# Patient Record
Sex: MALE | Race: WHITE | NOT HISPANIC OR LATINO | URBAN - METROPOLITAN AREA
[De-identification: names, ages, dates, MRNs, and addresses within clinical notes are randomized per-mention and may not be internally consistent; named-entity substitution may affect disease eponyms.]

---

## 2017-07-27 ENCOUNTER — EMERGENCY (EMERGENCY)
Facility: HOSPITAL | Age: 57
LOS: 0 days | Discharge: HOME | End: 2017-07-27

## 2017-07-27 DIAGNOSIS — G51.0 BELL'S PALSY: ICD-10-CM

## 2017-07-27 DIAGNOSIS — E11.9 TYPE 2 DIABETES MELLITUS WITHOUT COMPLICATIONS: ICD-10-CM

## 2017-07-27 DIAGNOSIS — I10 ESSENTIAL (PRIMARY) HYPERTENSION: ICD-10-CM

## 2017-07-27 DIAGNOSIS — I25.10 ATHEROSCLEROTIC HEART DISEASE OF NATIVE CORONARY ARTERY WITHOUT ANGINA PECTORIS: ICD-10-CM

## 2017-07-27 DIAGNOSIS — Z79.899 OTHER LONG TERM (CURRENT) DRUG THERAPY: ICD-10-CM

## 2017-07-27 DIAGNOSIS — Z79.82 LONG TERM (CURRENT) USE OF ASPIRIN: ICD-10-CM

## 2017-07-27 DIAGNOSIS — Z95.5 PRESENCE OF CORONARY ANGIOPLASTY IMPLANT AND GRAFT: ICD-10-CM

## 2017-07-27 DIAGNOSIS — Z88.1 ALLERGY STATUS TO OTHER ANTIBIOTIC AGENTS STATUS: ICD-10-CM

## 2017-07-27 DIAGNOSIS — R51 HEADACHE: ICD-10-CM

## 2017-07-27 DIAGNOSIS — Z79.84 LONG TERM (CURRENT) USE OF ORAL HYPOGLYCEMIC DRUGS: ICD-10-CM

## 2017-07-27 DIAGNOSIS — N28.9 DISORDER OF KIDNEY AND URETER, UNSPECIFIED: ICD-10-CM

## 2017-07-27 DIAGNOSIS — Z79.02 LONG TERM (CURRENT) USE OF ANTITHROMBOTICS/ANTIPLATELETS: ICD-10-CM

## 2018-03-08 ENCOUNTER — INPATIENT (INPATIENT)
Facility: HOSPITAL | Age: 58
LOS: 3 days | Discharge: HOME | End: 2018-03-12
Attending: INTERNAL MEDICINE

## 2018-03-08 VITALS
OXYGEN SATURATION: 99 % | HEART RATE: 65 BPM | SYSTOLIC BLOOD PRESSURE: 172 MMHG | RESPIRATION RATE: 18 BRPM | TEMPERATURE: 98 F | DIASTOLIC BLOOD PRESSURE: 90 MMHG

## 2018-03-08 DIAGNOSIS — Z87.898 PERSONAL HISTORY OF OTHER SPECIFIED CONDITIONS: Chronic | ICD-10-CM

## 2018-03-08 DIAGNOSIS — Z98.890 OTHER SPECIFIED POSTPROCEDURAL STATES: Chronic | ICD-10-CM

## 2018-03-08 LAB
ALBUMIN SERPL ELPH-MCNC: 3.4 G/DL — SIGNIFICANT CHANGE UP (ref 3–5.5)
ALP SERPL-CCNC: 97 U/L — SIGNIFICANT CHANGE UP (ref 30–115)
ALT FLD-CCNC: 10 U/L — SIGNIFICANT CHANGE UP (ref 0–41)
ANION GAP SERPL CALC-SCNC: 12 MMOL/L — SIGNIFICANT CHANGE UP (ref 7–14)
AST SERPL-CCNC: 11 U/L — SIGNIFICANT CHANGE UP (ref 0–41)
BASE EXCESS BLDV CALC-SCNC: -11.4 MMOL/L — LOW (ref -2–2)
BASOPHILS # BLD AUTO: 0.03 K/UL — SIGNIFICANT CHANGE UP (ref 0–0.2)
BASOPHILS NFR BLD AUTO: 0.3 % — SIGNIFICANT CHANGE UP (ref 0–1)
BILIRUB SERPL-MCNC: 0.5 MG/DL — SIGNIFICANT CHANGE UP (ref 0.2–1.2)
BUN SERPL-MCNC: 71 MG/DL — CRITICAL HIGH (ref 10–20)
CA-I SERPL-SCNC: 1.12 MMOL/L — SIGNIFICANT CHANGE UP (ref 1.12–1.3)
CALCIUM SERPL-MCNC: 8.3 MG/DL — LOW (ref 8.5–10.1)
CHLORIDE SERPL-SCNC: 114 MMOL/L — HIGH (ref 98–110)
CO2 SERPL-SCNC: 15 MMOL/L — LOW (ref 17–32)
CREAT SERPL-MCNC: 7.5 MG/DL — CRITICAL HIGH (ref 0.7–1.5)
EOSINOPHIL # BLD AUTO: 0.32 K/UL — SIGNIFICANT CHANGE UP (ref 0–0.7)
EOSINOPHIL NFR BLD AUTO: 3.7 % — SIGNIFICANT CHANGE UP (ref 0–8)
GAS PNL BLDV: 142 MMOL/L — SIGNIFICANT CHANGE UP (ref 136–145)
GAS PNL BLDV: SIGNIFICANT CHANGE UP
GLUCOSE SERPL-MCNC: 121 MG/DL — HIGH (ref 70–110)
HCO3 BLDV-SCNC: 15 MMOL/L — LOW (ref 22–29)
HCT VFR BLD CALC: 25.2 % — LOW (ref 42–52)
HGB BLD CALC-MCNC: 9.1 G/DL — LOW (ref 14–18)
HGB BLD-MCNC: 8.1 G/DL — LOW (ref 14–18)
IMM GRANULOCYTES NFR BLD AUTO: 0.3 % — SIGNIFICANT CHANGE UP (ref 0.1–0.3)
LACTATE BLDV-MCNC: 0.7 MMOL/L — SIGNIFICANT CHANGE UP (ref 0.5–1.6)
LACTATE SERPL-SCNC: 0.9 MMOL/L — SIGNIFICANT CHANGE UP (ref 0.5–2.2)
LYMPHOCYTES # BLD AUTO: 1.18 K/UL — LOW (ref 1.2–3.4)
LYMPHOCYTES # BLD AUTO: 13.5 % — LOW (ref 20.5–51.1)
MCHC RBC-ENTMCNC: 29.7 PG — SIGNIFICANT CHANGE UP (ref 27–31)
MCHC RBC-ENTMCNC: 32.1 G/DL — SIGNIFICANT CHANGE UP (ref 32–37)
MCV RBC AUTO: 92.3 FL — SIGNIFICANT CHANGE UP (ref 80–94)
MONOCYTES # BLD AUTO: 0.87 K/UL — HIGH (ref 0.1–0.6)
MONOCYTES NFR BLD AUTO: 10 % — HIGH (ref 1.7–9.3)
NEUTROPHILS # BLD AUTO: 6.3 K/UL — SIGNIFICANT CHANGE UP (ref 1.4–6.5)
NEUTROPHILS NFR BLD AUTO: 72.2 % — SIGNIFICANT CHANGE UP (ref 42.2–75.2)
NRBC # BLD: 0 /100 WBCS — SIGNIFICANT CHANGE UP (ref 0–0)
PCO2 BLDV: 36 MMHG — LOW (ref 41–51)
PH BLDV: 7.24 — LOW (ref 7.26–7.43)
PLATELET # BLD AUTO: 194 K/UL — SIGNIFICANT CHANGE UP (ref 130–400)
PO2 BLDV: 30 MMHG — SIGNIFICANT CHANGE UP (ref 20–40)
POTASSIUM BLDV-SCNC: 4.5 MMOL/L — SIGNIFICANT CHANGE UP (ref 3.3–5.6)
POTASSIUM SERPL-MCNC: 4.5 MMOL/L — SIGNIFICANT CHANGE UP (ref 3.5–5)
POTASSIUM SERPL-SCNC: 4.5 MMOL/L — SIGNIFICANT CHANGE UP (ref 3.5–5)
PROT SERPL-MCNC: 5.6 G/DL — LOW (ref 6–8)
RBC # BLD: 2.73 M/UL — LOW (ref 4.7–6.1)
RBC # FLD: 14.6 % — HIGH (ref 11.5–14.5)
SAO2 % BLDV: 47 % — SIGNIFICANT CHANGE UP
SODIUM SERPL-SCNC: 141 MMOL/L — SIGNIFICANT CHANGE UP (ref 135–146)
TROPONIN I SERPL-MCNC: 0.03 NG/ML — SIGNIFICANT CHANGE UP (ref 0–0.05)
WBC # BLD: 8.73 K/UL — SIGNIFICANT CHANGE UP (ref 4.8–10.8)
WBC # FLD AUTO: 8.73 K/UL — SIGNIFICANT CHANGE UP (ref 4.8–10.8)

## 2018-03-08 RX ORDER — SODIUM CHLORIDE 9 MG/ML
1000 INJECTION, SOLUTION INTRAVENOUS
Qty: 0 | Refills: 0 | Status: DISCONTINUED | OUTPATIENT
Start: 2018-03-08 | End: 2018-03-08

## 2018-03-08 RX ORDER — SODIUM CHLORIDE 9 MG/ML
3 INJECTION INTRAMUSCULAR; INTRAVENOUS; SUBCUTANEOUS ONCE
Qty: 0 | Refills: 0 | Status: COMPLETED | OUTPATIENT
Start: 2018-03-08 | End: 2018-03-08

## 2018-03-08 RX ORDER — OCTREOTIDE ACETATE 200 UG/ML
0 INJECTION, SOLUTION INTRAVENOUS; SUBCUTANEOUS
Qty: 0 | Refills: 0 | COMMUNITY

## 2018-03-08 RX ORDER — MECLIZINE HCL 12.5 MG
12.5 TABLET ORAL THREE TIMES A DAY
Qty: 0 | Refills: 0 | Status: DISCONTINUED | OUTPATIENT
Start: 2018-03-08 | End: 2018-03-12

## 2018-03-08 RX ORDER — ATORVASTATIN CALCIUM 80 MG/1
40 TABLET, FILM COATED ORAL AT BEDTIME
Qty: 0 | Refills: 0 | Status: DISCONTINUED | OUTPATIENT
Start: 2018-03-08 | End: 2018-03-12

## 2018-03-08 RX ORDER — ASPIRIN/CALCIUM CARB/MAGNESIUM 324 MG
1 TABLET ORAL
Qty: 0 | Refills: 0 | COMMUNITY

## 2018-03-08 RX ORDER — SODIUM CHLORIDE 9 MG/ML
1000 INJECTION INTRAMUSCULAR; INTRAVENOUS; SUBCUTANEOUS ONCE
Qty: 0 | Refills: 0 | Status: COMPLETED | OUTPATIENT
Start: 2018-03-08 | End: 2018-03-08

## 2018-03-08 RX ORDER — CALCITRIOL 0.5 UG/1
1 CAPSULE ORAL
Qty: 0 | Refills: 0 | COMMUNITY

## 2018-03-08 RX ORDER — METOCLOPRAMIDE HCL 10 MG
10 TABLET ORAL ONCE
Qty: 0 | Refills: 0 | Status: DISCONTINUED | OUTPATIENT
Start: 2018-03-08 | End: 2018-03-08

## 2018-03-08 RX ORDER — AMLODIPINE BESYLATE 2.5 MG/1
10 TABLET ORAL DAILY
Qty: 0 | Refills: 0 | Status: DISCONTINUED | OUTPATIENT
Start: 2018-03-08 | End: 2018-03-12

## 2018-03-08 RX ORDER — CALCITRIOL 0.5 UG/1
0.25 CAPSULE ORAL EVERY OTHER DAY
Qty: 0 | Refills: 0 | Status: DISCONTINUED | OUTPATIENT
Start: 2018-03-08 | End: 2018-03-12

## 2018-03-08 RX ORDER — CLOPIDOGREL BISULFATE 75 MG/1
75 TABLET, FILM COATED ORAL DAILY
Qty: 0 | Refills: 0 | Status: DISCONTINUED | OUTPATIENT
Start: 2018-03-08 | End: 2018-03-12

## 2018-03-08 RX ORDER — HEPARIN SODIUM 5000 [USP'U]/ML
5000 INJECTION INTRAVENOUS; SUBCUTANEOUS EVERY 8 HOURS
Qty: 0 | Refills: 0 | Status: DISCONTINUED | OUTPATIENT
Start: 2018-03-08 | End: 2018-03-09

## 2018-03-08 RX ORDER — AMLODIPINE BESYLATE 2.5 MG/1
10 TABLET ORAL ONCE
Qty: 0 | Refills: 0 | Status: COMPLETED | OUTPATIENT
Start: 2018-03-08 | End: 2018-03-08

## 2018-03-08 RX ORDER — ATENOLOL 25 MG/1
50 TABLET ORAL DAILY
Qty: 0 | Refills: 0 | Status: DISCONTINUED | OUTPATIENT
Start: 2018-03-08 | End: 2018-03-12

## 2018-03-08 RX ORDER — ASPIRIN/CALCIUM CARB/MAGNESIUM 324 MG
325 TABLET ORAL DAILY
Qty: 0 | Refills: 0 | Status: DISCONTINUED | OUTPATIENT
Start: 2018-03-08 | End: 2018-03-12

## 2018-03-08 RX ORDER — SODIUM BICARBONATE 1 MEQ/ML
0.05 SYRINGE (ML) INTRAVENOUS
Qty: 75 | Refills: 0 | Status: DISCONTINUED | OUTPATIENT
Start: 2018-03-08 | End: 2018-03-11

## 2018-03-08 RX ADMIN — SODIUM CHLORIDE 75 MILLILITER(S): 9 INJECTION, SOLUTION INTRAVENOUS at 20:05

## 2018-03-08 RX ADMIN — SODIUM CHLORIDE 2400 MILLILITER(S): 9 INJECTION INTRAMUSCULAR; INTRAVENOUS; SUBCUTANEOUS at 15:00

## 2018-03-08 RX ADMIN — HEPARIN SODIUM 5000 UNIT(S): 5000 INJECTION INTRAVENOUS; SUBCUTANEOUS at 23:17

## 2018-03-08 RX ADMIN — ATENOLOL 50 MILLIGRAM(S): 25 TABLET ORAL at 23:16

## 2018-03-08 RX ADMIN — SODIUM CHLORIDE 3 MILLILITER(S): 9 INJECTION INTRAMUSCULAR; INTRAVENOUS; SUBCUTANEOUS at 15:00

## 2018-03-08 RX ADMIN — AMLODIPINE BESYLATE 10 MILLIGRAM(S): 2.5 TABLET ORAL at 20:05

## 2018-03-08 RX ADMIN — CLOPIDOGREL BISULFATE 75 MILLIGRAM(S): 75 TABLET, FILM COATED ORAL at 23:10

## 2018-03-08 NOTE — ED PROVIDER NOTE - CARE PLAN
Principal Discharge DX:	Renal failure  Secondary Diagnosis:	Dizzy  Secondary Diagnosis:	DVT (deep venous thrombosis)

## 2018-03-08 NOTE — ED PROVIDER NOTE - OBJECTIVE STATEMENT
56 y/o M with PMH of acromyalgia, CAD with stents, diabetes, HTN, here for evaluation of dizziness. Pt reports that around 9 PM last night after going to bathroom he was walking back and started feeling dizziness and lightheadedness. Dizziness was associated with everything spinning, when pt sat down SX were resolved. Again at 8 AM this morning, pt went to the bathroom, pt reported feeling light headed and dizzy similar to spinning sensation however, this time pt  fell to legs, did not pass out. Denies hitting head. Pt currently with no SX. Denies any CP, SOB or rectal bleeding. Pt reports due to surgery has not been getting around much. Pt at home uses a cane to get around.

## 2018-03-08 NOTE — H&P ADULT - NSHPPHYSICALEXAM_GEN_ALL_CORE
GENERAL: NAD, well-developed  HEAD:  Atraumatic, Normocephalic  EYES: EOMI, PERRLA, conjunctiva and sclera clear  NECK: Supple, No JVD  CHEST/LUNG: Clear to auscultation bilaterally; No wheeze  HEART: Regular rate and rhythm; No murmurs  ABDOMEN: Soft, Nontender, Nondistended; Bowel sounds present  EXTREMITIES: 2-3 + pitting edema up to the knee bilaterally. + chronic vascular changes. no tenderness on palpation   NEUROLOGY: non-focal, CN II to XII grossly intact, AAOX3. no dysmetria. No nystagmus. gait: deferred

## 2018-03-08 NOTE — H&P ADULT - NSHPREVIEWOFSYSTEMS_GEN_ALL_CORE
Review of Systems:   CONSTITUTIONAL: No fever, weight changes  EYE: blurry vision during dizziness  ENMT:  No difficulty hearing, tinnitus, vertigo; No sinus or throat pain  NECK: No pain or stiffness  RESPIRATORY: No cough, wheezing, chills or hemoptysis; No shortness of breath  CARDIOVASCULAR: No chest pain, palpitations  GASTROINTESTINAL: No abdominal or epigastric pain. No nausea, vomiting, or hematemesis; No diarrhea or constipation. No melena or hematochezia.  GENITOURINARY: No dysuria  NEUROLOGICAL: No headaches, memory loss, loss of strength, numbness, or tremors. + dizziness/ spinning sensation/ imbalance  SKIN: No itching, burning, rashes, or lesions   MUSCULOSKELETAL: No joint pain or swelling

## 2018-03-08 NOTE — H&P ADULT - PMH
Acromegaly    CAD (coronary atherosclerotic disease)    CKD (chronic kidney disease), stage V    DM (diabetes mellitus), type 2    Dyslipidemia    HTN (hypertension)    Neuropathy

## 2018-03-08 NOTE — H&P ADULT - NSHPLABSRESULTS_GEN_ALL_CORE
T(C): 36.6 (03-08-18 @ 20:04), Max: 36.7 (03-08-18 @ 13:18)  T(F): 97.9 (03-08-18 @ 20:04), Max: 98 (03-08-18 @ 13:18)  HR: 55 (03-08-18 @ 20:04) (54 - 65)  BP: 175/100 (03-08-18 @ 20:04) (172/90 - 187/92)  RR: 18 (03-08-18 @ 20:04) (18 - 18)  SpO2: 99% (03-08-18 @ 20:04) (99% - 99%)  Labs:                         8.1<L>  8.73    )-----------(   194      ( 08 Mar 2018 15:20 )              25.2<L>    Neutro%  72.2    Lympho%  13.5<L>   Mono%    10.0<H>   Bands    x        03-08    141  |  114<H>  |  71<HH>  ----------------------------<  121<H>  4.5   |  15<L>  |  7.5<HH>    Ca    8.3<L>      08 Mar 2018 15:20    TPro  5.6<L>  /  Alb  3.4  /  TBili  0.5  /  DBili  x   /  AST  11  /  ALT  10  /  AlkPhos  97  03-08    eGFR if Non African American: 7 mL/min/1.73M2 (03-08-18 @ 15:20)  eGFR if : 9 mL/min/1.73M2 (03-08-18 @ 15:20)          CARDIAC MARKERS ( 08 Mar 2018 15:20 )  0.03 ng/mL / x     / x     / x     / x            Venous Blood Gas:  03-08 @ 17:22  7.24/36/30/15/47  VBG Lactate: 0.7

## 2018-03-08 NOTE — CONSULT NOTE ADULT - ASSESSMENT
Pt is 57M w/ Hx above who presented to the hospital for 2 episodes of dizziness w/ the second causing a fall to his knees. Pt was noted to have bilat leg swelling in ED L>R and received a venous duplex that was pos for chronic left femoral, popliteal, and gastrocnemius thrombus. Other than leg swelling for the past 2-3 weeks Pt has no acute symptoms relating to DVT. Due to chronic nature of DVTs Pt may need AC and f/u as outPt. Will discuss with attending.

## 2018-03-08 NOTE — H&P ADULT - ASSESSMENT
56 yo M with PMHx of pituitary tumor s/p removal, acromegaly, CAD s/p stents, DLD, DMII, neuropathy, CKD 5 presents with dizziness X2 days.     # Dizziness/ spinning sensation/ imbalance associated with blurry vision possibly 2/2 BPPV, r/o vasovagal r/o orthostatic r/o central neuro or cardiogenic causes  - f/u echo   - orthostatic VS follow up  - meclizine 12.5mg po q8 prn   - follow up C. E 2nd set  - get EKG    # LLE DVT (chronic appearing)  - follow up vascular C/S  - per ED note, ED spoke to vascular resident, if pt is asymptomatic and dvt is chronic, AC treatment not necessary    # NAGMA and KATHARINA on CKD V  - spoke to renal fellow, start 75 meq sodium bicarb infusion in 1/2 NS at 75 cc/hr  - monitor Is and OS  - pt refused ortiz  -monitor BMP    # DMII  - monitor FS  - start insulin ss if FS > 160    # HTN  - c/w home meds    # DLD  - c/w home meds    # CAD s/p stents  - c/w ASA, plavix    # DVT ppx   no need for GI ppx now 58 yo M with PMHx of pituitary tumor s/p removal, acromegaly, CAD s/p stents, DLD, DMII, neuropathy, CKD 5 presents with dizziness X2 days.     # Dizziness/ spinning sensation/ imbalance associated with blurry vision possibly 2/2 BPPV, r/o vasovagal r/o orthostatic r/o central neuro or cardiogenic causes  - f/u echo   - orthostatic VS follow up  - meclizine 12.5mg po q8 prn   - follow up C. E 2nd set    # LLE DVT (chronic appearing)  - follow up vascular C/S  - per ED note, ED spoke to vascular resident, if pt is asymptomatic and dvt is chronic, AC treatment not necessary    # NAGMA and KATHARINA on CKD V  - spoke to renal fellow, start 75 meq sodium bicarb infusion in 1/2 NS at 75 cc/hr  - monitor Is and OS, follow up Urine studies and follow up with retroperitoneal US  - pt refused ortiz  -monitor BMP    # DMII  - monitor FS  - start insulin ss if FS > 160    # HTN  - c/w home meds    # DLD  - c/w home meds    # CAD s/p stents  - c/w ASA, plavix    # DVT ppx   no need for GI ppx now 58 yo M with PMHx of pituitary tumor s/p removal, acromegaly, CAD s/p stents, DLD, DMII, neuropathy, CKD 5 presents with dizziness X2 days.     # Dizziness/ spinning sensation/ imbalance associated with blurry vision possibly 2/2 BPPV, r/o vasovagal r/o orthostatic r/o central neuro or arrhythmia  - f/u echo   - orthostatic VS follow up  - meclizine 12.5mg po q8 prn   - follow up C. E 2nd set    # chronic normocytic anemia  - H/H at baseline (hb 8.7 on 7/2017)  - f/u as outpatient    # LLE DVT (chronic appearing)  - follow up vascular C/S  - per ED note, ED spoke to vascular resident, if pt is asymptomatic and dvt is chronic, AC treatment not necessary    # NAGMA and KATHARINA on CKD V  - spoke to renal fellow, start 75 meq sodium bicarb infusion in 1/2 NS at 75 cc/hr  - monitor Is and OS, follow up Urine studies and follow up with retroperitoneal US  - pt refused ortiz  -monitor BMP    # DMII  - monitor FS  - start insulin ss if FS > 160    # HTN  - c/w home meds    # DLD  - c/w home meds    # CAD s/p stents  - c/w ASA, plavix    # DVT ppx   no need for GI ppx now

## 2018-03-08 NOTE — H&P ADULT - ATTENDING COMMENTS
Patient seen and examined independently. Resident's H & P reviewed. Agree with the findings and plan of care except,    1. UTI  2. KATHARINA/CKD-V likely due to obstructive uropathy  3. Urinary retention/BPH  4. AG Metabolic Acidosis  5. LLE DVT likely Ch.  6. Anemia of Ch. disease  7. Acromegaly  8. Likely JABARI  9. CAD        PLAN:    . Start him in IV Rociphen  . Start Flomax 0.4mg po q24h  . Pt. refused catheter  . Start Lasix 40mg IVP q12h.  . Check I'S and O'S  . Nephrology eval noted  . Pulm eval  . Vasc eval  . Will start him on IV hep for now. Follow PTT.  . On NAHCO3 drip. Renal F/U.

## 2018-03-08 NOTE — CONSULT NOTE ADULT - SUBJECTIVE AND OBJECTIVE BOX
HPI:  This patient is a 57y Male with PMH/PSH as below, who presents to the hospital w/ new onset dizziness for one day. Pt described it as a spinning sensation associated with imbalance and blurry vision lasting about 1-2 mins. Pt had another episode of dizziness the day of presentation. This last episode Pt fell to knees causing some shin abrasions and prompting his visit to the ED. In the ED Pt was noted to have swelling of the lower extrems L>R. Pt notes that this swelling is 2-3wks old. Lower extrem duplex was done which showed chronic appearing left femoral, popliteal, and gastrocnemius thrombus. Pt denies tenderness or discoloration of leg other than multiple healing wounds from constantly hitting shins due to chronic neuropathy. Pt is taking ASA and Plavix for Hx of coronary stents.    PMH:  Dyslipidemia  DM (diabetes mellitus), type 2  Neuropathy  HTN (hypertension)  CAD (coronary atherosclerotic disease)  Acromegaly  CKD (chronic kidney disease), stage V    PSH:  H/O eye surgery  H/O: pituitary tumor    Home Medications:  amLODIPine 10 mg oral tablet: 1 tab(s) orally once a day  aspirin 325 mg oral tablet: 1 tab(s) orally once a day  atenolol 50 mg oral tablet: 1 tab(s) orally once a day  calcitriol 0.25 mcg oral capsule: 1 cap(s) orally every 48 hours  Crestor 10 mg oral tablet: 1 tab(s) orally once a day (at bedtime)  glipiZIDE 2.5 mg oral tablet, extended release: 1 tab(s) orally once a day  Plavix 75 mg oral tablet: 1 tab(s) orally once a day  SandoSTATIN LAR [place] 20 mg intramuscular injection, extended release: intramuscular every 30 days  sodium bicarbonate 650 mg oral tablet: 1 tab(s) orally 2 times a day    Hospital Medications:    amLODIPine   Tablet 10 milliGRAM(s) Oral once  sodium chloride 0.9% Bolus 1000 milliLiter(s) IV Bolus once  sodium chloride 0.9% lock flush 3 milliLiter(s) IV Push once    Allergies:  bacitracin (Unknown)  Neosporin (Hypotension)      Physical Examination  Vital Signs: T(F): 96.2 (03-08-18 @ 21:39), Max: 98 (03-08-18 @ 13:18)  HR: 54 (03-08-18 @ 21:39)  BP: 186/91 (03-08-18 @ 21:39)  RR: 18 (03-08-18 @ 21:39)  SpO2: 99% (03-08-18 @ 20:04)  General Appearance: NAD, alert and cooperative  HEENT: Atraumatic, frontal bossing noted  Heart: Distant heart sounds. No murmurs. Rhythm is regular  Lungs: Clear to auscultation BL without rales, rhonchi, wheezing or diminished breath sounds.  Abdomen:  Positive bowel sounds. Soft, nondistended, nontender.  MSK/Extremities: +3 pitting edema bilat lower extrems extending to knees l>R, anterior shin with chronic and acute abrasions. palp DP bilat PT signs bilat, non-tender and absent sensation due to neuropathy, decreased ROM-extend/flex of toes.  Skin: Warm/dry, Normal color, texture and turgor with no lesions or eruptions. No jaundice.     Labs:  CBC: 03-08-18 @ 15:20  WBC: 8.73 K/uL N-- M-- L-- E--  HEMO: 8.1 g/dL<L> RBC2.73 M/uL<L>  HCT: 25.2 %<L>  PLT: 194 K/uL , --    BMP:03-08-18 @ 15:20   mmol/L  mmol/L<H> BUN 71 mg/dL<HH> GFR 9 mL/min/1.73M2<L>  K 4.5 mmol/L CO2 15 mmol/L<L> CR 7.5 mg/dL<HH> GLUC 121 mg/dL<H>    CA 8.3 mg/dL<L>  MG --  PO4 --    LFT: 03-08-18 @ 15:20  TPROT 5.6 g/dL<L> TBILI 0.5 mg/dL AST 11 U/L ALP 97 U/L  ALB 3.4 g/dL DBILI -- ALT 10 U/L      OTHER LABS:   Troponin I, Serum: 0.03 ng/mL (03-08-18 @ 15:20)  Lactate, Blood: 0.9 mmol/L (03-08-18 @ 15:20)      Imaging:  < from: [facility] Duplex Lower Ext Vein Scan, Bilat (03.08.18 @ 14:41) >  Impression:  Nonocclusive, chronic-appearing thrombus is present in the left femoral   and popliteal veins, along with the left gastrocnemius veins.  No evidence of deep venous thrombosis or superficial venous   thrombophlebitis on the right.  No prior studies available for comparison.      03-08-18 Inpatient  Height (cm): 185 (03-08-18 @ 21:00)  Weight (kg): 113 (03-08-18 @ 21:00)  BMI (kg/m2): 33 (03-08-18 @ 21:00)  BSA (m2): 2.36 (03-08-18 @ 21:00)

## 2018-03-08 NOTE — ED ADULT NURSE NOTE - OBJECTIVE STATEMENT
Pt has been feeling dizzy, experiencing dizzy spells. Family states he passed out yesterday and called family right after. Pt has history of acromegaly and brain surgery years

## 2018-03-08 NOTE — H&P ADULT - HISTORY OF PRESENT ILLNESS
56 yo M with PMHx of pituitary tumor s/p removal, acromegaly, CAD s/p stents, DLD, DMII, neuropathy, CKD 5 presents with dizziness X2 days. Pt felt dizziness the night before presentation, pt was walking back after going to the bathroom and felt dizzy. Pt described it as a spinning sensation associated with imbalance and blurry vision lasting about 1-2 mins. Pt had another episode of dizziness the day of presentation. Again, he was walking back after urinating, denies straining, felt dizziness on his way back. However, pt felt to his legs this time and called an ambulance to the hospital. Pt denies headache, fever, chills, N/V/D, SOB, CP, abdominal pain, leg pain, dysuria, blood in urine or stool.   Pt was found to have pH 7.24 Cr 7.5 and Bicarb of 15 in ED. Pt has a baseline cr of 6.64 (7/2017), follows up with Dr. Jones regularly and states that he stills make urine. Pt refused ortiz in the ED  Pt was found to have chronic appearing DVT in LLE.     In ED: 1 L NS bouls.

## 2018-03-08 NOTE — ED PROVIDER NOTE - PROGRESS NOTE DETAILS
pt refusing rectal exam states he know he has no bleeding Pt with chronic DVT, I spoke to vascular fellow unofficially who states if pt is not symptomatic can hold off on treatment. Pt has minimal L leg swelling, R leg swelling is greater. Additionally pt noted to have Creatinine of 7.5, agree with review of previous records with Creatinine of 6. Nephrologist not in [facility]. [facility] 7.24. Pt refusing He[place], states he makes urine. Will admit pt for further evaluation and treatment.  Nephrology consulted, pt signed out the Mar. Discussed with renal, will see pt tomorrow.

## 2018-03-09 PROBLEM — Z00.00 ENCOUNTER FOR PREVENTIVE HEALTH EXAMINATION: Status: ACTIVE | Noted: 2018-03-09

## 2018-03-09 LAB
ANION GAP SERPL CALC-SCNC: 10 MMOL/L — SIGNIFICANT CHANGE UP (ref 7–14)
ANION GAP SERPL CALC-SCNC: 11 MMOL/L — SIGNIFICANT CHANGE UP (ref 7–14)
ANION GAP SERPL CALC-SCNC: 12 MMOL/L — SIGNIFICANT CHANGE UP (ref 7–14)
BASOPHILS # BLD AUTO: 0.06 K/UL — SIGNIFICANT CHANGE UP (ref 0–0.2)
BASOPHILS NFR BLD AUTO: 0.6 % — SIGNIFICANT CHANGE UP (ref 0–1)
BUN SERPL-MCNC: 67 MG/DL — CRITICAL HIGH (ref 10–20)
BUN SERPL-MCNC: 67 MG/DL — CRITICAL HIGH (ref 10–20)
BUN SERPL-MCNC: 68 MG/DL — CRITICAL HIGH (ref 10–20)
CALCIUM SERPL-MCNC: 7.8 MG/DL — LOW (ref 8.5–10.1)
CALCIUM SERPL-MCNC: 7.8 MG/DL — LOW (ref 8.5–10.1)
CALCIUM SERPL-MCNC: 7.9 MG/DL — LOW (ref 8.5–10.1)
CHLORIDE SERPL-SCNC: 109 MMOL/L — SIGNIFICANT CHANGE UP (ref 98–110)
CHLORIDE SERPL-SCNC: 110 MMOL/L — SIGNIFICANT CHANGE UP (ref 98–110)
CHLORIDE SERPL-SCNC: 112 MMOL/L — HIGH (ref 98–110)
CK MB BLD-MCNC: 4 % — SIGNIFICANT CHANGE UP (ref 0–4)
CK MB CFR SERPL CALC: 5.9 NG/ML — SIGNIFICANT CHANGE UP (ref 0.6–6.3)
CK SERPL-CCNC: 141 U/L — SIGNIFICANT CHANGE UP (ref 0–225)
CO2 SERPL-SCNC: 15 MMOL/L — LOW (ref 17–32)
CO2 SERPL-SCNC: 16 MMOL/L — LOW (ref 17–32)
CO2 SERPL-SCNC: 16 MMOL/L — LOW (ref 17–32)
CREAT SERPL-MCNC: 6.6 MG/DL — CRITICAL HIGH (ref 0.7–1.5)
CREAT SERPL-MCNC: 6.6 MG/DL — CRITICAL HIGH (ref 0.7–1.5)
CREAT SERPL-MCNC: 6.7 MG/DL — CRITICAL HIGH (ref 0.7–1.5)
EOSINOPHIL # BLD AUTO: 0.51 K/UL — SIGNIFICANT CHANGE UP (ref 0–0.7)
EOSINOPHIL NFR BLD AUTO: 5.3 % — SIGNIFICANT CHANGE UP (ref 0–8)
GLUCOSE SERPL-MCNC: 109 MG/DL — SIGNIFICANT CHANGE UP (ref 70–110)
GLUCOSE SERPL-MCNC: 126 MG/DL — HIGH (ref 70–110)
GLUCOSE SERPL-MCNC: 95 MG/DL — SIGNIFICANT CHANGE UP (ref 70–110)
HCT VFR BLD CALC: 25.1 % — LOW (ref 42–52)
HGB BLD-MCNC: 8 G/DL — LOW (ref 14–18)
IMM GRANULOCYTES NFR BLD AUTO: 0.5 % — HIGH (ref 0.1–0.3)
IRON SATN MFR SERPL: 23 % — SIGNIFICANT CHANGE UP (ref 15–50)
IRON SATN MFR SERPL: 46 UG/DL — SIGNIFICANT CHANGE UP (ref 35–150)
LYMPHOCYTES # BLD AUTO: 1.06 K/UL — LOW (ref 1.2–3.4)
LYMPHOCYTES # BLD AUTO: 11 % — LOW (ref 20.5–51.1)
MAGNESIUM SERPL-MCNC: 1.7 MG/DL — LOW (ref 1.8–2.4)
MCHC RBC-ENTMCNC: 30.2 PG — SIGNIFICANT CHANGE UP (ref 27–31)
MCHC RBC-ENTMCNC: 31.9 G/DL — LOW (ref 32–37)
MCV RBC AUTO: 94.7 FL — HIGH (ref 80–94)
MONOCYTES # BLD AUTO: 1.06 K/UL — HIGH (ref 0.1–0.6)
MONOCYTES NFR BLD AUTO: 11 % — HIGH (ref 1.7–9.3)
NEUTROPHILS # BLD AUTO: 6.94 K/UL — HIGH (ref 1.4–6.5)
NEUTROPHILS NFR BLD AUTO: 71.6 % — SIGNIFICANT CHANGE UP (ref 42.2–75.2)
PHOSPHATE SERPL-MCNC: 8.6 MG/DL — HIGH (ref 2.1–4.9)
PLATELET # BLD AUTO: 203 K/UL — SIGNIFICANT CHANGE UP (ref 130–400)
POTASSIUM SERPL-MCNC: 4 MMOL/L — SIGNIFICANT CHANGE UP (ref 3.5–5)
POTASSIUM SERPL-MCNC: 4.1 MMOL/L — SIGNIFICANT CHANGE UP (ref 3.5–5)
POTASSIUM SERPL-MCNC: 4.2 MMOL/L — SIGNIFICANT CHANGE UP (ref 3.5–5)
POTASSIUM SERPL-SCNC: 4 MMOL/L — SIGNIFICANT CHANGE UP (ref 3.5–5)
POTASSIUM SERPL-SCNC: 4.1 MMOL/L — SIGNIFICANT CHANGE UP (ref 3.5–5)
POTASSIUM SERPL-SCNC: 4.2 MMOL/L — SIGNIFICANT CHANGE UP (ref 3.5–5)
RBC # BLD: 2.65 M/UL — LOW (ref 4.7–6.1)
RBC # FLD: 14.6 % — HIGH (ref 11.5–14.5)
SODIUM SERPL-SCNC: 136 MMOL/L — SIGNIFICANT CHANGE UP (ref 135–146)
SODIUM SERPL-SCNC: 137 MMOL/L — SIGNIFICANT CHANGE UP (ref 135–146)
SODIUM SERPL-SCNC: 138 MMOL/L — SIGNIFICANT CHANGE UP (ref 135–146)
TIBC SERPL-MCNC: 212 UG/ML — LOW (ref 260–400)
TROPONIN I SERPL-MCNC: 0.03 NG/ML — SIGNIFICANT CHANGE UP (ref 0–0.05)
WBC # BLD: 9.68 K/UL — SIGNIFICANT CHANGE UP (ref 4.8–10.8)
WBC # FLD AUTO: 9.68 K/UL — SIGNIFICANT CHANGE UP (ref 4.8–10.8)

## 2018-03-09 RX ORDER — CEFTRIAXONE 500 MG/1
INJECTION, POWDER, FOR SOLUTION INTRAMUSCULAR; INTRAVENOUS
Qty: 0 | Refills: 0 | Status: DISCONTINUED | OUTPATIENT
Start: 2018-03-09 | End: 2018-03-12

## 2018-03-09 RX ORDER — CEFTRIAXONE 500 MG/1
2 INJECTION, POWDER, FOR SOLUTION INTRAMUSCULAR; INTRAVENOUS ONCE
Qty: 0 | Refills: 0 | Status: COMPLETED | OUTPATIENT
Start: 2018-03-09 | End: 2018-03-09

## 2018-03-09 RX ORDER — CEFTRIAXONE 500 MG/1
1 INJECTION, POWDER, FOR SOLUTION INTRAMUSCULAR; INTRAVENOUS EVERY 24 HOURS
Qty: 0 | Refills: 0 | Status: DISCONTINUED | OUTPATIENT
Start: 2018-03-10 | End: 2018-03-12

## 2018-03-09 RX ORDER — TAMSULOSIN HYDROCHLORIDE 0.4 MG/1
0.4 CAPSULE ORAL DAILY
Qty: 0 | Refills: 0 | Status: DISCONTINUED | OUTPATIENT
Start: 2018-03-09 | End: 2018-03-12

## 2018-03-09 RX ORDER — CEFTRIAXONE 500 MG/1
INJECTION, POWDER, FOR SOLUTION INTRAMUSCULAR; INTRAVENOUS
Qty: 0 | Refills: 0 | Status: DISCONTINUED | OUTPATIENT
Start: 2018-03-09 | End: 2018-03-09

## 2018-03-09 RX ORDER — TAMSULOSIN HYDROCHLORIDE 0.4 MG/1
0.4 CAPSULE ORAL AT BEDTIME
Qty: 0 | Refills: 0 | Status: DISCONTINUED | OUTPATIENT
Start: 2018-03-09 | End: 2018-03-09

## 2018-03-09 RX ORDER — FUROSEMIDE 40 MG
40 TABLET ORAL EVERY 12 HOURS
Qty: 0 | Refills: 0 | Status: DISCONTINUED | OUTPATIENT
Start: 2018-03-09 | End: 2018-03-11

## 2018-03-09 RX ORDER — HEPARIN SODIUM 5000 [USP'U]/ML
1400 INJECTION INTRAVENOUS; SUBCUTANEOUS
Qty: 25000 | Refills: 0 | Status: DISCONTINUED | OUTPATIENT
Start: 2018-03-09 | End: 2018-03-09

## 2018-03-09 RX ADMIN — ATENOLOL 50 MILLIGRAM(S): 25 TABLET ORAL at 06:28

## 2018-03-09 RX ADMIN — CEFTRIAXONE 100 GRAM(S): 500 INJECTION, POWDER, FOR SOLUTION INTRAMUSCULAR; INTRAVENOUS at 13:20

## 2018-03-09 RX ADMIN — TAMSULOSIN HYDROCHLORIDE 0.4 MILLIGRAM(S): 0.4 CAPSULE ORAL at 13:07

## 2018-03-09 RX ADMIN — CLOPIDOGREL BISULFATE 75 MILLIGRAM(S): 75 TABLET, FILM COATED ORAL at 12:18

## 2018-03-09 RX ADMIN — Medication 40 MILLIGRAM(S): at 13:06

## 2018-03-09 RX ADMIN — HEPARIN SODIUM 1400 UNIT(S)/HR: 5000 INJECTION INTRAVENOUS; SUBCUTANEOUS at 13:12

## 2018-03-09 RX ADMIN — CALCITRIOL 0.25 MICROGRAM(S): 0.5 CAPSULE ORAL at 12:18

## 2018-03-09 RX ADMIN — Medication 325 MILLIGRAM(S): at 12:18

## 2018-03-09 RX ADMIN — AMLODIPINE BESYLATE 10 MILLIGRAM(S): 2.5 TABLET ORAL at 06:30

## 2018-03-09 RX ADMIN — Medication 75 MEQ/KG/HR: at 00:12

## 2018-03-09 RX ADMIN — Medication 40 MILLIGRAM(S): at 17:28

## 2018-03-09 RX ADMIN — ATORVASTATIN CALCIUM 40 MILLIGRAM(S): 80 TABLET, FILM COATED ORAL at 21:41

## 2018-03-09 RX ADMIN — HEPARIN SODIUM 5000 UNIT(S): 5000 INJECTION INTRAVENOUS; SUBCUTANEOUS at 06:30

## 2018-03-09 NOTE — CONSULT NOTE ADULT - ASSESSMENT
Impression:  [place] evaluation as out patient with PSG  elevation of right mega diaphragm: will need sniff test. can be done as out patient

## 2018-03-09 NOTE — CONSULT NOTE ADULT - SUBJECTIVE AND OBJECTIVE BOX
NEPHROLOGY CONSULTATION NOTE    A 58 yo M with PMH listed below presented to hospital because of new onset dizziness for 1 day, spinning sensation associated with imbalance and blurry vision. Pt stated he had a fall down on keens causing some abrasion. Renal consult called for KATHARINA and CKD 5. His serum Cr was 6.7, his baseline Cr was     PAST MEDICAL & SURGICAL HISTORY:  Dyslipidemia  DM (diabetes mellitus), type 2  Neuropathy  DVT  HTN (hypertension)  CAD (coronary atherosclerotic disease)  Acromegaly  CKD (chronic kidney disease), stage V  H/O eye surgery  H/O: pituitary tumor: s/p removal    Allergies:  bacitracin (Unknown)  Neosporin (Hypotension)    Home Medications Reviewed  Hospital Medications:   MEDICATIONS  (STANDING):  amLODIPine   Tablet 10 milliGRAM(s) Oral daily  aspirin 325 milliGRAM(s) Oral daily  ATENolol  Tablet 50 milliGRAM(s) Oral daily  atorvastatin 40 milliGRAM(s) Oral at bedtime  calcitriol   Capsule 0.25 MICROGram(s) Oral every other day  clopidogrel Tablet 75 milliGRAM(s) Oral daily  heparin  Injectable 5000 Unit(s) SubCutaneous every 8 hours  sodium bicarbonate  Infusion 0.05 mEq/kG/Hr (75 mL/Hr) IV Continuous <Continuous>  1/2 NS 75 ml/Hr    SOCIAL HISTORY:  Denies ETOH,Smoking,   FAMILY HISTORY:  Family history of myocardial infarction (Father)        REVIEW OF SYSTEMS:  CONSTITUTIONAL: No weakness, fevers or chills  EYES/ENT: No visual changes;  No vertigo or throat pain   NECK: No pain or stiffness  RESPIRATORY: No cough, wheezing, hemoptysis; No shortness of breath  CARDIOVASCULAR: No chest pain or palpitations.  GASTROINTESTINAL: No abdominal or epigastric pain. No nausea, vomiting, or hematemesis; No diarrhea or constipation. No melena or hematochezia.  GENITOURINARY: No dysuria, frequency, foamy urine, urinary urgency, incontinence or hematuria  NEUROLOGICAL: No numbness or weakness  SKIN: No itching, burning, rashes, or lesions   VASCULAR: No bilateral lower extremity edema.   All other review of systems is negative unless indicated above.    VITALS:  T(F): 97.1 (03-09-18 @ 05:30), Max: 98 (03-08-18 @ 13:18)  HR: 54 (03-08-18 @ 21:39)  BP: 151/75 (03-09-18 @ 05:30)  RR: 18 (03-09-18 @ 05:30)  SpO2: 99% (03-08-18 @ 20:04)    03-08 @ 07:01  -  03-09 @ 07:00  --------------------------------------------------------  IN: 450 mL / OUT: 0 mL / NET: 450 mL    03-09 @ 07:01  -  03-09 @ 09:11  --------------------------------------------------------  IN: 0 mL / OUT: 900 mL / NET: -900 mL      Height (cm): 185 (03-08 @ 21:00)  Weight (kg): 131 (03-09 @ 05:30)  BMI (kg/m2): 38.3 (03-09 @ 05:30)  BSA (m2): 2.51 (03-09 @ 05:30)      I&O's Detail    08 Mar 2018 07:01  -  09 Mar 2018 07:00  --------------------------------------------------------  IN:    sodium bicarbonate  Infusion: 450 mL  Total IN: 450 mL    OUT:  Total OUT: 0 mL    Total NET: 450 mL      09 Mar 2018 07:01  -  09 Mar 2018 09:11  --------------------------------------------------------  IN:  Total IN: 0 mL    OUT:    Voided: 900 mL  Total OUT: 900 mL    Total NET: -900 mL        Creatine Kinase, Serum: 141 U/L (03-09-18 @ 00:36)      PHYSICAL EXAM:  Constitutional: NAD  HEENT: anicteric sclera, oropharynx clear, MMM  Neck: No JVD  Respiratory: CTAB, no wheezes, rales or rhonchi  Cardiovascular: S1, S2, RRR  Gastrointestinal: BS+, soft, NT/ND  Extremities: No cyanosis or clubbing. No peripheral edema  Neurological: A/O x 3, no focal deficits  Psychiatric: Normal mood, normal affect  : No CVA tenderness. No ortiz.   Skin: No rashes  Vascular Access:    LABS:  03-09    137  |  110  |  68<HH>  ----------------------------<  109  4.2   |  16<L>  |  6.7<HH>    Ca    7.8<L>      09 Mar 2018 00:36  Mg     1.7     03-09    TPro  5.6<L>  /  Alb  3.4  /  TBili  0.5  /  DBili      /  AST  11  /  ALT  10  /  AlkPhos  97  03-08    Creatinine Trend: 6.7 <--, 6.6 <--, 7.5 <--                        8.0    9.68  )-----------( 203      ( 09 Mar 2018 06:33 )             25.1     Urine Studies:              RADIOLOGY & ADDITIONAL STUDIES: NEPHROLOGY CONSULTATION NOTE    A 56 yo M with PMH listed below presented to hospital because of new onset dizziness for 1 day, spinning sensation associated with imbalance and blurry vision. Pt stated he had a fall down on keens causing some abrasion. Renal consult called for KATHARINA and CKD 5. His serum Cr was 6.7, his baseline Cr was 6.6 last year.     PAST MEDICAL & SURGICAL HISTORY:  Dyslipidemia  DM (diabetes mellitus), type 2  Neuropathy  DVT  HTN (hypertension)  CAD (coronary atherosclerotic disease)  Acromegaly  CKD (chronic kidney disease), stage V  H/O eye surgery  H/O: pituitary tumor: s/p removal    Allergies:  bacitracin (Unknown)  Neosporin (Hypotension)    Home Medications Reviewed  Hospital Medications:   MEDICATIONS  (STANDING):  amLODIPine   Tablet 10 milliGRAM(s) Oral daily  aspirin 325 milliGRAM(s) Oral daily  ATENolol  Tablet 50 milliGRAM(s) Oral daily  atorvastatin 40 milliGRAM(s) Oral at bedtime  calcitriol   Capsule 0.25 MICROGram(s) Oral every other day  clopidogrel Tablet 75 milliGRAM(s) Oral daily  heparin  Injectable 5000 Unit(s) SubCutaneous every 8 hours  sodium bicarbonate  Infusion 0.05 mEq/kG/Hr (75 mL/Hr) IV Continuous <Continuous>  1/2 NS 75 ml/Hr    SOCIAL HISTORY:  Denies ETOH,Smoking,   FAMILY HISTORY:  Family history of myocardial infarction (Father)        REVIEW OF SYSTEMS:  CONSTITUTIONAL: No weakness, fevers or chills  EYES/ENT: No visual changes;  No vertigo or throat pain   NECK: No pain or stiffness  RESPIRATORY: No cough, wheezing, hemoptysis; No shortness of breath  CARDIOVASCULAR: No chest pain or palpitations.  GASTROINTESTINAL: No abdominal or epigastric pain. No nausea, vomiting, or hematemesis; No diarrhea or constipation. No melena or hematochezia.  GENITOURINARY: No dysuria, frequency, foamy urine, urinary urgency, incontinence or hematuria  NEUROLOGICAL: No numbness or weakness  SKIN: No itching, burning, rashes, or lesions   VASCULAR: No bilateral lower extremity edema.   All other review of systems is negative unless indicated above.    VITALS:  T(F): 97.1 (03-09-18 @ 05:30), Max: 98 (03-08-18 @ 13:18)  HR: 54 (03-08-18 @ 21:39)  BP: 151/75 (03-09-18 @ 05:30)  RR: 18 (03-09-18 @ 05:30)  SpO2: 99% (03-08-18 @ 20:04)    03-08 @ 07:01  -  03-09 @ 07:00  --------------------------------------------------------  IN: 450 mL / OUT: 0 mL / NET: 450 mL    03-09 @ 07:01  -  03-09 @ 09:11  --------------------------------------------------------  IN: 0 mL / OUT: 900 mL / NET: -900 mL    Height (cm): 185 (03-08 @ 21:00)  Weight (kg): 131 (03-09 @ 05:30)  BMI (kg/m2): 38.3 (03-09 @ 05:30)  BSA (m2): 2.51 (03-09 @ 05:30)      08 Mar 2018 07:01  -  09 Mar 2018 07:00  --------------------------------------------------------  IN:    sodium bicarbonate  Infusion: 450 mL  Total IN: 450 mL    OUT:  Total OUT: 0 mL    Total NET: 450 mL      09 Mar 2018 07:01  -  09 Mar 2018 09:11  --------------------------------------------------------  IN:  Total IN: 0 mL    OUT:  Voided: 900 mL  Total OUT: 900 mL    Total NET: -900 mL    Creatine Kinase, Serum: 141 U/L (03-09-18 @ 00:36)      PHYSICAL EXAM:  Constitutional: NAD  HEENT: anicteric sclera, oropharynx clear, MMM  Neck: No JVD  Respiratory: CTAB, no wheezes, rales or rhonchi  Cardiovascular: S1, S2, RRR  Gastrointestinal: BS+, soft, NT/ND  Extremities: No cyanosis or clubbing. No peripheral edema  Neurological: A/O x 3, no focal deficits  Psychiatric: Normal mood, normal affect  : No CVA tenderness. No ortiz.   Skin: No rashes      LABS:  03-09    137  |  110  |  68<HH>  ----------------------------<  109  4.2   |  16<L>  |  6.7<HH>    Ca    7.8<L>      09 Mar 2018 00:36  Mg     1.7     03-09    TPro  5.6<L>  /  Alb  3.4  /  TBili  0.5  /  DBili      /  AST  11  /  ALT  10  /  AlkPhos  97  03-08    Creatinine Trend: 6.7 <--, 6.6 <--, 7.5 <--                        8.0    9.68  )-----------( 203      ( 09 Mar 2018 06:33 )             25.1   Blood Gas Profile - Venous (03.08.18 @ 17:22)    pH, Venous: 7.24    pCO2, Venous: 36 mmHg    pO2, Venous: 30 mmHg    HCO3, Venous: 15 mmoL/L    Base Excess, Venous: -11.4 mmoL/L    Oxygen Saturation, Venous: 47 %    Urine Studies:    RADIOLOGY & ADDITIONAL STUDIES:  < from: US Retroperitoneal Complete (03.08.18 @ 20:47) >  IMPRESSION:  No hydronephrosis.  Right renal cyst.  Urinary bladder volume 590 cc. Patient declined voiding portion of   examination.  Enlarged prostate volume, 42 cc.    < end of copied text >  < from: Xray Chest 1 View AP/PA (03.08.18 @ 15:07) >  Impression:      Elevation the right hemidiaphragm with adjacent atelectasis.    Without difference.    < end of copied text >  < from: CT Head No Cont (03.08.18 @ 14:40) >    Impression:     1.  No acute mass effect, midline shift or hemorrhage.    2.  Limited study due to lack of administration of IV contrast which   decreases evaluation for primary or secondary cance    < end of copied text > NEPHROLOGY CONSULTATION NOTE    A 56 yo M with PMH listed below presented to hospital because of new onset dizziness for 1 day, spinning sensation associated with imbalance and blurry vision. Pt stated he had a fall down on keens causing some abrasion. Renal consult called for KATHARINA and CKD 5. His serum Cr was 6.7, his baseline Cr was 6.6 last year.     no fever/chills, headache, SOB, CP, abd pain, hematuria, and oliguria    PAST MEDICAL & SURGICAL HISTORY:  Dyslipidemia  DM (diabetes mellitus), type 2  Neuropathy  DVT  HTN (hypertension)  CAD (coronary atherosclerotic disease)  Acromegaly  CKD (chronic kidney disease), stage V  H/O eye surgery  H/O: pituitary tumor: s/p removal    Allergies:  bacitracin (Unknown)  Neosporin (Hypotension)    Home Medications Reviewed  Hospital Medications:   MEDICATIONS  (STANDING):  amLODIPine   Tablet 10 milliGRAM(s) Oral daily  aspirin 325 milliGRAM(s) Oral daily  ATENolol  Tablet 50 milliGRAM(s) Oral daily  atorvastatin 40 milliGRAM(s) Oral at bedtime  calcitriol   Capsule 0.25 MICROGram(s) Oral every other day  clopidogrel Tablet 75 milliGRAM(s) Oral daily  heparin  Injectable 5000 Unit(s) SubCutaneous every 8 hours  sodium bicarbonate  Infusion 0.05 mEq/kG/Hr (75 mL/Hr) IV Continuous <Continuous>  1/2 NS 75 ml/Hr    SOCIAL HISTORY:  Denies ETOH,Smoking,   FAMILY HISTORY:  Family history of myocardial infarction (Father)    REVIEW OF SYSTEMS:  CONSTITUTIONAL: no weakness.  EYES/ENT: No visual changes;    RESPIRATORY: No cough, wheezing, hemoptysis; No shortness of breath  CARDIOVASCULAR: No chest pain or palpitations..  GENITOURINARY: No dysuria, frequency, foamy urine, urinary urgency, incontinence or hematuria  VASCULAR:(+) bilateral lower extremity edema.     VITALS:  T(F): 97.1 (03-09-18 @ 05:30), Max: 98 (03-08-18 @ 13:18)  HR: 54 (03-08-18 @ 21:39)  BP: 151/75 (03-09-18 @ 05:30)Vital Signs Last 24 Hrs  BP: 151/75 (09 Mar 2018 05:30) (151/75 - 187/92)  RR: 18 (03-09-18 @ 05:30)  SpO2: 99% (03-08-18 @ 20:04)    03-08 @ 07:01  -  03-09 @ 07:00  --------------------------------------------------------  IN: 450 mL / OUT: 0 mL / NET: 450 mL    03-09 @ 07:01  -  03-09 @ 09:11  --------------------------------------------------------  IN: 0 mL / OUT: 900 mL / NET: -900 mL    Height (cm): 185 (03-08 @ 21:00)  Weight (kg): 131 (03-09 @ 05:30)  BMI (kg/m2): 38.3 (03-09 @ 05:30)  BSA (m2): 2.51 (03-09 @ 05:30)    08 Mar 2018 07:01  -  09 Mar 2018 07:00  --------------------------------------------------------  IN:    sodium bicarbonate  Infusion: 450 mL  Total IN: 450 mL    OUT:  Total OUT: 0 mL    Total NET: 450 mL    09 Mar 2018 07:01  -  09 Mar 2018 09:11  --------------------------------------------------------  IN:  Total IN: 0 mL    OUT:  Voided: 900 mL  Total OUT: 900 mL    Total NET: -900 mL    Creatine Kinase, Serum: 141 U/L (03-09-18 @ 00:36)    PHYSICAL EXAM:  Constitutional: NAD  HEENT: anicteric sclera, oropharynx clear, MMM  Neck: No JVD  Respiratory: CTAB, no wheezes, rales or rhonchi  Cardiovascular: S1, S2, RRR  Gastrointestinal: BS+, soft, NT/ND  Extremities: No cyanosis or clubbing. mild leg edema  Neurological: A/O x 3, no focal deficits  Psychiatric: Normal mood, normal affect  : No CVA tenderness. No ortiz.       LABS:  03-09    137  |  110  |  68<HH>  ----------------------------<  109  4.2   |  16<L>  |  6.7<HH>    Ca    7.8<L>      09 Mar 2018 00:36  Mg     1.7     03-09    TPro  5.6<L>  /  Alb  3.4  /  TBili  0.5  /  DBili      /  AST  11  /  ALT  10  /  AlkPhos  97  03-08    Creatinine Trend: 6.7 <--, 6.6 <--, 7.5 <--                        8.0    9.68  )-----------( 203      ( 09 Mar 2018 06:33 )             25.1   Blood Gas Profile - Venous (03.08.18 @ 17:22)    pH, Venous: 7.24    pCO2, Venous: 36 mmHg    pO2, Venous: 30 mmHg    HCO3, Venous: 15 mmoL/L    Base Excess, Venous: -11.4 mmoL/L    Oxygen Saturation, Venous: 47 %    Urine Studies:    RADIOLOGY & ADDITIONAL STUDIES:  < from: US Retroperitoneal Complete (03.08.18 @ 20:47) >  IMPRESSION:  No hydronephrosis.  Right renal cyst.  Urinary bladder volume 590 cc. Patient declined voiding portion of   examination.  Enlarged prostate volume, 42 cc.    < end of copied text >  < from: Xray Chest 1 View AP/PA (03.08.18 @ 15:07) >  Impression:      Elevation the right hemidiaphragm with adjacent atelectasis.    Without difference.    < end of copied text >  < from: CT Head No Cont (03.08.18 @ 14:40) >    Impression:     1.  No acute mass effect, midline shift or hemorrhage.    2.  Limited study due to lack of administration of IV contrast which   decreases evaluation for primary or secondary cance    < end of copied text > NEPHROLOGY CONSULTATION NOTE    A 56 yo M with PMH listed below presented to hospital because of new onset dizziness for 1 day, spinning sensation associated with imbalance and blurry vision. Pt stated he had a fall down on keens causing some abrasion. Renal consult called for KATHARINA and CKD 5. His serum Cr was 6.7, his baseline Cr was 6.6 last year.   PT non compliant with office f/u - last visit 8/2017 - creat 6.6, 9/2016 - creat 6.5.  Pt recently statrted having LE edema, no SOB.  Admitted with dizziness     no fever/chills, headache, SOB, CP, abd pain, hematuria, and oliguria    PAST MEDICAL & SURGICAL HISTORY:  Dyslipidemia  DM (diabetes mellitus), type 2  Neuropathy  DVT  HTN (hypertension)  CAD (coronary atherosclerotic disease)  Acromegaly  CKD (chronic kidney disease), stage V  H/O eye surgery  H/O: pituitary tumor: s/p removal    Allergies:  bacitracin (Unknown)  Neosporin (Hypotension)    Home Medications Reviewed  Hospital Medications:   MEDICATIONS  (STANDING):  amLODIPine   Tablet 10 milliGRAM(s) Oral daily  aspirin 325 milliGRAM(s) Oral daily  ATENolol  Tablet 50 milliGRAM(s) Oral daily  atorvastatin 40 milliGRAM(s) Oral at bedtime  calcitriol   Capsule 0.25 MICROGram(s) Oral every other day  clopidogrel Tablet 75 milliGRAM(s) Oral daily  heparin  Injectable 5000 Unit(s) SubCutaneous every 8 hours  sodium bicarbonate  Infusion 0.05 mEq/kG/Hr (75 mL/Hr) IV Continuous <Continuous>  1/2 NS 75 ml/Hr    SOCIAL HISTORY:  Denies ETOH,Smoking,   FAMILY HISTORY:  Family history of myocardial infarction (Father)    REVIEW OF SYSTEMS:  CONSTITUTIONAL: no weakness.  EYES/ENT: No visual changes;    RESPIRATORY: No cough, wheezing, hemoptysis; No shortness of breath  CARDIOVASCULAR: No chest pain or palpitations..  GENITOURINARY: No dysuria, frequency, foamy urine, urinary urgency, incontinence or hematuria  VASCULAR:(+) bilateral lower extremity edema.     VITALS:  T(F): 97.1 (03-09-18 @ 05:30), Max: 98 (03-08-18 @ 13:18)  HR: 54 (03-08-18 @ 21:39)  BP: 151/75 (03-09-18 @ 05:30)Vital Signs Last 24 Hrs  BP: 151/75 (09 Mar 2018 05:30) (151/75 - 187/92)  RR: 18 (03-09-18 @ 05:30)  SpO2: 99% (03-08-18 @ 20:04)    03-08 @ 07:01  -  03-09 @ 07:00  --------------------------------------------------------  IN: 450 mL / OUT: 0 mL / NET: 450 mL    03-09 @ 07:01  -  03-09 @ 09:11  --------------------------------------------------------  IN: 0 mL / OUT: 900 mL / NET: -900 mL    Height (cm): 185 (03-08 @ 21:00)  Weight (kg): 131 (03-09 @ 05:30)  BMI (kg/m2): 38.3 (03-09 @ 05:30)  BSA (m2): 2.51 (03-09 @ 05:30)    08 Mar 2018 07:01  -  09 Mar 2018 07:00  --------------------------------------------------------  IN:    sodium bicarbonate  Infusion: 450 mL  Total IN: 450 mL    OUT:  Total OUT: 0 mL    Total NET: 450 mL    09 Mar 2018 07:01  -  09 Mar 2018 09:11  --------------------------------------------------------  IN:  Total IN: 0 mL    OUT:  Voided: 900 mL  Total OUT: 900 mL    Total NET: -900 mL    Creatine Kinase, Serum: 141 U/L (03-09-18 @ 00:36)    PHYSICAL EXAM:  Constitutional: NAD  HEENT: moist mucous membranes  Neck: No JVD  Respiratory: CTAB, no wheezes, rales or rhonchi  Cardiovascular: S1, S2, RRR  Gastrointestinal: BS+, soft, NT/ND  Extremities: 1+ pitting edema  Neurological: A/O x 3, no tremor  Psychiatric: Normal mood, normal affect  : No CVA tenderness. No ortiz.       LABS:  03-09    137  |  110  |  68<HH>  ----------------------------<  109  4.2   |  16<L>  |  6.7<HH>    Ca    7.8<L>      09 Mar 2018 00:36  Mg     1.7     03-09    TPro  5.6<L>  /  Alb  3.4  /  TBili  0.5  /  DBili      /  AST  11  /  ALT  10  /  AlkPhos  97  03-08    Creatinine Trend: 6.7 <--, 6.6 <--, 7.5 <--                        8.0    9.68  )-----------( 203      ( 09 Mar 2018 06:33 )             25.1   Blood Gas Profile - Venous (03.08.18 @ 17:22)    pH, Venous: 7.24    pCO2, Venous: 36 mmHg    pO2, Venous: 30 mmHg    HCO3, Venous: 15 mmoL/L    Base Excess, Venous: -11.4 mmoL/L    Oxygen Saturation, Venous: 47 %    Urine Studies:    RADIOLOGY & ADDITIONAL STUDIES:  < from: US Retroperitoneal Complete (03.08.18 @ 20:47) >  IMPRESSION:  No hydronephrosis.  Right renal cyst.  Urinary bladder volume 590 cc. Patient declined voiding portion of   examination.  Enlarged prostate volume, 42 cc.    < end of copied text >  < from: Xray Chest 1 View AP/PA (03.08.18 @ 15:07) >  Impression:      Elevation the right hemidiaphragm with adjacent atelectasis.    Without difference.    < end of copied text >  < from: CT Head No Cont (03.08.18 @ 14:40) >    Impression:     1.  No acute mass effect, midline shift or hemorrhage.    2.  Limited study due to lack of administration of IV contrast which   decreases evaluation for primary or secondary cance    < end of copied text >

## 2018-03-09 NOTE — CONSULT NOTE ADULT - ASSESSMENT
1. KATHARINA on CKD 5 2/2 obstructive nephropathy  - U/S kidney and bladder noted, but pt refused Heard  - check ph, vitD,  iPTH level  - monitor BMP and U/O closely  - no nephrotoxic meds  - keep MAP >65  - d/w pt about HD, may need HD soon  - C/W 1/2 NS + 75 NaHCO3 75 ml/hr IV, monitor BMP  2. Anemia 2/2 CKD  - check iron study, ferritin, vit B12, folic acid,   - keep Hb>7  - if TIBC<30% and FERRITIN<500, will start iv iron.  - if BP well controlled, will start EDWIN  3. High AG metabolic acidosis 2/2 CKD  - check ABG  - sodium bicarbonate 375 mg tid oral, keep HCO3>20  4. hypocalcemia   - check ph, pth, and vitd  5. dizziness 2/2 uremic encephalopathy?  - consult with neurologist   - CT head noted, hold IV contrast    will follow 1. CKD stage V, non compliant with f/u (last office visit Aug 2017)  - creatinine has been stable at high level of 6.5 mg%, for the last year (followed by Dr Arevalo)  -- non oliguric  - U/S kidney and bladder noted, no hydro,  cc  - never had w/u done - please send UA, urine protein/creat ratio, SPEP, UPEP, SIF, UIF, DNA, C3, C4  - 2D Echo - r/o pericarditis    2. urinary retention - pt refusing Heard - start Flomax 0.4 mg qd  -repeat Bladder sono  - monitor BMP and U/O closely  - d/w pt dialysis options, lives alone, doubt he can do PD, limited mobility,  - he needs at least venous mapping for AVF as out pt  -  may need HD soon    3. NAG MEt acidosis due to CKD - C/W 1/2 NS + 75 NaHCO3 75 ml/hr IV, monitor BMP    4. Anemia 2/2 CKD  - check iron study, ferritin, vit B12, folic acid,   - keep Hb>7  - if TIBC<30% and FERRITIN<500, will start iv iron.  - if BP well controlled, will start EDWIN    5. hypocalcemia   - check ph, pth, and vitd    6. dizziness - check orthostatics  - consult with neurologist   - CT head noted, hold IV contrast    7. LLE DVT - on IV Heparin    will follow

## 2018-03-09 NOTE — PROGRESS NOTE ADULT - ASSESSMENT
56 yo M with PMHx of pituitary tumor s/p removal, acromegaly, CAD s/p stents, DLD, DMII, neuropathy, CKD 5 presents with dizziness X2 days.     # Dizziness/ spinning sensation/ imbalance associated with blurry vision possibly 2/2 BPPV, r/o vasovagal r/o orthostatic - - r/o central neuro or arrhythmia  - CT head non contrast: negative  - F/U echo   - orthostatic VS follow up  - meclizine 12.5mg po q8 prn     # NAGMA and KATHARINA on CKD V  - Renal following  - continu e 75 meq sodium bicarb infusion in 1/2 NS at 75 cc/hr  - monitor Is and Os - Pt refused ortiz  - Follow up Urine studies  - Monitor BMP  - Renal US (03/08):   	No hydronephrosis.  	Right renal cyst.  	Urinary bladder volume 590 cc. Patient declined voiding portion of examination.  	Enlarged prostate volume, 42 cc.    # LLE DVT (chronic appearing)  - Duplex LE (03/08):  	Nonocclusive, chronic-appearing thrombus is present in the left femoral and popliteal veins, along with the left gastrocnemius veins.  	No evidence of deep venous thrombosis or superficial venous thrombophlebitis on the right.  - follow up vascular C/S  - as per vascular resident, if pt is asymptomatic and dvt is chronic, AC treatment can be started, F/U outpatient     # CAD s/p stents  - Continue with ASA, plavix    # Chronic normocytic anemia  - H/H at baseline (hb 8.7 on 7/2017)  - f/u as outpatient    # DMII  - monitor FS  - start insulin ss if FS > 160    # HTN /DLD  - Continue with home meds    # DVT ppx   - on S/Q heparin    #) Full code status 56 yo M with PMHx of pituitary tumor s/p removal, acromegaly, CAD s/p stents, DLD, DMII, neuropathy, CKD 5 presents with dizziness X2 days.     # Dizziness/ spinning sensation/ imbalance associated with blurry vision possibly 2/2 BPPV, r/o vasovagal r/o orthostatic - - r/o central neuro or arrhythmia  - CT head non contrast: negative  - F/U echo   - meclizine 12.5mg po q8 prn     # NAGMA and KATHARINA on CKD V  - Renal following  - continue 75 meq sodium bicarb infusion in 1/2 NS at 75 cc/hr  - monitor Is and Os - Pt refused ortiz  - Follow up Urine studies  - Monitor BMP  - Renal US (03/08):   	No hydronephrosis.  	Right renal cyst.  	Urinary bladder volume 590 cc. Patient declined voiding portion of examination.  	Enlarged prostate volume, 42 cc.    # UTI  - Start Ceftriaxone 1gm q24  -    # LLE DVT (chronic appearing)  - Duplex LE (03/08):  	Nonocclusive, chronic-appearing thrombus is present in the left femoral and popliteal veins, along with the left gastrocnemius veins.  	No evidence of deep venous thrombosis or superficial venous thrombophlebitis on the right.  - follow up vascular C/S  - as per vascular resident, if pt is asymptomatic and dvt is chronic, AC treatment can be started, F/U outpatient     # CAD s/p stents  - Continue with ASA, plavix    # Chronic normocytic anemia  - H/H at baseline (hb 8.7 on 7/2017)  - f/u as outpatient    # DMII  - monitor FS  - start insulin ss if FS > 160    # HTN /DLD  - Continue with home meds    # DVT ppx   - on S/Q heparin    #) Full code status 56 yo M with PMHx of pituitary tumor s/p removal, acromegaly, CAD s/p stents, DLD, DMII, neuropathy, CKD 5 presents with dizziness X2 days.     #) Dizziness/ spinning sensation/ imbalance associated with blurry vision possibly 2/2 BPPV, r/o vasovagal r/o orthostatic - - r/o central neuro or arrhythmia  - CT head non contrast: negative  - F/U echo - r/o pericarditis  - meclizine 12.5mg po q8 prn     #) NAGMA and KATHARINA on CKD V  - Renal following  - continue 75 meq sodium bicarb infusion in 1/2 NS at 75 cc/hr  - monitor Is and Os - Pt refused Heard  - Follow up Urine studies  - Monitor BMP  - Renal US (03/08):   	No hydronephrosis.  	Right renal cyst.  	Urinary bladder volume 590 cc. Patient declined voiding portion of examination.  	Enlarged prostate volume, 42 cc  - Vascular consulted for possible AVF    #) UTI  - Start Ceftriaxone 1gm q24    #) LLE DVT (chronic appearing)  - Duplex LE (03/08):  	Nonocclusive, chronic-appearing thrombus is present in the left femoral and popliteal veins, along with the left gastrocnemius veins.  	No evidence of deep venous thrombosis or superficial venous thrombophlebitis on the right.  - As per vascular - No need for AC, only leg elevation and compression stockings    #) CAD s/p stents  - Continue with ASA, plavix    #) Chronic normocytic anemia  - H/H at baseline (hb 8.7 on 7/2017)  - f/u as outpatient    #) DM II  - monitor FS  - start insulin ss if FS > 160    #) HTN /DLD  - Continue with home meds    #) DVT ppx   - on S/Q heparin    #) Full code status

## 2018-03-09 NOTE — PROGRESS NOTE ADULT - SUBJECTIVE AND OBJECTIVE BOX
SUBJECTIVE:    Patient is a 57y old Male who presents with a chief complaint of Dizziness (08 Mar 2018 21:02)    Currently admitted to medicine with the primary diagnosis of Renal failure     Today is hospital day 1d. This morning he is resting comfortably in bed and reports no new issues or overnight events.     PAST MEDICAL & SURGICAL HISTORY  Dyslipidemia  DM (diabetes mellitus), type 2  Neuropathy  HTN (hypertension)  CAD (coronary atherosclerotic disease)  Acromegaly  CKD (chronic kidney disease), stage V  H/O eye surgery  H/O: pituitary tumor: s/p removal    SOCIAL HISTORY:  Negative for smoking/alcohol/drug use.     ALLERGIES:  bacitracin (Unknown)  Neosporin (Hypotension)    MEDICATIONS:  STANDING MEDICATIONS  amLODIPine   Tablet 10 milliGRAM(s) Oral daily  aspirin 325 milliGRAM(s) Oral daily  ATENolol  Tablet 50 milliGRAM(s) Oral daily  atorvastatin 40 milliGRAM(s) Oral at bedtime  calcitriol   Capsule 0.25 MICROGram(s) Oral every other day  clopidogrel Tablet 75 milliGRAM(s) Oral daily  heparin  Injectable 5000 Unit(s) SubCutaneous every 8 hours  sodium bicarbonate  Infusion 0.05 mEq/kG/Hr IV Continuous <Continuous>    PRN MEDICATIONS  meclizine 12.5 milliGRAM(s) Oral three times a day PRN    VITALS:   T(F): 97.1  HR: 54  BP: 151/75  RR: 18  SpO2: 99%    LABS:                        8.1    8.73  )-----------( 194      ( 08 Mar 2018 15:20 )             25.2     03-09    137  |  110  |  68<HH>  ----------------------------<  109  4.2   |  16<L>  |  6.7<HH>    Ca    7.8<L>      09 Mar 2018 00:36    TPro  5.6<L>  /  Alb  3.4  /  TBili  0.5  /  DBili  x   /  AST  11  /  ALT  10  /  AlkPhos  97  03-08          Creatine Kinase, Serum: 141 U/L (03-09-18 @ 00:36)  Troponin I, Serum: 0.03 ng/mL (03-09-18 @ 00:36)  Troponin I, Serum: 0.03 ng/mL (03-08-18 @ 15:20)  Lactate, Blood: 0.9 mmol/L (03-08-18 @ 15:20)      CARDIAC MARKERS ( 09 Mar 2018 00:36 )  0.03 ng/mL / x     / 141 U/L / x     / 5.9 ng/mL  CARDIAC MARKERS ( 08 Mar 2018 15:20 )  0.03 ng/mL / x     / x     / x     / x          RADIOLOGY:  Renal US (03/08):   No hydronephrosis.  Right renal cyst.  Urinary bladder volume 590 cc. Patient declined voiding portion of examination.  Enlarged prostate volume, 42 cc.    CXR (03/08): Elevation the right hemidiaphragm with adjacent atelectasis.    Duplex LE (03/08):  Nonocclusive, chronic-appearing thrombus is present in the left femoral and popliteal veins, along with the left gastrocnemius veins.  No evidence of deep venous thrombosis or superficial venous thrombophlebitis on the right.    CT Scan (03/08): No acute mass effect, midline shift or hemorrhage.    PHYSICAL EXAM:  GENERAL: NAD, well-developed  HEAD:  Atraumatic, Normocephalic  EYES: EOMI, PERRLA, conjunctiva and sclera clear  NECK: Supple, No JVD  CHEST/LUNG: Clear to auscultation bilaterally; No wheeze  HEART: Regular rate and rhythm; No murmurs  ABDOMEN: Soft, Nontender, Nondistended; Bowel sounds present  EXTREMITIES: 2-3 + pitting edema up to the knee bilaterally. + chronic vascular changes. no tenderness on palpation   NEUROLOGY: non-focal, CN II to XII grossly intact, AAOX3. no dysmetria. No nystagmus. gait: deferred SUBJECTIVE:    Patient is a 57y old Male who presents with a chief complaint of Dizziness (08 Mar 2018 21:02)    Currently admitted to medicine with the primary diagnosis of Renal failure     Today is hospital day 1d. This morning he is resting comfortably in bed and reports no new issues or overnight events.     PAST MEDICAL & SURGICAL HISTORY  Dyslipidemia  DM (diabetes mellitus), type 2  Neuropathy  HTN (hypertension)  CAD (coronary atherosclerotic disease)  Acromegaly  CKD (chronic kidney disease), stage V  H/O eye surgery  H/O: pituitary tumor: s/p removal    SOCIAL HISTORY:  Negative for smoking/alcohol/drug use.     ALLERGIES:  bacitracin (Unknown)  Neosporin (Hypotension)    MEDICATIONS:  STANDING MEDICATIONS  amLODIPine   Tablet 10 milliGRAM(s) Oral daily  aspirin 325 milliGRAM(s) Oral daily  ATENolol  Tablet 50 milliGRAM(s) Oral daily  atorvastatin 40 milliGRAM(s) Oral at bedtime  calcitriol   Capsule 0.25 MICROGram(s) Oral every other day  clopidogrel Tablet 75 milliGRAM(s) Oral daily  heparin  Injectable 5000 Unit(s) SubCutaneous every 8 hours  sodium bicarbonate  Infusion 0.05 mEq/kG/Hr IV Continuous <Continuous>    PRN MEDICATIONS  meclizine 12.5 milliGRAM(s) Oral three times a day PRN    VITALS:   T(F): 97.1  HR: 54  BP: 151/75  RR: 18  SpO2: 99%    LABS:                        8.1    8.73  )-----------( 194      ( 08 Mar 2018 15:20 )             25.2     03-09    137  |  110  |  68<HH>  ----------------------------<  109  4.2   |  16<L>  |  6.7<HH>    Ca    7.8<L>      09 Mar 2018 00:36    TPro  5.6<L>  /  Alb  3.4  /  TBili  0.5  /  DBili  x   /  AST  11  /  ALT  10  /  AlkPhos  97  03-08      Creatine Kinase, Serum: 141 U/L (03-09-18 @ 00:36)  Troponin I, Serum: 0.03 ng/mL (03-09-18 @ 00:36)  Troponin I, Serum: 0.03 ng/mL (03-08-18 @ 15:20)  Lactate, Blood: 0.9 mmol/L (03-08-18 @ 15:20)      CARDIAC MARKERS ( 09 Mar 2018 00:36 )  0.03 ng/mL / x     / 141 U/L / x     / 5.9 ng/mL  CARDIAC MARKERS ( 08 Mar 2018 15:20 )  0.03 ng/mL / x     / x     / x     / x          RADIOLOGY:  Renal US (03/08):   No hydronephrosis.  Right renal cyst.  Urinary bladder volume 590 cc. Patient declined voiding portion of examination.  Enlarged prostate volume, 42 cc.    CXR (03/08): Elevation the right hemidiaphragm with adjacent atelectasis.    Duplex LE (03/08):  Nonocclusive, chronic-appearing thrombus is present in the left femoral and popliteal veins, along with the left gastrocnemius veins.  No evidence of deep venous thrombosis or superficial venous thrombophlebitis on the right.    CT Scan (03/08): No acute mass effect, midline shift or hemorrhage.    PHYSICAL EXAM:  GENERAL: NAD, well-developed  HEAD:  Atraumatic, Normocephalic  EYES: EOMI, PERRLA, conjunctiva and sclera clear  NECK: Supple, No JVD  CHEST/LUNG: Clear to auscultation bilaterally; No wheeze  HEART: Regular rate and rhythm; No murmurs  ABDOMEN: Soft, Nontender, Nondistended; Bowel sounds present  EXTREMITIES: 2-3 + pitting edema up to the knee bilaterally. + chronic vascular changes. no tenderness on palpation, Atrophy note of both hands  NEUROLOGY: non-focal, CN II to XII grossly intact, AAOX3. no dysmetria. No nystagmus. gait: deferred

## 2018-03-09 NOTE — CONSULT NOTE ADULT - ATTENDING COMMENTS
57 year old with chronic DVT.      There is no need for anticoagulation in the setting of chronic DVT. Patient will benefit from leg elevation and compression stockings. Will follow as outpatient.
Patient seen and examined independently. Resident's H & P reviewed. Agree with the findings and plan of care except,    1. UTI  2. KATHARINA/CKD-V likely due to obstructive uropathy  3. Urinary retention/BPH  4. AG Metabolic Acidosis  5. LLE DVT likely Ch.  6. Anemia of Ch. disease  7. Acromegaly  8. Likely JABARI  9. CAD        PLAN:    . Start him in IV Rociphen  . Start Flomax 0.4mg po q24h  . Pt. refused catheter  . Start Lasix 40mg IVP q12h.  . Check I'S and O'S  . Nephrology eval noted  . Pulm eval  . Vasc eval  . Will start him on IV hep for now. Follow PTT.  . On NAHCO3 drip. Renal F/U.

## 2018-03-10 DIAGNOSIS — N18.5 CHRONIC KIDNEY DISEASE, STAGE 5: ICD-10-CM

## 2018-03-10 DIAGNOSIS — I10 ESSENTIAL (PRIMARY) HYPERTENSION: ICD-10-CM

## 2018-03-10 DIAGNOSIS — E22.0 ACROMEGALY AND PITUITARY GIGANTISM: ICD-10-CM

## 2018-03-10 DIAGNOSIS — E11.9 TYPE 2 DIABETES MELLITUS WITHOUT COMPLICATIONS: ICD-10-CM

## 2018-03-10 LAB
% ALBUMIN: 57.5 % — SIGNIFICANT CHANGE UP
% ALPHA 1: 5.5 % — SIGNIFICANT CHANGE UP
% ALPHA 2: 11 % — SIGNIFICANT CHANGE UP
% BETA: 10.1 % — SIGNIFICANT CHANGE UP
% GAMMA: 15.9 % — SIGNIFICANT CHANGE UP
24R-OH-CALCIDIOL SERPL-MCNC: 14.4 NG/ML — LOW (ref 30–80)
ALBUMIN SERPL ELPH-MCNC: 3.3 G/DL — LOW (ref 3.6–5.5)
ALBUMIN/GLOB SERPL ELPH: 1.3 RATIO — SIGNIFICANT CHANGE UP
ALPHA1 GLOB SERPL ELPH-MCNC: 0.3 G/DL — SIGNIFICANT CHANGE UP (ref 0.1–0.4)
ALPHA2 GLOB SERPL ELPH-MCNC: 0.6 G/DL — SIGNIFICANT CHANGE UP (ref 0.5–1)
ANION GAP SERPL CALC-SCNC: 10 MMOL/L — SIGNIFICANT CHANGE UP (ref 7–14)
B-GLOBULIN SERPL ELPH-MCNC: 0.6 G/DL — SIGNIFICANT CHANGE UP (ref 0.5–1)
BASOPHILS # BLD AUTO: 0.05 K/UL — SIGNIFICANT CHANGE UP (ref 0–0.2)
BASOPHILS NFR BLD AUTO: 0.7 % — SIGNIFICANT CHANGE UP (ref 0–1)
BLD GP AB SCN SERPL QL: SIGNIFICANT CHANGE UP
BUN SERPL-MCNC: 70 MG/DL — CRITICAL HIGH (ref 10–20)
C3 SERPL-MCNC: 92 MG/DL — SIGNIFICANT CHANGE UP (ref 80–180)
C4 SERPL-MCNC: 15 MG/DL — SIGNIFICANT CHANGE UP (ref 10–45)
CALCIUM SERPL-MCNC: 7.7 MG/DL — LOW (ref 8.5–10.1)
CALCIUM SERPL-MCNC: 8.3 MG/DL — LOW (ref 8.4–10.5)
CHLORIDE SERPL-SCNC: 113 MMOL/L — HIGH (ref 98–110)
CO2 SERPL-SCNC: 18 MMOL/L — SIGNIFICANT CHANGE UP (ref 17–32)
CREAT SERPL-MCNC: 7 MG/DL — CRITICAL HIGH (ref 0.7–1.5)
EOSINOPHIL # BLD AUTO: 0.39 K/UL — SIGNIFICANT CHANGE UP (ref 0–0.7)
EOSINOPHIL NFR BLD AUTO: 5.5 % — SIGNIFICANT CHANGE UP (ref 0–8)
FERRITIN SERPL-MCNC: 55 NG/ML — SIGNIFICANT CHANGE UP (ref 30–400)
FOLATE SERPL-MCNC: 8.9 NG/ML — SIGNIFICANT CHANGE UP (ref 4.8–24.2)
GAMMA GLOBULIN: 0.9 G/DL — SIGNIFICANT CHANGE UP (ref 0.6–1.6)
GLUCOSE SERPL-MCNC: 113 MG/DL — HIGH (ref 70–110)
HCT VFR BLD CALC: 22 % — LOW (ref 42–52)
HCT VFR BLD CALC: 23.4 % — LOW (ref 42–52)
HGB BLD-MCNC: 7 G/DL — CRITICAL LOW (ref 14–18)
HGB BLD-MCNC: 7.6 G/DL — LOW (ref 14–18)
IMM GRANULOCYTES NFR BLD AUTO: 0.4 % — HIGH (ref 0.1–0.3)
INTERPRETATION SERPL IFE-IMP: SIGNIFICANT CHANGE UP
LYMPHOCYTES # BLD AUTO: 0.84 K/UL — LOW (ref 1.2–3.4)
LYMPHOCYTES # BLD AUTO: 11.7 % — LOW (ref 20.5–51.1)
MCHC RBC-ENTMCNC: 29.4 PG — SIGNIFICANT CHANGE UP (ref 27–31)
MCHC RBC-ENTMCNC: 30.3 PG — SIGNIFICANT CHANGE UP (ref 27–31)
MCHC RBC-ENTMCNC: 31.8 G/DL — LOW (ref 32–37)
MCHC RBC-ENTMCNC: 32.5 G/DL — SIGNIFICANT CHANGE UP (ref 32–37)
MCV RBC AUTO: 92.4 FL — SIGNIFICANT CHANGE UP (ref 80–94)
MCV RBC AUTO: 93.2 FL — SIGNIFICANT CHANGE UP (ref 80–94)
MONOCYTES # BLD AUTO: 0.66 K/UL — HIGH (ref 0.1–0.6)
MONOCYTES NFR BLD AUTO: 9.2 % — SIGNIFICANT CHANGE UP (ref 1.7–9.3)
NEUTROPHILS # BLD AUTO: 5.18 K/UL — SIGNIFICANT CHANGE UP (ref 1.4–6.5)
NEUTROPHILS NFR BLD AUTO: 72.5 % — SIGNIFICANT CHANGE UP (ref 42.2–75.2)
NRBC # BLD: 0 /100 WBCS — SIGNIFICANT CHANGE UP (ref 0–0)
NRBC # BLD: 0 /100 WBCS — SIGNIFICANT CHANGE UP (ref 0–0)
PLATELET # BLD AUTO: 187 K/UL — SIGNIFICANT CHANGE UP (ref 130–400)
PLATELET # BLD AUTO: 193 K/UL — SIGNIFICANT CHANGE UP (ref 130–400)
POTASSIUM SERPL-MCNC: 4.1 MMOL/L — SIGNIFICANT CHANGE UP (ref 3.5–5)
POTASSIUM SERPL-SCNC: 4.1 MMOL/L — SIGNIFICANT CHANGE UP (ref 3.5–5)
PROT PATTERN SERPL ELPH-IMP: SIGNIFICANT CHANGE UP
PROT SERPL-MCNC: 5.8 G/DL — LOW (ref 6–8.3)
PROT SERPL-MCNC: 5.8 G/DL — LOW (ref 6–8.3)
PTH-INTACT FLD-MCNC: 1545 PG/ML — HIGH (ref 15–65)
RBC # BLD: 2.38 M/UL — LOW (ref 4.7–6.1)
RBC # BLD: 2.51 M/UL — LOW (ref 4.7–6.1)
RBC # FLD: 14 % — SIGNIFICANT CHANGE UP (ref 11.5–14.5)
RBC # FLD: 14.5 % — SIGNIFICANT CHANGE UP (ref 11.5–14.5)
SODIUM SERPL-SCNC: 141 MMOL/L — SIGNIFICANT CHANGE UP (ref 135–146)
TYPE + AB SCN PNL BLD: SIGNIFICANT CHANGE UP
VIT B12 SERPL-MCNC: 697 PG/ML — SIGNIFICANT CHANGE UP (ref 232–1245)
WBC # BLD: 7.15 K/UL — SIGNIFICANT CHANGE UP (ref 4.8–10.8)
WBC # BLD: 8.02 K/UL — SIGNIFICANT CHANGE UP (ref 4.8–10.8)
WBC # FLD AUTO: 7.15 K/UL — SIGNIFICANT CHANGE UP (ref 4.8–10.8)
WBC # FLD AUTO: 8.02 K/UL — SIGNIFICANT CHANGE UP (ref 4.8–10.8)

## 2018-03-10 RX ORDER — FUROSEMIDE 40 MG
40 TABLET ORAL ONCE
Qty: 0 | Refills: 0 | Status: COMPLETED | OUTPATIENT
Start: 2018-03-10 | End: 2018-03-10

## 2018-03-10 RX ADMIN — Medication 40 MILLIGRAM(S): at 17:07

## 2018-03-10 RX ADMIN — AMLODIPINE BESYLATE 10 MILLIGRAM(S): 2.5 TABLET ORAL at 05:51

## 2018-03-10 RX ADMIN — Medication 40 MILLIGRAM(S): at 22:37

## 2018-03-10 RX ADMIN — Medication 75 MEQ/KG/HR: at 21:55

## 2018-03-10 RX ADMIN — Medication 75 MEQ/KG/HR: at 05:51

## 2018-03-10 RX ADMIN — ATORVASTATIN CALCIUM 40 MILLIGRAM(S): 80 TABLET, FILM COATED ORAL at 21:43

## 2018-03-10 RX ADMIN — ATENOLOL 50 MILLIGRAM(S): 25 TABLET ORAL at 05:51

## 2018-03-10 RX ADMIN — Medication 40 MILLIGRAM(S): at 05:53

## 2018-03-10 RX ADMIN — CEFTRIAXONE 100 GRAM(S): 500 INJECTION, POWDER, FOR SOLUTION INTRAMUSCULAR; INTRAVENOUS at 10:37

## 2018-03-10 RX ADMIN — TAMSULOSIN HYDROCHLORIDE 0.4 MILLIGRAM(S): 0.4 CAPSULE ORAL at 11:21

## 2018-03-10 RX ADMIN — Medication 325 MILLIGRAM(S): at 11:21

## 2018-03-10 RX ADMIN — CLOPIDOGREL BISULFATE 75 MILLIGRAM(S): 75 TABLET, FILM COATED ORAL at 11:21

## 2018-03-10 NOTE — PROGRESS NOTE ADULT - PROBLEM SELECTOR PLAN 1
refusing ortiz, check residual, monitor cr and K   fu nephro
follow up nephrology recommendations, monitor bmp

## 2018-03-10 NOTE — PROGRESS NOTE ADULT - ASSESSMENT
DM/HTN/STAGE 5 CKD/anemia/HPT/metabolic acidosis/urinary retention:  # ? baseline creatinine  # followed by Dr Jones as outpatient  # refusing ortiz  # recheck bladder scan if > 400 PVR , will discuss ortiz again with patient  #start phoslo 2/2/2  # PTH noted, once ph better controlled, will increase calcitriol  # d/c iv fluids  # change lasix to po  #start feso4 po q 8, will need EDWIN  # start sodium bicarbonate q 8   # w/up in progress ?   # no acute indication for RRT  # will follow

## 2018-03-10 NOTE — PROGRESS NOTE ADULT - ATTENDING COMMENTS
drop in hgb. repeat cbc at 4pm, if hgb<7 or pt symptomatic transfuse and give lasix.  discussed with resident on call
drop in Hgb, anemia, repeat cbc at 4pm, If hgb<7 or pt is symptomaic transfuse one unit.   discussed with on call resident

## 2018-03-10 NOTE — PROGRESS NOTE ADULT - SUBJECTIVE AND OBJECTIVE BOX
Patient is a 57y old  Male who presents with a chief complaint of Dizziness (08 Mar 2018 21:02)    HPI:  56 yo M with PMHx of pituitary tumor s/p removal, acromegaly, CAD s/p stents, DLD, DMII, neuropathy, CKD 5 presents with dizziness X2 days. Pt felt dizziness the night before presentation, pt was walking back after going to the bathroom and felt dizzy. Pt described it as a spinning sensation associated with imbalance and blurry vision lasting about 1-2 mins. Pt had another episode of dizziness the day of presentation. Again, he was walking back after urinating, denies straining, felt dizziness on his way back. However, pt felt to his legs this time and called an ambulance to the hospital. Pt denies headache, fever, chills, N/V/D, SOB, CP, abdominal pain, leg pain, dysuria, blood in urine or stool.   Pt was found to have pH 7.24 Cr 7.5 and Bicarb of 15 in ED. Pt has a baseline cr of 6.64 (7/2017), follows up with Dr. Jones regularly and states that he stills make urine. Pt refused ortiz in the ED  Pt was found to have chronic appearing DVT in LLE.     In ED: 1 L QUINN benavidez. (08 Mar 2018 21:02)    PAST MEDICAL & SURGICAL HISTORY:  Dyslipidemia  DM (diabetes mellitus), type 2  Neuropathy  HTN (hypertension)  CAD (coronary atherosclerotic disease)  Acromegaly  CKD (chronic kidney disease), stage V  H/O eye surgery  H/O: pituitary tumor: s/p removal    Vital Signs Last 24 Hrs  T(C): 36.3 (10 Mar 2018 05:52), Max: 36.7 (09 Mar 2018 20:56)  T(F): 97.4 (10 Mar 2018 05:52), Max: 98.1 (09 Mar 2018 20:56)  HR: 55 (10 Mar 2018 05:52) (55 - 59)  BP: 135/63 (10 Mar 2018 05:52) (135/63 - 162/82)  BP(mean): --  RR: 16 (09 Mar 2018 20:56) (16 - 18)  SpO2: 95% (09 Mar 2018 23:48) (95% - 95%)                        7.6    7.15  )-----------( 193      ( 10 Mar 2018 10:15 )             23.4     03-10    141  |  113<H>  |  70<HH>  ----------------------------<  113<H>  4.1   |  18  |  7.0<HH>    Ca    7.7<L>      10 Mar 2018 10:15  Phos  8.6     03-09  Mg     1.7     03-09    TPro  5.8<L>  /  Alb  x   /  TBili  x   /  DBili  x   /  AST  x   /  ALT  x   /  AlkPhos  x   03-09    CARDIAC MARKERS ( 09 Mar 2018 00:36 )  0.03 ng/mL / x     / 141 U/L / x     / 5.9 ng/mL  CARDIAC MARKERS ( 08 Mar 2018 15:20 )  0.03 ng/mL / x     / x     / x     / x          MEDICATIONS  (STANDING):  amLODIPine   Tablet 10 milliGRAM(s) Oral daily  aspirin 325 milliGRAM(s) Oral daily  ATENolol  Tablet 50 milliGRAM(s) Oral daily  atorvastatin 40 milliGRAM(s) Oral at bedtime  calcitriol   Capsule 0.25 MICROGram(s) Oral every other day  cefTRIAXone   IVPB      cefTRIAXone   IVPB 1 Gram(s) IV Intermittent every 24 hours  clopidogrel Tablet 75 milliGRAM(s) Oral daily  furosemide   Injectable 40 milliGRAM(s) IV Push every 12 hours  sodium bicarbonate  Infusion 0.05 mEq/kG/Hr (75 mL/Hr) IV Continuous <Continuous>  tamsulosin 0.4 milliGRAM(s) Oral daily

## 2018-03-10 NOTE — PROGRESS NOTE ADULT - PROBLEM SELECTOR PROBLEM 2
Hypertension, unspecified type
Type 2 diabetes mellitus without complication, unspecified long term insulin use status

## 2018-03-10 NOTE — PROGRESS NOTE ADULT - SUBJECTIVE AND OBJECTIVE BOX
Patient is a 57y old  Male who presents with a chief complaint of Dizziness (08 Mar 2018 21:02)    HPI:  58 yo M with PMHx of pituitary tumor s/p removal, acromegaly, CAD s/p stents, DLD, DMII, neuropathy, CKD 5 presents with dizziness X2 days. Pt felt dizziness the night before presentation, pt was walking back after going to the bathroom and felt dizzy. Pt described it as a spinning sensation associated with imbalance and blurry vision lasting about 1-2 mins. Pt had another episode of dizziness the day of presentation. Again, he was walking back after urinating, denies straining, felt dizziness on his way back. However, pt felt to his legs this time and called an ambulance to the hospital. Pt denies headache, fever, chills, N/V/D, SOB, CP, abdominal pain, leg pain, dysuria, blood in urine or stool.   Pt was found to have pH 7.24 Cr 7.5 and Bicarb of 15 in ED. Pt has a baseline cr of 6.64 (7/2017), follows up with Dr. Jones regularly and states that he stills make urine. Pt refused ortiz in the ED  Pt was found to have chronic appearing DVT in LLE.     In ED: 1 L QUINN benavidez. (08 Mar 2018 21:02)    PAST MEDICAL & SURGICAL HISTORY:  Dyslipidemia  DM (diabetes mellitus), type 2  Neuropathy  HTN (hypertension)  CAD (coronary atherosclerotic disease)  Acromegaly  CKD (chronic kidney disease), stage V  H/O eye surgery  H/O: pituitary tumor: s/p removal    Vital Signs Last 24 Hrs  T(C): 36.3 (10 Mar 2018 05:52), Max: 36.7 (09 Mar 2018 20:56)  T(F): 97.4 (10 Mar 2018 05:52), Max: 98.1 (09 Mar 2018 20:56)  HR: 55 (10 Mar 2018 05:52) (55 - 59)  BP: 135/63 (10 Mar 2018 05:52) (135/63 - 162/82)  BP(mean): --  RR: 16 (09 Mar 2018 20:56) (16 - 18)  SpO2: 95% (09 Mar 2018 23:48) (95% - 95%)                        7.6    7.15  )-----------( 193      ( 10 Mar 2018 10:15 )             23.4     03-10    141  |  113<H>  |  70<HH>  ----------------------------<  113<H>  4.1   |  18  |  7.0<HH>    Ca    7.7<L>      10 Mar 2018 10:15  Phos  8.6     03-09  Mg     1.7     03-09    TPro  5.8<L>  /  Alb  x   /  TBili  x   /  DBili  x   /  AST  x   /  ALT  x   /  AlkPhos  x   03-09    CARDIAC MARKERS ( 09 Mar 2018 00:36 )  0.03 ng/mL / x     / 141 U/L / x     / 5.9 ng/mL  CARDIAC MARKERS ( 08 Mar 2018 15:20 )  0.03 ng/mL / x     / x     / x     / x                MEDICATIONS  (STANDING):  amLODIPine   Tablet 10 milliGRAM(s) Oral daily  aspirin 325 milliGRAM(s) Oral daily  ATENolol  Tablet 50 milliGRAM(s) Oral daily  atorvastatin 40 milliGRAM(s) Oral at bedtime  calcitriol   Capsule 0.25 MICROGram(s) Oral every other day  cefTRIAXone   IVPB      cefTRIAXone   IVPB 1 Gram(s) IV Intermittent every 24 hours  clopidogrel Tablet 75 milliGRAM(s) Oral daily  furosemide   Injectable 40 milliGRAM(s) IV Push every 12 hours  sodium bicarbonate  Infusion 0.05 mEq/kG/Hr (75 mL/Hr) IV Continuous <Continuous>  tamsulosin 0.4 milliGRAM(s) Oral daily

## 2018-03-10 NOTE — PROGRESS NOTE ADULT - SUBJECTIVE AND OBJECTIVE BOX
seen and examined  no new complaints  no CP, no SOB  no N, no V        Standing Inpatient Medications  amLODIPine   Tablet 10 milliGRAM(s) Oral daily  aspirin 325 milliGRAM(s) Oral daily  ATENolol  Tablet 50 milliGRAM(s) Oral daily  atorvastatin 40 milliGRAM(s) Oral at bedtime  calcitriol   Capsule 0.25 MICROGram(s) Oral every other day  cefTRIAXone   IVPB      cefTRIAXone   IVPB 1 Gram(s) IV Intermittent every 24 hours  clopidogrel Tablet 75 milliGRAM(s) Oral daily  furosemide   Injectable 40 milliGRAM(s) IV Push every 12 hours  sodium bicarbonate  Infusion 0.05 mEq/kG/Hr IV Continuous <Continuous>  tamsulosin 0.4 milliGRAM(s) Oral daily            VITALS/PHYSICAL EXAM  --------------------------------------------------------------------------------  T(C): 36.3 (03-10-18 @ 05:52), Max: 36.7 (03-09-18 @ 20:56)  HR: 55 (03-10-18 @ 05:52) (55 - 59)  BP: 135/63 (03-10-18 @ 05:52) (135/63 - 162/82)  RR: 16 (03-09-18 @ 20:56) (16 - 18)  SpO2: 95% (03-09-18 @ 23:48) (95% - 95%)      03-09-18 @ 07:01  -  03-10-18 @ 07:00  --------------------------------------------------------  IN: 2270 mL / OUT: 1202 mL / NET: 1068 mL      Physical Exam:  	Gen: NAD  	Pulm:  decrease BS  B/L  	CV: S1S2; no rub  	Abd: distended  	LE: 2+ edema    LABS/STUDIES  --------------------------------------------------------------------------------              8.0    9.68  >-----------<  203      [03-09-18 @ 06:33]              25.1     136  |  109  |  67  ----------------------------<  95      [03-09-18 @ 06:33]  4.0   |  15  |  6.6        Ca     7.9     [03-09-18 @ 06:33]      Mg     1.7     [03-09-18 @ 06:33]      Phos  8.6     [03-09-18 @ 18:01]    TPro  5.8  /  Alb  x   /  TBili  x   /  DBili  x   /  AST  x   /  ALT  x   /  AlkPhos  x   [03-09-18 @ 18:01]        Troponin 0.03      [03-09-18 @ 00:36]        [03-09-18 @ 00:36]    Creatinine Trend:  SCr 6.6 [03-09 @ 06:33]  SCr 6.7 [03-09 @ 00:36]  SCr 6.6 [03-08 @ 22:45]  SCr 7.5 [03-08 @ 15:20]        Iron 46, TIBC 212, %sat 23      [03-09-18 @ 18:01]  Ferritin 55      [03-09-18 @ 18:01]  PTH -- (Ca 8.3)      [03-09-18 @ 18:01]   1545

## 2018-03-11 LAB
ANION GAP SERPL CALC-SCNC: 8 MMOL/L — SIGNIFICANT CHANGE UP (ref 7–14)
BUN SERPL-MCNC: 71 MG/DL — CRITICAL HIGH (ref 10–20)
CALCIUM SERPL-MCNC: 7.6 MG/DL — LOW (ref 8.5–10.1)
CHLORIDE SERPL-SCNC: 112 MMOL/L — HIGH (ref 98–110)
CO2 SERPL-SCNC: 19 MMOL/L — SIGNIFICANT CHANGE UP (ref 17–32)
CREAT SERPL-MCNC: 7.3 MG/DL — CRITICAL HIGH (ref 0.7–1.5)
GLUCOSE SERPL-MCNC: 108 MG/DL — SIGNIFICANT CHANGE UP (ref 70–110)
HCT VFR BLD CALC: 24.1 % — LOW (ref 42–52)
HGB BLD-MCNC: 7.7 G/DL — LOW (ref 14–18)
MCHC RBC-ENTMCNC: 29.5 PG — SIGNIFICANT CHANGE UP (ref 27–31)
MCHC RBC-ENTMCNC: 32 G/DL — SIGNIFICANT CHANGE UP (ref 32–37)
MCV RBC AUTO: 92.3 FL — SIGNIFICANT CHANGE UP (ref 80–94)
NRBC # BLD: 0 /100 WBCS — SIGNIFICANT CHANGE UP (ref 0–0)
PLATELET # BLD AUTO: 202 K/UL — SIGNIFICANT CHANGE UP (ref 130–400)
POTASSIUM SERPL-MCNC: 4 MMOL/L — SIGNIFICANT CHANGE UP (ref 3.5–5)
POTASSIUM SERPL-SCNC: 4 MMOL/L — SIGNIFICANT CHANGE UP (ref 3.5–5)
RBC # BLD: 2.61 M/UL — LOW (ref 4.7–6.1)
RBC # FLD: 14.2 % — SIGNIFICANT CHANGE UP (ref 11.5–14.5)
SODIUM SERPL-SCNC: 139 MMOL/L — SIGNIFICANT CHANGE UP (ref 135–146)
WBC # BLD: 8.94 K/UL — SIGNIFICANT CHANGE UP (ref 4.8–10.8)
WBC # FLD AUTO: 8.94 K/UL — SIGNIFICANT CHANGE UP (ref 4.8–10.8)

## 2018-03-11 RX ORDER — CALCIUM ACETATE 667 MG
1334 TABLET ORAL
Qty: 0 | Refills: 0 | Status: DISCONTINUED | OUTPATIENT
Start: 2018-03-11 | End: 2018-03-12

## 2018-03-11 RX ORDER — SODIUM BICARBONATE 1 MEQ/ML
325 SYRINGE (ML) INTRAVENOUS EVERY 8 HOURS
Qty: 0 | Refills: 0 | Status: DISCONTINUED | OUTPATIENT
Start: 2018-03-11 | End: 2018-03-12

## 2018-03-11 RX ORDER — FUROSEMIDE 40 MG
40 TABLET ORAL DAILY
Qty: 0 | Refills: 0 | Status: DISCONTINUED | OUTPATIENT
Start: 2018-03-11 | End: 2018-03-12

## 2018-03-11 RX ADMIN — Medication 1334 MILLIGRAM(S): at 12:11

## 2018-03-11 RX ADMIN — Medication 325 MILLIGRAM(S): at 16:22

## 2018-03-11 RX ADMIN — Medication 1334 MILLIGRAM(S): at 17:24

## 2018-03-11 RX ADMIN — Medication 325 MILLIGRAM(S): at 12:11

## 2018-03-11 RX ADMIN — TAMSULOSIN HYDROCHLORIDE 0.4 MILLIGRAM(S): 0.4 CAPSULE ORAL at 12:12

## 2018-03-11 RX ADMIN — AMLODIPINE BESYLATE 10 MILLIGRAM(S): 2.5 TABLET ORAL at 06:06

## 2018-03-11 RX ADMIN — ATENOLOL 50 MILLIGRAM(S): 25 TABLET ORAL at 06:06

## 2018-03-11 RX ADMIN — Medication 40 MILLIGRAM(S): at 12:14

## 2018-03-11 RX ADMIN — CEFTRIAXONE 100 GRAM(S): 500 INJECTION, POWDER, FOR SOLUTION INTRAMUSCULAR; INTRAVENOUS at 12:11

## 2018-03-11 RX ADMIN — CLOPIDOGREL BISULFATE 75 MILLIGRAM(S): 75 TABLET, FILM COATED ORAL at 12:12

## 2018-03-11 RX ADMIN — CALCITRIOL 0.25 MICROGRAM(S): 0.5 CAPSULE ORAL at 12:11

## 2018-03-11 RX ADMIN — ATORVASTATIN CALCIUM 40 MILLIGRAM(S): 80 TABLET, FILM COATED ORAL at 21:19

## 2018-03-11 RX ADMIN — Medication 40 MILLIGRAM(S): at 06:06

## 2018-03-11 RX ADMIN — Medication 325 MILLIGRAM(S): at 21:20

## 2018-03-11 NOTE — PROGRESS NOTE ADULT - ASSESSMENT
56 yo M with PMHx of pituitary tumor s/p removal, acromegaly, CAD s/p stents, DLD, DMII, neuropathy, CKD 5 presents with dizziness X2 days.   #) Dizziness/ spinning sensation/ imbalance associated with blurry vision possibly 2/2 BPPV, r/o vasovagal r/o orthostatic - - r/o central neuro or arrhythmia - CT head non contrast: negative - F/U echo - r/o pericarditis - meclizine 12.5mg po q8 prn   #) NAGMA and KATHARINA on CKD V - Renal following ·	Still refusing ortiz ·	DC IVF change lasix to 40 mg po q8h  ·	start Sodium bicarbonate 325 mg po q8h  ·	will need RRT soon no acute indication for RRT ·	start phoslo 2/2/2 ·	on VitD t - monitor Is and Os  - Follow up Urine studies - Monitor BMP - Renal US (03/08):   No hydronephrosis.  Right renal cyst.  Urinary bladder volume 590 cc. Patient declined voiding portion of examination.  Enlarged prostate volume, 42 cc - Vascular consulted for possible AVF  #) UTI -  Ceftriaxone 1gm q24  #) LLE DVT (chronic appearing) - Duplex LE (03/08):  Nonocclusive, chronic-appearing thrombus is present in the left femoral and popliteal veins, along with the left gastrocnemius veins.  No evidence of deep venous thrombosis or superficial venous thrombophlebitis on the right. - As per vascular - No need for AC, only leg elevation and compression stockings  #) CAD s/p stents - Continue with ASA, plavix  #) Chronic normocytic anemia - H/H at baseline (hb 8.7 on 7/2017) - f/u as outpatient  #) DM II - monitor FS - start insulin ss if FS > 160  #) HTN /DLD - Continue with home meds  #) DVT ppx  - on S/Q heparin  #) Full code status 		  # Anemia Fe deficient  -Start FeSO4 1 tab po q12h  - will need Epogen , 56 yo M with PMHx of pituitary tumor s/p removal, acromegaly, CAD s/p stents, DLD, DMII, neuropathy, CKD 5 presents with dizziness X2 days.   #) Dizziness/ spinning sensation/ imbalance associated with blurry vision possibly 2/2 BPPV, r/o vasovagal r/o orthostatic - - r/o central neuro or arrhythmia - CT head non contrast: negative - meclizine 12.5mg po q8 prn   #) NAGMA and KATHARINA on CKD V - Renal following ·	Still refusing ortiz ·	DC IVF change lasix to 40 mg po q8h  ·	start Sodium bicarbonate 325 mg po q8h  ·	will need RRT soon no acute indication for RRT ·	start phoslo 2/2/2 ·	on VitD t - monitor Is and Os  - Follow up Urine studies - Monitor BMP - Renal US (03/08):   No hydronephrosis.  Right renal cyst.  Urinary bladder volume 590 cc. Patient declined voiding portion of examination.  Enlarged prostate volume, 42 cc - Vascular consulted for possible AVF  #) ???UTI -  pt is on Ceftriaxone 1gm q24 but no urine studies done  #) LLE DVT (chronic appearing) - Duplex LE (03/08):  Nonocclusive, chronic-appearing thrombus is present in the left femoral and popliteal veins, along with the left gastrocnemius veins.  No evidence of deep venous thrombosis or superficial venous thrombophlebitis on the right. - As per vascular - No need for AC, only leg elevation and compression stockings  #) CAD s/p stents - Continue with ASA, plavix  #) Chronic normocytic anemia - H/H at baseline (hb 8.7 on 7/2017) - f/u as outpatient  #) DM II - monitor FS - start insulin ss if FS > 160  #) HTN /DLD - Continue with home meds  #) DVT ppx  - on S/Q heparin  #) Full code status 		  # Anemia Fe deficient  -Start FeSO4 1 tab po q12h  - will need Epogen ,

## 2018-03-11 NOTE — PROGRESS NOTE ADULT - SUBJECTIVE AND OBJECTIVE BOX
Patient is a 57y old  Male who presents with a chief complaint of Dizziness (08 Mar 2018 21:02)      INTERVAL HPI/OVERNIGHT EVENTS: no new complaints    HPI:  58 yo M with PMHx of pituitary tumor s/p removal, acromegaly, CAD s/p stents, DLD, DMII, neuropathy, CKD 5 presents with dizziness X2 days. Pt felt dizziness the night before presentation, pt was walking back after going to the bathroom and felt dizzy. Pt described it as a spinning sensation associated with imbalance and blurry vision lasting about 1-2 mins. Pt had another episode of dizziness the day of presentation. Again, he was walking back after urinating, denies straining, felt dizziness on his way back. However, pt felt to his legs this time and called an ambulance to the hospital. Pt denies headache, fever, chills, N/V/D, SOB, CP, abdominal pain, leg pain, dysuria, blood in urine or stool.   Pt was found to have pH 7.24 Cr 7.5 and Bicarb of 15 in ED. Pt has a baseline cr of 6.64 (7/2017), follows up with Dr. Jones regularly and states that he stills make urine. Pt refused ortiz in the ED  Pt was found to have chronic appearing DVT in LLE.     In ED: 1 L QUINN benavidez. (08 Mar 2018 21:02)      PAST MEDICAL & SURGICAL HISTORY:    MEDICATIONS  (STANDING):  amLODIPine   Tablet 10 milliGRAM(s) Oral daily  aspirin 325 milliGRAM(s) Oral daily  ATENolol  Tablet 50 milliGRAM(s) Oral daily  atorvastatin 40 milliGRAM(s) Oral at bedtime  calcitriol   Capsule 0.25 MICROGram(s) Oral every other day  calcium acetate 1334 milliGRAM(s) Oral three times a day with meals  cefTRIAXone   IVPB      cefTRIAXone   IVPB 1 Gram(s) IV Intermittent every 24 hours  clopidogrel Tablet 75 milliGRAM(s) Oral daily  furosemide    Tablet 40 milliGRAM(s) Oral daily  sodium bicarbonate 325 milliGRAM(s) Oral every 8 hours  tamsulosin 0.4 milliGRAM(s) Oral daily    MEDICATIONS  (PRN):  meclizine 12.5 milliGRAM(s) Oral three times a day PRN Dizziness    Home Medications:  amLODIPine 10 mg oral tablet: 1 tab(s) orally once a day (08 Mar 2018 20:29)  aspirin 325 mg oral tablet: 1 tab(s) orally once a day (08 Mar 2018 20:29)  atenolol 50 mg oral tablet: 1 tab(s) orally once a day (08 Mar 2018 20:29)  calcitriol 0.25 mcg oral capsule: 1 cap(s) orally every 48 hours (08 Mar 2018 20:32)  Crestor 10 mg oral tablet: 1 tab(s) orally once a day (at bedtime) (08 Mar 2018 20:29)  glipiZIDE 2.5 mg oral tablet, extended release: 1 tab(s) orally once a day (08 Mar 2018 20:29)  Plavix 75 mg oral tablet: 1 tab(s) orally once a day (08 Mar 2018 20:29)  SandoSTATIN LAR [place] 20 mg intramuscular injection, extended release: intramuscular every 30 days (08 Mar 2018 20:29)  sodium bicarbonate 650 mg oral tablet: 1 tab(s) orally 2 times a day (08 Mar 2018 20:29)      Vital Signs Last 24 Hrs  T(C): 36.8 (11 Mar 2018 06:38), Max: 36.8 (11 Mar 2018 06:38)  T(F): 98.2 (11 Mar 2018 06:38), Max: 98.2 (11 Mar 2018 06:38)  HR: 58 (11 Mar 2018 06:38) (55 - 58)  BP: 135/78 (11 Mar 2018 06:38) (135/78 - 154/78)  BP(mean): --  RR: 20 (11 Mar 2018 06:38) (18 - 20)  SpO2: --  CAPILLARY BLOOD GLUCOSE  152 (11 Mar 2018 11:17)  119 (11 Mar 2018 07:49)  130 (10 Mar 2018 22:22)  142 (10 Mar 2018 14:33)          General: NAD, AAO3  CV: S1 S2  Pulm: decreased breath sounds at base  Abd: NT/ND/S +BS  EXT: no clubbing/cyanosis/ 1+edema  Neuro: nonfocal    LABS:                        7.7    8.94  )-----------( 202      ( 11 Mar 2018 08:50 )             24.1     03-11    139  |  112<H>  |  71<HH>  ----------------------------<  108  4.0   |  19  |  7.3<HH>    Ca    7.6<L>      11 Mar 2018 08:50  Phos  8.6     03-09    TPro  5.8<L>  /  Alb  3.3<L>  /  TBili  x   /  DBili  x   /  AST  x   /  ALT  x   /  AlkPhos  x   03-09    LIVER FUNCTIONS - ( 09 Mar 2018 18:01 )  Alb: 3.3 g/dL / Pro: 5.8 g/dL / ALK PHOS: x     / ALT: x     / AST: x     / GGT: x                               Consultant(s) Notes Reviewed:  [x ] YES  [ ] NO    Care Discussed with Consultants/Other Providers/ Housestaff [ x] YES  [ ] NO Patient is a 57y old  Male who presents with a chief complaint of Dizziness (08 Mar 2018 21:02)      INTERVAL HPI/OVERNIGHT EVENTS: no new complaints    HPI:  58 yo M with PMHx of pituitary tumor s/p removal, acromegaly, CAD s/p stents, DLD, DMII, neuropathy, CKD 5 presents with dizziness X2 days. Pt felt dizziness the night before presentation, pt was walking back after going to the bathroom and felt dizzy. Pt described it as a spinning sensation associated with imbalance and blurry vision lasting about 1-2 mins. Pt had another episode of dizziness the day of presentation. Again, he was walking back after urinating, denies straining, felt dizziness on his way back. However, pt felt to his legs this time and called an ambulance to the hospital. Pt denies headache, fever, chills, N/V/D, SOB, CP, abdominal pain, leg pain, dysuria, blood in urine or stool.   Pt was found to have pH 7.24 Cr 7.5 and Bicarb of 15 in ED. Pt has a baseline cr of 6.64 (7/2017), follows up with Dr. Jones regularly and states that he stills make urine. Pt refused ortiz in the ED  Pt was found to have chronic appearing DVT in LLE.     In ED: 1 L QUINN benavidez. (08 Mar 2018 21:02)      PAST MEDICAL & SURGICAL HISTORY:    MEDICATIONS  (STANDING):  amLODIPine   Tablet 10 milliGRAM(s) Oral daily  aspirin 325 milliGRAM(s) Oral daily  ATENolol  Tablet 50 milliGRAM(s) Oral daily  atorvastatin 40 milliGRAM(s) Oral at bedtime  calcitriol   Capsule 0.25 MICROGram(s) Oral every other day  calcium acetate 1334 milliGRAM(s) Oral three times a day with meals  cefTRIAXone   IVPB      cefTRIAXone   IVPB 1 Gram(s) IV Intermittent every 24 hours  clopidogrel Tablet 75 milliGRAM(s) Oral daily  furosemide    Tablet 40 milliGRAM(s) Oral daily  sodium bicarbonate 325 milliGRAM(s) Oral every 8 hours  tamsulosin 0.4 milliGRAM(s) Oral daily    MEDICATIONS  (PRN):  meclizine 12.5 milliGRAM(s) Oral three times a day PRN Dizziness    Home Medications:  amLODIPine 10 mg oral tablet: 1 tab(s) orally once a day (08 Mar 2018 20:29)  aspirin 325 mg oral tablet: 1 tab(s) orally once a day (08 Mar 2018 20:29)  atenolol 50 mg oral tablet: 1 tab(s) orally once a day (08 Mar 2018 20:29)  calcitriol 0.25 mcg oral capsule: 1 cap(s) orally every 48 hours (08 Mar 2018 20:32)  Crestor 10 mg oral tablet: 1 tab(s) orally once a day (at bedtime) (08 Mar 2018 20:29)  glipiZIDE 2.5 mg oral tablet, extended release: 1 tab(s) orally once a day (08 Mar 2018 20:29)  Plavix 75 mg oral tablet: 1 tab(s) orally once a day (08 Mar 2018 20:29)  SandoSTATIN LAR [place] 20 mg intramuscular injection, extended release: intramuscular every 30 days (08 Mar 2018 20:29)  sodium bicarbonate 650 mg oral tablet: 1 tab(s) orally 2 times a day (08 Mar 2018 20:29)      Vital Signs Last 24 Hrs  T(C): 36.8 (11 Mar 2018 06:38), Max: 36.8 (11 Mar 2018 06:38)  T(F): 98.2 (11 Mar 2018 06:38), Max: 98.2 (11 Mar 2018 06:38)  HR: 58 (11 Mar 2018 06:38) (55 - 58)  BP: 135/78 (11 Mar 2018 06:38) (135/78 - 154/78)  BP(mean): --  RR: 20 (11 Mar 2018 06:38) (18 - 20)  SpO2: --  CAPILLARY BLOOD GLUCOSE  152 (11 Mar 2018 11:17)  119 (11 Mar 2018 07:49)  130 (10 Mar 2018 22:22)  142 (10 Mar 2018 14:33)          General: NAD, AAO3  CV: S1 S2  Pulm: decreased breath sounds at base  Abd: NT/ND/S +BS  EXT: no clubbing/cyanosis/ 1-2+edema  Neuro: nonfocal    LABS:                        7.7    8.94  )-----------( 202      ( 11 Mar 2018 08:50 )             24.1     03-11    139  |  112<H>  |  71<HH>  ----------------------------<  108  4.0   |  19  |  7.3<HH>    Ca    7.6<L>      11 Mar 2018 08:50  Phos  8.6     03-09    TPro  5.8<L>  /  Alb  3.3<L>  /  TBili  x   /  DBili  x   /  AST  x   /  ALT  x   /  AlkPhos  x   03-09    LIVER FUNCTIONS - ( 09 Mar 2018 18:01 )  Alb: 3.3 g/dL / Pro: 5.8 g/dL / ALK PHOS: x     / ALT: x     / AST: x     / GGT: x                               Consultant(s) Notes Reviewed:  [x ] YES  [ ] NO    Care Discussed with Consultants/Other Providers/ Housestaff [ x] YES  [ ] NO

## 2018-03-11 NOTE — PROGRESS NOTE ADULT - ASSESSMENT
DM/HTN/STAGE 5 CKD/anemia/HPT/metabolic acidosis/urinary retention:  # ? baseline creatinine  # followed by Dr Jones as outpatient  # refusing ortiz  # recheck bladder scan if > 400 PVR , will discuss ortiz again with patient  #start phoslo 2/2/2  # PTH noted, once ph better controlled, will increase calcitriol  # d/c iv fluids  # change lasix to po  #start feso4 po q 8, will need EDWIN  # start sodium bicarbonate q 8   # w/up in progress ?   # no acute indication for RRT  # will follow DM/HTN/STAGE 5 CKD/anemia/HPT/metabolic acidosis/urinary retention:    ·	CKD 5 will check creatinine baseline from OP nephrology office in AM   ·	Still refusing ortiz  ·	DC IVF change lasix to 40 mg po q8h   ·	start Sodium bicarbonate 325 mg po q8h   ·	will need RRT soon no acute indication for RRT  ·	CKD MBD hyperphosphatemia SHPT  ·	start phoslo 2/2/2  ·	on VitD   ·	Anemia Fe deficient  ·	Start FeSO4 1 tab po q12h  ·	will need Epogen , will start in AM     will follow

## 2018-03-11 NOTE — PROGRESS NOTE ADULT - SUBJECTIVE AND OBJECTIVE BOX
Nephrology progress note    Patient is seen and examined, events over the last 24 h noted .    Allergies:  bacitracin (Unknown)  Neosporin (Hypotension)    Hospital Medications:   MEDICATIONS  (STANDING):  amLODIPine   Tablet 10 milliGRAM(s) Oral daily  aspirin 325 milliGRAM(s) Oral daily  ATENolol  Tablet 50 milliGRAM(s) Oral daily  atorvastatin 40 milliGRAM(s) Oral at bedtime  calcitriol   Capsule 0.25 MICROGram(s) Oral every other day  cefTRIAXone   IVPB      cefTRIAXone   IVPB 1 Gram(s) IV Intermittent every 24 hours  clopidogrel Tablet 75 milliGRAM(s) Oral daily  furosemide   Injectable 40 milliGRAM(s) IV Push every 12 hours  sodium bicarbonate  Infusion 0.05 mEq/kG/Hr (75 mL/Hr) IV Continuous <Continuous>  tamsulosin 0.4 milliGRAM(s) Oral daily        VITALS:  T(F): 98.2 (03-11-18 @ 06:38), Max: 98.2 (03-11-18 @ 06:38)  HR: 58 (03-11-18 @ 06:38)  BP: 135/78 (03-11-18 @ 06:38)  RR: 20 (03-11-18 @ 06:38)  SpO2: --  Wt(kg): --    03-09 @ 07:01  -  03-10 @ 07:00  --------------------------------------------------------  IN: 2270 mL / OUT: 1202 mL / NET: 1068 mL    03-10 @ 06:01  -  03-11 @ 07:00  --------------------------------------------------------  IN: 1300 mL / OUT: 0 mL / NET: 1300 mL          PHYSICAL EXAM:  Constitutional: NAD  HEENT: anicteric sclera, oropharynx clear, MMM  Neck: No JVD  Respiratory: CTAB, no wheezes, rales or rhonchi  Cardiovascular: S1, S2, RRR  Gastrointestinal: BS+, soft, NT/ND  Extremities: No cyanosis or clubbing. No peripheral edema  :  No ortiz.   Skin: No rashes    LABS:  03-10    141  |  113<H>  |  70<HH>  ----------------------------<  113<H>  4.1   |  18  |  7.0<HH>  Creatinine Trend: 7.0<--, 6.6<--, 6.7<--, 6.6<--, 7.5<--  Ca    7.7<L>      10 Mar 2018 10:15  Phos  8.6     03-09    TPro  5.8<L>  /  Alb  3.3<L>  /  TBili      /  DBili      /  AST      /  ALT      /  AlkPhos      03-09                          7.0    8.02  )-----------( 187      ( 10 Mar 2018 18:23 )             22.0     Immunofixation serum no monoclonal protein     Urine Studies:      RADIOLOGY & ADDITIONAL STUDIES:  < from: US Retroperitoneal Complete (03.08.18 @ 20:47) >  No hydronephrosis.  Right renal cyst.  Urinary bladder volume 590 cc. Patient declined voiding portion of   examination.  Enlarged prostate volume, 42 cc.    < end of copied text > Nephrology progress note    Patient is seen and examined, events over the last 24 h noted .  Denied any chest pain no SOB  Still refusing angel  was told by nephrology as OP that he may need  HD     Allergies:  bacitracin (Unknown)  Neosporin (Hypotension)    Hospital Medications:   MEDICATIONS  (STANDING):  amLODIPine   Tablet 10 milliGRAM(s) Oral daily  aspirin 325 milliGRAM(s) Oral daily  ATENolol  Tablet 50 milliGRAM(s) Oral daily  atorvastatin 40 milliGRAM(s) Oral at bedtime  calcitriol   Capsule 0.25 MICROGram(s) Oral every other day     cefTRIAXone   IVPB 1 Gram(s) IV Intermittent every 24 hours  clopidogrel Tablet 75 milliGRAM(s) Oral daily  furosemide   Injectable 40 milliGRAM(s) IV Push every 12 hours  sodium bicarbonate  Infusion 0.05 mEq/kG/Hr (75 mL/Hr) IV Continuous <Continuous>  tamsulosin 0.4 milliGRAM(s) Oral daily    VITALS:  T(F): 98.2 (03-11-18 @ 06:38), Max: 98.2 (03-11-18 @ 06:38)  HR: 58 (03-11-18 @ 06:38)  BP: 135/78 (03-11-18 @ 06:38)  RR: 20 (03-11-18 @ 06:38)      03-09 @ 07:01  -  03-10 @ 07:00  --------------------------------------------------------  IN: 2270 mL / OUT: 1202 mL / NET: 1068 mL    03-10 @ 06:01  -  03-11 @ 07:00  --------------------------------------------------------  IN: 1300 mL / OUT: 0 mL / NET: 1300 mL    PHYSICAL EXAM:  Constitutional: NAD  HEENT: anicteric sclera, oropharynx clear, MMM  Neck: No JVD  Respiratory: CTAB, no wheezes, rales or rhonchi  Cardiovascular: S1, S2, RRR  Gastrointestinal: BS+, soft, NT/ND  Extremities: plus one peripheral  edema  :  No ortiz.   Skin: No rashes    LABS:  03-10    141  |  113<H>  |  70<HH>  ----------------------------<  113<H>  4.1   |  18  |  7.0<HH>  Creatinine Trend: 7.0<--, 6.6<--, 6.7<--, 6.6<--, 7.5<--  Ca    7.7<L>      10 Mar 2018 10:15  Phos  8.6     03-09    TPro  5.8<L>  /  Alb  3.3<L>  /  TBili      /  DBili      /  AST      /  ALT      /  AlkPhos      03-09                          7.0    8.02  )-----------( 187      ( 10 Mar 2018 18:23 )             22.0     Iron with Total Binding Capacity (03.09.18 @ 18:01)    % Saturation, Iron: 23 %    Iron - Total Binding Capacity.: 212 ug/mL    Iron Total, Serum: 46 ug/dL  Ferritin, Serum: 55 ng/mL (03.09.18 @ 18:01)          Immunofixation serum no monoclonal protein     Urine Studies:      RADIOLOGY & ADDITIONAL STUDIES:  < from: US Retroperitoneal Complete (03.08.18 @ 20:47) >  No hydronephrosis.  Right renal cyst.  Urinary bladder volume 590 cc. Patient declined voiding portion of   examination.  Enlarged prostate volume, 42 cc.    < end of copied text >

## 2018-03-12 ENCOUNTER — TRANSCRIPTION ENCOUNTER (OUTPATIENT)
Age: 58
End: 2018-03-12

## 2018-03-12 LAB
ANION GAP SERPL CALC-SCNC: 10 MMOL/L — SIGNIFICANT CHANGE UP (ref 7–14)
BASOPHILS # BLD AUTO: 0.04 K/UL — SIGNIFICANT CHANGE UP (ref 0–0.2)
BASOPHILS NFR BLD AUTO: 0.5 % — SIGNIFICANT CHANGE UP (ref 0–1)
BUN SERPL-MCNC: 70 MG/DL — CRITICAL HIGH (ref 10–20)
CALCIUM SERPL-MCNC: 7.7 MG/DL — LOW (ref 8.5–10.1)
CHLORIDE SERPL-SCNC: 112 MMOL/L — HIGH (ref 98–110)
CO2 SERPL-SCNC: 18 MMOL/L — SIGNIFICANT CHANGE UP (ref 17–32)
CREAT SERPL-MCNC: 7.3 MG/DL — CRITICAL HIGH (ref 0.7–1.5)
EOSINOPHIL # BLD AUTO: 0.46 K/UL — SIGNIFICANT CHANGE UP (ref 0–0.7)
EOSINOPHIL NFR BLD AUTO: 5.7 % — SIGNIFICANT CHANGE UP (ref 0–8)
GLUCOSE SERPL-MCNC: 95 MG/DL — SIGNIFICANT CHANGE UP (ref 70–110)
HCT VFR BLD CALC: 24.3 % — LOW (ref 42–52)
HGB BLD-MCNC: 7.9 G/DL — LOW (ref 14–18)
IMM GRANULOCYTES NFR BLD AUTO: 0.5 % — HIGH (ref 0.1–0.3)
LYMPHOCYTES # BLD AUTO: 1.12 K/UL — LOW (ref 1.2–3.4)
LYMPHOCYTES # BLD AUTO: 13.8 % — LOW (ref 20.5–51.1)
MCHC RBC-ENTMCNC: 29.8 PG — SIGNIFICANT CHANGE UP (ref 27–31)
MCHC RBC-ENTMCNC: 32.5 G/DL — SIGNIFICANT CHANGE UP (ref 32–37)
MCV RBC AUTO: 91.7 FL — SIGNIFICANT CHANGE UP (ref 80–94)
MONOCYTES # BLD AUTO: 0.86 K/UL — HIGH (ref 0.1–0.6)
MONOCYTES NFR BLD AUTO: 10.6 % — HIGH (ref 1.7–9.3)
NEUTROPHILS # BLD AUTO: 5.6 K/UL — SIGNIFICANT CHANGE UP (ref 1.4–6.5)
NEUTROPHILS NFR BLD AUTO: 68.9 % — SIGNIFICANT CHANGE UP (ref 42.2–75.2)
NRBC # BLD: 0 /100 WBCS — SIGNIFICANT CHANGE UP (ref 0–0)
PLATELET # BLD AUTO: 182 K/UL — SIGNIFICANT CHANGE UP (ref 130–400)
POTASSIUM SERPL-MCNC: 4.4 MMOL/L — SIGNIFICANT CHANGE UP (ref 3.5–5)
POTASSIUM SERPL-SCNC: 4.4 MMOL/L — SIGNIFICANT CHANGE UP (ref 3.5–5)
RBC # BLD: 2.65 M/UL — LOW (ref 4.7–6.1)
RBC # FLD: 14.4 % — SIGNIFICANT CHANGE UP (ref 11.5–14.5)
SODIUM SERPL-SCNC: 140 MMOL/L — SIGNIFICANT CHANGE UP (ref 135–146)
WBC # BLD: 8.12 K/UL — SIGNIFICANT CHANGE UP (ref 4.8–10.8)
WBC # FLD AUTO: 8.12 K/UL — SIGNIFICANT CHANGE UP (ref 4.8–10.8)

## 2018-03-12 RX ORDER — MECLIZINE HCL 12.5 MG
1 TABLET ORAL
Qty: 0 | Refills: 0 | COMMUNITY
Start: 2018-03-12

## 2018-03-12 RX ORDER — ROSUVASTATIN CALCIUM 5 MG/1
1 TABLET ORAL
Qty: 0 | Refills: 0 | COMMUNITY

## 2018-03-12 RX ORDER — ROSUVASTATIN CALCIUM 5 MG/1
20 TABLET ORAL
Qty: 30 | Refills: 0 | OUTPATIENT
Start: 2018-03-12 | End: 2018-04-10

## 2018-03-12 RX ORDER — CALCIUM ACETATE 667 MG
1334 TABLET ORAL
Qty: 84 | Refills: 0
Start: 2018-03-12 | End: 2018-03-25

## 2018-03-12 RX ORDER — CLOPIDOGREL BISULFATE 75 MG/1
1 TABLET, FILM COATED ORAL
Qty: 0 | Refills: 0 | COMMUNITY

## 2018-03-12 RX ORDER — HEPARIN SODIUM 5000 [USP'U]/ML
5000 INJECTION INTRAVENOUS; SUBCUTANEOUS EVERY 8 HOURS
Qty: 0 | Refills: 0 | Status: DISCONTINUED | OUTPATIENT
Start: 2018-03-12 | End: 2018-03-12

## 2018-03-12 RX ORDER — ASPIRIN/CALCIUM CARB/MAGNESIUM 324 MG
1 TABLET ORAL
Qty: 0 | Refills: 0 | COMMUNITY

## 2018-03-12 RX ORDER — ASPIRIN/CALCIUM CARB/MAGNESIUM 324 MG
1 TABLET ORAL
Qty: 0 | Refills: 0 | COMMUNITY
Start: 2018-03-12

## 2018-03-12 RX ORDER — CLOPIDOGREL BISULFATE 75 MG/1
1 TABLET, FILM COATED ORAL
Qty: 0 | Refills: 0 | COMMUNITY
Start: 2018-03-12

## 2018-03-12 RX ORDER — CEFPODOXIME PROXETIL 100 MG
1 TABLET ORAL
Qty: 3 | Refills: 0 | OUTPATIENT
Start: 2018-03-12 | End: 2018-03-14

## 2018-03-12 RX ORDER — FUROSEMIDE 40 MG
1 TABLET ORAL
Qty: 0 | Refills: 0 | DISCHARGE
Start: 2018-03-12

## 2018-03-12 RX ORDER — OCTREOTIDE ACETATE 200 UG/ML
0 INJECTION, SOLUTION INTRAVENOUS; SUBCUTANEOUS
Qty: 0 | Refills: 0 | COMMUNITY

## 2018-03-12 RX ORDER — FERROUS SULFATE 325(65) MG
325 TABLET ORAL
Qty: 0 | Refills: 0 | Status: DISCONTINUED | OUTPATIENT
Start: 2018-03-12 | End: 2018-03-12

## 2018-03-12 RX ORDER — TAMSULOSIN HYDROCHLORIDE 0.4 MG/1
1 CAPSULE ORAL
Qty: 0 | Refills: 0 | DISCHARGE
Start: 2018-03-12

## 2018-03-12 RX ADMIN — CLOPIDOGREL BISULFATE 75 MILLIGRAM(S): 75 TABLET, FILM COATED ORAL at 13:19

## 2018-03-12 RX ADMIN — ATENOLOL 50 MILLIGRAM(S): 25 TABLET ORAL at 05:52

## 2018-03-12 RX ADMIN — Medication 325 MILLIGRAM(S): at 13:19

## 2018-03-12 RX ADMIN — AMLODIPINE BESYLATE 10 MILLIGRAM(S): 2.5 TABLET ORAL at 05:52

## 2018-03-12 RX ADMIN — Medication 40 MILLIGRAM(S): at 05:52

## 2018-03-12 RX ADMIN — Medication 325 MILLIGRAM(S): at 05:52

## 2018-03-12 RX ADMIN — TAMSULOSIN HYDROCHLORIDE 0.4 MILLIGRAM(S): 0.4 CAPSULE ORAL at 13:19

## 2018-03-12 RX ADMIN — Medication 325 MILLIGRAM(S): at 13:20

## 2018-03-12 RX ADMIN — Medication 1334 MILLIGRAM(S): at 08:03

## 2018-03-12 RX ADMIN — Medication 1334 MILLIGRAM(S): at 13:19

## 2018-03-12 RX ADMIN — HEPARIN SODIUM 5000 UNIT(S): 5000 INJECTION INTRAVENOUS; SUBCUTANEOUS at 13:19

## 2018-03-12 NOTE — PROGRESS NOTE ADULT - ASSESSMENT
DM/HTN/STAGE 5 CKD/anemia/HPT/metabolic acidosis/urinary retention:  # baseline cr 5 to 7   # followed by Dr Arevalo as outpatient  # refusing ortiz  #on phoslo 2/2/2  # PTH noted, once ph better controlled, will increase calcitriol  # change same dose of lasix   #start feso4 po q 8, procrit 5000 units s/c weekly   # on  sodium bicarbonate q 8   # no need for further w/up, will need av fistula, can be done as outpatient    # no acute indication for RRT  # will follow

## 2018-03-12 NOTE — DISCHARGE NOTE ADULT - PROVIDER TOKENS
TOKEN:'[id number]',FREE:[LAST:[Hardyal],FIRST:[Jair],PHONE:[phone]],FAX:[(   [phone]    -],ADDRESS:[[address] [place] [address]]]

## 2018-03-12 NOTE — DISCHARGE NOTE ADULT - PATIENT PORTAL LINK FT
You can access the [email or web address] Patient Portal, offered by [facility], by registering with the following website: [email or web address]

## 2018-03-12 NOTE — PROGRESS NOTE ADULT - PROVIDER SPECIALTY LIST ADULT
Hospitalist
Hospitalist
Internal Medicine
Internal Medicine
Nephrology
Hospitalist

## 2018-03-12 NOTE — DISCHARGE NOTE ADULT - MEDICATION SUMMARY - MEDICATIONS TO TAKE
I will START or STAY ON the medications listed below when I get home from the hospital:    aspirin 325 mg oral tablet  -- 1 tab(s) by mouth once a day  -- Indication: For CAD (coronary atherosclerotic disease)    sodium bicarbonate 650 mg oral tablet  -- 1 tab(s) by mouth 2 times a day  -- Indication: For RENAL FAILURE DIZZY DVT DEEP VEIN THROMBOSIS    tamsulosin 0.4 mg oral capsule  -- 1 cap(s) by mouth once a day  -- Indication: For bph    glipiZIDE 2.5 mg oral tablet, extended release  -- 1 tab(s) by mouth once a day  -- Indication: For Diabetes    Crestor 20 mg oral tablet  -- 20 milligram(s) by mouth once a day   -- Indication: For Dyslipidemia    clopidogrel 75 mg oral tablet  -- 1 tab(s) by mouth once a day  -- Indication: For CAD (coronary atherosclerotic disease)    atenolol 50 mg oral tablet  -- 1 tab(s) by mouth once a day  -- Indication: For bp    amLODIPine 10 mg oral tablet  -- 1 tab(s) by mouth once a day  -- Indication: For bp    cefpodoxime 100 mg oral tablet  -- 1 tab(s) by mouth once a day   -- Finish all this medication unless otherwise directed by prescriber.  Take with food or milk.    -- Indication: For uti    Lasix 40 mg oral tablet  -- 1 tab(s) by mouth once a day  -- Indication: For RENAL FAILURE DIZZY DVT DEEP VEIN THROMBOSIS    Calphron 667 mg oral tablet  -- 1334 milligram(s) by mouth 3 times a day   -- Indication: For RENAL FAILURE DIZZY DVT DEEP VEIN THROMBOSIS    calcitriol 0.25 mcg oral capsule  -- 1 cap(s) by mouth every 48 hours  -- Indication: For RENAL FAILURE DIZZY DVT DEEP VEIN THROMBOSIS

## 2018-03-12 NOTE — DISCHARGE NOTE ADULT - PLAN OF CARE
Control blood sugar levels Please continue taking your diabetic medications as your uncontrolled sugars were the underlying cause of your dizziness. Please follow up with your PCP Maintain renal function Please follow up with your Nephrologist Dr. Arevalo to monitor your renal function. YOu may require an AV fistula for renal replacement therapy please follow up with your PCP to monitor your hemoglobin regularly resolution of infection please complete your course of antibiotic as prescribed prevent further dvts As per vascular surgery, there is no need for anticoagulation at this time. Please continue with compression stockings and ambulation as much as possible. please follow up with your PCP

## 2018-03-12 NOTE — DISCHARGE NOTE ADULT - HOSPITAL COURSE
56 yo M with PMHx of pituitary tumor s/p removal, acromegaly, CAD s/p stents, DLD, DMII, neuropathy, CKD 5 presents with dizziness X2 days. Pt felt dizziness the night before presentation, pt was walking back after going to the bathroom and felt dizzy. Pt described it as a spinning sensation associated with imbalance and blurry vision lasting about 1-2 mins. Pt had another episode of dizziness the day of presentation. Again, he was walking back after urinating, denies straining, felt dizziness on his way back. However, pt felt to his legs this time and called an ambulance to the hospital. Pt denies headache, fever, chills, N/V/D, SOB, CP, abdominal pain, leg pain, dysuria, blood in urine or stool.   Pt was found to have pH 7.24 Cr 7.5 and Bicarb of 15 in ED. Glucose 500. Pt has a baseline cr of 6.64 (7/2017), follows up with Dr. Jones regularly and states that he stills make urine. Pt refused ortiz in the ED. Pt was found to have chronic appearing DVT in LLE.     Pt was admitted for primary diagnosis of dizziness likely 2/2 hyperosmolar hyperglycemic state which was corrected w/ fluids and insulin. CT Head and Echo were unremarkable. Pt's NAGMA was due ot underling worsening renal function and pt refused ortiz during the course of his stay to monitor ins and outs. Acidosis improved s/p fluids and bicarb drip. Nephrology was consulted and recommended renal replacement therapy as outpt and to continue w/ phoslo, and oral sodium bicarbonate. PT may need av fistula and can be done as outpt. Pt also had worsening anemia of chronic disease and was transfused 1 unit of PRBC for hb 7 and responded appropriately and remained stable. As per nephro, pt was given epogen and recommended FeSO4. Pt also was started on a course of rocephin for UTI which is improving and will complete the course as outpt. Vascular was consulted for chronic Left fem and pop dvt and recommended only leg elevation and compression stockings and no need for anticoagulation at this time.

## 2018-03-12 NOTE — PROVIDER CONTACT NOTE (OTHER) - SITUATION
PVR done. Bladderscan recorded 160-215mL pre-void, pt voided 250mL of yellow urine. PVR Bladderscan had 0mL recorded. MD notified

## 2018-03-12 NOTE — DISCHARGE NOTE ADULT - SECONDARY DIAGNOSIS.
Acute renal failure, unspecified acute renal failure type Anemia of chronic disease Acute cystitis without hematuria Chronic deep vein thrombosis (DVT) of femoral vein of left lower extremity

## 2018-03-12 NOTE — DISCHARGE NOTE ADULT - MEDICATION SUMMARY - MEDICATIONS TO CHANGE
I will SWITCH the dose or number of times a day I take the medications listed below when I get home from the hospital:    Crestor 10 mg oral tablet  -- 1 tab(s) by mouth once a day (at bedtime)

## 2018-03-12 NOTE — PROGRESS NOTE ADULT - ASSESSMENT
56 yo M with PMHx of pituitary tumor s/p removal, acromegaly, CAD s/p stents, DLD, DMII, neuropathy, CKD 5 presents with dizziness X2 days.       1. UTI  2. KATHARINA/CKD-V likely due to obstructive uropathy  3. Urinary retention/BPH  4. AG Metabolic Acidosis  5. Ch. LLE DVT   6. Anemia of Ch. disease  7. Acromegaly  8. Likely JABARI  9. CAD/DM-2        PLAN:    . Switch him to cefpodoxime 100mg po q24h  . Cont Flomax 0.4mg po q24h  . Pt. refused catheter  . Start Lasix 40mg IVP q12h.  . Check I'S and O'S  . Nephrology F/U noted. No need of RRT at this time. Out pt F/U>  . Pulm eval note. Out pt. sleep study  . Vasc eval noted. Ch. DVT. No need for A/C  . Will cont lasic 40mg po q24h. 56 yo M with PMHx of pituitary tumor s/p removal, acromegaly, CAD s/p stents, DLD, DMII, neuropathy, CKD 5 presents with dizziness X2 days.       1. UTI  2. KATHARINA/CKD-V likely due to obstructive uropathy  3. Urinary retention/BPH  4. AG Metabolic Acidosis  5. Ch. LLE DVT   6. Anemia of Ch. disease  7. Acromegaly  8. Likely JABARI  9. CAD/DM-2        PLAN:    . Switch him to cefpodoxime 100mg po q24h  . Cont Flomax 0.4mg po q24h  . Pt. refused catheter on admission. PVR <150cc now.  . Start Lasix 40mg IVP q12h.  . Check I'S and O'S  . Nephrology F/U noted. No need of RRT at this time. Out pt F/U>  . Pulm eval note. Out pt. sleep study  . Vasc eval noted. Ch. DVT. No need for A/C  . Will cont lasic 40mg po q24h.  . D/C home.    *  Med rec reviewed with the intern. Time spent 40 minutes.

## 2018-03-12 NOTE — DISCHARGE NOTE ADULT - ADDITIONAL INSTRUCTIONS
Dr. Edwards - PCP - 1 week  Dr. Arevalo - Nephrologist - 1 week  Dr. Barnes - Endocrinologist - 1 week

## 2018-03-12 NOTE — DISCHARGE NOTE ADULT - CARE PROVIDER_API CALL
Marlys Arevalo), Internal Medicine; Nephrology  [facility] [place] [address]  Phone: [phone]  Fax: [phone]    Jair Edwards  [address] [place] [address]  Phone: [phone]  Fax: (   )    -

## 2018-03-12 NOTE — PROGRESS NOTE ADULT - SUBJECTIVE AND OBJECTIVE BOX
SU SAWYER  57y Male    INTERVAL HPI/OVERNIGHT EVENTS:  Patient seen and examined. No dizziness. No SOB. No urinary complaints.    ROS: All other systems are negative.    Vital Signs:    T(F): 97 (03-12-18 @ 13:24), Max: 97.4 (03-11-18 @ 20:44)  HR: 57 (03-12-18 @ 13:24) (55 - 60)  BP: 168/82 (03-12-18 @ 13:24) (149/71 - 168/82)  RR: 18 (03-12-18 @ 13:24) (18 - 18)  SpO2: 98% (03-12-18 @ 08:42) (98% - 98%)  I&O's Summary    11 Mar 2018 07:01  -  12 Mar 2018 07:00  --------------------------------------------------------  IN: 240 mL / OUT: 220 mL / NET: 20 mL    12 Mar 2018 07:01  -  12 Mar 2018 14:52  --------------------------------------------------------  IN: 0 mL / OUT: 250 mL / NET: -250 mL      Daily     Daily   CAPILLARY BLOOD GLUCOSE  135 (12 Mar 2018 11:38)  113 (12 Mar 2018 09:58)  163 (11 Mar 2018 21:39)          PHYSICAL EXAM:    GENERAL: Alert & oriented X 3. NAD  SKIN: No rashes or lesions  HENT: Atrumatic. Normocephalic. PERRL. Moist membranes. Acromegaly.  NECK: Supple, No JVD. No lymphadenopathy.  PULMONARY: CTA B/L. No wheezing. No rales  CVS: Normal S1, S2. Rate and Rythm are regular. No murmurs, rubs or gallops.  ABDOMEN: Soft, Nontender, Nondistended; Bowel sounds present  EXTREMITIES: Peripheral pulses intact. 1-2 + pitting edema B/L.  NEUROLOGIC:  No motor or sensory deficit.    Consultant(s) Notes Reviewed:  [x ] YES  [ ] NO  Care Discussed with Consultants/Other Providers [ x] YES  [ ] NO    EKG reviewed  Telemetry reviewed    LABS:                        7.9    8.12  )-----------( 182      ( 12 Mar 2018 06:06 )             24.3   Hemoglobin: 7.9 g/dL (03-12 @ 06:06)  Hemoglobin: 7.7 g/dL (03-11 @ 08:50)  Hemoglobin: 7.0 g/dL (03-10 @ 18:23)  Hemoglobin: 7.6 g/dL (03-10 @ 10:15)  Hemoglobin: 8.0 g/dL (03-09 @ 06:33)  Hemoglobin: 8.1 g/dL (03-08 @ 15:20)    03-12    140  |  112<H>  |  70<HH>  ----------------------------<  95                     Creatinine Trend: 7.3<--, 7.3<--, 7.0<--, 6.6<--, 6.7<--, 6.6<--  4.4   |  18  |  7.3<HH>    Ca    7.7<L>      12 Mar 2018 06:06              RADIOLOGY & ADDITIONAL TESTS:      Imaging or report Personally Reviewed:  [ ] YES  [ ] NO    Medications:  Standing  amLODIPine   Tablet 10 milliGRAM(s) Oral daily  aspirin 325 milliGRAM(s) Oral daily  ATENolol  Tablet 50 milliGRAM(s) Oral daily  atorvastatin 40 milliGRAM(s) Oral at bedtime  calcitriol   Capsule 0.25 MICROGram(s) Oral every other day  calcium acetate 1334 milliGRAM(s) Oral three times a day with meals  cefTRIAXone   IVPB      cefTRIAXone   IVPB 1 Gram(s) IV Intermittent every 24 hours  clopidogrel Tablet 75 milliGRAM(s) Oral daily  furosemide    Tablet 40 milliGRAM(s) Oral daily  heparin  Injectable 5000 Unit(s) SubCutaneous every 8 hours  sodium bicarbonate 325 milliGRAM(s) Oral every 8 hours  tamsulosin 0.4 milliGRAM(s) Oral daily    PRN Meds  meclizine 12.5 milliGRAM(s) Oral three times a day PRN      Case discussed with resident    Care discussed with pt/family

## 2018-03-12 NOTE — PROGRESS NOTE ADULT - SUBJECTIVE AND OBJECTIVE BOX
seen and examined  no distress  no CP, no SOB  no N, no V       Standing Inpatient Medications  amLODIPine   Tablet 10 milliGRAM(s) Oral daily  aspirin 325 milliGRAM(s) Oral daily  ATENolol  Tablet 50 milliGRAM(s) Oral daily  atorvastatin 40 milliGRAM(s) Oral at bedtime  calcitriol   Capsule 0.25 MICROGram(s) Oral every other day  calcium acetate 1334 milliGRAM(s) Oral three times a day with meals  cefTRIAXone   IVPB      cefTRIAXone   IVPB 1 Gram(s) IV Intermittent every 24 hours  clopidogrel Tablet 75 milliGRAM(s) Oral daily  furosemide    Tablet 40 milliGRAM(s) Oral daily  heparin  Injectable 5000 Unit(s) SubCutaneous every 8 hours  sodium bicarbonate 325 milliGRAM(s) Oral every 8 hours  tamsulosin 0.4 milliGRAM(s) Oral daily              VITALS/PHYSICAL EXAM  --------------------------------------------------------------------------------  T(C): 36.1 (03-12-18 @ 05:00), Max: 36.3 (03-11-18 @ 20:44)  HR: 60 (03-12-18 @ 05:00) (55 - 60)  BP: 149/71 (03-12-18 @ 05:00) (148/76 - 159/78)  RR: 18 (03-12-18 @ 05:00) (18 - 18)  SpO2: 98% (03-12-18 @ 08:42) (98% - 98%)        03-11-18 @ 07:01  -  03-12-18 @ 07:00  --------------------------------------------------------  IN: 240 mL / OUT: 220 mL / NET: 20 mL      Physical Exam:  	Gen: NAD  	Pulm:   decrease BS B/L  	CV: S1S2; no rub  	Abd: soft, nontender/nondistended  	LE:  + edema  	    LABS/STUDIES  --------------------------------------------------------------------------------              7.9    8.12  >-----------<  182      [03-12-18 @ 06:06]              24.3     140  |  112  |  70  ----------------------------<  95      [03-12-18 @ 06:06]  4.4   |  18  |  7.3        Ca     7.7     [03-12-18 @ 06:06]            Creatinine Trend:  SCr 7.3 [03-12 @ 06:06]  SCr 7.3 [03-11 @ 08:50]  SCr 7.0 [03-10 @ 10:15]  SCr 6.6 [03-09 @ 06:33]  SCr 6.7 [03-09 @ 00:36]        Iron 46, TIBC 212, %sat 23      [03-09-18 @ 18:01]  Ferritin 55      [03-09-18 @ 18:01]  PTH -- (Ca 8.3)      [03-09-18 @ 18:01]   1545  Vitamin D (25OH) 14.4      [03-09-18 @ 18:01]      C3 Complement 92      [03-09-18 @ 18:01]  C4 Complement 15      [03-09-18 @ 18:01]  Immunofixation Serum:   No Monoclonal Band Identified      [03-09-18 @ 18:01]  SPEP Interpretation: Normal Electrophoresis Pattern      [03-09-18 @ 18:01]

## 2018-03-12 NOTE — DISCHARGE NOTE ADULT - CARE PLAN
Principal Discharge DX:	Dizziness  Goal:	Control blood sugar levels  Assessment and plan of treatment:	Please continue taking your diabetic medications as your uncontrolled sugars were the underlying cause of your dizziness. Please follow up with your PCP  Secondary Diagnosis:	Acute renal failure, unspecified acute renal failure type  Goal:	Maintain renal function  Assessment and plan of treatment:	Please follow up with your Nephrologist Dr. Arevalo to monitor your renal function. YOu may require an AV fistula for renal replacement therapy  Secondary Diagnosis:	Anemia of chronic disease  Goal:	Maintain renal function  Assessment and plan of treatment:	please follow up with your PCP to monitor your hemoglobin regularly  Secondary Diagnosis:	Acute cystitis without hematuria  Goal:	resolution of infection  Assessment and plan of treatment:	please complete your course of antibiotic as prescribed  Secondary Diagnosis:	Chronic deep vein thrombosis (DVT) of femoral vein of left lower extremity  Goal:	prevent further dvts  Assessment and plan of treatment:	As per vascular surgery, there is no need for anticoagulation at this time. Please continue with compression stockings and ambulation as much as possible. please follow up with your PCP

## 2018-03-13 VITALS
SYSTOLIC BLOOD PRESSURE: 143 MMHG | RESPIRATION RATE: 18 BRPM | TEMPERATURE: 97 F | HEART RATE: 101 BPM | DIASTOLIC BLOOD PRESSURE: 73 MMHG

## 2018-03-13 DIAGNOSIS — E11.22 TYPE 2 DIABETES MELLITUS WITH DIABETIC CHRONIC KIDNEY DISEASE: ICD-10-CM

## 2018-03-13 DIAGNOSIS — N39.0 URINARY TRACT INFECTION, SITE NOT SPECIFIED: ICD-10-CM

## 2018-03-13 DIAGNOSIS — I12.0 HYPERTENSIVE CHRONIC KIDNEY DISEASE WITH STAGE 5 CHRONIC KIDNEY DISEASE OR END STAGE RENAL DISEASE: ICD-10-CM

## 2018-03-13 DIAGNOSIS — I82.512 CHRONIC EMBOLISM AND THROMBOSIS OF LEFT FEMORAL VEIN: ICD-10-CM

## 2018-03-13 DIAGNOSIS — I25.10 ATHEROSCLEROTIC HEART DISEASE OF NATIVE CORONARY ARTERY WITHOUT ANGINA PECTORIS: ICD-10-CM

## 2018-03-13 DIAGNOSIS — N18.5 CHRONIC KIDNEY DISEASE, STAGE 5: ICD-10-CM

## 2018-03-13 DIAGNOSIS — G47.33 OBSTRUCTIVE SLEEP APNEA (ADULT) (PEDIATRIC): ICD-10-CM

## 2018-03-13 DIAGNOSIS — N13.9 OBSTRUCTIVE AND REFLUX UROPATHY, UNSPECIFIED: ICD-10-CM

## 2018-03-13 DIAGNOSIS — R42 DIZZINESS AND GIDDINESS: ICD-10-CM

## 2018-03-13 DIAGNOSIS — E22.0 ACROMEGALY AND PITUITARY GIGANTISM: ICD-10-CM

## 2018-03-13 DIAGNOSIS — N17.9 ACUTE KIDNEY FAILURE, UNSPECIFIED: ICD-10-CM

## 2018-03-13 DIAGNOSIS — D50.9 IRON DEFICIENCY ANEMIA, UNSPECIFIED: ICD-10-CM

## 2018-03-13 DIAGNOSIS — N40.1 BENIGN PROSTATIC HYPERPLASIA WITH LOWER URINARY TRACT SYMPTOMS: ICD-10-CM

## 2018-03-13 DIAGNOSIS — E87.2 ACIDOSIS: ICD-10-CM

## 2018-03-13 DIAGNOSIS — E83.39 OTHER DISORDERS OF PHOSPHORUS METABOLISM: ICD-10-CM

## 2018-03-13 DIAGNOSIS — R33.8 OTHER RETENTION OF URINE: ICD-10-CM

## 2018-03-13 DIAGNOSIS — E11.00 TYPE 2 DIABETES MELLITUS WITH HYPEROSMOLARITY WITHOUT NONKETOTIC HYPERGLYCEMIC-HYPEROSMOLAR COMA (NKHHC): ICD-10-CM

## 2018-03-13 DIAGNOSIS — E11.40 TYPE 2 DIABETES MELLITUS WITH DIABETIC NEUROPATHY, UNSPECIFIED: ICD-10-CM

## 2018-03-13 DIAGNOSIS — Z95.5 PRESENCE OF CORONARY ANGIOPLASTY IMPLANT AND GRAFT: ICD-10-CM

## 2018-03-14 LAB — SSDNA AB SER-ACNC: 45 U/ML — SIGNIFICANT CHANGE UP

## 2018-09-18 ENCOUNTER — INPATIENT (INPATIENT)
Facility: HOSPITAL | Age: 58
LOS: 7 days | Discharge: NURSING, REHAB OR LONG-TERM CARE | End: 2018-09-26
Attending: INTERNAL MEDICINE | Admitting: INTERNAL MEDICINE
Payer: MEDICARE

## 2018-09-18 VITALS
RESPIRATION RATE: 20 BRPM | WEIGHT: 265 LBS | SYSTOLIC BLOOD PRESSURE: 141 MMHG | TEMPERATURE: 97 F | HEART RATE: 52 BPM | DIASTOLIC BLOOD PRESSURE: 65 MMHG | OXYGEN SATURATION: 96 %

## 2018-09-18 DIAGNOSIS — Z87.898 PERSONAL HISTORY OF OTHER SPECIFIED CONDITIONS: Chronic | ICD-10-CM

## 2018-09-18 DIAGNOSIS — Z98.890 OTHER SPECIFIED POSTPROCEDURAL STATES: Chronic | ICD-10-CM

## 2018-09-18 LAB
ALBUMIN SERPL ELPH-MCNC: 3.7 G/DL — SIGNIFICANT CHANGE UP (ref 3.5–5.2)
ALP SERPL-CCNC: 123 U/L — HIGH (ref 30–115)
ALT FLD-CCNC: 10 U/L — SIGNIFICANT CHANGE UP (ref 0–41)
ANION GAP SERPL CALC-SCNC: 26 MMOL/L — HIGH (ref 7–14)
APTT BLD: 35 SEC — SIGNIFICANT CHANGE UP (ref 27–39.2)
AST SERPL-CCNC: 10 U/L — SIGNIFICANT CHANGE UP (ref 0–41)
BASE EXCESS BLDV CALC-SCNC: -18 MMOL/L — LOW (ref -2–2)
BASOPHILS # BLD AUTO: 0.03 K/UL — SIGNIFICANT CHANGE UP (ref 0–0.2)
BASOPHILS NFR BLD AUTO: 0.3 % — SIGNIFICANT CHANGE UP (ref 0–1)
BILIRUB SERPL-MCNC: 0.3 MG/DL — SIGNIFICANT CHANGE UP (ref 0.2–1.2)
BUN SERPL-MCNC: 117 MG/DL — CRITICAL HIGH (ref 10–20)
CA-I SERPL-SCNC: 1.15 MMOL/L — SIGNIFICANT CHANGE UP (ref 1.12–1.3)
CALCIUM SERPL-MCNC: 7.6 MG/DL — LOW (ref 8.5–10.1)
CHLORIDE SERPL-SCNC: 104 MMOL/L — SIGNIFICANT CHANGE UP (ref 98–110)
CO2 SERPL-SCNC: 7 MMOL/L — CRITICAL LOW (ref 17–32)
CREAT SERPL-MCNC: 9.1 MG/DL — CRITICAL HIGH (ref 0.7–1.5)
EOSINOPHIL # BLD AUTO: 0.04 K/UL — SIGNIFICANT CHANGE UP (ref 0–0.7)
EOSINOPHIL NFR BLD AUTO: 0.4 % — SIGNIFICANT CHANGE UP (ref 0–8)
GAS PNL BLDV: 138 MMOL/L — SIGNIFICANT CHANGE UP (ref 136–145)
GAS PNL BLDV: SIGNIFICANT CHANGE UP
GLUCOSE SERPL-MCNC: 111 MG/DL — HIGH (ref 70–99)
HCO3 BLDV-SCNC: 10 MMOL/L — LOW (ref 22–29)
HCT VFR BLD CALC: 22.3 % — LOW (ref 42–52)
HCT VFR BLDA CALC: 24.1 % — LOW (ref 34–44)
HGB BLD CALC-MCNC: 7.8 G/DL — LOW (ref 14–18)
HGB BLD-MCNC: 7.2 G/DL — CRITICAL LOW (ref 14–18)
IMM GRANULOCYTES NFR BLD AUTO: 0.4 % — HIGH (ref 0.1–0.3)
INR BLD: 1.22 RATIO — SIGNIFICANT CHANGE UP (ref 0.65–1.3)
LYMPHOCYTES # BLD AUTO: 0.78 K/UL — LOW (ref 1.2–3.4)
LYMPHOCYTES # BLD AUTO: 7.1 % — LOW (ref 20.5–51.1)
MAGNESIUM SERPL-MCNC: 1.8 MG/DL — SIGNIFICANT CHANGE UP (ref 1.8–2.4)
MCHC RBC-ENTMCNC: 29.4 PG — SIGNIFICANT CHANGE UP (ref 27–31)
MCHC RBC-ENTMCNC: 32.3 G/DL — SIGNIFICANT CHANGE UP (ref 32–37)
MCV RBC AUTO: 91 FL — SIGNIFICANT CHANGE UP (ref 80–94)
MONOCYTES # BLD AUTO: 1 K/UL — HIGH (ref 0.1–0.6)
MONOCYTES NFR BLD AUTO: 9 % — SIGNIFICANT CHANGE UP (ref 1.7–9.3)
NEUTROPHILS # BLD AUTO: 9.16 K/UL — HIGH (ref 1.4–6.5)
NEUTROPHILS NFR BLD AUTO: 82.8 % — HIGH (ref 42.2–75.2)
NRBC # BLD: 0 /100 WBCS — SIGNIFICANT CHANGE UP (ref 0–0)
NT-PROBNP SERPL-SCNC: 7464 PG/ML — HIGH (ref 0–300)
PCO2 BLDV: 28 MMHG — LOW (ref 41–51)
PH BLDV: 7.14 — LOW (ref 7.26–7.43)
PLATELET # BLD AUTO: 211 K/UL — SIGNIFICANT CHANGE UP (ref 130–400)
PO2 BLDV: 32 MMHG — SIGNIFICANT CHANGE UP (ref 20–40)
POTASSIUM BLDV-SCNC: 4.7 MMOL/L — SIGNIFICANT CHANGE UP (ref 3.3–5.6)
POTASSIUM SERPL-MCNC: 5 MMOL/L — SIGNIFICANT CHANGE UP (ref 3.5–5)
POTASSIUM SERPL-SCNC: 5 MMOL/L — SIGNIFICANT CHANGE UP (ref 3.5–5)
PROT SERPL-MCNC: 6.2 G/DL — SIGNIFICANT CHANGE UP (ref 6–8)
PROTHROM AB SERPL-ACNC: 13.1 SEC — HIGH (ref 9.95–12.87)
RBC # BLD: 2.45 M/UL — LOW (ref 4.7–6.1)
RBC # FLD: 15.2 % — HIGH (ref 11.5–14.5)
SAO2 % BLDV: 54 % — SIGNIFICANT CHANGE UP
SODIUM SERPL-SCNC: 137 MMOL/L — SIGNIFICANT CHANGE UP (ref 135–146)
TROPONIN T SERPL-MCNC: 0.33 NG/ML — CRITICAL HIGH
WBC # BLD: 11.05 K/UL — HIGH (ref 4.8–10.8)
WBC # FLD AUTO: 11.05 K/UL — HIGH (ref 4.8–10.8)

## 2018-09-18 NOTE — ED PROVIDER NOTE - OBJECTIVE STATEMENT
58 y/o M with an episode of SOB today while walking up the steps at his home.  Pt felt sob, through his shower and then went to outside [facility] [place], [place].  Had an CXR and was told that his heart was enlarged and he had fluid in his lungs. No cp. No fever.  mild cough. no n/v/d. No ap.  + 2-3d of b/l ankle swelling.  + skin avulsion on his 2nd and 3rd toes on his L foot and also on his L heel.  Pt came here via EMS and started feeling better when he was given o2 in the ambulance.  Pt now feels well.  PMH cad, with stents, htn, hchol, on asa, plavix, acromegaly, h/o pituitary tumor, ckd.

## 2018-09-18 NOTE — ED ADULT NURSE NOTE - OBJECTIVE STATEMENT
The patient complains of shortness of breath on exertion for 4 days.  The patient states the SOB is intermittent, worsening today and at rest.  The patient denies chest pain- states he was seen in urgent care today and told to come to the emergency room because he had a chest xray that showed fluid in the lungs.

## 2018-09-18 NOTE — ED ADULT TRIAGE NOTE - CHIEF COMPLAINT QUOTE
Pt came c/o respiratory distress x 2 days that is getting progressively worse, also has b/l LE pitting edema.

## 2018-09-18 NOTE — ED PROVIDER NOTE - CARE PLAN
Principal Discharge DX:	Acute on chronic renal failure  Secondary Diagnosis:	Anemia  Secondary Diagnosis:	Elevated troponin

## 2018-09-18 NOTE — ED ADULT NURSE NOTE - NSIMPLEMENTINTERV_GEN_ALL_ED
Implemented All Universal Safety Interventions:  [place] to call system. Call bell, personal items and telephone within reach. Instruct patient to call for assistance. Room bathroom lighting operational. Non-slip footwear when patient is off stretcher. Physically safe environment: no spills, clutter or unnecessary equipment. Stretcher in lowest position, wheels locked, appropriate side rails in place.

## 2018-09-18 NOTE — ED PROVIDER NOTE - NS ED ROS FT
Review of Systems:  · CONSTITUTIONAL: no fever and no chills.  · EYES: no discharge, no irritation, no pain, no redness, and no visual changes.  · ENMT: Ears: no ear pain and no hearing problems. Nose: no nasal congestion and no nasal drainage. Mouth/Throat: no dysphagia, no hoarseness and no throat pain.  · CARDIOVASCULAR: no chest pain  · RESPIRATORY:+ cough, and +shortness of breath.  · GASTROINTESTINAL: no abdominal pain, no diarrhea, no nausea and no vomiting.  · GENITOURINARY: no dysuria  · MUSCULOSKELETAL: no back pain, no musculoskeletal pain, no weakness.  + LE edema.  · SKIN: +skin avulsions on L foot/heel.  · NEURO: no loss of consciousness, no headache.  · ROS STATEMENT: all other ROS negative except as per HPI

## 2018-09-18 NOTE — ED PROVIDER NOTE - PROGRESS NOTE DETAILS
attempted to call cardiac fellow, no answer. VBG lactate is <0.4 so the automated report will not report it in the computer.  See scanned printout. d/w Dr. Currie, cards fellow.  He is not convinced this is ACS.  Recommends repeat 4 hr trop.  Call him if it is elevated.  also recommends asa.  d/w nephrology and ICU. d/w Dr. Gilbert, nephrology - who does not think Pt needs ICU.  Recommends bicarb drip, 3 amps in D5W at 75ml/hr.  Pt will be evaluated by him in the AM for dialysis. d/w Dr. Garcia, hospitalist - who is aware of the Pt.  The nephrology and cardiology c/s.  Agrees with plan.  Accepts the Pt to his service.

## 2018-09-19 PROBLEM — I10 ESSENTIAL (PRIMARY) HYPERTENSION: Chronic | Status: ACTIVE | Noted: 2018-03-08

## 2018-09-19 PROBLEM — I25.10 ATHEROSCLEROTIC HEART DISEASE OF NATIVE CORONARY ARTERY WITHOUT ANGINA PECTORIS: Chronic | Status: ACTIVE | Noted: 2018-03-08

## 2018-09-19 PROBLEM — E78.5 HYPERLIPIDEMIA, UNSPECIFIED: Chronic | Status: ACTIVE | Noted: 2018-03-08

## 2018-09-19 PROBLEM — E11.9 TYPE 2 DIABETES MELLITUS WITHOUT COMPLICATIONS: Chronic | Status: ACTIVE | Noted: 2018-03-08

## 2018-09-19 PROBLEM — E22.0 ACROMEGALY AND PITUITARY GIGANTISM: Chronic | Status: ACTIVE | Noted: 2018-03-08

## 2018-09-19 LAB
ANION GAP SERPL CALC-SCNC: 22 MMOL/L — HIGH (ref 7–14)
APPEARANCE UR: CLEAR — SIGNIFICANT CHANGE UP
BASOPHILS # BLD AUTO: 0.02 K/UL — SIGNIFICANT CHANGE UP (ref 0–0.2)
BASOPHILS NFR BLD AUTO: 0.2 % — SIGNIFICANT CHANGE UP (ref 0–1)
BILIRUB UR-MCNC: NEGATIVE — SIGNIFICANT CHANGE UP
BLD GP AB SCN SERPL QL: SIGNIFICANT CHANGE UP
BUN SERPL-MCNC: 113 MG/DL — CRITICAL HIGH (ref 10–20)
CALCIUM SERPL-MCNC: 7.9 MG/DL — LOW (ref 8.5–10.1)
CALCIUM SERPL-MCNC: 8 MG/DL — LOW (ref 8.4–10.5)
CHLORIDE SERPL-SCNC: 107 MMOL/L — SIGNIFICANT CHANGE UP (ref 98–110)
CK MB CFR SERPL CALC: 12.9 NG/ML — HIGH (ref 0.6–6.3)
CK MB CFR SERPL CALC: 13.1 NG/ML — HIGH (ref 0.6–6.3)
CK MB CFR SERPL CALC: 13.9 NG/ML — HIGH (ref 0.6–6.3)
CK SERPL-CCNC: 170 U/L — SIGNIFICANT CHANGE UP (ref 0–225)
CK SERPL-CCNC: 209 U/L — SIGNIFICANT CHANGE UP (ref 0–225)
CO2 SERPL-SCNC: 9 MMOL/L — CRITICAL LOW (ref 17–32)
COLOR SPEC: YELLOW — SIGNIFICANT CHANGE UP
CREAT SERPL-MCNC: 9 MG/DL — CRITICAL HIGH (ref 0.7–1.5)
DIFF PNL FLD: ABNORMAL
EOSINOPHIL # BLD AUTO: 0.06 K/UL — SIGNIFICANT CHANGE UP (ref 0–0.7)
EOSINOPHIL NFR BLD AUTO: 0.6 % — SIGNIFICANT CHANGE UP (ref 0–8)
EPI CELLS # UR: ABNORMAL /HPF
FOLATE SERPL-MCNC: 14 NG/ML — SIGNIFICANT CHANGE UP
GLUCOSE SERPL-MCNC: 114 MG/DL — HIGH (ref 70–99)
GLUCOSE UR QL: 100 MG/DL
GRAN CASTS # UR COMP ASSIST: ABNORMAL /LPF
HCT VFR BLD CALC: 21.6 % — LOW (ref 42–52)
HGB BLD-MCNC: 7.2 G/DL — CRITICAL LOW (ref 14–18)
IMM GRANULOCYTES NFR BLD AUTO: 0.4 % — HIGH (ref 0.1–0.3)
KETONES UR-MCNC: NEGATIVE — SIGNIFICANT CHANGE UP
LEUKOCYTE ESTERASE UR-ACNC: NEGATIVE — SIGNIFICANT CHANGE UP
LYMPHOCYTES # BLD AUTO: 0.87 K/UL — LOW (ref 1.2–3.4)
LYMPHOCYTES # BLD AUTO: 8.4 % — LOW (ref 20.5–51.1)
MAGNESIUM SERPL-MCNC: 1.7 MG/DL — LOW (ref 1.8–2.4)
MCHC RBC-ENTMCNC: 30.1 PG — SIGNIFICANT CHANGE UP (ref 27–31)
MCHC RBC-ENTMCNC: 33.3 G/DL — SIGNIFICANT CHANGE UP (ref 32–37)
MCV RBC AUTO: 90.4 FL — SIGNIFICANT CHANGE UP (ref 80–94)
MONOCYTES # BLD AUTO: 1.05 K/UL — HIGH (ref 0.1–0.6)
MONOCYTES NFR BLD AUTO: 10.2 % — HIGH (ref 1.7–9.3)
NEUTROPHILS # BLD AUTO: 8.28 K/UL — HIGH (ref 1.4–6.5)
NEUTROPHILS NFR BLD AUTO: 80.2 % — HIGH (ref 42.2–75.2)
NITRITE UR-MCNC: NEGATIVE — SIGNIFICANT CHANGE UP
NRBC # BLD: 0 /100 WBCS — SIGNIFICANT CHANGE UP (ref 0–0)
PH UR: 6 — SIGNIFICANT CHANGE UP (ref 5–8)
PHOSPHATE SERPL-MCNC: 8.9 MG/DL — HIGH (ref 2.1–4.9)
PLATELET # BLD AUTO: 201 K/UL — SIGNIFICANT CHANGE UP (ref 130–400)
POTASSIUM SERPL-MCNC: 5 MMOL/L — SIGNIFICANT CHANGE UP (ref 3.5–5)
POTASSIUM SERPL-SCNC: 5 MMOL/L — SIGNIFICANT CHANGE UP (ref 3.5–5)
PROT UR-MCNC: >=300 MG/DL
PTH-INTACT FLD-MCNC: 1519 PG/ML — HIGH (ref 15–65)
RBC # BLD: 2.39 M/UL — LOW (ref 4.7–6.1)
RBC # FLD: 15.2 % — HIGH (ref 11.5–14.5)
RBC CASTS # UR COMP ASSIST: ABNORMAL /HPF
SODIUM SERPL-SCNC: 138 MMOL/L — SIGNIFICANT CHANGE UP (ref 135–146)
SP GR SPEC: 1.02 — SIGNIFICANT CHANGE UP (ref 1.01–1.03)
TROPONIN T SERPL-MCNC: 0.27 NG/ML — CRITICAL HIGH
TROPONIN T SERPL-MCNC: 0.31 NG/ML — CRITICAL HIGH
TYPE + AB SCN PNL BLD: SIGNIFICANT CHANGE UP
UROBILINOGEN FLD QL: 0.2 MG/DL — SIGNIFICANT CHANGE UP (ref 0.2–0.2)
VIT B12 SERPL-MCNC: 1324 PG/ML — HIGH (ref 232–1245)
WBC # BLD: 10.32 K/UL — SIGNIFICANT CHANGE UP (ref 4.8–10.8)
WBC # FLD AUTO: 10.32 K/UL — SIGNIFICANT CHANGE UP (ref 4.8–10.8)
WBC UR QL: SIGNIFICANT CHANGE UP /HPF

## 2018-09-19 PROCEDURE — 99221 1ST HOSP IP/OBS SF/LOW 40: CPT

## 2018-09-19 PROCEDURE — 93970 EXTREMITY STUDY: CPT | Mod: 26

## 2018-09-19 PROCEDURE — 93926 LOWER EXTREMITY STUDY: CPT | Mod: 26

## 2018-09-19 RX ORDER — ATORVASTATIN CALCIUM 80 MG/1
80 TABLET, FILM COATED ORAL AT BEDTIME
Qty: 0 | Refills: 0 | Status: DISCONTINUED | OUTPATIENT
Start: 2018-09-19 | End: 2018-09-21

## 2018-09-19 RX ORDER — TAMSULOSIN HYDROCHLORIDE 0.4 MG/1
0.4 CAPSULE ORAL AT BEDTIME
Qty: 0 | Refills: 0 | Status: DISCONTINUED | OUTPATIENT
Start: 2018-09-19 | End: 2018-09-21

## 2018-09-19 RX ORDER — HEPARIN SODIUM 5000 [USP'U]/ML
5000 INJECTION INTRAVENOUS; SUBCUTANEOUS EVERY 8 HOURS
Qty: 0 | Refills: 0 | Status: DISCONTINUED | OUTPATIENT
Start: 2018-09-19 | End: 2018-09-21

## 2018-09-19 RX ORDER — ACETAMINOPHEN 500 MG
650 TABLET ORAL EVERY 6 HOURS
Qty: 0 | Refills: 0 | Status: DISCONTINUED | OUTPATIENT
Start: 2018-09-19 | End: 2018-09-21

## 2018-09-19 RX ORDER — ATENOLOL 25 MG/1
50 TABLET ORAL DAILY
Qty: 0 | Refills: 0 | Status: DISCONTINUED | OUTPATIENT
Start: 2018-09-19 | End: 2018-09-21

## 2018-09-19 RX ORDER — CALCITRIOL 0.5 UG/1
0.25 CAPSULE ORAL
Qty: 0 | Refills: 0 | Status: DISCONTINUED | OUTPATIENT
Start: 2018-09-19 | End: 2018-09-21

## 2018-09-19 RX ORDER — ASPIRIN/CALCIUM CARB/MAGNESIUM 324 MG
324 TABLET ORAL ONCE
Qty: 0 | Refills: 0 | Status: COMPLETED | OUTPATIENT
Start: 2018-09-19 | End: 2018-09-19

## 2018-09-19 RX ORDER — SODIUM BICARBONATE 1 MEQ/ML
0.09 SYRINGE (ML) INTRAVENOUS
Qty: 150 | Refills: 0 | Status: DISCONTINUED | OUTPATIENT
Start: 2018-09-19 | End: 2018-09-20

## 2018-09-19 RX ORDER — CALCIUM ACETATE 667 MG
1334 TABLET ORAL
Qty: 0 | Refills: 0 | Status: DISCONTINUED | OUTPATIENT
Start: 2018-09-19 | End: 2018-09-21

## 2018-09-19 RX ORDER — FUROSEMIDE 40 MG
40 TABLET ORAL
Qty: 0 | Refills: 0 | Status: DISCONTINUED | OUTPATIENT
Start: 2018-09-19 | End: 2018-09-20

## 2018-09-19 RX ORDER — AMLODIPINE BESYLATE 2.5 MG/1
10 TABLET ORAL DAILY
Qty: 0 | Refills: 0 | Status: DISCONTINUED | OUTPATIENT
Start: 2018-09-19 | End: 2018-09-21

## 2018-09-19 RX ORDER — INFLUENZA VIRUS VACCINE 15; 15; 15; 15 UG/.5ML; UG/.5ML; UG/.5ML; UG/.5ML
0.5 SUSPENSION INTRAMUSCULAR ONCE
Qty: 0 | Refills: 0 | Status: DISCONTINUED | OUTPATIENT
Start: 2018-09-19 | End: 2018-09-26

## 2018-09-19 RX ORDER — ASPIRIN/CALCIUM CARB/MAGNESIUM 324 MG
325 TABLET ORAL DAILY
Qty: 0 | Refills: 0 | Status: DISCONTINUED | OUTPATIENT
Start: 2018-09-19 | End: 2018-09-21

## 2018-09-19 RX ORDER — ACETAMINOPHEN 500 MG
650 TABLET ORAL ONCE
Qty: 0 | Refills: 0 | Status: COMPLETED | OUTPATIENT
Start: 2018-09-19 | End: 2018-09-19

## 2018-09-19 RX ORDER — OXYCODONE AND ACETAMINOPHEN 5; 325 MG/1; MG/1
1 TABLET ORAL EVERY 6 HOURS
Qty: 0 | Refills: 0 | Status: DISCONTINUED | OUTPATIENT
Start: 2018-09-19 | End: 2018-09-21

## 2018-09-19 RX ORDER — SODIUM CHLORIDE 9 MG/ML
1000 INJECTION INTRAMUSCULAR; INTRAVENOUS; SUBCUTANEOUS
Qty: 0 | Refills: 0 | Status: DISCONTINUED | OUTPATIENT
Start: 2018-09-20 | End: 2018-09-20

## 2018-09-19 RX ORDER — CLOPIDOGREL BISULFATE 75 MG/1
75 TABLET, FILM COATED ORAL DAILY
Qty: 0 | Refills: 0 | Status: DISCONTINUED | OUTPATIENT
Start: 2018-09-19 | End: 2018-09-19

## 2018-09-19 RX ADMIN — Medication 40 MILLIGRAM(S): at 18:39

## 2018-09-19 RX ADMIN — Medication 1334 MILLIGRAM(S): at 09:37

## 2018-09-19 RX ADMIN — HEPARIN SODIUM 5000 UNIT(S): 5000 INJECTION INTRAVENOUS; SUBCUTANEOUS at 13:05

## 2018-09-19 RX ADMIN — CLOPIDOGREL BISULFATE 75 MILLIGRAM(S): 75 TABLET, FILM COATED ORAL at 11:18

## 2018-09-19 RX ADMIN — Medication 1334 MILLIGRAM(S): at 12:34

## 2018-09-19 RX ADMIN — Medication 650 MILLIGRAM(S): at 05:02

## 2018-09-19 RX ADMIN — CALCITRIOL 0.25 MICROGRAM(S): 0.5 CAPSULE ORAL at 06:46

## 2018-09-19 RX ADMIN — Medication 1334 MILLIGRAM(S): at 18:39

## 2018-09-19 RX ADMIN — OXYCODONE AND ACETAMINOPHEN 1 TABLET(S): 5; 325 TABLET ORAL at 15:01

## 2018-09-19 RX ADMIN — ATORVASTATIN CALCIUM 80 MILLIGRAM(S): 80 TABLET, FILM COATED ORAL at 23:20

## 2018-09-19 RX ADMIN — Medication 75 MEQ/KG/HR: at 06:31

## 2018-09-19 RX ADMIN — Medication 325 MILLIGRAM(S): at 11:18

## 2018-09-19 RX ADMIN — Medication 324 MILLIGRAM(S): at 00:48

## 2018-09-19 RX ADMIN — HEPARIN SODIUM 5000 UNIT(S): 5000 INJECTION INTRAVENOUS; SUBCUTANEOUS at 23:20

## 2018-09-19 RX ADMIN — Medication 75 MEQ/KG/HR: at 03:58

## 2018-09-19 NOTE — CONSULT NOTE ADULT - SUBJECTIVE AND OBJECTIVE BOX
CHIEF COMPLAINT:Patient is a 57y old  Male who presents with a chief complaint of shortness of breath (19 Sep 2018 02:40)      HISTORY OF PRESENT ILLNESS:     58 yo M with PMHx of pituitary tumor s/p removal, acromegaly, CAD s/p 3 stents (2015), DLD, DMII, neuropathy, CKD 5 p/w 3 day hx of shortness of breath on exertion and was sent by urgent care. Pt states he has compliant w/ his medications and 3 days ago he developed progressively worsening shortness of breath on exertion. he was unable to walk a block without having to catch a breath and then went to urgent care. He had a CXR done which apparently showed congestion and thus sent him to the ED. Pt denies syncope, chest pain, dizziness, confusion, cough, fever, and chills.  Patient has not seen OP cardiologist since over 1 year, remote history of PCI with stents over 10 years ago, does not recall having any cardiac workup as OP in recent times. Patient denies having any CP/palpitations/dizziness/syncope.   Patient is seeing OP nephrologist however denies knowledge of imminent dialysis in near future.      PAST MEDICAL & SURGICAL HISTORY:  Dyslipidemia  DM (diabetes mellitus), type 2  Neuropathy  HTN (hypertension)  CAD (coronary atherosclerotic disease)  Acromegaly  CKD (chronic kidney disease), stage V  H/O eye surgery  H/O: pituitary tumor: s/p removal    FAMILY HISTORY:  Family history of myocardial infarction (Father)    Allergies    bacitracin (Unknown)  Neosporin (Hypotension)    Home Medications:  amLODIPine 10 mg oral tablet: 1 tab(s) orally once a day (19 Sep 2018 02:54)  aspirin 325 mg oral tablet: 1 tab(s) orally once a day (19 Sep 2018 02:54)  atenolol 50 mg oral tablet: 1 tab(s) orally once a day (19 Sep 2018 02:54)  calcitriol 0.25 mcg oral capsule: 1 cap(s) orally every 48 hours (19 Sep 2018 02:54)  clopidogrel 75 mg oral tablet: 1 tab(s) orally once a day (19 Sep 2018 02:54)  glipiZIDE 2.5 mg oral tablet, extended release: 1 tab(s) orally once a day (19 Sep 2018 02:54)  Lasix 40 mg oral tablet: 1 tab(s) orally once a day (19 Sep 2018 02:54)  sodium bicarbonate 650 mg oral tablet: 1 tab(s) orally 2 times a day (19 Sep 2018 02:54)  tamsulosin 0.4 mg oral capsule: 1 cap(s) orally once a day (19 Sep 2018 02:54)    MEDICATIONS  (STANDING):  amLODIPine   Tablet 10 milliGRAM(s) Oral daily  aspirin 325 milliGRAM(s) Oral daily  ATENolol  Tablet 50 milliGRAM(s) Oral daily  atorvastatin 80 milliGRAM(s) Oral at bedtime  calcitriol   Capsule 0.25 MICROGram(s) Oral <User Schedule>  calcium acetate 1334 milliGRAM(s) Oral three times a day with meals  clopidogrel Tablet 75 milliGRAM(s) Oral daily  furosemide   Injectable 40 milliGRAM(s) IV Push two times a day  heparin  Injectable 5000 Unit(s) SubCutaneous every 8 hours  sodium bicarbonate  Infusion 0.094 mEq/kG/Hr (75 mL/Hr) IV Continuous <Continuous>  tamsulosin 0.4 milliGRAM(s) Oral at bedtime    SOCIAL HISTORY:    [  ] active smoker  [x ] non smoker  [  ] Etoh  [  ] recreational drugs    REVIEW OF SYSTEMS:  14 point ROS negative except as mentioned above in HPI    PHYSICAL EXAM:  T(C): 36.2 (09-18-18 @ 19:24), Max: 36.2 (09-18-18 @ 19:24)  HR: 49 (09-19-18 @ 06:05) (49 - 52)  BP: 147/66 (09-19-18 @ 06:05) (141/65 - 147/66)  RR: 17 (09-19-18 @ 06:05) (17 - 20)  SpO2: 100% (09-19-18 @ 06:05) (96% - 100%)  Wt(kg): --  I&O's Summary      General Appearance: in NAD	  HEENT: NC, No JVD appreciated  Cardiovascular: Normal S1 S2, No murmurs  Respiratory: cta b/l  Gastrointestinal:  Soft, Non-tender, BS +	  Neurologic: No focal deficits, AAOx3  Extremities: ROM wnl, No clubbing/cyanosis, + edema b/l  Skin: No rashes, No ecchymoses, No cyanosis  Vascular: Peripheral pulses palpable 2+ bilaterally    LABS:	 	                        7.2    10.32 )-----------( 201      ( 19 Sep 2018 04:30 )             21.6     09-19    138  |  107  |  113<HH>  ----------------------------<  114<H>  5.0   |  9<LL>  |  9.0<HH>    Ca    7.9<L>      19 Sep 2018 04:30  Phos  8.9     09-19  Mg     1.7     09-19    TPro  6.2  /  Alb  3.7  /  TBili  0.3  /  DBili  x   /  AST  10  /  ALT  10  /  AlkPhos  123<H>  09-18    eGFR if Non African American: 6 mL/min/1.73M2 (09-19-18 @ 04:30)  eGFR if Non African American: 6 mL/min/1.73M2 (09-18-18 @ 21:32)        Serum Pro-Brain Natriuretic Peptide: 7464 pg/mL (09-18-18 @ 21:32)      CARDIAC MARKERS:  09-19-18 @ 04:30  TROPONIN-T  0.31 ng/mL  CKMB  13.1 ng/mL  CREATINE KINASE  170 U/L  09-19-18 @ 02:03  TROPONIN-T  --  CKMB  12.9 ng/mL  CREATINE KINASE  --  09-18-18 @ 21:32  TROPONIN-T  0.33 ng/mL  CKMB  --  CREATINE KINASE  --      TELEMETRY EVENTS: 	    ECG:  	  RADIOLOGY:  	    PREVIOUS DIAGNOSTIC TESTING:    [ ] Echocardiogram:  [ ] Catheterization:  [ ] Stress Test:

## 2018-09-19 NOTE — CONSULT NOTE ADULT - SUBJECTIVE AND OBJECTIVE BOX
Vascular Surgery Consultation Note  =====================================================  HPI:  Patient is a 57 year old male with PMH significant for pituitary tumor s/p removal, acromegaly, CAD s/p 3 stents (), DLD, DMII, neuropathy, CKD 5 presented to the ED complaining of a 3 day history of shortness of breath on exertion, patient was sent in by urgent care.  States he developed progressively worsening SOB 3 days ago and was unable to walk 1 block before having to stop and catch his breath.  In ED, patient was also complaining of nonhealing ulcer on RLE and bilateral abrasions on his shins that he noticed 2 days ago.  Patient states that he has neuropathy and frequently notices abrasions on his LEs and they have always healed, this is the 1st time he has had a wound that has not healed.  Patient does not follow with a podiatrist outpatient.  Arterial duplex showed apparent severe disease at the femoral bifurcation, with occlusion of the superficial femoral artery distal to this.      PAST MEDICAL & SURGICAL HISTORY:  Dyslipidemia  DM (diabetes mellitus), type 2  Neuropathy  HTN (hypertension)  CAD (coronary atherosclerotic disease)  Acromegaly  CKD (chronic kidney disease), stage V  H/O eye surgery  H/O: pituitary tumor: s/p removal    Home Meds: Home Medications:  amLODIPine 10 mg oral tablet: 1 tab(s) orally once a day (19 Sep 2018 02:54)  aspirin 325 mg oral tablet: 1 tab(s) orally once a day (19 Sep 2018 02:54)  atenolol 50 mg oral tablet: 1 tab(s) orally once a day (19 Sep 2018 02:54)  calcitriol 0.25 mcg oral capsule: 1 cap(s) orally every 48 hours (19 Sep 2018 02:54)  clopidogrel 75 mg oral tablet: 1 tab(s) orally once a day (19 Sep 2018 02:54)  glipiZIDE 2.5 mg oral tablet, extended release: 1 tab(s) orally once a day (19 Sep 2018 02:54)  Lasix 40 mg oral tablet: 1 tab(s) orally once a day (19 Sep 2018 02:54)  sodium bicarbonate 650 mg oral tablet: 1 tab(s) orally 2 times a day (19 Sep 2018 02:54)  tamsulosin 0.4 mg oral capsule: 1 cap(s) orally once a day (19 Sep 2018 02:54)    Allergies: Allergies    bacitracin (Unknown)  Neosporin (Hypotension)    Intolerances      Soc:   Advanced Directives: Presumed Full Code     ROS:    REVIEW OF SYSTEMS    [x] A ten-point review of systems was otherwise negative except as noted.  [ ] Due to altered mental status/intubation, subjective information were not able to be obtained from the patient. History was obtained, to the extent possible, from review of the chart and collateral sources of information.      CURRENT MEDICATIONS:   --------------------------------------------------------------------------------------  Neurologic Medications  acetaminophen   Tablet .. 650 milliGRAM(s) Oral every 6 hours PRN Mild Pain (1 - 3)  aspirin 325 milliGRAM(s) Oral daily  oxyCODONE    5 mG/acetaminophen 325 mG 1 Tablet(s) Oral every 6 hours PRN Moderate Pain (4 - 6)    Cardiovascular Medications  amLODIPine   Tablet 10 milliGRAM(s) Oral daily  ATENolol  Tablet 50 milliGRAM(s) Oral daily  furosemide   Injectable 40 milliGRAM(s) IV Push two times a day  tamsulosin 0.4 milliGRAM(s) Oral at bedtime    Gastrointestinal Medications  calcitriol   Capsule 0.25 MICROGram(s) Oral <User Schedule>  calcium acetate 1334 milliGRAM(s) Oral three times a day with meals  sodium bicarbonate  Infusion 0.094 mEq/kG/Hr IV Continuous <Continuous>    Hematologic/Oncologic Medications  clopidogrel Tablet 75 milliGRAM(s) Oral daily  heparin  Injectable 5000 Unit(s) SubCutaneous every 8 hours  influenza   Vaccine 0.5 milliLiter(s) IntraMuscular once    Endocrine/Metabolic Medications  atorvastatin 80 milliGRAM(s) Oral at bedtime    --------------------------------------------------------------------------------------    VITAL SIGNS, INS/OUTS (last 24 hours):  --------------------------------------------------------------------------------------  ICU Vital Signs Last 24 Hrs  T(C): 35.7 (19 Sep 2018 15:00), Max: 36.2 (18 Sep 2018 19:24)  T(F): 96.3 (19 Sep 2018 15:00), Max: 97.1 (18 Sep 2018 19:24)  HR: 52 (19 Sep 2018 15:00) (45 - 52)  BP: 136/65 (19 Sep 2018 15:00) (136/65 - 147/66)  RR: 18 (19 Sep 2018 15:00) (17 - 20)  SpO2: 100% (19 Sep 2018 15:00) (96% - 100%)      19 Sep 2018 07:01  -  19 Sep 2018 15:32  --------------------------------------------------------  IN: 525 mL / OUT: 0 mL / NET: 525 mL      --------------------------------------------------------------------------------------  PHYSICAL EXAM    General: NAD, AAOx3, calm and cooperative  HEENT: NCAT, JUANITO, EOMI  Cardiac: RRR S1, S2, no Murmurs, rubs or gallops  Respiratory: CTAB  Abdomen: Soft, non-distended, non-tender, +bowel sounds  Musculoskeletal: abrasions present over anterior portion of bilateral LEs, abrasion present over 1st-3rd toes of LLE, pitting edema bilateral LEs, faintly dopplerable PTs bilaterally    LABS  --------------------------------------------------------------------------------------  Labs:  CAPILLARY BLOOD GLUCOSE                              7.2    10.32 )-----------( 201      ( 19 Sep 2018 04:30 )             21.6       Auto Neutrophil %: 80.2 % (18 @ 04:30)  Auto Immature Granulocyte %: 0.4 % (18 @ 04:30)  Auto Immature Granulocyte %: 0.4 % (18 @ 21:32)  Auto Neutrophil %: 82.8 % (18 @ 21:32)        138  |  107  |  113<HH>  ----------------------------<  114<H>  5.0   |  9<LL>  |  9.0<HH>      Calcium, Total Serum: 7.9 mg/dL (18 @ 04:30)      LFTs:             6.2  | 0.3  | 10       ------------------[123     ( 18 Sep 2018 21:32 )  3.7  | x    | 10          Lipase:x      Amylase:x             Coags:     13.10  ----< 1.22    ( 18 Sep 2018 21:32 )     35.0        CARDIAC MARKERS ( 19 Sep 2018 12:01 )  x     / 0.27 ng/mL / 209 U/L / x     / 13.9 ng/mL  CARDIAC MARKERS ( 19 Sep 2018 04:30 )  x     / 0.31 ng/mL / 170 U/L / x     / 13.1 ng/mL  CARDIAC MARKERS ( 19 Sep 2018 02:03 )  x     / x     / x     / x     / 12.9 ng/mL  CARDIAC MARKERS ( 18 Sep 2018 21:32 )  x     / 0.33 ng/mL / x     / x     / x          Urinalysis Basic - ( 19 Sep 2018 06:40 )    Color: Yellow / Appearance: Clear / S.020 / pH: x  Gluc: x / Ketone: Negative  / Bili: Negative / Urobili: 0.2 mg/dL   Blood: x / Protein: >=300 mg/dL / Nitrite: Negative   Leuk Esterase: Negative / RBC: 2-5 /HPF / WBC 3-5 /HPF   Sq Epi: x / Non Sq Epi: Occasional /HPF / Bacteria: x          --------------------------------------------------------------------------------------    OTHER LABS    IMAGING RESULTS  < from: VA Duplex Low Ext Arterial, Ltd, Left (18 @ 08:05) >  Impression:    On the left: Apparentsevere disease at the femoral bifurcation, with   occlusion of the superficial femoral artery distal to this.   Reconstitution of flow through the popliteal and tibial vessels.    < end of copied text >

## 2018-09-19 NOTE — H&P ADULT - ASSESSMENT
58 yo M with PMHx of pituitary tumor s/p removal, acromegaly, CAD s/p 3 stents (), DLD, DMII, neuropathy, CKD 5 p/w 3 day hx of shortness of breath on exertion and was sent by urgent care. In the ED, vitals were wnl and Pt was saturating 100% on rA. pt was found to have an KATHARINA w/ AGMA w/ pH of 7.1 and HCO3 of 18. CXR in ED did not show any congestion. Trops was elevated at 0.33 and BNP 7464. Nephro was consulted and recommended HCO3 drip and no emergent need for dialysis. Cardio fellow recommended trending CE as pt has no acute chest pain. Pt denied using any pain killers, any recent use of antibiotics. Pt has been having adequate PO intake    #Dyspnea on exertion and AGMA likely 2/2 KATHARINA on CKD stage 5 likely pre-renal - Hemodynamically stable and Pt voiding freely  - vitals wnl and pt saturating on RA. Lung ausculation shows occasional crackles  - CXR: Cardiomegaly w/o evidence of congestion  - VB.14/28/32/7 AG 26  - Baseline Cr 7.3 and this admission 9.1  - BNP: 7464  - as per nephro, c/w HCO3 drip for now. Pt is voiding.  - c/w IV Lasix 40 Q12 for now  - strict i&O and daily weights  - monitor PO4 and iPTH daily  - f/u Urine:Protein Cr ratio  - f/u FeUREA  - f/u Renal sono    #Left heal ulcer w/ clean base likely complicated by underlying diabetic neuropathy   - No evidence of Sepsis  - PE: Peripheral pulse +1 in LLE  - will hold off antibiotics  - will order Arterial duplex of LLE  - will order Podiatry consult    #Troponemia likely 2/2 CKD Stage 5 - No clinical evidence of ACS  - trops: 0.33  - f/u EKG  - as per cardio, trend CE    #Normocytic anemia likely component of ACD and iron deficiency anemia - Stable and no overt bleed  - Pt on Procrit shots qweekly  - Baseline hb 7-8 - at baseline  - f/u iron studies, b12, folate  - Transfuse if <7  - active T&S    #T2Dm  - monitor FS  - If FS >180 - start insulin    #HTN/CAD s/p stents  - c/w home meds    #BPH  - c/w flomax    #Acitivity: OOBC  #Diet: renal Diet  #DVT/GI PPX: HSQ and Protonix  #Dispo: Home 58 yo M with PMHx of pituitary tumor s/p removal, acromegaly, CAD s/p 3 stents (), DLD, DMII, neuropathy, CKD 5 p/w 3 day hx of shortness of breath on exertion and was sent by urgent care. In the ED, vitals were wnl and Pt was saturating 100% on rA. pt was found to have an KATHARINA w/ AGMA w/ pH of 7.1 and HCO3 of 18. CXR in ED did not show any congestion. Trops was elevated at 0.33 and BNP 7464. Nephro was consulted and recommended HCO3 drip and no emergent need for dialysis. Cardio fellow recommended trending CE as pt has no acute chest pain. Pt denied using any pain killers, any recent use of antibiotics. Pt has been having adequate PO intake    #Dyspnea on exertion and AGMA likely 2/2 KATHARINA on CKD stage 5 likely pre-renal - Hemodynamically stable and Pt voiding freely  - vitals wnl and pt saturating on RA. Lung ausculation shows occasional crackles  - CXR: Cardiomegaly w/o evidence of congestion  - VB.14/28/32/7 AG 26  - Baseline Cr 7.3 and this admission 9.1  - BNP: 7464  - as per nephro, c/w HCO3 drip for now. Pt is voiding.  - c/w IV Lasix 40 Q12 for now  - strict i&O and daily weights  - monitor PO4 and iPTH daily  - f/u Urine:Protein Cr ratio  - f/u FeUREA  - f/u Renal sono    #Left heal ulcer w/ clean base likely complicated by underlying diabetic neuropathy   - No evidence of Sepsis  - PE: Peripheral pulse +1 in LLE  - will hold off antibiotics  - will order Arterial duplex of LLE  - will order Podiatry consult    #Troponemia likely 2/2 CKD Stage 5 - No clinical evidence of ACS  - trops: 0.33  - f/u EKG  - as per cardio, trend CE x2    #Normocytic anemia likely component of ACD and iron deficiency anemia - Stable and no overt bleed  - Pt on Procrit shots qweekly  - Baseline hb 7-8 - at baseline  - f/u iron studies, b12, folate  - Transfuse if <7  - active T&S    #T2Dm  - monitor FS  - If FS >180 - start insulin    #HTN/CAD s/p stents  - c/w home meds - norvasc, asa, lipitor, BB    #BPH  - c/w flomax    #Acitivity: OOBC  #Diet: renal Diet  #DVT/GI PPX: HSQ and Protonix  #Dispo: Home 56 yo M with PMHx of pituitary tumor s/p removal, acromegaly, CAD s/p 3 stents (), DLD, DMII, neuropathy, CKD 5 p/w 3 day hx of shortness of breath on exertion and was sent by urgent care. In the ED, vitals were wnl and Pt was saturating 100% on rA. pt was found to have an KATHARINA w/ AGMA w/ pH of 7.1 and HCO3 of 18. CXR in ED did not show any congestion. Trops was elevated at 0.33 and BNP 7464. Nephro was consulted and recommended HCO3 drip and no emergent need for dialysis. Cardio fellow recommended trending CE as pt has no acute chest pain. Pt denied using any pain killers, any recent use of antibiotics. Pt has been having adequate PO intake    #Dyspnea on exertion and AGMA likely 2/2 KATHARINA on CKD stage 5 likely pre-renal - Hemodynamically stable and Pt voiding freely  - vitals wnl and pt saturating on RA. Lung ausculation shows occasional crackles  - CXR: Cardiomegaly w/o evidence of congestion  - VB.14/28/32/7 AG 26  - Baseline Cr 7.3 and this admission 9.1  - BNP: 7464  - as per nephro, c/w HCO3 drip for now. Pt is voiding.  - c/w IV Lasix 40 Q12 for now  - strict i&O and daily weights  - monitor PO4 and iPTH daily  - f/u Urine:Protein Cr ratio  - f/u FeUREA  - f/u Renal sono    #Left heal ulcer w/ clean base likely complicated by underlying diabetic neuropathy   - No evidence of Sepsis  - PE: Peripheral pulse +1 in LLE  - will hold off antibiotics  - will order Arterial duplex of LLE  - will order Podiatry consult    #Troponemia likely 2/2 CKD Stage 5 -   - trops: 0.33  - f/u EKG  - as per cardio, trend CE x2    #Normocytic anemia likely component of ACD and iron deficiency anemia - Stable and no overt bleed  - Pt on Procrit shots qweekly  - Baseline hb 7-8 - at baseline  - f/u iron studies, b12, folate  - Transfuse if <7  - active T&S    #T2Dm  - monitor FS  - If FS >180 - start insulin    #HTN/CAD s/p stents  - c/w home meds - norvasc, asa, lipitor, BB    #BPH  - c/w flomax    #Acitivity: OOBC  #Diet: renal Diet  #DVT/GI PPX: HSQ and Protonix  #Dispo: Home

## 2018-09-19 NOTE — H&P ADULT - HISTORY OF PRESENT ILLNESS
58 yo M with PMHx of pituitary tumor s/p removal, acromegaly, CAD s/p 3 stents (2015), DLD, DMII, neuropathy, CKD 5 p/w 3 day hx of shortness of breath on exertion and was sent by urgent care. Pt states he has compliant w/ his medications and 3 days ago he developed progressively worsening shortness of breath on exertion. he was unable to walk a block without having to catch a breath and then went to urgent care. He had a CXR done which apparently showed congestion and thus sent him to the ED. Pt denies syncope, chest pain, dizziness, confusion, cough, fever, and chills.    In the ED, vitals were wnl and Pt was saturating 100% on rA. pt was found to have an KATHARINA w/ AGMA w/ pH of 7.1 and HCO3 of 18. CXR in ED did not show any congestion. Trops was elevated at 0.33 and BNP 7464. Nephro was consulted and recommended HCO3 drip and no emergent need for dialysis. Cardio fellow recommended trending CE as pt has no acute chest pain. Pt denied using any pain killers, any recent use of antibiotics. Pt has been having adequate PO intake

## 2018-09-19 NOTE — H&P ADULT - ATTENDING COMMENTS
58 yo M with PMHx of pituitary tumor s/p removal, acromegaly, CAD s/p 3 stents (2015), DLD, DMII, neuropathy, CKD 5 p/w 3 day hx of shortness of breath on exertion and was sent by urgent care. Pt states he has compliant w/ his medications and 3 days ago he developed progressively worsening shortness of breath on exertion. he was unable to walk a block without having to catch a breath and then went to urgent care. He had a CXR done which apparently showed congestion and thus sent him to the ED. Pt denies syncope, chest pain, dizziness, confusion, cough, fever, and chills.    In the ED, vitals were wnl and Pt was saturating 100% on rA. pt was found to have an KATHARINA w/ AGMA w/ pH of 7.1 and HCO3 of 18. CXR in ED did not show any congestion. Trops was elevated at 0.33 and BNP 7464. Nephro was consulted and recommended HCO3 drip and no emergent need for dialysis. Cardio fellow recommended trending CE as pt has no acute chest pain. Pt denied using any pain killers, any recent use of antibiotics. Pt has been having adequate PO intake    REVIEW OF SYSTEMS: see cc/HPI  CONSTITUTIONAL: No weakness, fevers or chills  EYES/ENT: No visual changes;  No vertigo or throat pain   NECK: No pain or stiffness  RESPIRATORY: No cough, wheezing, hemoptysis; No shortness of breath  CARDIOVASCULAR: No chest pain or palpitations  GASTROINTESTINAL: No abdominal or epigastric pain. No nausea, vomiting, or hematemesis; No diarrhea or constipation. No melena or hematochezia.  GENITOURINARY: No dysuria, frequency or hematuria  NEUROLOGICAL: No numbness or weakness  SKIN: No itching, rashes    T(C): 36.2 (09-18-18 @ 19:24), Max: 36.2 (09-18-18 @ 19:24)  HR: 49 (09-19-18 @ 06:05) (49 - 52)  BP: 147/66 (09-19-18 @ 06:05) (141/65 - 147/66)  RR: 17 (09-19-18 @ 06:05) (17 - 20)  SpO2: 100% (09-19-18 @ 06:05) (96% - 100%)  Physical Exam:  General: WN/WD NAD  Neurology: A&Ox3, nonfocal, follows commands  Eyes: PERRLA/ EOMI  ENT/Neck: Neck supple, trachea midline, No JVD  Respiratory: CTA B/L, No wheezing, rales, rhonchi  CV: Normal rate regular rhythm, S1S2, no murmurs, rubs or gallops  Abdominal: Soft, NT, ND +BS,   Extremities: No edema, + peripheral pulses L heel ulcer - non infected  Skin: No Rashes, Hematoma, Ecchymosis  Incisions: n/a  Tubes: n/a    A/P  KATHARINA on CKD w/ AGMA  - IV fluids w/ bicarb  - Nephrology eval   -Is and Os and daily weights   -U/S of KUB  -agree w/ Phos level and iPTH    Elevated Troponin in setting of CKD/ESRD  -serial EGK and Nathaniel  -check 2D Echo   -Cardiology eval     Anemia- chronic /normocytic   -c/w outpatient Rx    L heel ulceration   -local wound care/dressings  -Podiatry    CAD/HTN-stable  -c/w outpatient Rx    DM - type II   -f/w monitoring and insulin PRN     DVT prophylaxis

## 2018-09-19 NOTE — CONSULT NOTE ADULT - ASSESSMENT
Patient is a 57y Male with KATHARINA on CKD 5 (Baseline Cr. 7.0) and metabolic acidosis. presented for SOB, GUTIERREZ, swelling of both ankles.  PMH acromegaly (S/P Pitutary removal), DM with neuropathy, DLD, kidney stones.    # KATHARINA on CKD   - most likely Prerenal vs ATN due decreased effective IV volume secondary to CHF.   - S. crt elevated at 9.   - cont. Lasix IV 40 mg q 12 hr   - repeat BMP, UA   - check urine creatinine, Na, Urea for FENA, FEUrea   - US KUB   - monitor I & O   - No indication for immediate RRT   - But with CKD 5, RRT may be needed in future.   - Discussed with pt about preparation for possible HD in future with AV fistula.   - will follow    # HAGMA   - Metabolic acidosis with some possible met alkalosis (though reading from [facility] so can not be completely certain)   - can be due to KATHARINA vs lactic acidosis   - cont. Bicarb drip   - check ABGs, serum lactate   - monitor EKG closely    # Hyperphosphatemia   - cont Phoslo   - repeat IP   - check PTH.    # Anemia   - serum iron studies noted.   - family hx of Cancers   - evaluate for sites of active bleeding Patient is a 57y Male with KATHARINA on CKD 5 (Baseline Cr. 7.0) and metabolic acidosis. presented for SOB, GUTIERREZ, swelling of both ankles.  PMH acromegaly (S/P Pitutary removal), DM with neuropathy, DLD, kidney stones.    # KATHARINA on CKD   - most likely Prerenal vs ATN due decreased effective IV volume secondary to CHF.   - S. crt elevated at 9.   - cont. Lasix IV 40 mg q 12 hr   - repeat BMP, UA   - check urine creatinine, Na, Urea for FENA, FEUrea   - US KUB   - monitor I & O   - No indication for immediate RRT   - But with CKD 5, RRT may be needed in future.   - Discussed with pt about preparation for possible HD in future with AV fistula.   - will follow    # HAGMA   - Metabolic acidosis with some possible met alkalosis (though reading from [facility] so can not be completely certain)   - can be due to KATHARINA vs lactic acidosis   - cont. Bicarb drip   - check ABGs, serum lactate   - monitor EKG closely    # Hyperphosphatemia   - cont Phoslo   - repeat IP   - check PTH.    # Anemia   - serum iron studies noted.   - family hx of Cancers   - evaluate for sites of active bleeding    # BP   - under control.    Will follow Patient is a 57y Male with KATHARINA on CKD 5 (Baseline Cr. 7.0) and metabolic acidosis. presented for SOB, GUTIERREZ, swelling of both ankles.  PMH acromegaly (S/P Pitutary removal), DM with neuropathy, DLD, kidney stones.    # KATHARINA on CKD   - most likely Prerenal vs ATN due decreased effective IV volume secondary to CHF.   - S. crt elevated at 9.   - cont. Lasix IV 40 mg q 12 hr   - repeat BMP, UA   - check urine creatinine, Na, Urea for FENA, FEUrea   - US KUB   - monitor I & O   - No indication for immediate RRT   - But with CKD 5, RRT may be needed in future.   - Discussed with pt about preparation for possible HD in future with AV fistula.   - Avoid nephrotoxins and hypotension   - will follow    # HAGMA   - Metabolic acidosis with some possible met alkalosis (though reading from [facility] so can not be completely certain)   - can be due to KATHARINA vs lactic acidosis   - cont. Bicarb drip   - check ABGs, serum lactate   - monitor EKG closely    # Hyperphosphatemia   - cont Phoslo   - repeat IP   - check PTH.    # Anemia   - serum iron studies noted.   - family hx of Cancers   - evaluate for sites of active bleeding   - Maintain H/H   - Transfuse if Hb < 7.    # BP   - under control.    Will follow Patient is a 57y Male with KATHARINA on CKD 5 (Baseline Cr. 7.0) and metabolic acidosis. presented for SOB, GUTIERREZ, swelling of both ankles.  PMH acromegaly (S/P Pitutary removal), DM with neuropathy, DLD, kidney stones.    # KATHARINA on CKD 5 vs progression of CKD    - most likely progression of CKD    - S. crt elevated at 9.   - cont. sodium bicarbonate drip   -  hold lasix    - repeat BMP, UA   - check urine creatinine, Na, Urea for FENA, FEUrea   - US KUB   - monitor I & O   - will need RRT , will call for tesio catheter    - Discussed with pt about preparation for possible HD in future with AV fistula.   - IP noted high on phoslo, check PTH already on calcitriol   - Avoid nephrotoxins and hypotension   - will follow    # HAGMA   - Metabolic acidosis with some possible met alkalosis (though reading from [facility] so can not be completely certain)   - can be due to CKD progression    - cont. Bicarb drip   - check ABGs, serum lactate   - monitor EKG closely    # Hyperphosphatemia   - cont Phoslo   - repeat IP   - check PTH.    # Anemia   - serum iron studies noted.   - may need EDWIN    - evaluate for sites of active bleeding   - Maintain H/H   - Transfuse if Hb < 7.    # BP   - under control.    Will follow

## 2018-09-19 NOTE — CONSULT NOTE ADULT - SUBJECTIVE AND OBJECTIVE BOX
NEPHROLOGY CONSULTATION NOTE    Patient is a 57y Male pmh acromegaly (S/P Pitutary removal), DM with neuropathy, CKD 5 (Baseline Cr. 7.0), DLD presented to the hospital with     PAST MEDICAL & SURGICAL HISTORY:  Dyslipidemia  DM (diabetes mellitus), type 2  Neuropathy  HTN (hypertension)  CAD (coronary atherosclerotic disease)  Acromegaly  CKD (chronic kidney disease), stage V  H/O eye surgery  H/O: pituitary tumor: s/p removal    Allergies:  bacitracin (Unknown)  Neosporin (Hypotension)    Home Medications Reviewed  Hospital Medications:   MEDICATIONS  (STANDING):  amLODIPine   Tablet 10 milliGRAM(s) Oral daily  aspirin 325 milliGRAM(s) Oral daily  ATENolol  Tablet 50 milliGRAM(s) Oral daily  atorvastatin 80 milliGRAM(s) Oral at bedtime  calcitriol   Capsule 0.25 MICROGram(s) Oral <User Schedule>  calcium acetate 1334 milliGRAM(s) Oral three times a day with meals  clopidogrel Tablet 75 milliGRAM(s) Oral daily  furosemide   Injectable 40 milliGRAM(s) IV Push two times a day  heparin  Injectable 5000 Unit(s) SubCutaneous every 8 hours  sodium bicarbonate  Infusion 0.094 mEq/kG/Hr (75 mL/Hr) IV Continuous <Continuous>  tamsulosin 0.4 milliGRAM(s) Oral at bedtime      SOCIAL HISTORY:  Denies ETOH,Smoking,   FAMILY HISTORY:  Family history of myocardial infarction (Father)        REVIEW OF SYSTEMS:  CONSTITUTIONAL: No weakness, fevers or chills  EYES/ENT: No visual changes;  No vertigo or throat pain   NECK: No pain or stiffness  RESPIRATORY: No cough, wheezing, hemoptysis; No shortness of breath  CARDIOVASCULAR: No chest pain or palpitations.  GASTROINTESTINAL: No abdominal or epigastric pain. No nausea, vomiting, or hematemesis; No diarrhea or constipation. No melena or hematochezia.  GENITOURINARY: No dysuria, frequency, foamy urine, urinary urgency, incontinence or hematuria  NEUROLOGICAL: No numbness or weakness  SKIN: No itching, burning, rashes, or lesions   VASCULAR: No bilateral lower extremity edema.   All other review of systems is negative unless indicated above.    VITALS:  T(F): 96.1 (18 @ 07:31), Max: 97.1 (18 @ 19:24)  HR: 45 (09-19-18 @ 07:31)  BP: 137/70 (18 @ 07:31)  RR: 17 (18 @ 07:31)  SpO2: 100% (18 @ 07:31)      Weight (kg): 120.2 ( @ 19:24)    I&O's Detail    Creatine Kinase, Serum: 170 U/L (18 @ 04:30)      PHYSICAL EXAM:  Constitutional: NAD  HEENT: anicteric sclera, oropharynx clear, MMM  Neck: No JVD  Respiratory: CTAB, no wheezes, rales or rhonchi  Cardiovascular: S1, S2, RRR  Gastrointestinal: BS+, soft, NT/ND  Extremities: No cyanosis or clubbing. No peripheral edema  Neurological: A/O x 3, no focal deficits  Psychiatric: Normal mood, normal affect  : No CVA tenderness. No ortiz.   Skin: No rashes  Vascular Access:    LABS:      138  |  107  |  113<HH>  ----------------------------<  114<H>  5.0   |  9<LL>  |  9.0<HH>    Ca    7.9<L>      19 Sep 2018 04:30  Phos  8.9       Mg     1.7         TPro  6.2  /  Alb  3.7  /  TBili  0.3  /  DBili      /  AST  10  /  ALT  10  /  AlkPhos  123<H>      Creatinine Trend: 9.0 <--, 9.1 <--                        7.2    10.32 )-----------( 201      ( 19 Sep 2018 04:30 )             21.6     Urine Studies:  Urinalysis Basic - ( 19 Sep 2018 06:40 )    Color: Yellow / Appearance: Clear / S.020 / pH:   Gluc:  / Ketone: Negative  / Bili: Negative / Urobili: 0.2 mg/dL   Blood:  / Protein: >=300 mg/dL / Nitrite: Negative   Leuk Esterase: Negative / RBC: 2-5 /HPF / WBC 3-5 /HPF   Sq Epi:  / Non Sq Epi: Occasional /HPF / Bacteria:                 RADIOLOGY & ADDITIONAL STUDIES: NEPHROLOGY CONSULTATION NOTE    Patient is a 57y Male pmh acromegaly (S/P Pitutary removal), DM with neuropathy, CKD 5 (Baseline Cr. 7.0), DLD, kidney stones presented to the hospital with SOB, GUTIERREZ, swelling of both ankles for 3 days. Pt also complains of ulcer on left heel for 3 days. Pt has numbness in both feet due to Diabetic neuropathy. Pt is making urine, no collection for output chart but atleast 5-6 times/day.Denies any fever, n/v, chest pain, cough, dizziness. Consult called for KATHARINA on CKD 5 and metabolic acidosis.    PAST MEDICAL & SURGICAL HISTORY:  Dyslipidemia  DM (diabetes mellitus), type 2  Neuropathy  HTN (hypertension)  CAD (coronary atherosclerotic disease)  Acromegaly  CKD (chronic kidney disease), stage V  H/O eye surgery  H/O: pituitary tumor: s/p removal    Allergies:  bacitracin (Unknown)  Neosporin (Hypotension)    Home Medications:  amLODIPine 10 mg oral tablet: 1 tab(s) orally once a day (19 Sep 2018 02:54)  aspirin 325 mg oral tablet: 1 tab(s) orally once a day (19 Sep 2018 02:54)  atenolol 50 mg oral tablet: 1 tab(s) orally once a day (19 Sep 2018 02:54)  calcitriol 0.25 mcg oral capsule: 1 cap(s) orally every 48 hours (19 Sep 2018 02:54)  clopidogrel 75 mg oral tablet: 1 tab(s) orally once a day (19 Sep 2018 02:54)  glipiZIDE 2.5 mg oral tablet, extended release: 1 tab(s) orally once a day (19 Sep 2018 02:54)  Lasix 40 mg oral tablet: 1 tab(s) orally once a day (19 Sep 2018 02:54)  sodium bicarbonate 650 mg oral tablet: 1 tab(s) orally 2 times a day (19 Sep 2018 02:54)  tamsulosin 0.4 mg oral capsule: 1 cap(s) orally once a day (19 Sep 2018 02:54)    Hospital Medications:   MEDICATIONS  (STANDING):  amLODIPine   Tablet 10 milliGRAM(s) Oral daily  aspirin 325 milliGRAM(s) Oral daily  ATENolol  Tablet 50 milliGRAM(s) Oral daily  atorvastatin 80 milliGRAM(s) Oral at bedtime  calcitriol   Capsule 0.25 MICROGram(s) Oral <User Schedule>  calcium acetate 1334 milliGRAM(s) Oral three times a day with meals  clopidogrel Tablet 75 milliGRAM(s) Oral daily  furosemide   Injectable 40 milliGRAM(s) IV Push two times a day  heparin  Injectable 5000 Unit(s) SubCutaneous every 8 hours  sodium bicarbonate  Infusion 0.094 mEq/kG/Hr (75 mL/Hr) IV Continuous <Continuous>  tamsulosin 0.4 milliGRAM(s) Oral at bedtime      SOCIAL HISTORY:  Denies ETOH,Smoking,   FAMILY HISTORY:  Family history of myocardial infarction (Father)  Prostate Ca (Father)  Stage VI Ca (Mother) does not know primary site  Thyroid Ca (Sister)        REVIEW OF SYSTEMS:     All other review of systems is negative unless indicated above.    VITALS:  T(F): 96.1 (18 @ 07:31), Max: 97.1 (18 @ 19:24)  HR: 45 (18 @ 07:31)  BP: 137/70 (18 @ 07:31)  RR: 17 (18 @ 07:31)  SpO2: 100% (18 @ 07:31)      Weight (kg): 120.2 ( @ :24)    I&O's Detail    Creatine Kinase, Serum: 170 U/L (18 @ 04:30)      PHYSICAL EXAM:  Constitutional: NAD  Respiratory: CTAB, no wheezes, rales or rhonchi  Cardiovascular: S1, S2, RRR  Gastrointestinal: BS+, soft, NT/ND  Extremities:1+ peripheral edema, dressing on left foot for ulcer  Neurological: A/O x 3, no focal deficits  Psychiatric: Normal mood, normal affect  : No CVA tenderness. No ortiz.   Vascular Access:    LABS:      138  |  107  |  113<HH>  ----------------------------<  114<H>  5.0   |  9<LL>  |  9.0<HH>    Ca    7.9<L>      19 Sep 2018 04:30  Phos  8.9       Mg     1.7         TPro  6.2  /  Alb  3.7  /  TBili  0.3  /  DBili      /  AST  10  /  ALT  10  /  AlkPhos  123<H>      Creatinine Trend: 9.0 <--, 9.1 <--                        7.2    10.32 )-----------( 201      ( 19 Sep 2018 04:30 )             21.6     Serum Pro-Brain Natriuretic Peptide: 7464 pg/mL (18 @ 21:32)  Troponin T, Serum (18 @ 04:30)    Troponin T, Serum: 0.31: Critical value: ng/mL    Blood Gas Profile - Venous (18 @ 23:48)    pH, Venous: 7.14    pCO2, Venous: 28 mmHg    pO2, Venous: 32 mmHg    HCO3, Venous: 10 mmoL/L    Base Excess, Venous: -18.0 mmoL/L    Oxygen Saturation, Venous: 54 %      < from: 12 Lead ECG (18 @ 19:31) >  Diagnosis Line Sinus bradycardia  Otherwise normal ECG    < end of copied text >      Urine Studies:  Urinalysis Basic - ( 19 Sep 2018 06:40 )    Color: Yellow / Appearance: Clear / S.020 / pH:   Gluc:  / Ketone: Negative  / Bili: Negative / Urobili: 0.2 mg/dL   Blood:  / Protein: >=300 mg/dL / Nitrite: Negative   Leuk Esterase: Negative / RBC: 2-5 /HPF / WBC 3-5 /HPF   Sq Epi:  / Non Sq Epi: Occasional /HPF / Bacteria:         RADIOLOGY & ADDITIONAL STUDIES:

## 2018-09-19 NOTE — CONSULT NOTE ADULT - ASSESSMENT
ASSESSMENT:  57y Male with PMH significant for pituitary tumor s/p removal, acromegaly, CAD s/p 3 stents (2015), DLD, DMII, neuropathy, CKD 5 presented to ED with worsening SOB on exertion for the past 3 days, found to have nonhealing ulcers/abrasions on BL LEs and occlusion of SFA in LLE on arterial duplex.    PLAN:   -recommend angiogram of LLE      --------------------------------------------------------------------------------------    09-19-18 @ 15:32 ASSESSMENT:  57y Male with PMH significant for pituitary tumor s/p removal, acromegaly, CAD s/p 3 stents (2015), DLD, DMII, neuropathy, CKD 5 presented to ED with worsening SOB on exertion for the past 3 days, found to have nonhealing ulcers/abrasions on BL LEs and occlusion of SFA in LLE on arterial duplex.    PLAN:   -recommend angiogram of LLE  - Plan for a left lower extremity angiogram Monday 9/24/18  - Please, get nephrology clearance      --------------------------------------------------------------------------------------    09-19-18 @ 15:32

## 2018-09-19 NOTE — PATIENT PROFILE ADULT. - VISION (WITH CORRECTIVE LENSES IF THE PATIENT USUALLY WEARS THEM):
Normal vision: sees adequately in most situations; can see medication labels, newsprint/wear glasses to see things far away

## 2018-09-19 NOTE — CONSULT NOTE ADULT - SUBJECTIVE AND OBJECTIVE BOX
GENERAL SURGERY CONSULT NOTE    Patient: SU SAWYER , 57y (64)Male   MRN: [id number]  Location: [facility] Hold 028 A  Visit: 18 Inpatient  Date: 18 @ 17:47    HPI:  56 yo M with PMHx of pituitary tumor s/p removal, acromegaly, CAD s/p 3 stents (), DLD, DMII, neuropathy, CKD 5 p/w 3 day hx of shortness of breath on exertion and was sent by urgent care. Pt states he has compliant w/ his medications and 3 days ago he developed progressively worsening shortness of breath on exertion. he was unable to walk a block without having to catch a breath and then went to urgent care. He had a CXR done which apparently showed congestion and thus sent him to the ED. Pt denies syncope, chest pain, dizziness, confusion, cough, fever, and chills.    In the ED, vitals were wnl and Pt was saturating 100% on rA. pt was found to have an KATHARINA w/ AGMA w/ pH of 7.1 and HCO3 of 18. CXR in ED did not show any congestion. Trops was elevated at 0.33 and BNP 7464. Nephro was consulted and recommended HCO3 drip and no emergent need for dialysis. Cardio fellow recommended trending CE as pt has no acute chest pain. Pt denied using any pain killers, any recent use of antibiotics. Pt has been having adequate PO intake.      PMH:  Dyslipidemia  DM (diabetes mellitus), type 2  Neuropathy  HTN (hypertension)  CAD (coronary atherosclerotic disease)  Acromegaly  CKD (chronic kidney disease), stage V    PSH:    H/O eye surgery  H/O: pituitary tumor    Home Medications:  amLODIPine 10 mg oral tablet: 1 tab(s) orally once a day  aspirin 325 mg oral tablet: 1 tab(s) orally once a day  atenolol 50 mg oral tablet: 1 tab(s) orally once a day  calcitriol 0.25 mcg oral capsule: 1 cap(s) orally every 48 hours  Calphron 667 mg oral tablet: 1334 milligram(s) orally 3 times a day   clopidogrel 75 mg oral tablet: 1 tab(s) orally once a day  Crestor 20 mg oral tablet: 20 milligram(s) orally once a day   glipiZIDE 2.5 mg oral tablet, extended release: 1 tab(s) orally once a day  Lasix 40 mg oral tablet: 1 tab(s) orally once a day  sodium bicarbonate 650 mg oral tablet: 1 tab(s) orally 2 times a day  tamsulosin 0.4 mg oral capsule: 1 cap(s) orally once a day    Allergies:  bacitracin (Unknown)  Neosporin (Hypotension)    Vital Signs Last 24 Hrs  T(C): 35.7 (19 Sep 2018 15:00), Max: 36.2 (18 Sep 2018 19:24)  T(F): 96.3 (19 Sep 2018 15:00), Max: 97.1 (18 Sep 2018 19:24)  HR: 52 (19 Sep 2018 15:00) (45 - 52)  BP: 136/65 (19 Sep 2018 15:00) (136/65 - 147/66)  BP(mean): --  RR: 18 (19 Sep 2018 15:00) (17 - 20)  SpO2: 100% (19 Sep 2018 15:00) (96% - 100%)    PHYSICAL EXAM:  General: NAD, AAOx3, calm and cooperative  Cardiac: RRR S1, S2, no Murmurs, rubs or gallops  Ext:     MEDICATIONS  (STANDING):  amLODIPine   Tablet 10 milliGRAM(s) Oral daily  aspirin 325 milliGRAM(s) Oral daily  ATENolol  Tablet 50 milliGRAM(s) Oral daily  atorvastatin 80 milliGRAM(s) Oral at bedtime  calcitriol   Capsule 0.25 MICROGram(s) Oral <User Schedule>  calcium acetate 1334 milliGRAM(s) Oral three times a day with meals  clopidogrel Tablet 75 milliGRAM(s) Oral daily  furosemide   Injectable 40 milliGRAM(s) IV Push two times a day  heparin  Injectable 5000 Unit(s) SubCutaneous every 8 hours  influenza   Vaccine 0.5 milliLiter(s) IntraMuscular once  sodium bicarbonate  Infusion 0.094 mEq/kG/Hr (75 mL/Hr) IV Continuous <Continuous>  tamsulosin 0.4 milliGRAM(s) Oral at bedtime    MEDICATIONS  (PRN):  acetaminophen   Tablet .. 650 milliGRAM(s) Oral every 6 hours PRN Mild Pain (1 - 3)  oxyCODONE    5 mG/acetaminophen 325 mG 1 Tablet(s) Oral every 6 hours PRN Moderate Pain (4 - 6)    LAB/STUDIES:                        7.2    10.32 )-----------( 201      ( 19 Sep 2018 04:30 )             21.6     09-    138  |  107  |  113<HH>  ----------------------------<  114<H>  5.0   |  9<LL>  |  9.0<HH>    Ca    7.9<L>      19 Sep 2018 04:30  Phos  8.9       Mg     1.7         TPro  6.2  /  Alb  3.7  /  TBili  0.3  /  DBili  x   /  AST  10  /  ALT  10  /  AlkPhos  123<H>      PT/INR - ( 18 Sep 2018 21:32 )   PT: 13.10 sec;   INR: 1.22 ratio         PTT - ( 18 Sep 2018 21:32 )  PTT:35.0 sec  LIVER FUNCTIONS - ( 18 Sep 2018 21:32 )  Alb: 3.7 g/dL / Pro: 6.2 g/dL / ALK PHOS: 123 U/L / ALT: 10 U/L / AST: 10 U/L / GGT: x           Urinalysis Basic - ( 19 Sep 2018 06:40 )    Color: Yellow / Appearance: Clear / S.020 / pH: x  Gluc: x / Ketone: Negative  / Bili: Negative / Urobili: 0.2 mg/dL   Blood: x / Protein: >=300 mg/dL / Nitrite: Negative   Leuk Esterase: Negative / RBC: 2-5 /HPF / WBC 3-5 /HPF   Sq Epi: x / Non Sq Epi: Occasional /HPF / Bacteria: x    CARDIAC MARKERS ( 19 Sep 2018 12:01 )  x     / 0.27 ng/mL / 209 U/L / x     / 13.9 ng/mL  CARDIAC MARKERS ( 19 Sep 2018 04:30 )  x     / 0.31 ng/mL / 170 U/L / x     / 13.1 ng/mL  CARDIAC MARKERS ( 19 Sep 2018 02:03 )  x     / x     / x     / x     / 12.9 ng/mL  CARDIAC MARKERS ( 18 Sep 2018 21:32 )  x     / 0.33 ng/mL / x     / x     / x        ASSESSMENT:  57yM w/ PMHx of KATHARINA on CKD 5 (Baseline Cr. 7.0) and metabolic acidosis. presented for SOB, GUTIERREZ, swelling of both ankles. Patient evaluated by Nephrology, now with progression of CKD in need of Tesio Catheter & AVF.    PLAN:  - Needs Vein Mapping of B/L UE.  - Will Schedule for TESIO Catheter placement & Creation of AVF.    Case discussed with attending physician Dr. Gomes

## 2018-09-19 NOTE — CONSULT NOTE ADULT - ATTENDING COMMENTS
above noted neck no mass will place udall
agree with above note
I was Physically Present for the key portions of the evaluation   I agree with the above History  , Physical examination Assessment and plan   I have Reviewed , Modified or appended where appropriate.  Please check A and P as above   1- CKD 5/ acidosis severe   will need to initiate RRT   continue bicarb drip for now  patient is reluctant to start RRT  will call surgery for access  IP noted on binders  2-Anemia chronic will need EDWIN   check Fe studies   3- Cardio notes noted for stress test

## 2018-09-19 NOTE — CONSULT NOTE ADULT - SUBJECTIVE AND OBJECTIVE BOX
PODIATRY CONSULT   SU SAWYER is a pleasant well-nourished, well developed 57y Male in no acute distress, alert awake, and oriented to person, place and time.   Patient is a 57y old  Male who presents with a chief complaint of shortness of breath (19 Sep 2018 09:49)    HPI:  56 yo M with PMHx of pituitary tumor s/p removal, acromegaly, CAD s/p 3 stents (2015), DLD, DMII, neuropathy, CKD 5 p/w 3 day hx of shortness of breath on exertion and was sent by urgent care. Pt states he has compliant w/ his medications and 3 days ago he developed progressively worsening shortness of breath on exertion. he was unable to walk a block without having to catch a breath and then went to urgent care. He had a CXR done which apparently showed congestion and thus sent him to the ED. Pt denies syncope, chest pain, dizziness, confusion, cough, fever, and chills.    In the ED, vitals were wnl and Pt was saturating 100% on rA. pt was found to have an KATHARINA w/ AGMA w/ pH of 7.1 and HCO3 of 18. CXR in ED did not show any congestion. Trops was elevated at 0.33 and BNP 7464. Nephro was consulted and recommended HCO3 drip and no emergent need for dialysis. Cardio fellow recommended trending CE as pt has no acute chest pain. Pt denied using any pain killers, any recent use of antibiotics. Pt has been having adequate PO intake (19 Sep 2018 02:40)    Podiatry consulted about left heel ulcer and RLE ulcers.  left heel ulcer was noticed about 4 days ago. Pt noticed while in shower. pt states that he might have stepped on something. no pain, put her noticed redness and swelling. No discharge. No previus treatment. Pt states that the skin peeled off  RLE ulcer pt states that he has had them for a while. Pt states the he tends to bump into things and he develops ulcers and cuts. Pt says that in the past he has healed without seeking medical attention. no discharge from the ulcer. pt reports history of blister at the area where the ulcer are.   No podiatry visits in the past    ACUTE ON CHRONIC RENAL FAILURE;ANEMIA;ELEVATED TROPONIN  Dyslipidemia  DM (diabetes mellitus), type 2  Neuropathy  HTN (hypertension)  CAD (coronary atherosclerotic disease)  Acromegaly  CKD (chronic kidney disease), stage V      PMH: ACUTE ON CHRONIC RENAL FAILURE;ANEMIA;ELEVATED TROPONIN  Dyslipidemia  DM (diabetes mellitus), type 2  Neuropathy  HTN (hypertension)  CAD (coronary atherosclerotic disease)  Acromegaly  CKD (chronic kidney disease), stage V    PSH: H/O eye surgery  H/O: pituitary tumor    Medication   Allergy: bacitracin (Unknown)  Neosporin (Hypotension)        Labs:                        7.2    10.32 )-----------( 201      ( 19 Sep 2018 04:30 )             21.6     PT/INR - ( 18 Sep 2018 21:32 )   PT: 13.10 sec;   INR: 1.22 ratio         PTT - ( 18 Sep 2018 21:32 )  PTT:35.0 sec  09-19    138  |  107  |  113<HH>  ----------------------------<  114<H>  5.0   |  9<LL>  |  9.0<HH>    Ca    7.9<L>      19 Sep 2018 04:30  Phos  8.9     09-19  Mg     1.7     09-19    TPro  6.2  /  Alb  3.7  /  TBili  0.3  /  DBili  x   /  AST  10  /  ALT  10  /  AlkPhos  123<H>  09-18    CARDIAC MARKERS ( 19 Sep 2018 12:01 )  x     / 0.27 ng/mL / 209 U/L / x     / 13.9 ng/mL  CARDIAC MARKERS ( 19 Sep 2018 04:30 )  x     / 0.31 ng/mL / 170 U/L / x     / 13.1 ng/mL  CARDIAC MARKERS ( 19 Sep 2018 02:03 )  x     / x     / x     / x     / 12.9 ng/mL  CARDIAC MARKERS ( 18 Sep 2018 21:32 )  x     / 0.33 ng/mL / x     / x     / x              O:   Derm: Ulceration Right heel  /Left ---- in nature, +/- hyperkeratotic border, wound base Granular/Fibrogranular/Necrotic patches/ , wound size (-- cm X – cm X –cm) +/- edema, +/- jose-wound erythema, +/- purulence, +/- fluctuance, +/- tracking/tunneling, +/- probe to bone. +/- streaking erythema. +/- xerosis, +/- hemosiderin deposits. +/- active sign of infection  Vascular: Dorsalis Pedis and Posterior Tibial pulses --/4.  Capillary re-fill time less then 3 seconds digits 1-5 bilateral.  B/L feet warm to touch  Neuro: Protective sensation intact/diminished to the level of the digits bilateral.  MSK: Muscle strength 5/5 all major muscle groups bilateral.        Assessment and Plan:   Pt with…………………..  Chart reviewed and Patient evaluated  Discussed diagnosis and treatment with patient  Wound flush with normal saline  Applied --- with dry sterile dressing  Offloading to bilateral Heels.   Obtained wound culture to be sent to Pathology  X-rays ordered/reviewed : Shows -------  Recommend ID/Vascular consult  Continue IV abx as per ID  Arterial duplex ordered  Weight bearing/Non-weight bearing  to ------------  Discussed importance of daily foot examinations and proper shoe gear and to importance of lower Fasting Blood Glucose levels.   Podiatry will follow while in house.   will discuss care plan  with all  Attendings ------  Pt to OR for Excisional debridement  on non viable soft  tissue  ---- base down to subcutaneous tissue red granular base  Right/ Left foot ulceration  Medical clearance requested PODIATRY CONSULT   SU SAWYER is a 57y old  Male who presents with a chief complaint of shortness of breath (19 Sep 2018 09:49)    HPI:  56 yo M with PMHx of pituitary tumor s/p removal, acromegaly, CAD s/p 3 stents (2015), DLD, DMII, neuropathy, CKD 5 p/w 3 day hx of shortness of breath on exertion and was sent by urgent care. Pt states he has compliant w/ his medications and 3 days ago he developed progressively worsening shortness of breath on exertion. he was unable to walk a block without having to catch a breath and then went to urgent care. He had a CXR done which apparently showed congestion and thus sent him to the ED. Pt denies syncope, chest pain, dizziness, confusion, cough, fever, and chills.    In the ED, vitals were wnl and Pt was saturating 100% on rA. pt was found to have an KATHARINA w/ AGMA w/ pH of 7.1 and HCO3 of 18. CXR in ED did not show any congestion. Trops was elevated at 0.33 and BNP 7464. Nephro was consulted and recommended HCO3 drip and no emergent need for dialysis. Cardio fellow recommended trending CE as pt has no acute chest pain. Pt denied using any pain killers, any recent use of antibiotics. Pt has been having adequate PO intake (19 Sep 2018 02:40)    Podiatry consulted about left heel ulcer and RLE ulcers.  left heel ulcer was noticed about 4 days ago. Pt noticed while in shower. pt states that he might have stepped on something. no pain, put her noticed redness and swelling. No discharge. No previus treatment. Pt states that the skin peeled off  RLE ulcer pt states that he has had them for a while. Pt states the he tends to bump into things and he develops ulcers and cuts. Pt says that in the past he has healed without seeking medical attention. no discharge from the ulcer. pt reports history of blister at the area where the ulcer are.   No podiatry visits in the past    ACUTE ON CHRONIC RENAL FAILURE;ANEMIA;ELEVATED TROPONIN  Dyslipidemia  DM (diabetes mellitus), type 2  Neuropathy  HTN (hypertension)  CAD (coronary atherosclerotic disease)  Acromegaly  CKD (chronic kidney disease), stage V      PMH: ACUTE ON CHRONIC RENAL FAILURE;ANEMIA;ELEVATED TROPONIN  Dyslipidemia  DM (diabetes mellitus), type 2  Neuropathy  HTN (hypertension)  CAD (coronary atherosclerotic disease)  Acromegaly  CKD (chronic kidney disease), stage V    PSH: H/O eye surgery  H/O: pituitary tumor    Medication   Allergy: bacitracin (Unknown)  Neosporin (Hypotension)        Labs:                        7.2    10.32 )-----------( 201      ( 19 Sep 2018 04:30 )             21.6     PT/INR - ( 18 Sep 2018 21:32 )   PT: 13.10 sec;   INR: 1.22 ratio         PTT - ( 18 Sep 2018 21:32 )  PTT:35.0 sec  09-19    138  |  107  |  113<HH>  ----------------------------<  114<H>  5.0   |  9<LL>  |  9.0<HH>    Ca    7.9<L>      19 Sep 2018 04:30  Phos  8.9     09-19  Mg     1.7     09-19    TPro  6.2  /  Alb  3.7  /  TBili  0.3  /  DBili  x   /  AST  10  /  ALT  10  /  AlkPhos  123<H>  09-18    CARDIAC MARKERS ( 19 Sep 2018 12:01 )  x     / 0.27 ng/mL / 209 U/L / x     / 13.9 ng/mL  CARDIAC MARKERS ( 19 Sep 2018 04:30 )  x     / 0.31 ng/mL / 170 U/L / x     / 13.1 ng/mL  CARDIAC MARKERS ( 19 Sep 2018 02:03 )  x     / x     / x     / x     / 12.9 ng/mL  CARDIAC MARKERS ( 18 Sep 2018 21:32 )  x     / 0.33 ng/mL / x     / x     / x              O:   Derm: Ulceration Right heel ulcer due to deroofing of pervious blister, - hyperkeratotic border, + macerated borders, wound base Granular  wound size (3.1 cm X 3.4cm X) + edema, + jose-wound erythema, - purulence, - fluctuance, - tracking/tunneling, - probe to bone. - streaking erythema. - xerosis, - hemosiderin deposits. - active sign of infection  Ulceration LLE and L foot due to deroofing of pervious blister due to LE edema, Ulcers limited to subcutaneous tissue - hyperkeratotic border, + macerated borders, wound base Granular + edema, + jose-wound erythema, - purulence, - fluctuance, - tracking/tunneling, - probe to bone. - streaking erythema. - xerosis, - hemosiderin deposits. - active sign of infection  Vascular: Dorsalis Pedis and Posterior Tibial pulses non palpable b/l.  Capillary re-fill time less then 3 seconds digits 1-5 bilateral.  B/L feet warm to touch  Neuro: Protective sensation intact  to the level of the digits bilateral.  MSK: Muscle strength 5/5 all major muscle groups bilateral.        Assessment and Plan:   b/l ulcer due to previous blisters, Stable without sings of infection  P:  Chart reviewed and Patient evaluated  Discussed diagnosis and treatment with patient  Wounds flush with normal saline  R heel Applied Betadine  with dry sterile dressing - to be changes to silvedene/Xeroform/DSD/Kerlix on the next dressing change.  LLE ulcers: silvedene/Xeroform/DSD/Kerlix   X-rays ordered  Vascular consult appreciated  Podiatry will follow while in house.   will discuss care plan  with  Attending PODIATRY CONSULT   SU SAWYER is a 57y old  Male who presents with a chief complaint of shortness of breath (19 Sep 2018 09:49)    HPI:  56 yo M with PMHx of pituitary tumor s/p removal, acromegaly, CAD s/p 3 stents (2015), DLD, DMII, neuropathy, CKD 5 p/w 3 day hx of shortness of breath on exertion and was sent by urgent care. Pt states he has compliant w/ his medications and 3 days ago he developed progressively worsening shortness of breath on exertion. he was unable to walk a block without having to catch a breath and then went to urgent care. He had a CXR done which apparently showed congestion and thus sent him to the ED. Pt denies syncope, chest pain, dizziness, confusion, cough, fever, and chills.    In the ED, vitals were wnl and Pt was saturating 100% on rA. pt was found to have an KATHARINA w/ AGMA w/ pH of 7.1 and HCO3 of 18. CXR in ED did not show any congestion. Trops was elevated at 0.33 and BNP 7464. Nephro was consulted and recommended HCO3 drip and no emergent need for dialysis. Cardio fellow recommended trending CE as pt has no acute chest pain. Pt denied using any pain killers, any recent use of antibiotics. Pt has been having adequate PO intake (19 Sep 2018 02:40)    Podiatry consulted about left heel ulcer and RLE ulcers.  left heel ulcer was noticed about 4 days ago. Pt noticed while in shower. pt states that he might have stepped on something. no pain, put her noticed redness and swelling. No discharge. No previus treatment. Pt states that the skin peeled off  RLE ulcer pt states that he has had them for a while. Pt states the he tends to bump into things and he develops ulcers and cuts. Pt says that in the past he has healed without seeking medical attention. no discharge from the ulcer. pt reports history of blister at the area where the ulcer are.   No podiatry visits in the past    ACUTE ON CHRONIC RENAL FAILURE;ANEMIA;ELEVATED TROPONIN  Dyslipidemia  DM (diabetes mellitus), type 2  Neuropathy  HTN (hypertension)  CAD (coronary atherosclerotic disease)  Acromegaly  CKD (chronic kidney disease), stage V      PMH: ACUTE ON CHRONIC RENAL FAILURE;ANEMIA;ELEVATED TROPONIN  Dyslipidemia  DM (diabetes mellitus), type 2  Neuropathy  HTN (hypertension)  CAD (coronary atherosclerotic disease)  Acromegaly  CKD (chronic kidney disease), stage V    PSH: H/O eye surgery  H/O: pituitary tumor    Medication   Allergy: bacitracin (Unknown)  Neosporin (Hypotension)        Labs:                        7.2    10.32 )-----------( 201      ( 19 Sep 2018 04:30 )             21.6     PT/INR - ( 18 Sep 2018 21:32 )   PT: 13.10 sec;   INR: 1.22 ratio         PTT - ( 18 Sep 2018 21:32 )  PTT:35.0 sec  09-19    138  |  107  |  113<HH>  ----------------------------<  114<H>  5.0   |  9<LL>  |  9.0<HH>    Ca    7.9<L>      19 Sep 2018 04:30  Phos  8.9     09-19  Mg     1.7     09-19    TPro  6.2  /  Alb  3.7  /  TBili  0.3  /  DBili  x   /  AST  10  /  ALT  10  /  AlkPhos  123<H>  09-18    CARDIAC MARKERS ( 19 Sep 2018 12:01 )  x     / 0.27 ng/mL / 209 U/L / x     / 13.9 ng/mL  CARDIAC MARKERS ( 19 Sep 2018 04:30 )  x     / 0.31 ng/mL / 170 U/L / x     / 13.1 ng/mL  CARDIAC MARKERS ( 19 Sep 2018 02:03 )  x     / x     / x     / x     / 12.9 ng/mL  CARDIAC MARKERS ( 18 Sep 2018 21:32 )  x     / 0.33 ng/mL / x     / x     / x              O:   Derm: Ulceration Right heel ulcer due to deroofing of pervious blister, - hyperkeratotic border, + macerated borders, wound base Granular  wound size (3.1 cm X 3.4cm X) + edema, + jose-wound erythema, - purulence, - fluctuance, - tracking/tunneling, - probe to bone. - streaking erythema. - xerosis, - hemosiderin deposits. - active sign of infection  Ulceration LLE and L foot due to deroofing of pervious blister due to LE edema, Ulcers limited to subcutaneous tissue - hyperkeratotic border, + macerated borders, wound base Granular + edema, + jose-wound erythema, - purulence, - fluctuance, - tracking/tunneling, - probe to bone. - streaking erythema. - xerosis, - hemosiderin deposits. - active sign of infection  Vascular: Dorsalis Pedis and Posterior Tibial pulses non palpable b/l.  Capillary re-fill time less then 3 seconds digits 1-5 bilateral.  B/L feet warm to touch  Neuro: Protective sensation intact  to the level of the digits bilateral.  MSK: Muscle strength 5/5 all major muscle groups bilateral.        Assessment and Plan:   b/l ulcer due to previous blisters, Stable without sings of infection  P:  Chart reviewed and Patient evaluated with attending   Discussed diagnosis and treatment with patient  Wounds flush with normal saline  R heel Applied Betadine  with dry sterile dressing - to be changes to silvedene/Xeroform/DSD/Kerlix on the next dressing change.  LLE ulcers: silvedene/Xeroform/DSD/Kerlix   X-rays ordered  Vascular consult appreciated  Podiatry will follow while in house.   will discuss care plan  with  Attending

## 2018-09-19 NOTE — ED ADULT NURSE REASSESSMENT NOTE - NS ED NURSE REASSESS COMMENT FT1
Spoke to MD Santiago at 3001 about patients HR of 49.  MD ordered to not give 0600 dose of medications lasix, amlodipine and atenolol.

## 2018-09-20 LAB
ANION GAP SERPL CALC-SCNC: 26 MMOL/L — HIGH (ref 7–14)
APTT BLD: 33 SEC — SIGNIFICANT CHANGE UP (ref 27–39.2)
BASOPHILS # BLD AUTO: 0.03 K/UL — SIGNIFICANT CHANGE UP (ref 0–0.2)
BASOPHILS NFR BLD AUTO: 0.3 % — SIGNIFICANT CHANGE UP (ref 0–1)
BLD GP AB SCN SERPL QL: SIGNIFICANT CHANGE UP
BUN SERPL-MCNC: 115 MG/DL — CRITICAL HIGH (ref 10–20)
CALCIUM SERPL-MCNC: 8 MG/DL — LOW (ref 8.5–10.1)
CHLORIDE SERPL-SCNC: 104 MMOL/L — SIGNIFICANT CHANGE UP (ref 98–110)
CO2 SERPL-SCNC: 11 MMOL/L — LOW (ref 17–32)
CREAT ?TM UR-MCNC: 51 MG/DL — SIGNIFICANT CHANGE UP
CREAT SERPL-MCNC: 9.4 MG/DL — CRITICAL HIGH (ref 0.7–1.5)
EOSINOPHIL # BLD AUTO: 0.14 K/UL — SIGNIFICANT CHANGE UP (ref 0–0.7)
EOSINOPHIL NFR BLD AUTO: 1.4 % — SIGNIFICANT CHANGE UP (ref 0–8)
GLUCOSE BLDC GLUCOMTR-MCNC: 102 MG/DL — HIGH (ref 70–99)
GLUCOSE BLDC GLUCOMTR-MCNC: 84 MG/DL — SIGNIFICANT CHANGE UP (ref 70–99)
GLUCOSE BLDC GLUCOMTR-MCNC: 96 MG/DL — SIGNIFICANT CHANGE UP (ref 70–99)
GLUCOSE SERPL-MCNC: 100 MG/DL — HIGH (ref 70–99)
HCT VFR BLD CALC: 23 % — LOW (ref 42–52)
HGB BLD-MCNC: 7.5 G/DL — LOW (ref 14–18)
IMM GRANULOCYTES NFR BLD AUTO: 0.4 % — HIGH (ref 0.1–0.3)
INR BLD: 1.2 RATIO — SIGNIFICANT CHANGE UP (ref 0.65–1.3)
LYMPHOCYTES # BLD AUTO: 0.82 K/UL — LOW (ref 1.2–3.4)
LYMPHOCYTES # BLD AUTO: 8.4 % — LOW (ref 20.5–51.1)
MAGNESIUM SERPL-MCNC: 1.7 MG/DL — LOW (ref 1.8–2.4)
MCHC RBC-ENTMCNC: 29.3 PG — SIGNIFICANT CHANGE UP (ref 27–31)
MCHC RBC-ENTMCNC: 32.6 G/DL — SIGNIFICANT CHANGE UP (ref 32–37)
MCV RBC AUTO: 89.8 FL — SIGNIFICANT CHANGE UP (ref 80–94)
MONOCYTES # BLD AUTO: 0.81 K/UL — HIGH (ref 0.1–0.6)
MONOCYTES NFR BLD AUTO: 8.3 % — SIGNIFICANT CHANGE UP (ref 1.7–9.3)
NEUTROPHILS # BLD AUTO: 7.87 K/UL — HIGH (ref 1.4–6.5)
NEUTROPHILS NFR BLD AUTO: 81.2 % — HIGH (ref 42.2–75.2)
NRBC # BLD: 0 /100 WBCS — SIGNIFICANT CHANGE UP (ref 0–0)
PHOSPHATE SERPL-MCNC: 8.7 MG/DL — HIGH (ref 2.1–4.9)
PLATELET # BLD AUTO: 224 K/UL — SIGNIFICANT CHANGE UP (ref 130–400)
POTASSIUM SERPL-MCNC: 4.8 MMOL/L — SIGNIFICANT CHANGE UP (ref 3.5–5)
POTASSIUM SERPL-SCNC: 4.8 MMOL/L — SIGNIFICANT CHANGE UP (ref 3.5–5)
PROT ?TM UR-MCNC: 134 MG/DLG/24H — SIGNIFICANT CHANGE UP
PROT/CREAT UR-RTO: 2.6 RATIO — HIGH (ref 0–0.2)
PROTHROM AB SERPL-ACNC: 12.9 SEC — HIGH (ref 9.95–12.87)
RBC # BLD: 2.56 M/UL — LOW (ref 4.7–6.1)
RBC # FLD: 15 % — HIGH (ref 11.5–14.5)
SODIUM SERPL-SCNC: 141 MMOL/L — SIGNIFICANT CHANGE UP (ref 135–146)
TYPE + AB SCN PNL BLD: SIGNIFICANT CHANGE UP
UUN UR-MCNC: 415 MG/DL — SIGNIFICANT CHANGE UP
WBC # BLD: 9.71 K/UL — SIGNIFICANT CHANGE UP (ref 4.8–10.8)
WBC # FLD AUTO: 9.71 K/UL — SIGNIFICANT CHANGE UP (ref 4.8–10.8)

## 2018-09-20 RX ORDER — FUROSEMIDE 40 MG
40 TABLET ORAL ONCE
Qty: 0 | Refills: 0 | Status: COMPLETED | OUTPATIENT
Start: 2018-09-20 | End: 2018-09-20

## 2018-09-20 RX ORDER — FUROSEMIDE 40 MG
40 TABLET ORAL
Qty: 0 | Refills: 0 | Status: DISCONTINUED | OUTPATIENT
Start: 2018-09-20 | End: 2018-09-21

## 2018-09-20 RX ORDER — MAGNESIUM SULFATE 500 MG/ML
2 VIAL (ML) INJECTION ONCE
Qty: 0 | Refills: 0 | Status: COMPLETED | OUTPATIENT
Start: 2018-09-20 | End: 2018-09-20

## 2018-09-20 RX ADMIN — Medication 1334 MILLIGRAM(S): at 11:12

## 2018-09-20 RX ADMIN — Medication 12.5 GRAM(S): at 15:41

## 2018-09-20 RX ADMIN — OXYCODONE AND ACETAMINOPHEN 1 TABLET(S): 5; 325 TABLET ORAL at 06:42

## 2018-09-20 RX ADMIN — HEPARIN SODIUM 5000 UNIT(S): 5000 INJECTION INTRAVENOUS; SUBCUTANEOUS at 13:57

## 2018-09-20 RX ADMIN — SODIUM CHLORIDE 75 MILLILITER(S): 9 INJECTION INTRAMUSCULAR; INTRAVENOUS; SUBCUTANEOUS at 01:08

## 2018-09-20 RX ADMIN — ATENOLOL 50 MILLIGRAM(S): 25 TABLET ORAL at 10:11

## 2018-09-20 RX ADMIN — Medication 40 MILLIGRAM(S): at 15:41

## 2018-09-20 RX ADMIN — AMLODIPINE BESYLATE 10 MILLIGRAM(S): 2.5 TABLET ORAL at 10:11

## 2018-09-20 RX ADMIN — OXYCODONE AND ACETAMINOPHEN 1 TABLET(S): 5; 325 TABLET ORAL at 21:11

## 2018-09-20 RX ADMIN — ATORVASTATIN CALCIUM 80 MILLIGRAM(S): 80 TABLET, FILM COATED ORAL at 21:11

## 2018-09-20 RX ADMIN — OXYCODONE AND ACETAMINOPHEN 1 TABLET(S): 5; 325 TABLET ORAL at 21:41

## 2018-09-20 RX ADMIN — Medication 1334 MILLIGRAM(S): at 18:41

## 2018-09-20 RX ADMIN — OXYCODONE AND ACETAMINOPHEN 1 TABLET(S): 5; 325 TABLET ORAL at 05:41

## 2018-09-20 RX ADMIN — Medication 325 MILLIGRAM(S): at 11:12

## 2018-09-20 RX ADMIN — HEPARIN SODIUM 5000 UNIT(S): 5000 INJECTION INTRAVENOUS; SUBCUTANEOUS at 21:15

## 2018-09-20 RX ADMIN — Medication 40 MILLIGRAM(S): at 05:39

## 2018-09-20 NOTE — PROGRESS NOTE ADULT - SUBJECTIVE AND OBJECTIVE BOX
Progress Note: General Surgery  Patient: SU SAWYER , 57y (1960)Male   MRN: [id number]  Location: [place] [address] A  Visit: 18 Inpatient  Date: 18 @ 06:00  Hospital Day: 2    Procedure/Diagnosis: Consult for tesio and AVF  Events over 24h: No acute overnight events. Patient NPO for possible LLE angiogram with vascular surgery today.     Vitals: T(F): 96.3 (18 @ 22:34), Max: 96.3 (18 @ 15:00)  HR: 53 (18 @ 22:34)  BP: 136/83 (18 @ 22:34) (136/65 - 156/78)  RR: 18 (18 @ 22:34)  SpO2: 99% (18 @ 18:49)    In:   18 @ 07:01  -  18 @ 06:00  --------------------------------------------------------  IN: 1125 mL    Out:   18 @ 07:01  -  18 @ 06:00  --------------------------------------------------------  OUT:  Total OUT: 0 mL    Net:   18 @ 07:01  -  18 @ 06:00  --------------------------------------------------------  NET: 1125 mL    Diet: Diet, Consistent Carbohydrate Renal/No Snacks:   1000mL Fluid Restriction ([id number]) (18 @ 11:11)  Diet, NPO after Midnight:      NPO Start Date: 19-Sep-2018,   NPO Start Time: 23:59  Except Medications (18 @ 03:56)    IV Fluids: yes no , Type: calcitriol   Capsule 0.25 MICROGram(s) Oral <User Schedule>  calcium acetate 1334 milliGRAM(s) Oral three times a day with meals  sodium chloride 0.9%. 1000 milliLiter(s) (75 mL/Hr) IV Continuous <Continuous>      PHYSICAL EXAM:  General: NAD, AAOx3, calm and cooperative  Cardiac: RRR S1, S2, no Murmurs, rubs or gallops  Ext: Wrapped in ace bandages. Non palpable pulses.    Medications: [Standing]  amLODIPine   Tablet 10 milliGRAM(s) Oral daily  aspirin 325 milliGRAM(s) Oral daily  ATENolol  Tablet 50 milliGRAM(s) Oral daily  atorvastatin 80 milliGRAM(s) Oral at bedtime  calcitriol   Capsule 0.25 MICROGram(s) Oral <User Schedule>  calcium acetate 1334 milliGRAM(s) Oral three times a day with meals  furosemide   Injectable 40 milliGRAM(s) IV Push two times a day  heparin  Injectable 5000 Unit(s) SubCutaneous every 8 hours  influenza   Vaccine 0.5 milliLiter(s) IntraMuscular once  sodium chloride 0.9%. 1000 milliLiter(s) (75 mL/Hr) IV Continuous <Continuous>  tamsulosin 0.4 milliGRAM(s) Oral at bedtime    DVT Prophylaxis: heparin  Injectable 5000 Unit(s) SubCutaneous every 8 hours    GI Prophylaxis:   Antibiotics:   Anticoagulation:   Medications:[PRN]  acetaminophen   Tablet .. 650 milliGRAM(s) Oral every 6 hours PRN  oxyCODONE    5 mG/acetaminophen 325 mG 1 Tablet(s) Oral every 6 hours PRN    Labs:                        7.2    10.32 )-----------( 201      ( 19 Sep 2018 04:30 )             21.6     -    138  |  107  |  113<HH>  ----------------------------<  114<H>  5.0   |  9<LL>  |  9.0<HH>    Ca    7.9<L>      19 Sep 2018 04:30  Phos  8.9       Mg     1.7         TPro  6.2  /  Alb  3.7  /  TBili  0.3  /  DBili  x   /  AST  10  /  ALT  10  /  AlkPhos  123<H>      LIVER FUNCTIONS - ( 18 Sep 2018 21:32 )  Alb: 3.7 g/dL / Pro: 6.2 g/dL / ALK PHOS: 123 U/L / ALT: 10 U/L / AST: 10 U/L / GGT: x           PT/INR - ( 18 Sep 2018 21:32 )   PT: 13.10 sec;   INR: 1.22 ratio    PTT - ( 18 Sep 2018 21:32 )  PTT:35.0 sec    CARDIAC MARKERS ( 19 Sep 2018 12:01 )  x     / 0.27 ng/mL / 209 U/L / x     / 13.9 ng/mL  CARDIAC MARKERS ( 19 Sep 2018 04:30 )  x     / 0.31 ng/mL / 170 U/L / x     / 13.1 ng/mL  CARDIAC MARKERS ( 19 Sep 2018 02:03 )  x     / x     / x     / x     / 12.9 ng/mL  CARDIAC MARKERS ( 18 Sep 2018 21:32 )  x     / 0.33 ng/mL / x     / x     / x          Urine/Micro:    Urinalysis Basic - ( 19 Sep 2018 06:40 )    Color: Yellow / Appearance: Clear / S.020 / pH: x  Gluc: x / Ketone: Negative  / Bili: Negative / Urobili: 0.2 mg/dL   Blood: x / Protein: >=300 mg/dL / Nitrite: Negative   Leuk Esterase: Negative / RBC: 2-5 /HPF / WBC 3-5 /HPF   Sq Epi: x / Non Sq Epi: Occasional /HPF / Bacteria: x      Imaging:  None/24h    Assessment:  57y Male patient consulted for tesio and AVF creation    PLAN:  - Results of vein mapping appreciated  - Will Schedule for TESIO Catheter placement & Creation of AVF.    Date/Time: 18 @ 06:00

## 2018-09-20 NOTE — PROGRESS NOTE ADULT - SUBJECTIVE AND OBJECTIVE BOX
SUBJECTIVE:    Patient is a 57y old Male who presents with a chief complaint of shortness of breath (20 Sep 2018 10:58)    Currently admitted to medicine with the primary diagnosis of Acute on chronic renal failure now ESRD and LLE SFA Stenosis PVD     Today is hospital day 1d. This morning he is resting comfortably in bed and reports no new issues or overnight events. He denies sob, cp, n/v, headache, dizziness, palpitations    PAST MEDICAL & SURGICAL HISTORY  Dyslipidemia  DM (diabetes mellitus), type 2  Neuropathy  HTN (hypertension)  CAD (coronary atherosclerotic disease)  Acromegaly  CKD (chronic kidney disease), stage V  H/O eye surgery  H/O: pituitary tumor: s/p removal    ALLERGIES:  bacitracin (Unknown)  Neosporin (Hypotension)    MEDICATIONS:  STANDING MEDICATIONS  amLODIPine   Tablet 10 milliGRAM(s) Oral daily  aspirin 325 milliGRAM(s) Oral daily  ATENolol  Tablet 50 milliGRAM(s) Oral daily  atorvastatin 80 milliGRAM(s) Oral at bedtime  calcitriol   Capsule 0.25 MICROGram(s) Oral <User Schedule>  calcium acetate 1334 milliGRAM(s) Oral three times a day with meals  furosemide   Injectable 40 milliGRAM(s) IV Push two times a day  heparin  Injectable 5000 Unit(s) SubCutaneous every 8 hours  influenza   Vaccine 0.5 milliLiter(s) IntraMuscular once  tamsulosin 0.4 milliGRAM(s) Oral at bedtime    PRN MEDICATIONS  acetaminophen   Tablet .. 650 milliGRAM(s) Oral every 6 hours PRN  oxyCODONE    5 mG/acetaminophen 325 mG 1 Tablet(s) Oral every 6 hours PRN    VITALS:   T(F): 96.9  HR: 55  BP: 140/60  RR: 18  SpO2: 99%    LABS:                        7.5    9.71  )-----------( 224      ( 20 Sep 2018 08:41 )             23.0     09-20    141  |  104  |  115<HH>  ----------------------------<  100<H>  4.8   |  11<L>  |  9.4<HH>    Ca    8.0<L>      20 Sep 2018 08:41  Phos  8.7     09-20  Mg     1.7     09-20    TPro  6.2  /  Alb  3.7  /  TBili  0.3  /  DBili  x   /  AST  10  /  ALT  10  /  AlkPhos  123<H>      PT/INR - ( 20 Sep 2018 08:41 )   PT: 12.90 sec;   INR: 1.20 ratio         PTT - ( 20 Sep 2018 08:41 )  PTT:33.0 sec  Urinalysis Basic - ( 19 Sep 2018 06:40 )    Color: Yellow / Appearance: Clear / S.020 / pH: x  Gluc: x / Ketone: Negative  / Bili: Negative / Urobili: 0.2 mg/dL   Blood: x / Protein: >=300 mg/dL / Nitrite: Negative   Leuk Esterase: Negative / RBC: 2-5 /HPF / WBC 3-5 /HPF   Sq Epi: x / Non Sq Epi: Occasional /HPF / Bacteria: x      CARDIAC MARKERS ( 19 Sep 2018 12:01 )  x     / 0.27 ng/mL / 209 U/L / x     / 13.9 ng/mL  CARDIAC MARKERS ( 19 Sep 2018 04:30 )  x     / 0.31 ng/mL / 170 U/L / x     / 13.1 ng/mL  CARDIAC MARKERS ( 19 Sep 2018 02:03 )  x     / x     / x     / x     / 12.9 ng/mL  CARDIAC MARKERS ( 18 Sep 2018 21:32 )  x     / 0.33 ng/mL / x     / x     / x          RADIOLOGY:  < from: US Kidney and Bladder (18 @ 09:35) >  IMPRESSION:  No hydronephrosis.  Right renal cysts.  Post-void residual 383 cc.   < end of copied text >    < from: VA Duplex Low Ext Arterial, Ltd, Left (18 @ 08:05) >  Left:   common femoral artery: Patent, with very mild spectral broadening and   triphasic waveform   superficial femoral artery: Significantly diminished amplitude   proximally, with loss of triphasic waveform and spectral broadening.   Apparent occlusion in the mid SFA. Broad and distal reconstitution.   popliteal artery: Flow present, with diseased waveform.   anterior tibial artery: Patent   posterior tibial artery: Patent   peroneal artery: Not visualized     Impression:    On the left: Apparent severe disease at the femoral bifurcation, with   occlusion of the superficial femoral artery distal to this.   Reconstitution of flow through the popliteal and tibial vessels.    Please consider vascular surgical consultation if felt to be appropriate   in the current clinical context.    < end of copied text >      PHYSICAL EXAM:  GEN: No acute distress  LUNGS: Clear to auscultation bilaterally   HEART: S1/S2 present. RRR.   ABD: Soft, non-tender, non-distended. Bowel sounds present  EXT: NC/NC/NE/2+PP/[place]/ multiple B/L ulcers of the distal LE   NEURO: AAOX3, no focal deficits    Intravenous access:   NG tube:   Heard cathter:

## 2018-09-20 NOTE — PROGRESS NOTE ADULT - ASSESSMENT
56 yo M with PMHx of pituitary tumor s/p removal, acromegaly, CAD s/p 3 stents (2015), DLD, DMII, neuropathy, CKD 5 p/w 3 day hx of shortness of breath on exertion and was sent by urgent care.    1.	KATHARINA on CKD 5 vs progression of CKD  2.	 HAGMA  3.	Fluid overload/Ac. HFpEF  4.	CAD  5.	DM-2/DL  6.	Acromegaly  7.	B/L Leg ulcers 58 yo M with PMHx of pituitary tumor s/p removal, acromegaly, CAD s/p 3 stents (2015), DLD, DMII, neuropathy, CKD 5 p/w 3 day hx of shortness of breath on exertion and was sent by urgent care.    1.	KATHARINA on CKD 5 vs progression of CKD  2.	 HAGMA  3.	Fluid overload/Ac. HFpEF  4.	CAD  5.	DM-2/DL  6.	Acromegaly  7.	B/L Leg ulcers  8.	Anemia of Ch. disease         PLAN:    ·	Plan of care D/W the renal  ·	Tesio catheter and HD tomorrow  ·	Can D/C NaHCO3 drip. Follow BMP. Needs to be on PO NaHCO3  ·	Check I'S and O'S and daily wt.  ·	Transfuse on unit of PRBC'S and follow CBC.  ·	Monitor FS. On no meds for DM-2. Pt states he has been running low sugars  ·	Local wound care of leg and foot ulcers. Pod eval  ·	Vascular eval. Possible angiogram on Mon.

## 2018-09-20 NOTE — PROGRESS NOTE ADULT - SUBJECTIVE AND OBJECTIVE BOX
Nephrology progress note    Patient is seen and examined, events over the last 24 h noted .    Allergies:  bacitracin (Unknown)  Neosporin (Hypotension)    Hospital Medications:   MEDICATIONS  (STANDING):  amLODIPine   Tablet 10 milliGRAM(s) Oral daily  aspirin 325 milliGRAM(s) Oral daily  ATENolol  Tablet 50 milliGRAM(s) Oral daily  atorvastatin 80 milliGRAM(s) Oral at bedtime  calcitriol   Capsule 0.25 MICROGram(s) Oral <User Schedule>  calcium acetate 1334 milliGRAM(s) Oral three times a day with meals  furosemide   Injectable 40 milliGRAM(s) IV Push two times a day  heparin  Injectable 5000 Unit(s) SubCutaneous every 8 hours  influenza   Vaccine 0.5 milliLiter(s) IntraMuscular once  sodium chloride 0.9%. 1000 milliLiter(s) (75 mL/Hr) IV Continuous <Continuous>  tamsulosin 0.4 milliGRAM(s) Oral at bedtime        VITALS:  T(F): 95.7 (18 @ 06:04), Max: 96.3 (18 @ 15:00)  HR: 56 (18 @ 06:04)  BP: 146/66 (18 @ 06:04)  RR: 19 (18 @ 06:04)  SpO2: 99% (18 @ 18:49)       @ 07:01  -   @ 07:00  --------------------------------------------------------  IN: 1200 mL / OUT: 0 mL / NET: 1200 mL      Height (cm): 190.5 ( @ 22:34)  Weight (kg): 120.2 ( @ 22:34)  BMI (kg/m2): 33.1 ( @ 22:34)  BSA (m2): 2.47 ( @ 22:34)    PHYSICAL EXAM:  Constitutional: NAD  HEENT: anicteric sclera, oropharynx clear, MMM  Neck: No JVD  Respiratory: CTAB, no wheezes, rales or rhonchi  Cardiovascular: S1, S2, RRR  Gastrointestinal: BS+, soft, NT/ND  Extremities: No cyanosis or clubbing. No peripheral edema  :  No ortiz.   Skin: No rashes    LABS:      138  |  107  |  113<HH>  ----------------------------<  114<H>  5.0   |  9<LL>  |  9.0<HH>    Ca    7.9<L>      19 Sep 2018 04:30  Phos  8.9       Mg     1.7         TPro  6.2  /  Alb  3.7  /  TBili  0.3  /  DBili      /  AST  10  /  ALT  10  /  AlkPhos  123<H>                            7.2    10.32 )-----------( 201      ( 19 Sep 2018 04:30 )             21.6       Urine Studies:  Urinalysis Basic - ( 19 Sep 2018 06:40 )    Color: Yellow / Appearance: Clear / S.020 / pH:   Gluc:  / Ketone: Negative  / Bili: Negative / Urobili: 0.2 mg/dL   Blood:  / Protein: >=300 mg/dL / Nitrite: Negative   Leuk Esterase: Negative / RBC: 2-5 /HPF / WBC 3-5 /HPF   Sq Epi:  / Non Sq Epi: Occasional /HPF / Bacteria:         RADIOLOGY & ADDITIONAL STUDIES: Nephrology progress note    Patient is seen and examined, events over the last 24 h noted .  for tesio/ AVF today   needs angiogram    Allergies:  bacitracin (Unknown)  Neosporin (Hypotension)    Hospital Medications:   MEDICATIONS  (STANDING):  amLODIPine   Tablet 10 milliGRAM(s) Oral daily  aspirin 325 milliGRAM(s) Oral daily  ATENolol  Tablet 50 milliGRAM(s) Oral daily  atorvastatin 80 milliGRAM(s) Oral at bedtime  calcitriol   Capsule 0.25 MICROGram(s) Oral <User Schedule>  calcium acetate 1334 milliGRAM(s) Oral three times a day with meals  furosemide   Injectable 40 milliGRAM(s) IV Push two times a day  heparin  Injectable 5000 Unit(s) SubCutaneous every 8 hours  influenza   Vaccine 0.5 milliLiter(s) IntraMuscular once  sodium chloride 0.9%. 1000 milliLiter(s) (75 mL/Hr) IV Continuous <Continuous>  tamsulosin 0.4 milliGRAM(s) Oral at bedtime        VITALS:  T(F): 95.7 (18 @ 06:04), Max: 96.3 (18 @ 15:00)  HR: 56 (18 @ 06:04)  BP: 146/66 (18 @ 06:04)  RR: 19 (18 @ 06:04)  SpO2: 99% (18 @ 18:49)       @ 07:01  -   @ 07:00  --------------------------------------------------------  IN: 1200 mL / OUT: 0 mL / NET: 1200 mL      Height (cm): 190.5 ( 22:34)  Weight (kg): 120.2 ( 22:34)  BMI (kg/m2): 33.1 ( 22:34)  BSA (m2): 2.47 ( 22:34)    PHYSICAL EXAM:  Constitutional: NAD  HEENT: anicteric sclera, oropharynx clear, MMM  Neck: No JVD  Respiratory: CTAB, no wheezes, rales or rhonchi  Cardiovascular: S1, S2, RRR  Gastrointestinal: BS+, soft, NT/ND  Extremities: No cyanosis or clubbing. No peripheral edema  :  No ortiz.   Skin: No rashes    LABS:      138  |  107  |  113<HH>  ----------------------------<  114<H>  5.0   |  9<LL>  |  9.0<HH>    Ca    7.9<L>      19 Sep 2018 04:30  Phos  8.9       Mg     1.7         TPro  6.2  /  Alb  3.7  /  TBili  0.3  /  DBili      /  AST  10  /  ALT  10  /  AlkPhos  123<H>                            7.2    10.32 )-----------( 201      ( 19 Sep 2018 04:30 )             21.6       Urine Studies:  Urinalysis Basic - ( 19 Sep 2018 06:40 )    Color: Yellow / Appearance: Clear / S.020 / pH:   Gluc:  / Ketone: Negative  / Bili: Negative / Urobili: 0.2 mg/dL   Blood:  / Protein: >=300 mg/dL / Nitrite: Negative   Leuk Esterase: Negative / RBC: 2-5 /HPF / WBC 3-5 /HPF   Sq Epi:  / Non Sq Epi: Occasional /HPF / Bacteria:         RADIOLOGY & ADDITIONAL STUDIES:

## 2018-09-20 NOTE — PROGRESS NOTE ADULT - ASSESSMENT
Patient is a 57y Male with KATHARINA on CKD 5 (Baseline Cr. 7.0) and metabolic acidosis. presented for SOB, GUTIERREZ, swelling of both ankles.  PMH acromegaly (S/P Pitutary removal), DM with neuropathy, DLD, kidney stones.    # KATHARINA on CKD 5 vs progression of CKD    - most likely progression of CKD    - S. crt elevated at 9.   - cont. sodium bicarbonate drip   -  hold lasix    - repeat BMP, UA   - check urine creatinine, Na, Urea for FENA, FEUrea   - US KUB   - monitor I & O   - will need RRT , will call for tesio catheter    - Discussed with pt about preparation for possible HD in future with AV fistula.   - IP noted high on phoslo, check PTH already on calcitriol   - Avoid nephrotoxins and hypotension   - will follow    # HAGMA   - Metabolic acidosis with some possible met alkalosis (though reading from [facility] so can not be completely certain)   - can be due to CKD progression    - cont. Bicarb drip   - check ABGs, serum lactate   - monitor EKG closely    # Hyperphosphatemia   - cont Phoslo   - repeat IP   - check PTH.    # Anemia   - serum iron studies noted.   - may need EDWIN    - evaluate for sites of active bleeding   - Maintain H/H   - Transfuse if Hb < 7.    # BP   - under control.    Will follow Patient is a 57y Male with KATHARINA on CKD 5 (Baseline Cr. 7.0) and metabolic acidosis. presented for SOB, GUTIERREZ, swelling of both ankles.  PMH acromegaly (S/P Pitutary removal), DM with neuropathy, DLD, kidney stones.    # KATHARINA on CKD 5 vs progression of CKD    - most likely progression of CKD    - S. crt elevated at 9.   - DC IVF   -  continue lasix current    - repeat BMP, UA   - monitor I & O   - for HD in AM after tesio / will need AVF    - IP noted high on phoslo, check PTH already on calcitriol will switch to hectorol on HD    - Avoid nephrotoxins and hypotension   - will follow    # HAGMA   - Metabolic acidosis with some possible met alkalosis (though reading from [facility] so can not be completely certain)   - can be due to CKD progression    - check ABGs, serum lactate   - monitor EKG closely    # Hyperphosphatemia   - cont Phoslo   - repeat IP   - check PTH.    # Anemia   - serum iron studies noted.   - may need EDWIN , will start on HD    - Maintain H/H   - Transfuse if Hb < 7.    # BP   - under control.    Will follow

## 2018-09-20 NOTE — PROGRESS NOTE ADULT - SUBJECTIVE AND OBJECTIVE BOX
SU SAWYER  57y Male    CHIEF COMPLAINT:    Patient is a 57y old  Male who presents with a chief complaint of shortness of breath (20 Sep 2018 09:14)      INTERVAL HPI/OVERNIGHT EVENTS:    Patient seen and examined.    ROS: All other systems are negative.    Vital Signs:    T(F): 95.7 (18 @ 06:04), Max: 96.3 (18 @ 15:00)  HR: 56 (18 @ 06:04) (52 - 56)  BP: 146/66 (18 @ 06:04) (136/65 - 156/78)  RR: 19 (18 @ 06:04) (17 - 19)  SpO2: 99% (18 @ 18:49) (99% - 100%)  I&O's Summary    19 Sep 2018 07:01  -  20 Sep 2018 07:00  --------------------------------------------------------  IN: 1200 mL / OUT: 0 mL / NET: 1200 mL      Daily Height in cm: 190.5 (19 Sep 2018 22:34)    Daily Weight in k.6 (20 Sep 2018 06:04)  CAPILLARY BLOOD GLUCOSE          PHYSICAL EXAM:    GENERAL:  NAD  SKIN: No rashes or lesions  HENT: Atrumatic. Normocephalic. PERRL. Moist membranes.  NECK: Supple, No JVD. No lymphadenopathy.  PULMONARY: CTA B/L. No wheezing. No rales  CVS: Normal S1, S2. Rate and Rythm are regular. No murmurs.  ABDOMEN/GI: Soft, Nontender, Nondistended; BS present  EXTREMITIES: Peripheral pulses intact. No edema B/L LE.  NEUROLOGIC:  No motor or sensory deficit.  PSYCH: Alert & oriented x 3    Consultant(s) Notes Reviewed:  [x ] YES  [ ] NO  Care Discussed with Consultants/Other Providers [ x] YES  [ ] NO    EKG reviewed  Telemetry reviewed    LABS:                        7.5    9.71  )-----------( 224      ( 20 Sep 2018 08:41 )             23.0         141  |  104  |  115<HH>  ----------------------------<  100<H>  4.8   |  11<L>  |  9.4<HH>    Ca    8.0<L>      20 Sep 2018 08:41  Phos  8.7       Mg     1.7         TPro  6.2  /  Alb  3.7  /  TBili  0.3  /  DBili  x   /  AST  10  /  ALT  10  /  AlkPhos  123<H>      PT/INR - ( 20 Sep 2018 08:41 )   PT: 12.90 sec;   INR: 1.20 ratio         PTT - ( 20 Sep 2018 08:41 )  PTT:33.0 sec  Serum Pro-Brain Natriuretic Peptide: 7464 pg/mL (18 @ 21:32)    Trop 0.27, CKMB 13.9, , 18 @ 12:01  Trop 0.31, CKMB 13.1, , 18 @ 04:30  Trop --, CKMB 12.9, CK --, 18 @ 02:03  Trop 0.33, CKMB --, CK --, 18 @ 21:32        RADIOLOGY & ADDITIONAL TESTS:      Imaging or report Personally Reviewed:  [ ] YES  [ ] NO    Medications:  Standing  amLODIPine   Tablet 10 milliGRAM(s) Oral daily  aspirin 325 milliGRAM(s) Oral daily  ATENolol  Tablet 50 milliGRAM(s) Oral daily  atorvastatin 80 milliGRAM(s) Oral at bedtime  calcitriol   Capsule 0.25 MICROGram(s) Oral <User Schedule>  calcium acetate 1334 milliGRAM(s) Oral three times a day with meals  furosemide   Injectable 40 milliGRAM(s) IV Push two times a day  heparin  Injectable 5000 Unit(s) SubCutaneous every 8 hours  influenza   Vaccine 0.5 milliLiter(s) IntraMuscular once  tamsulosin 0.4 milliGRAM(s) Oral at bedtime    PRN Meds  acetaminophen   Tablet .. 650 milliGRAM(s) Oral every 6 hours PRN  oxyCODONE    5 mG/acetaminophen 325 mG 1 Tablet(s) Oral every 6 hours PRN      Case discussed with resident    Care discussed with pt/family SU SAWYER  57y Male    CHIEF COMPLAINT:    Patient is a 57y old  Male who presents with a chief complaint of shortness of breath (20 Sep 2018 09:14)      INTERVAL HPI/OVERNIGHT EVENTS:    Patient seen and examined. C/O GUTIERREZ and dry cough. No cp. No palpitations. Also C/O ulcers on both legs and feet and leg swelling. No fever.    ROS: All other systems are negative.    Vital Signs:    T(F): 95.7 (18 @ 06:04), Max: 96.3 (18 @ 15:00)  HR: 56 (18 @ 06:04) (52 - 56)  BP: 146/66 (18 @ 06:04) (136/65 - 156/78)  RR: 19 (18 @ 06:04) (17 - 19)  SpO2: 99% (18 @ 18:49) (99% - 100%)  I&O's Summary    19 Sep 2018 07:01  -  20 Sep 2018 07:00  --------------------------------------------------------  IN: 1200 mL / OUT: 0 mL / NET: 1200 mL      Daily Height in cm: 190.5 (19 Sep 2018 22:34)    Daily Weight in k.6 (20 Sep 2018 06:04)  CAPILLARY BLOOD GLUCOSE          PHYSICAL EXAM:    GENERAL:  NAD  SKIN: No rashes or lesions  HENT: Atrumatic. Normocephalic. PERRL. Moist membranes.  NECK: Supple, No JVD. No lymphadenopathy.  PULMONARY: CTA B/L. No wheezing. No rales  CVS: Normal S1, S2. Rate and Rythm are regular. No murmurs.  ABDOMEN/GI: Soft, Nontender, Nondistended; BS present  EXTREMITIES: Peripheral pulses intact. 1+ pitting edema B/L LE. Clean base ulcers on both legs. Dry gangrene of tip of Lt. 3-5th toes.  NEUROLOGIC:  No motor or sensory deficit.  PSYCH: Alert & oriented x 3    Consultant(s) Notes Reviewed:  [x ] YES  [ ] NO  Care Discussed with Consultants/Other Providers [ x] YES  [ ] NO    EKG reviewed  Telemetry reviewed    LABS:                        7.5    9.71  )-----------( 224      ( 20 Sep 2018 08:41 )             23.0         141  |  104  |  115<HH>  ----------------------------<  100<H>  4.8   |  11<L>  |  9.4<HH>    Ca    8.0<L>      20 Sep 2018 08:41  Phos  8.7       Mg     1.7         TPro  6.2  /  Alb  3.7  /  TBili  0.3  /  DBili  x   /  AST  10  /  ALT  10  /  AlkPhos  123<H>      PT/INR - ( 20 Sep 2018 08:41 )   PT: 12.90 sec;   INR: 1.20 ratio         PTT - ( 20 Sep 2018 08:41 )  PTT:33.0 sec  Serum Pro-Brain Natriuretic Peptide: 7464 pg/mL (18 @ 21:32)    Trop 0.27, CKMB 13.9, , 18 @ 12:01  Trop 0.31, CKMB 13.1, , 18 @ 04:30  Trop --, CKMB 12.9, CK --, 18 @ 02:03  Trop 0.33, CKMB --, CK --, 18 @ 21:32        RADIOLOGY & ADDITIONAL TESTS:      Imaging or report Personally Reviewed:  [ ] YES  [ ] NO    Medications:  Standing  amLODIPine   Tablet 10 milliGRAM(s) Oral daily  aspirin 325 milliGRAM(s) Oral daily  ATENolol  Tablet 50 milliGRAM(s) Oral daily  atorvastatin 80 milliGRAM(s) Oral at bedtime  calcitriol   Capsule 0.25 MICROGram(s) Oral <User Schedule>  calcium acetate 1334 milliGRAM(s) Oral three times a day with meals  furosemide   Injectable 40 milliGRAM(s) IV Push two times a day  heparin  Injectable 5000 Unit(s) SubCutaneous every 8 hours  influenza   Vaccine 0.5 milliLiter(s) IntraMuscular once  tamsulosin 0.4 milliGRAM(s) Oral at bedtime    PRN Meds  acetaminophen   Tablet .. 650 milliGRAM(s) Oral every 6 hours PRN  oxyCODONE    5 mG/acetaminophen 325 mG 1 Tablet(s) Oral every 6 hours PRN      Case discussed with resident    Care discussed with pt/family

## 2018-09-20 NOTE — CHART NOTE - NSCHARTNOTEFT_GEN_A_CORE
PRE OP DIAGNOSIS: ckd    PROCEDURE: tesio placement and arteriovenous fistula creation     Vital Signs Last 24 Hrs  T(F): 96.9 (20 Sep 2018 13:39), Max: 96.9 (20 Sep 2018 13:39)  HR: 55 (20 Sep 2018 13:39) (53 - 56)  BP: 140/60 (20 Sep 2018 13:39) (136/83 - 156/78)  RR: 18 (20 Sep 2018 13:39) (17 - 19)  SpO2: 99% (19 Sep 2018 18:49) (99% - 99%)    MEDICATIONS:   MEDICATIONS  (STANDING):  amLODIPine   Tablet 10 milliGRAM(s) Oral daily  aspirin 325 milliGRAM(s) Oral daily  ATENolol  Tablet 50 milliGRAM(s) Oral daily  atorvastatin 80 milliGRAM(s) Oral at bedtime  calcitriol   Capsule 0.25 MICROGram(s) Oral <User Schedule>  calcium acetate 1334 milliGRAM(s) Oral three times a day with meals  furosemide   Injectable 40 milliGRAM(s) IV Push two times a day  heparin  Injectable 5000 Unit(s) SubCutaneous every 8 hours  influenza   Vaccine 0.5 milliLiter(s) IntraMuscular once  tamsulosin 0.4 milliGRAM(s) Oral at bedtime    MEDICATIONS  (PRN):  acetaminophen   Tablet .. 650 milliGRAM(s) Oral every 6 hours PRN Mild Pain (1 - 3)  oxyCODONE    5 mG/acetaminophen 325 mG 1 Tablet(s) Oral every 6 hours PRN Moderate Pain (4 - 6)      LAB/STUDIES:    CAPILLARY BLOOD GLUCOSE  POCT Blood Glucose.: 84 mg/dL (20 Sep 2018 15:53)  POCT Blood Glucose.: 96 mg/dL (20 Sep 2018 11:44)                          7.5    9.71  )-----------( 224      ( 20 Sep 2018 08:41 )             23.0       Auto Neutrophil %: 81.2 % (18 @ 08:41)  Auto Immature Granulocyte %: 0.4 % (18 @ 08:41)        141  |  104  |  115<HH>  ----------------------------<  100<H>  4.8   |  11<L>  |  9.4<HH>      Calcium, Total Serum: 8.0 mg/dL (18 @ 08:41)      LFTs:             6.2  | 0.3  | 10       ------------------[123     ( 18 Sep 2018 21:32 )  3.7  | x    | 10          Lipase:x      Amylase:x           Coags:     12.90  ----< 1.20    ( 20 Sep 2018 08:41 )     33.0        CARDIAC MARKERS ( 19 Sep 2018 12:01 )  x     / 0.27 ng/mL / 209 U/L / x     / 13.9 ng/mL  CARDIAC MARKERS ( 19 Sep 2018 04:30 )  x     / 0.31 ng/mL / 170 U/L / x     / 13.1 ng/mL  CARDIAC MARKERS ( 19 Sep 2018 02:03 )  x     / x     / x     / x     / 12.9 ng/mL  CARDIAC MARKERS ( 18 Sep 2018 21:32 )  x     / 0.33 ng/mL / x     / x     / x        Urinalysis Basic - ( 19 Sep 2018 06:40 )  Color: Yellow / Appearance: Clear / S.020 / pH: x  Gluc: x / Ketone: Negative  / Bili: Negative / Urobili: 0.2 mg/dL   Blood: x / Protein: >=300 mg/dL / Nitrite: Negative   Leuk Esterase: Negative / RBC: 2-5 /HPF / WBC 3-5 /HPF   Sq Epi: x / Non Sq Epi: Occasional /HPF / Bacteria: x      Vein Mapping- done 2018  CXR: pending  EKG: done 2018  BLOOD: T&S - done 2018, 2 units PRBCs on hold for OR  ORDERS: NPO@[place], plavix discontinued 2018  CONSENT: OBTAINED AND IN CHART

## 2018-09-20 NOTE — PROGRESS NOTE ADULT - ASSESSMENT
56 yo M with PMHx of pituitary tumor s/p removal, acromegaly, CAD s/p 3 stents (), DLD, DMII, neuropathy, CKD 5 p/w 3 day hx of shortness of breath on exertion and was sent by urgent care. In the ED, vitals were wnl and Pt was saturating 100% on rA. pt was found to have an KATHARINA w/ AGMA w/ pH of 7.1 and HCO3 of 18. CXR in ED did not show any congestion. Trops was elevated at 0.33 and BNP 7464. Nephro was consulted and recommended HCO3 drip and no emergent need for dialysis. Cardio fellow recommended trending CE as pt has no acute chest pain. Pt denied using any pain killers, any recent use of antibiotics. Pt has been having adequate PO intake    #Dyspnea on exertion and AGMA likely 2/2 KATHARINA on CKD stage 5 likely pre-renal - Hemodynamically stable and Pt voiding freely  - vitals wnl and pt saturating on RA. Lung ausculation shows occasional crackles on admission  - CXR: Cardiomegaly w/o evidence of congestion  - VB.14/28/32/7 AG 26  - Baseline Cr 7.3 and this admission 9.1  - BNP: 7464  - c/w IV Lasix 40 Q12 for now  - strict i&O and daily weights  - monitor PO4 and iPTH daily PTH 1519  - f/u Urine:Protein Cr ratio  - f/u FeUREA  -  Renal sono no hydro  - OR tomorrow for AVF and Tesio placement followed by HD    #Left heal ulcer w/ clean base likely complicated by underlying diabetic neuropathy and Peripheral Vascular Disease  - No evidence of Sepsis  - PE: Peripheral pulse +1 in LLE  - will hold off antibiotics  - Podiatry Following  -L SFA Stenosis Need for Angiogram planned Monday    #Troponemia likely 2/2 CKD Stage 5 -   - trops: 0.33  - f/u EKG  - as per cardio, trend CE x2    #Normocytic anemia likely component of ACD and iron deficiency anemia - Stable and no overt bleed  - Pt on Procrit shots qweekly  - Baseline hb 7-8 - at baseline  - f/u iron studies, b12, folate  - Transfuse if <7  - active T&S  - Hb 7.5 1 PRBC today as patient has Cardiac hx    #T2Dm  - monitor FS  - If FS >180 - start insulin    #HTN/CAD s/p stents  - c/w home meds - norvasc, asa, lipitor, BB  -Repeat Echo    #BPH  - c/w flomax    #Acitivity: OOBC  #Diet: renal Diet  #DVT/GI PPX: HSQ and Protonix  #Dispo: Home 56 yo M with PMHx of pituitary tumor s/p removal, acromegaly, CAD s/p 3 stents (), DLD, DMII, neuropathy, CKD 5 p/w 3 day hx of shortness of breath on exertion and was sent by urgent care. In the ED, vitals were wnl and Pt was saturating 100% on rA. pt was found to have an KATHARINA w/ AGMA w/ pH of 7.1 and HCO3 of 18. CXR in ED did not show any congestion. Trops was elevated at 0.33 and BNP 7464. Nephro was consulted and recommended HCO3 drip and no emergent need for dialysis. Cardio fellow recommended trending CE as pt has no acute chest pain. Pt denied using any pain killers, any recent use of antibiotics. Pt has been having adequate PO intake    #Dyspnea on exertion and AGMA likely 2/2 KATHARINA on CKD stage 5 likely pre-renal - Hemodynamically stable and Pt voiding freely  - vitals wnl and pt saturating on RA. Lung ausculation shows occasional crackles on admission  - CXR: Cardiomegaly w/o evidence of congestion  - VB.14/28/32/7 AG 26  - Baseline Cr 7.3 and this admission 9.1  - BNP: 7464  - c/w IV Lasix 40 Q12 for now  - strict i&O and daily weights  - monitor PO4 and iPTH daily PTH 1519  - f/u Urine:Protein Cr ratio  - f/u FeUREA  -  Renal sono no hydro  - L Arm Precautions and Hold Plavix  - OR tomorrow for AVF and Tesio placement followed by HD    #Left heal ulcer w/ clean base likely complicated by underlying diabetic neuropathy and Peripheral Vascular Disease  - No evidence of Sepsis  - PE: Peripheral pulse +1 in LLE  - will hold off antibiotics  - Podiatry Following  -L SFA Stenosis Need for Angiogram planned Monday    #Troponemia likely 2/2 CKD Stage 5 -   - trops: 0.33  - f/u EKG  - as per cardio, trend CE x2    #Normocytic anemia likely component of ACD and iron deficiency anemia - Stable and no overt bleed  - Pt on Procrit shots qweekly  - Baseline hb 7-8 - at baseline  - f/u iron studies, b12, folate  - Transfuse if <7  - active T&S  - Hb 7.5 1 PRBC today as patient has Cardiac hx    #T2Dm  - monitor FS  - If FS >180 - start insulin    #HTN/CAD s/p stents  - c/w home meds - norvasc, asa, lipitor, BB  -Repeat Echo    #BPH  - c/w flomax    #Acitivity: OOBC  #Diet: renal Diet  #DVT/GI PPX: HSQ and Protonix  #Dispo: Home

## 2018-09-21 ENCOUNTER — TRANSCRIPTION ENCOUNTER (OUTPATIENT)
Age: 58
End: 2018-09-21

## 2018-09-21 LAB
CK MB CFR SERPL CALC: 15.3 NG/ML — HIGH (ref 0.6–6.3)
GLUCOSE BLDC GLUCOMTR-MCNC: 126 MG/DL — HIGH (ref 70–99)
GLUCOSE BLDC GLUCOMTR-MCNC: 127 MG/DL — HIGH (ref 70–99)
GLUCOSE BLDC GLUCOMTR-MCNC: 162 MG/DL — HIGH (ref 70–99)
HAV IGG SER QL IA: SIGNIFICANT CHANGE UP
HAV IGM SER-ACNC: SIGNIFICANT CHANGE UP
HAV IGM SER-ACNC: SIGNIFICANT CHANGE UP
HBV CORE AB SER-ACNC: SIGNIFICANT CHANGE UP
HBV CORE IGM SER-ACNC: SIGNIFICANT CHANGE UP
HBV CORE IGM SER-ACNC: SIGNIFICANT CHANGE UP
HBV SURFACE AB SER-ACNC: REACTIVE
HBV SURFACE AG SER-ACNC: SIGNIFICANT CHANGE UP
HBV SURFACE AG SER-ACNC: SIGNIFICANT CHANGE UP
HCV AB S/CO SERPL IA: 0.13 S/CO — SIGNIFICANT CHANGE UP
HCV AB SERPL-IMP: SIGNIFICANT CHANGE UP
TROPONIN T SERPL-MCNC: 0.25 NG/ML — CRITICAL HIGH

## 2018-09-21 PROCEDURE — 99231 SBSQ HOSP IP/OBS SF/LOW 25: CPT

## 2018-09-21 PROCEDURE — 99222 1ST HOSP IP/OBS MODERATE 55: CPT

## 2018-09-21 RX ORDER — ATENOLOL 25 MG/1
50 TABLET ORAL DAILY
Qty: 0 | Refills: 0 | Status: DISCONTINUED | OUTPATIENT
Start: 2018-09-21 | End: 2018-09-26

## 2018-09-21 RX ORDER — CALCIUM ACETATE 667 MG
1334 TABLET ORAL
Qty: 0 | Refills: 0 | Status: DISCONTINUED | OUTPATIENT
Start: 2018-09-21 | End: 2018-09-26

## 2018-09-21 RX ORDER — SODIUM BICARBONATE 1 MEQ/ML
325 SYRINGE (ML) INTRAVENOUS THREE TIMES A DAY
Qty: 0 | Refills: 0 | Status: DISCONTINUED | OUTPATIENT
Start: 2018-09-21 | End: 2018-09-22

## 2018-09-21 RX ORDER — TAMSULOSIN HYDROCHLORIDE 0.4 MG/1
0.4 CAPSULE ORAL AT BEDTIME
Qty: 0 | Refills: 0 | Status: DISCONTINUED | OUTPATIENT
Start: 2018-09-21 | End: 2018-09-26

## 2018-09-21 RX ORDER — HYDROMORPHONE HYDROCHLORIDE 2 MG/ML
1 INJECTION INTRAMUSCULAR; INTRAVENOUS; SUBCUTANEOUS
Qty: 0 | Refills: 0 | Status: DISCONTINUED | OUTPATIENT
Start: 2018-09-21 | End: 2018-09-22

## 2018-09-21 RX ORDER — HYDROMORPHONE HYDROCHLORIDE 2 MG/ML
0.5 INJECTION INTRAMUSCULAR; INTRAVENOUS; SUBCUTANEOUS
Qty: 0 | Refills: 0 | Status: DISCONTINUED | OUTPATIENT
Start: 2018-09-21 | End: 2018-09-22

## 2018-09-21 RX ORDER — ATORVASTATIN CALCIUM 80 MG/1
80 TABLET, FILM COATED ORAL AT BEDTIME
Qty: 0 | Refills: 0 | Status: DISCONTINUED | OUTPATIENT
Start: 2018-09-21 | End: 2018-09-26

## 2018-09-21 RX ORDER — ONDANSETRON 8 MG/1
4 TABLET, FILM COATED ORAL ONCE
Qty: 0 | Refills: 0 | Status: DISCONTINUED | OUTPATIENT
Start: 2018-09-21 | End: 2018-09-22

## 2018-09-21 RX ORDER — HEPARIN SODIUM 5000 [USP'U]/ML
5000 INJECTION INTRAVENOUS; SUBCUTANEOUS EVERY 8 HOURS
Qty: 0 | Refills: 0 | Status: DISCONTINUED | OUTPATIENT
Start: 2018-09-21 | End: 2018-09-26

## 2018-09-21 RX ORDER — ACETAMINOPHEN 500 MG
650 TABLET ORAL EVERY 6 HOURS
Qty: 0 | Refills: 0 | Status: DISCONTINUED | OUTPATIENT
Start: 2018-09-21 | End: 2018-09-26

## 2018-09-21 RX ORDER — AMLODIPINE BESYLATE 2.5 MG/1
10 TABLET ORAL DAILY
Qty: 0 | Refills: 0 | Status: DISCONTINUED | OUTPATIENT
Start: 2018-09-21 | End: 2018-09-26

## 2018-09-21 RX ORDER — FUROSEMIDE 40 MG
40 TABLET ORAL
Qty: 0 | Refills: 0 | Status: DISCONTINUED | OUTPATIENT
Start: 2018-09-21 | End: 2018-09-22

## 2018-09-21 RX ORDER — CALCITRIOL 0.5 UG/1
0.25 CAPSULE ORAL
Qty: 0 | Refills: 0 | Status: DISCONTINUED | OUTPATIENT
Start: 2018-09-21 | End: 2018-09-22

## 2018-09-21 RX ORDER — SODIUM BICARBONATE 1 MEQ/ML
325 SYRINGE (ML) INTRAVENOUS THREE TIMES A DAY
Qty: 0 | Refills: 0 | Status: DISCONTINUED | OUTPATIENT
Start: 2018-09-21 | End: 2018-09-21

## 2018-09-21 RX ORDER — OXYCODONE AND ACETAMINOPHEN 5; 325 MG/1; MG/1
1 TABLET ORAL EVERY 6 HOURS
Qty: 0 | Refills: 0 | Status: DISCONTINUED | OUTPATIENT
Start: 2018-09-21 | End: 2018-09-25

## 2018-09-21 RX ADMIN — OXYCODONE AND ACETAMINOPHEN 1 TABLET(S): 5; 325 TABLET ORAL at 09:30

## 2018-09-21 RX ADMIN — Medication 1334 MILLIGRAM(S): at 08:32

## 2018-09-21 RX ADMIN — HEPARIN SODIUM 5000 UNIT(S): 5000 INJECTION INTRAVENOUS; SUBCUTANEOUS at 21:50

## 2018-09-21 RX ADMIN — AMLODIPINE BESYLATE 10 MILLIGRAM(S): 2.5 TABLET ORAL at 05:54

## 2018-09-21 RX ADMIN — Medication 40 MILLIGRAM(S): at 21:50

## 2018-09-21 RX ADMIN — HEPARIN SODIUM 5000 UNIT(S): 5000 INJECTION INTRAVENOUS; SUBCUTANEOUS at 05:53

## 2018-09-21 RX ADMIN — ATORVASTATIN CALCIUM 80 MILLIGRAM(S): 80 TABLET, FILM COATED ORAL at 21:51

## 2018-09-21 RX ADMIN — CALCITRIOL 0.25 MICROGRAM(S): 0.5 CAPSULE ORAL at 05:54

## 2018-09-21 RX ADMIN — ATENOLOL 50 MILLIGRAM(S): 25 TABLET ORAL at 08:32

## 2018-09-21 RX ADMIN — Medication 1 APPLICATION(S): at 13:32

## 2018-09-21 RX ADMIN — OXYCODONE AND ACETAMINOPHEN 1 TABLET(S): 5; 325 TABLET ORAL at 08:32

## 2018-09-21 RX ADMIN — Medication 325 MILLIGRAM(S): at 21:51

## 2018-09-21 RX ADMIN — Medication 40 MILLIGRAM(S): at 05:53

## 2018-09-21 NOTE — DIETITIAN INITIAL EVALUATION ADULT. - ENERGY NEEDS
Estimated energy needs: 2670-3120kcal/d (30-35kcal/kg IBW)- pt to be started on HD, pressure ulcers noted  Estimated protein needs: 107-125gm/d (1.2-1.4gm/kg IBW) - for HD + pressure ulcers    Estimated fluid needs: 1000ml as per MD

## 2018-09-21 NOTE — PROGRESS NOTE ADULT - ASSESSMENT
58 yo M with PMHx of pituitary tumor s/p removal, acromegaly, CAD s/p 3 stents (), DLD, DMII, neuropathy, CKD 5 p/w 3 day hx of shortness of breath on exertion and was sent by urgent care. In the ED, vitals were wnl and Pt was saturating 100% on rA. pt was found to have an KATHARINA w/ AGMA w/ pH of 7.1 and HCO3 of 18. CXR in ED did not show any congestion. Trops was elevated at 0.33 and BNP 7464. Nephro was consulted and recommended HCO3 drip and no emergent need for dialysis. Cardio fellow recommended trending CE as pt has no acute chest pain. Pt denied using any pain killers, any recent use of antibiotics. Pt has been having adequate PO intake    #Dyspnea on exertion and AGMA likely 2/2 KATHARINA on CKD stage 5 likely pre-renal - Hemodynamically stable and Pt voiding freely  - vitals wnl and pt saturating on RA. Lung ausculation shows occasional crackles on admission  - CXR: Cardiomegaly w/o evidence of congestion  - VB.14/28/32/7 AG 26  - Baseline Cr 7.3 and this admission 9.1  - BNP: 7464  - c/w IV Lasix 40 Q12   - strict i&O and daily weights  - monitor PO4 and iPTH daily PTH 1519  - f/u Urine:Protein Cr ratio  - f/u FeUREA  -  Renal sono no hydro  - L Arm Precautions and Hold Plavix  - OR today for AVF and Tesio placement followed by HD    #Left heal ulcer w/ clean base likely complicated by underlying diabetic neuropathy and Peripheral Vascular Disease  - No evidence of Sepsis  -B/L LE pulses Dopplerable  - will hold off antibiotics  - Podiatry Following  -L SFA Stenosis Need for Angiogram planned Monday    #Troponemia likely 2/2 CKD Stage 5 -   - trops: 0.33, 0.25  - f/u EKG  - as per cardio, trend CE x2    #Normocytic anemia likely component of ACD and iron deficiency anemia - Stable and no overt bleed  - Pt on Procrit shots weekly  - Baseline hb 7-8 - at baseline  - f/u iron studies, b12, folate  - Transfuse if <7  - active T&S  - Hb 7.5 1 PRBC yesterday as patient has Cardiac hx    #T2Dm  - monitor FS  - If FS >180 - start insulin    #HTN/CAD s/p stents  - c/w home meds - norvasc, asa, lipitor, BB  -Repeat Echo    #BPH  - c/w flomax    #Acitivity: OOBC  #Diet: renal Diet  #DVT/GI PPX: HSQ and Protonix  #Dispo: Home

## 2018-09-21 NOTE — DIETITIAN INITIAL EVALUATION ADULT. - OTHER INFO
Pt admitted for shortness of breath on exertion. As per MD - dyspnea on exertion and AGMA likely 2/2 KATHARINA on CKD stage 5. Plan for OR today for AVF and Tesio placement followed by HD. Reason for assessment: pressure ulcer stage II.

## 2018-09-21 NOTE — DIETITIAN INITIAL EVALUATION ADULT. - ORAL INTAKE PTA
pt reports good appetite at home, on "low sugar". PO supplements: MVI, Iron, Calcitrol- not on regular basis. NKFA. NO Latter day/ cultural preferences./good

## 2018-09-21 NOTE — BRIEF OPERATIVE NOTE - PROCEDURE
<<-----Click on this checkbox to enter Procedure Tunneled venous catheter placement  09/21/2018    Active  [id number]  Dialysis fistula creation  09/21/2018    Active  [id number]

## 2018-09-21 NOTE — DIETITIAN INITIAL EVALUATION ADULT. - PHYSICAL APPEARANCE
well nourished/alert, oriented. BMI- 33.1. IBW-196lbs. Regular BMs (last 2 days ago, no complains). NO chewing/swallowing difficulties reported. Skin: RLE venous ulcer; stage II pressure ulcer- lt heel and rt 1st toe; unstageable pressure ulcer- rt 2nd toe

## 2018-09-21 NOTE — BRIEF OPERATIVE NOTE - OPERATION/FINDINGS
RUE AVF creation. Attempted LEFT IJ Tunneled Dialysis Catheter placement. Successful RIGHT IJ Tunneled Dialysis Catheter placement.

## 2018-09-21 NOTE — PROGRESS NOTE ADULT - SUBJECTIVE AND OBJECTIVE BOX
Patient is a 57y old Male who presents with a chief complaint of shortness of breath (20 Sep 2018 10:58)    Currently admitted to medicine with the primary diagnosis of Acute on chronic renal failure now ESRD and LLE SFA Stenosis PVD     Today is hospital day 2d. This morning he is resting comfortably in bed and reports no new issues or overnight events. He denies sob, cp, n/v, headache, dizziness, palpitations      PAST MEDICAL & SURGICAL HISTORY  Dyslipidemia  DM (diabetes mellitus), type 2  Neuropathy  HTN (hypertension)  CAD (coronary atherosclerotic disease)  Acromegaly  CKD (chronic kidney disease), stage V  H/O eye surgery  H/O: pituitary tumor: s/p removal     ALLERGIES:  bacitracin (Unknown)  Neosporin (Hypotension)    MEDICATIONS:  STANDING MEDICATIONS  amLODIPine   Tablet 10 milliGRAM(s) Oral daily  aspirin 325 milliGRAM(s) Oral daily  ATENolol  Tablet 50 milliGRAM(s) Oral daily  atorvastatin 80 milliGRAM(s) Oral at bedtime  calcitriol   Capsule 0.25 MICROGram(s) Oral <User Schedule>  calcium acetate 1334 milliGRAM(s) Oral three times a day with meals  furosemide   Injectable 40 milliGRAM(s) IV Push two times a day  heparin  Injectable 5000 Unit(s) SubCutaneous every 8 hours  influenza   Vaccine 0.5 milliLiter(s) IntraMuscular once  silver sulfADIAZINE 1% Cream 1 Application(s) Topical daily  tamsulosin 0.4 milliGRAM(s) Oral at bedtime    PRN MEDICATIONS  acetaminophen   Tablet .. 650 milliGRAM(s) Oral every 6 hours PRN  oxyCODONE    5 mG/acetaminophen 325 mG 1 Tablet(s) Oral every 6 hours PRN    VITALS:   T(F): 96.6  HR: 57  BP: 158/71  RR: 18  SpO2: 96%    LABS:                        7.5    9.71  )-----------( 224      ( 20 Sep 2018 08:41 )             23.0     09-20    141  |  104  |  115<HH>  ----------------------------<  100<H>  4.8   |  11<L>  |  9.4<HH>    Ca    8.0<L>      20 Sep 2018 08:41  Phos  8.7     09-20  Mg     1.7     09-20      PT/INR - ( 20 Sep 2018 08:41 )   PT: 12.90 sec;   INR: 1.20 ratio       PTT - ( 20 Sep 2018 08:41 )  PTT:33.0 sec    Troponin T, Serum: 0.25 ng/mL <HH> (09-20-18 @ 21:44)    CARDIAC MARKERS ( 20 Sep 2018 21:44 )  x     / 0.25 ng/mL / x     / x     / 15.3 ng/mL  CARDIAC MARKERS ( 19 Sep 2018 12:01 )  x     / 0.27 ng/mL / 209 U/L / x     / 13.9 ng/mL      RADIOLOGY:  < from: US Kidney and Bladder (09.19.18 @ 09:35) >  IMPRESSION:  No hydronephrosis.  Right renal cysts.  Post-void residual 383 cc.   < end of copied text >    < from: [facility] Duplex Low Ext Arterial, Ltd, Left (09.19.18 @ 08:05) >  Left:   common femoral artery: Patent, with very mild spectral broadening and   triphasic waveform   superficial femoral artery: Significantly diminished amplitude   proximally, with loss of triphasic waveform and spectral broadening.   Apparent occlusion in the mid SFA. Broad and distal reconstitution.   popliteal artery: Flow present, with diseased waveform.   anterior tibial artery: Patent   posterior tibial artery: Patent   peroneal artery: Not visualized     Impression:    On the left: Apparent severe disease at the femoral bifurcation, with   occlusion of the superficial femoral artery distal to this.   Reconstitution of flow through the popliteal and tibial vessels.    Please consider vascular surgical consultation if felt to be appropriate   in the current clinical context.  < end of copied text >    PHYSICAL EXAM:  GEN: No acute distress  LUNGS: Clear to auscultation bilaterally   HEART: S1/S2 present. RRR.   ABD: Soft, non-tender, non-distended. Bowel sounds present multiple B/L ulcers of the distal LE   EXT: NC/NC/NE/2+PP/[place]/Skin Intact.   NEURO: AAOX3, no focal defecits    Intravenous access:   NG tube:   Heard cathter:

## 2018-09-21 NOTE — CHART NOTE - NSCHARTNOTEFT_GEN_A_CORE
Surgery post-op Note 09-21-18 @ 22:59    Procedure: RUE AVF creation. Attempted LEFT IJ Tunneled Dialysis Catheter placement. Successful RIGHT IJ Tunneled Dialysis Catheter placement    S: 56 y/o Male s/p RUE AVF creation. Attempted LEFT IJ Tunneled Dialysis Catheter placement. Successful RIGHT IJ Tunneled Dialysis Catheter placement. Patient is laying comfortably in the bed. Denies any pain at the incision site, fever, chills, chest pain, shortness of breath.    O:   T(C): 36.3 (09-21-18 @ 21:00), Max: 36.3 (09-21-18 @ 19:33)  HR: 76 (09-21-18 @ 21:00) (51 - 76)  BP: 137/68 (09-21-18 @ 21:00) (120/72 - 158/71)  RR: 14 (09-21-18 @ 21:00) (12 - 22)  SpO2: 96% (09-21-18 @ 21:00) (92% - 99%)      General: AAOx 3. NAD  Heart: S1 and S2 noted  Lungs: Clear to auscultation bilaterally  Abdomen: Soft Non tender, Non distended. Bowel sounds present  Extremities:  Piulses:  Wound: No bleeding or discharge noted from the incision sites in the right side of the chest and RUE    09-20-18 @ 07:01  -  09-21-18 @ 07:00  --------------------------------------------------------  IN: 100 mL / OUT: 0 mL / NET: 100 mL    09-21-18 @ 07:01  -  09-21-18 @ 22:59  --------------------------------------------------------  IN: 275 mL / OUT: 300 mL / NET: -25 mL    acetaminophen   Tablet .. 650 milliGRAM(s) Oral every 6 hours PRN  amLODIPine   Tablet 10 milliGRAM(s) Oral daily  ATENolol  Tablet 50 milliGRAM(s) Oral daily  atorvastatin 80 milliGRAM(s) Oral at bedtime  calcitriol   Capsule 0.25 MICROGram(s) Oral <User Schedule>  calcium acetate 1334 milliGRAM(s) Oral three times a day with meals  furosemide   Injectable 40 milliGRAM(s) IV Push two times a day  heparin  Injectable 5000 Unit(s) SubCutaneous every 8 hours  HYDROmorphone  Injectable 0.5 milliGRAM(s) IV Push every 10 minutes PRN  HYDROmorphone  Injectable 1 milliGRAM(s) IV Push every 10 minutes PRN  influenza   Vaccine 0.5 milliLiter(s) IntraMuscular once  ondansetron Injectable 4 milliGRAM(s) IV Push once PRN  oxyCODONE    5 mG/acetaminophen 325 mG 1 Tablet(s) Oral every 6 hours PRN  silver sulfADIAZINE 1% Cream 1 Application(s) Topical daily  sodium bicarbonate 325 milliGRAM(s) Oral three times a day  tamsulosin 0.4 milliGRAM(s) Oral at bedtime    Assessment:  56 y/o Male s/p RUE AVF creation. Attempted LEFT IJ Tunneled Dialysis Catheter placement. Successful RIGHT IJ Tunneled Dialysis Catheter placement.     Plan:  Pain control  Heparin sq  Encourage ambulation  Incentive spirometry Surgery post-op Note 09-21-18 @ 22:59    Procedure: RUE AVF creation. Attempted LEFT IJ Tunneled Dialysis Catheter placement. Successful RIGHT IJ Tunneled Dialysis Catheter placement    S: 58 y/o Male s/p RUE AVF creation. Attempted LEFT IJ Tunneled Dialysis Catheter placement. Successful RIGHT IJ Tunneled Dialysis Catheter placement. Patient is laying comfortably in the bed. Denies any pain at the incision site, fever, chills, chest pain, shortness of breath.    O:   T(C): 36.3 (09-21-18 @ 21:00), Max: 36.3 (09-21-18 @ 19:33)  HR: 76 (09-21-18 @ 21:00) (51 - 76)  BP: 137/68 (09-21-18 @ 21:00) (120/72 - 158/71)  RR: 14 (09-21-18 @ 21:00) (12 - 22)  SpO2: 96% (09-21-18 @ 21:00) (92% - 99%)      General: AAOx 3. NAD  Heart: S1 and S2 noted  Lungs: Clear to auscultation bilaterally  Abdomen: Soft Non tender, Non distended. Bowel sounds present  Extremities: RUE palpable thrill over the fistula site, + radial pulse  Wound: No bleeding or discharge noted from the incision sites in the right side of the chest and RUE    09-20-18 @ 07:01  -  09-21-18 @ 07:00  --------------------------------------------------------  IN: 100 mL / OUT: 0 mL / NET: 100 mL    09-21-18 @ 07:01  -  09-21-18 @ 22:59  --------------------------------------------------------  IN: 275 mL / OUT: 300 mL / NET: -25 mL    acetaminophen   Tablet .. 650 milliGRAM(s) Oral every 6 hours PRN  amLODIPine   Tablet 10 milliGRAM(s) Oral daily  ATENolol  Tablet 50 milliGRAM(s) Oral daily  atorvastatin 80 milliGRAM(s) Oral at bedtime  calcitriol   Capsule 0.25 MICROGram(s) Oral <User Schedule>  calcium acetate 1334 milliGRAM(s) Oral three times a day with meals  furosemide   Injectable 40 milliGRAM(s) IV Push two times a day  heparin  Injectable 5000 Unit(s) SubCutaneous every 8 hours  HYDROmorphone  Injectable 0.5 milliGRAM(s) IV Push every 10 minutes PRN  HYDROmorphone  Injectable 1 milliGRAM(s) IV Push every 10 minutes PRN  influenza   Vaccine 0.5 milliLiter(s) IntraMuscular once  ondansetron Injectable 4 milliGRAM(s) IV Push once PRN  oxyCODONE    5 mG/acetaminophen 325 mG 1 Tablet(s) Oral every 6 hours PRN  silver sulfADIAZINE 1% Cream 1 Application(s) Topical daily  sodium bicarbonate 325 milliGRAM(s) Oral three times a day  tamsulosin 0.4 milliGRAM(s) Oral at bedtime    Assessment:  58 y/o Male s/p RUE AVF creation. Attempted LEFT IJ Tunneled Dialysis Catheter placement. Successful RIGHT IJ Tunneled Dialysis Catheter placement on 9/21    Plan:  f/u chest xray read on TDC position  Pain control  Heparin sq  Encourage ambulation  Incentive spirometry

## 2018-09-21 NOTE — PROGRESS NOTE ADULT - SUBJECTIVE AND OBJECTIVE BOX
PROGRESS NOTE   Patient is a 57y old  Male who presents with a chief complaint of shortness of breath (21 Sep 2018 12:38)      HPI:  56 yo M with PMHx of pituitary tumor s/p removal, acromegaly, CAD s/p 3 stents (2015), DLD, DMII, neuropathy, CKD 5 p/w 3 day hx of shortness of breath on exertion and was sent by urgent care. Pt states he has compliant w/ his medications and 3 days ago he developed progressively worsening shortness of breath on exertion. he was unable to walk a block without having to catch a breath and then went to urgent care. He had a CXR done which apparently showed congestion and thus sent him to the ED. Pt denies syncope, chest pain, dizziness, confusion, cough, fever, and chills.    In the ED, vitals were wnl and Pt was saturating 100% on rA. pt was found to have an KATHARINA w/ AGMA w/ pH of 7.1 and HCO3 of 18. CXR in ED did not show any congestion. Trops was elevated at 0.33 and BNP 7464. Nephro was consulted and recommended HCO3 drip and no emergent need for dialysis. Cardio fellow recommended trending CE as pt has no acute chest pain. Pt denied using any pain killers, any recent use of antibiotics. Pt has been having adequate PO intake (19 Sep 2018 02:40)    podiatry consulted about LE wounds b/l    Vital Signs Last 24 Hrs  T(C): 35.9 (21 Sep 2018 05:45), Max: 36.1 (20 Sep 2018 13:39)  T(F): 96.6 (21 Sep 2018 05:45), Max: 96.9 (20 Sep 2018 13:39)  HR: 57 (21 Sep 2018 05:45) (54 - 57)  BP: 158/71 (21 Sep 2018 05:45) (131/79 - 158/71)  BP(mean): --  RR: 18 (21 Sep 2018 05:45) (18 - 18)  SpO2: 96% (20 Sep 2018 22:25) (96% - 96%)                          7.5    9.71  )-----------( 224      ( 20 Sep 2018 08:41 )             23.0               09-20    141  |  104  |  115<HH>  ----------------------------<  100<H>  4.8   |  11<L>  |  9.4<HH>    Ca    8.0<L>      20 Sep 2018 08:41  Phos  8.7     09-20  Mg     1.7     09-20        Patient's wounds are stable and without signs of infection.   Continue local wound care.   Patient to f/u with podiatrist as OP. Please recall PRN.

## 2018-09-21 NOTE — PROGRESS NOTE ADULT - SUBJECTIVE AND OBJECTIVE BOX
Progress Note: General Surgery  Patient: SU SAWYER , 57y (1960)Male   MRN: [id number]  Location: [place] [address] A  Visit: 18 Inpatient  Date: 18 @ 05:50  Hospital Day: 3    Procedure/Diagnosis: Needs tesio and AVF  Events over 24h: Seen and examined bedside. No acute overnight events. OR today, patient preop'ed.    Vitals: T(F): 96.6 (18 @ 05:45), Max: 96.9 (18 @ 13:39)  HR: 57 (18 @ 05:45)  BP: 158/71 (18 @ 05:45) (131/79 - 158/71)  RR: 18 (18 @ 05:45)  SpO2: 96% (18 @ 22:25)    In:   18 @ 07:01  -  18 @ 07:00  --------------------------------------------------------  IN: 1200 mL    18 @ 07:01  -  18 @ 05:50  --------------------------------------------------------  IN: 100 mL      Out:   18 @ 07:01  -  18 @ 07:00  --------------------------------------------------------  OUT:  Total OUT: 0 mL      18 @ 07:01  -  18 @ 05:50  --------------------------------------------------------  OUT:  Total OUT: 0 mL        Net:   18 @ 07:01  -  18 @ 07:00  --------------------------------------------------------  NET: 1200 mL    18 @ 07:01  -  18 @ 05:50  --------------------------------------------------------  NET: 100 mL      Diet: Diet, Clear Liquid (18 @ 23:18)    IV Fluids: yes no , Type: calcitriol   Capsule 0.25 MICROGram(s) Oral <User Schedule>  calcium acetate 1334 milliGRAM(s) Oral three times a day with meals    PHYSICAL EXAM:  General: NAD, AAOx3, calm and cooperative  Cardiac: RRR S1, S2, no Murmurs, rubs or gallops  Ext: Wrapped in ace bandages. Non palpable pulses.    Medications: [Standing]  amLODIPine   Tablet 10 milliGRAM(s) Oral daily  aspirin 325 milliGRAM(s) Oral daily  ATENolol  Tablet 50 milliGRAM(s) Oral daily  atorvastatin 80 milliGRAM(s) Oral at bedtime  calcitriol   Capsule 0.25 MICROGram(s) Oral <User Schedule>  calcium acetate 1334 milliGRAM(s) Oral three times a day with meals  furosemide   Injectable 40 milliGRAM(s) IV Push two times a day  heparin  Injectable 5000 Unit(s) SubCutaneous every 8 hours  influenza   Vaccine 0.5 milliLiter(s) IntraMuscular once  silver sulfADIAZINE 1% Cream 1 Application(s) Topical daily  tamsulosin 0.4 milliGRAM(s) Oral at bedtime    DVT Prophylaxis: heparin  Injectable 5000 Unit(s) SubCutaneous every 8 hours  GI Prophylaxis:   Antibiotics:   Anticoagulation:   Medications:[PRN]  acetaminophen   Tablet .. 650 milliGRAM(s) Oral every 6 hours PRN  oxyCODONE    5 mG/acetaminophen 325 mG 1 Tablet(s) Oral every 6 hours PRN    Labs:                        7.5    9.71  )-----------( 224      ( 20 Sep 2018 08:41 )             23.0     09-20    141  |  104  |  115<HH>  ----------------------------<  100<H>  4.8   |  11<L>  |  9.4<HH>    Ca    8.0<L>      20 Sep 2018 08:41  Phos  8.7     -  Mg     1.7             PT/INR - ( 20 Sep 2018 08:41 )   PT: 12.90 sec;   INR: 1.20 ratio    PTT - ( 20 Sep 2018 08:41 )  PTT:33.0 sec    CARDIAC MARKERS ( 20 Sep 2018 21:44 )  x     / 0.25 ng/mL / x     / x     / 15.3 ng/mL  CARDIAC MARKERS ( 19 Sep 2018 12:01 )  x     / 0.27 ng/mL / 209 U/L / x     / 13.9 ng/mL      Urine/Micro:    Urinalysis Basic - ( 19 Sep 2018 06:40 )    Color: Yellow / Appearance: Clear / S.020 / pH: x  Gluc: x / Ketone: Negative  / Bili: Negative / Urobili: 0.2 mg/dL   Blood: x / Protein: >=300 mg/dL / Nitrite: Negative   Leuk Esterase: Negative / RBC: 2-5 /HPF / WBC 3-5 /HPF   Sq Epi: x / Non Sq Epi: Occasional /HPF / Bacteria: x      Assessment:  57y Male patient c/s for tesio and AVF    Plan:  -NPO from 7am  -IVF  -Needs medicine clearance  -OR today    Date/Time: 18 @ 05:50

## 2018-09-21 NOTE — PROGRESS NOTE ADULT - SUBJECTIVE AND OBJECTIVE BOX
SU SAWYER  57y Male    CHIEF COMPLAINT:    Patient is a 57y old  Male who presents with a chief complaint of shortness of breath (21 Sep 2018 11:46)      INTERVAL HPI/OVERNIGHT EVENTS:    Patient seen and examined. C/O GUTIERREZ. Scheduled for AVF and Tesio catheter today. No cp. No fever. No cough    ROS: All other systems are negative.    Vital Signs:    T(F): 96.6 (18 @ 05:45), Max: 96.9 (18 @ 13:39)  HR: 57 (18 @ 05:45) (54 - 57)  BP: 158/71 (18 @ 05:45) (131/79 - 158/71)  RR: 18 (18 @ 05:45) (18 - 18)  SpO2: 96% (18 @ 22:25) (96% - 96%)  I&O's Summary    20 Sep 2018 07:01  -  21 Sep 2018 07:00  --------------------------------------------------------  IN: 100 mL / OUT: 0 mL / NET: 100 mL      Daily Height in cm: 190.5 (20 Sep 2018 19:36)    Daily Weight in k.2 (21 Sep 2018 05:45)  CAPILLARY BLOOD GLUCOSE  162 (21 Sep 2018 11:58)  126 (21 Sep 2018 08:26)      POCT Blood Glucose.: 162 mg/dL (21 Sep 2018 11:56)  POCT Blood Glucose.: 126 mg/dL (21 Sep 2018 08:04)  POCT Blood Glucose.: 102 mg/dL (20 Sep 2018 20:58)  POCT Blood Glucose.: 84 mg/dL (20 Sep 2018 15:53)      PHYSICAL EXAM:    GENERAL:  NAD  SKIN: No rashes or lesions  HENT: Atrumatic. Normocephalic. PERRL. Moist membranes.  NECK: Supple, No JVD. No lymphadenopathy.  PULMONARY: BS are decreased in the bases B/L. No wheezing. No rales  CVS: Normal S1, S2. Rate and Rythm are regular. No murmurs.  ABDOMEN/GI: Soft, Nontender, Nondistended; BS present  EXTREMITIES: Peripheral pulses intact. 1+ pitting edema B/L LE. Ulcers on lower legs B/L.  NEUROLOGIC:  No motor or sensory deficit.  PSYCH: Alert & oriented x 3    Consultant(s) Notes Reviewed:  [x ] YES  [ ] NO  Care Discussed with Consultants/Other Providers [ x] YES  [ ] NO    EKG reviewed  Telemetry reviewed    LABS:                        7.5    9.71  )-----------( 224      ( 20 Sep 2018 08:41 )             23.0   Hemoglobin: 7.5 g/dL ( @ 08:41)  Hemoglobin: 7.2 g/dL ( @ 04:30)  Hemoglobin: 7.2 g/dL ( @ 21:32)        141  |  104  |  115<HH>  ----------------------------<  100<H>  4.8   |  11<L>  |  9.4<HH>    Ca    8.0<L>      20 Sep 2018 08:41  Phos  8.7       Mg     1.7           PT/INR - ( 20 Sep 2018 08:41 )   PT: 12.90 sec;   INR: 1.20 ratio         PTT - ( 20 Sep 2018 08:41 )  PTT:33.0 sec  Serum Pro-Brain Natriuretic Peptide: 7464 pg/mL (18 @ 21:32)    Trop 0.25, CKMB 15.3, CK --, 18 @ 21:44  Trop 0.27, CKMB 13.9, , 18 @ 12:01  Trop 0.31, CKMB 13.1, , 18 @ 04:30  Trop --, CKMB 12.9, CK --, 18 @ 02:03  Trop 0.33, CKMB --, CK --, 18 @ 21:32        RADIOLOGY & ADDITIONAL TESTS:      Imaging or report Personally Reviewed:  [ ] YES  [ ] NO    Medications:  Standing  amLODIPine   Tablet 10 milliGRAM(s) Oral daily  aspirin 325 milliGRAM(s) Oral daily  ATENolol  Tablet 50 milliGRAM(s) Oral daily  atorvastatin 80 milliGRAM(s) Oral at bedtime  calcitriol   Capsule 0.25 MICROGram(s) Oral <User Schedule>  calcium acetate 1334 milliGRAM(s) Oral three times a day with meals  furosemide   Injectable 40 milliGRAM(s) IV Push two times a day  heparin  Injectable 5000 Unit(s) SubCutaneous every 8 hours  influenza   Vaccine 0.5 milliLiter(s) IntraMuscular once  silver sulfADIAZINE 1% Cream 1 Application(s) Topical daily  tamsulosin 0.4 milliGRAM(s) Oral at bedtime    PRN Meds  acetaminophen   Tablet .. 650 milliGRAM(s) Oral every 6 hours PRN  oxyCODONE    5 mG/acetaminophen 325 mG 1 Tablet(s) Oral every 6 hours PRN      Case discussed with resident    Care discussed with pt/family

## 2018-09-21 NOTE — PROGRESS NOTE ADULT - ASSESSMENT
ESRD, sob, schrader most likely due to volume overload of ESRD  prior CAD, normal ECG and Normal echo  pre op for av fistula placement   moderate risk patient and surgery, limited FC walks with walker, at moderate risk, euvolemic, stable to have the procedure done  if risk of bleeding is a concern, stop plavix for 4 days and then proceed with the procedure  start ASA 81 mg daily   i had a lengthy talk with patient  and his wife  he needs out patient f/u with cardiology

## 2018-09-21 NOTE — PROGRESS NOTE ADULT - SUBJECTIVE AND OBJECTIVE BOX
Nephrology progress note    Patient is seen and examined, events over the last 24 h noted.  No new complaints.  Scheduled for Tesio Cath placement today.    Allergies:  bacitracin (Unknown)  Neosporin (Hypotension)    Hospital Medications:   MEDICATIONS  (STANDING):  amLODIPine   Tablet 10 milliGRAM(s) Oral daily  aspirin 325 milliGRAM(s) Oral daily  ATENolol  Tablet 50 milliGRAM(s) Oral daily  atorvastatin 80 milliGRAM(s) Oral at bedtime  calcitriol   Capsule 0.25 MICROGram(s) Oral <User Schedule>  calcium acetate 1334 milliGRAM(s) Oral three times a day with meals  furosemide   Injectable 40 milliGRAM(s) IV Push two times a day  heparin  Injectable 5000 Unit(s) SubCutaneous every 8 hours  influenza   Vaccine 0.5 milliLiter(s) IntraMuscular once  silver sulfADIAZINE 1% Cream 1 Application(s) Topical daily  tamsulosin 0.4 milliGRAM(s) Oral at bedtime        VITALS:  T(F): 96.6 (18 @ 05:45), Max: 96.9 (18 @ 13:39)  HR: 57 (18 @ 05:45)  BP: 158/71 (18 @ 05:45)  RR: 18 (18 @ 05:45)  SpO2: 96% (18 @ 22:25)       @ 07:01  -   @ 07:00  --------------------------------------------------------  IN: 1200 mL / OUT: 0 mL / NET: 1200 mL     @ 07:01  -   @ 07:00  --------------------------------------------------------  IN: 100 mL / OUT: 0 mL / NET: 100 mL      Height (cm): 190.5 ( 19:36)  Weight (kg): 120.2 ( 19:36)  BMI (kg/m2): 33.1 ( @ 19:36)  BSA (m2): 2.47 ( @ 19:36)    PHYSICAL EXAM:  Constitutional: NAD  HEENT: anicteric sclera, oropharynx clear, MMM  Neck: No JVD  Respiratory: CTAB, no wheezes, rales or rhonchi  Cardiovascular: S1, S2, RRR  Gastrointestinal: BS+, soft, NT/ND  Extremities: No cyanosis or clubbing. No peripheral edema  :  No ortiz.   Skin: No rashes    LABS:      141  |  104  |  115<HH>  ----------------------------<  100<H>  4.8   |  11<L>  |  9.4<HH>    Ca    8.0<L>      20 Sep 2018 08:41  Phos  8.7       Mg     1.7                              7.5    9.71  )-----------( 224      ( 20 Sep 2018 08:41 )             23.0         Urine Studies:  Urinalysis Basic - ( 19 Sep 2018 06:40 )    Color: Yellow / Appearance: Clear / S.020 / pH:   Gluc:  / Ketone: Negative  / Bili: Negative / Urobili: 0.2 mg/dL   Blood:  / Protein: >=300 mg/dL / Nitrite: Negative   Leuk Esterase: Negative / RBC: 2-5 /HPF / WBC 3-5 /HPF   Sq Epi:  / Non Sq Epi: Occasional /HPF / Bacteria:       Protein/Creatinine Ratio Calculation: 2.6 Ratio ( @ 01:25)  Creatinine, Random Urine: 51 mg/dL ( @ 01:25)    RADIOLOGY & ADDITIONAL STUDIES:

## 2018-09-21 NOTE — PROGRESS NOTE ADULT - SUBJECTIVE AND OBJECTIVE BOX
I was called by dr Gomes to see him for risk assessment prior to putting A-V fistula, he is stable, no new complaint other than GUTIERREZ, advanced ESRD, requires hemodialysis  ECG: bradycardia, otherwise normal ECG  echo: normal EF 60%  remote> 10 years hx of cardiac stent, years ago cardiac cath by dr jimenez showed a patent stent    MEDICATIONS  (STANDING):  amLODIPine   Tablet 10 milliGRAM(s) Oral daily  aspirin 325 milliGRAM(s) Oral daily  ATENolol  Tablet 50 milliGRAM(s) Oral daily  atorvastatin 80 milliGRAM(s) Oral at bedtime  calcitriol   Capsule 0.25 MICROGram(s) Oral <User Schedule>  calcium acetate 1334 milliGRAM(s) Oral three times a day with meals  furosemide   Injectable 40 milliGRAM(s) IV Push two times a day  heparin  Injectable 5000 Unit(s) SubCutaneous every 8 hours  influenza   Vaccine 0.5 milliLiter(s) IntraMuscular once  silver sulfADIAZINE 1% Cream 1 Application(s) Topical daily  sodium bicarbonate 325 milliGRAM(s) Oral three times a day  tamsulosin 0.4 milliGRAM(s) Oral at bedtime    MEDICATIONS  (PRN):  acetaminophen   Tablet .. 650 milliGRAM(s) Oral every 6 hours PRN Mild Pain (1 - 3)  oxyCODONE    5 mG/acetaminophen 325 mG 1 Tablet(s) Oral every 6 hours PRN Moderate Pain (4 - 6)            Vital Signs Last 24 Hrs  T(C): 35.9 (21 Sep 2018 05:45), Max: 36.1 (20 Sep 2018 13:39)  T(F): 96.6 (21 Sep 2018 05:45), Max: 96.9 (20 Sep 2018 13:39)  HR: 57 (21 Sep 2018 05:45) (54 - 57)  BP: 158/71 (21 Sep 2018 05:45) (131/79 - 158/71)  BP(mean): --  RR: 18 (21 Sep 2018 05:45) (18 - 18)  SpO2: 96% (20 Sep 2018 22:25) (96% - 96%)             REVIEW OF SYSTEMS:  CONSTITUTIONAL: no fever, no chills, no diaphoresis  CARDIOLOGY: no chest pain, SOB, no palpitation, no diaphoresis, no faint   RESPIRATORY: dyspnea, no wheeze, no orthopnea, no PND   NEUROLOGICAL: no dizziness, no headache  GI: no abdominal pain, no dyspepsia, no nausea, no vomiting, no diarrhea.    HEENT: no congestion, no nasal bleeding  SKIN: no ecchymosis, no petechia              PHYSICAL EXAM:  · CONSTITUTIONAL: Looks stable, in no respiratory distress  . NECK: Supple, no JVD, no bruit   · RESPIRATORY: Normal air entry to lung base, no wheeze, no crackle, no wet rales  · CARDIOVASCULAR: Normal S1, A2, P2, no murmur,  · EXTREMITIES: No cyanosis, no clubbing, no edema  · VASCULAR: Pulses are regular, equal, bilateral in upper and lower extremities  	  TELEMETRY: NSR    ECG: < from: 12 Lead ECG (09.19.18 @ 09:14) >  Sinus bradycardia    < end of copied text >      TTE: < from: Transthoracic Echocardiogram (03.09.18 @ 08:28) >  1. Left ventricular ejection fraction, by visual estimation, is 55 to   60%.   2. Technically difficult study.   3. Normal left atrial size.   4. There is a significant pericardial fat pad present.   5. Mild mitral annular calcification.   6. Thickening and calcification of the anterior and posterior mitral   valve leaflets.    < end of copied text >      LABS:                        7.5    9.71  )-----------( 224      ( 20 Sep 2018 08:41 )             23.0     09-20    141  |  104  |  115<HH>  ----------------------------<  100<H>  4.8   |  11<L>  |  9.4<HH>    Ca    8.0<L>      20 Sep 2018 08:41  Phos  8.7     09-20  Mg     1.7     09-20      CARDIAC MARKERS ( 20 Sep 2018 21:44 )  x     / 0.25 ng/mL / x     / x     / 15.3 ng/mL      PT/INR - ( 20 Sep 2018 08:41 )   PT: 12.90 sec;   INR: 1.20 ratio         PTT - ( 20 Sep 2018 08:41 )  PTT:33.0 sec    I&O's Summary    20 Sep 2018 07:01  -  21 Sep 2018 07:00  --------------------------------------------------------  IN: 100 mL / OUT: 0 mL / NET: 100 mL      BNP  RADIOLOGY & ADDITIONAL STUDIES: < from: Xray Chest 1 View-PORTABLE IMMEDIATE (09.20.18 @ 17:59) >  Stable elevation of the right hemidiaphragm.     < end of copied text >      IMPRESSION AND PLAN:

## 2018-09-21 NOTE — DISCHARGE NOTE ADULT - HOSPITAL COURSE
56 yo M with PMHx of pituitary tumor s/p removal, acromegaly, CAD s/p 3 stents (2015), DLD, DMII, neuropathy, CKD 5 p/w 3 day hx of shortness of breath on exertion and was sent by urgent care. In the ED, vitals were wnl and Pt was saturating 100% on rA. pt was found to have an KATHARINA w/ AGMA w/ pH of 7.1 and HCO3 of 18. CXR in ED did not show any congestion. Trops was elevated at 0.33 and BNP 7464. Nephro was consulted and recommended HCO3 drip and no emergent need for dialysis. Cardio fellow recommended trending CE as pt has no acute chest pain. Pt denied using any pain killers, any recent use of antibiotics. Pt has been having adequate PO intake    #Dyspnea on exertion and AGMA likely 2/2 KATHARINA on CKD stage 5 likely pre-renal - Hemodynamically stable and Pt voiding freely  sp tesio and HD on 9/22    #Left heal ulcer w/ clean base likely complicated by underlying diabetic neuropathy and Peripheral Vascular Disease  - No evidence of Sepsis  -B/L LE pulses Dopplerable  - will hold off antibiotics  - Podiatry Following  -L SFA Stenosis Need for Angiogram planned Monday    #Troponemia likely 2/2 CKD Stage 5 -   - trops: 0.33, 0.25, 0.25  - f/u EKG  - as per cardio, trend CE x2  -f/u ECHO   -Cardiologist Dr. Pagan    #Normocytic anemia likely component of ACD and iron deficiency anemia - Stable and no overt bleed  - Pt on Procrit shots weekly  - Baseline hb 7-8 - at baseline  -  iron studies,   -b12, folate WNL  - Transfuse if <7  - active T&S  s/p 1 PRBC as patient has Cardiac hx  -Hb 7.8    #T2Dm  - monitor FS  - If FS >180 - start insulin    #HTN/CAD s/p stents  - c/w home meds - norvasc, asa, lipitor, BB  -Repeat Echo    #BPH  - c/w flomax    #Acitivity: OOBC  #Diet: renal Diet  #DVT/GI PPX: HSQ and Protonix  #Dispo: Home

## 2018-09-21 NOTE — DISCHARGE NOTE ADULT - CARE PROVIDER_API CALL
Velia Esquivel), Internal Medicine; Nephrology  [address] [place] [address]  Phone: [phone]  Fax: [phone]    Compa Oswald), Surgery; Vascular Surgery  [address] [place] [address]  Phone: [phone]  Fax: [phone]

## 2018-09-21 NOTE — DISCHARGE NOTE ADULT - CARE PLAN
Principal Discharge DX:	Acute on chronic renal failure  Goal:	baseline  Assessment and plan of treatment:	baseline

## 2018-09-21 NOTE — PROGRESS NOTE ADULT - ASSESSMENT
Patient is a 57y Male with KATHARINA on CKD 5 (Baseline Cr. 7.0) and metabolic acidosis. presented for SOB, GUTIERREZ, swelling of both ankles.  PMH acromegaly (S/P Pitutary removal), DM with neuropathy, DLD, kidney stones.    # KATHARINA on CKD 5 vs progression of CKD    - most likely progression of CKD    - scheduled for tesio catheter   - HD in AM with 3hr cycle, 2 K bath, UF 2L as tolerated    - S. crt elevated at 9.   - cont. sodium bicarbonate drip    - hold lasix     - IP noted, high on phoslo. check PTH already on calcitriol   - will follow    # HAGMA   - Metabolic acidosis with some possible met alkalosis (though reading from [facility] so can not be completely certain)   - can be due to CKD progression    - cont. Bicarb drip   - check ABGs, serum lactate   - monitor EKG closely    # Hyperphosphatemia   - cont Phoslo   - repeat IP   - check PTH.    # Anemia   - serum iron studies noted.   - may need EDWIN    - evaluate for sites of active bleeding   - Maintain H/H   - Transfuse if Hb < 7.    # BP   - under control.    Will follow Patient is a 57y Male with KATHARINA on CKD 5 (Baseline Cr. 7.0) and metabolic acidosis. presented for SOB, GUTIERREZ, swelling of both ankles.  PMH acromegaly (S/P Pitutary removal), DM with neuropathy, DLD, kidney stones.    # KATHARINA on CKD 5 vs progression of CKD    - most likely progression of CKD    - scheduled for tesio catheter today    - HD in AM with 3hr cycle, 2 K bath, UF 2L as tolerated    - S. crt elevated at 9.   - continue lasix     - IP noted, high on phoslo. check PTH already on calcitriol      # HAGMA   - Metabolic acidosis with some possible met alkalosis (though reading from [facility] so can not be completely certain)   - can be due to CKD progression       # Hyperphosphatemia   - cont Phoslo   - repeat IP   - check PTH.    # Anemia   - serum iron studies noted.   - may need EDWIN    - Maintain H/H   - Transfuse if Hb < 7.    # BP   - under control.    Will follow

## 2018-09-21 NOTE — PROGRESS NOTE ADULT - ASSESSMENT
56 yo M with PMHx of pituitary tumor s/p removal, acromegaly, CAD s/p 3 stents (2015), DLD, DMII, neuropathy, CKD 5 p/w 3 day hx of shortness of breath on exertion and was sent by urgent care.    1.	KATHARINA on CKD 5 vs progression of CKD  2.	 HAGMA  3.	Fluid overload/Ac. HFpEF  4.	CAD  5.	DM-2/DL  6.	Acromegaly  7.	B/L Leg ulcers  8.	Anemia of Ch. disease         PLAN:    ·	Plan of care D/W the renal yesterdsy  ·	D/W the surg yesterday. Tesio catheter and AVF today. Plavix on hold. Can restart tomorrow.  ·	Follow BMP. Will start him on PO NaHCO3  ·	Check I'S and O'S and daily wt.  ·	S/P one unit of PRBC'S yesterday. Follow CBC.  ·	Monitor FS. On no meds for DM-2. Pt states he has been running low sugars  ·	Local wound care of leg and foot ulcers. Pod eval  ·	Vascular eval. Possible angiogram on Mon.

## 2018-09-21 NOTE — DISCHARGE NOTE ADULT - VISION (WITH CORRECTIVE LENSES IF THE PATIENT USUALLY WEARS THEM):
wear glasses to see things far away/Normal vision: sees adequately in most situations; can see medication labels, newsprint

## 2018-09-21 NOTE — DISCHARGE NOTE ADULT - PATIENT PORTAL LINK FT
You can access the [facility] Patient Portal, offered by [facility], by registering with the following website: [email or web address]

## 2018-09-21 NOTE — DIETITIAN INITIAL EVALUATION ADULT. - MD RECOMMEND
other/when appropriate: Renal (high protein, low Na, Phos, K), CHO consistent diet (+ 2 snacks), add Nepro q 24hrs (425kcal, 19 gm protein) and ProSource Gelatin Plus q 24hrs.

## 2018-09-21 NOTE — DISCHARGE NOTE ADULT - MEDICATION SUMMARY - MEDICATIONS TO TAKE
I will START or STAY ON the medications listed below when I get home from the hospital:    aspirin 325 mg oral tablet  -- 1 tab(s) by mouth once a day  -- Indication: For cad    sodium bicarbonate 650 mg oral tablet  -- 1 tab(s) by mouth 2 times a day  -- Indication: For Acute on chronic renal failure    tamsulosin 0.4 mg oral capsule  -- 1 cap(s) by mouth once a day  -- Indication: For Acute on chronic renal failure    glipiZIDE 2.5 mg oral tablet, extended release  -- 1 tab(s) by mouth once a day  -- Indication: For dm    Crestor 20 mg oral tablet  -- 20 milligram(s) by mouth once a day   -- Indication: For dld    clopidogrel 75 mg oral tablet  -- 1 tab(s) by mouth once a day  -- Indication: For cad    atenolol 50 mg oral tablet  -- 1 tab(s) by mouth once a day  -- Indication: For cad    amLODIPine 10 mg oral tablet  -- 1 tab(s) by mouth once a day  -- Indication: For htn    Silvadene 1% topical cream  -- Apply on skin to affected area 3 times a day   -- For external use only.    -- Indication: For Skin avulsion    Lasix 40 mg oral tablet  -- 1 tab(s) by mouth once a day  -- Indication: For Acute on chronic renal failure    Calphron 667 mg oral tablet  -- 1334 milligram(s) by mouth 3 times a day   -- Indication: For Acute on chronic renal failure    calcitriol 0.25 mcg oral capsule  -- 1 cap(s) by mouth every 48 hours  -- Indication: For Acute on chronic renal failure I will START or STAY ON the medications listed below when I get home from the hospital:    aspirin 325 mg oral tablet  -- 1 tab(s) by mouth once a day  -- Indication: For Acute on chronic renal failure    sodium bicarbonate 650 mg oral tablet  -- 1 tab(s) by mouth 2 times a day  -- Indication: For Acute on chronic renal failure    tamsulosin 0.4 mg oral capsule  -- 1 cap(s) by mouth once a day  -- Indication: For Acute on chronic renal failure    glipiZIDE 2.5 mg oral tablet, extended release  -- 1 tab(s) by mouth once a day  -- Indication: For dm    atorvastatin 80 mg oral tablet  -- 1 tab(s) by mouth once a day (at bedtime)  -- Indication: For dld    clopidogrel 75 mg oral tablet  -- 1 tab(s) by mouth once a day  -- Indication: For cad    atenolol 50 mg oral tablet  -- 1 tab(s) by mouth once a day  -- Indication: For cad    amLODIPine 10 mg oral tablet  -- 1 tab(s) by mouth once a day  -- Indication: For htn    silver sulfADIAZINE 1% topical cream  -- 1 application on skin once a day  -- Indication: For wound    Lasix 40 mg oral tablet  -- 1 tab(s) by mouth once a day  -- Indication: For Acute on chronic renal failure    darbepoetin nikolas 40 mcg/mL injectable solution  -- 20 microgram(s) injectable  -- Indication: For esrd    Calphron 667 mg oral tablet  -- 1334 milligram(s) by mouth 3 times a day   -- Indication: For Acute on chronic renal failure    calcitriol 0.25 mcg oral capsule  -- 1 cap(s) by mouth every 48 hours  -- Indication: For Acute on chronic renal failure    doxercalciferol 2 mcg/mL injectable solution  -- 4 microgram(s) injectable  -- Indication: For esrd I will START or STAY ON the medications listed below when I get home from the hospital:    aspirin 325 mg oral tablet  -- 1 tab(s) by mouth once a day  -- Indication: For Acute on chronic renal failure    sodium bicarbonate 650 mg oral tablet  -- 1 tab(s) by mouth 2 times a day  -- Indication: For Acute on chronic renal failure    tamsulosin 0.4 mg oral capsule  -- 1 cap(s) by mouth once a day  -- Indication: For Acute on chronic renal failure    glipiZIDE 2.5 mg oral tablet, extended release  -- 1 tab(s) by mouth once a day  -- Indication: For dm    Crestor 40 mg oral tablet  -- 1 tab(s) by mouth once a day   -- Do not take dairy products, antacids, or iron preparations within one hour of this medication.  Do not take this drug if you are pregnant.  It is very important that you take or use this exactly as directed.  Do not skip doses or discontinue unless directed by your doctor.    -- Indication: For dld    clopidogrel 75 mg oral tablet  -- 1 tab(s) by mouth once a day  -- Indication: For cad    atenolol 50 mg oral tablet  -- 1 tab(s) by mouth once a day  -- Indication: For cad    amLODIPine 10 mg oral tablet  -- 1 tab(s) by mouth once a day  -- Indication: For htn    silver sulfADIAZINE 1% topical cream  -- 1 application on skin once a day  -- Indication: For wound    Lasix 40 mg oral tablet  -- 1 tab(s) by mouth once a day  -- Indication: For Acute on chronic renal failure    darbepoetin nikolas 40 mcg/mL injectable solution  -- 20 microgram(s) injectable  -- Indication: For esrd    Calphron 667 mg oral tablet  -- 1334 milligram(s) by mouth 3 times a day   -- Indication: For Acute on chronic renal failure    doxercalciferol 2 mcg/mL injectable solution  -- 4 microgram(s) injectable  -- Indication: For esrd

## 2018-09-21 NOTE — CHART NOTE - NSCHARTNOTEFT_GEN_A_CORE
PACU ANESTHESIA ADMISSION NOTE      Procedure:   Post op diagnosis:  ESRD needing dialysis      ____  Intubated  TV:______       Rate: ______      FiO2: ______    __x__  Patent Airway    __x__  Full return of protective reflexes    __x__  Full recovery from anesthesia / back to baseline status      Vitals:   BP   135/64         HR    76       RR    16         O2 sat    96       Temp 97.4F      Mental Status:  __x__ Awake   _____ Alert   _____ Drowsy   _____ Sedated    Nausea/Vomiting:  __x__ No    ____ Yes, See Post - Op Orders        Pain Scale (0-10):  __0___    Treatment: ____ None    ____ See Post - Op/PCA Orders    Post - Operative Fluids:   __x__ Oral   ____ See Post - Op Orders    Plan: Discharge:   ____Home       ___x__Floor     _____Critical Care    _____  Other:_________________    Comments: No anesthesia complications noted.  Discharge once criteria met.

## 2018-09-21 NOTE — PRE-ANESTHESIA EVALUATION ADULT - NSANTHOSAYNRD_GEN_A_CORE
No. JABRAI screening performed.  STOP BANG Legend: 0-2 = LOW Risk; 3-4 = INTERMEDIATE Risk; 5-8 = HIGH Risk
No. JABARI screening performed.  STOP BANG Legend: 0-2 = LOW Risk; 3-4 = INTERMEDIATE Risk; 5-8 = HIGH Risk

## 2018-09-21 NOTE — DISCHARGE NOTE ADULT - BECAUSE OF A PHYSICAL, MENTAL OR EMOTIONAL CONDITION, DO YOU HAVE DIFFICULTY DOING  ERRANDS ALONE LIKE VISITING A DOCTOR'S OFFICE OR SHOPPING (15 YEARS AND OLDER)
7/10/18 0745: Trach culture Gram Pos Cocci in Pairs, WBC 1+, discussed with Pulm fellow, does not require abx treatment at this time. Has f/u with Pulm scheduled. BRADLEY Michaels.
No

## 2018-09-21 NOTE — DISCHARGE NOTE ADULT - CARE PROVIDERS DIRECT ADDRESSES
[email or web address]

## 2018-09-21 NOTE — DIETITIAN INITIAL EVALUATION ADULT. - DIET TYPE
clear fluid/clear liquids for breakfast today, to be NPO after breakfast for OR today. Previously on CHO consistent, renal w/ good PO intake 9per pt report)

## 2018-09-21 NOTE — DISCHARGE NOTE ADULT - MEDICATION SUMMARY - MEDICATIONS TO STOP TAKING
I will STOP taking the medications listed below when I get home from the hospital:  None I will STOP taking the medications listed below when I get home from the hospital:    Crestor 20 mg oral tablet  -- 20 milligram(s) by mouth once a day I will STOP taking the medications listed below when I get home from the hospital:    calcitriol 0.25 mcg oral capsule  -- 1 cap(s) by mouth every 48 hours    Crestor 20 mg oral tablet  -- 20 milligram(s) by mouth once a day

## 2018-09-22 LAB
ALBUMIN SERPL ELPH-MCNC: 3.5 G/DL — SIGNIFICANT CHANGE UP (ref 3.5–5.2)
ALP SERPL-CCNC: 128 U/L — HIGH (ref 30–115)
ALT FLD-CCNC: 14 U/L — SIGNIFICANT CHANGE UP (ref 0–41)
ANION GAP SERPL CALC-SCNC: 22 MMOL/L — HIGH (ref 7–14)
AST SERPL-CCNC: 19 U/L — SIGNIFICANT CHANGE UP (ref 0–41)
BASOPHILS # BLD AUTO: 0.04 K/UL — SIGNIFICANT CHANGE UP (ref 0–0.2)
BASOPHILS NFR BLD AUTO: 0.4 % — SIGNIFICANT CHANGE UP (ref 0–1)
BILIRUB SERPL-MCNC: 0.2 MG/DL — SIGNIFICANT CHANGE UP (ref 0.2–1.2)
BUN SERPL-MCNC: 109 MG/DL — CRITICAL HIGH (ref 10–20)
CALCIUM SERPL-MCNC: 8.1 MG/DL — LOW (ref 8.5–10.1)
CHLORIDE SERPL-SCNC: 106 MMOL/L — SIGNIFICANT CHANGE UP (ref 98–110)
CK MB CFR SERPL CALC: 19.3 NG/ML — HIGH (ref 0.6–6.3)
CO2 SERPL-SCNC: 13 MMOL/L — LOW (ref 17–32)
CREAT SERPL-MCNC: 9.8 MG/DL — CRITICAL HIGH (ref 0.7–1.5)
EOSINOPHIL # BLD AUTO: 0.08 K/UL — SIGNIFICANT CHANGE UP (ref 0–0.7)
EOSINOPHIL NFR BLD AUTO: 0.8 % — SIGNIFICANT CHANGE UP (ref 0–8)
GLUCOSE BLDC GLUCOMTR-MCNC: 136 MG/DL — HIGH (ref 70–99)
GLUCOSE BLDC GLUCOMTR-MCNC: 140 MG/DL — HIGH (ref 70–99)
GLUCOSE BLDC GLUCOMTR-MCNC: 174 MG/DL — HIGH (ref 70–99)
GLUCOSE BLDC GLUCOMTR-MCNC: 200 MG/DL — HIGH (ref 70–99)
GLUCOSE SERPL-MCNC: 141 MG/DL — HIGH (ref 70–99)
HCT VFR BLD CALC: 23.9 % — LOW (ref 42–52)
HGB BLD-MCNC: 7.8 G/DL — LOW (ref 14–18)
IMM GRANULOCYTES NFR BLD AUTO: 0.5 % — HIGH (ref 0.1–0.3)
LYMPHOCYTES # BLD AUTO: 0.69 K/UL — LOW (ref 1.2–3.4)
LYMPHOCYTES # BLD AUTO: 7.1 % — LOW (ref 20.5–51.1)
MAGNESIUM SERPL-MCNC: 1.9 MG/DL — SIGNIFICANT CHANGE UP (ref 1.8–2.4)
MCHC RBC-ENTMCNC: 29.5 PG — SIGNIFICANT CHANGE UP (ref 27–31)
MCHC RBC-ENTMCNC: 32.6 G/DL — SIGNIFICANT CHANGE UP (ref 32–37)
MCV RBC AUTO: 90.5 FL — SIGNIFICANT CHANGE UP (ref 80–94)
MONOCYTES # BLD AUTO: 1.03 K/UL — HIGH (ref 0.1–0.6)
MONOCYTES NFR BLD AUTO: 10.6 % — HIGH (ref 1.7–9.3)
NEUTROPHILS # BLD AUTO: 7.81 K/UL — HIGH (ref 1.4–6.5)
NEUTROPHILS NFR BLD AUTO: 80.6 % — HIGH (ref 42.2–75.2)
NRBC # BLD: 0 /100 WBCS — SIGNIFICANT CHANGE UP (ref 0–0)
PHOSPHATE SERPL-MCNC: 8.9 MG/DL — HIGH (ref 2.1–4.9)
PLATELET # BLD AUTO: 204 K/UL — SIGNIFICANT CHANGE UP (ref 130–400)
POTASSIUM SERPL-MCNC: 5.1 MMOL/L — HIGH (ref 3.5–5)
POTASSIUM SERPL-SCNC: 5.1 MMOL/L — HIGH (ref 3.5–5)
PROT SERPL-MCNC: 6.2 G/DL — SIGNIFICANT CHANGE UP (ref 6–8)
RBC # BLD: 2.64 M/UL — LOW (ref 4.7–6.1)
RBC # FLD: 16 % — HIGH (ref 11.5–14.5)
SODIUM SERPL-SCNC: 141 MMOL/L — SIGNIFICANT CHANGE UP (ref 135–146)
TROPONIN T SERPL-MCNC: 0.25 NG/ML — CRITICAL HIGH
WBC # BLD: 9.7 K/UL — SIGNIFICANT CHANGE UP (ref 4.8–10.8)
WBC # FLD AUTO: 9.7 K/UL — SIGNIFICANT CHANGE UP (ref 4.8–10.8)

## 2018-09-22 RX ORDER — DARBEPOETIN ALFA IN POLYSORBAT 200MCG/0.4
20 PEN INJECTOR (ML) SUBCUTANEOUS
Qty: 0 | Refills: 0 | Status: DISCONTINUED | OUTPATIENT
Start: 2018-09-22 | End: 2018-09-24

## 2018-09-22 RX ORDER — DOXERCALCIFEROL 2.5 UG/1
4 CAPSULE ORAL
Qty: 0 | Refills: 0 | Status: DISCONTINUED | OUTPATIENT
Start: 2018-09-22 | End: 2018-09-24

## 2018-09-22 RX ORDER — CLOPIDOGREL BISULFATE 75 MG/1
75 TABLET, FILM COATED ORAL DAILY
Qty: 0 | Refills: 0 | Status: DISCONTINUED | OUTPATIENT
Start: 2018-09-23 | End: 2018-09-26

## 2018-09-22 RX ADMIN — DOXERCALCIFEROL 4 MICROGRAM(S): 2.5 CAPSULE ORAL at 16:23

## 2018-09-22 RX ADMIN — Medication 40 MILLIGRAM(S): at 05:16

## 2018-09-22 RX ADMIN — OXYCODONE AND ACETAMINOPHEN 1 TABLET(S): 5; 325 TABLET ORAL at 04:09

## 2018-09-22 RX ADMIN — Medication 1334 MILLIGRAM(S): at 13:47

## 2018-09-22 RX ADMIN — Medication 325 MILLIGRAM(S): at 05:16

## 2018-09-22 RX ADMIN — OXYCODONE AND ACETAMINOPHEN 1 TABLET(S): 5; 325 TABLET ORAL at 12:20

## 2018-09-22 RX ADMIN — Medication 20 MICROGRAM(S): at 16:23

## 2018-09-22 RX ADMIN — HEPARIN SODIUM 5000 UNIT(S): 5000 INJECTION INTRAVENOUS; SUBCUTANEOUS at 21:12

## 2018-09-22 RX ADMIN — ATENOLOL 50 MILLIGRAM(S): 25 TABLET ORAL at 05:16

## 2018-09-22 RX ADMIN — HEPARIN SODIUM 5000 UNIT(S): 5000 INJECTION INTRAVENOUS; SUBCUTANEOUS at 05:16

## 2018-09-22 RX ADMIN — OXYCODONE AND ACETAMINOPHEN 1 TABLET(S): 5; 325 TABLET ORAL at 11:12

## 2018-09-22 RX ADMIN — Medication 1 APPLICATION(S): at 13:48

## 2018-09-22 RX ADMIN — ATORVASTATIN CALCIUM 80 MILLIGRAM(S): 80 TABLET, FILM COATED ORAL at 21:13

## 2018-09-22 RX ADMIN — HEPARIN SODIUM 5000 UNIT(S): 5000 INJECTION INTRAVENOUS; SUBCUTANEOUS at 13:49

## 2018-09-22 RX ADMIN — TAMSULOSIN HYDROCHLORIDE 0.4 MILLIGRAM(S): 0.4 CAPSULE ORAL at 21:13

## 2018-09-22 RX ADMIN — AMLODIPINE BESYLATE 10 MILLIGRAM(S): 2.5 TABLET ORAL at 05:16

## 2018-09-22 RX ADMIN — CALCITRIOL 0.25 MICROGRAM(S): 0.5 CAPSULE ORAL at 05:16

## 2018-09-22 RX ADMIN — Medication 1334 MILLIGRAM(S): at 08:18

## 2018-09-22 NOTE — PROGRESS NOTE ADULT - ASSESSMENT
58 yo M with PMHx of pituitary tumor s/p removal, acromegaly, CAD s/p 3 stents (), DLD, DMII, neuropathy, CKD 5 p/w 3 day hx of shortness of breath on exertion and was sent by urgent care. In the ED, vitals were wnl and Pt was saturating 100% on rA. pt was found to have an KATHARINA w/ AGMA w/ pH of 7.1 and HCO3 of 18. CXR in ED did not show any congestion. Trops was elevated at 0.33 and BNP 7464. Nephro was consulted and recommended HCO3 drip and no emergent need for dialysis. Cardio fellow recommended trending CE as pt has no acute chest pain. Pt denied using any pain killers, any recent use of antibiotics. Pt has been having adequate PO intake    #Dyspnea on exertion and AGMA likely 2/2 KATHARINA on CKD stage 5 likely pre-renal - Hemodynamically stable and Pt voiding freely  - vitals wnl and pt saturating on RA. Lung ausculation shows occasional crackles on admission  - CXR: Cardiomegaly w/o evidence of congestion  - VB.14/28/32/7 AG 26  - Baseline Cr 7.3 and this admission 9.8  - BNP: 7464  - c/w IV Lasix 40 Q12   - strict i&O and daily weights  - monitor PO4 and iPTH daily PTH 1519  - f/u Urine:Protein Cr ratio  - f/u FeUREA  -  Renal sono no hydro  - L Arm Precautions as new L AVF placed  - Can restart plavix 18 per vascular  -HD today      #Left heal ulcer w/ clean base likely complicated by underlying diabetic neuropathy and Peripheral Vascular Disease  - No evidence of Sepsis  -B/L LE pulses Dopplerable  - will hold off antibiotics  - Podiatry Following  -L SFA Stenosis Need for Angiogram planned Monday    #Troponemia likely 2/2 CKD Stage 5 -   - trops: 0.33, 0.25, 0.25  - f/u EKG  - as per cardio, trend CE x2  -f/u ECHO   -Cardiologist Dr. Pagan    #Normocytic anemia likely component of ACD and iron deficiency anemia - Stable and no overt bleed  - Pt on Procrit shots weekly  - Baseline hb 7-8 - at baseline  -  iron studies,   -b12, folate WNL  - Transfuse if <7  - active T&S  s/p 1 PRBC as patient has Cardiac hx  -Hb 7.8    #T2Dm  - monitor FS  - If FS >180 - start insulin    #HTN/CAD s/p stents  - c/w home meds - norvasc, asa, lipitor, BB  -Repeat Echo    #BPH  - c/w flomax    #Acitivity: OOBC  #Diet: renal Diet  #DVT/GI PPX: HSQ and Protonix  #Dispo: Home

## 2018-09-22 NOTE — PROGRESS NOTE ADULT - SUBJECTIVE AND OBJECTIVE BOX
SU SAWYER  57y  Male      Patient is a 57y old  Male who presents with a chief complaint of shortness of breath (22 Sep 2018 12:54)      INTERVAL HPI/OVERNIGHT EVENTS:      ******************************* REVIEW OF SYSTEMS:**********************************************      All other review of systems negative    *********************** VITALS ******************************************    T(F): 97 (09-22-18 @ 13:43)  HR: 58 (09-22-18 @ 13:43) (51 - 76)  BP: 124/58 (09-22-18 @ 13:43) (115/61 - 144/75)  RR: 18 (09-22-18 @ 13:43) (12 - 22)  SpO2: 96% (09-21-18 @ 21:00) (92% - 99%)    09-21-18 @ 07:01  -  09-22-18 @ 07:00  --------------------------------------------------------  IN: 375 mL / OUT: 300 mL / NET: 75 mL    09-22-18 @ 07:01  -  09-22-18 @ 15:00  --------------------------------------------------------  IN: 1670 mL / OUT: 690 mL / NET: 980 mL            09-21-18 @ 07:01  -  09-22-18 @ 07:00  --------------------------------------------------------  IN: 375 mL / OUT: 300 mL / NET: 75 mL    09-22-18 @ 07:01  -  09-22-18 @ 15:00  --------------------------------------------------------  IN: 1670 mL / OUT: 690 mL / NET: 980 mL        ******************************** PHYSICAL EXAM:**************************************************  GENERAL: NAD    PSYCH: no agitation, baseline mentation  HEENT:     NERVOUS SYSTEM:  Alert & Oriented X3,   PULMONARY: HOWIE, b/b fine crackles    CARDIOVASCULAR: S1S2 RRR    GI: Soft, NT, ND; BS present.    EXTREMITIES:  2+ Peripheral Pulses, No clubbing, cyanosis, or edema, LUE newly placed AVF    LYMPH: No lymphadenopathy noted    SKIN: left heel ulcer    ******************************************************************************************    Consultant(s) Notes Reviewed:  [x ] YES  [ ] NO    Discussed with Consultants/Other Providers [ x] YES     **************************** LABS *******************************************************                          7.8    9.70  )-----------( 204      ( 22 Sep 2018 08:40 )             23.9     09-22    141  |  106  |  109<HH>  ----------------------------<  141<H>  5.1<H>   |  13<L>  |  9.8<HH>    Ca    8.1<L>      22 Sep 2018 08:40  Phos  8.9     09-22  Mg     1.9     09-22    TPro  6.2  /  Alb  3.5  /  TBili  0.2  /  DBili  x   /  AST  19  /  ALT  14  /  AlkPhos  128<H>  09-22          Lactate Trend    CARDIAC MARKERS ( 22 Sep 2018 08:40 )  x     / 0.25 ng/mL / x     / x     / 19.3 ng/mL  CARDIAC MARKERS ( 20 Sep 2018 21:44 )  x     / 0.25 ng/mL / x     / x     / 15.3 ng/mL      CAPILLARY BLOOD GLUCOSE  200 (22 Sep 2018 11:53)      POCT Blood Glucose.: 200 mg/dL (22 Sep 2018 11:51)          **************************Active Medications *******************************************  bacitracin (Unknown)  Neosporin (Hypotension)      acetaminophen   Tablet .. 650 milliGRAM(s) Oral every 6 hours PRN  amLODIPine   Tablet 10 milliGRAM(s) Oral daily  ATENolol  Tablet 50 milliGRAM(s) Oral daily  atorvastatin 80 milliGRAM(s) Oral at bedtime  calcium acetate 1334 milliGRAM(s) Oral three times a day with meals  darbepoetin Injectable Syringe 20 MICROGram(s) IV Push <User Schedule>  doxercalciferol Injectable 4 MICROGram(s) IV Push <User Schedule>  heparin  Injectable 5000 Unit(s) SubCutaneous every 8 hours  influenza   Vaccine 0.5 milliLiter(s) IntraMuscular once  oxyCODONE    5 mG/acetaminophen 325 mG 1 Tablet(s) Oral every 6 hours PRN  silver sulfADIAZINE 1% Cream 1 Application(s) Topical daily  tamsulosin 0.4 milliGRAM(s) Oral at bedtime      ***************************************************  RADIOLOGY & ADDITIONAL TESTS:    Imaging Personally Reviewed:  [ ] YES  [ ] NO    HEALTH ISSUES - PROBLEM Dx:

## 2018-09-22 NOTE — PROGRESS NOTE ADULT - ASSESSMENT
Patient is a 57y Male with KATHARINA on CKD 5 (Baseline Cr. 7.0) and metabolic acidosis. presented for SOB, GUTIERREZ, swelling of both ankles.  PMH acromegaly (S/P Pitutary removal), DM with neuropathy, DLD, kidney stones.    # KATHARINA on CKD 5 vs progression of CKD    - most likely progression of CKD    - scheduled for tesio catheter today    - HD in AM with 3hr cycle, 2 K bath, UF 2L as tolerated    - S. crt elevated at 9.   - continue lasix     - IP noted, high on phoslo. check PTH already on calcitriol      # HAGMA   - Metabolic acidosis with some possible met alkalosis (though reading from [facility] so can not be completely certain)   - can be due to CKD progression       # Hyperphosphatemia   - cont Phoslo   - repeat IP   - check PTH.    # Anemia   - serum iron studies noted.   - may need EDWIN    - Maintain H/H   - Transfuse if Hb < 7.    # BP   - under control.    Will follow Patient is a 57y Male with KATHARINA on CKD 5 (Baseline Cr. 7.0) and metabolic acidosis. presented for SOB, GUTIERREZ, swelling of both ankles.  PMH acromegaly (S/P Pitutary removal), DM with neuropathy, DLD, kidney stones.    # KATHARINA on CKD 5 vs progression of CKD    - most likely progression of CKD    - sp tesio and AVF    - HD today with 2hr cycle, 2 K bath, UF 1L as tolerated    - S. crt elevated at 9.8   -hold lasix    - IP noted, high on phoslo. check PTH already on calcitriol, will switch to hectorol       # HAGMA   -for HD today DC bicarbonate po       # Hyperphosphatemia   - cont Phoslo   - repeat IP   - check PTH.    # Anemia   - serum iron studies noted.   - will  need EDWIN    - Maintain H/H   - Transfuse if Hb < 7.    # BP   - under control.    Will follow

## 2018-09-22 NOTE — PROGRESS NOTE ADULT - SUBJECTIVE AND OBJECTIVE BOX
SU SAWYER  57y Male   [id number]    Procedure: RUE AVF creation. Attempted LEFT IJ Tunneled Dialysis Catheter placement. Successful RIGHT IJ Tunneled Dialysis Catheter placement    S: 58 y/o Male s/p RUE AVF creation. Attempted LEFT IJ Tunneled Dialysis Catheter placement. Successful RIGHT IJ Tunneled Dialysis Catheter placement. Patient is laying comfortably in the bed. Denies any pain at the incision site, fever, chills, chest pain, shortness of breath.    Patient is a 57y old  Male who presents with a chief complaint of shortness of breath (21 Sep 2018 14:33)    PAST MEDICAL & SURGICAL HISTORY:  Dyslipidemia  DM (diabetes mellitus), type 2  Neuropathy  HTN (hypertension)  CAD (coronary atherosclerotic disease)  Acromegaly  CKD (chronic kidney disease), stage V  H/O eye surgery  H/O: pituitary tumor: s/p removal    Vital Signs Last 24 Hrs  T(C): 36.3 (21 Sep 2018 21:00), Max: 36.3 (21 Sep 2018 19:33)  T(F): 97.4 (21 Sep 2018 21:00), Max: 97.4 (21 Sep 2018 19:33)  HR: 67 (22 Sep 2018 00:02) (51 - 76)  BP: 115/61 (22 Sep 2018 00:02) (115/61 - 158/71)  BP(mean): --  RR: 18 (22 Sep 2018 00:02) (12 - 22)  SpO2: 96% (21 Sep 2018 21:00) (92% - 99%)    Diet, Consistent Carbohydrate Renal/No Snacks:   1000mL Fluid Restriction ([id number]) (09-21-18 @ 20:41)    I&O's Detail    20 Sep 2018 07:01  -  21 Sep 2018 07:00  --------------------------------------------------------  IN:    Oral Fluid: 100 mL  Total IN: 100 mL    OUT:  Total OUT: 0 mL    Total NET: 100 mL      21 Sep 2018 07:01  -  22 Sep 2018 00:36  --------------------------------------------------------  IN:    Oral Fluid: 275 mL  Total IN: 275 mL    OUT:    Voided: 300 mL  Total OUT: 300 mL    Total NET: -25 mL    MEDICATIONS  (STANDING):  amLODIPine   Tablet 10 milliGRAM(s) Oral daily  ATENolol  Tablet 50 milliGRAM(s) Oral daily  atorvastatin 80 milliGRAM(s) Oral at bedtime  calcitriol   Capsule 0.25 MICROGram(s) Oral <User Schedule>  calcium acetate 1334 milliGRAM(s) Oral three times a day with meals  furosemide   Injectable 40 milliGRAM(s) IV Push two times a day  heparin  Injectable 5000 Unit(s) SubCutaneous every 8 hours  influenza   Vaccine 0.5 milliLiter(s) IntraMuscular once  silver sulfADIAZINE 1% Cream 1 Application(s) Topical daily  sodium bicarbonate 325 milliGRAM(s) Oral three times a day  tamsulosin 0.4 milliGRAM(s) Oral at bedtime    MEDICATIONS  (PRN):  acetaminophen   Tablet .. 650 milliGRAM(s) Oral every 6 hours PRN Mild Pain (1 - 3)  HYDROmorphone  Injectable 0.5 milliGRAM(s) IV Push every 10 minutes PRN Moderate Pain (4 - 6)  HYDROmorphone  Injectable 1 milliGRAM(s) IV Push every 10 minutes PRN Severe Pain (7 - 10)  ondansetron Injectable 4 milliGRAM(s) IV Push once PRN Nausea and/or Vomiting  oxyCODONE    5 mG/acetaminophen 325 mG 1 Tablet(s) Oral every 6 hours PRN Moderate Pain (4 - 6)      General: AAOx 3. NAD  Heart: S1 and S2 noted  Lungs: Clear to auscultation bilaterally  Abdomen: Soft Non tender, Non distended. Bowel sounds present  Extremities: RUE palpable thrill over the fistula site, + radial pulse  Wound: No bleeding or discharge noted from the incision sites in the right side of the chest and RUE       labs:                   7.5    9.71  )-----------( 224      ( 20 Sep 2018 08:41 )             23.0        09-20    141  |  104  |  115<HH>  ----------------------------<  100<H>  4.8   |  11<L>  |  9.4<HH>    Ca    8.0<L>      20 Sep 2018 08:41  Phos  8.7     09-20  Mg     1.7     09-20    PT/INR - ( 20 Sep 2018 08:41 )   PT: 12.90 sec;   INR: 1.20 ratio    PTT - ( 20 Sep 2018 08:41 )  PTT:33.0 sec  CARDIAC MARKERS ( 20 Sep 2018 21:44 )  x     / 0.25 ng/mL / x     / x     / 15.3 ng/mL

## 2018-09-22 NOTE — PROGRESS NOTE ADULT - SUBJECTIVE AND OBJECTIVE BOX
Nephrology progress note    Patient is seen and examined, events over the last 24 h noted .    Allergies:  bacitracin (Unknown)  Neosporin (Hypotension)    Hospital Medications:   MEDICATIONS  (STANDING):  amLODIPine   Tablet 10 milliGRAM(s) Oral daily  ATENolol  Tablet 50 milliGRAM(s) Oral daily  atorvastatin 80 milliGRAM(s) Oral at bedtime  calcitriol   Capsule 0.25 MICROGram(s) Oral <User Schedule>  calcium acetate 1334 milliGRAM(s) Oral three times a day with meals  furosemide   Injectable 40 milliGRAM(s) IV Push two times a day  heparin  Injectable 5000 Unit(s) SubCutaneous every 8 hours  influenza   Vaccine 0.5 milliLiter(s) IntraMuscular once  silver sulfADIAZINE 1% Cream 1 Application(s) Topical daily  sodium bicarbonate 325 milliGRAM(s) Oral three times a day  tamsulosin 0.4 milliGRAM(s) Oral at bedtime        VITALS:  T(F): 96 (18 @ 06:29), Max: 97.4 (18 @ 19:33)  HR: 55 (18 @ 06:29)  BP: 117/65 (18 @ 06:29)  RR: 18 (18 @ 06:29)  SpO2: 96% (18 @ 21:00)  Wt(kg): --     @ 07:01  -   @ 07:00  --------------------------------------------------------  IN: 100 mL / OUT: 0 mL / NET: 100 mL     @ 07:01  -   @ 07:00  --------------------------------------------------------  IN: 375 mL / OUT: 300 mL / NET: 75 mL      Height (cm): 190.5 ( @ 15:46)  Weight (kg): 120 ( @ 15:46)  BMI (kg/m2): 33.1 ( @ 15:46)  BSA (m2): 2.47 ( @ 15:46)    PHYSICAL EXAM:  Constitutional: NAD  HEENT: anicteric sclera, oropharynx clear, MMM  Neck: No JVD  Respiratory: CTAB, no wheezes, rales or rhonchi  Cardiovascular: S1, S2, RRR  Gastrointestinal: BS+, soft, NT/ND  Extremities: No cyanosis or clubbing. No peripheral edema  :  No ortiz.   Skin: No rashes    LABS:      141  |  106  |  109<HH>  ----------------------------<  141<H>  5.1<H>   |  13<L>  |  9.8<HH>    Ca    8.1<L>      22 Sep 2018 08:40  Phos  8.9       Mg     1.9         TPro  6.2  /  Alb  3.5  /  TBili  0.2  /  DBili      /  AST  19  /  ALT  14  /  AlkPhos  128<H>                            7.8    9.70  )-----------( 204      ( 22 Sep 2018 08:40 )             23.9       Urine Studies:  Urinalysis Basic - ( 19 Sep 2018 06:40 )    Color: Yellow / Appearance: Clear / S.020 / pH:   Gluc:  / Ketone: Negative  / Bili: Negative / Urobili: 0.2 mg/dL   Blood:  / Protein: >=300 mg/dL / Nitrite: Negative   Leuk Esterase: Negative / RBC: 2-5 /HPF / WBC 3-5 /HPF   Sq Epi:  / Non Sq Epi: Occasional /HPF / Bacteria:       Protein/Creatinine Ratio Calculation: 2.6 Ratio ( @ 01:25)  Creatinine, Random Urine: 51 mg/dL ( @ 01:25)    RADIOLOGY & ADDITIONAL STUDIES: Nephrology progress note    Patient is seen and examined, events over the last 24 h noted .  sp AVF and tesio creation   feels weak     Allergies:  bacitracin (Unknown)  Neosporin (Hypotension)    Hospital Medications:   MEDICATIONS  (STANDING):  amLODIPine   Tablet 10 milliGRAM(s) Oral daily  ATENolol  Tablet 50 milliGRAM(s) Oral daily  atorvastatin 80 milliGRAM(s) Oral at bedtime  calcitriol   Capsule 0.25 MICROGram(s) Oral <User Schedule>  calcium acetate 1334 milliGRAM(s) Oral three times a day with meals  furosemide   Injectable 40 milliGRAM(s) IV Push two times a day  heparin  Injectable 5000 Unit(s) SubCutaneous every 8 hours  influenza   Vaccine 0.5 milliLiter(s) IntraMuscular once  silver sulfADIAZINE 1% Cream 1 Application(s) Topical daily  sodium bicarbonate 325 milliGRAM(s) Oral three times a day  tamsulosin 0.4 milliGRAM(s) Oral at bedtime        VITALS:  T(F): 96 (18 @ 06:29), Max: 97.4 (18 @ 19:33)  HR: 55 (18 @ 06:29)  BP: 117/65 (18 @ 06:29)  RR: 18 (18 @ 06:29)  SpO2: 96% (18 @ 21:00)       @ 07:01  -   @ 07:00  --------------------------------------------------------  IN: 100 mL / OUT: 0 mL / NET: 100 mL     @ 07:01  -   @ 07:00  --------------------------------------------------------  IN: 375 mL / OUT: 300 mL / NET: 75 mL      Height (cm): 190.5 ( @ 15:46)  Weight (kg): 120 ( 15:46)  BMI (kg/m2): 33.1 ( @ 15:46)  BSA (m2): 2.47 ( @ 15:46)    PHYSICAL EXAM:  Constitutional: NAD  HEENT: anicteric sclera, oropharynx clear, MMM  Neck: No JVD  Respiratory: CTAB, no wheezes, rales or rhonchi  Cardiovascular: S1, S2, RRR  Gastrointestinal: BS+, soft, NT/ND  Extremities: No cyanosis or clubbing. No peripheral edema  :  No ortiz.   Skin: No rashes    LABS:      141  |  106  |  109<HH>  ----------------------------<  141<H>  5.1<H>   |  13<L>  |  9.8<HH>    Ca    8.1<L>      22 Sep 2018 08:40  Phos  8.9       Mg     1.9         TPro  6.2  /  Alb  3.5  /  TBili  0.2  /  DBili      /  AST  19  /  ALT  14  /  AlkPhos  128<H>                            7.8    9.70  )-----------( 204      ( 22 Sep 2018 08:40 )             23.9       Urine Studies:  Urinalysis Basic - ( 19 Sep 2018 06:40 )    Color: Yellow / Appearance: Clear / S.020 / pH:   Gluc:  / Ketone: Negative  / Bili: Negative / Urobili: 0.2 mg/dL   Blood:  / Protein: >=300 mg/dL / Nitrite: Negative   Leuk Esterase: Negative / RBC: 2-5 /HPF / WBC 3-5 /HPF   Sq Epi:  / Non Sq Epi: Occasional /HPF / Bacteria:       Protein/Creatinine Ratio Calculation: 2.6 Ratio ( @ 01:25)  Creatinine, Random Urine: 51 mg/dL ( @ 01:25)    RADIOLOGY & ADDITIONAL STUDIES:

## 2018-09-22 NOTE — PROGRESS NOTE ADULT - ASSESSMENT
Assessment:  58 y/o Male s/p RUE AVF creation. Attempted LEFT IJ Tunneled Dialysis Catheter placement. Successful RIGHT IJ Tunneled Dialysis Catheter placement on 9/21    Plan:  f/u chest xray read on TDC position  Pain control  Heparin sq  Encourage ambulation  Incentive spirometry.

## 2018-09-22 NOTE — PROGRESS NOTE ADULT - SUBJECTIVE AND OBJECTIVE BOX
SUBJECTIVE:    Patient is a 57y old Male who presents with a chief complaint of shortness of breath (22 Sep 2018 11:02)    Currently admitted to medicine with the primary diagnosis of Acute on chronic renal failure now ESRD Requiring Dialysis and L SFA Stenosis      Today is hospital day 3d. This morning he is resting in bed and reports feeling short of breath and a little nausea and a sensation he cant describe just generally unwell. Denies, cp, palpitations, dizziness, emesis, headache.     PAST MEDICAL & SURGICAL HISTORY  Dyslipidemia  DM (diabetes mellitus), type 2  Neuropathy  HTN (hypertension)  CAD (coronary atherosclerotic disease)  Acromegaly  CKD (chronic kidney disease), stage V  H/O eye surgery  H/O: pituitary tumor: s/p removal      ALLERGIES:  bacitracin (Unknown)  Neosporin (Hypotension)    MEDICATIONS:  STANDING MEDICATIONS  amLODIPine   Tablet 10 milliGRAM(s) Oral daily  ATENolol  Tablet 50 milliGRAM(s) Oral daily  atorvastatin 80 milliGRAM(s) Oral at bedtime  calcium acetate 1334 milliGRAM(s) Oral three times a day with meals  darbepoetin Injectable Syringe 20 MICROGram(s) IV Push <User Schedule>  doxercalciferol Injectable 4 MICROGram(s) IV Push <User Schedule>  heparin  Injectable 5000 Unit(s) SubCutaneous every 8 hours  influenza   Vaccine 0.5 milliLiter(s) IntraMuscular once  silver sulfADIAZINE 1% Cream 1 Application(s) Topical daily  tamsulosin 0.4 milliGRAM(s) Oral at bedtime    PRN MEDICATIONS  acetaminophen   Tablet .. 650 milliGRAM(s) Oral every 6 hours PRN  oxyCODONE    5 mG/acetaminophen 325 mG 1 Tablet(s) Oral every 6 hours PRN    VITALS:   T(F): 96  HR: 55  BP: 117/65  RR: 18  SpO2: 96%    LABS:                        7.8    9.70  )-----------( 204      ( 22 Sep 2018 08:40 )             23.9     09-22    141  |  106  |  109<HH>  ----------------------------<  141<H>  5.1<H>   |  13<L>  |  9.8<HH>    Ca    8.1<L>      22 Sep 2018 08:40  Phos  8.9     09-22  Mg     1.9     09-22    TPro  6.2  /  Alb  3.5  /  TBili  0.2  /  DBili  x   /  AST  19  /  ALT  14  /  AlkPhos  128<H>  09-22    Troponin T, Serum: 0.25 ng/mL <HH> (09-22-18 @ 08:40)    CARDIAC MARKERS ( 22 Sep 2018 08:40 )  x     / 0.25 ng/mL / x     / x     / 19.3 ng/mL  CARDIAC MARKERS ( 20 Sep 2018 21:44 )  x     / 0.25 ng/mL / x     / x     / 15.3 ng/mL      RADIOLOGY:    < from: Xray Chest 1 View-PORTABLE IMMEDIATE (09.21.18 @ 20:15) >  Support devices: Interval placement of right-sided dialysis catheter with   the tip in the right atrium.  Cardiac/mediastinum/hilum: Magnified  Lung parenchyma/Pleura: Low lung volumes with bibasilar atelectasis. No   pleural effusion or pneumothorax  Skeleton/soft tissues: Stable  Impression:    Low lung volumes with bibasilar atelectasis  Right-sided dialysis catheter with the tip in the right atrium  < end of copied text >      < from: [facility] Duplex Low Ext Arterial, Ltd, Left (09.19.18 @ 08:05) >  Left:   common femoral artery: Patent, with very mild spectral broadening and   triphasic waveform   superficial femoral artery: Significantly diminished amplitude   proximally, with loss of triphasic waveform and spectral broadening.   Apparent occlusion in the mid SFA. Broad and distal reconstitution.   popliteal artery: Flow present, with diseased waveform.   anterior tibial artery: Patent   posterior tibial artery: Patent   peroneal artery: Not visualized     Impression:    On the left: Apparent severe disease at the femoral bifurcation, with   occlusion of the superficial femoral artery distal to this.   Reconstitution of flow through the popliteal and tibial vessels.    Please consider vascular surgical consultation if felt to be appropriate   in the current clinical context.  < end of copied text >        PHYSICAL EXAM:  GEN: No acute distress  LUNGS: Clear to auscultation bilaterally   HEART: S1/S2 present. RRR.   ABD: Soft, non-tender, non-distended. Bowel sounds present B/L ulcers of the distal LE   EXT: NC/NC/NE/2+PP/[place]/Skin Intact.   NEURO: AAOX3, no focal defecits    Intravenous access:   NG tube:   Heard cathter:

## 2018-09-22 NOTE — PROGRESS NOTE ADULT - ASSESSMENT
58 yo M with PMHx of pituitary tumor s/p removal, acromegaly, CAD s/p 3 stents (), DLD, DMII, neuropathy, CKD 5 p/w 3 day hx of shortness of breath on exertion and was sent by urgent care. In the ED, vitals were wnl and Pt was saturating 100% on rA. pt was found to have an KATHARINA w/ AGMA w/ pH of 7.1 and HCO3 of 18. CXR in ED did not show any congestion. Trops was elevated at 0.33 and BNP 7464. Nephro was consulted and recommended HCO3 drip and no emergent need for dialysis. Cardio fellow recommended trending CE as pt has no acute chest pain. Pt denied using any pain killers, any recent use of antibiotics. Pt has been having adequate PO intake    # KATHARINA on CKD stage 5 with High Anion Gap Metabolic Acidosis  - probably progresive CKD, R/O CRS, get 2D ECHO.  - Now HD dependent.  - vitals wnl and pt saturating on RA. Lung ausculation shows occasional crackles on admission  - CXR: Cardiomegaly w/o evidence of congestion  - VB.14//32/7 AG 26  - BNP: 7464  - c/w IV Lasix 40 Q12   - strict i&O and daily weights  - monitor PO4 and iPTH daily   -  Renal sono no hydro  - L Arm Precautions as new L AVF placed  - Can restart plavix 18 per vascular  -HD today      #Left heal ulcer w/ clean base likely complicated by underlying diabetic neuropathy and Peripheral Vascular Disease  - No evidence of Sepsis  -B/L LE pulses Dopplerable  - will hold off antibiotics  - Podiatry Following  -L SFA Stenosis Need for Angiogram planned Monday    #Troponemia likely 2/2 CKD Stage 5 -   - trops: 0.33, 0.25, 0.25  - f/u EKG  - as per cardio, trend CE x2  -f/u ECHO   -Cardiologist Dr. Pagan    #Normocytic anemia likely component of ACD and iron deficiency anemia - Stable and no overt bleed  - Pt on Procrit shots weekly  - Baseline hb 7-8 - at baseline  -  iron studies,   -b12, folate WNL  - Transfuse if <7  - active T&S  s/p 1 PRBC as patient has Cardiac hx  -Hb 7.8    #T2Dm  - monitor FS  - If FS >180 - start insulin    #HTN/CAD s/p stents  - c/w home meds - norvasc, asa, lipitor, BB  -Repeat Echo    #BPH  - c/w flomax    #Acitivity: OOBC  #Diet: renal Diet  #DVT/GI PPX: HSQ and Protonix  #Dispo: Home

## 2018-09-22 NOTE — CHART NOTE - NSCHARTNOTEFT_GEN_A_CORE
At approximate 1900 Mr. Romo was bladder scanned at bedside found to have greater than a liter of urine.  At bed side he complained of having super pubic pain. Heard was placed. Follow up UOP.

## 2018-09-23 LAB
ALBUMIN SERPL ELPH-MCNC: 3.1 G/DL — LOW (ref 3.5–5.2)
ALP SERPL-CCNC: 110 U/L — SIGNIFICANT CHANGE UP (ref 30–115)
ALT FLD-CCNC: 14 U/L — SIGNIFICANT CHANGE UP (ref 0–41)
ANION GAP SERPL CALC-SCNC: 22 MMOL/L — HIGH (ref 7–14)
APTT BLD: 32.8 SEC — SIGNIFICANT CHANGE UP (ref 27–39.2)
AST SERPL-CCNC: 18 U/L — SIGNIFICANT CHANGE UP (ref 0–41)
BASOPHILS # BLD AUTO: 0.02 K/UL — SIGNIFICANT CHANGE UP (ref 0–0.2)
BASOPHILS NFR BLD AUTO: 0.2 % — SIGNIFICANT CHANGE UP (ref 0–1)
BILIRUB SERPL-MCNC: 0.2 MG/DL — SIGNIFICANT CHANGE UP (ref 0.2–1.2)
BLD GP AB SCN SERPL QL: SIGNIFICANT CHANGE UP
BUN SERPL-MCNC: 88 MG/DL — CRITICAL HIGH (ref 10–20)
CALCIUM SERPL-MCNC: 7.9 MG/DL — LOW (ref 8.5–10.1)
CALCIUM SERPL-MCNC: 8.2 MG/DL — LOW (ref 8.4–10.5)
CHLORIDE SERPL-SCNC: 103 MMOL/L — SIGNIFICANT CHANGE UP (ref 98–110)
CO2 SERPL-SCNC: 18 MMOL/L — SIGNIFICANT CHANGE UP (ref 17–32)
CREAT SERPL-MCNC: 8.1 MG/DL — CRITICAL HIGH (ref 0.7–1.5)
EOSINOPHIL # BLD AUTO: 0.12 K/UL — SIGNIFICANT CHANGE UP (ref 0–0.7)
EOSINOPHIL NFR BLD AUTO: 1.3 % — SIGNIFICANT CHANGE UP (ref 0–8)
GLUCOSE BLDC GLUCOMTR-MCNC: 135 MG/DL — HIGH (ref 70–99)
GLUCOSE BLDC GLUCOMTR-MCNC: 146 MG/DL — HIGH (ref 70–99)
GLUCOSE BLDC GLUCOMTR-MCNC: 156 MG/DL — HIGH (ref 70–99)
GLUCOSE BLDC GLUCOMTR-MCNC: 172 MG/DL — HIGH (ref 70–99)
GLUCOSE SERPL-MCNC: 144 MG/DL — HIGH (ref 70–99)
HCT VFR BLD CALC: 20.9 % — LOW (ref 42–52)
HGB BLD-MCNC: 6.9 G/DL — CRITICAL LOW (ref 14–18)
IMM GRANULOCYTES NFR BLD AUTO: 0.5 % — HIGH (ref 0.1–0.3)
INR BLD: 1.29 RATIO — SIGNIFICANT CHANGE UP (ref 0.65–1.3)
LYMPHOCYTES # BLD AUTO: 0.54 K/UL — LOW (ref 1.2–3.4)
LYMPHOCYTES # BLD AUTO: 5.7 % — LOW (ref 20.5–51.1)
MAGNESIUM SERPL-MCNC: 1.8 MG/DL — SIGNIFICANT CHANGE UP (ref 1.8–2.4)
MCHC RBC-ENTMCNC: 29.4 PG — SIGNIFICANT CHANGE UP (ref 27–31)
MCHC RBC-ENTMCNC: 33 G/DL — SIGNIFICANT CHANGE UP (ref 32–37)
MCV RBC AUTO: 88.9 FL — SIGNIFICANT CHANGE UP (ref 80–94)
MONOCYTES # BLD AUTO: 1.09 K/UL — HIGH (ref 0.1–0.6)
MONOCYTES NFR BLD AUTO: 11.4 % — HIGH (ref 1.7–9.3)
NEUTROPHILS # BLD AUTO: 7.73 K/UL — HIGH (ref 1.4–6.5)
NEUTROPHILS NFR BLD AUTO: 80.9 % — HIGH (ref 42.2–75.2)
NRBC # BLD: 0 /100 WBCS — SIGNIFICANT CHANGE UP (ref 0–0)
PHOSPHATE SERPL-MCNC: 7.6 MG/DL — HIGH (ref 2.1–4.9)
PLATELET # BLD AUTO: 176 K/UL — SIGNIFICANT CHANGE UP (ref 130–400)
POTASSIUM SERPL-MCNC: 4.1 MMOL/L — SIGNIFICANT CHANGE UP (ref 3.5–5)
POTASSIUM SERPL-SCNC: 4.1 MMOL/L — SIGNIFICANT CHANGE UP (ref 3.5–5)
PROT SERPL-MCNC: 5.2 G/DL — LOW (ref 6–8)
PROTHROM AB SERPL-ACNC: 13.9 SEC — HIGH (ref 9.95–12.87)
PTH-INTACT FLD-MCNC: 2071 PG/ML — HIGH (ref 15–65)
RBC # BLD: 2.35 M/UL — LOW (ref 4.7–6.1)
RBC # FLD: 15.8 % — HIGH (ref 11.5–14.5)
SODIUM SERPL-SCNC: 143 MMOL/L — SIGNIFICANT CHANGE UP (ref 135–146)
TYPE + AB SCN PNL BLD: SIGNIFICANT CHANGE UP
WBC # BLD: 9.55 K/UL — SIGNIFICANT CHANGE UP (ref 4.8–10.8)
WBC # FLD AUTO: 9.55 K/UL — SIGNIFICANT CHANGE UP (ref 4.8–10.8)

## 2018-09-23 RX ADMIN — Medication 1 APPLICATION(S): at 12:22

## 2018-09-23 RX ADMIN — ATORVASTATIN CALCIUM 80 MILLIGRAM(S): 80 TABLET, FILM COATED ORAL at 21:34

## 2018-09-23 RX ADMIN — TAMSULOSIN HYDROCHLORIDE 0.4 MILLIGRAM(S): 0.4 CAPSULE ORAL at 21:34

## 2018-09-23 RX ADMIN — HEPARIN SODIUM 5000 UNIT(S): 5000 INJECTION INTRAVENOUS; SUBCUTANEOUS at 13:13

## 2018-09-23 RX ADMIN — Medication 1334 MILLIGRAM(S): at 12:22

## 2018-09-23 RX ADMIN — HEPARIN SODIUM 5000 UNIT(S): 5000 INJECTION INTRAVENOUS; SUBCUTANEOUS at 05:49

## 2018-09-23 RX ADMIN — HEPARIN SODIUM 5000 UNIT(S): 5000 INJECTION INTRAVENOUS; SUBCUTANEOUS at 21:34

## 2018-09-23 RX ADMIN — Medication 1334 MILLIGRAM(S): at 08:12

## 2018-09-23 RX ADMIN — Medication 1334 MILLIGRAM(S): at 17:02

## 2018-09-23 RX ADMIN — CLOPIDOGREL BISULFATE 75 MILLIGRAM(S): 75 TABLET, FILM COATED ORAL at 12:22

## 2018-09-23 NOTE — CHART NOTE - NSCHARTNOTEFT_GEN_A_CORE
Got called by nurse because the patient told he did not want the Angiogram tomorrow ( monday 9/24). Got called by nurse because the patient told he did not want the Angiogram tomorrow ( monday 9/24). Pt was scheduled for angiogram tomorrow for SFA stenosis.   Had a discussion with the patient. Pt states that he has had enough and he doesn't want the procedure. He said he might consider it in the future but doesn't want it   tomorrow.  Told the patient that we will still keep him NPO and continue the same medical management and he can discuss his concerns further with the Attending tomorrow. Pt agreed to the plan. Senior notified.

## 2018-09-23 NOTE — PROGRESS NOTE ADULT - SUBJECTIVE AND OBJECTIVE BOX
SUBJECTIVE:    Patient is a 57y old Male who presents with a chief complaint of shortness of breath (22 Sep 2018 11:02)    had tesio and HD yesterday, today he is feeling weak but sob is better.     ALLERGIES:  bacitracin (Unknown)  Neosporin (Hypotension)    Vital Signs Last 24 Hrs  T(C): 37.2 (23 Sep 2018 13:58), Max: 37.2 (23 Sep 2018 06:06)  T(F): 98.9 (23 Sep 2018 13:58), Max: 98.9 (23 Sep 2018 06:06)  HR: 68 (23 Sep 2018 13:58) (60 - 70)  BP: 114/56 (23 Sep 2018 13:58) (94/55 - 126/66)  BP(mean): --  RR: 18 (23 Sep 2018 13:58) (16 - 18)  SpO2: --    Physical exam:   constitutional NAD, acromegaly, AAOX3, Respiratory  lungs CTA, CVS heart RRR, GI: abdomen Soft NT, ND, BS+, skin: intact  neuro exam non focal.   tesio, right subclavian, not tender, has dressing    LABS:                        6.9    9.55  )-----------( 176      ( 23 Sep 2018 10:21 )             20.9   09-23    143  |  103  |  88<HH>  ----------------------------<  144<H>  4.1   |  18  |  8.1<HH>    Ca    7.9<L>      23 Sep 2018 10:21  Phos  7.6     09-23  Mg     1.8     09-23    TPro  5.2<L>  /  Alb  3.1<L>  /  TBili  0.2  /  DBili  x   /  AST  18  /  ALT  14  /  AlkPhos  110  09-23    CARDIAC MARKERS ( 22 Sep 2018 08:40 )  x     / 0.25 ng/mL / x     / x     / 19.3 ng/mL

## 2018-09-23 NOTE — PROGRESS NOTE ADULT - ASSESSMENT
56y/o M s/p right IJ weston placemetn & CHRISTINE AVF creation    PLAN:  - encourage ambulation  - ok to restart Plavix/ASA on 9/23  - incentive spirometry  - pain control

## 2018-09-23 NOTE — PROGRESS NOTE ADULT - ASSESSMENT
Patient is a 57y Male with KATHARINA on CKD 5 (Baseline Cr. 7.0) and metabolic acidosis. presented for SOB, GUTIERREZ, swelling of both ankles.  PMH acromegaly (S/P Pitutary removal), DM with neuropathy, DLD, kidney stones.    # KATHARINA on CKD 5 vs progression of CKD    - most likely progression of CKD    - sp tesio and AVF  sp HD yesterday    - for HD in AM second treatment    - S. crt elevated at 9.8   -hold lasix    - IP noted, high on phoslo. check PTH already on  hectorol       # HAGMA   -for HD in AM  off  bicarbonate po       # Hyperphosphatemia   - cont Phoslo   - repeat IP   - check PTH.    # Anemia   - serum iron studies noted.   - on Aranesp    - Maintain H/H   - Transfuse if Hb < 7.    # BP   -low hold norvasc     Will follow

## 2018-09-23 NOTE — PROGRESS NOTE ADULT - SUBJECTIVE AND OBJECTIVE BOX
GENERAL SURGERY PROGRESS NOTE    Patient: SU SAWYER , 57y ([birth year])Male   MRN: [id number]  Location: [address]  Visit: 09-19-18 Inpatient  Date: 09-23-18 @ 03:20    Hospital Day #: 5  Post-Op Day #: 2    Procedure/Dx/Injuries: s/p R IJ tesio placement & RUE AVF creation    Events of past 24 hours: No acute events overnight. Pt denies complaints, pain, CP, SOB, n/v, fever/chills, pain around incision sites. Pt went for 1st HD yesterday.    PAST MEDICAL & SURGICAL HISTORY:  Dyslipidemia  DM (diabetes mellitus), type 2  Neuropathy  HTN (hypertension)  CAD (coronary atherosclerotic disease)  Acromegaly  CKD (chronic kidney disease), stage V  H/O eye surgery  H/O: pituitary tumor: s/p removal      Vitals: T(F): 97.9 (09-22-18 @ 20:56), Max: 97.9 (09-22-18 @ 20:56)  HR: 70 (09-22-18 @ 20:56)  BP: 112/64 (09-22-18 @ 20:56)  RR: 18 (09-22-18 @ 20:56)  SpO2: --        Diet, Consistent Carbohydrate Renal/No Snacks:   1000mL Fluid Restriction ([id number])      Fluids:     I & O's:    09-21-18 @ 07:01  -  09-22-18 @ 07:00  --------------------------------------------------------  IN:    Oral Fluid: 375 mL  Total IN: 375 mL    OUT:    Voided: 300 mL  Total OUT: 300 mL    Total NET: 75 mL        PHYSICAL EXAM  GEN: NAD, resting comfortably  CV: RRR, no rubs/murmurs  LUNGS: CTAB, no wheeze/rales/ronchi  ABD: soft, nt, nd, no guarding, +BS  EXT: FROM, no clubbing/cyanosis  WOUND/INCISION: RUE, R neck and mid-L neck c/d/i    MEDICATIONS  (STANDING):  amLODIPine   Tablet 10 milliGRAM(s) Oral daily  ATENolol  Tablet 50 milliGRAM(s) Oral daily  atorvastatin 80 milliGRAM(s) Oral at bedtime  calcium acetate 1334 milliGRAM(s) Oral three times a day with meals  clopidogrel Tablet 75 milliGRAM(s) Oral daily  darbepoetin Injectable Syringe 20 MICROGram(s) IV Push <User Schedule>  doxercalciferol Injectable 4 MICROGram(s) IV Push <User Schedule>  heparin  Injectable 5000 Unit(s) SubCutaneous every 8 hours  influenza   Vaccine 0.5 milliLiter(s) IntraMuscular once  silver sulfADIAZINE 1% Cream 1 Application(s) Topical daily  tamsulosin 0.4 milliGRAM(s) Oral at bedtime    MEDICATIONS  (PRN):  acetaminophen   Tablet .. 650 milliGRAM(s) Oral every 6 hours PRN Mild Pain (1 - 3)  oxyCODONE    5 mG/acetaminophen 325 mG 1 Tablet(s) Oral every 6 hours PRN Moderate Pain (4 - 6)      LAB/STUDIES:  CAPILLARY BLOOD GLUCOSE  200 (22 Sep 2018 11:53)  174 (22 Sep 2018 08:20)      POCT Blood Glucose.: 140 mg/dL (22 Sep 2018 21:35)  POCT Blood Glucose.: 136 mg/dL (22 Sep 2018 17:59)  POCT Blood Glucose.: 200 mg/dL (22 Sep 2018 11:51)  POCT Blood Glucose.: 174 mg/dL (22 Sep 2018 08:05)                          7.8    9.70  )-----------( 204      ( 22 Sep 2018 08:40 )             23.9     09-22    141  |  106  |  109<HH>  ----------------------------<  141<H>  5.1<H>   |  13<L>  |  9.8<HH>    Ca    8.1<L>      22 Sep 2018 08:40  Phos  8.9     09-22  Mg     1.9     09-22    TPro  6.2  /  Alb  3.5  /  TBili  0.2  /  DBili  x   /  AST  19  /  ALT  14  /  AlkPhos  128<H>  09-22               6.2  | 0.2  | 19       ------------------[128     ( 22 Sep 2018 08:40 )  3.5  | x    | 14          Lipase:x      Amylase:x            CARDIAC MARKERS ( 22 Sep 2018 08:40 )  x     / 0.25 ng/mL / x     / x     / 19.3 ng/mL

## 2018-09-23 NOTE — PROGRESS NOTE ADULT - SUBJECTIVE AND OBJECTIVE BOX
Nephrology progress note    Patient is seen and examined, events over the last 24 h noted .  sp HD first treatment yesterday   tolerated procedure very well  for HD in AM     Allergies:  bacitracin (Unknown)  Neosporin (Hypotension)    Hospital Medications:   MEDICATIONS  (STANDING):  amLODIPine   Tablet 10 milliGRAM(s) Oral daily  ATENolol  Tablet 50 milliGRAM(s) Oral daily  atorvastatin 80 milliGRAM(s) Oral at bedtime  calcium acetate 1334 milliGRAM(s) Oral three times a day with meals  clopidogrel Tablet 75 milliGRAM(s) Oral daily  darbepoetin Injectable Syringe 20 MICROGram(s) IV Push <User Schedule>  doxercalciferol Injectable 4 MICROGram(s) IV Push <User Schedule>  heparin  Injectable 5000 Unit(s) SubCutaneous every 8 hours  influenza   Vaccine 0.5 milliLiter(s) IntraMuscular once  silver sulfADIAZINE 1% Cream 1 Application(s) Topical daily  tamsulosin 0.4 milliGRAM(s) Oral at bedtime        VITALS:  T(F): 98.9 (18 @ 06:06), Max: 98.9 (18 @ 06:06)  HR: 67 (18 @ 08:14)  BP: 96/50 (18 @ 08:14)  RR: 18 (18 @ 06:06)     @ 07:  -   @ 07:00  --------------------------------------------------------  IN: 375 mL / OUT: 300 mL / NET: 75 mL     @ 07:01  -   @ 07:00  --------------------------------------------------------  IN: 1670 mL / OUT: 2940 mL / NET: -1270 mL          PHYSICAL EXAM:  Constitutional: NAD  HEENT: anicteric sclera, oropharynx clear, MMM  Neck: No JVD  Respiratory: CTAB, no wheezes, rales or rhonchi  Cardiovascular: S1, S2, RRR  Gastrointestinal: BS+, soft, NT/ND  Extremities: No cyanosis or clubbing. No peripheral edema  :  positive ortiz   Skin: No rashes    LABS:      141  |  106  |  109<HH>  ----------------------------<  141<H>  5.1<H>   |  13<L>  |  9.8<HH>    Ca    8.1<L>      22 Sep 2018 08:40  Phos  8.9       Mg     1.9         TPro  6.2  /  Alb  3.5  /  TBili  0.2  /  DBili      /  AST  19  /  ALT  14  /  AlkPhos  128<H>                            7.8    9.70  )-----------( 204      ( 22 Sep 2018 08:40 )             23.9       Urine Studies:  Urinalysis Basic - ( 19 Sep 2018 06:40 )    Color: Yellow / Appearance: Clear / S.020 / pH:   Gluc:  / Ketone: Negative  / Bili: Negative / Urobili: 0.2 mg/dL   Blood:  / Protein: >=300 mg/dL / Nitrite: Negative   Leuk Esterase: Negative / RBC: 2-5 /HPF / WBC 3-5 /HPF   Sq Epi:  / Non Sq Epi: Occasional /HPF / Bacteria:       Protein/Creatinine Ratio Calculation: 2.6 Ratio ( @ 01:25)  Creatinine, Random Urine: 51 mg/dL ( @ 01:25)    RADIOLOGY & ADDITIONAL STUDIES:

## 2018-09-23 NOTE — PROGRESS NOTE ADULT - ATTENDING COMMENTS
I was Physically Present for the key portions of the evaluation   I agree with the above History  , Physical examination Assessment and plan   I have Reviewed , Modified or appended where appropriate.  Please check A and P as above   1- CKD 5 for tesio AVF today   will run first HD in AM   2- Anemia chronic will start EDWIN check Fe studies  check Hepatitis profile  for Angio on Monday for PVD   3- IP elevated on binders  will follow
I was Physically Present for the key portions of the evaluation   I agree with the above History  , Physical examination Assessment and plan   I have Reviewed , Modified or appended where appropriate.  Please check A and P as above   1- CKD 5 for tesio AVF today   will run first HD in AM   2- Anemia chronic will start EDWIN check Fe studies  check Hepatitis profile  for Angio on Monday for PVD   3- IP elevated on binders  will follow
agree with above note
I was Physically Present for the key portions of the evaluation   I agree with the above History  , Physical examination Assessment and plan   I have Reviewed , Modified or appended where appropriate.  Please check A and P as above   1- CKD 5 for tesio AVF today   will run first HD in AM   2- Anemia chronic will start EDWIN check Fe studies  check Hepatitis profile  for Angio on Monday for PVD   3- IP elevated on binders  will follow

## 2018-09-23 NOTE — PROGRESS NOTE ADULT - ASSESSMENT
58 yo M with PMHx of pituitary tumor s/p removal, acromegaly, CAD s/p 3 stents (2015), DLD, DMII, neuropathy, CKD 5 p/w 3 day hx of shortness of breath on exertion and was sent by urgent care. In the ED, vitals were wnl and Pt was saturating 100% on rA. pt was found to have an KATHARINA w/ AGMA w/ pH of 7.1 and HCO3 of 18. CXR in ED did not show any congestion. Trops was elevated at 0.33 and BNP 7464. Nephro was consulted and recommended HCO3 drip and no emergent need for dialysis. Cardio fellow recommended trending CE as pt has no acute chest pain. Pt denied using any pain killers, any recent use of antibiotics. Pt has been having adequate PO intake    #Dyspnea on exertion and AGMA likely 2/2 KATHARINA on CKD stage 5 likely pre-renal - Hemodynamically stable and Pt voiding freely  sp tesio and HD on 9/22    #Left heal ulcer w/ clean base likely complicated by underlying diabetic neuropathy and Peripheral Vascular Disease  - No evidence of Sepsis  -B/L LE pulses Dopplerable  - will hold off antibiotics  - Podiatry Following  -L SFA Stenosis Need for Angiogram planned Monday    #Troponemia likely 2/2 CKD Stage 5 -   - trops: 0.33, 0.25, 0.25  - f/u EKG  - as per cardio, trend CE x2  -f/u ECHO   -Cardiologist Dr. Pagan    #Normocytic anemia likely component of ACD and iron deficiency anemia - Stable and no overt bleed  - Pt on Procrit shots weekly  - Baseline hb 7-8 - at baseline  -  iron studies,   -b12, folate WNL  - Transfuse if <7  - active T&S  s/p 1 PRBC as patient has Cardiac hx  -Hb 7.8    #T2Dm  - monitor FS  - If FS >180 - start insulin    #HTN/CAD s/p stents  - c/w home meds - norvasc, asa, lipitor, BB  -Repeat Echo    #BPH  - c/w flomax    #Acitivity: OOBC  #Diet: renal Diet  #DVT/GI PPX: HSQ and Protonix  #Dispo: Home

## 2018-09-24 LAB
ALBUMIN SERPL ELPH-MCNC: 3.1 G/DL — LOW (ref 3.5–5.2)
ALP SERPL-CCNC: 110 U/L — SIGNIFICANT CHANGE UP (ref 30–115)
ALT FLD-CCNC: 16 U/L — SIGNIFICANT CHANGE UP (ref 0–41)
ANION GAP SERPL CALC-SCNC: 21 MMOL/L — HIGH (ref 7–14)
APTT BLD: 33.6 SEC — SIGNIFICANT CHANGE UP (ref 27–39.2)
AST SERPL-CCNC: 17 U/L — SIGNIFICANT CHANGE UP (ref 0–41)
BASOPHILS # BLD AUTO: 0.03 K/UL — SIGNIFICANT CHANGE UP (ref 0–0.2)
BASOPHILS NFR BLD AUTO: 0.2 % — SIGNIFICANT CHANGE UP (ref 0–1)
BILIRUB SERPL-MCNC: 0.3 MG/DL — SIGNIFICANT CHANGE UP (ref 0.2–1.2)
BLD GP AB SCN SERPL QL: SIGNIFICANT CHANGE UP
BUN SERPL-MCNC: 94 MG/DL — CRITICAL HIGH (ref 10–20)
CALCIUM SERPL-MCNC: 8.2 MG/DL — LOW (ref 8.5–10.1)
CHLORIDE SERPL-SCNC: 104 MMOL/L — SIGNIFICANT CHANGE UP (ref 98–110)
CO2 SERPL-SCNC: 18 MMOL/L — SIGNIFICANT CHANGE UP (ref 17–32)
CREAT SERPL-MCNC: 8.2 MG/DL — CRITICAL HIGH (ref 0.7–1.5)
EOSINOPHIL # BLD AUTO: 0.19 K/UL — SIGNIFICANT CHANGE UP (ref 0–0.7)
EOSINOPHIL NFR BLD AUTO: 1.5 % — SIGNIFICANT CHANGE UP (ref 0–8)
GLUCOSE BLDC GLUCOMTR-MCNC: 132 MG/DL — HIGH (ref 70–99)
GLUCOSE BLDC GLUCOMTR-MCNC: 135 MG/DL — HIGH (ref 70–99)
GLUCOSE BLDC GLUCOMTR-MCNC: 160 MG/DL — HIGH (ref 70–99)
GLUCOSE BLDC GLUCOMTR-MCNC: 193 MG/DL — HIGH (ref 70–99)
GLUCOSE SERPL-MCNC: 139 MG/DL — HIGH (ref 70–99)
HCT VFR BLD CALC: 23.1 % — LOW (ref 42–52)
HGB BLD-MCNC: 7.7 G/DL — LOW (ref 14–18)
IMM GRANULOCYTES NFR BLD AUTO: 0.8 % — HIGH (ref 0.1–0.3)
INR BLD: 1.29 RATIO — SIGNIFICANT CHANGE UP (ref 0.65–1.3)
LYMPHOCYTES # BLD AUTO: 1.03 K/UL — LOW (ref 1.2–3.4)
LYMPHOCYTES # BLD AUTO: 7.9 % — LOW (ref 20.5–51.1)
MAGNESIUM SERPL-MCNC: 1.8 MG/DL — SIGNIFICANT CHANGE UP (ref 1.8–2.4)
MCHC RBC-ENTMCNC: 29.6 PG — SIGNIFICANT CHANGE UP (ref 27–31)
MCHC RBC-ENTMCNC: 33.3 G/DL — SIGNIFICANT CHANGE UP (ref 32–37)
MCV RBC AUTO: 88.8 FL — SIGNIFICANT CHANGE UP (ref 80–94)
MONOCYTES # BLD AUTO: 1.44 K/UL — HIGH (ref 0.1–0.6)
MONOCYTES NFR BLD AUTO: 11 % — HIGH (ref 1.7–9.3)
NEUTROPHILS # BLD AUTO: 10.26 K/UL — HIGH (ref 1.4–6.5)
NEUTROPHILS NFR BLD AUTO: 78.6 % — HIGH (ref 42.2–75.2)
NRBC # BLD: 0 /100 WBCS — SIGNIFICANT CHANGE UP (ref 0–0)
PHOSPHATE SERPL-MCNC: 7.6 MG/DL — HIGH (ref 2.1–4.9)
PLATELET # BLD AUTO: 168 K/UL — SIGNIFICANT CHANGE UP (ref 130–400)
POTASSIUM SERPL-MCNC: 4.6 MMOL/L — SIGNIFICANT CHANGE UP (ref 3.5–5)
POTASSIUM SERPL-SCNC: 4.6 MMOL/L — SIGNIFICANT CHANGE UP (ref 3.5–5)
PROT SERPL-MCNC: 5.1 G/DL — LOW (ref 6–8)
PROTHROM AB SERPL-ACNC: 13.9 SEC — HIGH (ref 9.95–12.87)
RBC # BLD: 2.6 M/UL — LOW (ref 4.7–6.1)
RBC # FLD: 16.3 % — HIGH (ref 11.5–14.5)
SODIUM SERPL-SCNC: 143 MMOL/L — SIGNIFICANT CHANGE UP (ref 135–146)
TYPE + AB SCN PNL BLD: SIGNIFICANT CHANGE UP
WBC # BLD: 13.06 K/UL — HIGH (ref 4.8–10.8)
WBC # FLD AUTO: 13.06 K/UL — HIGH (ref 4.8–10.8)

## 2018-09-24 RX ORDER — DOXERCALCIFEROL 2.5 UG/1
4 CAPSULE ORAL
Qty: 0 | Refills: 0 | Status: DISCONTINUED | OUTPATIENT
Start: 2018-09-24 | End: 2018-09-26

## 2018-09-24 RX ORDER — DARBEPOETIN ALFA IN POLYSORBAT 200MCG/0.4
20 PEN INJECTOR (ML) SUBCUTANEOUS
Qty: 0 | Refills: 0 | Status: DISCONTINUED | OUTPATIENT
Start: 2018-09-24 | End: 2018-09-26

## 2018-09-24 RX ORDER — ASPIRIN/CALCIUM CARB/MAGNESIUM 324 MG
325 TABLET ORAL DAILY
Qty: 0 | Refills: 0 | Status: DISCONTINUED | OUTPATIENT
Start: 2018-09-24 | End: 2018-09-26

## 2018-09-24 RX ADMIN — TAMSULOSIN HYDROCHLORIDE 0.4 MILLIGRAM(S): 0.4 CAPSULE ORAL at 21:01

## 2018-09-24 RX ADMIN — Medication 325 MILLIGRAM(S): at 18:49

## 2018-09-24 RX ADMIN — Medication 1334 MILLIGRAM(S): at 08:33

## 2018-09-24 RX ADMIN — AMLODIPINE BESYLATE 10 MILLIGRAM(S): 2.5 TABLET ORAL at 18:47

## 2018-09-24 RX ADMIN — HEPARIN SODIUM 5000 UNIT(S): 5000 INJECTION INTRAVENOUS; SUBCUTANEOUS at 05:42

## 2018-09-24 RX ADMIN — HEPARIN SODIUM 5000 UNIT(S): 5000 INJECTION INTRAVENOUS; SUBCUTANEOUS at 21:01

## 2018-09-24 RX ADMIN — CLOPIDOGREL BISULFATE 75 MILLIGRAM(S): 75 TABLET, FILM COATED ORAL at 18:47

## 2018-09-24 RX ADMIN — DOXERCALCIFEROL 4 MICROGRAM(S): 2.5 CAPSULE ORAL at 14:09

## 2018-09-24 RX ADMIN — Medication 1334 MILLIGRAM(S): at 18:48

## 2018-09-24 RX ADMIN — ATENOLOL 50 MILLIGRAM(S): 25 TABLET ORAL at 18:48

## 2018-09-24 RX ADMIN — ATORVASTATIN CALCIUM 80 MILLIGRAM(S): 80 TABLET, FILM COATED ORAL at 21:01

## 2018-09-24 RX ADMIN — Medication 1 APPLICATION(S): at 18:49

## 2018-09-24 NOTE — PROGRESS NOTE ADULT - SUBJECTIVE AND OBJECTIVE BOX
seen and examined  no distress  lying comfortable      Standing Inpatient Medications  amLODIPine   Tablet 10 milliGRAM(s) Oral daily  ATENolol  Tablet 50 milliGRAM(s) Oral daily  atorvastatin 80 milliGRAM(s) Oral at bedtime  calcium acetate 1334 milliGRAM(s) Oral three times a day with meals  clopidogrel Tablet 75 milliGRAM(s) Oral daily  darbepoetin Injectable Syringe 20 MICROGram(s) IV Push <User Schedule>  doxercalciferol Injectable 4 MICROGram(s) IV Push <User Schedule>  heparin  Injectable 5000 Unit(s) SubCutaneous every 8 hours  influenza   Vaccine 0.5 milliLiter(s) IntraMuscular once  silver sulfADIAZINE 1% Cream 1 Application(s) Topical daily  tamsulosin 0.4 milliGRAM(s) Oral at bedtime        VITALS/PHYSICAL EXAM  --------------------------------------------------------------------------------  T(C): 36.6 (09-24-18 @ 04:17), Max: 37.2 (09-23-18 @ 13:58)  HR: 60 (09-24-18 @ 04:17) (60 - 68)  BP: 130/58 (09-24-18 @ 04:17) (114/56 - 130/58)  RR: 18 (09-24-18 @ 04:17) (18 - 18)  SpO2: --  Wt(kg): --        09-23-18 @ 07:01  -  09-24-18 @ 07:00  --------------------------------------------------------  IN: 1904 mL / OUT: 1125 mL / NET: 779 mL      Physical Exam:  	Gen: NAD  	Pulm: decrease BS  B/L  	CV:  S1S2; no rub  	Abd: +distended  	:angel  	LE: b/p dressings   	Vascular access: tesio     LABS/STUDIES  --------------------------------------------------------------------------------              6.9    9.55  >-----------<  176      [09-23-18 @ 10:21]              20.9     143  |  103  |  88  ----------------------------<  144      [09-23-18 @ 10:21]  4.1   |  18  |  8.1        Ca     7.9     [09-23-18 @ 10:21]      Mg     1.8     [09-23-18 @ 10:21]      Phos  7.6     [09-23-18 @ 10:21]    TPro  5.2  /  Alb  3.1  /  TBili  0.2  /  DBili  x   /  AST  18  /  ALT  14  /  AlkPhos  110  [09-23-18 @ 10:21]    PT/INR: PT 13.90, INR 1.29       [09-24-18 @ 01:21]  PTT: 33.6       [09-24-18 @ 01:21]      Creatinine Trend:  SCr 8.1 [09-23 @ 10:21]  SCr 9.8 [09-22 @ 08:40]  SCr 9.4 [09-20 @ 08:41]  SCr 9.0 [09-19 @ 04:30]  SCr 9.1 [09-18 @ 21:32]    Urinalysis - [09-19-18 @ 06:40]      Color Yellow / Appearance Clear / SG 1.020 / pH 6.0      Gluc 100 / Ketone Negative  / Bili Negative / Urobili 0.2       Blood Small / Protein >=300 / Leuk Est Negative / Nitrite Negative      RBC 2-5 / WBC 3-5 / Hyaline  / Gran 1-2 / Sq Epi  / Non Sq Epi Occasional / Bacteria     Urine Creatinine 51      [09-20-18 @ 01:25]  Urine Protein 134      [09-20-18 @ 01:25]  Urine Urea Nitrogen 415      [09-20-18 @ 01:25]    Iron 38, TIBC 166, %sat 23      [09-19-18 @ 04:30]  Ferritin 55      [03-09-18 @ 18:01]  PTH -- (Ca 8.2)      [09-22-18 @ 08:40]   2071  PTH -- (Ca 8.0)      [09-19-18 @ 04:30]   1519  PTH -- (Ca 8.3)      [03-09-18 @ 18:01]   1545  Vitamin D (25OH) 14.4      [03-09-18 @ 18:01]    HBsAb Reactive      [09-20-18 @ 21:44]  HBsAg Nonreact      [09-20-18 @ 21:44]  HBcAb Nonreact      [09-20-18 @ 21:44]  HCV 0.13, Nonreact      [09-20-18 @ 21:44]

## 2018-09-24 NOTE — PROGRESS NOTE ADULT - ASSESSMENT
Patient is a 57y Male with KATHARINA on CKD 5 (Baseline Cr. 7.0) and metabolic acidosis. presented for SOB, GUTIERREZ, swelling of both ankles.  PMH acromegaly (S/P Pitutary removal), DM with neuropathy, DLD, kidney stones.    # KATHARINA on CKD 5 vs progression of CKD    - most likely progression of CKD    - sp tesio    - hd today, standard bath, uf 2 liters as tolerated    -resume lasix  40 q 12, patient non oliguric         # Hyperphosphatemia   - cont Phoslo   - add renagel 2 tablets q 8    - PTH noted , on hectorol 4     # Anemia   - serum iron studies noted, will start venofer 100 with HD    - on Aranesp    - Maintain H/H   - will tranfuse 1 unit of PRBC     # will follow

## 2018-09-24 NOTE — PROGRESS NOTE ADULT - ASSESSMENT
56 yo M with PMHx of pituitary tumor s/p removal, acromegaly, CAD s/p 3 stents (2015), DLD, DMII, neuropathy, CKD 5 p/w 3 day hx of shortness of breath on exertion and was sent by urgent care.    1.	KATHARINA on CKD 5 likely progression of CKD on HD now  2.	 HAGMA  3.	Fluid overload/Ac. HFpEF  4.	CAD  5.	DM-2/DL  6.	Acromegaly  7.	B/L Leg ulcers  8.	Anemia of Ch. disease         PLAN:    ·	S/P HD. Renal F/U noted.  ·	S/P Tesio catheter and AVF. Restart his Plavix  ·	Care D/W the vascular surgery. Was scheduled for angiogram but refusing it. Vascular recommended out pt F/U.  ·	Venofer given during HD  ·	Local wound care of leg and foot ulcers.   ·	Can D/C home.    * Med rec reviewed. Pt is advised to F/U with renal and vascular. Plan of care D/W the pt. Time spent 36  minutes. 58 yo M with PMHx of pituitary tumor s/p removal, acromegaly, CAD s/p 3 stents (2015), DLD, DMII, neuropathy, CKD 5 p/w 3 day hx of shortness of breath on exertion and was sent by urgent care.    1.	KATHARINA on CKD 5 likely progression of CKD on HD now  2.	 HAGMA  3.	Fluid overload/Ac. HFpEF  4.	CAD  5.	DM-2/DL  6.	Acromegaly  7.	B/L Leg ulcers  8.	Anemia of Ch. disease         PLAN:    ·	S/P HD. Renal F/U noted. Also care D/W the renal. Pt has HD slot on Mon, Wed and Fri. Explained it to the pt.  ·	S/P Tesio catheter and AVF. Restart his Plavix  ·	Care D/W the vascular surgery. Was scheduled for angiogram but refusing it. Vascular recommended out pt F/U.  ·	Venofer given during HD  ·	Local wound care of leg and foot ulcers.   ·	Can D/C home. Cleared by renal    * Med rec reviewed. Pt is advised to F/U with renal and vascular. Plan of care D/W the pt. Time spent 36  minutes.

## 2018-09-24 NOTE — PROGRESS NOTE ADULT - SUBJECTIVE AND OBJECTIVE BOX
SU SAWYER  57y Male    CHIEF COMPLAINT:    Patient is a 57y old  Male who presents with a chief complaint of shortness of breath (24 Sep 2018 09:17)      INTERVAL HPI/OVERNIGHT EVENTS:    Patient seen and examined.    ROS: All other systems are negative.    Vital Signs:    T(F): 97.9 (18 @ 04:17), Max: 97.9 (18 @ 21:07)  HR: 63 (18 @ 12:05) (60 - 63)  BP: 130/68 (18 @ 12:05) (118/57 - 130/68)  RR: 18 (18 @ 12:05) (18 - 18)  SpO2: --  I&O's Summary    23 Sep 2018 07:01  -  24 Sep 2018 07:00  --------------------------------------------------------  IN: 1904 mL / OUT: 1125 mL / NET: 779 mL      Daily     Daily Weight in k.3 (24 Sep 2018 06:35)  CAPILLARY BLOOD GLUCOSE      POCT Blood Glucose.: 135 mg/dL (24 Sep 2018 11:18)  POCT Blood Glucose.: 160 mg/dL (24 Sep 2018 07:26)  POCT Blood Glucose.: 172 mg/dL (23 Sep 2018 21:33)  POCT Blood Glucose.: 146 mg/dL (23 Sep 2018 17:06)      PHYSICAL EXAM:    GENERAL:  NAD  SKIN: No rashes or lesions  HENT: Atrumatic. Normocephalic. PERRL. Moist membranes.  NECK: Supple, No JVD. No lymphadenopathy.  PULMONARY: CTA B/L. No wheezing. No rales  CVS: Normal S1, S2. Rate and Rythm are regular. No murmurs.  ABDOMEN/GI: Soft, Nontender, Nondistended; BS present  EXTREMITIES: Peripheral pulses intact. No edema B/L LE.  NEUROLOGIC:  No motor or sensory deficit.  PSYCH: Alert & oriented x 3    Consultant(s) Notes Reviewed:  [x ] YES  [ ] NO  Care Discussed with Consultants/Other Providers [ x] YES  [ ] NO    EKG reviewed  Telemetry reviewed    LABS:                        7.7    13.06 )-----------( 168      ( 24 Sep 2018 08:16 )             23.1         143  |  104  |  94<HH>  ----------------------------<  139<H>  4.6   |  18  |  8.2<HH>    Ca    8.2<L>      24 Sep 2018 08:16  Phos  7.6       Mg     1.8         TPro  5.1<L>  /  Alb  3.1<L>  /  TBili  0.3  /  DBili  x   /  AST  17  /  ALT  16  /  AlkPhos  110      PT/INR - ( 24 Sep 2018 01:21 )   PT: 13.90 sec;   INR: 1.29 ratio         PTT - ( 24 Sep 2018 01:21 )  PTT:33.6 sec  Serum Pro-Brain Natriuretic Peptide: 7464 pg/mL (18 @ 21:32)    Trop 0.25, CKMB 19.3, CK --, 18 @ 08:40        RADIOLOGY & ADDITIONAL TESTS:      Imaging or report Personally Reviewed:  [ ] YES  [ ] NO    Medications:  Standing  amLODIPine   Tablet 10 milliGRAM(s) Oral daily  aspirin 325 milliGRAM(s) Oral daily  ATENolol  Tablet 50 milliGRAM(s) Oral daily  atorvastatin 80 milliGRAM(s) Oral at bedtime  calcium acetate 1334 milliGRAM(s) Oral three times a day with meals  clopidogrel Tablet 75 milliGRAM(s) Oral daily  darbepoetin Injectable Syringe 20 MICROGram(s) IV Push <User Schedule>  doxercalciferol Injectable 4 MICROGram(s) IV Push <User Schedule>  heparin  Injectable 5000 Unit(s) SubCutaneous every 8 hours  influenza   Vaccine 0.5 milliLiter(s) IntraMuscular once  silver sulfADIAZINE 1% Cream 1 Application(s) Topical daily  tamsulosin 0.4 milliGRAM(s) Oral at bedtime    PRN Meds  acetaminophen   Tablet .. 650 milliGRAM(s) Oral every 6 hours PRN  oxyCODONE    5 mG/acetaminophen 325 mG 1 Tablet(s) Oral every 6 hours PRN      Case discussed with resident    Care discussed with pt/family SU SAWYER  57y Male    CHIEF COMPLAINT:    Patient is a 57y old  Male who presents with a chief complaint of shortness of breath (24 Sep 2018 09:17)      INTERVAL HPI/OVERNIGHT EVENTS:    Patient seen and examined. S/P HD.    ROS: All other systems are negative.    Vital Signs:    T(F): 97.9 (18 @ 04:17), Max: 97.9 (18 @ 21:07)  HR: 63 (18 @ 12:05) (60 - 63)  BP: 130/68 (18 @ 12:05) (118/57 - 130/68)  RR: 18 (18 @ 12:05) (18 - 18)  SpO2: --  I&O's Summary    23 Sep 2018 07:01  -  24 Sep 2018 07:00  --------------------------------------------------------  IN: 1904 mL / OUT: 1125 mL / NET: 779 mL      Daily     Daily Weight in k.3 (24 Sep 2018 06:35)  CAPILLARY BLOOD GLUCOSE      POCT Blood Glucose.: 135 mg/dL (24 Sep 2018 11:18)  POCT Blood Glucose.: 160 mg/dL (24 Sep 2018 07:26)  POCT Blood Glucose.: 172 mg/dL (23 Sep 2018 21:33)  POCT Blood Glucose.: 146 mg/dL (23 Sep 2018 17:06)      PHYSICAL EXAM:    GENERAL:  NAD  SKIN: No rashes or lesions  HENT: Atrumatic. Normocephalic. PERRL. Moist membranes.  NECK: Supple, No JVD. No lymphadenopathy.  PULMONARY: CTA B/L. No wheezing. No rales  CVS: Normal S1, S2. Rate and Rythm are regular. No murmurs.  ABDOMEN/GI: Soft, Nontender, Nondistended; BS present  EXTREMITIES: Peripheral pulses intact. No edema B/L LE.  NEUROLOGIC:  No motor or sensory deficit.  PSYCH: Alert & oriented x 3    Consultant(s) Notes Reviewed:  [x ] YES  [ ] NO  Care Discussed with Consultants/Other Providers [ x] YES  [ ] NO    EKG reviewed  Telemetry reviewed    LABS:                        7.7    13.06 )-----------( 168      ( 24 Sep 2018 08:16 )             23.1         143  |  104  |  94<HH>  ----------------------------<  139<H>  4.6   |  18  |  8.2<HH>    Ca    8.2<L>      24 Sep 2018 08:16  Phos  7.6       Mg     1.8         TPro  5.1<L>  /  Alb  3.1<L>  /  TBili  0.3  /  DBili  x   /  AST  17  /  ALT  16  /  AlkPhos  110      PT/INR - ( 24 Sep 2018 01:21 )   PT: 13.90 sec;   INR: 1.29 ratio         PTT - ( 24 Sep 2018 01:21 )  PTT:33.6 sec  Serum Pro-Brain Natriuretic Peptide: 7464 pg/mL (18 @ 21:32)    Trop 0.25, CKMB 19.3, CK --, 18 @ 08:40        RADIOLOGY & ADDITIONAL TESTS:      Imaging or report Personally Reviewed:  [ ] YES  [ ] NO    Medications:  Standing  amLODIPine   Tablet 10 milliGRAM(s) Oral daily  aspirin 325 milliGRAM(s) Oral daily  ATENolol  Tablet 50 milliGRAM(s) Oral daily  atorvastatin 80 milliGRAM(s) Oral at bedtime  calcium acetate 1334 milliGRAM(s) Oral three times a day with meals  clopidogrel Tablet 75 milliGRAM(s) Oral daily  darbepoetin Injectable Syringe 20 MICROGram(s) IV Push <User Schedule>  doxercalciferol Injectable 4 MICROGram(s) IV Push <User Schedule>  heparin  Injectable 5000 Unit(s) SubCutaneous every 8 hours  influenza   Vaccine 0.5 milliLiter(s) IntraMuscular once  silver sulfADIAZINE 1% Cream 1 Application(s) Topical daily  tamsulosin 0.4 milliGRAM(s) Oral at bedtime    PRN Meds  acetaminophen   Tablet .. 650 milliGRAM(s) Oral every 6 hours PRN  oxyCODONE    5 mG/acetaminophen 325 mG 1 Tablet(s) Oral every 6 hours PRN      Case discussed with resident    Care discussed with pt/family SU SAWYER  57y Male    CHIEF COMPLAINT:    Patient is a 57y old  Male who presents with a chief complaint of shortness of breath (24 Sep 2018 09:17)      INTERVAL HPI/OVERNIGHT EVENTS:    Patient seen and examined. S/P HD. No sob. No dizziness. No palpitations. Feels good.    ROS: All other systems are negative.    Vital Signs:    T(F): 97.9 (18 @ 04:17), Max: 97.9 (18 @ 21:07)  HR: 63 (18 @ 12:05) (60 - 63)  BP: 130/68 (18 @ 12:05) (118/57 - 130/68)  RR: 18 (18 @ 12:05) (18 - 18)  SpO2: --  I&O's Summary    23 Sep 2018 07:01  -  24 Sep 2018 07:00  --------------------------------------------------------  IN: 1904 mL / OUT: 1125 mL / NET: 779 mL      Daily     Daily Weight in k.3 (24 Sep 2018 06:35)  CAPILLARY BLOOD GLUCOSE      POCT Blood Glucose.: 135 mg/dL (24 Sep 2018 11:18)  POCT Blood Glucose.: 160 mg/dL (24 Sep 2018 07:26)  POCT Blood Glucose.: 172 mg/dL (23 Sep 2018 21:33)  POCT Blood Glucose.: 146 mg/dL (23 Sep 2018 17:06)      PHYSICAL EXAM:    GENERAL:  NAD  SKIN: No rashes or lesions  HENT: Atrumatic. Normocephalic. PERRL. Moist membranes.  NECK: Supple, No JVD. No lymphadenopathy.  PULMONARY: BS are decreased in the bases B/L. No wheezing. No rales  CVS: Normal S1, S2. Rate and Rythm are regular. No murmurs.  ABDOMEN/GI: Soft, Nontender, Nondistended; BS present  EXTREMITIES: Peripheral pulses intact. No edema B/L LE. Ulcer on the lower legs and toes  NEUROLOGIC:  No motor or sensory deficit.  PSYCH: Alert & oriented x 3    Consultant(s) Notes Reviewed:  [x ] YES  [ ] NO  Care Discussed with Consultants/Other Providers [ x] YES  [ ] NO    EKG reviewed  Telemetry reviewed    LABS:                        7.7    13.06 )-----------( 168      ( 24 Sep 2018 08:16 )             23.1         143  |  104  |  94<HH>  ----------------------------<  139<H>  4.6   |  18  |  8.2<HH>    Ca    8.2<L>      24 Sep 2018 08:16  Phos  7.6       Mg     1.8         TPro  5.1<L>  /  Alb  3.1<L>  /  TBili  0.3  /  DBili  x   /  AST  17  /  ALT  16  /  AlkPhos  110      PT/INR - ( 24 Sep 2018 01:21 )   PT: 13.90 sec;   INR: 1.29 ratio         PTT - ( 24 Sep 2018 01:21 )  PTT:33.6 sec  Serum Pro-Brain Natriuretic Peptide: 7464 pg/mL (18 @ 21:32)    Trop 0.25, CKMB 19.3, CK --, 18 @ 08:40        RADIOLOGY & ADDITIONAL TESTS:      Imaging or report Personally Reviewed:  [ ] YES  [ ] NO    Medications:  Standing  amLODIPine   Tablet 10 milliGRAM(s) Oral daily  aspirin 325 milliGRAM(s) Oral daily  ATENolol  Tablet 50 milliGRAM(s) Oral daily  atorvastatin 80 milliGRAM(s) Oral at bedtime  calcium acetate 1334 milliGRAM(s) Oral three times a day with meals  clopidogrel Tablet 75 milliGRAM(s) Oral daily  darbepoetin Injectable Syringe 20 MICROGram(s) IV Push <User Schedule>  doxercalciferol Injectable 4 MICROGram(s) IV Push <User Schedule>  heparin  Injectable 5000 Unit(s) SubCutaneous every 8 hours  influenza   Vaccine 0.5 milliLiter(s) IntraMuscular once  silver sulfADIAZINE 1% Cream 1 Application(s) Topical daily  tamsulosin 0.4 milliGRAM(s) Oral at bedtime    PRN Meds  acetaminophen   Tablet .. 650 milliGRAM(s) Oral every 6 hours PRN  oxyCODONE    5 mG/acetaminophen 325 mG 1 Tablet(s) Oral every 6 hours PRN      Case discussed with resident    Care discussed with pt/family

## 2018-09-24 NOTE — CHART NOTE - NSCHARTNOTEFT_GEN_A_CORE
patient and sister refused to go home with home services since they think patient is not ready to be alone at home and needs constant care  they prefer to be discharged to a nursing home  it was explained to them that he has home with home services as well as dialysis spot but they insisted he needs constant care and that they haven't gotten enough clarification from the  on how to move around to dialysis  an intent to discharge was placed

## 2018-09-25 LAB
GLUCOSE BLDC GLUCOMTR-MCNC: 161 MG/DL — HIGH (ref 70–99)
GLUCOSE BLDC GLUCOMTR-MCNC: 165 MG/DL — HIGH (ref 70–99)
GLUCOSE BLDC GLUCOMTR-MCNC: 173 MG/DL — HIGH (ref 70–99)

## 2018-09-25 RX ORDER — DOCUSATE SODIUM 100 MG
100 CAPSULE ORAL THREE TIMES A DAY
Qty: 0 | Refills: 0 | Status: DISCONTINUED | OUTPATIENT
Start: 2018-09-25 | End: 2018-09-26

## 2018-09-25 RX ORDER — SENNA PLUS 8.6 MG/1
2 TABLET ORAL AT BEDTIME
Qty: 0 | Refills: 0 | Status: DISCONTINUED | OUTPATIENT
Start: 2018-09-25 | End: 2018-09-26

## 2018-09-25 RX ADMIN — Medication 325 MILLIGRAM(S): at 12:37

## 2018-09-25 RX ADMIN — ATENOLOL 50 MILLIGRAM(S): 25 TABLET ORAL at 06:39

## 2018-09-25 RX ADMIN — TAMSULOSIN HYDROCHLORIDE 0.4 MILLIGRAM(S): 0.4 CAPSULE ORAL at 22:14

## 2018-09-25 RX ADMIN — HEPARIN SODIUM 5000 UNIT(S): 5000 INJECTION INTRAVENOUS; SUBCUTANEOUS at 15:11

## 2018-09-25 RX ADMIN — HEPARIN SODIUM 5000 UNIT(S): 5000 INJECTION INTRAVENOUS; SUBCUTANEOUS at 06:39

## 2018-09-25 RX ADMIN — Medication 1334 MILLIGRAM(S): at 12:38

## 2018-09-25 RX ADMIN — Medication 1334 MILLIGRAM(S): at 16:52

## 2018-09-25 RX ADMIN — AMLODIPINE BESYLATE 10 MILLIGRAM(S): 2.5 TABLET ORAL at 06:39

## 2018-09-25 RX ADMIN — CLOPIDOGREL BISULFATE 75 MILLIGRAM(S): 75 TABLET, FILM COATED ORAL at 12:38

## 2018-09-25 RX ADMIN — Medication 1 APPLICATION(S): at 12:39

## 2018-09-25 RX ADMIN — ATORVASTATIN CALCIUM 80 MILLIGRAM(S): 80 TABLET, FILM COATED ORAL at 22:14

## 2018-09-25 RX ADMIN — Medication 100 MILLIGRAM(S): at 22:14

## 2018-09-25 RX ADMIN — HEPARIN SODIUM 5000 UNIT(S): 5000 INJECTION INTRAVENOUS; SUBCUTANEOUS at 22:15

## 2018-09-25 RX ADMIN — SENNA PLUS 2 TABLET(S): 8.6 TABLET ORAL at 22:14

## 2018-09-25 NOTE — PROGRESS NOTE ADULT - ASSESSMENT
56 yo M with PMHx of pituitary tumor s/p removal, acromegaly, CAD s/p 3 stents (2015), DLD, DMII, neuropathy, CKD 5 p/w 3 day hx of shortness of breath on exertion and was sent by urgent care.    1.	KATHARINA on CKD 5 likely progression of CKD on HD now  2.	 HAGMA  3.	Fluid overload/Ac. HFpEF  4.	CAD  5.	DM-2/DL  6.	Acromegaly  7.	B/L Leg ulcers  8.	Anemia of Ch. disease         PLAN:    ·	Wants to go to SNF  ·	Pt has HD slot on Mon, Wed and Fri. Explained it to the pt.  ·	S/P Tesio catheter and AVF.   ·	Care D/W the vascular surgery. Was scheduled for angiogram but refusing it. Vascular recommended out pt F/U.  ·	Local wound care of leg and foot ulcers.   ·	Waiting for SNF placement

## 2018-09-25 NOTE — CHART NOTE - NSCHARTNOTEFT_GEN_A_CORE
Registered Dietitian Follow-Up     Patient Profile Reviewed                           Yes [x]   No []     Nutrition History Previously Obtained        Yes [x]  No []       Pertinent Subjective Information: Pt reports good appetite/intake, eating "almost everything" and po % per EMR.      Pertinent Medical Interventions: S/P Tesio catheter and AVF. Care D/W the vascular surgery. Was scheduled for angiogram but refusing it. Vascular recommended out pt F/U. Pt cleared by renal, can d/c. Nephro following: KATHARINA on CKD 5 vs progression of CKD.     Diet order: carbohydrate consistent, renal, no snacks. 1000 ml fluid restriction      Anthropometrics:  - Ht. 75"   - Wt. - wt fluctuations from 120.2-129.6 kg during LOS - pt with edema on HD, continue to monitor wt changes during LOS      Pertinent Lab Data: (9/24) WBC 13.06, RBC 2.60, H/H 7.7/23.1, BUN 94, creatinine 8.2, glucose 139, albumin 3.1      Pertinent Meds: clopidogrel, heparin, darbepoetin, doxercalciferol, amlodipine, atenolol, atorvastatin, calcium acetate, oxycodone      Physical Findings:  - Appearance: alert, oriented   - GI function: pt reports nausea and vomiting yesterday 9/24 after breakfast but no nausea today, pt reports last BM a couple of days ago and a little GI discomfort   - Oral/Mouth cavity: pt denies issues chewing/swallowing   - Skin: wound, pressure ulcers: stage II L heel, Stage II R first toe, unstageable R 2nd toe, BS =18     Nutrition Requirements  Weight Used:  120.2kg (lowest recorded) and IBW- 89kg - continued from 9/21      Estimated energy needs: 2670-3120kcal/d (30-35kcal/kg IBW)- pt to be started on HD, pressure ulcers noted  Estimated protein needs: 107-125gm/d (1.2-1.4gm/kg IBW) - for HD + pressure ulcers    Estimated fluid needs: 1000ml as per MD    Nutrient Intake: not meeting needs      [] Previous Nutrition Diagnosis: Inadequate protein-energy intake             [] Ongoing          [x] Resolved    Nutrition Diagnostic #1  Problem: Increased nutrient needs   Etiology: increased metabolic demand 2/2 wound healing and HD  Statement: multiple pressure ulcers and on HD 3x/wk (MWF)     Nutrition Intervention Meals and Snacks, Medical Food Supplements  Continue current diet order and consider ordering Nepro q 24 hrs and consider MVI without minerals q 24 hrs.      Goal/Expected Outcome: Pt to consume >75% of meals, snacks, supplements within 3 days.     Indicator/Monitoring: RD to monitor diet order, energy intake ,renal and glucose profile, body composition, NFPF.

## 2018-09-25 NOTE — PROGRESS NOTE ADULT - ASSESSMENT
56 yo M with PMHx of pituitary tumor s/p removal, acromegaly, CAD s/p 3 stents (2015), DLD, DMII, neuropathy, CKD 5 p/w 3 day hx of shortness of breath on exertion and was sent by urgent care.    #KATHARINA on CKD 5 likely progression of CKD on HD now  #HAGMA  #Fluid overload/Ac. HFpEF  #Anemia of Ch. disease  S/P HD. Renal F/U noted. Also care D/W the renal. Pt has HD slot on Mon, Wed and Fri. Explained it to the pt.  S/P Tesio catheter and AVF. Restart his Plavix  Venofer given during HD    #CAD  #DM-2/DL  #Acromegaly  c/w home meds    #B/L Leg ulcers  Care D/W the vascular surgery. Was scheduled for angiogram but refusing it. Vascular recommended out pt F/U.  Local wound care of leg and foot ulcers.     #Dispo  Pt requestion SNF. awaiting placement

## 2018-09-25 NOTE — PROGRESS NOTE ADULT - ASSESSMENT
Patient is a 57y Male with KATHARINA on CKD 5 (Baseline Cr. 7.0) and metabolic acidosis. presented for SOB, GUTIERREZ, swelling of both ankles.  PMH acromegaly (S/P Pitutary removal), DM with neuropathy, DLD, kidney stones.    # KATHARINA on CKD 5 vs progression of CKD    - most likely progression of CKD    - sp tesio and av fistula    - s/p hd yesterday, hd in am   - patient has outpatient dialysis spot MWF at [address] at 5 pm ,? transportation issues     # Hyperphosphatemia   - cont Phoslo   - add renagel 2 tablets q 8    - PTH noted , on hectorol 4     # Anemia   - serum iron studies noted, will start venofer 100 with HD    - on Aranesp    - Maintain H/H    # will follow

## 2018-09-25 NOTE — PROGRESS NOTE ADULT - SUBJECTIVE AND OBJECTIVE BOX
SU SAWYER  57y Male    CHIEF COMPLAINT:    Patient is a 57y old  Male who presents with a chief complaint of shortness of breath (25 Sep 2018 09:57)      INTERVAL HPI/OVERNIGHT EVENTS:    Patient seen and examined. No new complaint. Feels good. Wants to go to SNF    ROS: All other systems are negative.    Vital Signs:    T(F): 98.5 (09-25-18 @ 05:53), Max: 98.5 (09-25-18 @ 05:53)  HR: 68 (09-25-18 @ 05:53) (68 - 72)  BP: 129/58 (09-25-18 @ 05:53) (115/56 - 129/58)  RR: 18 (09-25-18 @ 05:53) (18 - 18)  SpO2: 99% (09-25-18 @ 08:19) (99% - 99%)  I&O's Summary    24 Sep 2018 07:01  -  25 Sep 2018 07:00  --------------------------------------------------------  IN: 0 mL / OUT: 500 mL / NET: -500 mL    25 Sep 2018 07:01  -  25 Sep 2018 12:27  --------------------------------------------------------  IN: 470 mL / OUT: 0 mL / NET: 470 mL      Daily     Daily   CAPILLARY BLOOD GLUCOSE  146 (25 Sep 2018 08:29)      POCT Blood Glucose.: 173 mg/dL (25 Sep 2018 11:39)  POCT Blood Glucose.: 193 mg/dL (24 Sep 2018 21:13)  POCT Blood Glucose.: 132 mg/dL (24 Sep 2018 16:19)      PHYSICAL EXAM:    GENERAL:  NAD  SKIN: No rashes or lesions  HENT: Atrumatic. Normocephalic. PERRL. Moist membranes.  NECK: Supple, No JVD. No lymphadenopathy.  PULMONARY: CTA B/L. No wheezing. No rales  CVS: Normal S1, S2. Rate and Rythm are regular. No murmurs.  ABDOMEN/GI: Soft, Nontender, Nondistended; BS present  EXTREMITIES: Peripheral pulses intact. No edema B/L LE.  NEUROLOGIC:  No motor or sensory deficit.  PSYCH: Alert & oriented x 3    Consultant(s) Notes Reviewed:  [x ] YES  [ ] NO  Care Discussed with Consultants/Other Providers [ x] YES  [ ] NO      LABS:                        7.7    13.06 )-----------( 168      ( 24 Sep 2018 08:16 )             23.1     09-24    143  |  104  |  94<HH>  ----------------------------<  139<H>  4.6   |  18  |  8.2<HH>    Ca    8.2<L>      24 Sep 2018 08:16  Phos  7.6     09-24  Mg     1.8     09-24    TPro  5.1<L>  /  Alb  3.1<L>  /  TBili  0.3  /  DBili  x   /  AST  17  /  ALT  16  /  AlkPhos  110  09-24    PT/INR - ( 24 Sep 2018 01:21 )   PT: 13.90 sec;   INR: 1.29 ratio         PTT - ( 24 Sep 2018 01:21 )  PTT:33.6 sec  Serum Pro-Brain Natriuretic Peptide: 7464 pg/mL (09-18-18 @ 21:32)          RADIOLOGY & ADDITIONAL TESTS:      Imaging or report Personally Reviewed:  [ ] YES  [ ] NO    Medications:  Standing  amLODIPine   Tablet 10 milliGRAM(s) Oral daily  aspirin 325 milliGRAM(s) Oral daily  ATENolol  Tablet 50 milliGRAM(s) Oral daily  atorvastatin 80 milliGRAM(s) Oral at bedtime  calcium acetate 1334 milliGRAM(s) Oral three times a day with meals  clopidogrel Tablet 75 milliGRAM(s) Oral daily  darbepoetin Injectable Syringe 20 MICROGram(s) IV Push <User Schedule>  docusate sodium 100 milliGRAM(s) Oral three times a day  doxercalciferol Injectable 4 MICROGram(s) IV Push <User Schedule>  heparin  Injectable 5000 Unit(s) SubCutaneous every 8 hours  influenza   Vaccine 0.5 milliLiter(s) IntraMuscular once  senna 2 Tablet(s) Oral at bedtime  silver sulfADIAZINE 1% Cream 1 Application(s) Topical daily  tamsulosin 0.4 milliGRAM(s) Oral at bedtime    PRN Meds  acetaminophen   Tablet .. 650 milliGRAM(s) Oral every 6 hours PRN      Case discussed with resident    Care discussed with pt/family

## 2018-09-25 NOTE — PROGRESS NOTE ADULT - SUBJECTIVE AND OBJECTIVE BOX
seen and examined  s/p hd yesterday       Standing Inpatient Medications  amLODIPine   Tablet 10 milliGRAM(s) Oral daily  aspirin 325 milliGRAM(s) Oral daily  ATENolol  Tablet 50 milliGRAM(s) Oral daily  atorvastatin 80 milliGRAM(s) Oral at bedtime  calcium acetate 1334 milliGRAM(s) Oral three times a day with meals  clopidogrel Tablet 75 milliGRAM(s) Oral daily  darbepoetin Injectable Syringe 20 MICROGram(s) IV Push <User Schedule>  doxercalciferol Injectable 4 MICROGram(s) IV Push <User Schedule>  heparin  Injectable 5000 Unit(s) SubCutaneous every 8 hours  influenza   Vaccine 0.5 milliLiter(s) IntraMuscular once  silver sulfADIAZINE 1% Cream 1 Application(s) Topical daily  tamsulosin 0.4 milliGRAM(s) Oral at bedtime          VITALS/PHYSICAL EXAM  --------------------------------------------------------------------------------  T(C): 36.9 (09-25-18 @ 05:53), Max: 36.9 (09-25-18 @ 05:53)  HR: 68 (09-25-18 @ 05:53) (63 - 72)  BP: 129/58 (09-25-18 @ 05:53) (115/56 - 130/68)  RR: 18 (09-25-18 @ 05:53) (18 - 18)  SpO2: --  Wt(kg): --        09-24-18 @ 07:01  -  09-25-18 @ 07:00  --------------------------------------------------------  IN: 0 mL / OUT: 500 mL / NET: -500 mL      Physical Exam:  	Gen: NAD  	Pulm: decrease BS  B/L  	CV:  S1S2; no rub  	Abd:  soft, nontender/nondistended  	LE: b/l dressings, edema  	Vascular access: tesio, av fistula     LABS/STUDIES  --------------------------------------------------------------------------------              7.7    13.06 >-----------<  168      [09-24-18 @ 08:16]              23.1     143  |  104  |  94  ----------------------------<  139      [09-24-18 @ 08:16]  4.6   |  18  |  8.2        Ca     8.2     [09-24-18 @ 08:16]      Mg     1.8     [09-24-18 @ 08:16]      Phos  7.6     [09-24-18 @ 08:16]    TPro  5.1  /  Alb  3.1  /  TBili  0.3  /  DBili  x   /  AST  17  /  ALT  16  /  AlkPhos  110  [09-24-18 @ 08:16]    PT/INR: PT 13.90, INR 1.29       [09-24-18 @ 01:21]  PTT: 33.6       [09-24-18 @ 01:21]      Creatinine Trend:  SCr 8.2 [09-24 @ 08:16]  SCr 8.1 [09-23 @ 10:21]  SCr 9.8 [09-22 @ 08:40]  SCr 9.4 [09-20 @ 08:41]  SCr 9.0 [09-19 @ 04:30]    Urinalysis - [09-19-18 @ 06:40]      Color Yellow / Appearance Clear / SG 1.020 / pH 6.0      Gluc 100 / Ketone Negative  / Bili Negative / Urobili 0.2       Blood Small / Protein >=300 / Leuk Est Negative / Nitrite Negative      RBC 2-5 / WBC 3-5 / Hyaline  / Gran 1-2 / Sq Epi  / Non Sq Epi Occasional / Bacteria     Urine Creatinine 51      [09-20-18 @ 01:25]  Urine Protein 134      [09-20-18 @ 01:25]  Urine Urea Nitrogen 415      [09-20-18 @ 01:25]    Iron 38, TIBC 166, %sat 23      [09-19-18 @ 04:30]  Ferritin 55      [03-09-18 @ 18:01]  PTH -- (Ca 8.2)      [09-22-18 @ 08:40]   2071  PTH -- (Ca 8.0)      [09-19-18 @ 04:30]   1519  PTH -- (Ca 8.3)      [03-09-18 @ 18:01]   1545  Vitamin D (25OH) 14.4      [03-09-18 @ 18:01]    HBsAb Reactive      [09-20-18 @ 21:44]  HBsAg Nonreact      [09-20-18 @ 21:44]  HBcAb Nonreact      [09-20-18 @ 21:44]  HCV 0.13, Nonreact      [09-20-18 @ 21:44]

## 2018-09-25 NOTE — PROGRESS NOTE ADULT - SUBJECTIVE AND OBJECTIVE BOX
SU SAWYER  57y  Male    Patient is a 57y old  Male who presents with a chief complaint of shortness of breath (25 Sep 2018 08:01)    INTERVAL HPI/OVERNIGHT EVENTS:  s/p HD, tolerated well    Vital Signs Last 24 Hrs  T(F): 98.5 (25 Sep 2018 05:53), Max: 98.5 (25 Sep 2018 05:53)  HR: 68 (25 Sep 2018 05:53) (63 - 72)  BP: 129/58 (25 Sep 2018 05:53) (115/56 - 130/68)  RR: 18 (25 Sep 2018 05:53) (18 - 18)  SpO2: 99% (25 Sep 2018 08:19) (99% - 99%)      PHYSICAL EXAMINATION:  GENERAL: The patient is a well-developed, well-nourished in no apparent distress. AOX3.  HEENT: NCAT/PERRLA/EOMI.   NECK: Supple. No lymphadenopathy or thyromegaly.  LUNGS: No respiratory distress or accessory muscle use.   HEART: RRR  ABDOMEN: Soft, nontender, and nondistended.  No gaurding or rigidity.  No hepatosplenomegaly was noted.  EXTREMITIES: Without any cyanosis, clubbing, rash, lesions or edema. TESIO and Fistual c/d/i  NEUROLOGIC: Cranial nerves II through XII are grossly intact.    Consultant(s) Notes Reviewed:  [x ] YES  [ ] NO  Care Discussed with Consultants/Other Providers [ x] YES  [ ] NO    LABS:                        7.7    13.06 )-----------( 168      ( 24 Sep 2018 08:16 )             23.1     09-24    143  |  104  |  94<HH>  ----------------------------<  139<H>  4.6   |  18  |  8.2<HH>    Ca    8.2<L>      24 Sep 2018 08:16  Phos  7.6     09-24  Mg     1.8     09-24    TPro  5.1<L>  /  Alb  3.1<L>  /  TBili  0.3  /  DBili  x   /  AST  17  /  ALT  16  /  AlkPhos  110  09-24  PT/INR - ( 24 Sep 2018 01:21 )   PT: 13.90 sec;   INR: 1.29 ratio    PTT - ( 24 Sep 2018 01:21 )  PTT:33.6 sec    CAPILLARY BLOOD GLUCOSE  146 (25 Sep 2018 08:29)    POCT Blood Glucose.: 193 mg/dL (24 Sep 2018 21:13)  POCT Blood Glucose.: 132 mg/dL (24 Sep 2018 16:19)  POCT Blood Glucose.: 135 mg/dL (24 Sep 2018 11:18)

## 2018-09-26 VITALS — HEART RATE: 78 BPM | SYSTOLIC BLOOD PRESSURE: 114 MMHG | DIASTOLIC BLOOD PRESSURE: 58 MMHG | RESPIRATION RATE: 18 BRPM

## 2018-09-26 LAB
GLUCOSE BLDC GLUCOMTR-MCNC: 142 MG/DL — HIGH (ref 70–99)
GLUCOSE BLDC GLUCOMTR-MCNC: 146 MG/DL — HIGH (ref 70–99)
GLUCOSE BLDC GLUCOMTR-MCNC: 199 MG/DL — HIGH (ref 70–99)

## 2018-09-26 RX ORDER — ATORVASTATIN CALCIUM 80 MG/1
1 TABLET, FILM COATED ORAL
Qty: 0 | Refills: 0 | COMMUNITY
Start: 2018-09-26

## 2018-09-26 RX ORDER — DARBEPOETIN ALFA IN POLYSORBAT 200MCG/0.4
20 PEN INJECTOR (ML) SUBCUTANEOUS
Qty: 0 | Refills: 0 | COMMUNITY
Start: 2018-09-26

## 2018-09-26 RX ORDER — ROSUVASTATIN CALCIUM 5 MG/1
1 TABLET ORAL
Qty: 30 | Refills: 0
Start: 2018-09-26

## 2018-09-26 RX ORDER — AMLODIPINE BESYLATE 2.5 MG/1
1 TABLET ORAL
Qty: 0 | Refills: 0 | COMMUNITY

## 2018-09-26 RX ORDER — DOXERCALCIFEROL 2.5 UG/1
4 CAPSULE ORAL
Qty: 0 | Refills: 0 | COMMUNITY
Start: 2018-09-26

## 2018-09-26 RX ORDER — CALCITRIOL 0.5 UG/1
1 CAPSULE ORAL
Qty: 0 | Refills: 0 | COMMUNITY

## 2018-09-26 RX ADMIN — ATENOLOL 50 MILLIGRAM(S): 25 TABLET ORAL at 05:19

## 2018-09-26 RX ADMIN — Medication 325 MILLIGRAM(S): at 11:21

## 2018-09-26 RX ADMIN — Medication 1334 MILLIGRAM(S): at 11:24

## 2018-09-26 RX ADMIN — Medication 100 MILLIGRAM(S): at 05:19

## 2018-09-26 RX ADMIN — Medication 1 APPLICATION(S): at 13:10

## 2018-09-26 RX ADMIN — HEPARIN SODIUM 5000 UNIT(S): 5000 INJECTION INTRAVENOUS; SUBCUTANEOUS at 05:19

## 2018-09-26 RX ADMIN — AMLODIPINE BESYLATE 10 MILLIGRAM(S): 2.5 TABLET ORAL at 05:19

## 2018-09-26 RX ADMIN — Medication 1334 MILLIGRAM(S): at 07:52

## 2018-09-26 RX ADMIN — CLOPIDOGREL BISULFATE 75 MILLIGRAM(S): 75 TABLET, FILM COATED ORAL at 11:21

## 2018-09-26 RX ADMIN — DOXERCALCIFEROL 4 MICROGRAM(S): 2.5 CAPSULE ORAL at 15:33

## 2018-09-26 RX ADMIN — Medication 1334 MILLIGRAM(S): at 18:30

## 2018-09-26 RX ADMIN — HEPARIN SODIUM 5000 UNIT(S): 5000 INJECTION INTRAVENOUS; SUBCUTANEOUS at 13:11

## 2018-09-26 NOTE — CONSULT NOTE ADULT - SUBJECTIVE AND OBJECTIVE BOX
HPI:  56 yo M with PMHx of pituitary tumor s/p removal, acromegaly, CAD s/p 3 stents (2015), DLD, DMII, neuropathy, CKD 5 p/w 3 day hx of shortness of breath on exertion and was sent by urgent care. Pt states he has compliant w/ his medications and 3 days ago he developed progressively worsening shortness of breath on exertion. he was unable to walk a block without having to catch a breath and then went to urgent care. He had a CXR done which apparently showed congestion and thus sent him to the ED. Pt denies syncope, chest pain, dizziness, confusion, cough, fever, and chills.    In the ED, vitals were wnl and Pt was saturating 100% on rA. pt was found to have an KATHARINA w/ AGMA w/ pH of 7.1 and HCO3 of 18. CXR in ED did not show any congestion. Trops was elevated at 0.33 and BNP 7464. Nephro was consulted and recommended HCO3 drip and no emergent need for dialysis. Cardio fellow recommended trending CE as pt has no acute chest pain. Pt denied using any pain killers, any recent use of antibiotics. Pt has been having adequate PO intake (19 Sep 2018 02:40)      PAST MEDICAL & SURGICAL HISTORY:  Dyslipidemia  DM (diabetes mellitus), type 2  Neuropathy  HTN (hypertension)  CAD (coronary atherosclerotic disease)  Acromegaly  CKD (chronic kidney disease), stage V  H/O eye surgery  H/O: pituitary tumor: s/p removal      Hospital Course:  He has weakness of ankle DF and PF. He has amb. with a walker for 6 months. He lives alone. he has had to start HD for ESRD. He has DM neuropathy.  TODAY'S SUBJECTIVE & REVIEW OF SYMPTOMS:     Constitutional WNL   Cardio WNL   Resp WNL   GI WNL  Heme WNL  Endo WNL  Skin WNL  MSK WNL  Neuro WNL  Cognitive WNL  Psych WNL      MEDICATIONS  (STANDING):  amLODIPine   Tablet 10 milliGRAM(s) Oral daily  aspirin 325 milliGRAM(s) Oral daily  ATENolol  Tablet 50 milliGRAM(s) Oral daily  atorvastatin 80 milliGRAM(s) Oral at bedtime  calcium acetate 1334 milliGRAM(s) Oral three times a day with meals  clopidogrel Tablet 75 milliGRAM(s) Oral daily  darbepoetin Injectable Syringe 20 MICROGram(s) IV Push <User Schedule>  docusate sodium 100 milliGRAM(s) Oral three times a day  doxercalciferol Injectable 4 MICROGram(s) IV Push <User Schedule>  heparin  Injectable 5000 Unit(s) SubCutaneous every 8 hours  influenza   Vaccine 0.5 milliLiter(s) IntraMuscular once  senna 2 Tablet(s) Oral at bedtime  silver sulfADIAZINE 1% Cream 1 Application(s) Topical daily  tamsulosin 0.4 milliGRAM(s) Oral at bedtime    MEDICATIONS  (PRN):  acetaminophen   Tablet .. 650 milliGRAM(s) Oral every 6 hours PRN Mild Pain (1 - 3)      FAMILY HISTORY:  Family history of myocardial infarction (Father)      Allergies    bacitracin (Unknown)  Neosporin (Hypotension)    Intolerances        SOCIAL HISTORY:    [  ] Etoh  [  ] Smoking  [  ] Substance abuse     Home Environment:  [x  ] Home Alone  [  ] Lives with Family  [  ] Home Health Aid    Dwelling:  [  ] Apartment  [  ] Private House  [  ] Adult Home  [  ] Skilled Nursing Facility      [  ] Short Term  [  ] Long Term  [  ] Stairs       Elevator [  ]    FUNCTIONAL STATUS PTA: (Check all that apply)  Ambulation: [x   ]Independent    [  ] Dependent     [  ] Non-Ambulatory  Assistive Device: [  ] SA Cane  [  ]  Q Cane  [ x ] Walker  [  ]  Wheelchair  ADL : [  ] Independent  [  ]  Dependent       Vital Signs Last 24 Hrs  T(C): 36.6 (26 Sep 2018 13:34), Max: 37.2 (25 Sep 2018 20:42)  T(F): 97.8 (26 Sep 2018 13:34), Max: 99 (25 Sep 2018 20:42)  HR: 78 (26 Sep 2018 14:35) (57 - 78)  BP: 118/60 (26 Sep 2018 14:35) (87/51 - 126/61)  BP(mean): --  RR: 18 (26 Sep 2018 14:35) (18 - 19)  SpO2: --      PHYSICAL EXAM: Alert & Oriented X3  GENERAL: NAD, well-groomed, well-developed  HEAD:  Atraumatic, Normocephalic  EYES: EOMI, PERRLA, conjunctiva and sclera clear  NECK: Supple, No JVD, Normal thyroid  CHEST/LUNG: Clear to percussion bilaterally; No rales, rhonchi, wheezing, or rubs  HEART: Regular rate and rhythm; No murmurs, rubs, or gallops  ABDOMEN: Soft, Nontender, Nondistended; Bowel sounds present  EXTREMITIES:  2+ Peripheral Pulses, No clubbing, cyanosis, or edema    NERVOUS SYSTEM:  Cranial Nerves 2-12 intact [  ] Abnormal  [  ]  ROM: WFL all extremities [  ]  Abnormal [  ]  Motor Strength: WFL all extremities  [  ]  Abnormal [x  ] weakness of bilat ankle DF/PF 1/5  Sensation: intact to light touch [  ] Abnormal [ x ] impaired PP feet  Reflexes: Symmetric [  ]  Abnormal [  ]    FUNCTIONAL STATUS:  Bed Mobility: Independent [  ]  Supervision [  ]  Needs Assistance [  ]  N/A [  ]  Transfers: Independent [  ]  Supervision [  ]  Needs Assistance [  ]  N/A [  ]   Ambulation: Independent [  ]  Supervision [  ]  Needs Assistance [  ]  N/A [  ]  ADL: Independent [  ] Requires Assistance [  ] N/A [  ]      LABS:                RADIOLOGY & ADDITIONAL STUDIES:    Assesment:

## 2018-09-26 NOTE — PROGRESS NOTE ADULT - SUBJECTIVE AND OBJECTIVE BOX
seen and examined  no distress  no new complaints       Standing Inpatient Medications  amLODIPine   Tablet 10 milliGRAM(s) Oral daily  aspirin 325 milliGRAM(s) Oral daily  ATENolol  Tablet 50 milliGRAM(s) Oral daily  atorvastatin 80 milliGRAM(s) Oral at bedtime  calcium acetate 1334 milliGRAM(s) Oral three times a day with meals  clopidogrel Tablet 75 milliGRAM(s) Oral daily  darbepoetin Injectable Syringe 20 MICROGram(s) IV Push <User Schedule>  docusate sodium 100 milliGRAM(s) Oral three times a day  doxercalciferol Injectable 4 MICROGram(s) IV Push <User Schedule>  heparin  Injectable 5000 Unit(s) SubCutaneous every 8 hours  influenza   Vaccine 0.5 milliLiter(s) IntraMuscular once  senna 2 Tablet(s) Oral at bedtime  silver sulfADIAZINE 1% Cream 1 Application(s) Topical daily  tamsulosin 0.4 milliGRAM(s) Oral at bedtime          VITALS/PHYSICAL EXAM  --------------------------------------------------------------------------------  T(C): 36.3 (09-26-18 @ 05:00), Max: 37.2 (09-25-18 @ 20:42)  HR: 59 (09-26-18 @ 05:00) (57 - 59)  BP: 124/59 (09-26-18 @ 05:00) (107/58 - 126/61)  RR: 19 (09-26-18 @ 05:00) (18 - 19)  SpO2: 99% (09-25-18 @ 08:19) (99% - 99%)  Wt(kg): --        09-25-18 @ 07:01  -  09-26-18 @ 07:00  --------------------------------------------------------  IN: 950 mL / OUT: 0 mL / NET: 950 mL      Physical Exam:  	Gen: NAD  	Pulm: decrease BS  B/L  	CV: S1S2; no rub  	Abd: +distended  	LE: B/L DRESSINGS   	Vascular access: tesio, av fistula     LABS/STUDIES  --------------------------------------------------------------------------------              7.7    13.06 >-----------<  168      [09-24-18 @ 08:16]              23.1     143  |  104  |  94  ----------------------------<  139      [09-24-18 @ 08:16]  4.6   |  18  |  8.2        Ca     8.2     [09-24-18 @ 08:16]      Mg     1.8     [09-24-18 @ 08:16]      Phos  7.6     [09-24-18 @ 08:16]    TPro  5.1  /  Alb  3.1  /  TBili  0.3  /  DBili  x   /  AST  17  /  ALT  16  /  AlkPhos  110  [09-24-18 @ 08:16]          Creatinine Trend:  SCr 8.2 [09-24 @ 08:16]  SCr 8.1 [09-23 @ 10:21]  SCr 9.8 [09-22 @ 08:40]  SCr 9.4 [09-20 @ 08:41]  SCr 9.0 [09-19 @ 04:30]    Urinalysis - [09-19-18 @ 06:40]      Color Yellow / Appearance Clear / SG 1.020 / pH 6.0      Gluc 100 / Ketone Negative  / Bili Negative / Urobili 0.2       Blood Small / Protein >=300 / Leuk Est Negative / Nitrite Negative      RBC 2-5 / WBC 3-5 / Hyaline  / Gran 1-2 / Sq Epi  / Non Sq Epi Occasional / Bacteria     Urine Creatinine 51      [09-20-18 @ 01:25]  Urine Protein 134      [09-20-18 @ 01:25]  Urine Urea Nitrogen 415      [09-20-18 @ 01:25]    Iron 38, TIBC 166, %sat 23      [09-19-18 @ 04:30]  Ferritin 55      [03-09-18 @ 18:01]  PTH -- (Ca 8.2)      [09-22-18 @ 08:40]   2071  PTH -- (Ca 8.0)      [09-19-18 @ 04:30]   1519  PTH -- (Ca 8.3)      [03-09-18 @ 18:01]   1545  Vitamin D (25OH) 14.4      [03-09-18 @ 18:01]    HBsAb Reactive      [09-20-18 @ 21:44]  HBsAg Nonreact      [09-20-18 @ 21:44]  HBcAb Nonreact      [09-20-18 @ 21:44]  HCV 0.13, Nonreact      [09-20-18 @ 21:44]

## 2018-09-26 NOTE — PROGRESS NOTE ADULT - SUBJECTIVE AND OBJECTIVE BOX
SU SAWYER  57y Male    CHIEF COMPLAINT:    Patient is a 57y old  Male who presents with a chief complaint of shortness of breath (26 Sep 2018 07:37)      INTERVAL HPI/OVERNIGHT EVENTS:    Patient seen and examined. No new complaint. No sob. No cp.     ROS: All other systems are negative.    Vital Signs:    T(F): 97.4 (18 @ 05:00), Max: 99 (18 @ 20:42)  HR: 59 (18 @ 05:00) (57 - 59)  BP: 124/59 (18 @ 05:00) (107/58 - 126/61)  RR: 19 (18 @ 05:00) (18 - 19)  SpO2: --  I&O's Summary    25 Sep 2018 07:01  -  26 Sep 2018 07:00  --------------------------------------------------------  IN: 950 mL / OUT: 0 mL / NET: 950 mL    26 Sep 2018 07:01  -  26 Sep 2018 11:04  --------------------------------------------------------  IN: 320 mL / OUT: 0 mL / NET: 320 mL      Daily     Daily Weight in k.6 (26 Sep 2018 05:00)  CAPILLARY BLOOD GLUCOSE  142 (26 Sep 2018 08:17)      POCT Blood Glucose.: 142 mg/dL (26 Sep 2018 08:07)  POCT Blood Glucose.: 165 mg/dL (25 Sep 2018 21:22)  POCT Blood Glucose.: 161 mg/dL (25 Sep 2018 16:49)  POCT Blood Glucose.: 173 mg/dL (25 Sep 2018 11:39)      PHYSICAL EXAM:    GENERAL:  NAD  SKIN: No rashes or lesions  HENT: Atrumatic. Normocephalic. PERRL. Moist membranes.  NECK: Supple, No JVD. No lymphadenopathy.  PULMONARY: CTA B/L. No wheezing. No rales  CVS: Normal S1, S2. Rate and Rythm are regular. No murmurs.  ABDOMEN/GI: Soft, Nontender, Nondistended; BS present  EXTREMITIES: Peripheral pulses intact. No edema B/L LE.  NEUROLOGIC:  No motor or sensory deficit.  PSYCH: Alert & oriented x 3    Consultant(s) Notes Reviewed:  [x ] YES  [ ] NO  Care Discussed with Consultants/Other Providers [ x] YES  [ ] NO        LABS:                    RADIOLOGY & ADDITIONAL TESTS:      Imaging or report Personally Reviewed:  [ ] YES  [ ] NO    Medications:  Standing  amLODIPine   Tablet 10 milliGRAM(s) Oral daily  aspirin 325 milliGRAM(s) Oral daily  ATENolol  Tablet 50 milliGRAM(s) Oral daily  atorvastatin 80 milliGRAM(s) Oral at bedtime  calcium acetate 1334 milliGRAM(s) Oral three times a day with meals  clopidogrel Tablet 75 milliGRAM(s) Oral daily  darbepoetin Injectable Syringe 20 MICROGram(s) IV Push <User Schedule>  docusate sodium 100 milliGRAM(s) Oral three times a day  doxercalciferol Injectable 4 MICROGram(s) IV Push <User Schedule>  heparin  Injectable 5000 Unit(s) SubCutaneous every 8 hours  influenza   Vaccine 0.5 milliLiter(s) IntraMuscular once  senna 2 Tablet(s) Oral at bedtime  silver sulfADIAZINE 1% Cream 1 Application(s) Topical daily  tamsulosin 0.4 milliGRAM(s) Oral at bedtime    PRN Meds  acetaminophen   Tablet .. 650 milliGRAM(s) Oral every 6 hours PRN      Case discussed with resident    Care discussed with pt/family

## 2018-09-26 NOTE — PHYSICAL THERAPY INITIAL EVALUATION ADULT - IMPAIRED TRANSFERS: SIT/STAND, REHAB EVAL
impaired motor control/decreased strength/impaired balance/impaired coordination/decreased flexibility

## 2018-09-26 NOTE — PROGRESS NOTE ADULT - ASSESSMENT
Patient is a 57y Male with KATHARINA on CKD 5 (Baseline Cr. 7.0) and metabolic acidosis. presented for SOB, GUTIERREZ, swelling of both ankles.  PMH acromegaly (S/P Pitutary removal), DM with neuropathy, DLD, kidney stones.    # KATHARINA on CKD 5 vs progression of CKD    - most likely progression of CKD    - sp tesio and av fistula    - hd today, standard bath, uf 2 liters as tolerated   - patient has outpatient dialysis spot MWF at [address] at 5 pm ,? transportation issues   - if patient is going to SNF please consider [facility] or [facility] in view of availability of HD in theses facilities and issues with transportation     # Hyperphosphatemia   - cont Phoslo   - add renagel 2 tablets q 8    - PTH noted , on hectorol 4     # Anemia   - serum iron studies noted, will start venofer 100 with HD    - on Aranesp    - Maintain H/H  -tx if h <7    # will follow

## 2018-09-26 NOTE — CONSULT NOTE ADULT - ASSESSMENT
IMPRESSION: Rehab of diabetic peripheral neuropathy, foot drop bilat, debility, ESRD on HD    PRECAUTIONS: [ x ] Cardiac  [  ] Respiratory  [  ] Seizures [  ] Contact Isolation  [  ] Droplet Isolation  [  ] Other    Weight Bearing Status:     RECOMMENDATION:    Out of Bed to Chair     DVT/Decubiti Prophylaxis    REHAB PLAN:     [ x  ] Bedside P/T 3-5 times a week   [   ]   Bedside O/T  2-3 times a week             [   ] No Rehab Therapy Indicated                   [   ]  Speech Therapy   Conditioning/ROM                                    ADL  Bed Mobility                                               Conditioning/ROM  Transfers                                                     Bed Mobility  Sitting /Standing Balance                         Transfers                                        Gait Training                                               Sitting/Standing Balance  Stair Training [   ]Applicable                    Home equipment Eval                                                                        Splinting  [   ] Only      GOALS:   ADL   [ x  ]   Independent                    Transfers  [ x  ] Independent                          Ambulation  [x   ] Independent     [  x  ] With device                            [   ]  CG                                                         [   ]  CG                                                                  [   ] CG                            [    ] Min A                                                   [   ] Min A                                                              [   ] Min  A          DISCHARGE PLAN:   [   ]  Good candidate for Intensive Rehabilitation/Hospital based-[facility]                                             Will tolerate 3hrs Intensive Rehab Daily                                       [  xx  ]  Short Term Rehab in Skilled Nursing Facility                                       [    ]  Home with Outpatient or VN services                                         [    ]  Possible Candidate for Intensive Hospital based Rehab

## 2018-09-26 NOTE — PROGRESS NOTE ADULT - REASON FOR ADMISSION
pre op risk assessment
shortness of breath

## 2018-09-26 NOTE — PROGRESS NOTE ADULT - PROVIDER SPECIALTY LIST ADULT
Hospitalist
Hospitalist
Internal Medicine
Nephrology
Podiatry
Surgery
Surgery
Cardiology
Surgery
Surgery
Internal Medicine
Nephrology

## 2018-09-26 NOTE — PHYSICAL THERAPY INITIAL EVALUATION ADULT - LEVEL OF INDEPENDENCE: SIT/STAND, REHAB EVAL
dependent (less than 25% patients effort)/Despite multiple attempts, pt unable to get completely upright from sitting. He reports he is too weak to do this movement. 3C staff report pt was able to ambulate to bathroom earlier today.

## 2018-09-26 NOTE — PROGRESS NOTE ADULT - ASSESSMENT
56 yo M with PMHx of pituitary tumor s/p removal, acromegaly, CAD s/p 3 stents (2015), DLD, DMII, neuropathy, CKD 5 p/w 3 day hx of shortness of breath on exertion and was sent by urgent care.    1.	KATHARINA on CKD 5 likely progression of CKD on HD now  2.	 HAGMA  3.	Fluid overload/Ac. HFpEF  4.	CAD  5.	DM-2/DL  6.	Acromegaly  7.	B/L Leg ulcers  8.	Anemia of Ch. disease         PLAN:    ·	Waiting for SNF placement  ·	Pt has HD slot on Mon, Wed and Fri. Explained it to the pt.  ·	S/P Tesio catheter and AVF.   ·	Care D/W the vascular surgery. Was scheduled for angiogram but refusing it. Vascular recommended out pt F/U.  ·	Local wound care of leg and foot ulcers.   ·	D/C planning.    * Med rec reviewed. Plan of care D/W the pt. Time spent 33 minutes. 56 yo M with PMHx of pituitary tumor s/p removal, acromegaly, CAD s/p 3 stents (2015), DLD, DMII, neuropathy, CKD 5 p/w 3 day hx of shortness of breath on exertion and was sent by urgent care.    1.	KATHARINA on CKD 5 likely progression of CKD on HD now  2.	 HAGMA  3.	Fluid overload/Ac. HFpEF  4.	CAD  5.	DM-2/DL  6.	Acromegaly  7.	B/L Leg ulcers  8.	Anemia of Ch. disease         PLAN:    ·	Waiting for SNF placement  ·	Pt has HD slot on Mon, Wed and Fri. Explained it to the pt.  ·	S/P Tesio catheter and AVF.   ·	Care D/W the vascular surgery. Was scheduled for angiogram but refusing it. Vascular recommended out pt F/U.  ·	Local wound care of leg and foot ulcers.   ·	D/C planning.    * Med rec reviewed with the intern. Plan of care D/W the pt. Time spent 33 minutes.

## 2018-09-26 NOTE — CONSULT NOTE ADULT - CONSULT REQUESTED DATE/TIME
19-Sep-2018 15:31
19-Sep-2018 15:43
19-Sep-2018 17:46
26-Sep-2018 16:24
19-Sep-2018 06:31
19-Sep-2018 16:51

## 2018-09-26 NOTE — CONSULT NOTE ADULT - CONSULT REASON
Eval for Tesio cath placement & creation of AVF.
Fool and LE ulcer b/l
debility  acute on chronic renal insuff  DM neuropathy
nonhealing DFU, SFA occlusion on duplex
elevated troponin
CKD 5

## 2018-09-26 NOTE — PHYSICAL THERAPY INITIAL EVALUATION ADULT - CRITERIA FOR SKILLED THERAPEUTIC INTERVENTIONS
risk reduction/prevention/rehab potential/impairments found/therapy frequency/predicted duration of therapy intervention/anticipated equipment needs at discharge/functional limitations in following categories

## 2018-09-28 ENCOUNTER — OUTPATIENT (OUTPATIENT)
Dept: OUTPATIENT SERVICES | Facility: HOSPITAL | Age: 58
LOS: 1 days | Discharge: HOME | End: 2018-09-28

## 2018-09-28 DIAGNOSIS — Z87.898 PERSONAL HISTORY OF OTHER SPECIFIED CONDITIONS: Chronic | ICD-10-CM

## 2018-09-28 DIAGNOSIS — Z98.890 OTHER SPECIFIED POSTPROCEDURAL STATES: Chronic | ICD-10-CM

## 2018-09-28 DIAGNOSIS — R79.9 ABNORMAL FINDING OF BLOOD CHEMISTRY, UNSPECIFIED: ICD-10-CM

## 2018-10-02 ENCOUNTER — OUTPATIENT (OUTPATIENT)
Dept: OUTPATIENT SERVICES | Facility: HOSPITAL | Age: 58
LOS: 1 days | Discharge: HOME | End: 2018-10-02

## 2018-10-02 DIAGNOSIS — E78.5 HYPERLIPIDEMIA, UNSPECIFIED: ICD-10-CM

## 2018-10-02 DIAGNOSIS — E11.40 TYPE 2 DIABETES MELLITUS WITH DIABETIC NEUROPATHY, UNSPECIFIED: ICD-10-CM

## 2018-10-02 DIAGNOSIS — E22.0 ACROMEGALY AND PITUITARY GIGANTISM: ICD-10-CM

## 2018-10-02 DIAGNOSIS — L85.3 XEROSIS CUTIS: ICD-10-CM

## 2018-10-02 DIAGNOSIS — I25.10 ATHEROSCLEROTIC HEART DISEASE OF NATIVE CORONARY ARTERY WITHOUT ANGINA PECTORIS: ICD-10-CM

## 2018-10-02 DIAGNOSIS — I13.2 HYPERTENSIVE HEART AND CHRONIC KIDNEY DISEASE WITH HEART FAILURE AND WITH STAGE 5 CHRONIC KIDNEY DISEASE, OR END STAGE RENAL DISEASE: ICD-10-CM

## 2018-10-02 DIAGNOSIS — R74.8 ABNORMAL LEVELS OF OTHER SERUM ENZYMES: ICD-10-CM

## 2018-10-02 DIAGNOSIS — Z88.3 ALLERGY STATUS TO OTHER ANTI-INFECTIVE AGENTS: ICD-10-CM

## 2018-10-02 DIAGNOSIS — I50.31 ACUTE DIASTOLIC (CONGESTIVE) HEART FAILURE: ICD-10-CM

## 2018-10-02 DIAGNOSIS — D63.8 ANEMIA IN OTHER CHRONIC DISEASES CLASSIFIED ELSEWHERE: ICD-10-CM

## 2018-10-02 DIAGNOSIS — N40.0 BENIGN PROSTATIC HYPERPLASIA WITHOUT LOWER URINARY TRACT SYMPTOMS: ICD-10-CM

## 2018-10-02 DIAGNOSIS — N17.9 ACUTE KIDNEY FAILURE, UNSPECIFIED: ICD-10-CM

## 2018-10-02 DIAGNOSIS — Z87.442 PERSONAL HISTORY OF URINARY CALCULI: ICD-10-CM

## 2018-10-02 DIAGNOSIS — N18.6 END STAGE RENAL DISEASE: ICD-10-CM

## 2018-10-02 DIAGNOSIS — E11.621 TYPE 2 DIABETES MELLITUS WITH FOOT ULCER: ICD-10-CM

## 2018-10-02 DIAGNOSIS — L97.428 NON-PRESSURE CHRONIC ULCER OF LEFT HEEL AND MIDFOOT WITH OTHER SPECIFIED SEVERITY: ICD-10-CM

## 2018-10-02 DIAGNOSIS — M21.372 FOOT DROP, LEFT FOOT: ICD-10-CM

## 2018-10-02 DIAGNOSIS — L97.419 NON-PRESSURE CHRONIC ULCER OF RIGHT HEEL AND MIDFOOT WITH UNSPECIFIED SEVERITY: ICD-10-CM

## 2018-10-02 DIAGNOSIS — D64.9 ANEMIA, UNSPECIFIED: ICD-10-CM

## 2018-10-02 DIAGNOSIS — E87.3 ALKALOSIS: ICD-10-CM

## 2018-10-02 DIAGNOSIS — I70.244 ATHEROSCLEROSIS OF NATIVE ARTERIES OF LEFT LEG WITH ULCERATION OF HEEL AND MIDFOOT: ICD-10-CM

## 2018-10-02 DIAGNOSIS — Z98.890 OTHER SPECIFIED POSTPROCEDURAL STATES: ICD-10-CM

## 2018-10-02 DIAGNOSIS — D50.9 IRON DEFICIENCY ANEMIA, UNSPECIFIED: ICD-10-CM

## 2018-10-02 DIAGNOSIS — M21.371 FOOT DROP, RIGHT FOOT: ICD-10-CM

## 2018-10-02 DIAGNOSIS — E87.2 ACIDOSIS: ICD-10-CM

## 2018-10-02 DIAGNOSIS — Z95.5 PRESENCE OF CORONARY ANGIOPLASTY IMPLANT AND GRAFT: ICD-10-CM

## 2018-10-02 DIAGNOSIS — E83.39 OTHER DISORDERS OF PHOSPHORUS METABOLISM: ICD-10-CM

## 2018-10-02 DIAGNOSIS — E11.22 TYPE 2 DIABETES MELLITUS WITH DIABETIC CHRONIC KIDNEY DISEASE: ICD-10-CM

## 2018-10-02 DIAGNOSIS — Z98.890 OTHER SPECIFIED POSTPROCEDURAL STATES: Chronic | ICD-10-CM

## 2018-10-02 DIAGNOSIS — E11.51 TYPE 2 DIABETES MELLITUS WITH DIABETIC PERIPHERAL ANGIOPATHY WITHOUT GANGRENE: ICD-10-CM

## 2018-10-02 DIAGNOSIS — Z87.898 PERSONAL HISTORY OF OTHER SPECIFIED CONDITIONS: Chronic | ICD-10-CM

## 2018-10-09 ENCOUNTER — OUTPATIENT (OUTPATIENT)
Dept: OUTPATIENT SERVICES | Facility: HOSPITAL | Age: 58
LOS: 1 days | Discharge: HOME | End: 2018-10-09

## 2018-10-09 DIAGNOSIS — Z87.898 PERSONAL HISTORY OF OTHER SPECIFIED CONDITIONS: Chronic | ICD-10-CM

## 2018-10-09 DIAGNOSIS — D64.9 ANEMIA, UNSPECIFIED: ICD-10-CM

## 2018-10-09 DIAGNOSIS — Z98.890 OTHER SPECIFIED POSTPROCEDURAL STATES: Chronic | ICD-10-CM

## 2018-10-10 ENCOUNTER — OUTPATIENT (OUTPATIENT)
Dept: OUTPATIENT SERVICES | Facility: HOSPITAL | Age: 58
LOS: 1 days | Discharge: HOME | End: 2018-10-10

## 2018-10-10 ENCOUNTER — EMERGENCY (EMERGENCY)
Facility: HOSPITAL | Age: 58
LOS: 0 days | Discharge: HOME | End: 2018-10-11
Attending: STUDENT IN AN ORGANIZED HEALTH CARE EDUCATION/TRAINING PROGRAM | Admitting: EMERGENCY MEDICINE

## 2018-10-10 VITALS
RESPIRATION RATE: 20 BRPM | DIASTOLIC BLOOD PRESSURE: 76 MMHG | TEMPERATURE: 97 F | SYSTOLIC BLOOD PRESSURE: 120 MMHG | HEART RATE: 73 BPM | OXYGEN SATURATION: 96 %

## 2018-10-10 DIAGNOSIS — E11.22 TYPE 2 DIABETES MELLITUS WITH DIABETIC CHRONIC KIDNEY DISEASE: ICD-10-CM

## 2018-10-10 DIAGNOSIS — Z99.2 DEPENDENCE ON RENAL DIALYSIS: ICD-10-CM

## 2018-10-10 DIAGNOSIS — Z87.898 PERSONAL HISTORY OF OTHER SPECIFIED CONDITIONS: Chronic | ICD-10-CM

## 2018-10-10 DIAGNOSIS — R79.9 ABNORMAL FINDING OF BLOOD CHEMISTRY, UNSPECIFIED: ICD-10-CM

## 2018-10-10 DIAGNOSIS — I12.0 HYPERTENSIVE CHRONIC KIDNEY DISEASE WITH STAGE 5 CHRONIC KIDNEY DISEASE OR END STAGE RENAL DISEASE: ICD-10-CM

## 2018-10-10 DIAGNOSIS — Z98.890 OTHER SPECIFIED POSTPROCEDURAL STATES: Chronic | ICD-10-CM

## 2018-10-10 DIAGNOSIS — N18.6 END STAGE RENAL DISEASE: ICD-10-CM

## 2018-10-10 DIAGNOSIS — D63.1 ANEMIA IN CHRONIC KIDNEY DISEASE: ICD-10-CM

## 2018-10-10 DIAGNOSIS — D64.9 ANEMIA, UNSPECIFIED: ICD-10-CM

## 2018-10-10 DIAGNOSIS — Z95.5 PRESENCE OF CORONARY ANGIOPLASTY IMPLANT AND GRAFT: ICD-10-CM

## 2018-10-10 LAB
ANION GAP SERPL CALC-SCNC: 17 MMOL/L — HIGH (ref 7–14)
ANISOCYTOSIS BLD QL: SLIGHT — SIGNIFICANT CHANGE UP
BASE EXCESS BLDV CALC-SCNC: 0.9 MMOL/L — SIGNIFICANT CHANGE UP (ref -2–2)
BASOPHILS # BLD AUTO: 0 K/UL — SIGNIFICANT CHANGE UP (ref 0–0.2)
BASOPHILS NFR BLD AUTO: 0 % — SIGNIFICANT CHANGE UP (ref 0–1)
BLD GP AB SCN SERPL QL: SIGNIFICANT CHANGE UP
BUN SERPL-MCNC: 49 MG/DL — HIGH (ref 10–20)
CA-I SERPL-SCNC: 1.09 MMOL/L — LOW (ref 1.12–1.3)
CALCIUM SERPL-MCNC: 8.4 MG/DL — LOW (ref 8.5–10.1)
CHLORIDE SERPL-SCNC: 95 MMOL/L — LOW (ref 98–110)
CO2 SERPL-SCNC: 25 MMOL/L — SIGNIFICANT CHANGE UP (ref 17–32)
CREAT SERPL-MCNC: 5.7 MG/DL — CRITICAL HIGH (ref 0.7–1.5)
EOSINOPHIL # BLD AUTO: 0.33 K/UL — SIGNIFICANT CHANGE UP (ref 0–0.7)
EOSINOPHIL NFR BLD AUTO: 2.6 % — SIGNIFICANT CHANGE UP (ref 0–8)
GAS PNL BLDV: 136 MMOL/L — SIGNIFICANT CHANGE UP (ref 136–145)
GAS PNL BLDV: SIGNIFICANT CHANGE UP
GIANT PLATELETS BLD QL SMEAR: PRESENT — SIGNIFICANT CHANGE UP
GLUCOSE SERPL-MCNC: 182 MG/DL — HIGH (ref 70–99)
HCO3 BLDV-SCNC: 27 MMOL/L — SIGNIFICANT CHANGE UP (ref 22–29)
HCT VFR BLD CALC: 21.7 % — LOW (ref 42–52)
HCT VFR BLDA CALC: 42.2 % — SIGNIFICANT CHANGE UP (ref 34–44)
HGB BLD CALC-MCNC: 13.8 G/DL — LOW (ref 14–18)
HGB BLD-MCNC: 6.8 G/DL — CRITICAL LOW (ref 14–18)
HYPOCHROMIA BLD QL: SIGNIFICANT CHANGE UP
INR BLD: 1.23 RATIO — SIGNIFICANT CHANGE UP (ref 0.65–1.3)
LACTATE BLDV-MCNC: 1 MMOL/L — SIGNIFICANT CHANGE UP (ref 0.5–1.6)
LYMPHOCYTES # BLD AUTO: 0.21 K/UL — LOW (ref 1.2–3.4)
LYMPHOCYTES # BLD AUTO: 1.7 % — LOW (ref 20.5–51.1)
MANUAL SMEAR VERIFICATION: SIGNIFICANT CHANGE UP
MCHC RBC-ENTMCNC: 29.1 PG — SIGNIFICANT CHANGE UP (ref 27–31)
MCHC RBC-ENTMCNC: 31.3 G/DL — LOW (ref 32–37)
MCV RBC AUTO: 92.7 FL — SIGNIFICANT CHANGE UP (ref 80–94)
MONOCYTES # BLD AUTO: 0.55 K/UL — SIGNIFICANT CHANGE UP (ref 0.1–0.6)
MONOCYTES NFR BLD AUTO: 4.4 % — SIGNIFICANT CHANGE UP (ref 1.7–9.3)
NEUTROPHILS # BLD AUTO: 11.46 K/UL — HIGH (ref 1.4–6.5)
NEUTROPHILS NFR BLD AUTO: 91.3 % — HIGH (ref 42.2–75.2)
NRBC # BLD: 0 /100 WBCS — SIGNIFICANT CHANGE UP (ref 0–0)
PCO2 BLDV: 46 MMHG — SIGNIFICANT CHANGE UP (ref 41–51)
PH BLDV: 7.38 — SIGNIFICANT CHANGE UP (ref 7.26–7.43)
PLAT MORPH BLD: NORMAL — SIGNIFICANT CHANGE UP
PLATELET # BLD AUTO: 307 K/UL — SIGNIFICANT CHANGE UP (ref 130–400)
PO2 BLDV: 33 MMHG — SIGNIFICANT CHANGE UP (ref 20–40)
POIKILOCYTOSIS BLD QL AUTO: SIGNIFICANT CHANGE UP
POTASSIUM BLDV-SCNC: 3.4 MMOL/L — SIGNIFICANT CHANGE UP (ref 3.3–5.6)
POTASSIUM SERPL-MCNC: 4 MMOL/L — SIGNIFICANT CHANGE UP (ref 3.5–5)
POTASSIUM SERPL-SCNC: 4 MMOL/L — SIGNIFICANT CHANGE UP (ref 3.5–5)
PROTHROM AB SERPL-ACNC: 13.3 SEC — HIGH (ref 9.95–12.87)
RBC # BLD: 2.34 M/UL — LOW (ref 4.7–6.1)
RBC # FLD: 15.2 % — HIGH (ref 11.5–14.5)
RBC BLD AUTO: ABNORMAL
SAO2 % BLDV: 55 % — SIGNIFICANT CHANGE UP
SODIUM SERPL-SCNC: 137 MMOL/L — SIGNIFICANT CHANGE UP (ref 135–146)
TYPE + AB SCN PNL BLD: SIGNIFICANT CHANGE UP
WBC # BLD: 12.55 K/UL — HIGH (ref 4.8–10.8)
WBC # FLD AUTO: 12.55 K/UL — HIGH (ref 4.8–10.8)

## 2018-10-10 RX ORDER — SODIUM CHLORIDE 9 MG/ML
500 INJECTION INTRAMUSCULAR; INTRAVENOUS; SUBCUTANEOUS ONCE
Qty: 0 | Refills: 0 | Status: COMPLETED | OUTPATIENT
Start: 2018-10-10 | End: 2018-10-10

## 2018-10-10 RX ADMIN — SODIUM CHLORIDE 500 MILLILITER(S): 9 INJECTION INTRAMUSCULAR; INTRAVENOUS; SUBCUTANEOUS at 18:34

## 2018-10-10 NOTE — ED ADULT NURSE REASSESSMENT NOTE - NS ED NURSE REASSESS COMMENT FT1
Pt alert and orientedx3, VSS and afebrile. Pt tolerating 1st unit of PRBCS well. PRBC on iv pump, vital sheet in chart, endorsed to P shift RN. pt in no acute distress, right arm precautions maintained, no active bleednig. Safety maintained and hourly rounding performed. Will continue with plan of care.

## 2018-10-10 NOTE — ED ADULT NURSE NOTE - NSIMPLEMENTINTERV_GEN_ALL_ED
Implemented All Universal Safety Interventions:  [place] to call system. Call bell, personal items and telephone within reach. Instruct patient to call for assistance. Room bathroom lighting operational. Non-slip footwear when patient is off stretcher. Physically safe environment: no spills, clutter or unnecessary equipment. Stretcher in lowest position, wheels locked, appropriate side rails in place.

## 2018-10-10 NOTE — ED PROVIDER NOTE - PROGRESS NOTE DETAILS
pt tolerated transfusion of 2 UPRBC. pt w/o any complaints. no f/c/n/v/cp/sob/cough/diarrhea/rash. pt motivated to go back to rehab facility. discussed results with patient as well as leukocytosis without a source. will d/w pt's nephrologist prior to d/c. strict return precautions given to pt inc fever, chills, cough, flu symptoms, abdominal pain

## 2018-10-10 NOTE — ED ADULT NURSE NOTE - CHPI ED NUR SYMPTOMS NEG
no pain/no tingling/no dizziness/no nausea/no vomiting/no chills/no decreased eating/drinking/no fever

## 2018-10-10 NOTE — ED ADULT NURSE REASSESSMENT NOTE - NS ED NURSE REASSESS COMMENT FT1
Pt alert and orientedx3, VSS and afebrile. Pt pending PRBCS, called  type and screen sent to lab pending results, as per  prbc ready in one hour. Pt has no active bleednig, denies any shortness of breath. right arm precautions maintained for healing fistula, right dialysis port intact, dialysis m/w/f.  Safety maintained and hourly rounding performed. Will continue with plan of care.

## 2018-10-10 NOTE — ED PROVIDER NOTE - OBJECTIVE STATEMENT
This is a 57yoM pituitary tumor s/p resection, acromegaly, CAD s/p PCI with stents x 3 (2015), DMII, ESRD on HD (started 2 weeks ago via L tunneled catheter while R forearm fistula matures) who presents for severe anemia Hgb ~6.5 at o/p.  Pt arrived for HD today but did not receive any part of his session because he was orthostatic, with BP 90/60, and Hgb <7.  He reports worsening tolerance of HD sessions this week, feeling progressively weak and dizzy afterward that last time did not resolve until the next morning.  He denies any chest pain,  SOB, volume overload, cough, dysuria, though he reports feeling run down and off as if he is coming down with an infection.  Denies vomiting, hematemesis, melena or hematochezia.  Reports that he is progressing well at [facility] and does not want to miss further sessions. He further reports that his nephrologist is aware of his sx during/after dialysis and spoke about making changes.

## 2018-10-10 NOTE — ED PROVIDER NOTE - MEDICAL DECISION MAKING DETAILS
57yoM recently started on HD for ESRD presents for orthostatic hypotension and severe anemia when he arrived for scheduled HD. Pt reports increasing hypotension/dizziness after dialysis this past week but today he didn't even get dialysis when he felt ill.  Hgb 6.4 at o/p, similar here.  Will transfuse 2u pRBC but do so slowly given his dialysis.  Limited clinical concern for sepsis as cause of hypotension (and not hypotensive here, no report of fever and febrile here, no focal signs of infection), so will obtain cultures and monitor without starting abx.  [facility] obs for transfusion [facility] monitoring.

## 2018-10-10 NOTE — ED CDU PROVIDER INITIAL DAY NOTE - ATTENDING CONTRIBUTION TO CARE
56 yo M with PMHx of acromegaly, CAD s/p stent x 3, DM, ESRD on HD and pituitary tumor s/p resection [facility].  Pt's primary complaint was shortness of breath.     On my assessment of patient, he had already received 2 units RBC and was asymptomatic  pt denied cough, fever, chills, nausea, vomiting, rash, ap, diarrhea, dysuria  pt was pending serial reassessment post transfusion to r/o overload, drawing of blood cultures ordered from ER

## 2018-10-10 NOTE — ED PROVIDER NOTE - NS ED ROS FT
Constitutional: no fevers/chills, no sick contacts  Eyes: No visual changes, eye pain or discharge. No photophobia  ENMT: No hearing changes, pain, discharge or infections. No sore throat or drooling.  Neck:  No neck pain or stiffness. No limited ROM  Cardiac: No SOB or edema. No chest pain with exertion.  Respiratory: No cough or respiratory distress. No hemoptysis. No history of asthma or RAD  GI: No nausea, vomiting, diarrhea or abdominal pain  : No dysuria, frequency or burning. No discharge  MS: No myalgia, muscle weakness, joint pain or back pain  Neuro: No headache or weakness. No LOC  Skin: No skin rash  Endo: no diabetes or thyroid dysfunction  Heme: no abnormal bleeding or clotting  Except as documented in the HPI, all other systems are negative

## 2018-10-10 NOTE — ED ADULT NURSE NOTE - NS ED NOTE  TALK SOMEONE YN
No Hypoactive bowel sounds, normal pitch. +mild periumbilical TTP. No rebound or guarding. No Focal epigastric or RUQ TTP. Negative Batres's sign. Hypoactive bowel sounds, normal pitch. +mild periumbilical TTP. No rebound or guarding. No focal epigastric or RUQ TTP. Negative Batres's sign.

## 2018-10-10 NOTE — ED PROVIDER NOTE - PHYSICAL EXAMINATION
CONSTITUTIONAL: well developed; well nourished; well appearing in no acute distress  HEAD: normocephalic; atraumatic  EYES: no conjunctival injection, no scleral icterus  ENT: no nasal discharge; airway clear.  NECK: supple; non tender. + full passive ROM in all directions  CARD: S1, S2 normal; no murmurs, gallops, or rubs. Regular rate and rhythm  RESP: no wheezes, rales or rhonchi. Good air movement bilaterally without significant accessory muscle use  ABD: soft; non-distended; non-tender. No rebound, no guarding, no pulsatile abdominal mass  EXT: moving all extremities spontaneously, normal ROM. No clubbing, cyanosis or edema  SKIN: warm and dry, no lesions noted, pale conjunctivae/skin/nail beds  NEURO: alert, oriented, CN II-XII grossly intact, motor and sensory grossly intact, speech nonslurred, no focal deficits. GCS 15  PSYCH: calm, cooperative, appropriate, good eye contact, logical thought process, no apparent danger to self or others

## 2018-10-10 NOTE — ED CDU PROVIDER INITIAL DAY NOTE - OBJECTIVE STATEMENT
58 yo M with PMHx of acromegaly, CAD s/p stent x 3, DM, ESRD on HD and pituitary tumor s/p resection [facility]. Pt has blood work showing hemoglobin of 6.5. Pt was supposed to be dialyzed today but did not receive it 2/2 hypotension and hemoglobin <7. Pt states this week he has had difficulty during dialysis sessions because he feels weak and dizzy after. Pt denies other complaints. Pt denies fever, chills, nausea, vomiting, abdominal pain, diarrhea, headache, dizziness, chest pain, SOB, back pain, LOC, trauma, cough, recent travel, recent surgery.

## 2018-10-10 NOTE — ED CDU PROVIDER INITIAL DAY NOTE - PHYSICAL EXAMINATION
VITAL SIGNS: I have reviewed nursing notes and confirm.  CONSTITUTIONAL: Well-developed; well-nourished; in no acute distress.  SKIN: Skin exam is warm and dry, no acute rash.  HEAD: Normocephalic; atraumatic.  EYES: Conjunctiva and sclera clear.  ENT: No nasal discharge; airway clear.   NECK: Supple; non tender.  CARD: S1, S2 normal; no murmurs, gallops, or rubs. Regular rate and rhythm.  RESP: No wheezes, rales or rhonchi. Speaking in full sentences.   ABD: Normal bowel sounds; soft; non-distended; non-tender; No rebound or guarding.   EXT: Normal ROM.   NEURO: Alert, oriented. Grossly unremarkable. No focal deficits.

## 2018-10-10 NOTE — ED ADULT NURSE REASSESSMENT NOTE - COMFORT CARE
side rails up/wait time explained/plan of care explained/warm blanket provided/treatment delay explained
side rails up/wait time explained/warm blanket provided/plan of care explained

## 2018-10-11 VITALS
DIASTOLIC BLOOD PRESSURE: 61 MMHG | RESPIRATION RATE: 20 BRPM | HEART RATE: 64 BPM | SYSTOLIC BLOOD PRESSURE: 141 MMHG | TEMPERATURE: 99 F | OXYGEN SATURATION: 96 %

## 2018-10-11 RX ORDER — DOCUSATE SODIUM 100 MG
200 CAPSULE ORAL AT BEDTIME
Qty: 0 | Refills: 0 | Status: DISCONTINUED | OUTPATIENT
Start: 2018-10-11 | End: 2018-10-11

## 2018-10-11 RX ORDER — ATORVASTATIN CALCIUM 80 MG/1
40 TABLET, FILM COATED ORAL AT BEDTIME
Qty: 0 | Refills: 0 | Status: DISCONTINUED | OUTPATIENT
Start: 2018-10-11 | End: 2018-10-11

## 2018-10-11 RX ORDER — TAMSULOSIN HYDROCHLORIDE 0.4 MG/1
0.4 CAPSULE ORAL AT BEDTIME
Qty: 0 | Refills: 0 | Status: DISCONTINUED | OUTPATIENT
Start: 2018-10-11 | End: 2018-10-11

## 2018-10-11 RX ORDER — SENNA PLUS 8.6 MG/1
1 TABLET ORAL AT BEDTIME
Qty: 0 | Refills: 0 | Status: DISCONTINUED | OUTPATIENT
Start: 2018-10-11 | End: 2018-10-11

## 2018-10-11 RX ORDER — SODIUM BICARBONATE 1 MEQ/ML
650 SYRINGE (ML) INTRAVENOUS
Qty: 0 | Refills: 0 | Status: DISCONTINUED | OUTPATIENT
Start: 2018-10-11 | End: 2018-10-11

## 2018-10-11 RX ADMIN — Medication 650 MILLIGRAM(S): at 06:17

## 2018-10-11 NOTE — ED CDU PROVIDER DISPOSITION NOTE - CLINICAL COURSE
Pt brought in to obs for transfusion and serial reassessment. Pt s/p transfusion of 2 unites PRBC on my eval. Pt without complaints and endorses symptomatic improvement. No fever, chills, cough, ap, nausea, vomiting, diarrhea. Patient is tolerating PO. Patient is motivated to return to rehab. Pt denies blood in stool or black stool. Patient seen by renal who agrees with plan for discharge.

## 2018-10-11 NOTE — CONSULT NOTE ADULT - ASSESSMENT
56yo M pituitary tumor s/p resection, acromegaly, CAD s/p PCI with stents x 3 (2015), DMII, ESRD on HD (started 2 weeks ago via L tunneled catheter while R forearm fistula matures HD MWF) sent in from HD yesterday w/ symptomatic anemia now improved    Offered HD today as inpt, declined he prefers to return will get HD there today (though even if doesn't , based on labs and physical exam pt could wait for HD until tomorrow as is schedule, he is new to HD still urinates  No barrier to d/c from renal standpoint     Can stop PO Na bicarb now that is on HD  -

## 2018-10-11 NOTE — CONSULT NOTE ADULT - SUBJECTIVE AND OBJECTIVE BOX
NEPHROLOGY CONSULTATION NOTE    56yo M pituitary tumor s/p resection, acromegaly, CAD s/p PCI with stents x 3 (2015), DMII, ESRD on HD (started 2 weeks ago via L tunneled catheter while R forearm fistula matures HD MWF) who  was sent in from [facility] for symptomatic anemia yesterday  fHgb ~6.5 at o/p.  Prior to HD yesterday he was orthostatic, with BP 90/60, So didn't dialyze   He denies any chest pain,  SOB, volume overload, cough, dysuria, though he reports feeling run down and off as if he is coming down with an infection.  Denies vomiting, hematemesis, melena or hematochezia.    Today he feels better after PRBC transfusion    PAST MEDICAL & SURGICAL HISTORY:  Dyslipidemia  DM (diabetes mellitus), type 2  Neuropathy  HTN (hypertension)  CAD (coronary atherosclerotic disease)  Acromegaly  CKD (chronic kidney disease), stage V  H/O eye surgery  H/O: pituitary tumor: s/p removal    Allergies:  bacitracin (Unknown)  Neosporin (Hypotension)    Home Medications Reviewed  Hospital Medications:   MEDICATIONS  (STANDING):  atorvastatin 40 milliGRAM(s) Oral at bedtime  docusate sodium 200 milliGRAM(s) Oral at bedtime  senna 1 Tablet(s) Oral at bedtime  sodium bicarbonate 650 milliGRAM(s) Oral two times a day  tamsulosin 0.4 milliGRAM(s) Oral at bedtime      SOCIAL HISTORY:  Denies ETOH,Smoking,   FAMILY HISTORY:  Family history of myocardial infarction (Father)        REVIEW OF SYSTEMS:  CONSTITUTIONAL+ weakness, no fevers or chills  EYES/ENT: No visual changes;  No vertigo or throat pain   NECK: No pain or stiffness  RESPIRATORY: No cough, wheezing, hemoptysis; No shortness of breath  CARDIOVASCULAR: No chest pain or palpitations.  GASTROINTESTINAL: No abdominal or epigastric pain. No nausea, vomiting, or hematemesis; No diarrhea or constipation. No melena or hematochezia.  GENITOURINARY: No dysuria, frequency, foamy urine, urinary urgency, incontinence or hematuria  NEUROLOGICAL: No numbness or weakness  SKIN: No itching, burning, rashes, or lesions   VASCULAR: +bilateral lower extremity edema.   All other review of systems is negative unless indicated above.    VITALS:  T(F): 99 (10-11-18 @ 08:16), Max: 99.8 (10-11-18 @ 03:50)  HR: 64 (10-11-18 @ 08:16)  BP: 141/61 (10-11-18 @ 08:16)  RR: 20 (10-11-18 @ 08:16)  SpO2: 96% (10-11-18 @ 08:16)        I&O's Detail        PHYSICAL EXAM:  Constitutional: NAD  HEENT: anicteric sclera, oropharynx clear, MMM  Neck: No JVD  Respiratory: CTAB, no wheezes, rales or rhonchi  Cardiovascular: S1, S2, RRR  Gastrointestinal: BS+, soft, NT/ND  Extremities: No cyanosis or clubbing. + peripheral edema, legs wrapped   Neurological: A/O x 3, no focal deficits  Psychiatric: Normal mood, normal affect  : No CVA tenderness. No ortiz.   Skin: No rashes  Vascular Access: Rt AVF (immature) tessio    LABS:  10-10    137  |  95<L>  |  49<H>  ----------------------------<  182<H>  4.0   |  25  |  5.7<HH>    Ca    8.4<L>      10 Oct 2018 16:49      Creatinine Trend: 5.7 <--, 8.2 <--, 8.1 <--, 9.8 <--, 9.4 <--, 9.0 <--, 9.1 <--                        6.8    12.55 )-----------( 307      ( 10 Oct 2018 16:49 )             21.7     Urine Studies:        09-22 @ 08:40  8.2  2071  --  09-19 @ 04:30  8.0  1519  --        RADIOLOGY & ADDITIONAL STUDIES:

## 2018-10-11 NOTE — ED CDU PROVIDER SUBSEQUENT DAY NOTE - HISTORY
Pt placed in OBS for transfusion protocol. Pt currently finishing 2nd unit. Denies any complaints. Will finish transfusion and re-asses.

## 2018-10-11 NOTE — ED CDU PROVIDER SUBSEQUENT DAY NOTE - MEDICAL DECISION MAKING DETAILS
Pt brought in to obs for transfusion and serial reassessment. Pt s/p transfusion of 2 unites PRBC on my eval. Pt without complaints and endorses symptomatic improvement. No fever, chills, cough, ap, nausea, vomiting, diarrhea. Patient is tolerating PO. Patient is motivated to return to rehab. Will discuss case with renal prior to discharge.

## 2018-10-16 LAB
CULTURE RESULTS: SIGNIFICANT CHANGE UP
SPECIMEN SOURCE: SIGNIFICANT CHANGE UP

## 2018-10-17 LAB
CULTURE RESULTS: SIGNIFICANT CHANGE UP
SPECIMEN SOURCE: SIGNIFICANT CHANGE UP

## 2018-10-18 ENCOUNTER — OUTPATIENT (OUTPATIENT)
Dept: OUTPATIENT SERVICES | Facility: HOSPITAL | Age: 58
LOS: 1 days | Discharge: HOME | End: 2018-10-18

## 2018-10-18 DIAGNOSIS — Z87.898 PERSONAL HISTORY OF OTHER SPECIFIED CONDITIONS: Chronic | ICD-10-CM

## 2018-10-18 DIAGNOSIS — Z98.890 OTHER SPECIFIED POSTPROCEDURAL STATES: Chronic | ICD-10-CM

## 2018-10-19 ENCOUNTER — OUTPATIENT (OUTPATIENT)
Dept: OUTPATIENT SERVICES | Facility: HOSPITAL | Age: 58
LOS: 1 days | Discharge: HOME | End: 2018-10-19

## 2018-10-19 DIAGNOSIS — Z87.898 PERSONAL HISTORY OF OTHER SPECIFIED CONDITIONS: Chronic | ICD-10-CM

## 2018-10-19 DIAGNOSIS — L89.629 PRESSURE ULCER OF LEFT HEEL, UNSPECIFIED STAGE: ICD-10-CM

## 2018-10-19 DIAGNOSIS — Z98.890 OTHER SPECIFIED POSTPROCEDURAL STATES: Chronic | ICD-10-CM

## 2018-10-19 DIAGNOSIS — R10.9 UNSPECIFIED ABDOMINAL PAIN: ICD-10-CM

## 2018-10-22 ENCOUNTER — INPATIENT (INPATIENT)
Facility: HOSPITAL | Age: 58
LOS: 27 days | Discharge: NURSING, REHAB OR LONG-TERM CARE | End: 2018-11-19
Attending: INTERNAL MEDICINE | Admitting: INTERNAL MEDICINE
Payer: COMMERCIAL

## 2018-10-22 VITALS
TEMPERATURE: 100 F | HEART RATE: 95 BPM | OXYGEN SATURATION: 98 % | SYSTOLIC BLOOD PRESSURE: 159 MMHG | RESPIRATION RATE: 185 BRPM | HEIGHT: 75 IN | WEIGHT: 259.93 LBS | DIASTOLIC BLOOD PRESSURE: 82 MMHG

## 2018-10-22 DIAGNOSIS — Z98.890 OTHER SPECIFIED POSTPROCEDURAL STATES: Chronic | ICD-10-CM

## 2018-10-22 DIAGNOSIS — I10 ESSENTIAL (PRIMARY) HYPERTENSION: ICD-10-CM

## 2018-10-22 DIAGNOSIS — Z87.898 PERSONAL HISTORY OF OTHER SPECIFIED CONDITIONS: Chronic | ICD-10-CM

## 2018-10-22 DIAGNOSIS — N18.5 CHRONIC KIDNEY DISEASE, STAGE 5: ICD-10-CM

## 2018-10-22 DIAGNOSIS — E78.5 HYPERLIPIDEMIA, UNSPECIFIED: ICD-10-CM

## 2018-10-22 DIAGNOSIS — G62.9 POLYNEUROPATHY, UNSPECIFIED: ICD-10-CM

## 2018-10-22 DIAGNOSIS — L97.509 NON-PRESSURE CHRONIC ULCER OF OTHER PART OF UNSPECIFIED FOOT WITH UNSPECIFIED SEVERITY: ICD-10-CM

## 2018-10-22 DIAGNOSIS — E11.9 TYPE 2 DIABETES MELLITUS WITHOUT COMPLICATIONS: ICD-10-CM

## 2018-10-22 LAB
ALBUMIN SERPL ELPH-MCNC: 2.9 G/DL — LOW (ref 3.5–5.2)
ALLERGY+IMMUNOLOGY DIAG STUDY NOTE: SIGNIFICANT CHANGE UP
ALP SERPL-CCNC: 115 U/L — SIGNIFICANT CHANGE UP (ref 30–115)
ALT FLD-CCNC: 16 U/L — SIGNIFICANT CHANGE UP (ref 0–41)
ANION GAP SERPL CALC-SCNC: 14 MMOL/L — SIGNIFICANT CHANGE UP (ref 7–14)
APTT BLD: 28.1 SEC — SIGNIFICANT CHANGE UP (ref 27–39.2)
AST SERPL-CCNC: 20 U/L — SIGNIFICANT CHANGE UP (ref 0–41)
BASE EXCESS BLDV CALC-SCNC: 7.6 MMOL/L — HIGH (ref -2–2)
BASOPHILS # BLD AUTO: 0.03 K/UL — SIGNIFICANT CHANGE UP (ref 0–0.2)
BASOPHILS NFR BLD AUTO: 0.2 % — SIGNIFICANT CHANGE UP (ref 0–1)
BILIRUB SERPL-MCNC: 0.2 MG/DL — SIGNIFICANT CHANGE UP (ref 0.2–1.2)
BUN SERPL-MCNC: 19 MG/DL — SIGNIFICANT CHANGE UP (ref 10–20)
CA-I SERPL-SCNC: 1.18 MMOL/L — SIGNIFICANT CHANGE UP (ref 1.12–1.3)
CALCIUM SERPL-MCNC: 8.5 MG/DL — SIGNIFICANT CHANGE UP (ref 8.5–10.1)
CHLORIDE SERPL-SCNC: 95 MMOL/L — LOW (ref 98–110)
CO2 SERPL-SCNC: 28 MMOL/L — SIGNIFICANT CHANGE UP (ref 17–32)
CREAT SERPL-MCNC: 3.4 MG/DL — HIGH (ref 0.7–1.5)
EOSINOPHIL # BLD AUTO: 0.1 K/UL — SIGNIFICANT CHANGE UP (ref 0–0.7)
EOSINOPHIL NFR BLD AUTO: 0.8 % — SIGNIFICANT CHANGE UP (ref 0–8)
GAS PNL BLDV: 139 MMOL/L — SIGNIFICANT CHANGE UP (ref 136–145)
GAS PNL BLDV: SIGNIFICANT CHANGE UP
GLUCOSE BLDC GLUCOMTR-MCNC: 136 MG/DL — HIGH (ref 70–99)
GLUCOSE SERPL-MCNC: 160 MG/DL — HIGH (ref 70–99)
HCO3 BLDV-SCNC: 32 MMOL/L — HIGH (ref 22–29)
HCT VFR BLD CALC: 27.3 % — LOW (ref 42–52)
HCT VFR BLDA CALC: 23.5 % — LOW (ref 34–44)
HGB BLD CALC-MCNC: 7.7 G/DL — LOW (ref 14–18)
HGB BLD-MCNC: 8.5 G/DL — LOW (ref 14–18)
HOROWITZ INDEX BLDV+IHG-RTO: 21 — SIGNIFICANT CHANGE UP
IMM GRANULOCYTES NFR BLD AUTO: 0.3 % — SIGNIFICANT CHANGE UP (ref 0.1–0.3)
INR BLD: 1.26 RATIO — SIGNIFICANT CHANGE UP (ref 0.65–1.3)
LACTATE BLDV-MCNC: 1.1 MMOL/L — SIGNIFICANT CHANGE UP (ref 0.5–1.6)
LACTATE SERPL-SCNC: 1 MMOL/L — SIGNIFICANT CHANGE UP (ref 0.5–2.2)
LIDOCAIN IGE QN: 13 U/L — SIGNIFICANT CHANGE UP (ref 7–60)
LYMPHOCYTES # BLD AUTO: 0.65 K/UL — LOW (ref 1.2–3.4)
LYMPHOCYTES # BLD AUTO: 5.4 % — LOW (ref 20.5–51.1)
MAGNESIUM SERPL-MCNC: 1.9 MG/DL — SIGNIFICANT CHANGE UP (ref 1.8–2.4)
MCHC RBC-ENTMCNC: 28.6 PG — SIGNIFICANT CHANGE UP (ref 27–31)
MCHC RBC-ENTMCNC: 31.1 G/DL — LOW (ref 32–37)
MCV RBC AUTO: 91.9 FL — SIGNIFICANT CHANGE UP (ref 80–94)
MONOCYTES # BLD AUTO: 0.96 K/UL — HIGH (ref 0.1–0.6)
MONOCYTES NFR BLD AUTO: 7.9 % — SIGNIFICANT CHANGE UP (ref 1.7–9.3)
NEUTROPHILS # BLD AUTO: 10.34 K/UL — HIGH (ref 1.4–6.5)
NEUTROPHILS NFR BLD AUTO: 85.4 % — HIGH (ref 42.2–75.2)
NRBC # BLD: 0 /100 WBCS — SIGNIFICANT CHANGE UP (ref 0–0)
PCO2 BLDV: 41 MMHG — SIGNIFICANT CHANGE UP (ref 41–51)
PH BLDV: 7.49 — HIGH (ref 7.26–7.43)
PLATELET # BLD AUTO: 392 K/UL — SIGNIFICANT CHANGE UP (ref 130–400)
PO2 BLDV: 15 MMHG — LOW (ref 20–40)
POTASSIUM BLDV-SCNC: 3.6 MMOL/L — SIGNIFICANT CHANGE UP (ref 3.3–5.6)
POTASSIUM SERPL-MCNC: 3.9 MMOL/L — SIGNIFICANT CHANGE UP (ref 3.5–5)
POTASSIUM SERPL-SCNC: 3.9 MMOL/L — SIGNIFICANT CHANGE UP (ref 3.5–5)
PROT SERPL-MCNC: 6 G/DL — SIGNIFICANT CHANGE UP (ref 6–8)
PROTHROM AB SERPL-ACNC: 13.7 SEC — HIGH (ref 9.95–12.87)
RBC # BLD: 2.97 M/UL — LOW (ref 4.7–6.1)
RBC # FLD: 15.5 % — HIGH (ref 11.5–14.5)
SAO2 % BLDV: 21 % — SIGNIFICANT CHANGE UP
SODIUM SERPL-SCNC: 137 MMOL/L — SIGNIFICANT CHANGE UP (ref 135–146)
TYPE + AB SCN PNL BLD: SIGNIFICANT CHANGE UP
WBC # BLD: 12.12 K/UL — HIGH (ref 4.8–10.8)
WBC # FLD AUTO: 12.12 K/UL — HIGH (ref 4.8–10.8)

## 2018-10-22 RX ORDER — ATENOLOL 25 MG/1
1 TABLET ORAL
Qty: 0 | Refills: 0 | COMMUNITY

## 2018-10-22 RX ORDER — FUROSEMIDE 40 MG
40 TABLET ORAL DAILY
Qty: 0 | Refills: 0 | Status: DISCONTINUED | OUTPATIENT
Start: 2018-10-22 | End: 2018-10-23

## 2018-10-22 RX ORDER — CALCIUM ACETATE 667 MG
667 TABLET ORAL
Qty: 0 | Refills: 0 | Status: DISCONTINUED | OUTPATIENT
Start: 2018-10-22 | End: 2018-10-23

## 2018-10-22 RX ORDER — ATORVASTATIN CALCIUM 80 MG/1
20 TABLET, FILM COATED ORAL AT BEDTIME
Qty: 0 | Refills: 0 | Status: DISCONTINUED | OUTPATIENT
Start: 2018-10-22 | End: 2018-10-23

## 2018-10-22 RX ORDER — LACTULOSE 10 G/15ML
20 SOLUTION ORAL DAILY
Qty: 0 | Refills: 0 | Status: DISCONTINUED | OUTPATIENT
Start: 2018-10-22 | End: 2018-10-23

## 2018-10-22 RX ORDER — FOLIC ACID 0.8 MG
1 TABLET ORAL DAILY
Qty: 0 | Refills: 0 | Status: DISCONTINUED | OUTPATIENT
Start: 2018-10-22 | End: 2018-10-23

## 2018-10-22 RX ORDER — CEFEPIME 1 G/1
1000 INJECTION, POWDER, FOR SOLUTION INTRAMUSCULAR; INTRAVENOUS DAILY
Qty: 0 | Refills: 0 | Status: DISCONTINUED | OUTPATIENT
Start: 2018-10-22 | End: 2018-10-23

## 2018-10-22 RX ORDER — FERROUS SULFATE 325(65) MG
325 TABLET ORAL DAILY
Qty: 0 | Refills: 0 | Status: DISCONTINUED | OUTPATIENT
Start: 2018-10-22 | End: 2018-10-23

## 2018-10-22 RX ORDER — SODIUM BICARBONATE 1 MEQ/ML
650 SYRINGE (ML) INTRAVENOUS
Qty: 0 | Refills: 0 | Status: DISCONTINUED | OUTPATIENT
Start: 2018-10-22 | End: 2018-10-23

## 2018-10-22 RX ORDER — TAMSULOSIN HYDROCHLORIDE 0.4 MG/1
0.4 CAPSULE ORAL AT BEDTIME
Qty: 0 | Refills: 0 | Status: DISCONTINUED | OUTPATIENT
Start: 2018-10-22 | End: 2018-10-23

## 2018-10-22 RX ORDER — DOCUSATE SODIUM 100 MG
100 CAPSULE ORAL
Qty: 0 | Refills: 0 | Status: DISCONTINUED | OUTPATIENT
Start: 2018-10-22 | End: 2018-10-23

## 2018-10-22 RX ORDER — CEFEPIME 1 G/1
2000 INJECTION, POWDER, FOR SOLUTION INTRAMUSCULAR; INTRAVENOUS ONCE
Qty: 0 | Refills: 0 | Status: COMPLETED | OUTPATIENT
Start: 2018-10-22 | End: 2018-10-22

## 2018-10-22 RX ORDER — INFLUENZA VIRUS VACCINE 15; 15; 15; 15 UG/.5ML; UG/.5ML; UG/.5ML; UG/.5ML
0.5 SUSPENSION INTRAMUSCULAR ONCE
Qty: 0 | Refills: 0 | Status: DISCONTINUED | OUTPATIENT
Start: 2018-10-22 | End: 2018-10-26

## 2018-10-22 RX ORDER — ACETAMINOPHEN 500 MG
975 TABLET ORAL ONCE
Qty: 0 | Refills: 0 | Status: COMPLETED | OUTPATIENT
Start: 2018-10-22 | End: 2018-10-22

## 2018-10-22 RX ADMIN — Medication 975 MILLIGRAM(S): at 19:34

## 2018-10-22 RX ADMIN — CEFEPIME 2000 MILLIGRAM(S): 1 INJECTION, POWDER, FOR SOLUTION INTRAMUSCULAR; INTRAVENOUS at 19:04

## 2018-10-22 RX ADMIN — TAMSULOSIN HYDROCHLORIDE 0.4 MILLIGRAM(S): 0.4 CAPSULE ORAL at 21:45

## 2018-10-22 RX ADMIN — Medication 667 MILLIGRAM(S): at 21:45

## 2018-10-22 RX ADMIN — CEFEPIME 100 MILLIGRAM(S): 1 INJECTION, POWDER, FOR SOLUTION INTRAMUSCULAR; INTRAVENOUS at 18:31

## 2018-10-22 RX ADMIN — ATORVASTATIN CALCIUM 20 MILLIGRAM(S): 80 TABLET, FILM COATED ORAL at 21:45

## 2018-10-22 NOTE — ED PROVIDER NOTE - PROGRESS NOTE DETAILS
hgb similar to previous.  pt received vanc at NH today already Pt received vancomycin IV after dialysis today as per pts paperwork.   Donell from surgery wants admission. Pt received vancomycin IV after dialysis today as per pts paperwork.   Donell from surgery with rogelio graves shes okay admission. pt already got vancomycein at NH.  pt given iv abx here. surgery pa aware.  surgery agrees no transfusion at this time

## 2018-10-22 NOTE — ED PROVIDER NOTE - MEDICAL DECISION MAKING DETAILS
pt sent in by primary for admission to medicine for infecrted diabeteic foot heel ulcer.  pt given iv abx.  pt to have surgery debriedment done tomorrow by Dr. Gomes.  surgery PA aware of consult.  pt given iv abx.

## 2018-10-22 NOTE — ED PROVIDER NOTE - PHYSICAL EXAMINATION
VITAL SIGNS: I have reviewed nursing notes and confirm.  CONSTITUTIONAL: Well-developed; well-nourished; in no acute distress. acromegaly features to face and hands  SKIN: skin exam is warm and dry.  3cm deep ulcer to L. foot with significant smell.  HEAD: Normocephalic; atraumatic.  EYES:  EOM intact; conjunctiva and sclera clear.  ENT: No nasal discharge; airway clear. moist oral mucosa;   NECK: Supple; non tender.  CARD: S1, S2 normal; no murmurs, gallops, or rubs. Regular rate and rhythm. posterior tibial and radial pulses 2+  RESP: No wheezes, rales or rhonchi. cta b/l. no use of accessory muscles. no retractions  ABD: Normal bowel sounds; soft; non-distended; non-tender; no rebound  EXT: Normal ROM. No  cyanosis or edema.  LYMPH: No acute cervical adenopathy.  NEURO: Alert, oriented, grossly unremarkable.    PSYCH: Cooperative, appropriate.

## 2018-10-22 NOTE — CONSULT NOTE ADULT - SUBJECTIVE AND OBJECTIVE BOX
General  Surgical Attending:        Pt's Gastroenterologist:  Unknown.  Last Colonoscopy:  N/A  Last EGD:  N/A      HPI:  56y/o M w/ hx of brain tumor--acromegaly, diabetes, htn, ckd stage on dialysis MWF, makes urine, htn, 2 blood transfusion recently being worked up--  pt on rehab after recent admission for infection to feet presents for hg under 7 at rehab-[facility]; and for worsening of left leg ulcer.  pt had dialysis today and was given iv antibiotics after.  pt has been feeling chills; no fever. no abdominal pain, cp or sob. +generalized weakness.  no blood in stool.   Pt states that it has been going on for an unknown period of time and most recently has become malodorous.  pt states that he does not see a podiatrist either. (22 Oct 2018 19:39)      PAST MEDICAL & SURGICAL HISTORY:  Dyslipidemia  DM (diabetes mellitus), type 2  Neuropathy  HTN (hypertension)  CAD (coronary atherosclerotic disease)  Acromegaly  CKD (chronic kidney disease), stage V  H/O eye surgery  H/O: pituitary tumor: s/p removal      MEDICATIONS  (STANDING):  atorvastatin 20 milliGRAM(s) Oral at bedtime  calcium acetate 667 milliGRAM(s) Oral four times a day with meals  cefepime   IVPB 1000 milliGRAM(s) IV Intermittent daily  docusate sodium 100 milliGRAM(s) Oral two times a day  ferrous sulfate Oral Tab/Cap - Peds 325 milliGRAM(s) Oral daily  folic acid 1 milliGRAM(s) Oral daily  furosemide    Tablet 40 milliGRAM(s) Oral daily  influenza   Vaccine 0.5 milliLiter(s) IntraMuscular once  lactulose Syrup 20 Gram(s) Oral daily  sodium bicarbonate 650 milliGRAM(s) Oral two times a day  tamsulosin 0.4 milliGRAM(s) Oral at bedtime    Allergies: bacitracin (Unknown) Neosporin (Hypotension)    Vital Signs Last 24 Hrs  T(C): 37.7 (22 Oct 2018 20:26), Max: 38.6 (22 Oct 2018 19:17)  T(F): 99.9 (22 Oct 2018 20:26), Max: 101.4 (22 Oct 2018 19:17)  HR: 87 (22 Oct 2018 20:26) (87 - 96)  BP: 146/89 (22 Oct 2018 20:26) (139/79 - 159/82)  BP(mean): --  RR: 16 (22 Oct 2018 20:26) (16 - 20)  SpO2: 95% (22 Oct 2018 19:17) (95% - 98%)      Physical Exam:  General: NAD    HEENT: NC/AT, EOMI, normal hearing, no oral lesions, no LAD, neck supple  Pulmonary: normal resp effort, CTA-B  Cardiovascular: NSR, no murmurs  Abdominal: +BS, Soft, ND/NT, no organomegaly, no rebound, no guarding.  Extremities: WWP, normal strength, no clubbing/cyanosis/edema  Left Heel: ulceration to heel.  malodorous  Neuro: A/O x 3, CNs II-XII grossly intact, normal sensation, no focal deficits  Pulses: palpable distal pulses    Lines/drains/tubes:    LABS:                        8.5    12.12 )-----------( 392      ( 22 Oct 2018 17:30 )             27.3     10-22    137  |  95<L>  |  19  ----------------------------<  160<H>  3.9   |  28  |  3.4<H>    Ca    8.5      22 Oct 2018 17:30  Mg     1.9     10-22    TPro  6.0  /  Alb  2.9<L>  /  TBili  0.2  /  DBili  x   /  AST  20  /  ALT  16  /  AlkPhos  115  10-22    PT/INR - ( 22 Oct 2018 17:30 )   PT: 13.70 sec;   INR: 1.26 ratio         PTT - ( 22 Oct 2018 17:30 )  PTT:28.1 sec    CAPILLARY BLOOD GLUCOSE      POCT Blood Glucose.: 136 mg/dL (22 Oct 2018 22:22)    LIVER FUNCTIONS - ( 22 Oct 2018 17:30 )  Alb: 2.9 g/dL / Pro: 6.0 g/dL / ALK PHOS: 115 U/L / ALT: 16 U/L / AST: 20 U/L / GGT: x             RADIOLOGY & ADDITIONAL STUDIES:        Assessment: 57y Male presents with complaints of left heel ulcer.  Pt states that it has been going on for an unknown period of time and most recently has become malodorous.  pt states that he does not see a podiatrist either.    Recommendations:  1. Repeat Labs in am  2. Add-on OR  3. transfuse if needed.  4. d/w Dr Gomes.

## 2018-10-22 NOTE — PATIENT PROFILE ADULT - NSASFALLATTEMPTOOB_GEN_A_NUR
Possible influenza.   Throat culture still pending.  We were also treating an ear infection, which should hopefully be getting better (I would need to see in order to know).  Could also consider some prednisone 20 mg po bid for 5 days.  Can extend work note to be off until Monday.  Can also take ibuprofen/tylenol to help with symptoms.   She can also come in for evaluation.     no

## 2018-10-22 NOTE — H&P ADULT - ASSESSMENT
56y/o M w/ hx of brain tumor--acromegaly, diabetes, htn, ckd stage on dialysis MWF, makes urine, htn, 2 blood transfusion recently being worked up--  pt on rehab after recent admission for infection to feet presents for hg under 7 at rehab-[facility]; and for worsening of left leg ulcer.  pt had dialysis today and was given iv antibiotics after.  pt has been feeling chills; no fever. no abdominal pain, cp or sob. +generalized weakness.  no blood in stool

## 2018-10-22 NOTE — ED PROVIDER NOTE - NS ED ROS FT
Constitutional: (-) fever  Eyes/ENT: (-) blurry vision, (-) epistaxis  Cardiovascular: (-) chest pain, (-) syncope  Respiratory: (-) cough, (-) shortness of breath  Gastrointestinal: (-) vomiting, (-) diarrhea  Musculoskeletal: (-) neck pain, (-) back pain, (-) joint pain  Integumentary: +foot ulcer, (-) edema  Neurological: (-) headache, (-) altered mental status  Psychiatric: (-) hallucinations  Allergic/Immunologic: (-) pruritus

## 2018-10-22 NOTE — ED PROVIDER NOTE - OBJECTIVE STATEMENT
58y/o M w/ hx of brain tumor--acromegaly, diabetes, htn, ckd stage on dialysis MWF, makes urine, htn, 2 blood transfusion recently being worked up--  pt on rehab after recent admission for infection to feet presents for hg under 7 at rehab-[facility]; and for worsening of left leg ulcer.  pt had dialysis today and was given iv antibiotics after.  pt has been feeling chills; no fever. no abdominal pain, cp or sob. +generalized weakness.  no blood in stool or urine.

## 2018-10-22 NOTE — H&P ADULT - NSHPPHYSICALEXAM_GEN_ALL_CORE
GENERAL:  58y/o Male NAD, resting comfortably.  HEAD:  Atraumatic, Normocephalic  EYES: EOMI, PERRLA, conjunctiva and sclera clear  NECK: Supple, No JVD, no cervical lymphadenopathy, non-tender  CHEST/LUNG: Clear to auscultation bilaterally; No wheeze, rhonchi, or rales  HEART: Regular rate and rhythm; S1&S2  ABDOMEN: Soft, Nontender, Nondistended x 4 quadrants; Bowel sounds present  EXTREMITIES:   Peripheral Pulses Present, No clubbing, no cyanosis, or no edema, no calf tenderness  PSYCH: AAOx3, cooperative, appropriate  NEUROLOGY: WNL  SKIN: WNL

## 2018-10-22 NOTE — H&P ADULT - NSHPLABSRESULTS_GEN_ALL_CORE
8.5    12.12 )-----------( 392      ( 22 Oct 2018 17:30 )             27.3       10-22    137  |  95<L>  |  19  ----------------------------<  160<H>  3.9   |  28  |  3.4<H>    Ca    8.5      22 Oct 2018 17:30  Mg     1.9     10-22    TPro  6.0  /  Alb  2.9<L>  /  TBili  0.2  /  DBili  x   /  AST  20  /  ALT  16  /  AlkPhos  115  10-22                  PT/INR - ( 22 Oct 2018 17:30 )   PT: 13.70 sec;   INR: 1.26 ratio         PTT - ( 22 Oct 2018 17:30 )  PTT:28.1 sec    Lactate Trend  10-22 @ 17:30 Lactate:1.0             CAPILLARY BLOOD GLUCOSE            Culture Results:   No growth at 5 days. (10-10 @ 20:06)  Culture Results:   No growth at 5 days. (10-10 @ 20:06)

## 2018-10-22 NOTE — ED ADULT NURSE NOTE - NSIMPLEMENTINTERV_GEN_ALL_ED
Implemented All Universal Safety Interventions:  [place] to call system. Call bell, personal items and telephone within reach. Instruct patient to call for assistance. Room bathroom lighting operational. Non-slip footwear when patient is off stretcher. Physically safe environment: no spills, clutter or unnecessary equipment. Stretcher in lowest position, wheels locked, appropriate side rails in place.

## 2018-10-22 NOTE — ED PROVIDER NOTE - ATTENDING CONTRIBUTION TO CARE
58 yo m with pmh of esrd on HD, htn, DM sent in by Dr. Raoul Ordoñez for admission for infected foot ulcer 58 yo m with pmh of esrd on HD, htn, DM sent in by Dr. Raoul Ordoñez for admission for infected foot ulcer.  pt also with low H/H.  no gi bleed, no blood in stool, no black stool.  no cp, no sob, no abd pain, no najusea, no vomiting, no back pain.  no headache, no cp, no sob.  no other complaints.  pt with left foot heel ulcer for the past month, worsening.  + discharge.  awake, alert.  neck supple.  lungs clear.  abd soft, nontender.  RLE: healing ulcers to lower ext, 2+ dp pulse, motor intact.  LLE: 2+ dp pulse, motor intact, + infected heel ulcer with discharge  as per Dr. Everett pt to be admitted to her service and Dr. Gomes (surgery) is planning on doing operation tomorrow for wound debriedment.  p:  xray, abx, labs, ekg, admission.  pt reports he got IV abx today at nursing home but is unsure which abnx.  will call NH to find out 58 yo m with pmh of esrd on HD, htn, DM sent in by Dr. Raoul Ordoñez for admission for infected foot ulcer.  pt also with low H/H.  no gi bleed, no blood in stool, no black stool.  no cp, no sob, no abd pain, no najusea, no vomiting, no back pain.  no headache, no cp, no sob.  no other complaints.  pt with left foot heel ulcer for the past month, worsening.  + discharge.  awake, alert.  neck supple.  lungs clear.  abd soft, nontender.  RLE: healing ulcers to lower ext, 2+ dp pulse, motor intact.  LLE: 2+ dp pulse, motor intact, + infected heel ulcer with discharge.  Pt with brown stool on exam.  as per Dr. Everett pt to be admitted to her service and Dr. Gomes (surgery) is planning on doing operation tomorrow for wound debriedment.  p:  xray, abx, labs, ekg, admission.  pt reports he got IV abx today at nursing home but is unsure which abnx.  will call NH to find out

## 2018-10-23 ENCOUNTER — RESULT REVIEW (OUTPATIENT)
Age: 58
End: 2018-10-23

## 2018-10-23 LAB
ANION GAP SERPL CALC-SCNC: 13 MMOL/L — SIGNIFICANT CHANGE UP (ref 7–14)
BUN SERPL-MCNC: 24 MG/DL — HIGH (ref 10–20)
CALCIUM SERPL-MCNC: 8.4 MG/DL — LOW (ref 8.5–10.1)
CHLORIDE SERPL-SCNC: 100 MMOL/L — SIGNIFICANT CHANGE UP (ref 98–110)
CO2 SERPL-SCNC: 26 MMOL/L — SIGNIFICANT CHANGE UP (ref 17–32)
CREAT SERPL-MCNC: 4.1 MG/DL — CRITICAL HIGH (ref 0.7–1.5)
GLUCOSE BLDC GLUCOMTR-MCNC: 102 MG/DL — HIGH (ref 70–99)
GLUCOSE BLDC GLUCOMTR-MCNC: 114 MG/DL — HIGH (ref 70–99)
GLUCOSE BLDC GLUCOMTR-MCNC: 123 MG/DL — HIGH (ref 70–99)
GLUCOSE BLDC GLUCOMTR-MCNC: 82 MG/DL — SIGNIFICANT CHANGE UP (ref 70–99)
GLUCOSE SERPL-MCNC: 122 MG/DL — HIGH (ref 70–99)
HCT VFR BLD CALC: 25.2 % — LOW (ref 42–52)
HGB BLD-MCNC: 7.9 G/DL — LOW (ref 14–18)
MCHC RBC-ENTMCNC: 29 PG — SIGNIFICANT CHANGE UP (ref 27–31)
MCHC RBC-ENTMCNC: 31.3 G/DL — LOW (ref 32–37)
MCV RBC AUTO: 92.6 FL — SIGNIFICANT CHANGE UP (ref 80–94)
NRBC # BLD: 0 /100 WBCS — SIGNIFICANT CHANGE UP (ref 0–0)
PLATELET # BLD AUTO: 342 K/UL — SIGNIFICANT CHANGE UP (ref 130–400)
POTASSIUM SERPL-MCNC: 4 MMOL/L — SIGNIFICANT CHANGE UP (ref 3.5–5)
POTASSIUM SERPL-SCNC: 4 MMOL/L — SIGNIFICANT CHANGE UP (ref 3.5–5)
RBC # BLD: 2.72 M/UL — LOW (ref 4.7–6.1)
RBC # FLD: 15.6 % — HIGH (ref 11.5–14.5)
SODIUM SERPL-SCNC: 139 MMOL/L — SIGNIFICANT CHANGE UP (ref 135–146)
WBC # BLD: 10.6 K/UL — SIGNIFICANT CHANGE UP (ref 4.8–10.8)
WBC # FLD AUTO: 10.6 K/UL — SIGNIFICANT CHANGE UP (ref 4.8–10.8)

## 2018-10-23 RX ORDER — TAMSULOSIN HYDROCHLORIDE 0.4 MG/1
0.4 CAPSULE ORAL AT BEDTIME
Qty: 0 | Refills: 0 | Status: DISCONTINUED | OUTPATIENT
Start: 2018-10-23 | End: 2018-10-31

## 2018-10-23 RX ORDER — CHLORHEXIDINE GLUCONATE 213 G/1000ML
1 SOLUTION TOPICAL
Qty: 0 | Refills: 0 | Status: DISCONTINUED | OUTPATIENT
Start: 2018-10-23 | End: 2018-10-31

## 2018-10-23 RX ORDER — CALCIUM ACETATE 667 MG
1334 TABLET ORAL
Qty: 0 | Refills: 0 | Status: DISCONTINUED | OUTPATIENT
Start: 2018-10-23 | End: 2018-10-31

## 2018-10-23 RX ORDER — FUROSEMIDE 40 MG
40 TABLET ORAL DAILY
Qty: 0 | Refills: 0 | Status: DISCONTINUED | OUTPATIENT
Start: 2018-10-23 | End: 2018-10-31

## 2018-10-23 RX ORDER — SODIUM BICARBONATE 1 MEQ/ML
650 SYRINGE (ML) INTRAVENOUS
Qty: 0 | Refills: 0 | Status: DISCONTINUED | OUTPATIENT
Start: 2018-10-23 | End: 2018-10-25

## 2018-10-23 RX ORDER — LACTULOSE 10 G/15ML
20 SOLUTION ORAL DAILY
Qty: 0 | Refills: 0 | Status: DISCONTINUED | OUTPATIENT
Start: 2018-10-23 | End: 2018-10-31

## 2018-10-23 RX ORDER — DOCUSATE SODIUM 100 MG
100 CAPSULE ORAL
Qty: 0 | Refills: 0 | Status: DISCONTINUED | OUTPATIENT
Start: 2018-10-23 | End: 2018-10-31

## 2018-10-23 RX ORDER — ACETAMINOPHEN 500 MG
650 TABLET ORAL ONCE
Qty: 0 | Refills: 0 | Status: COMPLETED | OUTPATIENT
Start: 2018-10-23 | End: 2018-10-25

## 2018-10-23 RX ORDER — PANTOPRAZOLE SODIUM 20 MG/1
40 TABLET, DELAYED RELEASE ORAL
Qty: 0 | Refills: 0 | Status: DISCONTINUED | OUTPATIENT
Start: 2018-10-23 | End: 2018-10-31

## 2018-10-23 RX ORDER — SODIUM CHLORIDE 9 MG/ML
1000 INJECTION INTRAMUSCULAR; INTRAVENOUS; SUBCUTANEOUS
Qty: 0 | Refills: 0 | Status: DISCONTINUED | OUTPATIENT
Start: 2018-10-23 | End: 2018-10-23

## 2018-10-23 RX ORDER — HEPARIN SODIUM 5000 [USP'U]/ML
5000 INJECTION INTRAVENOUS; SUBCUTANEOUS EVERY 8 HOURS
Qty: 0 | Refills: 0 | Status: DISCONTINUED | OUTPATIENT
Start: 2018-10-23 | End: 2018-10-31

## 2018-10-23 RX ORDER — FERROUS SULFATE 325(65) MG
325 TABLET ORAL DAILY
Qty: 0 | Refills: 0 | Status: DISCONTINUED | OUTPATIENT
Start: 2018-10-23 | End: 2018-10-31

## 2018-10-23 RX ORDER — CEFEPIME 1 G/1
2000 INJECTION, POWDER, FOR SOLUTION INTRAMUSCULAR; INTRAVENOUS EVERY 12 HOURS
Qty: 0 | Refills: 0 | Status: DISCONTINUED | OUTPATIENT
Start: 2018-10-23 | End: 2018-10-23

## 2018-10-23 RX ORDER — OXYCODONE AND ACETAMINOPHEN 5; 325 MG/1; MG/1
1 TABLET ORAL EVERY 6 HOURS
Qty: 0 | Refills: 0 | Status: DISCONTINUED | OUTPATIENT
Start: 2018-10-23 | End: 2018-10-26

## 2018-10-23 RX ORDER — ATORVASTATIN CALCIUM 80 MG/1
20 TABLET, FILM COATED ORAL AT BEDTIME
Qty: 0 | Refills: 0 | Status: DISCONTINUED | OUTPATIENT
Start: 2018-10-23 | End: 2018-10-31

## 2018-10-23 RX ORDER — FOLIC ACID 0.8 MG
1 TABLET ORAL DAILY
Qty: 0 | Refills: 0 | Status: DISCONTINUED | OUTPATIENT
Start: 2018-10-23 | End: 2018-10-31

## 2018-10-23 RX ORDER — SODIUM CHLORIDE 9 MG/ML
1000 INJECTION, SOLUTION INTRAVENOUS
Qty: 0 | Refills: 0 | Status: DISCONTINUED | OUTPATIENT
Start: 2018-10-23 | End: 2018-10-23

## 2018-10-23 RX ADMIN — OXYCODONE AND ACETAMINOPHEN 1 TABLET(S): 5; 325 TABLET ORAL at 18:01

## 2018-10-23 RX ADMIN — Medication 40 MILLIGRAM(S): at 05:02

## 2018-10-23 RX ADMIN — Medication 650 MILLIGRAM(S): at 05:02

## 2018-10-23 RX ADMIN — Medication 1334 MILLIGRAM(S): at 22:17

## 2018-10-23 RX ADMIN — OXYCODONE AND ACETAMINOPHEN 1 TABLET(S): 5; 325 TABLET ORAL at 18:31

## 2018-10-23 RX ADMIN — Medication 100 MILLIGRAM(S): at 05:02

## 2018-10-23 RX ADMIN — HEPARIN SODIUM 5000 UNIT(S): 5000 INJECTION INTRAVENOUS; SUBCUTANEOUS at 22:18

## 2018-10-23 RX ADMIN — Medication 650 MILLIGRAM(S): at 18:03

## 2018-10-23 RX ADMIN — Medication 100 MILLIGRAM(S): at 18:03

## 2018-10-23 RX ADMIN — ATORVASTATIN CALCIUM 20 MILLIGRAM(S): 80 TABLET, FILM COATED ORAL at 22:16

## 2018-10-23 RX ADMIN — TAMSULOSIN HYDROCHLORIDE 0.4 MILLIGRAM(S): 0.4 CAPSULE ORAL at 22:17

## 2018-10-23 RX ADMIN — Medication 1334 MILLIGRAM(S): at 18:03

## 2018-10-23 NOTE — CONSULT NOTE ADULT - ASSESSMENT
58 yo male with PMH of CKD 5 with recent start on HD , DM, neuropathy HTN and acromegaly presenting to the hospital with left foot non healing ulcer     ·	CKD 5 on HD for HD in AM no need for HD today   ·	check IP and PTH   ·	left foot ulcer on cefepime podiatry on case , call vascular / has severe ASPVD   ·	Anemia chronic will be transfused on unit PRBS , check Fe studies on Aranesp as OP     will follow

## 2018-10-23 NOTE — PROGRESS NOTE ADULT - SUBJECTIVE AND OBJECTIVE BOX
SU SAWYER  57y  Male      Patient is a 57y old  Male who presents with a chief complaint of left foot ulcer (22 Oct 2018 23:20)    ESRD ON HD,ACROMEGALY,HX LT DVT,RT PVD,LT HEEL ULCER    REVIEW OF SYSTEMS:  CONSTITUTIONAL: FEVER  EYES: No eye pain, visual disturbances, or discharge  ENMT:  No difficulty hearing, tinnitus, vertigo; No sinus or throat pain  NECK: No pain or stiffness  BREASTS: No pain, masses, or nipple discharge  RESPIRATORY: No cough, wheezing, chills or hemoptysis; No shortness of breath  CARDIOVASCULAR: No chest pain, palpitations, dizziness, or leg swelling  GASTROINTESTINAL: No abdominal or epigastric pain. No nausea, vomiting, or hematemesis; No diarrhea or constipation. No melena or hematochezia.  GENITOURINARY: No dysuria, frequency, hematuria, or incontinence  NEUROLOGICAL: No headaches, memory loss, loss of strength, numbness, or tremors  SKIN: No itching, burning, rashes, or lesions ,LT HEEL ULCER  LYMPH NODES: No enlarged glands  ENDOCRINE: No heat or cold intolerance; No hair loss  MUSCULOSKELETAL: No joint pain or swelling; No muscle, back, or LEGS EDEMA+  PSYCHIATRIC: No depression, anxiety, mood swings, or difficulty sleeping  HEME/LYMPH: No easy bruising, or bleeding gums  ALLERY AND IMMUNOLOGIC: No hives or eczema  FAMILY HISTORY:  Family history of myocardial infarction (Father)    T(C): 37.2 (10-23-18 @ 06:01), Max: 38.6 (10-22-18 @ 19:17)  HR: 82 (10-23-18 @ 06:01) (82 - 96)  BP: 120/67 (10-23-18 @ 06:01) (120/67 - 159/82)  RR: 18 (10-23-18 @ 06:01) (16 - 20)  SpO2: 95% (10-22-18 @ 19:17) (95% - 98%)  Wt(kg): --Vital Signs Last 24 Hrs  T(C): 37.2 (23 Oct 2018 06:01), Max: 38.6 (22 Oct 2018 19:17)  T(F): 98.9 (23 Oct 2018 06:01), Max: 101.4 (22 Oct 2018 19:17)  HR: 82 (23 Oct 2018 06:01) (82 - 96)  BP: 120/67 (23 Oct 2018 06:01) (120/67 - 159/82)  BP(mean): --  RR: 18 (23 Oct 2018 06:01) (16 - 20)  SpO2: 95% (22 Oct 2018 19:17) (95% - 98%)  bacitracin (Unknown)  Neosporin (Hypotension)      PHYSICAL EXAM:  GENERAL: NAD, well-groomed, well-developed  HEAD:  Atraumatic, Normocephalic  EYES: EOMI, PERRLA, conjunctiva and sclera clear  ENMT: No tonsillar erythema, exudates, or enlargement; Moist mucous membranes, Good dentition, No lesions  NECK: Supple, No JVD, Normal thyroid  NERVOUS SYSTEM:  Alert & Oriented X3, Good concentration; Motor Strength 5/5 B/L upper and lower extremities; DTRs 2+ intact and symmetric  CHEST/LUNG: Clear to percussion bilaterally; No rales, rhonchi, wheezing, or rubs  HEART: Regular rate and rhythm; No murmurs, rubs, or gallops  ABDOMEN: Soft, Nontender, Nondistended; Bowel sounds present  EXTREMITIES:  RT FEET COLD, LT LEG EDEMA,LT HEEL BLACK DISCOLORATION ,SMELL+  LYMPH: No lymphadenopathy noted  SKIN: No rashes or lesions      LABS:  10-22    137  |  95<L>  |  19  ----------------------------<  160<H>  3.9   |  28  |  3.4<H>    Ca    8.5      22 Oct 2018 17:30  Mg     1.9     10-22    TPro  6.0  /  Alb  2.9<L>  /  TBili  0.2  /  DBili  x   /  AST  20  /  ALT  16  /  AlkPhos  115  10-22                          7.9    10.60 )-----------( 342      ( 23 Oct 2018 06:56 )             25.2         RADIOLOGY & ADDITIONAL TESTS:    MEDICATION:  atorvastatin 20 milliGRAM(s) Oral at bedtime  calcium acetate 667 milliGRAM(s) Oral four times a day with meals  cefepime   IVPB 1000 milliGRAM(s) IV Intermittent daily  docusate sodium 100 milliGRAM(s) Oral two times a day  ferrous sulfate Oral Tab/Cap - Peds 325 milliGRAM(s) Oral daily  folic acid 1 milliGRAM(s) Oral daily  furosemide    Tablet 40 milliGRAM(s) Oral daily  influenza   Vaccine 0.5 milliLiter(s) IntraMuscular once  lactulose Syrup 20 Gram(s) Oral daily  sodium bicarbonate 650 milliGRAM(s) Oral two times a day  tamsulosin 0.4 milliGRAM(s) Oral at bedtime      HEALTH ISSUES - PROBLEM Dx:  Neuropathy: Neuropathy  HTN (hypertension): HTN (hypertension)  CKD (chronic kidney disease), stage V: CKD (chronic kidney disease), stage V ON HD MWF  DM (diabetes mellitus), type 2: DM (diabetes mellitus), type 2   Dyslipidemia: Dyslipidemia  Foot ulcer: Foot ulcer ,LT HEEL GOIG FOR DEBRIDEMENT,DR CASTILLO,ID CONSULT    HX LT DVT,RT PVD ,WILL DO VASCULAR STUDY,PT WAS RECEIVING VANCOMYCIN AFTER HD X2 DOSE AT [facility] FOR SURGERY

## 2018-10-23 NOTE — CONSULT NOTE ADULT - SUBJECTIVE AND OBJECTIVE BOX
NEPHROLOGY CONSULTATION NOTE    Patient is a 57y Male whom presented to the hospital with non healing left foot ulcer. Patient was sent in from NH for non healing foot ulcer left , denied any fever no chills no chest pain no SOB no abdominal pain.  PAtien is on HD MWF, last HD yesterday     PAST MEDICAL & SURGICAL HISTORY:  Dyslipidemia  DM (diabetes mellitus), type 2  Neuropathy  HTN (hypertension)  CAD (coronary atherosclerotic disease)  Acromegaly  CKD (chronic kidney disease), stage V  H/O eye surgery  H/O: pituitary tumor: s/p removal    Allergies:  bacitracin (Unknown)  Neosporin (Hypotension)    Home Medications Reviewed  Hospital Medications:   MEDICATIONS  (STANDING):  atorvastatin 20 milliGRAM(s) Oral at bedtime  calcium acetate 667 milliGRAM(s) Oral four times a day with meals  cefepime   IVPB 1000 milliGRAM(s) IV Intermittent daily  docusate sodium 100 milliGRAM(s) Oral two times a day  ferrous sulfate Oral Tab/Cap - Peds 325 milliGRAM(s) Oral daily  folic acid 1 milliGRAM(s) Oral daily  furosemide    Tablet 40 milliGRAM(s) Oral daily  influenza   Vaccine 0.5 milliLiter(s) IntraMuscular once  lactulose Syrup 20 Gram(s) Oral daily  sodium bicarbonate 650 milliGRAM(s) Oral two times a day  tamsulosin 0.4 milliGRAM(s) Oral at bedtime      SOCIAL HISTORY:  Denies ETOH,Smoking,   FAMILY HISTORY:  Family history of myocardial infarction (Father)        REVIEW OF SYSTEMS:    All other review of systems is negative unless indicated above.    VITALS:  T(F): 98.9 (10-23-18 @ 06:01), Max: 101.4 (10-22-18 @ 19:17)  HR: 82 (10-23-18 @ 06:01)  BP: 120/67 (10-23-18 @ 06:01)  RR: 18 (10-23-18 @ 06:01)  SpO2: 95% (10-22-18 @ 19:17)    Height (cm): 190.5 (10-22 @ 20:26)  Weight (kg): 115 (10-22 @ 20:26)  BMI (kg/m2): 31.7 (10-22 @ 20:26)  BSA (m2): 2.43 (10-22 @ 20:26)    I&O's Detail        PHYSICAL EXAM:  Constitutional: NAD  HEENT: anicteric sclera, oropharynx clear, MMM  Neck: No JVD  Respiratory: CTAB, no wheezes, rales or rhonchi  Cardiovascular: S1, S2, RRR  Gastrointestinal: BS+, soft, NT/ND  Extremities: cyanosis toes plus wrappings   Neurological: A/O x 3, no focal deficits  Psychiatric: Normal mood, normal affect  : No CVA tenderness. No ortiz.   Skin: No rashes  Vascular Access:    LABS:  10-23    139  |  100  |  24<H>  ----------------------------<  122<H>  4.0   |  26  |  4.1<HH>    Ca    8.4<L>      23 Oct 2018 06:56  Mg     1.9     10-22    TPro  6.0  /  Alb  2.9<L>  /  TBili  0.2  /  DBili      /  AST  20  /  ALT  16  /  AlkPhos  115  10-22    Creatinine Trend: 4.1 <--, 3.4 <--, 5.7 <--, 8.2 <--, 8.1 <--                        7.9    10.60 )-----------( 342      ( 23 Oct 2018 06:56 )             25.2     Urine Studies:

## 2018-10-23 NOTE — CHART NOTE - NSCHARTNOTEFT_GEN_A_CORE
HGB 7.9  d/w Dr. Silveira     will transfuse 1 unit prbc   pt going to OR for left heel ulcer debrid.     rn   pt aware with consent

## 2018-10-23 NOTE — BRIEF OPERATIVE NOTE - PROCEDURE
<<-----Click on this checkbox to enter Procedure Debridement of ulcer of left heel  10/23/2018    Active  ALLYPION1

## 2018-10-24 LAB
ANION GAP SERPL CALC-SCNC: 13 MMOL/L — SIGNIFICANT CHANGE UP (ref 7–14)
BASOPHILS # BLD AUTO: 0.07 K/UL — SIGNIFICANT CHANGE UP (ref 0–0.2)
BASOPHILS NFR BLD AUTO: 0.5 % — SIGNIFICANT CHANGE UP (ref 0–1)
BUN SERPL-MCNC: 37 MG/DL — HIGH (ref 10–20)
CALCIUM SERPL-MCNC: 8.2 MG/DL — LOW (ref 8.5–10.1)
CHLORIDE SERPL-SCNC: 103 MMOL/L — SIGNIFICANT CHANGE UP (ref 98–110)
CO2 SERPL-SCNC: 24 MMOL/L — SIGNIFICANT CHANGE UP (ref 17–32)
CREAT SERPL-MCNC: 5.2 MG/DL — CRITICAL HIGH (ref 0.7–1.5)
EOSINOPHIL # BLD AUTO: 0.06 K/UL — SIGNIFICANT CHANGE UP (ref 0–0.7)
EOSINOPHIL NFR BLD AUTO: 0.5 % — SIGNIFICANT CHANGE UP (ref 0–8)
ERYTHROCYTE [SEDIMENTATION RATE] IN BLOOD: 80 MM/HR — HIGH (ref 0–10)
GLUCOSE BLDC GLUCOMTR-MCNC: 122 MG/DL — HIGH (ref 70–99)
GLUCOSE BLDC GLUCOMTR-MCNC: 165 MG/DL — HIGH (ref 70–99)
GLUCOSE BLDC GLUCOMTR-MCNC: 174 MG/DL — HIGH (ref 70–99)
GLUCOSE BLDC GLUCOMTR-MCNC: 223 MG/DL — HIGH (ref 70–99)
GLUCOSE SERPL-MCNC: 120 MG/DL — HIGH (ref 70–99)
HCT VFR BLD CALC: 25.8 % — LOW (ref 42–52)
HGB BLD-MCNC: 8 G/DL — LOW (ref 14–18)
IMM GRANULOCYTES NFR BLD AUTO: 0.5 % — HIGH (ref 0.1–0.3)
LYMPHOCYTES # BLD AUTO: 0.63 K/UL — LOW (ref 1.2–3.4)
LYMPHOCYTES # BLD AUTO: 4.9 % — LOW (ref 20.5–51.1)
MAGNESIUM SERPL-MCNC: 2.1 MG/DL — SIGNIFICANT CHANGE UP (ref 1.8–2.4)
MCHC RBC-ENTMCNC: 28.8 PG — SIGNIFICANT CHANGE UP (ref 27–31)
MCHC RBC-ENTMCNC: 31 G/DL — LOW (ref 32–37)
MCV RBC AUTO: 92.8 FL — SIGNIFICANT CHANGE UP (ref 80–94)
MONOCYTES # BLD AUTO: 0.95 K/UL — HIGH (ref 0.1–0.6)
MONOCYTES NFR BLD AUTO: 7.4 % — SIGNIFICANT CHANGE UP (ref 1.7–9.3)
NEUTROPHILS # BLD AUTO: 11.06 K/UL — HIGH (ref 1.4–6.5)
NEUTROPHILS NFR BLD AUTO: 86.2 % — HIGH (ref 42.2–75.2)
NRBC # BLD: 0 /100 WBCS — SIGNIFICANT CHANGE UP (ref 0–0)
PHOSPHATE SERPL-MCNC: 4.6 MG/DL — SIGNIFICANT CHANGE UP (ref 2.1–4.9)
PLATELET # BLD AUTO: 325 K/UL — SIGNIFICANT CHANGE UP (ref 130–400)
POTASSIUM SERPL-MCNC: 4.3 MMOL/L — SIGNIFICANT CHANGE UP (ref 3.5–5)
POTASSIUM SERPL-SCNC: 4.3 MMOL/L — SIGNIFICANT CHANGE UP (ref 3.5–5)
RBC # BLD: 2.78 M/UL — LOW (ref 4.7–6.1)
RBC # FLD: 15.7 % — HIGH (ref 11.5–14.5)
SODIUM SERPL-SCNC: 140 MMOL/L — SIGNIFICANT CHANGE UP (ref 135–146)
WBC # BLD: 12.84 K/UL — HIGH (ref 4.8–10.8)
WBC # FLD AUTO: 12.84 K/UL — HIGH (ref 4.8–10.8)

## 2018-10-24 PROCEDURE — 93970 EXTREMITY STUDY: CPT | Mod: 26

## 2018-10-24 PROCEDURE — 93923 UPR/LXTR ART STDY 3+ LVLS: CPT | Mod: 26

## 2018-10-24 PROCEDURE — 93925 LOWER EXTREMITY STUDY: CPT | Mod: 26

## 2018-10-24 RX ORDER — ERGOCALCIFEROL 1.25 MG/1
50000 CAPSULE ORAL
Qty: 0 | Refills: 0 | Status: DISCONTINUED | OUTPATIENT
Start: 2018-10-24 | End: 2018-10-31

## 2018-10-24 RX ORDER — VANCOMYCIN HCL 1 G
500 VIAL (EA) INTRAVENOUS ONCE
Qty: 0 | Refills: 0 | Status: COMPLETED | OUTPATIENT
Start: 2018-10-24 | End: 2018-10-24

## 2018-10-24 RX ADMIN — Medication 1334 MILLIGRAM(S): at 08:37

## 2018-10-24 RX ADMIN — HEPARIN SODIUM 5000 UNIT(S): 5000 INJECTION INTRAVENOUS; SUBCUTANEOUS at 14:54

## 2018-10-24 RX ADMIN — OXYCODONE AND ACETAMINOPHEN 1 TABLET(S): 5; 325 TABLET ORAL at 16:29

## 2018-10-24 RX ADMIN — Medication 325 MILLIGRAM(S): at 14:49

## 2018-10-24 RX ADMIN — Medication 100 MILLIGRAM(S): at 05:58

## 2018-10-24 RX ADMIN — CHLORHEXIDINE GLUCONATE 1 APPLICATION(S): 213 SOLUTION TOPICAL at 06:01

## 2018-10-24 RX ADMIN — Medication 650 MILLIGRAM(S): at 19:05

## 2018-10-24 RX ADMIN — Medication 1334 MILLIGRAM(S): at 14:50

## 2018-10-24 RX ADMIN — OXYCODONE AND ACETAMINOPHEN 1 TABLET(S): 5; 325 TABLET ORAL at 15:29

## 2018-10-24 RX ADMIN — Medication 100 MILLIGRAM(S): at 19:05

## 2018-10-24 RX ADMIN — ATORVASTATIN CALCIUM 20 MILLIGRAM(S): 80 TABLET, FILM COATED ORAL at 21:18

## 2018-10-24 RX ADMIN — Medication 100 MILLIGRAM(S): at 14:48

## 2018-10-24 RX ADMIN — Medication 40 MILLIGRAM(S): at 05:58

## 2018-10-24 RX ADMIN — TAMSULOSIN HYDROCHLORIDE 0.4 MILLIGRAM(S): 0.4 CAPSULE ORAL at 21:18

## 2018-10-24 RX ADMIN — Medication 1334 MILLIGRAM(S): at 19:05

## 2018-10-24 RX ADMIN — Medication 650 MILLIGRAM(S): at 05:58

## 2018-10-24 RX ADMIN — HEPARIN SODIUM 5000 UNIT(S): 5000 INJECTION INTRAVENOUS; SUBCUTANEOUS at 06:01

## 2018-10-24 RX ADMIN — Medication 1334 MILLIGRAM(S): at 21:18

## 2018-10-24 RX ADMIN — PANTOPRAZOLE SODIUM 40 MILLIGRAM(S): 20 TABLET, DELAYED RELEASE ORAL at 08:38

## 2018-10-24 RX ADMIN — OXYCODONE AND ACETAMINOPHEN 1 TABLET(S): 5; 325 TABLET ORAL at 04:09

## 2018-10-24 RX ADMIN — Medication 1 MILLIGRAM(S): at 14:49

## 2018-10-24 RX ADMIN — HEPARIN SODIUM 5000 UNIT(S): 5000 INJECTION INTRAVENOUS; SUBCUTANEOUS at 21:18

## 2018-10-24 NOTE — PROGRESS NOTE ADULT - SUBJECTIVE AND OBJECTIVE BOX
Progress Note: General Surgery  Patient: SU SAWYER , 57y (1960)Male   MRN: [id number]  Location: [place] [address]  Visit: 10-22-18 Inpatient  Date: 10-24-18 @ 14:19  Post-op Day: 1    Procedure/Diagnosis: S/P debridement of infected heel ulcer to the calcaneus    Vitals: T(F): 99.9 (10-24-18 @ 05:52), Max: 100.8 (10-23-18 @ 22:08)  HR: 69 (10-24-18 @ 09:20)  BP: 124/69 (10-24-18 @ 09:20) (118/65 - 139/73)  RR: 16 (10-24-18 @ 09:20)  SpO2: 95% (10-23-18 @ 14:58)    Diet: Diet, Regular:   Consistent Carbohydrate No Snacks  DASH/TLC Sodium & Cholesterol Restricted  For patients receiving Renal Replacement - No Protein Restr, No Conc K, No Conc Phos, Low Sodium (10-24-18 @ 10:47)    IV Fluids: yes no , Type: calcium acetate 1334 milliGRAM(s) Oral four times a day with meals  ergocalciferol 92116 Unit(s) Oral every week  ferrous sulfate Oral Tab/Cap - Peds 325 milliGRAM(s) Oral daily  folic acid 1 milliGRAM(s) Oral daily  sodium bicarbonate 650 milliGRAM(s) Oral two times a day    Intake and Output:   10-23-18 @ 07:01  -  10-24-18 @ 07:00  --------------------------------------------------------  IN: 0 mL     10-23-18 @ 07:01  -  10-24-18 @ 07:00  --------------------------------------------------------  OUT:    Intermittent Catheterization - Urethral: 1000 mL    Voided: 2 mL  Total OUT: 1002 mL    10-23-18 @ 07:01  -  10-24-18 @ 07:00  --------------------------------------------------------  NET: -1002 mL    Physical Examination:  General Appearance: NAD, alert and cooperative  MSK/Extremities: R heel ulcer s/p debridement to calcaneus, with fat necrosis, surrounding dead skin debrided, wound dressing changed in PM  Skin: Warm/dry, Normal color  Incisions/Wounds: Dressings in place, clean, dry and intact, no signs of active bleeding/drainage    Medications: [Standing]  atorvastatin 20 milliGRAM(s) Oral at bedtime  calcium acetate 1334 milliGRAM(s) Oral four times a day with meals  chlorhexidine 4% Liquid 1 Application(s) Topical <User Schedule>  docusate sodium 100 milliGRAM(s) Oral two times a day  ergocalciferol 33427 Unit(s) Oral every week  ferrous sulfate Oral Tab/Cap - Peds 325 milliGRAM(s) Oral daily  folic acid 1 milliGRAM(s) Oral daily  furosemide    Tablet 40 milliGRAM(s) Oral daily  heparin  Injectable 5000 Unit(s) SubCutaneous every 8 hours  influenza   Vaccine 0.5 milliLiter(s) IntraMuscular once  lactulose Syrup 20 Gram(s) Oral daily  pantoprazole    Tablet 40 milliGRAM(s) Oral before breakfast  sodium bicarbonate 650 milliGRAM(s) Oral two times a day  tamsulosin 0.4 milliGRAM(s) Oral at bedtime  vancomycin  IVPB 500 milliGRAM(s) IV Intermittent once    DVT Prophylaxis: heparin  Injectable 5000 Unit(s) SubCutaneous every 8 hours  GI Prophylaxis: pantoprazole    Tablet 40 milliGRAM(s) Oral before breakfast  Antibiotics: vancomycin  IVPB 500 milliGRAM(s) IV Intermittent once    Anticoagulation:   Medications:[PRN]  acetaminophen   Tablet .. 650 milliGRAM(s) Oral once PRN  oxyCODONE    5 mG/acetaminophen 325 mG 1 Tablet(s) Oral every 6 hours PRN    Labs:                        8.0    12.84 )-----------( 325      ( 24 Oct 2018 06:41 )             25.8   10-24    140  |  103  |  37<H>  ----------------------------<  120<H>  4.3   |  24  |  5.2<HH>    Ca    8.2<L>      24 Oct 2018 06:41  Phos  4.6     10-24  Mg     2.1     10-24    TPro  6.0  /  Alb  2.9<L>  /  TBili  0.2  /  DBili  x   /  AST  20  /  ALT  16  /  AlkPhos  115  10-22  LIVER FUNCTIONS - ( 22 Oct 2018 17:30 )  Alb: 2.9 g/dL / Pro: 6.0 g/dL / ALK PHOS: 115 U/L / ALT: 16 U/L / AST: 20 U/L / GGT: x         PT/INR - ( 22 Oct 2018 17:30 )   PT: 13.70 sec;   INR: 1.26 ratio    PTT - ( 22 Oct 2018 17:30 )  PTT:28.1 sec    Urine/Micro:  Culture - Blood (collected 22 Oct 2018 17:45)  Source: .Blood Blood  Preliminary Report (24 Oct 2018 03:01):    No growth to date.    Culture - Blood (collected 22 Oct 2018 17:30)  Source: .Blood Blood  Preliminary Report (24 Oct 2018 03:01):    No growth to date.      Imaging:  None/24h

## 2018-10-24 NOTE — PROGRESS NOTE ADULT - ASSESSMENT
Assessment:  57y Male patient admitted S/P debridement of infected heel ulcer to the calcaneus, POD1, with the above physical exam, labs, and imaging findings.    Plan:  Daily drssg changes:  -wet to dry 4x4  -kerlex  -ACE  C/w abx - Vanco post HD, Gent  AM labs, daily CBC  C/w current care as per medical team  Will need wound care when dc planning    Date/Time: 10-24-18 @ 14:19

## 2018-10-24 NOTE — PROGRESS NOTE ADULT - ASSESSMENT
1)  ESRD on HD via tunnelled catheter    2)  DFU right foot s/p debridement and on Abx    3)  Severe secondary HPT and 25-OH Vit D3 deficiency    4) Anemia requiring another PRBC transfusion.  Suspect bone marrow suppression due to chronic osteomyelitis    Recommend:    1)  Will complete 3hrs HD via tunnelled catheter, Optiflux dialyzer, 2K+ bath, BFR 350cc/min, UF ~ 3L as tolerated    2)  Continue current Abx    3)  Ergocalciferol 50,000 IU qweek to target blood level > 35.  Not sure why pt not on activated Vit D3; ? became hypercalcemic in past, allergic, etc.

## 2018-10-24 NOTE — PROGRESS NOTE ADULT - SUBJECTIVE AND OBJECTIVE BOX
called to evaluated for urinary retention. 57y Male whom presented to the hospital with non healing left foot ulcer. Patient was sent in from NH for non healing foot ulcer left , denied any fever no chills no chest pain no SOB no abdominal pain.   CIC catheterization /ortiz drained 1000ml  Plan consider gu follow up for urinary retention

## 2018-10-24 NOTE — CONSULT NOTE ADULT - ASSESSMENT
# Left foot infected ulcer with cellulitis - s/p debridement  # Leukocytosis    Would recommend:    1. Follow up Left foot wound culture  2. Monitor Temp. and c/w supportive  care  3. Obtain Vancomycin dose in AM and  Re-dose based on the level, the GOal level is between 15 to 20  4. Continue Cefepime until culture is finalized  5. Wound care as per POdiatry  6. Monitor WBC count    d/w patient     will follow the patient with you and make further recommendation based on the clinical course and Lab results  Thank you for the opportunity to participate in Mr. SAWYER's care

## 2018-10-24 NOTE — CONSULT NOTE ADULT - SUBJECTIVE AND OBJECTIVE BOX
Patient is a 57y old  Male who presents with a chief complaint of foot ulcer (24 Oct 2018 14:18)      INTERVAL HPI/OVERNIGHT EVENTS:  T(C): 36.7 (10-24-18 @ 13:40), Max: 38.2 (10-23-18 @ 22:08)  HR: 91 (10-24-18 @ 13:40) (69 - 97)  BP: 114/58 (10-24-18 @ 13:40) (114/58 - 125/63)  RR: 16 (10-24-18 @ 13:40) (16 - 16)  SpO2: --  Wt(kg): --  I&O's Summary    23 Oct 2018 07:01  -  24 Oct 2018 07:00  --------------------------------------------------------  IN: 0 mL / OUT: 1002 mL / NET: -1002 mL    24 Oct 2018 07:01  -  24 Oct 2018 21:19  --------------------------------------------------------  IN: 160 mL / OUT: 410 mL / NET: -250 mL        PAST MEDICAL & SURGICAL HISTORY:  Dyslipidemia  DM (diabetes mellitus), type 2  Neuropathy  HTN (hypertension)  CAD (coronary atherosclerotic disease)  Acromegaly  CKD (chronic kidney disease), stage V  H/O eye surgery  H/O: pituitary tumor: s/p removal      SOCIAL HISTORY  Alcohol:  Tobacco:  Illicit substance use:      FAMILY HISTORY:      LABS:                        8.0    12.84 )-----------( 325      ( 24 Oct 2018 06:41 )             25.8     10-24    140  |  103  |  37<H>  ----------------------------<  120<H>  4.3   |  24  |  5.2<HH>    Ca    8.2<L>      24 Oct 2018 06:41  Phos  4.6     10-24  Mg     2.1     10-24          CAPILLARY BLOOD GLUCOSE      POCT Blood Glucose.: 223 mg/dL (24 Oct 2018 20:57)  POCT Blood Glucose.: 174 mg/dL (24 Oct 2018 16:00)  POCT Blood Glucose.: 165 mg/dL (24 Oct 2018 13:03)  POCT Blood Glucose.: 122 mg/dL (24 Oct 2018 07:57)            MEDICATIONS  (STANDING):  atorvastatin 20 milliGRAM(s) Oral at bedtime  calcium acetate 1334 milliGRAM(s) Oral four times a day with meals  chlorhexidine 4% Liquid 1 Application(s) Topical <User Schedule>  docusate sodium 100 milliGRAM(s) Oral two times a day  ergocalciferol 58263 Unit(s) Oral every week  ferrous sulfate Oral Tab/Cap - Peds 325 milliGRAM(s) Oral daily  folic acid 1 milliGRAM(s) Oral daily  furosemide    Tablet 40 milliGRAM(s) Oral daily  heparin  Injectable 5000 Unit(s) SubCutaneous every 8 hours  influenza   Vaccine 0.5 milliLiter(s) IntraMuscular once  lactulose Syrup 20 Gram(s) Oral daily  pantoprazole    Tablet 40 milliGRAM(s) Oral before breakfast  sodium bicarbonate 650 milliGRAM(s) Oral two times a day  tamsulosin 0.4 milliGRAM(s) Oral at bedtime    MEDICATIONS  (PRN):  acetaminophen   Tablet .. 650 milliGRAM(s) Oral once PRN Mild Pain (1 - 3)  oxyCODONE    5 mG/acetaminophen 325 mG 1 Tablet(s) Oral every 6 hours PRN Moderate Pain (4 - 6)      REVIEW OF SYSTEMS:  CONSTITUTIONAL: No fever, weight loss, or fatigue  EYES: No eye pain, visual disturbances, or discharge  ENMT:  No difficulty hearing, tinnitus, vertigo; No sinus or throat pain  NECK: No pain or stiffness  RESPIRATORY: No cough, wheezing, chills or hemoptysis; No shortness of breath  CARDIOVASCULAR: No chest pain, palpitations, dizziness, or leg swelling  GASTROINTESTINAL: No abdominal or epigastric pain. No nausea, vomiting, or hematemesis; No diarrhea or constipation. No melena or hematochezia.  GENITOURINARY: No dysuria, frequency, hematuria, or incontinence  NEUROLOGICAL: No headaches, memory loss, loss of strength, numbness, or tremors  SKIN: No itching, burning, rashes, or lesions   LYMPH NODES: No enlarged glands  ENDOCRINE: No heat or cold intolerance; No hair loss  MUSCULOSKELETAL: No joint pain or swelling; No muscle, back, or extremity pain  PSYCHIATRIC: No depression, anxiety, mood swings, or difficulty sleeping  HEME/LYMPH: No easy bruising, or bleeding gums  ALLERY AND IMMUNOLOGIC: No hives or eczema    RADIOLOGY & ADDITIONAL TESTS:    Imaging Personally Reviewed:  [ ] YES  [ ] NO    Consultant(s) Notes Reviewed:  [ ] YES  [ ] NO    PHYSICAL EXAM:  GENERAL: NAD, well-groomed, well-developed  HEAD:  Atraumatic, Normocephalic  EYES: EOMI, PERRLA, conjunctiva and sclera clear  ENMT: No tonsillar erythema, exudates, or enlargement; Moist mucous membranes, Good dentition, No lesions  NECK: Supple, No JVD, Normal thyroid  NERVOUS SYSTEM:  Alert & Oriented X3, Good concentration; Motor Strength 5/5 B/L upper and lower extremities; DTRs 2+ intact and symmetric  CHEST/LUNG: Clear to percussion bilaterally; No rales, rhonchi, wheezing, or rubs  HEART: Regular rate and rhythm; No murmurs, rubs, or gallops  ABDOMEN: Soft, Nontender, Nondistended; Bowel sounds present  EXTREMITIES:  2+ Peripheral Pulses, No clubbing, cyanosis, or edema  LYMPH: No lymphadenopathy noted  SKIN: No rashes or lesions    Care Discussed with Consultants/Other Providers [ ] YES  [ ] NO Patient is a 57y old  Male who presents with a chief complaint of foot ulcer (24 Oct 2018 14:18)      REVIEW OF SYSTEMS: Total of twelve systems have been reviewed with patient and found to be negative unless mentioned in HPI        PAST MEDICAL & SURGICAL HISTORY:  Dyslipidemia  DM (diabetes mellitus), type 2  Neuropathy  HTN (hypertension)  CAD (coronary atherosclerotic disease)  Acromegaly  CKD (chronic kidney disease), stage V  H/O eye surgery  H/O: pituitary tumor: s/p removal      SOCIAL HISTORY  Alcohol: Does not drink  Tobacco: Does not smoke  Illicit substance use: None        FAMILY HISTORY: Non contributory to the present illness      ALLERGIES: NO abx      T(C): 36.7 (10-24-18 @ 13:40), Max: 38.2 (10-23-18 @ 22:08)  HR: 91 (10-24-18 @ 13:40) (69 - 97)  BP: 114/58 (10-24-18 @ 13:40) (114/58 - 125/63)  RR: 16 (10-24-18 @ 13:40) (16 - 16)  SpO2: --  Wt(kg): --  I&O's Summary      PHYSICAL EXAM:  GENERAL: Not in distress  CHEST/LUNG: Air entry bilaterally  HEART: s1 and s2 present  ABDOMEN:  Nontender and Nondistended  EXTREMITIES:  Left foot banfage in placed  CNS: Awake and Alert        LABS:                        8.0    12.84 )-----------( 325      ( 24 Oct 2018 06:41 )             25.8         10-24    140  |  103  |  37<H>  ----------------------------<  120<H>  4.3   |  24  |  5.2<HH>    Ca    8.2<L>      24 Oct 2018 06:41  Phos  4.6     10-24  Mg     2.1     10-24          CAPILLARY BLOOD GLUCOSE    POCT Blood Glucose.: 223 mg/dL (24 Oct 2018 20:57)  POCT Blood Glucose.: 174 mg/dL (24 Oct 2018 16:00)  POCT Blood Glucose.: 165 mg/dL (24 Oct 2018 13:03)  POCT Blood Glucose.: 122 mg/dL (24 Oct 2018 07:57)        MEDICATIONS  (STANDING):  atorvastatin 20 milliGRAM(s) Oral at bedtime  calcium acetate 1334 milliGRAM(s) Oral four times a day with meals  chlorhexidine 4% Liquid 1 Application(s) Topical <User Schedule>  docusate sodium 100 milliGRAM(s) Oral two times a day  ergocalciferol 07586 Unit(s) Oral every week  ferrous sulfate Oral Tab/Cap - Peds 325 milliGRAM(s) Oral daily  folic acid 1 milliGRAM(s) Oral daily  furosemide    Tablet 40 milliGRAM(s) Oral daily  heparin  Injectable 5000 Unit(s) SubCutaneous every 8 hours  influenza   Vaccine 0.5 milliLiter(s) IntraMuscular once  lactulose Syrup 20 Gram(s) Oral daily  pantoprazole    Tablet 40 milliGRAM(s) Oral before breakfast  sodium bicarbonate 650 milliGRAM(s) Oral two times a day  tamsulosin 0.4 milliGRAM(s) Oral at bedtime    MEDICATIONS  (PRN):  acetaminophen   Tablet .. 650 milliGRAM(s) Oral once PRN Mild Pain (1 - 3)  oxyCODONE    5 mG/acetaminophen 325 mG 1 Tablet(s) Oral every 6 hours PRN Moderate Pain (4 - 6)        RADIOLOGY & ADDITIONAL TESTS:    < from: Xray Foot AP + Lateral, Left (10.22.18 @ 19:06) >  Impression:    Diffuse dorsal soft tissue swelling, without any evidence of soft tissue   gas.     Diffuse vascular calcifications. Degenerative changes of 1st MTP joint   and tarsal joints.     < end of copied text >      MICROBIOLOGY DATA:    Culture - Blood (10.22.18 @ 17:45)    Specimen Source: .Blood Blood    Culture Results:   No growth to date.      Culture - Blood (10.22.18 @ 17:30)    Specimen Source: .Blood Blood    Culture Results:   No growth to date.

## 2018-10-24 NOTE — PROGRESS NOTE ADULT - SUBJECTIVE AND OBJECTIVE BOX
[facility] FOLLOW UP NOTE  --------------------------------------------------------------------------------  Chief Complaint:    24 hour events/subjective:  Events of last 24hrs noted.  Pt seen during dialysis via tunnelled chest wall catheter.  Pt offers no new c/o.        PAST HISTORY  --------------------------------------------------------------------------------  No significant changes to PMH, PSH, FHx, SHx, unless otherwise noted    ALLERGIES & MEDICATIONS  --------------------------------------------------------------------------------  Allergies    bacitracin (Unknown)  Neosporin (Hypotension)    Intolerances      Standing Inpatient Medications  atorvastatin 20 milliGRAM(s) Oral at bedtime  calcium acetate 1334 milliGRAM(s) Oral four times a day with meals  chlorhexidine 4% Liquid 1 Application(s) Topical <User Schedule>  docusate sodium 100 milliGRAM(s) Oral two times a day  ferrous sulfate Oral Tab/Cap - Peds 325 milliGRAM(s) Oral daily  folic acid 1 milliGRAM(s) Oral daily  furosemide    Tablet 40 milliGRAM(s) Oral daily  heparin  Injectable 5000 Unit(s) SubCutaneous every 8 hours  influenza   Vaccine 0.5 milliLiter(s) IntraMuscular once  lactulose Syrup 20 Gram(s) Oral daily  pantoprazole    Tablet 40 milliGRAM(s) Oral before breakfast  sodium bicarbonate 650 milliGRAM(s) Oral two times a day  tamsulosin 0.4 milliGRAM(s) Oral at bedtime  vancomycin  IVPB 500 milliGRAM(s) IV Intermittent once    PRN Inpatient Medications  acetaminophen   Tablet .. 650 milliGRAM(s) Oral once PRN  oxyCODONE    5 mG/acetaminophen 325 mG 1 Tablet(s) Oral every 6 hours PRN      REVIEW OF SYSTEMS  --------------------------------------------------------------------------------  Gen: No weight changes, fatigue, fevers/chills, weakness  Skin: No rashes  Head/Eyes/Ears/Mouth: No headache; Normal hearing; Normal vision w/o blurriness; No sinus pain/discomfort, sore throat  Respiratory: No dyspnea, cough, wheezing, hemoptysis  CV: No chest pain, PND, orthopnea  GI: No abdominal pain, diarrhea, constipation, nausea, vomiting, melena, hematochezia  : No increased frequency, dysuria, hematuria, nocturia  MSK: No joint pain/swelling; no back pain; no edema  Neuro: No dizziness/lightheadedness, weakness, seizures, numbness, tingling  Heme: No easy bruising or bleeding  Endo: No heat/cold intolerance  Psych: No significant nervousness, anxiety, stress, depression    All other systems were reviewed and are negative, except as noted.    VITALS/PHYSICAL EXAM  --------------------------------------------------------------------------------  T(C): 37.7 (10-24-18 @ 05:52), Max: 38.2 (10-23-18 @ 22:08)  HR: 69 (10-24-18 @ 09:20) (69 - 97)  BP: 124/69 (10-24-18 @ 09:20) (118/65 - 143/72)  RR: 16 (10-24-18 @ 09:20) (15 - 18)  SpO2: 95% (10-23-18 @ 14:58) (95% - 98%)  Wt(kg): --  Height (cm): 190.5 (10-23-18 @ 13:41)  Weight (kg): 115 (10-23-18 @ 13:41)  BMI (kg/m2): 31.7 (10-23-18 @ 13:41)  BSA (m2): 2.43 (10-23-18 @ 13:41)      10-23-18 @ 07:01  -  10-24-18 @ 07:00  --------------------------------------------------------  IN: 0 mL / OUT: 1002 mL / NET: -1002 mL      Physical Exam:  	Gen: NAD, well-appearing  	HEENT: PERRL, supple neck, clear oropharynx  	Pulm: CTA B/L  	CV: RRR, S1S2; no rub  	Back: No spinal or CVA tenderness; no sacral edema  	Abd: +BS, soft, nontender/nondistended  	: No suprapubic tenderness  	UE: Warm, FROM, no clubbing, intact strength; no edema; no asterixis  	LE: Warm, FROM, no clubbing, intact strength; no edema  	Neuro: No focal deficits, intact gait  	Psych: Normal affect and mood  	Skin: Warm, without rashes  	Vascular access:    LABS/STUDIES  --------------------------------------------------------------------------------              8.0    12.84 >-----------<  325      [10-24-18 @ 06:41]              25.8     140  |  103  |  37  ----------------------------<  120      [10-24-18 @ 06:41]  4.3   |  24  |  5.2        Ca     8.2     [10-24-18 @ 06:41]      Mg     2.1     [10-24-18 @ 06:41]      Phos  4.6     [10-24-18 @ 06:41]    TPro  6.0  /  Alb  2.9  /  TBili  0.2  /  DBili  x   /  AST  20  /  ALT  16  /  AlkPhos  115  [10-22-18 @ 17:30]    PT/INR: PT 13.70, INR 1.26       [10-22-18 @ 17:30]  PTT: 28.1       [10-22-18 @ 17:30]      Creatinine Trend:  SCr 5.2 [10-24 @ 06:41]  SCr 4.1 [10-23 @ 06:56]  SCr 3.4 [10-22 @ 17:30]  SCr 5.7 [10-10 @ 16:49]        Iron 38, TIBC 166, %sat 23      [09-19-18 @ 04:30]  Ferritin 55      [03-09-18 @ 18:01]  PTH -- (Ca 8.2)      [09-22-18 @ 08:40]   2071  PTH -- (Ca 8.0)      [09-19-18 @ 04:30]   1519  PTH -- (Ca 8.3)      [03-09-18 @ 18:01]   1545  Vitamin D (25OH) 14.4      [03-09-18 @ 18:01]

## 2018-10-24 NOTE — PROGRESS NOTE ADULT - SUBJECTIVE AND OBJECTIVE BOX
SU SAWYER  57y  Male      Patient is a 57y old  Male who presents with a chief complaint of foot ulcer (24 Oct 2018 07:05)  s/p lt heel debridement,on antibiotics      REVIEW OF SYSTEMS:  CONSTITUTIONAL: fever+  EYES: No eye pain, visual disturbances, or discharge  ENMT:  No difficulty hearing, tinnitus, vertigo; No sinus or throat pain  NECK: No pain or stiffness  BREASTS: No pain, masses, or nipple discharge  RESPIRATORY: No cough, wheezing, chills or hemoptysis; No shortness of breath  CARDIOVASCULAR: No chest pain, palpitations, dizziness, or leg swelling  GASTROINTESTINAL: No abdominal or epigastric pain. No nausea, vomiting, or hematemesis; No diarrhea or constipation. No melena or hematochezia.  GENITOURINARY: No dysuria, frequency, hematuria, or incontinence  NEUROLOGICAL: No headaches, memory loss, loss of strength, numbness, or tremors  SKIN: No itching, burning, rashes, or lesions   LYMPH NODES: No enlarged glands  ENDOCRINE: No heat or cold intolerance; No hair loss  MUSCULOSKELETAL: No joint pain or swelling; No muscle, back, or extremity pain  PSYCHIATRIC: No depression, anxiety, mood swings, or difficulty sleeping  HEME/LYMPH: No easy bruising, or bleeding gums  ALLERY AND IMMUNOLOGIC: No hives or eczema  FAMILY HISTORY:  Family history of myocardial infarction (Father)    T(C): 37.7 (10-24-18 @ 05:52), Max: 38.2 (10-23-18 @ 22:08)  HR: 88 (10-24-18 @ 05:52) (77 - 97)  BP: 125/63 (10-24-18 @ 05:52) (118/65 - 143/72)  RR: 16 (10-24-18 @ 05:52) (15 - 18)  SpO2: 95% (10-23-18 @ 14:58) (95% - 98%)  Wt(kg): --Vital Signs Last 24 Hrs  T(C): 37.7 (24 Oct 2018 05:52), Max: 38.2 (23 Oct 2018 22:08)  T(F): 99.9 (24 Oct 2018 05:52), Max: 100.8 (23 Oct 2018 22:08)  HR: 88 (24 Oct 2018 05:52) (77 - 97)  BP: 125/63 (24 Oct 2018 05:52) (118/65 - 143/72)  BP(mean): --  RR: 16 (24 Oct 2018 05:52) (15 - 18)  SpO2: 95% (23 Oct 2018 14:58) (95% - 98%)  bacitracin (Unknown)  Neosporin (Hypotension)      PHYSICAL EXAM:  GENERAL: NAD, well-groomed, well-developed  HEAD:  Atraumatic, Normocephalic  EYES: EOMI, PERRLA, conjunctiva and sclera clear  ENMT: No tonsillar erythema, exudates, or enlargement;   NECK: Supple, No JVD, Normal thyroid  NERVOUS SYSTEM:  Alert & Oriented X3, Good concentration; Motor Strength 5/5 B/L upper and lower extremities; DTRs 2+ intact and symmetric  CHEST/LUNG: Clear to percussion bilaterally; No rales, rhonchi, wheezing, or rubs  HEART: Regular rate and rhythm; No murmurs, rubs, or gallops  ABDOMEN: Soft, Nontender, Nondistended; Bowel sounds present  EXTREMITIES:  2+ Peripheral Pulses, No clubbing, cyanosis, or edema,lt arm fistula+  LYMPH: No lymphadenopathy noted  SKIN: No rashes or lesions      LABS:  10-23    139  |  100  |  24<H>  ----------------------------<  122<H>  4.0   |  26  |  4.1<HH>    Ca    8.4<L>      23 Oct 2018 06:56  Mg     1.9     10-22    TPro  6.0  /  Alb  2.9<L>  /  TBili  0.2  /  DBili  x   /  AST  20  /  ALT  16  /  AlkPhos  115  10-22                            8.0    12.84 )-----------( 325      ( 24 Oct 2018 06:41 )             25.8       RADIOLOGY & ADDITIONAL TESTS:    MEDICATION:  acetaminophen   Tablet .. 650 milliGRAM(s) Oral once PRN  atorvastatin 20 milliGRAM(s) Oral at bedtime  calcium acetate 1334 milliGRAM(s) Oral four times a day with meals  chlorhexidine 4% Liquid 1 Application(s) Topical <User Schedule>  docusate sodium 100 milliGRAM(s) Oral two times a day  ferrous sulfate Oral Tab/Cap - Peds 325 milliGRAM(s) Oral daily  folic acid 1 milliGRAM(s) Oral daily  furosemide    Tablet 40 milliGRAM(s) Oral daily  heparin  Injectable 5000 Unit(s) SubCutaneous every 8 hours  influenza   Vaccine 0.5 milliLiter(s) IntraMuscular once  lactulose Syrup 20 Gram(s) Oral daily  oxyCODONE    5 mG/acetaminophen 325 mG 1 Tablet(s) Oral every 6 hours PRN  pantoprazole    Tablet 40 milliGRAM(s) Oral before breakfast  sodium bicarbonate 650 milliGRAM(s) Oral two times a day  tamsulosin 0.4 milliGRAM(s) Oral at bedtime  vancomycin  IVPB 500 milliGRAM(s) IV Intermittent once      HEALTH ISSUES - PROBLEM Dx:  Neuropathy: Neuropathy  HTN (hypertension): HTN (hypertension)  CKD (chronic kidney disease), stage V: CKD (chronic kidney disease), stage V  DM (diabetes mellitus), type 2: DM (diabetes mellitus), type 2  Dyslipidemia: Dyslipidemia  Foot ulcer: Foot ulcer,s/p debridement  ESRD on HD MWF  Hx acromegaly  pvd will do arteril duplex  hx dvt lt will do venous duplex

## 2018-10-25 DIAGNOSIS — M86.672 OTHER CHRONIC OSTEOMYELITIS, LEFT ANKLE AND FOOT: ICD-10-CM

## 2018-10-25 LAB
GLUCOSE BLDC GLUCOMTR-MCNC: 164 MG/DL — HIGH (ref 70–99)
GLUCOSE BLDC GLUCOMTR-MCNC: 183 MG/DL — HIGH (ref 70–99)
GLUCOSE BLDC GLUCOMTR-MCNC: 224 MG/DL — HIGH (ref 70–99)
GLUCOSE BLDC GLUCOMTR-MCNC: 254 MG/DL — HIGH (ref 70–99)
GLUCOSE BLDC GLUCOMTR-MCNC: 91 MG/DL — SIGNIFICANT CHANGE UP (ref 70–99)

## 2018-10-25 RX ORDER — ACETAMINOPHEN 500 MG
650 TABLET ORAL EVERY 6 HOURS
Qty: 0 | Refills: 0 | Status: DISCONTINUED | OUTPATIENT
Start: 2018-10-25 | End: 2018-10-31

## 2018-10-25 RX ORDER — CEFEPIME 1 G/1
2000 INJECTION, POWDER, FOR SOLUTION INTRAMUSCULAR; INTRAVENOUS
Qty: 0 | Refills: 0 | Status: DISCONTINUED | OUTPATIENT
Start: 2018-10-25 | End: 2018-10-29

## 2018-10-25 RX ORDER — VANCOMYCIN HCL 1 G
750 VIAL (EA) INTRAVENOUS
Qty: 0 | Refills: 0 | Status: DISCONTINUED | OUTPATIENT
Start: 2018-10-25 | End: 2018-10-26

## 2018-10-25 RX ORDER — CEFEPIME 1 G/1
INJECTION, POWDER, FOR SOLUTION INTRAMUSCULAR; INTRAVENOUS
Qty: 0 | Refills: 0 | Status: DISCONTINUED | OUTPATIENT
Start: 2018-10-25 | End: 2018-10-25

## 2018-10-25 RX ORDER — CEFEPIME 1 G/1
1000 INJECTION, POWDER, FOR SOLUTION INTRAMUSCULAR; INTRAVENOUS ONCE
Qty: 0 | Refills: 0 | Status: COMPLETED | OUTPATIENT
Start: 2018-10-25 | End: 2018-10-25

## 2018-10-25 RX ADMIN — Medication 650 MILLIGRAM(S): at 06:07

## 2018-10-25 RX ADMIN — Medication 1334 MILLIGRAM(S): at 08:18

## 2018-10-25 RX ADMIN — CEFEPIME 100 MILLIGRAM(S): 1 INJECTION, POWDER, FOR SOLUTION INTRAMUSCULAR; INTRAVENOUS at 00:40

## 2018-10-25 RX ADMIN — Medication 1334 MILLIGRAM(S): at 18:22

## 2018-10-25 RX ADMIN — HEPARIN SODIUM 5000 UNIT(S): 5000 INJECTION INTRAVENOUS; SUBCUTANEOUS at 15:36

## 2018-10-25 RX ADMIN — Medication 1 MILLIGRAM(S): at 11:33

## 2018-10-25 RX ADMIN — Medication 1334 MILLIGRAM(S): at 21:18

## 2018-10-25 RX ADMIN — Medication 650 MILLIGRAM(S): at 05:12

## 2018-10-25 RX ADMIN — Medication 1334 MILLIGRAM(S): at 11:33

## 2018-10-25 RX ADMIN — ATORVASTATIN CALCIUM 20 MILLIGRAM(S): 80 TABLET, FILM COATED ORAL at 21:18

## 2018-10-25 RX ADMIN — ERGOCALCIFEROL 50000 UNIT(S): 1.25 CAPSULE ORAL at 11:33

## 2018-10-25 RX ADMIN — HEPARIN SODIUM 5000 UNIT(S): 5000 INJECTION INTRAVENOUS; SUBCUTANEOUS at 05:13

## 2018-10-25 RX ADMIN — TAMSULOSIN HYDROCHLORIDE 0.4 MILLIGRAM(S): 0.4 CAPSULE ORAL at 21:18

## 2018-10-25 RX ADMIN — Medication 100 MILLIGRAM(S): at 18:22

## 2018-10-25 RX ADMIN — Medication 100 MILLIGRAM(S): at 05:13

## 2018-10-25 RX ADMIN — PANTOPRAZOLE SODIUM 40 MILLIGRAM(S): 20 TABLET, DELAYED RELEASE ORAL at 05:13

## 2018-10-25 RX ADMIN — Medication 325 MILLIGRAM(S): at 11:33

## 2018-10-25 RX ADMIN — HEPARIN SODIUM 5000 UNIT(S): 5000 INJECTION INTRAVENOUS; SUBCUTANEOUS at 21:18

## 2018-10-25 RX ADMIN — Medication 650 MILLIGRAM(S): at 05:13

## 2018-10-25 RX ADMIN — CHLORHEXIDINE GLUCONATE 1 APPLICATION(S): 213 SOLUTION TOPICAL at 05:09

## 2018-10-25 RX ADMIN — Medication 40 MILLIGRAM(S): at 05:13

## 2018-10-25 NOTE — PROGRESS NOTE ADULT - ASSESSMENT
1)  ESRD on HD via tunnelled catheter, started on HD 1 mos ago, AVF in place  - d/c Na bicarb   - f/u Dr Gomes as to whether AVF can be used soon     2)  DFU right foot s/p debridement and on Abx - Cefipime 2 gr pHD (max dose. monitor) and Vanco 750 pHD    3)  Severe secondary HPT and 25-OH Vit D3 deficiency  -iPTH was 1300 1 month ago, needs a repeat iPTH and Hectorol or Calcitriol. Will adjust as OP.  - Ergocalciferol 50,000 IU qweek to target blood level > 35.     4) Anemia Hb 8.0 now, no need for PRBC transfusion.  Suspect bone marrow suppression due to chronic osteomyelitis

## 2018-10-25 NOTE — PROGRESS NOTE ADULT - SUBJECTIVE AND OBJECTIVE BOX
infectious diseases progress note:  SU SAWYER is a 57yMale patient    FOOT ULCER    Neuropathy  HTN (hypertension)  CKD (chronic kidney disease), stage V  DM (diabetes mellitus), type 2  Dyslipidemia  Foot ulcer      ROS:  not relevant     Allergies    bacitracin (Unknown)  Neosporin (Hypotension)    Intolerances        ANTIBIOTICS/RELEVANT:  antimicrobials  cefepime   IVPB 2000 milliGRAM(s) IV Intermittent <User Schedule>  vancomycin  IVPB 750 milliGRAM(s) IV Intermittent every 48 hours    immunologic:  influenza   Vaccine 0.5 milliLiter(s) IntraMuscular once    OTHER:  acetaminophen   Tablet .. 650 milliGRAM(s) Oral every 6 hours PRN  atorvastatin 20 milliGRAM(s) Oral at bedtime  calcium acetate 1334 milliGRAM(s) Oral four times a day with meals  chlorhexidine 4% Liquid 1 Application(s) Topical <User Schedule>  docusate sodium 100 milliGRAM(s) Oral two times a day  ergocalciferol 30950 Unit(s) Oral every week  ferrous sulfate Oral Tab/Cap - Peds 325 milliGRAM(s) Oral daily  folic acid 1 milliGRAM(s) Oral daily  furosemide    Tablet 40 milliGRAM(s) Oral daily  heparin  Injectable 5000 Unit(s) SubCutaneous every 8 hours  lactulose Syrup 20 Gram(s) Oral daily  oxyCODONE    5 mG/acetaminophen 325 mG 1 Tablet(s) Oral every 6 hours PRN  pantoprazole    Tablet 40 milliGRAM(s) Oral before breakfast  sodium bicarbonate 650 milliGRAM(s) Oral two times a day  tamsulosin 0.4 milliGRAM(s) Oral at bedtime      Objective:  T(F): 100.9 (10-25-18 @ 05:18), Max: 100.9 (10-25-18 @ 05:18)  HR: 78 (10-25-18 @ 05:18) (69 - 91)  BP: 132/64 (10-25-18 @ 05:18) (114/58 - 132/64)  RR: 16 (10-25-18 @ 05:18) (16 - 17)  SpO2: 98% (10-24-18 @ 21:57) (98% - 98%)    PHYSICAL EXAM:  Constitutional:Well-developed, well nourished  Neck:no JVD, no lymphadenopathy, supple  Respiratory: CTA brandt  Cardiovascular: S1S2 RRR  Gastrointestinal:soft, (+) BS, no HSM  Extremities: Left heel wound dressed. Right leg dressed         LABS:                        8.0    12.84 )-----------( 325      ( 24 Oct 2018 06:41 )             25.8     10-24    140  |  103  |  37<H>  ----------------------------<  120<H>  4.3   |  24  |  5.2<HH>    Ca    8.2<L>      24 Oct 2018 06:41  Phos  4.6     10-24  Mg     2.1     10-24              MICROBIOLOGY:    Culture - Blood (collected 10-22-18 @ 17:45)  Source: .Blood Blood  Preliminary Report (10-24-18 @ 03:01):    No growth to date.    Culture - Blood (collected 10-22-18 @ 17:30)  Source: .Blood Blood  Preliminary Report (10-24-18 @ 03:01):    No growth to date.        Culture - Blood (collected 22 Oct 2018 17:45)  Source: .Blood Blood  Preliminary Report (24 Oct 2018 03:01):    No growth to date.    Culture - Blood (collected 22 Oct 2018 17:30)  Source: .Blood Blood  Preliminary Report (24 Oct 2018 03:01):    No growth to date.      Culture Results:   No growth to date. (10-22 @ 17:45)  Culture Results:   No growth to date. (10-22 @ 17:30)        RADIOLOGY & ADDITIONAL STUDIES:

## 2018-10-25 NOTE — PROGRESS NOTE ADULT - PROBLEM SELECTOR PLAN 1
Post HD abx - 6 weeks post op   Follow cx - intra op cx   DC planning per surgery  recall if needed  Follow up with ID Dr Madrid out pt

## 2018-10-25 NOTE — PROGRESS NOTE ADULT - SUBJECTIVE AND OBJECTIVE BOX
Nephrology progress note    Patient is seen and examined, events over the last 24 h noted .  Had HD yesterday. Admitted with DFU.    Allergies:  bacitracin (Unknown)  Neosporin (Hypotension)    Hospital Medications:   MEDICATIONS  (STANDING):  atorvastatin 20 milliGRAM(s) Oral at bedtime  calcium acetate 1334 milliGRAM(s) Oral four times a day with meals  cefepime   IVPB 2000 milliGRAM(s) IV Intermittent <User Schedule>  chlorhexidine 4% Liquid 1 Application(s) Topical <User Schedule>  docusate sodium 100 milliGRAM(s) Oral two times a day  ergocalciferol 61795 Unit(s) Oral every week  ferrous sulfate Oral Tab/Cap - Peds 325 milliGRAM(s) Oral daily  folic acid 1 milliGRAM(s) Oral daily  furosemide    Tablet 40 milliGRAM(s) Oral daily  heparin  Injectable 5000 Unit(s) SubCutaneous every 8 hours  influenza   Vaccine 0.5 milliLiter(s) IntraMuscular once  lactulose Syrup 20 Gram(s) Oral daily  pantoprazole    Tablet 40 milliGRAM(s) Oral before breakfast  sodium bicarbonate 650 milliGRAM(s) Oral two times a day  tamsulosin 0.4 milliGRAM(s) Oral at bedtime  vancomycin  IVPB 750 milliGRAM(s) IV Intermittent every 48 hours        VITALS:  T(F): 99.7 (10-25-18 @ 07:57), Max: 100.9 (10-25-18 @ 05:18)  HR: 78 (10-25-18 @ 05:18)  BP: 132/64 (10-25-18 @ 05:18)  RR: 16 (10-25-18 @ 05:18)  SpO2: 95% (10-25-18 @ 07:57)  Wt(kg): --    10-23 @ 07:01  -  10-24 @ 07:00  --------------------------------------------------------  IN: 0 mL / OUT: 1002 mL / NET: -1002 mL    10-24 @ 07:01  -  10-25 @ 07:00  --------------------------------------------------------  IN: 400 mL / OUT: 410 mL / NET: -10 mL          PHYSICAL EXAM:  Constitutional: NAD  HEENT: anicteric sclera, oropharynx clear, MMM  Neck: No JVD  Respiratory: CTAB, no wheezes, rales or rhonchi  Cardiovascular: S1, S2, RRR  Gastrointestinal: BS+, soft, NT/ND  Extremities: 1+ peripheral edema, ace bandages, bluish toes  Neurological: A/O x 3, no focal deficits  : No CVA tenderness. No ortiz.   Skin: No rashes  Vascular Access: Rt Tesio cath, Rt AVF with bruit    LABS:  10-24    140  |  103  |  37<H>  ----------------------------<  120<H>  4.3   |  24  |  5.2<HH>    Ca    8.2<L>      24 Oct 2018 06:41  Phos  4.6     10-24  Mg     2.1     10-24                            8.0    12.84 )-----------( 325      ( 24 Oct 2018 06:41 )             25.8       Urine Studies:      RADIOLOGY & ADDITIONAL STUDIES:

## 2018-10-25 NOTE — PROGRESS NOTE ADULT - SUBJECTIVE AND OBJECTIVE BOX
Chart reviewed, patient examined. Pertinent results reviewed.  specialist f/u reviewed  HD#4; POD#2-heel debridement by Dr Eliseo SAWYER, SU  57y  Male      Patient is a 57y old  Male who presents with a chief complaint of foot ulcer (24 Oct 2018 07:05)  s/p lt heel debridement,on antibiotics      REVIEW OF SYSTEMS:  CONSTITUTIONAL: fever+  EYES: No eye pain, visual disturbances, or discharge  ENMT:  No difficulty hearing, tinnitus, vertigo; No sinus or throat pain  NECK: No pain or stiffness  BREASTS: No pain, masses, or nipple discharge  RESPIRATORY: No cough, wheezing, chills or hemoptysis; No shortness of breath  CARDIOVASCULAR: No chest pain, palpitations, dizziness, or leg swelling  GASTROINTESTINAL: No abdominal or epigastric pain. No nausea, vomiting, or hematemesis; No diarrhea or constipation. No melena or hematochezia.  GENITOURINARY: No dysuria, frequency, hematuria, or incontinence  NEUROLOGICAL: No headaches, memory loss, loss of strength, numbness, or tremors  SKIN: No itching, burning, rashes, or lesions   LYMPH NODES: No enlarged glands  ENDOCRINE: No heat or cold intolerance; No hair loss  MUSCULOSKELETAL: No joint pain or swelling; No muscle, back, or extremity pain  PSYCHIATRIC: No depression, anxiety, mood swings, or difficulty sleeping  HEME/LYMPH: No easy bruising, or bleeding gums  ALLERY AND IMMUNOLOGIC: No hives or eczema  FAMILY HISTORY:  Family history of myocardial infarction (Father)    Vital Signs Last 24 Hrs  T(C): 37.6 (25 Oct 2018 07:57), Max: 38.3 (25 Oct 2018 05:18)  T(F): 99.7 (25 Oct 2018 07:57), Max: 100.9 (25 Oct 2018 05:18)  HR: 78 (25 Oct 2018 05:18) (78 - 85)  BP: 132/64 (25 Oct 2018 05:18) (130/63 - 132/64)  BP(mean): --  RR: 16 (25 Oct 2018 05:18) (16 - 17)  SpO2: 95% (25 Oct 2018 07:57) (95% - 98%)    SpO2: 95% (23 Oct 2018 14:58) (95% - 98%)  bacitracin (Unknown)  Neosporin (Hypotension)      PHYSICAL EXAM:  GENERAL: NAD, well-groomed, well-developed; A, Ox4  HEAD:  Atraumatic, Normocephalic  EYES: EOMI, PERRLA, conjunctiva and sclera clear  ENMT: No tonsillar erythema, exudates, or enlargement;   NECK: Supple, No JVD, Normal thyroid  NERVOUS SYSTEM:  Alert & Oriented X3, Good concentration; Motor Strength 5/5 B/L upper and lower extremities; DTRs 2+ intact and symmetric; DECreased sensation in distal LE  CHEST/LUNG: Clear to percussion bilaterally; No rales, rhonchi, wheezing, or rubs  HEART: Regular rate and rhythm; No murmurs, rubs, or gallops  ABDOMEN: Soft, Nontender, Nondistended; Bowel sounds present  EXTREMITIES:  2+ Peripheral Pulses, No clubbing, cyanosis, or edema,lt arm fistula+; some bilat hand-thenar wasting;      L foot/heel wrap; distal atrophy and some abrasions  LYMPH: No lymphadenopathy noted  SKIN: No rashes or lesions      LABS:    10-24    140  |  103  |  37<H>  ----------------------------<  120<H>  4.3   |  24  |  5.2<HH>    Ca    8.2<L>      24 Oct 2018 06:41  Phos  4.6     10-24  Mg     2.1     10-24      10-23    139  |  100  |  24<H>  ----------------------------<  122<H>  4.0   |  26  |  4.1<HH>    Ca    8.4<L>      23 Oct 2018 06:56  Mg     1.9     10-22    TPro  6.0  /  Alb  2.9<L>  /  TBili  0.2  /  DBili  x   /  AST  20  /  ALT  16  /  AlkPhos  115  10-22                            8.0    12.84 )-----------( 325      ( 24 Oct 2018 06:41 )             25.8       RADIOLOGY & ADDITIONAL TESTS:    MEDICATION:  acetaminophen   Tablet .. 650 milliGRAM(s) Oral once PRN  atorvastatin 20 milliGRAM(s) Oral at bedtime  calcium acetate 1334 milliGRAM(s) Oral four times a day with meals  chlorhexidine 4% Liquid 1 Application(s) Topical <User Schedule>  docusate sodium 100 milliGRAM(s) Oral two times a day  ferrous sulfate Oral Tab/Cap - Peds 325 milliGRAM(s) Oral daily  folic acid 1 milliGRAM(s) Oral daily  furosemide    Tablet 40 milliGRAM(s) Oral daily  heparin  Injectable 5000 Unit(s) SubCutaneous every 8 hours  influenza   Vaccine 0.5 milliLiter(s) IntraMuscular once  lactulose Syrup 20 Gram(s) Oral daily  oxyCODONE    5 mG/acetaminophen 325 mG 1 Tablet(s) Oral every 6 hours PRN  pantoprazole    Tablet 40 milliGRAM(s) Oral before breakfast  sodium bicarbonate 650 milliGRAM(s) Oral two times a day  tamsulosin 0.4 milliGRAM(s) Oral at bedtime  vancomycin  IVPB 500 milliGRAM(s) IV Intermittent once      HEALTH ISSUES - PROBLEM Dx:  Neuropathy: Neuropathy  HTN (hypertension): HTN (hypertension)  CKD (chronic kidney disease), stage V: CKD (chronic kidney disease), stage V;       see ranal note  DM (diabetes mellitus), type 2: DM (diabetes mellitus), type 2  Dyslipidemia: Dyslipidemia  Foot ulcer: Foot ulcer,s/p debridement; plan per ID & Surgery; clarify antibx  ESRD on HD MWF  Hx acromegaly  pvd will do arteril duplex---See + significant Bilat LE PAD;   hx dvt lt will do venous duplex- + chronic dvt      Will ask vascular to see for all

## 2018-10-26 LAB
-  AMIKACIN: SIGNIFICANT CHANGE UP
-  AMOXICILLIN/CLAVULANIC ACID: SIGNIFICANT CHANGE UP
-  AMPICILLIN/SULBACTAM: SIGNIFICANT CHANGE UP
-  AMPICILLIN/SULBACTAM: SIGNIFICANT CHANGE UP
-  AMPICILLIN: SIGNIFICANT CHANGE UP
-  AMPICILLIN: SIGNIFICANT CHANGE UP
-  AZTREONAM: SIGNIFICANT CHANGE UP
-  CEFAZOLIN: SIGNIFICANT CHANGE UP
-  CEFAZOLIN: SIGNIFICANT CHANGE UP
-  CEFEPIME: SIGNIFICANT CHANGE UP
-  CEFOXITIN: SIGNIFICANT CHANGE UP
-  CEFTRIAXONE: SIGNIFICANT CHANGE UP
-  CIPROFLOXACIN: SIGNIFICANT CHANGE UP
-  CLINDAMYCIN: SIGNIFICANT CHANGE UP
-  ERTAPENEM: SIGNIFICANT CHANGE UP
-  ERYTHROMYCIN: SIGNIFICANT CHANGE UP
-  GENTAMICIN: SIGNIFICANT CHANGE UP
-  GENTAMICIN: SIGNIFICANT CHANGE UP
-  LEVOFLOXACIN: SIGNIFICANT CHANGE UP
-  MEROPENEM: SIGNIFICANT CHANGE UP
-  OXACILLIN: SIGNIFICANT CHANGE UP
-  PENICILLIN: SIGNIFICANT CHANGE UP
-  PIPERACILLIN/TAZOBACTAM: SIGNIFICANT CHANGE UP
-  RIFAMPIN: SIGNIFICANT CHANGE UP
-  TETRACYCLINE: SIGNIFICANT CHANGE UP
-  TETRACYCLINE: SIGNIFICANT CHANGE UP
-  TOBRAMYCIN: SIGNIFICANT CHANGE UP
-  TRIMETHOPRIM/SULFAMETHOXAZOLE: SIGNIFICANT CHANGE UP
-  TRIMETHOPRIM/SULFAMETHOXAZOLE: SIGNIFICANT CHANGE UP
-  VANCOMYCIN: SIGNIFICANT CHANGE UP
-  VANCOMYCIN: SIGNIFICANT CHANGE UP
CULTURE RESULTS: SIGNIFICANT CHANGE UP
GLUCOSE BLDC GLUCOMTR-MCNC: 136 MG/DL — HIGH (ref 70–99)
GLUCOSE BLDC GLUCOMTR-MCNC: 214 MG/DL — HIGH (ref 70–99)
GLUCOSE BLDC GLUCOMTR-MCNC: 222 MG/DL — HIGH (ref 70–99)
METHOD TYPE: SIGNIFICANT CHANGE UP
ORGANISM # SPEC MICROSCOPIC CNT: SIGNIFICANT CHANGE UP
SPECIMEN SOURCE: SIGNIFICANT CHANGE UP
SURGICAL PATHOLOGY STUDY: SIGNIFICANT CHANGE UP

## 2018-10-26 RX ORDER — OXYCODONE AND ACETAMINOPHEN 5; 325 MG/1; MG/1
1 TABLET ORAL EVERY 4 HOURS
Qty: 0 | Refills: 0 | Status: DISCONTINUED | OUTPATIENT
Start: 2018-10-26 | End: 2018-10-31

## 2018-10-26 RX ORDER — VANCOMYCIN HCL 1 G
750 VIAL (EA) INTRAVENOUS
Qty: 0 | Refills: 0 | Status: DISCONTINUED | OUTPATIENT
Start: 2018-10-26 | End: 2018-10-29

## 2018-10-26 RX ORDER — DARBEPOETIN ALFA IN POLYSORBAT 200MCG/0.4
40 PEN INJECTOR (ML) SUBCUTANEOUS
Qty: 0 | Refills: 0 | Status: DISCONTINUED | OUTPATIENT
Start: 2018-10-26 | End: 2018-10-31

## 2018-10-26 RX ADMIN — Medication 40 MILLIGRAM(S): at 05:57

## 2018-10-26 RX ADMIN — Medication 1334 MILLIGRAM(S): at 08:35

## 2018-10-26 RX ADMIN — Medication 250 MILLIGRAM(S): at 19:06

## 2018-10-26 RX ADMIN — Medication 325 MILLIGRAM(S): at 13:59

## 2018-10-26 RX ADMIN — TAMSULOSIN HYDROCHLORIDE 0.4 MILLIGRAM(S): 0.4 CAPSULE ORAL at 21:49

## 2018-10-26 RX ADMIN — CHLORHEXIDINE GLUCONATE 1 APPLICATION(S): 213 SOLUTION TOPICAL at 06:00

## 2018-10-26 RX ADMIN — HEPARIN SODIUM 5000 UNIT(S): 5000 INJECTION INTRAVENOUS; SUBCUTANEOUS at 13:58

## 2018-10-26 RX ADMIN — LACTULOSE 20 GRAM(S): 10 SOLUTION ORAL at 14:00

## 2018-10-26 RX ADMIN — Medication 100 MILLIGRAM(S): at 21:51

## 2018-10-26 RX ADMIN — Medication 1334 MILLIGRAM(S): at 21:49

## 2018-10-26 RX ADMIN — Medication 1334 MILLIGRAM(S): at 13:59

## 2018-10-26 RX ADMIN — OXYCODONE AND ACETAMINOPHEN 1 TABLET(S): 5; 325 TABLET ORAL at 02:41

## 2018-10-26 RX ADMIN — Medication 40 MICROGRAM(S): at 19:06

## 2018-10-26 RX ADMIN — Medication 100 MILLIGRAM(S): at 05:57

## 2018-10-26 RX ADMIN — HEPARIN SODIUM 5000 UNIT(S): 5000 INJECTION INTRAVENOUS; SUBCUTANEOUS at 21:49

## 2018-10-26 RX ADMIN — Medication 1 MILLIGRAM(S): at 13:59

## 2018-10-26 RX ADMIN — ATORVASTATIN CALCIUM 20 MILLIGRAM(S): 80 TABLET, FILM COATED ORAL at 21:49

## 2018-10-26 RX ADMIN — PANTOPRAZOLE SODIUM 40 MILLIGRAM(S): 20 TABLET, DELAYED RELEASE ORAL at 05:57

## 2018-10-26 RX ADMIN — HEPARIN SODIUM 5000 UNIT(S): 5000 INJECTION INTRAVENOUS; SUBCUTANEOUS at 06:00

## 2018-10-26 NOTE — PROGRESS NOTE ADULT - SUBJECTIVE AND OBJECTIVE BOX
SU SAWYER  57y  Male      Patient is a 57y old  Male who presents with a chief complaint of foot ulcer (25 Oct 2018 13:52)    s/p debridement,hx chronic bilateral dvt,pvd,rt arm av fistula    REVIEW OF SYSTEMS:  CONSTITUTIONAL: No fever, weight loss, or fatigue  EYES: No eye pain, visual disturbances, or discharge  ENMT:  No difficulty hearing, tinnitus, vertigo; No sinus or throat pain  NECK: No pain or stiffness  BREASTS: No pain, masses, or nipple discharge  RESPIRATORY: No cough, wheezing, chills or hemoptysis; No shortness of breath  CARDIOVASCULAR: No chest pain, palpitations, dizziness, or leg swelling  GASTROINTESTINAL: No abdominal or epigastric pain. No nausea, vomiting, or hematemesis; No diarrhea or constipation.   GENITOURINARY: No dysuria, frequency, hematuria, or incontinence  NEUROLOGICAL: No headaches, memory loss, loss of strength, numbness, or tremors  SKIN: No itching, burning, rashes, or lesions   LYMPH NODES: No enlarged glands  ENDOCRINE: No heat or cold intolerance; No hair loss  MUSCULOSKELETAL: No joint pain or swelling; No muscle, back, or extremity pain  PSYCHIATRIC: No depression, anxiety, mood swings, or difficulty sleeping  HEME/LYMPH: No easy bruising, or bleeding gums  ALLERY AND IMMUNOLOGIC: No hives or eczema  FAMILY HISTORY:  Family history of myocardial infarction (Father)    T(C): 37.6 (10-26-18 @ 06:07), Max: 37.6 (10-25-18 @ 14:00)  HR: 70 (10-26-18 @ 06:07) (70 - 84)  BP: 131/78 (10-26-18 @ 06:07) (117/65 - 131/78)  RR: 16 (10-26-18 @ 06:07) (16 - 16)  SpO2: --  Wt(kg): --Vital Signs Last 24 Hrs  T(C): 37.6 (26 Oct 2018 06:07), Max: 37.6 (25 Oct 2018 14:00)  T(F): 99.7 (26 Oct 2018 06:07), Max: 99.7 (25 Oct 2018 14:00)  HR: 70 (26 Oct 2018 06:07) (70 - 84)  BP: 131/78 (26 Oct 2018 06:07) (117/65 - 131/78)  BP(mean): --  RR: 16 (26 Oct 2018 06:07) (16 - 16)  SpO2: --  bacitracin (Unknown)  Neosporin (Hypotension)      PHYSICAL EXAM:  GENERAL: NAD,   HEAD:  Atraumatic, Normocephalic  EYES: EOMI, PERRLA, conjunctiva and sclera clear  ENMT: No tonsillar erythema, exudates, or enlargement;   NECK: Supple, No JVD, Normal thyroid  NERVOUS SYSTEM:  Alert & Oriented X3, Good concentration;   CHEST/LUNG: Clear to percussion bilaterally; No rales, rhonchi, wheezing, or rubs  HEART: Regular rate and rhythm; No murmurs, rubs, or gallops  ABDOMEN: Soft, Nontender, Nondistended; Bowel sounds present  EXTREMITIES:  2+ Peripheral Pulses, No clubbing, cyanosis, or edema  LYMPH: No lymphadenopathy noted  SKIN: No rashes or lesions      LABS:              RADIOLOGY & ADDITIONAL TESTS:    MEDICATION:  acetaminophen   Tablet .. 650 milliGRAM(s) Oral every 6 hours PRN  atorvastatin 20 milliGRAM(s) Oral at bedtime  calcium acetate 1334 milliGRAM(s) Oral four times a day with meals  cefepime   IVPB 2000 milliGRAM(s) IV Intermittent <User Schedule>  chlorhexidine 4% Liquid 1 Application(s) Topical <User Schedule>  docusate sodium 100 milliGRAM(s) Oral two times a day  ergocalciferol 54898 Unit(s) Oral every week  ferrous sulfate Oral Tab/Cap - Peds 325 milliGRAM(s) Oral daily  folic acid 1 milliGRAM(s) Oral daily  furosemide    Tablet 40 milliGRAM(s) Oral daily  heparin  Injectable 5000 Unit(s) SubCutaneous every 8 hours  influenza   Vaccine 0.5 milliLiter(s) IntraMuscular once  lactulose Syrup 20 Gram(s) Oral daily  oxyCODONE    5 mG/acetaminophen 325 mG 1 Tablet(s) Oral every 6 hours PRN  pantoprazole    Tablet 40 milliGRAM(s) Oral before breakfast  tamsulosin 0.4 milliGRAM(s) Oral at bedtime  vancomycin  IVPB 750 milliGRAM(s) IV Intermittent every 48 hours      HEALTH ISSUES - PROBLEM Dx:  Osteomyelitis, chronic, ankle or foot, left: Osteomyelitis, chronic, ankle or foot, left cefipime,vancomycin  Neuropathy: Neuropathy  HTN (hypertension): HTN (hypertension)  CKD (chronic kidney disease), stage V: CKD (chronic kidney disease), stage V on HD  DM (diabetes mellitus), type 2: DM (diabetes mellitus), type 2  Dyslipidemia: Dyslipidemia  Foot ulcer: Foot ulcer  PVD transfer to [place]  Chronic bilateral DVT,vascular consult

## 2018-10-26 NOTE — PROGRESS NOTE ADULT - ASSESSMENT
1)  ESRD on HD via tunnelled catheter, started on HD 1 mos ago, AVF in place  - HD today , 2 K bath  UF 2 L as malissa  - f/u Dr Reina as to whether AVF can be used soon   PLEASE CALL DR Reina when pt is at [place] as to whether AVF can be used    2)  DFU right foot s/p debridement and on Abx - Cefipime 2 gr pHD (max dose. monitor) and Vanco 750 pHD    3)  Severe secondary HPT and 25-OH Vit D3 deficiency  -iPTH was 1300 1 month ago, needs a repeat iPTH and Hectorol or Calcitriol. Will adjust as OP.  - Ergocalciferol 50,000 IU qweek to target blood level > 35.     4) Anemia Hb 8.0 on10/24, no need for PRBC transfusion. - will restart EDWIN 40 mcg weekly    5) Severe PVD - angiogram today

## 2018-10-26 NOTE — CONSULT NOTE ADULT - ASSESSMENT
A/P    Chronic DVT of LLE and occlusion of R popliteal and L SF arteries  PLAN:    - Angiogram for monday    - Please make pt NPO sunday at midnight, type and screen, coags

## 2018-10-26 NOTE — PROGRESS NOTE ADULT - SUBJECTIVE AND OBJECTIVE BOX
Nephrology progress note    Patient is seen and examined, events over the last 24 h noted .  Pt going for an angiogram to [place] today.    Allergies:  bacitracin (Unknown)  Neosporin (Hypotension)    Hospital Medications:   MEDICATIONS  (STANDING):  atorvastatin 20 milliGRAM(s) Oral at bedtime  calcium acetate 1334 milliGRAM(s) Oral four times a day with meals  cefepime   IVPB 2000 milliGRAM(s) IV Intermittent <User Schedule>  chlorhexidine 4% Liquid 1 Application(s) Topical <User Schedule>  docusate sodium 100 milliGRAM(s) Oral two times a day  ergocalciferol 93672 Unit(s) Oral every week  ferrous sulfate Oral Tab/Cap - Peds 325 milliGRAM(s) Oral daily  folic acid 1 milliGRAM(s) Oral daily  furosemide    Tablet 40 milliGRAM(s) Oral daily  heparin  Injectable 5000 Unit(s) SubCutaneous every 8 hours  influenza   Vaccine 0.5 milliLiter(s) IntraMuscular once  lactulose Syrup 20 Gram(s) Oral daily  pantoprazole    Tablet 40 milliGRAM(s) Oral before breakfast  tamsulosin 0.4 milliGRAM(s) Oral at bedtime  vancomycin  IVPB 750 milliGRAM(s) IV Intermittent every 48 hours        VITALS:  T(F): 99.7 (10-26-18 @ 06:07), Max: 99.7 (10-25-18 @ 14:00)  HR: 70 (10-26-18 @ 06:07)  BP: 131/78 (10-26-18 @ 06:07)  RR: 16 (10-26-18 @ 06:07)  SpO2: --  Wt(kg): --    10-24 @ 07:01  -  10-25 @ 07:00  --------------------------------------------------------  IN: 400 mL / OUT: 410 mL / NET: -10 mL    10-25 @ 07:01  -  10-26 @ 07:00  --------------------------------------------------------  IN: 0 mL / OUT: 151 mL / NET: -151 mL          PHYSICAL EXAM:  Constitutional: NAD  HEENT: anicteric sclera, oropharynx clear, MMM  Neck: No JVD  Respiratory: CTAB, no wheezes, rales or rhonchi  Cardiovascular: S1, S2, RRR  Gastrointestinal: BS+, soft, NT/ND  Extremities: Left LE edema, b/l ace bandages, bluish toes discoloration  Neurogical: A/O x 3, no focal deficits  : No CVA tenderness. No ortiz.   Skin: No rashes  Vascular Access: Rt Tesio and Rt AVF with bruit    LABS:            Urine Studies:      RADIOLOGY & ADDITIONAL STUDIES:

## 2018-10-26 NOTE — CONSULT NOTE ADULT - SUBJECTIVE AND OBJECTIVE BOX
SU SAWYER [id number]  57y Male    HPI:  56y/o M w/ hx of neuropathy, acromegaly, diabetes, htn, CKD MWF, makes urine, htn [facility] of LLE laceration with Dr. Gomes. [facility] the acquired an arterial and venous duplex. Duplex showed chronic L fem, pop, and gastrocnemius stenosis that had bee previously seen and evaluated by Dr. Comer in march; at that time elevation and compression were recommended. Arterial duplex showed R distal popliteal stenosis and L SF occlusion. Patient has no pain in the legs due to loss of sensation from knee down b/l due to neuropathy. He has noticed however swelling in both for the past 5 weeks.           PAST MEDICAL & SURGICAL HISTORY:  Dyslipidemia  DM (diabetes mellitus), type 2  Neuropathy  HTN (hypertension)  CAD (coronary atherosclerotic disease)  Acromegaly  CKD (chronic kidney disease), stage V  H/O eye surgery  H/O: pituitary tumor: s/p removal        MEDICATIONS  (STANDING):  atorvastatin 20 milliGRAM(s) Oral at bedtime  calcium acetate 1334 milliGRAM(s) Oral four times a day with meals  cefepime   IVPB 2000 milliGRAM(s) IV Intermittent <User Schedule>  chlorhexidine 4% Liquid 1 Application(s) Topical <User Schedule>  darbepoetin Injectable Syringe 40 MICROGram(s) IV Push <User Schedule>  docusate sodium 100 milliGRAM(s) Oral two times a day  ergocalciferol 59261 Unit(s) Oral every week  ferrous sulfate Oral Tab/Cap - Peds 325 milliGRAM(s) Oral daily  folic acid 1 milliGRAM(s) Oral daily  furosemide    Tablet 40 milliGRAM(s) Oral daily  heparin  Injectable 5000 Unit(s) SubCutaneous every 8 hours  lactulose Syrup 20 Gram(s) Oral daily  pantoprazole    Tablet 40 milliGRAM(s) Oral before breakfast  tamsulosin 0.4 milliGRAM(s) Oral at bedtime  vancomycin  IVPB 750 milliGRAM(s) IV Intermittent every 48 hours    MEDICATIONS  (PRN):  acetaminophen   Tablet .. 650 milliGRAM(s) Oral every 6 hours PRN Temp greater or equal to 38C (100.4F)  oxyCODONE    5 mG/acetaminophen 325 mG 1 Tablet(s) Oral every 6 hours PRN Moderate Pain (4 - 6)      Allergies    bacitracin (Unknown)  Neosporin (Hypotension)    Intolerances        Vital Signs Last 24 Hrs  T(C): 36.8 (26 Oct 2018 14:24), Max: 37.6 (26 Oct 2018 06:07)  T(F): 98.3 (26 Oct 2018 14:24), Max: 99.7 (26 Oct 2018 06:07)  HR: 72 (26 Oct 2018 14:24) (70 - 84)  BP: 105/55 (26 Oct 2018 14:24) (105/55 - 137/70)  BP(mean): --  RR: 18 (26 Oct 2018 14:24) (16 - 18)  SpO2: --    PHYSICAL EXAM:  GENERAL: NAD, well-appearing  CHEST/LUNG: no obvious increased wob  EXTREMITIES:  swelling b/l LE. bandages on b/l c/d/i.   VASC: brisk capillary refill b/l LE, warm, able to faintly doppler b/l DP/PT      LABS:  Labs:  CAPILLARY BLOOD GLUCOSE  254 (25 Oct 2018 20:32)  224 (25 Oct 2018 17:42)      POCT Blood Glucose.: 222 mg/dL (26 Oct 2018 11:40)  POCT Blood Glucose.: 136 mg/dL (26 Oct 2018 08:02)  POCT Blood Glucose.: 254 mg/dL (25 Oct 2018 20:31)  POCT Blood Glucose.: 224 mg/dL (25 Oct 2018 17:35)        RADIOLOGY & ADDITIONAL STUDIES:  < from: VA Duplex Lower Ext Vein Scan, Bilat (10.24.18 @ 17:35) >      Impression:    Chronic thrombus is noted in the bilateral femoral, popliteal and left   gastrocnemius vein    < end of copied text >  < from: VA Duplex Lower Extrem Arterial, Bilat (10.24.18 @ 17:30) >    Right:    The right external iliac, common femoral, superficial femoral and   popliteal arteries are patent. There is a significant velocity drop off   between the mid popliteal and distal popliteal artery. This is suggestive   of high-grade stenosis in the distal popliteal artery. The anterior   tibial artery is patent. The posterior tibial artery demonstrates   diminished flow suggestive of high-grade proximal stenosis.    Left:    Left external iliac, common femoral arteries are patent. The deep femoral   artery is patent.The superficial femoral artery is occluded at its mid   segment. There is reconstitution of the popliteal artery with diminished   flow velocities distally. The anterior tibial and posterior tibial   arteries are patent with diminished flow distally.      Impression:    Bilateral severe peripheral arterial disease as described above    < end of copied text >

## 2018-10-27 LAB
ANION GAP SERPL CALC-SCNC: 12 MMOL/L — SIGNIFICANT CHANGE UP (ref 7–14)
ANION GAP SERPL CALC-SCNC: 13 MMOL/L — SIGNIFICANT CHANGE UP (ref 7–14)
BUN SERPL-MCNC: 32 MG/DL — HIGH (ref 10–20)
BUN SERPL-MCNC: 34 MG/DL — HIGH (ref 10–20)
CALCIUM SERPL-MCNC: 8.5 MG/DL — SIGNIFICANT CHANGE UP (ref 8.5–10.1)
CALCIUM SERPL-MCNC: 8.7 MG/DL — SIGNIFICANT CHANGE UP (ref 8.5–10.1)
CHLORIDE SERPL-SCNC: 96 MMOL/L — LOW (ref 98–110)
CHLORIDE SERPL-SCNC: 96 MMOL/L — LOW (ref 98–110)
CO2 SERPL-SCNC: 27 MMOL/L — SIGNIFICANT CHANGE UP (ref 17–32)
CO2 SERPL-SCNC: 28 MMOL/L — SIGNIFICANT CHANGE UP (ref 17–32)
CREAT SERPL-MCNC: 4.4 MG/DL — CRITICAL HIGH (ref 0.7–1.5)
CREAT SERPL-MCNC: 4.6 MG/DL — CRITICAL HIGH (ref 0.7–1.5)
GLUCOSE BLDC GLUCOMTR-MCNC: 168 MG/DL — HIGH (ref 70–99)
GLUCOSE BLDC GLUCOMTR-MCNC: 182 MG/DL — HIGH (ref 70–99)
GLUCOSE BLDC GLUCOMTR-MCNC: 193 MG/DL — HIGH (ref 70–99)
GLUCOSE BLDC GLUCOMTR-MCNC: 230 MG/DL — HIGH (ref 70–99)
GLUCOSE SERPL-MCNC: 162 MG/DL — HIGH (ref 70–99)
GLUCOSE SERPL-MCNC: 187 MG/DL — HIGH (ref 70–99)
HCT VFR BLD CALC: 27.6 % — LOW (ref 42–52)
HGB BLD-MCNC: 8.6 G/DL — LOW (ref 14–18)
MAGNESIUM SERPL-MCNC: 2 MG/DL — SIGNIFICANT CHANGE UP (ref 1.8–2.4)
MCHC RBC-ENTMCNC: 28.2 PG — SIGNIFICANT CHANGE UP (ref 27–31)
MCHC RBC-ENTMCNC: 31.2 G/DL — LOW (ref 32–37)
MCV RBC AUTO: 90.5 FL — SIGNIFICANT CHANGE UP (ref 80–94)
NRBC # BLD: 0 /100 WBCS — SIGNIFICANT CHANGE UP (ref 0–0)
PHOSPHATE SERPL-MCNC: 3.1 MG/DL — SIGNIFICANT CHANGE UP (ref 2.1–4.9)
PLATELET # BLD AUTO: 318 K/UL — SIGNIFICANT CHANGE UP (ref 130–400)
POTASSIUM SERPL-MCNC: 3.8 MMOL/L — SIGNIFICANT CHANGE UP (ref 3.5–5)
POTASSIUM SERPL-MCNC: 4.1 MMOL/L — SIGNIFICANT CHANGE UP (ref 3.5–5)
POTASSIUM SERPL-SCNC: 3.8 MMOL/L — SIGNIFICANT CHANGE UP (ref 3.5–5)
POTASSIUM SERPL-SCNC: 4.1 MMOL/L — SIGNIFICANT CHANGE UP (ref 3.5–5)
RBC # BLD: 3.05 M/UL — LOW (ref 4.7–6.1)
RBC # FLD: 15.2 % — HIGH (ref 11.5–14.5)
SODIUM SERPL-SCNC: 136 MMOL/L — SIGNIFICANT CHANGE UP (ref 135–146)
SODIUM SERPL-SCNC: 136 MMOL/L — SIGNIFICANT CHANGE UP (ref 135–146)
WBC # BLD: 9.02 K/UL — SIGNIFICANT CHANGE UP (ref 4.8–10.8)
WBC # FLD AUTO: 9.02 K/UL — SIGNIFICANT CHANGE UP (ref 4.8–10.8)

## 2018-10-27 RX ADMIN — Medication 325 MILLIGRAM(S): at 13:28

## 2018-10-27 RX ADMIN — Medication 100 MILLIGRAM(S): at 17:32

## 2018-10-27 RX ADMIN — OXYCODONE AND ACETAMINOPHEN 1 TABLET(S): 5; 325 TABLET ORAL at 02:04

## 2018-10-27 RX ADMIN — HEPARIN SODIUM 5000 UNIT(S): 5000 INJECTION INTRAVENOUS; SUBCUTANEOUS at 21:31

## 2018-10-27 RX ADMIN — CHLORHEXIDINE GLUCONATE 1 APPLICATION(S): 213 SOLUTION TOPICAL at 05:39

## 2018-10-27 RX ADMIN — Medication 1334 MILLIGRAM(S): at 08:53

## 2018-10-27 RX ADMIN — Medication 1334 MILLIGRAM(S): at 13:28

## 2018-10-27 RX ADMIN — Medication 100 MILLIGRAM(S): at 05:40

## 2018-10-27 RX ADMIN — Medication 1 MILLIGRAM(S): at 13:27

## 2018-10-27 RX ADMIN — TAMSULOSIN HYDROCHLORIDE 0.4 MILLIGRAM(S): 0.4 CAPSULE ORAL at 21:31

## 2018-10-27 RX ADMIN — Medication 1334 MILLIGRAM(S): at 17:32

## 2018-10-27 RX ADMIN — HEPARIN SODIUM 5000 UNIT(S): 5000 INJECTION INTRAVENOUS; SUBCUTANEOUS at 05:40

## 2018-10-27 RX ADMIN — Medication 1334 MILLIGRAM(S): at 21:31

## 2018-10-27 RX ADMIN — ATORVASTATIN CALCIUM 20 MILLIGRAM(S): 80 TABLET, FILM COATED ORAL at 21:31

## 2018-10-27 RX ADMIN — OXYCODONE AND ACETAMINOPHEN 1 TABLET(S): 5; 325 TABLET ORAL at 01:24

## 2018-10-27 RX ADMIN — Medication 40 MILLIGRAM(S): at 05:40

## 2018-10-27 RX ADMIN — HEPARIN SODIUM 5000 UNIT(S): 5000 INJECTION INTRAVENOUS; SUBCUTANEOUS at 13:29

## 2018-10-27 RX ADMIN — LACTULOSE 20 GRAM(S): 10 SOLUTION ORAL at 13:28

## 2018-10-27 RX ADMIN — PANTOPRAZOLE SODIUM 40 MILLIGRAM(S): 20 TABLET, DELAYED RELEASE ORAL at 06:27

## 2018-10-27 NOTE — PROGRESS NOTE ADULT - SUBJECTIVE AND OBJECTIVE BOX
Nephrology progress note    Patient is seen and examined, events over the last 24 h noted .  denied any complaints     Allergies:  bacitracin (Unknown)  Neosporin (Hypotension)    Hospital Medications:   MEDICATIONS  (STANDING):  atorvastatin 20 milliGRAM(s) Oral at bedtime  calcium acetate 1334 milliGRAM(s) Oral four times a day with meals  cefepime   IVPB 2000 milliGRAM(s) IV Intermittent <User Schedule>  chlorhexidine 4% Liquid 1 Application(s) Topical <User Schedule>  darbepoetin Injectable Syringe 40 MICROGram(s) IV Push <User Schedule>  docusate sodium 100 milliGRAM(s) Oral two times a day  ergocalciferol 80038 Unit(s) Oral every week  ferrous sulfate Oral Tab/Cap - Peds 325 milliGRAM(s) Oral daily  folic acid 1 milliGRAM(s) Oral daily  furosemide    Tablet 40 milliGRAM(s) Oral daily  heparin  Injectable 5000 Unit(s) SubCutaneous every 8 hours  lactulose Syrup 20 Gram(s) Oral daily  pantoprazole    Tablet 40 milliGRAM(s) Oral before breakfast  tamsulosin 0.4 milliGRAM(s) Oral at bedtime  vancomycin  IVPB 750 milliGRAM(s) IV Intermittent <User Schedule>        VITALS:  T(F): 97.9 (10-27-18 @ 06:50), Max: 98.8 (10-26-18 @ 10:53)  HR: 69 (10-27-18 @ 06:50)  BP: 128/59 (10-27-18 @ 06:50)  RR: 16 (10-27-18 @ 06:50)  SpO2: 95% (10-26-18 @ 22:03)    10-25 @ 07:01  -  10-26 @ 07:00  --------------------------------------------------------  IN: 0 mL / OUT: 151 mL / NET: -151 mL    10-26 @ 07:01  -  10-27 @ 07:00  --------------------------------------------------------  IN: 0 mL / OUT: 3000 mL / NET: -3000 mL      Height (cm): 193.04 (10-26 @ 10:53)  Weight (kg): 115.6 (10-26 @ 10:53)  BMI (kg/m2): 31 (10-26 @ 10:53)  BSA (m2): 2.46 (10-26 @ 10:53)    PHYSICAL EXAM:  Constitutional: NAD  HEENT: anicteric sclera, oropharynx clear, MMM  Neck: No JVD  Respiratory: CTAB, no wheezes, rales or rhonchi  Cardiovascular: S1, S2, RRR  Gastrointestinal: BS+, soft, NT/ND  Extremities: No cyanosis or clubbing. feet in dressing   :  No ortiz.   Skin: No rashes    LABS:  10-27    136  |  96<L>  |  32<H>  ----------------------------<  162<H>  3.8   |  28  |  4.4<HH>    Ca    8.5      27 Oct 2018 07:59  Phos  3.1     10-27  Mg     2.0     10-27                            8.6    9.02  )-----------( 318      ( 27 Oct 2018 07:59 )             27.6       Urine Studies:      RADIOLOGY & ADDITIONAL STUDIES:

## 2018-10-27 NOTE — PROGRESS NOTE ADULT - SUBJECTIVE AND OBJECTIVE BOX
VASCULAR SURGERY PRE-OP PROGRESS NOTE    SU SAWYER  [id number]    57M with history of DM, HTN, CAD, ESRD on HD, acromegaly, and neuropathy with PAD, presenting with left leg laceration.     LABS                        8.6    9.02  )-----------( 318      ( 27 Oct 2018 07:59 )             27.6     10-27    136  |  96<L>  |  34<H>  ----------------------------<  187<H>  4.1   |  27  |  4.6<HH>    Ca    8.7      27 Oct 2018 11:41  Phos  3.1     10-27  Mg     2.0     10-27    Arterial Duplex (10/24):  R high-grade popliteal stenosis, PT stenosis. L SFA occlusion, tibial disease.      PLAN  Plan for left leg angiogram, possible endovascular revascularization  ·	To go to the OR on 10/30/18  ·	NPO after midnight on Monday night  ·	Consent obtained  ·	Pre-op labs (CBC, BMP, Coags) on Tuesday; to have HD Monday  ·	OK to continue subq heparin  ·	Appreciate primary team care  Please call [phone] with any questions/concerns

## 2018-10-27 NOTE — PROGRESS NOTE ADULT - ASSESSMENT
1)  ESRD on HD via tunnelled catheter, started on HD 1 mos ago, AVF in place  - sp HD yesterday   - f/u Dr Reina as to whether AVF can be used soon   PLEASE CALL DR Reina when pt is at [place] as to whether AVF can be used    2)  DFU right foot s/p debridement and on Abx - Cefipime 2 gr pHD (max dose. monitor) and Vanco 750 pHD    3)  Severe secondary HPT and 25-OH Vit D3 deficiency  -iPTH was 1300 1 month ago, needs a repeat iPTH and Hectorol or Calcitriol. Will adjust as OP.  - Ergocalciferol 50,000 IU qweek to target blood level > 35.     4) Anemia Hb 8.0 on10/24, no need for PRBC transfusion. - will restart EDWIN 40 mcg weekly    5) Severe PVD - angio on mnonday    please limit blood withdrawals to HD days, willdraw on HD

## 2018-10-27 NOTE — PROVIDER CONTACT NOTE (OTHER) - BACKGROUND
Pt also stated to RN that he does not want to stand at end of the bed to attempt to urinate. Pt was educated on the use of ortiz catheter and risk of infection.

## 2018-10-27 NOTE — PROGRESS NOTE ADULT - SUBJECTIVE AND OBJECTIVE BOX
SU SAWYER  57y  Male      Patient is a 57y old  Male who presents with a chief complaint of foot ulcer (27 Oct 2018 08:40)    s/p lt heel wound debridement,PVD,Chronic DVT    REVIEW OF SYSTEMS:  CONSTITUTIONAL: No fever, weight loss, or fatigue  EYES: No eye pain, visual disturbances, or discharge  ENMT:  No difficulty hearing, tinnitus, vertigo; No sinus or throat pain  NECK: No pain or stiffness  BREASTS: No pain, masses, or nipple discharge  RESPIRATORY: No cough, wheezing, chills or hemoptysis; No shortness of breath  CARDIOVASCULAR: No chest pain, palpitations, dizziness, or leg swelling  GASTROINTESTINAL: No abdominal or epigastric pain. No nausea, vomiting, or hematemesis; No diarrhea or constipation. No melena or hematochezia.  GENITOURINARY: No dysuria, frequency, hematuria, or incontinence  NEUROLOGICAL: No headaches, memory loss, loss of strength, numbness, or tremors  SKIN: No itching, burning, rashes, or lesions   LYMPH NODES: No enlarged glands  ENDOCRINE: No heat or cold intolerance; No hair loss  MUSCULOSKELETAL: No joint pain or swelling; No muscle, back, lt heel ulcer  PSYCHIATRIC: No depression, anxiety, mood swings, or difficulty sleeping  HEME/LYMPH: No easy bruising, or bleeding gums  ALLERY AND IMMUNOLOGIC: No hives or eczema  FAMILY HISTORY:  Family history of myocardial infarction (Father)    T(C): 36.6 (10-27-18 @ 06:50), Max: 37.1 (10-26-18 @ 10:53)  HR: 69 (10-27-18 @ 06:50) (69 - 95)  BP: 128/59 (10-27-18 @ 06:50) (97/55 - 137/70)  RR: 16 (10-27-18 @ 06:50) (16 - 18)  SpO2: 95% (10-26-18 @ 22:03) (95% - 95%)  Wt(kg): --Vital Signs Last 24 Hrs  T(C): 36.6 (27 Oct 2018 06:50), Max: 37.1 (26 Oct 2018 10:53)  T(F): 97.9 (27 Oct 2018 06:50), Max: 98.8 (26 Oct 2018 10:53)  HR: 69 (27 Oct 2018 06:50) (69 - 95)  BP: 128/59 (27 Oct 2018 06:50) (97/55 - 137/70)  BP(mean): --  RR: 16 (27 Oct 2018 06:50) (16 - 18)  SpO2: 95% (26 Oct 2018 22:03) (95% - 95%)  bacitracin (Unknown)  Neosporin (Hypotension)      PHYSICAL EXAM:  GENERAL: NAD, well-groomed, well-developed  HEAD:  Atraumatic, Normocephalic  EYES: EOMI, PERRLA, conjunctiva and sclera clear  ENMT: No tonsillar erythema, exudates, or enlargement; Moist mucous membranes, Good dentition, No lesions  NECK: Supple, No JVD, Normal thyroid  NERVOUS SYSTEM:  Alert & Oriented X3, Good concentration;   CHEST/LUNG: Clear to percussion bilaterally; No rales, rhonchi, wheezing, or rubs  HEART: Regular rate and rhythm; No murmurs, rubs, or gallops  ABDOMEN: Soft, Nontender, Nondistended; Bowel sounds present  EXTREMITIES:  2+ Peripheral Pulses, No clubbing, cyanosis, or edema  LYMPH: No lymphadenopathy noted  SKIN: No rashes or lesions      LABS:  10-27    136  |  96<L>  |  32<H>  ----------------------------<  162<H>  3.8   |  28  |  4.4<HH>    Ca    8.5      27 Oct 2018 07:59  Phos  3.1     10-27  Mg     2.0     10-27                          8.6    9.02  )-----------( 318      ( 27 Oct 2018 07:59 )             27.6           RADIOLOGY & ADDITIONAL TESTS:    MEDICATION:  acetaminophen   Tablet .. 650 milliGRAM(s) Oral every 6 hours PRN  atorvastatin 20 milliGRAM(s) Oral at bedtime  calcium acetate 1334 milliGRAM(s) Oral four times a day with meals  cefepime   IVPB 2000 milliGRAM(s) IV Intermittent <User Schedule>  chlorhexidine 4% Liquid 1 Application(s) Topical <User Schedule>  darbepoetin Injectable Syringe 40 MICROGram(s) IV Push <User Schedule>  docusate sodium 100 milliGRAM(s) Oral two times a day  ergocalciferol 31979 Unit(s) Oral every week  ferrous sulfate Oral Tab/Cap - Peds 325 milliGRAM(s) Oral daily  folic acid 1 milliGRAM(s) Oral daily  furosemide    Tablet 40 milliGRAM(s) Oral daily  heparin  Injectable 5000 Unit(s) SubCutaneous every 8 hours  lactulose Syrup 20 Gram(s) Oral daily  oxyCODONE    5 mG/acetaminophen 325 mG 1 Tablet(s) Oral every 4 hours PRN  pantoprazole    Tablet 40 milliGRAM(s) Oral before breakfast  tamsulosin 0.4 milliGRAM(s) Oral at bedtime  vancomycin  IVPB 750 milliGRAM(s) IV Intermittent <User Schedule>      HEALTH ISSUES - PROBLEM Dx:  Osteomyelitis, chronic, ankle or foot, left: Osteomyelitis, chronic, ankle or foot, left wait for bone biopsy  Neuropathy: Neuropathy  HTN (hypertension): HTN (hypertension)  CKD (chronic kidney disease), stage V: CKD (chronic kidney disease), stage V on HD  DM (diabetes mellitus), type 2: DM (diabetes mellitus), type 2  Dyslipidemia: Dyslipidemia  Foot ulcer: Foot ulcer,s/p wound debridement    PVD vascular for angiogram,medically stable to go for angiogram  chronic dvt on heparin

## 2018-10-27 NOTE — PROGRESS NOTE ADULT - SUBJECTIVE AND OBJECTIVE BOX
SUBJECTIVE:    Patient is a 57y old Male who presents with a chief complaint of foot ulcer (26 Oct 2018 15:26)    Currently admitted to medicine with the primary diagnosis of Foot ulcer     Today is hospital day 5d. This morning he is resting comfortably in bed and reports no new issues or overnight events.     PAST MEDICAL & SURGICAL HISTORY  Dyslipidemia  DM (diabetes mellitus), type 2  Neuropathy  HTN (hypertension)  CAD (coronary atherosclerotic disease)  Acromegaly  CKD (chronic kidney disease), stage V  H/O eye surgery  H/O: pituitary tumor: s/p removal    SOCIAL HISTORY:  Negative for smoking/alcohol/drug use.     ALLERGIES:  bacitracin (Unknown)  Neosporin (Hypotension)    MEDICATIONS:  STANDING MEDICATIONS  atorvastatin 20 milliGRAM(s) Oral at bedtime  calcium acetate 1334 milliGRAM(s) Oral four times a day with meals  cefepime   IVPB 2000 milliGRAM(s) IV Intermittent <User Schedule>  chlorhexidine 4% Liquid 1 Application(s) Topical <User Schedule>  darbepoetin Injectable Syringe 40 MICROGram(s) IV Push <User Schedule>  docusate sodium 100 milliGRAM(s) Oral two times a day  ergocalciferol 52864 Unit(s) Oral every week  ferrous sulfate Oral Tab/Cap - Peds 325 milliGRAM(s) Oral daily  folic acid 1 milliGRAM(s) Oral daily  furosemide    Tablet 40 milliGRAM(s) Oral daily  heparin  Injectable 5000 Unit(s) SubCutaneous every 8 hours  lactulose Syrup 20 Gram(s) Oral daily  pantoprazole    Tablet 40 milliGRAM(s) Oral before breakfast  tamsulosin 0.4 milliGRAM(s) Oral at bedtime  vancomycin  IVPB 750 milliGRAM(s) IV Intermittent <User Schedule>    PRN MEDICATIONS  acetaminophen   Tablet .. 650 milliGRAM(s) Oral every 6 hours PRN  oxyCODONE    5 mG/acetaminophen 325 mG 1 Tablet(s) Oral every 4 hours PRN    VITALS:   T(F): 97.9  HR: 69  BP: 128/59  RR: 16  SpO2: 95%    LABS:                        RADIOLOGY:    EXAM:  PHYSIOL EXTREM LOW 3+ LEV BI            PROCEDURE DATE:  10/24/2018            INTERPRETATION:  Clinical History / Reason for exam: The patient is a   57-year-old man with nonhealing foot ulcer.    The study was performed to assess the patient for peripheral occlusive   disease.    Bilateral lower extremity pulse volume recordings and pressure analyses   were obtained.     Evaluation of the bilateral lower extremities revealed normal PVR   waveform amplitudes at the high thigh, low thigh, calf, and left ankle   levels. No significant pressure gradients were noted.    Ankle brachial index was calculated due to noncompressible vessels on the   left and overlying bandages on the right..    Impression:    Limited study. Unable to fully assess the lower extremity due to   overlying bandages. There is no significant pressure gradient obtained   high thigh and low thigh bilaterally. Suggest further evaluation with   arterial duplex.    ICD-10: I70.232  ANGELA SOLITARIO M.D., ATTENDING VASCULAR  This document has been electronically signed. Oct 25 2018  9:11AM          EXAM:  DUPLEX SCAN EXT VEINS LOWER BI            PROCEDURE DATE:  10/24/2018            INTERPRETATION:  Clinical History / Reason for exam: The patient is a   57-year-old male with leg swelling. A venous duplex examination was   performed to evaluate the patient for deep venous thrombosis of the lower   extremities.    Chronic thrombus noted in the bilateral femoral and popliteal veins and   left gastrocnemius vein the bilateral common femoral veins are patent    All veins were fully compressible.  There was presence of spontaneous   flow, augmentation with distal compression and phasicity.    F veins bilaterally could not be adequately visualized to overlying   bandages         Impression:    Chronic thrombus is noted in the bilateral femoral, popliteal and left   gastrocnemius vein      ICD-10: M79.89KSOCO CERON M.D., ATTENDING VASCULAR  This document has been electronically signed. Oct 24 2018  5:46PM    EXAM:  ART DUPLEX LOWER EXT BILATERAL            PROCEDURE DATE:  10/24/2018            INTERPRETATION:  Clinical History / Reason for exam: This patient is a   57-year-old male with nonhealing right foot ulcer. Lower extremity   arterial duplex ultrasound was performed to assess for arterial occlusive   disease.    Bilateral common femoral, deep femoral, superficial femoral, popliteal,   anterior tibial, posterior tibial and peroneal arteries were visualized.      Right:    The right external iliac, common femoral, superficial femoral and   popliteal arteries are patent. There is a significant velocity drop off   between the mid popliteal and distal popliteal artery. This is suggestive   of high-grade stenosis in the distal popliteal artery. The anterior   tibial artery is patent. The posterior tibial artery demonstrates   diminished flow suggestive of high-grade proximal stenosis.    Left:    Left external iliac, common femoral arteries are patent. The deep femoral   artery is patent.The superficial femoral artery is occluded at its mid   segment. There is reconstitution of the popliteal artery with diminished   flow velocities distally. The anterior tibial and posterior tibial   arteries are patent with diminished flow distally.      Impression:    Bilateral severe peripheral arterial disease as described above    ICD-10: I70.232        ANGELA SOLITARIO M.D., ATTENDING VASCULAR  This document has been electronically signed. Oct 25 2018  9:15AM          EXAM:  XR FOOT 2 VIEWS LT            PROCEDURE DATE:  10/22/2018            INTERPRETATION:  Clinical History / Reason for exam: Uncertain    Comparison: 9/19/2018    Findings/  Impression:    Diffuse dorsal soft tissue swelling, without any evidence of soft tissue   gas.     Diffuse vascular calcifications. Degenerative changes of 1st MTP joint   and tarsal joints.         MOE BAIN M.D., ATTENDING RADIOLOGIST  This document has been electronically signed. Oct 23 2018 12:59PM          PHYSICAL EXAM:  GEN: No acute distress  LUNGS: Clear to auscultation bilaterally   HEART: S1/S2 present. RRR.   ABD: Soft, non-tender, non-distended. Bowel sounds present  EXT: swelling b/l LE.   NEURO: AAOX3

## 2018-10-27 NOTE — CONSULT NOTE ADULT - SUBJECTIVE AND OBJECTIVE BOX
PODIATRY CONSULT   SU SAWYER is a pleasant well-nourished, well developed 57y Male in no acute distress, alert awake, and oriented to person, place and time.   Patient is a 57y old  Male who presents with a chief complaint of foot ulcer (27 Oct 2018 09:39)    HPI:  56y/o M w/ hx of brain tumor--acromegaly, diabetes, htn, ckd stage on dialysis MWF, makes urine, htn, 2 blood transfusion recently being worked up--  pt on rehab after recent admission for infection to feet presents for hg under 7 at [facility]; and for worsening of left leg ulcer.  pt had dialysis today and was given iv antibiotics after.  pt has been feeling chills; no fever. no abdominal pain, cp or sob. +generalized weakness.  no blood in stool (22 Oct 2018 19:39)    pt states that he had a surgical debridement done left heel by surgery department at [facility] 10/23.  however he got transferred to [place] for angio monday.   s/p Debridement of ulcer of left heel  10/23/2018     FOOT ULCER  Dyslipidemia  DM (diabetes mellitus), type 2  Neuropathy  HTN (hypertension)  CAD (coronary atherosclerotic disease)  Acromegaly  CKD (chronic kidney disease), stage V      PMH: FOOT ULCER  Dyslipidemia  DM (diabetes mellitus), type 2  Neuropathy  HTN (hypertension)  CAD (coronary atherosclerotic disease)  Acromegaly  CKD (chronic kidney disease), stage V    PSH: H/O eye surgery  H/O: pituitary tumor    Medication cefepime   IVPB 2000 milliGRAM(s) IV Intermittent <User Schedule>  vancomycin  IVPB 750 milliGRAM(s) IV Intermittent <User Schedule>    Allergy: bacitracin (Unknown)  Neosporin (Hypotension)        Labs:                        8.6    9.02  )-----------( 318      ( 27 Oct 2018 07:59 )             27.6       10-27    136  |  96<L>  |  32<H>  ----------------------------<  162<H>  3.8   |  28  |  4.4<HH>    Ca    8.5      27 Oct 2018 07:59  Phos  3.1     10-27  Mg     2.0     10-27              O:   Derm: superfical abrasion anterior LLE, stable, -SOI.  Vascular: Dorsalis Pedis and Posterior Tibial pulses 0/4.  Capillary re-fill time less then 3 seconds digits 1-5 bilateral.  B/L feet warm to touch  Neuro: Protective sensation diminished to the level of the digits bilateral.  MSK: Muscle strength 5/5 all major muscle groups bilateral.        Assessment and Plan:   s/p Debridement of ulcer of left heel  10/23/2018 DONE BY SURGERY TEAM.    Chart reviewed and Patient evaluated  Applied xeroform with dry sterile dressing RLE.  continue LWC as per medicine RLE  continue wound care as per surgery right heel  recommend surgery consult  vascular planning angio monday  podiatry s/o, reconsult as needed

## 2018-10-27 NOTE — PROGRESS NOTE ADULT - ASSESSMENT
Assessment 56y/o M w/ hx of neuropathy, acromegaly, diabetes, htn, CKD MWF, makes urine, htn who [facility] LLE ulcer with Dr. Gomes. Vascular studies showed severe PVD and is transferred to [place] for further management.    Plan    #Severe PVD  -arterial duplex 10/24: b/l severe peripheral arterial disease, occlusion of R popliteal and L SF arteries  -angiogram on Monday; NPO after midnight on Sunday  -vascular following    #Chronic DVT of LLE  -not on anticoagulation currently  -vascular f/u    #DFU - left foot  -s/p debridement infected heel ulcer to the calcaneus  -c/w vanc and cefepime    #ESRD on HD started 1 month ago - MWF  -c/w HD; renal following  -need to f/u with Dr. Gomes if AVF can be used    #Anemia  -s/p prbc transfusion in [facility]  -EDWIN 40 mcg weekly  -monitor cbc    #Diabetes  -basal bolus insuin; iss    #HTN  -c/w lasix 40 qd	    #HLD  -c/w atorvastatin    DVT ppx: heparin subq  GI ppx: protonix Assessment 58y/o M w/ hx of neuropathy, acromegaly, diabetes, htn, CKD MWF, makes urine, htn who [facility] of LLE ulcer with Dr. Gomes. Vascular studies showed severe PVD and is transferred to [place] for angiogram.    Plan    #Severe PVD  -arterial duplex 10/24: b/l severe peripheral arterial disease, occlusion of R popliteal and L SF arteries  -angiogram on Monday; NPO after midnight on Sunday  -vascular following    #Chronic DVT of LLE  -not on anticoagulation currently  -vascular f/u    #DFU - left foot  -s/p debridement infected heel ulcer to the calcaneus  -c/w vanc and cefepime    #ESRD on HD started 1 month ago - MWF  -c/w HD; renal following  -need to f/u with Dr. Gomes if AVF can be used    #Anemia  -s/p prbc transfusion in [facility]  -EDWIN 40 mcg weekly  -monitor cbc    #Diabetes  -basal bolus insuin; iss    #HTN  -c/w lasix 40 qd	    #HLD  -c/w atorvastatin    DVT ppx: heparin subq  GI ppx: protonix  Diet: consistent carbohydrate  Activity: ambulate as tolerated Assessment 58y/o M w/ hx of neuropathy, acromegaly, diabetes, htn, CKD MWF, makes urine, htn who [facility] LLE ulcer with Dr. Gomes. Vascular studies showed severe PVD and is transferred to [place] for angiogram.    Plan    #Severe PVD  -arterial duplex 10/24: b/l severe peripheral arterial disease, occlusion of R popliteal and L SF arteries  -angiogram on Monday; NPO after midnight on Sunday  -vascular following    #Chronic DVT of LLE - found on imaging on March 2018  -not on anticoagulation  -vascular f/u    #DFU - left foot  -s/p debridement infected heel ulcer to the calcaneus  -c/w vanc and cefepime;   -ID consult  -podiatry consult    #ESRD on HD started 1 month ago - MWF  -c/w HD; renal following  -need to f/u with Dr. Gomes if AVF can be used    #Anemia  -s/p 1 prbc transfusion in [facility]  -EDWIN 40 mcg weekly  -monitor cbc    #Diabetes  -basal bolus insuin; iss    #HTN  -c/w lasix 40 qd	    #HLD  -c/w atorvastatin    DVT ppx: heparin subq  GI ppx: protonix  Diet: consistent carbohydrate  Activity: ambulate as tolerated

## 2018-10-28 LAB
ANION GAP SERPL CALC-SCNC: 14 MMOL/L — SIGNIFICANT CHANGE UP (ref 7–14)
APTT BLD: 29.8 SEC — SIGNIFICANT CHANGE UP (ref 27–39.2)
BLD GP AB SCN SERPL QL: SIGNIFICANT CHANGE UP
BUN SERPL-MCNC: 44 MG/DL — HIGH (ref 10–20)
CALCIUM SERPL-MCNC: 8.9 MG/DL — SIGNIFICANT CHANGE UP (ref 8.5–10.1)
CHLORIDE SERPL-SCNC: 97 MMOL/L — LOW (ref 98–110)
CO2 SERPL-SCNC: 28 MMOL/L — SIGNIFICANT CHANGE UP (ref 17–32)
CREAT SERPL-MCNC: 5.4 MG/DL — CRITICAL HIGH (ref 0.7–1.5)
CULTURE RESULTS: SIGNIFICANT CHANGE UP
CULTURE RESULTS: SIGNIFICANT CHANGE UP
GLUCOSE BLDC GLUCOMTR-MCNC: 176 MG/DL — HIGH (ref 70–99)
GLUCOSE BLDC GLUCOMTR-MCNC: 186 MG/DL — HIGH (ref 70–99)
GLUCOSE BLDC GLUCOMTR-MCNC: 218 MG/DL — HIGH (ref 70–99)
GLUCOSE BLDC GLUCOMTR-MCNC: 254 MG/DL — HIGH (ref 70–99)
GLUCOSE SERPL-MCNC: 205 MG/DL — HIGH (ref 70–99)
HCT VFR BLD CALC: 26.4 % — LOW (ref 42–52)
HGB BLD-MCNC: 8 G/DL — LOW (ref 14–18)
INR BLD: 1.09 RATIO — SIGNIFICANT CHANGE UP (ref 0.65–1.3)
MCHC RBC-ENTMCNC: 27.6 PG — SIGNIFICANT CHANGE UP (ref 27–31)
MCHC RBC-ENTMCNC: 30.3 G/DL — LOW (ref 32–37)
MCV RBC AUTO: 91 FL — SIGNIFICANT CHANGE UP (ref 80–94)
NRBC # BLD: 0 /100 WBCS — SIGNIFICANT CHANGE UP (ref 0–0)
PLATELET # BLD AUTO: 316 K/UL — SIGNIFICANT CHANGE UP (ref 130–400)
POTASSIUM SERPL-MCNC: 4.4 MMOL/L — SIGNIFICANT CHANGE UP (ref 3.5–5)
POTASSIUM SERPL-SCNC: 4.4 MMOL/L — SIGNIFICANT CHANGE UP (ref 3.5–5)
PROTHROM AB SERPL-ACNC: 12.5 SEC — SIGNIFICANT CHANGE UP (ref 9.95–12.87)
RBC # BLD: 2.9 M/UL — LOW (ref 4.7–6.1)
RBC # FLD: 15.3 % — HIGH (ref 11.5–14.5)
SODIUM SERPL-SCNC: 139 MMOL/L — SIGNIFICANT CHANGE UP (ref 135–146)
SPECIMEN SOURCE: SIGNIFICANT CHANGE UP
SPECIMEN SOURCE: SIGNIFICANT CHANGE UP
TYPE + AB SCN PNL BLD: SIGNIFICANT CHANGE UP
WBC # BLD: 9.74 K/UL — SIGNIFICANT CHANGE UP (ref 4.8–10.8)
WBC # FLD AUTO: 9.74 K/UL — SIGNIFICANT CHANGE UP (ref 4.8–10.8)

## 2018-10-28 RX ADMIN — Medication 1334 MILLIGRAM(S): at 08:18

## 2018-10-28 RX ADMIN — CHLORHEXIDINE GLUCONATE 1 APPLICATION(S): 213 SOLUTION TOPICAL at 05:16

## 2018-10-28 RX ADMIN — TAMSULOSIN HYDROCHLORIDE 0.4 MILLIGRAM(S): 0.4 CAPSULE ORAL at 21:45

## 2018-10-28 RX ADMIN — PANTOPRAZOLE SODIUM 40 MILLIGRAM(S): 20 TABLET, DELAYED RELEASE ORAL at 06:04

## 2018-10-28 RX ADMIN — HEPARIN SODIUM 5000 UNIT(S): 5000 INJECTION INTRAVENOUS; SUBCUTANEOUS at 21:45

## 2018-10-28 RX ADMIN — ATORVASTATIN CALCIUM 20 MILLIGRAM(S): 80 TABLET, FILM COATED ORAL at 21:45

## 2018-10-28 RX ADMIN — Medication 100 MILLIGRAM(S): at 05:16

## 2018-10-28 RX ADMIN — Medication 40 MILLIGRAM(S): at 05:16

## 2018-10-28 RX ADMIN — Medication 325 MILLIGRAM(S): at 12:06

## 2018-10-28 RX ADMIN — Medication 1 MILLIGRAM(S): at 12:06

## 2018-10-28 RX ADMIN — HEPARIN SODIUM 5000 UNIT(S): 5000 INJECTION INTRAVENOUS; SUBCUTANEOUS at 13:03

## 2018-10-28 RX ADMIN — Medication 1334 MILLIGRAM(S): at 21:45

## 2018-10-28 RX ADMIN — Medication 1334 MILLIGRAM(S): at 17:15

## 2018-10-28 RX ADMIN — Medication 1334 MILLIGRAM(S): at 12:06

## 2018-10-28 RX ADMIN — Medication 100 MILLIGRAM(S): at 17:15

## 2018-10-28 RX ADMIN — HEPARIN SODIUM 5000 UNIT(S): 5000 INJECTION INTRAVENOUS; SUBCUTANEOUS at 05:16

## 2018-10-28 NOTE — PROGRESS NOTE ADULT - SUBJECTIVE AND OBJECTIVE BOX
SU SAWYER  57y  Male      Patient is a 57y old  Male who presents with a chief complaint of foot ulcer (27 Oct 2018 13:46)    biopsy showed OM+    REVIEW OF SYSTEMS:  CONSTITUTIONAL: No fever, weight loss, or fatigue  EYES: No eye pain, visual disturbances, or discharge  ENMT:  No difficulty hearing, tinnitus, vertigo; No sinus or throat pain  NECK: No pain or stiffness  BREASTS: No pain, masses, or nipple discharge  RESPIRATORY: No cough, wheezing, chills or hemoptysis; No shortness of breath  CARDIOVASCULAR: No chest pain, palpitations, dizziness, or leg swelling  GASTROINTESTINAL: No abdominal or epigastric pain. No nausea, vomiting, or hematemesis; No diarrhea or constipation. No melena or hematochezia.  GENITOURINARY: No dysuria, frequency, hematuria, or incontinence  NEUROLOGICAL: No headaches, memory loss, loss of strength, numbness, or tremors  SKIN: No itching, burning, rashes, or lesions   LYMPH NODES: No enlarged glands  ENDOCRINE: No heat or cold intolerance; No hair loss  MUSCULOSKELETAL: No joint pain or swelling; No muscle, back, or extremity pain  PSYCHIATRIC: No depression, anxiety, mood swings, or difficulty sleeping  HEME/LYMPH: No easy bruising, or bleeding gums  ALLERY AND IMMUNOLOGIC: No hives or eczema  FAMILY HISTORY:  Family history of myocardial infarction (Father)    T(C): 36.7 (10-28-18 @ 04:52), Max: 37.6 (10-27-18 @ 21:06)  HR: 77 (10-28-18 @ 04:52) (74 - 78)  BP: 149/72 (10-28-18 @ 04:52) (128/62 - 149/72)  RR: 18 (10-28-18 @ 04:52) (16 - 18)  SpO2: 98% (10-27-18 @ 20:03) (98% - 98%)  Wt(kg): --Vital Signs Last 24 Hrs  T(C): 36.7 (28 Oct 2018 04:52), Max: 37.6 (27 Oct 2018 21:06)  T(F): 98 (28 Oct 2018 04:52), Max: 99.6 (27 Oct 2018 21:06)  HR: 77 (28 Oct 2018 04:52) (74 - 78)  BP: 149/72 (28 Oct 2018 04:52) (128/62 - 149/72)  BP(mean): --  RR: 18 (28 Oct 2018 04:52) (16 - 18)  SpO2: 98% (27 Oct 2018 20:03) (98% - 98%)  bacitracin (Unknown)  Neosporin (Hypotension)      PHYSICAL EXAM:  GENERAL: NAD, well-groomed, well-developed  HEAD:  Atraumatic, Normocephalic  EYES: EOMI, PERRLA, conjunctiva and sclera clear  ENMT: No tonsillar erythema, exudates, or enlargement; Moist mucous membranes, Good dentition, No lesions  NECK: Supple, No JVD, Normal thyroid  NERVOUS SYSTEM:  Alert & Oriented X3, Good concentration; Motor Strength 5/5 B/L upper and lower extremities; DTRs 2+ intact and symmetric  CHEST/LUNG: Clear to percussion bilaterally; No rales, rhonchi, wheezing, or rubs  HEART: Regular rate and rhythm; No murmurs, rubs, or gallops  ABDOMEN: Soft, Nontender, Nondistended; Bowel sounds present  EXTREMITIES:  2+ Peripheral Pulses, No clubbing, cyanosis, or edema  LYMPH: No lymphadenopathy noted  SKIN: No rashes or lesions      LABS:  10-28    139  |  97<L>  |  44<H>  ----------------------------<  205<H>  4.4   |  28  |  5.4<HH>    Ca    8.9      28 Oct 2018 07:39  Phos  3.1     10-27  Mg     2.0     10-27                            8.0    9.74  )-----------( 316      ( 28 Oct 2018 07:39 )             26.4         RADIOLOGY & ADDITIONAL TESTS:    MEDICATION:  acetaminophen   Tablet .. 650 milliGRAM(s) Oral every 6 hours PRN  atorvastatin 20 milliGRAM(s) Oral at bedtime  calcium acetate 1334 milliGRAM(s) Oral four times a day with meals  cefepime   IVPB 2000 milliGRAM(s) IV Intermittent <User Schedule>  chlorhexidine 4% Liquid 1 Application(s) Topical <User Schedule>  darbepoetin Injectable Syringe 40 MICROGram(s) IV Push <User Schedule>  docusate sodium 100 milliGRAM(s) Oral two times a day  ergocalciferol 24295 Unit(s) Oral every week  ferrous sulfate Oral Tab/Cap - Peds 325 milliGRAM(s) Oral daily  folic acid 1 milliGRAM(s) Oral daily  furosemide    Tablet 40 milliGRAM(s) Oral daily  heparin  Injectable 5000 Unit(s) SubCutaneous every 8 hours  lactulose Syrup 20 Gram(s) Oral daily  oxyCODONE    5 mG/acetaminophen 325 mG 1 Tablet(s) Oral every 4 hours PRN  pantoprazole    Tablet 40 milliGRAM(s) Oral before breakfast  tamsulosin 0.4 milliGRAM(s) Oral at bedtime  vancomycin  IVPB 750 milliGRAM(s) IV Intermittent <User Schedule>      HEALTH ISSUES - PROBLEM Dx:  Osteomyelitis, chronic, ankle or foot, left: Osteomyelitis, chronic, ankle or foot, left on vanco,cefipime  Neuropathy: Neuropathy  HTN (hypertension): HTN (hypertension)  CKD (chronic kidney disease), stage V: CKD (chronic kidney disease), stage V on HD  DM (diabetes mellitus), type 2: DM (diabetes mellitus), type 2  Dyslipidemia: Dyslipidemia  Foot ulcer: Foot ulcer daily dressing  PVD going for angiogram  chronic DVT on heparin  anemia chronnic

## 2018-10-28 NOTE — PROGRESS NOTE ADULT - ASSESSMENT
Patient presents with diabetes and diabetic foot ulcer, new fistula looks good - will probably use soon. Foot care noted, no acute distress. Outpatient therapy soon. Hyperparathyroidism noted. labs and meds reviewed.

## 2018-10-28 NOTE — PROGRESS NOTE ADULT - SUBJECTIVE AND OBJECTIVE BOX
[facility] FOLLOW UP NOTE  --------------------------------------------------------------------------------  Chief Complaint:    24 hour events/subjective:        PAST HISTORY  --------------------------------------------------------------------------------  No significant changes to PMH, PSH, FHx, SHx, unless otherwise noted    ALLERGIES & MEDICATIONS  --------------------------------------------------------------------------------  Allergies    bacitracin (Unknown)  Neosporin (Hypotension)    Intolerances      Standing Inpatient Medications  atorvastatin 20 milliGRAM(s) Oral at bedtime  calcium acetate 1334 milliGRAM(s) Oral four times a day with meals  cefepime   IVPB 2000 milliGRAM(s) IV Intermittent <User Schedule>  chlorhexidine 4% Liquid 1 Application(s) Topical <User Schedule>  darbepoetin Injectable Syringe 40 MICROGram(s) IV Push <User Schedule>  docusate sodium 100 milliGRAM(s) Oral two times a day  ergocalciferol 20044 Unit(s) Oral every week  ferrous sulfate Oral Tab/Cap - Peds 325 milliGRAM(s) Oral daily  folic acid 1 milliGRAM(s) Oral daily  furosemide    Tablet 40 milliGRAM(s) Oral daily  heparin  Injectable 5000 Unit(s) SubCutaneous every 8 hours  lactulose Syrup 20 Gram(s) Oral daily  pantoprazole    Tablet 40 milliGRAM(s) Oral before breakfast  tamsulosin 0.4 milliGRAM(s) Oral at bedtime  vancomycin  IVPB 750 milliGRAM(s) IV Intermittent <User Schedule>    PRN Inpatient Medications  acetaminophen   Tablet .. 650 milliGRAM(s) Oral every 6 hours PRN  oxyCODONE    5 mG/acetaminophen 325 mG 1 Tablet(s) Oral every 4 hours PRN      REVIEW OF SYSTEMS  --------------------------------------------------------------------------------  Gen: No weight changes, fatigue, fevers/chills, weakness  Skin: No rashes  Head/Eyes/Ears/Mouth: No headache; Normal hearing; Normal vision w/o blurriness; No sinus pain/discomfort, sore throat  Respiratory: No dyspnea, cough, wheezing, hemoptysis  CV: No chest pain, PND, orthopnea  GI: No abdominal pain, diarrhea, constipation, nausea, vomiting, melena, hematochezia  : No increased frequency, dysuria, hematuria, nocturia  MSK: No joint pain/swelling; no back pain; no edema  Neuro: No dizziness/lightheadedness, weakness, seizures, numbness, tingling  Heme: No easy bruising or bleeding  Endo: No heat/cold intolerance  Psych: No significant nervousness, anxiety, stress, depression    All other systems were reviewed and are negative, except as noted.    VITALS/PHYSICAL EXAM  --------------------------------------------------------------------------------  T(C): 36.8 (10-28-18 @ 15:05), Max: 37.6 (10-27-18 @ 21:06)  HR: 77 (10-28-18 @ 15:05) (77 - 78)  BP: 108/66 (10-28-18 @ 15:05) (108/66 - 149/72)  RR: 18 (10-28-18 @ 15:05) (18 - 18)  SpO2: 98% (10-27-18 @ 20:03) (98% - 98%)  Wt(kg): --    Weight (kg): 116 (10-27-18 @ 10:10)      10-27-18 @ 07:01  -  10-28-18 @ 07:00  --------------------------------------------------------  IN: 0 mL / OUT: 900 mL / NET: -900 mL      Physical Exam:  	Gen: NAD, well-appearing  	HEENT: PERRL, supple neck, clear oropharynx  	Pulm: CTA B/L  	CV: RRR, S1S2; no rub  	Back: No spinal or CVA tenderness; no sacral edema  	Abd: +BS, soft, nontender/nondistended  	: No suprapubic tenderness  	UE: Warm, FROM, no clubbing, intact strength; no edema; no asterixis  	LE: Warm, FROM, no clubbing, intact strength; no edema  	Neuro: No focal deficits, intact gait  	Psych: Normal affect and mood  	Skin: Warm, without rashes  	Vascular access:    LABS/STUDIES  --------------------------------------------------------------------------------              8.0    9.74  >-----------<  316      [10-28-18 @ 07:39]              26.4     139  |  97  |  44  ----------------------------<  205      [10-28-18 @ 07:39]  4.4   |  28  |  5.4        Ca     8.9     [10-28-18 @ 07:39]      Mg     2.0     [10-27-18 @ 07:59]      Phos  3.1     [10-27-18 @ 07:59]      PT/INR: PT 12.50, INR 1.09       [10-28-18 @ 07:39]  PTT: 29.8       [10-28-18 @ 07:39]      Creatinine Trend:  SCr 5.4 [10-28 @ 07:39]  SCr 4.6 [10-27 @ 11:41]  SCr 4.4 [10-27 @ 07:59]  SCr 5.2 [10-24 @ 06:41]  SCr 4.1 [10-23 @ 06:56]        Iron 38, TIBC 166, %sat 23      [09-19-18 @ 04:30]  Ferritin 55      [03-09-18 @ 18:01]  PTH -- (Ca 8.2)      [09-22-18 @ 08:40]   2071  PTH -- (Ca 8.0)      [09-19-18 @ 04:30]   1519  PTH -- (Ca 8.3)      [03-09-18 @ 18:01]   1545  Vitamin D (25OH) 14.4      [03-09-18 @ 18:01]

## 2018-10-28 NOTE — PROGRESS NOTE ADULT - ASSESSMENT
[facility] [facility]    Heel grew  Proteus mirabilis R Ampi, Unasyn, Ancef  Enterococcus faecalis S Ampi & Vanco  LINDA S Bactrim, Doxy    ON cefepime   IVPB 2000 milliGRAM(s) IV Intermittent <User Schedule>  vancomycin  IVPB 750 milliGRAM(s) IV Intermittent <User Schedule>    Osteomyelitis, chronic, ankle or foot, left.     PLAN  Continue POSt HD Vanco/Cefepime for 6 week total  Follow up with ID Dr Madrid out pt. [phone] [facility] [phone] or [phone]

## 2018-10-28 NOTE — PROGRESS NOTE ADULT - SUBJECTIVE AND OBJECTIVE BOX
infectious diseases progress note:  SU SAWYER is a 57yMale patient    FOOT ULCER    Osteomyelitis, chronic, ankle or foot, left  Neuropathy  HTN (hypertension)  CKD (chronic kidney disease), stage V  DM (diabetes mellitus), type 2  Dyslipidemia  Foot ulcer      ROS:  CONSTITUTIONAL:  Negative fever or chills, feels well, good appetite  EYES:  Negative  blurry vision or double vision  CARDIOVASCULAR:  Negative for chest pain or palpitations  RESPIRATORY:  Negative for cough, wheezing, or SOB   GASTROINTESTINAL:  Negative for nausea, vomiting, diarrhea, constipation, or abdominal pain  GENITOURINARY:  Negative frequency, urgency or dysuria  NEUROLOGIC:  No headache, confusion, dizziness, lightheadedness    Allergies    bacitracin (Unknown)  Neosporin (Hypotension)    Intolerances        ANTIBIOTICS/RELEVANT:  antimicrobials  cefepime   IVPB 2000 milliGRAM(s) IV Intermittent <User Schedule>  vancomycin  IVPB 750 milliGRAM(s) IV Intermittent <User Schedule>    immunologic:  darbepoetin Injectable Syringe 40 MICROGram(s) IV Push <User Schedule>    OTHER:  acetaminophen   Tablet .. 650 milliGRAM(s) Oral every 6 hours PRN  atorvastatin 20 milliGRAM(s) Oral at bedtime  calcium acetate 1334 milliGRAM(s) Oral four times a day with meals  chlorhexidine 4% Liquid 1 Application(s) Topical <User Schedule>  docusate sodium 100 milliGRAM(s) Oral two times a day  ergocalciferol 14547 Unit(s) Oral every week  ferrous sulfate Oral Tab/Cap - Peds 325 milliGRAM(s) Oral daily  folic acid 1 milliGRAM(s) Oral daily  furosemide    Tablet 40 milliGRAM(s) Oral daily  heparin  Injectable 5000 Unit(s) SubCutaneous every 8 hours  lactulose Syrup 20 Gram(s) Oral daily  oxyCODONE    5 mG/acetaminophen 325 mG 1 Tablet(s) Oral every 4 hours PRN  pantoprazole    Tablet 40 milliGRAM(s) Oral before breakfast  tamsulosin 0.4 milliGRAM(s) Oral at bedtime      Objective:  T(F): 98 (10-28-18 @ 04:52), Max: 99.6 (10-27-18 @ 21:06)  HR: 77 (10-28-18 @ 04:52) (74 - 78)  BP: 149/72 (10-28-18 @ 04:52) (128/62 - 149/72)  RR: 18 (10-28-18 @ 04:52) (16 - 18)  SpO2: 98% (10-27-18 @ 20:03) (98% - 98%)    PHYSICAL EXAM  Constitutional:Well-developed, well nourished  Eyes:JUANITO, EOMI  Ear/Nose/Throat: no oral lesion, no sinus tenderness on percussion	  Neck:no JVD, no lymphadenopathy, supple  Respiratory: CTA brandt  Cardiovascular: S1S2 RRR, no murmurs  Gastrointestinal:soft, (+) BS, no HSM  Extremities:no e/e/c    10-28    139  |  97<L>  |  44<H>  ----------------------------<  205<H>  4.4   |  28  |  5.4<HH>    Mg     2.0     10-27                              8.0    9.74  )-----------( 316      ( 28 Oct 2018 07:39 )             26.4       PT/INR - ( 28 Oct 2018 07:39 )   PT: 12.50 sec;   INR: 1.09 ratio         PTT - ( 28 Oct 2018 07:39 )  PTT:29.8 sec    Culture - Other (collected 23 Oct 2018 14:05)  Source: Ulcer HEEL  Final Report (26 Oct 2018 14:28):    Few Proteus mirabilis    Few Enterococcus faecalis    Rare Staphylococcus aureus  Organism: Proteus mirabilis  Enterococcus faecalis  Staphylococcus aureus (26 Oct 2018 14:28)  Organism: Staphylococcus aureus (26 Oct 2018 14:28)      -  Ampicillin/Sulbactam: S <=8/4      -  Cefazolin: S <=4      -  Clindamycin: S 0.5      -  Erythromycin: R >4      -  Gentamicin: S 2 Should not be used as monotherapy      -  Oxacillin: S 0.5      -  Penicillin: R >8      -  RIF- Rifampin: S <=1 Should not be used as monotherapy      -  Tetra/Doxy: S <=1      -  Trimethoprim/Sulfamethoxazole: S <=0.5/9.5      -  Vancomycin: S 2      Method Type: ROSALIA  Organism: Enterococcus faecalis (26 Oct 2018 14:28)      -  Ampicillin: S <=2 Predicts results to ampicillin/sulbactam, amoxacillin-clavulanate and  piperacillin-tazobactam.      -  Tetra/Doxy: S <=1      -  Vancomycin: S 2      Method Type: ROSALIA  Organism: Proteus mirabilis (26 Oct 2018 14:28)      -  Amikacin: S <=8      -  Amoxicillin/Clavulanic Acid: R >16/8      -  Ampicillin: R >16 These ampicillin results predict results for amoxicillin      -  Ampicillin/Sulbactam: R >16/8      -  Aztreonam: S <=4      -  Cefazolin: R >16      -  Cefepime: S <=2      -  Cefoxitin: S 8      -  Ceftriaxone: S <=1 Enterobacter, Citrobacter, and Serratia may develop resistance during prolonged therapy      -  Ciprofloxacin: S <=0.5      -  Ertapenem: S <=0.5      -  Gentamicin: S <=1      -  Levofloxacin: S <=1      -  Meropenem: S <=1      -  Piperacillin/Tazobactam: S <=8      -  Tobramycin: S <=2      -  Trimethoprim/Sulfamethoxazole: S <=0.5/9.5      Method Type: ROSALIA    Culture - Blood (collected 22 Oct 2018 17:45)  Source: .Blood Blood  Final Report (28 Oct 2018 03:00):    No growth at 5 days.    Culture - Blood (collected 22 Oct 2018 17:30)  Source: .Blood Blood  Final Report (28 Oct 2018 03:00):    No growth at 5 days.

## 2018-10-29 LAB
ANION GAP SERPL CALC-SCNC: 14 MMOL/L — SIGNIFICANT CHANGE UP (ref 7–14)
BLD GP AB SCN SERPL QL: SIGNIFICANT CHANGE UP
BUN SERPL-MCNC: 58 MG/DL — HIGH (ref 10–20)
CALCIUM SERPL-MCNC: 8.9 MG/DL — SIGNIFICANT CHANGE UP (ref 8.5–10.1)
CHLORIDE SERPL-SCNC: 92 MMOL/L — LOW (ref 98–110)
CO2 SERPL-SCNC: 24 MMOL/L — SIGNIFICANT CHANGE UP (ref 17–32)
CREAT SERPL-MCNC: 5.9 MG/DL — SIGNIFICANT CHANGE UP (ref 0.7–1.5)
GLUCOSE BLDC GLUCOMTR-MCNC: 147 MG/DL — HIGH (ref 70–99)
GLUCOSE BLDC GLUCOMTR-MCNC: 177 MG/DL — HIGH (ref 70–99)
GLUCOSE BLDC GLUCOMTR-MCNC: 215 MG/DL — HIGH (ref 70–99)
GLUCOSE SERPL-MCNC: 169 MG/DL — HIGH (ref 70–99)
POTASSIUM SERPL-MCNC: 4.4 MMOL/L — SIGNIFICANT CHANGE UP (ref 3.5–5)
POTASSIUM SERPL-SCNC: 4.4 MMOL/L — SIGNIFICANT CHANGE UP (ref 3.5–5)
SODIUM SERPL-SCNC: 130 MMOL/L — LOW (ref 135–146)
TYPE + AB SCN PNL BLD: SIGNIFICANT CHANGE UP

## 2018-10-29 RX ORDER — POLYETHYLENE GLYCOL 3350 17 G/17G
17 POWDER, FOR SOLUTION ORAL
Qty: 0 | Refills: 0 | Status: DISCONTINUED | OUTPATIENT
Start: 2018-10-29 | End: 2018-10-31

## 2018-10-29 RX ORDER — AMPICILLIN SODIUM AND SULBACTAM SODIUM 250; 125 MG/ML; MG/ML
3 INJECTION, POWDER, FOR SUSPENSION INTRAMUSCULAR; INTRAVENOUS DAILY
Qty: 0 | Refills: 0 | Status: DISCONTINUED | OUTPATIENT
Start: 2018-10-30 | End: 2018-10-31

## 2018-10-29 RX ORDER — AMPICILLIN SODIUM AND SULBACTAM SODIUM 250; 125 MG/ML; MG/ML
INJECTION, POWDER, FOR SUSPENSION INTRAMUSCULAR; INTRAVENOUS
Qty: 0 | Refills: 0 | Status: DISCONTINUED | OUTPATIENT
Start: 2018-10-29 | End: 2018-10-31

## 2018-10-29 RX ORDER — AMPICILLIN SODIUM AND SULBACTAM SODIUM 250; 125 MG/ML; MG/ML
3 INJECTION, POWDER, FOR SUSPENSION INTRAMUSCULAR; INTRAVENOUS ONCE
Qty: 0 | Refills: 0 | Status: COMPLETED | OUTPATIENT
Start: 2018-10-29 | End: 2018-10-29

## 2018-10-29 RX ADMIN — Medication 100 MILLIGRAM(S): at 05:55

## 2018-10-29 RX ADMIN — PANTOPRAZOLE SODIUM 40 MILLIGRAM(S): 20 TABLET, DELAYED RELEASE ORAL at 06:03

## 2018-10-29 RX ADMIN — AMPICILLIN SODIUM AND SULBACTAM SODIUM 200 GRAM(S): 250; 125 INJECTION, POWDER, FOR SUSPENSION INTRAMUSCULAR; INTRAVENOUS at 17:29

## 2018-10-29 RX ADMIN — ATORVASTATIN CALCIUM 20 MILLIGRAM(S): 80 TABLET, FILM COATED ORAL at 22:15

## 2018-10-29 RX ADMIN — Medication 1334 MILLIGRAM(S): at 22:15

## 2018-10-29 RX ADMIN — Medication 40 MILLIGRAM(S): at 05:55

## 2018-10-29 RX ADMIN — Medication 325 MILLIGRAM(S): at 17:30

## 2018-10-29 RX ADMIN — Medication 1334 MILLIGRAM(S): at 17:30

## 2018-10-29 RX ADMIN — CHLORHEXIDINE GLUCONATE 1 APPLICATION(S): 213 SOLUTION TOPICAL at 05:55

## 2018-10-29 RX ADMIN — HEPARIN SODIUM 5000 UNIT(S): 5000 INJECTION INTRAVENOUS; SUBCUTANEOUS at 22:15

## 2018-10-29 RX ADMIN — Medication 1334 MILLIGRAM(S): at 17:32

## 2018-10-29 RX ADMIN — TAMSULOSIN HYDROCHLORIDE 0.4 MILLIGRAM(S): 0.4 CAPSULE ORAL at 22:14

## 2018-10-29 RX ADMIN — HEPARIN SODIUM 5000 UNIT(S): 5000 INJECTION INTRAVENOUS; SUBCUTANEOUS at 17:31

## 2018-10-29 RX ADMIN — Medication 1 MILLIGRAM(S): at 17:31

## 2018-10-29 RX ADMIN — Medication 100 MILLIGRAM(S): at 17:31

## 2018-10-29 RX ADMIN — HEPARIN SODIUM 5000 UNIT(S): 5000 INJECTION INTRAVENOUS; SUBCUTANEOUS at 05:55

## 2018-10-29 NOTE — PROGRESS NOTE ADULT - SUBJECTIVE AND OBJECTIVE BOX
SU SAWYER  57y, Male      OVERNIGHT EVENTS:    none    VITALS:  T(F): 98.9, Max: 98.9 (10-29-18 @ 06:06)  HR: 68  BP: 138/62  RR: 18Vital Signs Last 24 Hrs  T(C): 37.2 (29 Oct 2018 06:06), Max: 37.2 (29 Oct 2018 06:06)  T(F): 98.9 (29 Oct 2018 06:06), Max: 98.9 (29 Oct 2018 06:06)  HR: 68 (29 Oct 2018 06:06) (68 - 77)  BP: 138/62 (29 Oct 2018 06:06) (108/66 - 138/62)  BP(mean): --  RR: 18 (29 Oct 2018 06:06) (18 - 18)  SpO2: 95% (29 Oct 2018 08:31) (95% - 95%)    TESTS & MEASUREMENTS:                        8.0    9.74  )-----------( 316      ( 28 Oct 2018 07:39 )             26.4     10-28    139  |  97<L>  |  44<H>  ----------------------------<  205<H>  4.4   |  28  |  5.4<HH>    Ca    8.9      28 Oct 2018 07:39          Culture - Other (collected 10-23-18 @ 14:05)  Source: Ulcer HEEL  Final Report (10-26-18 @ 14:28):    Few Proteus mirabilis    Few Enterococcus faecalis    Rare Staphylococcus aureus  Organism: Proteus mirabilis  Enterococcus faecalis  Staphylococcus aureus (10-26-18 @ 14:28)  Organism: Staphylococcus aureus (10-26-18 @ 14:28)      -  Ampicillin/Sulbactam: S <=8/4      -  Cefazolin: S <=4      -  Clindamycin: S 0.5      -  Erythromycin: R >4      -  Gentamicin: S 2 Should not be used as monotherapy      -  Oxacillin: S 0.5      -  Penicillin: R >8      -  RIF- Rifampin: S <=1 Should not be used as monotherapy      -  Tetra/Doxy: S <=1      -  Trimethoprim/Sulfamethoxazole: S <=0.5/9.5      -  Vancomycin: S 2      Method Type: ROSALIA  Organism: Enterococcus faecalis (10-26-18 @ 14:28)      -  Ampicillin: S <=2 Predicts results to ampicillin/sulbactam, amoxacillin-clavulanate and  piperacillin-tazobactam.      -  Tetra/Doxy: S <=1      -  Vancomycin: S 2      Method Type: ROSALIA  Organism: Proteus mirabilis (10-26-18 @ 14:28)      -  Amikacin: S <=8      -  Amoxicillin/Clavulanic Acid: R >16/8      -  Ampicillin: R >16 These ampicillin results predict results for amoxicillin      -  Ampicillin/Sulbactam: R >16/8      -  Aztreonam: S <=4      -  Cefazolin: R >16      -  Cefepime: S <=2      -  Cefoxitin: S 8      -  Ceftriaxone: S <=1 Enterobacter, Citrobacter, and Serratia may develop resistance during prolonged therapy      -  Ciprofloxacin: S <=0.5      -  Ertapenem: S <=0.5      -  Gentamicin: S <=1      -  Levofloxacin: S <=1      -  Meropenem: S <=1      -  Piperacillin/Tazobactam: S <=8      -  Tobramycin: S <=2      -  Trimethoprim/Sulfamethoxazole: S <=0.5/9.5      Method Type: ROSALIA    Culture - Blood (collected 10-22-18 @ 17:45)  Source: .Blood Blood  Final Report (10-28-18 @ 03:00):    No growth at 5 days.    Culture - Blood (collected 10-22-18 @ 17:30)  Source: .Blood Blood  Final Report (10-28-18 @ 03:00):    No growth at 5 days.            RADIOLOGY & ADDITIONAL TESTS:    ANTIBIOTICS:  cefepime   IVPB 2000 milliGRAM(s) IV Intermittent <User Schedule>  vancomycin  IVPB 750 milliGRAM(s) IV Intermittent <User Schedule>

## 2018-10-29 NOTE — PROGRESS NOTE ADULT - SUBJECTIVE AND OBJECTIVE BOX
seen and examined  no distress  no new complaints        Standing Inpatient Medications  atorvastatin 20 milliGRAM(s) Oral at bedtime  calcium acetate 1334 milliGRAM(s) Oral four times a day with meals  cefepime   IVPB 2000 milliGRAM(s) IV Intermittent <User Schedule>  chlorhexidine 4% Liquid 1 Application(s) Topical <User Schedule>  darbepoetin Injectable Syringe 40 MICROGram(s) IV Push <User Schedule>  docusate sodium 100 milliGRAM(s) Oral two times a day  ergocalciferol 98901 Unit(s) Oral every week  ferrous sulfate Oral Tab/Cap - Peds 325 milliGRAM(s) Oral daily  folic acid 1 milliGRAM(s) Oral daily  furosemide    Tablet 40 milliGRAM(s) Oral daily  heparin  Injectable 5000 Unit(s) SubCutaneous every 8 hours  lactulose Syrup 20 Gram(s) Oral daily  pantoprazole    Tablet 40 milliGRAM(s) Oral before breakfast  tamsulosin 0.4 milliGRAM(s) Oral at bedtime  vancomycin  IVPB 750 milliGRAM(s) IV Intermittent <User Schedule>    VITALS/PHYSICAL EXAM  --------------------------------------------------------------------------------  T(C): 37.2 (10-29-18 @ 06:06), Max: 37.2 (10-29-18 @ 06:06)  HR: 68 (10-29-18 @ 06:06) (68 - 77)  BP: 138/62 (10-29-18 @ 06:06) (108/66 - 138/62)  RR: 18 (10-29-18 @ 06:06) (18 - 18)  SpO2: --  Wt(kg): --    Weight (kg): 116 (10-27-18 @ 10:10)      10-28-18 @ 07:01  -  10-29-18 @ 07:00  --------------------------------------------------------  IN: 0 mL / OUT: 300 mL / NET: -300 mL      Physical Exam:  	Gen: NAD  	Pulm:  decrease BS B/L  	CV:  S1S2; no rub  	Abd:  soft, nontender/nondistended  	LE: dressing   	Vascular access: av fistula, tesio     LABS/STUDIES  --------------------------------------------------------------------------------              8.0    9.74  >-----------<  316      [10-28-18 @ 07:39]              26.4     139  |  97  |  44  ----------------------------<  205      [10-28-18 @ 07:39]  4.4   |  28  |  5.4        Ca     8.9     [10-28-18 @ 07:39]      PT/INR: PT 12.50, INR 1.09       [10-28-18 @ 07:39]  PTT: 29.8       [10-28-18 @ 07:39]      Creatinine Trend:  SCr 5.4 [10-28 @ 07:39]  SCr 4.6 [10-27 @ 11:41]  SCr 4.4 [10-27 @ 07:59]  SCr 5.2 [10-24 @ 06:41]  SCr 4.1 [10-23 @ 06:56]        Iron 38, TIBC 166, %sat 23      [09-19-18 @ 04:30]  Ferritin 55      [03-09-18 @ 18:01]  PTH -- (Ca 8.2)      [09-22-18 @ 08:40]   2071  PTH -- (Ca 8.0)      [09-19-18 @ 04:30]   1519  PTH -- (Ca 8.3)      [03-09-18 @ 18:01]   1545  Vitamin D (25OH) 14.4      [03-09-18 @ 18:01]

## 2018-10-29 NOTE — DIETITIAN INITIAL EVALUATION ADULT. - PERTINENT MEDS FT
heparin, abx, darbepoetin, oxycodone, ergocalciferol, atorvastatin, calcium acetate, docusate, ferrous sulfate, folic acid, frusemide, lactulose, pantoprazole

## 2018-10-29 NOTE — DIETITIAN INITIAL EVALUATION ADULT. - DIET TYPE
regular/DASH/TLC (sodium and cholesterol restricted diet)/renal replacement pts:no protein restr,no conc K & phos, low sodium/consistent carbohydrate (no snacks)

## 2018-10-29 NOTE — PROGRESS NOTE ADULT - SUBJECTIVE AND OBJECTIVE BOX
SUBJECTIVE:    Patient is a 57y old Male who presents with a chief complaint of foot ulcer (29 Oct 2018 11:30)    Currently admitted to medicine with the primary diagnosis of Foot ulcer     Today is hospital day 7d. HD today. Angiogram tomorrow.     PAST MEDICAL & SURGICAL HISTORY  Dyslipidemia  DM (diabetes mellitus), type 2  Neuropathy  HTN (hypertension)  CAD (coronary atherosclerotic disease)  Acromegaly  CKD (chronic kidney disease), stage V  H/O eye surgery  H/O: pituitary tumor: s/p removal    SOCIAL HISTORY:  Negative for smoking/alcohol/drug use.     ALLERGIES:  bacitracin (Unknown)  Neosporin (Hypotension)    MEDICATIONS:  STANDING MEDICATIONS  ampicillin/sulbactam  IVPB      ampicillin/sulbactam  IVPB 3 Gram(s) IV Intermittent once  atorvastatin 20 milliGRAM(s) Oral at bedtime  calcium acetate 1334 milliGRAM(s) Oral four times a day with meals  chlorhexidine 4% Liquid 1 Application(s) Topical <User Schedule>  darbepoetin Injectable Syringe 40 MICROGram(s) IV Push <User Schedule>  docusate sodium 100 milliGRAM(s) Oral two times a day  ergocalciferol 44553 Unit(s) Oral every week  ferrous sulfate Oral Tab/Cap - Peds 325 milliGRAM(s) Oral daily  folic acid 1 milliGRAM(s) Oral daily  furosemide    Tablet 40 milliGRAM(s) Oral daily  heparin  Injectable 5000 Unit(s) SubCutaneous every 8 hours  lactulose Syrup 20 Gram(s) Oral daily  pantoprazole    Tablet 40 milliGRAM(s) Oral before breakfast  polyethylene glycol 3350 17 Gram(s) Oral two times a day  tamsulosin 0.4 milliGRAM(s) Oral at bedtime    PRN MEDICATIONS  acetaminophen   Tablet .. 650 milliGRAM(s) Oral every 6 hours PRN  oxyCODONE    5 mG/acetaminophen 325 mG 1 Tablet(s) Oral every 4 hours PRN    VITALS:   T(F): 98.9  HR: 72  BP: 108/66  RR: 17  SpO2: 95%    LABS:                        8.0    9.74  )-----------( 316      ( 28 Oct 2018 07:39 )             26.4     10-28    139  |  97<L>  |  44<H>  ----------------------------<  205<H>  4.4   |  28  |  5.4<HH>    Ca    8.9      28 Oct 2018 07:39      PT/INR - ( 28 Oct 2018 07:39 )   PT: 12.50 sec;   INR: 1.09 ratio         PTT - ( 28 Oct 2018 07:39 )  PTT:29.8 sec              RADIOLOGY:        PHYSICAL EXAM:  GEN: No acute distress  LUNGS: Clear to auscultation bilaterally ;  +R chest Tessio  HEART: S1/S2 present. RRR.   ABD: Soft, non-tender, non-distended. Bowel sounds present  EXT: swelling b/l LE. R foot dressed; RUE AVF/scar;   NEURO: AAOX3

## 2018-10-29 NOTE — PROGRESS NOTE ADULT - ASSESSMENT
1)  ESRD : HD today, standard bath, uf 2 liters as tolerated   2)  DFU right foot s/p debridement and on Abx - Cefipime 2 gr pHD  and Vanco 750 pHD, follow vanco level, ID notes appreciated     3)  Severe secondary HPT  will start hectorol 2 with HD , check ph level     4) Anemia Hb noted   no need for PRBC transfusion. resistant to EDWIN in view of infection , d/c feso4     5) Severe PVD - angio today

## 2018-10-29 NOTE — PROGRESS NOTE ADULT - ASSESSMENT
Assessment 56y/o M w/ hx of neuropathy, acromegaly, diabetes, htn, CKD MWF, makes urine, htn who [facility] of LLE ulcer with Dr. Gomes. Vascular studies showed severe PVD and is transferred to [place] for angiogram.    Plan    #Severe PVD  -arterial duplex 10/24: b/l severe peripheral arterial disease, occlusion of R popliteal and L SF arteries  -angiogram on 10/30-Tomorrow; NPO after midnight tonite  -vascular following    #Chronic DVT of LLE - found on imaging on March 2018  -not on anticoagulation  -vascular f/u  -review heparin dosing with vascular    #DFU - left foot  -s/p debridement infected heel ulcer to the calcaneus  -c/w vanc and cefepime;   -ID consult-see + BC  -podiatry consult    #ESRD on HD started 1 month ago - MWF  -c/w HD; renal following  -need to f/u with Dr. Gomes if AVF can be used    #Anemia  -s/p 1 prbc transfusion in [facility]  -EDWIN 40 mcg weekly  -monitor cbc    #Diabetes  -basal bolus insuin; iss    #HTN  -c/w lasix 40 qd	    #HLD  -c/w atorvastatin    DVT ppx: heparin subq  GI ppx: protonix  Diet: consistent carbohydrate  Activity: ambulate as tolerated

## 2018-10-29 NOTE — DIETITIAN INITIAL EVALUATION ADULT. - FACTORS AFF FOOD INTAKE
pt reports good appetite/intake continues with 100% po, pt reports last BM 7 days ago - experiencing some abdominal discomfort, no nausea/vomiting, no issues chewing/swallowing

## 2018-10-29 NOTE — PROGRESS NOTE ADULT - ASSESSMENT
IMPRESSION:  Right heel with ischemic ulcer with superinfection    RECOMMENDATIONS:  Unasyn 3 gm iv q24h  D/c other Abx

## 2018-10-29 NOTE — DIETITIAN INITIAL EVALUATION ADULT. - OTHER INFO
[facility]ement of LLE ulcer with Dr. Gomes. Vascular studies showed severe PVD and is transferred to [place] for angiogram. Angiogram scheduled 10/30. Pt reports UBW ~260-270# PTA and stable within this range. Reason for assessment: LOS.

## 2018-10-29 NOTE — DIETITIAN INITIAL EVALUATION ADULT. - ENERGY NEEDS
Energy needs: (1339-8541 kcal/day = 30-35 kcal/kg IBW as pt on HD)  Protein needs: (110-129 g/day = 1.2-1.4 g/kg IBW as pt on HD)   Fluid needs: 1000 ml + urine outpt or per LIP/nephro

## 2018-10-29 NOTE — PROGRESS NOTE ADULT - ASSESSMENT
Assessment 56y/o M w/ hx of neuropathy, acromegaly, diabetes, htn, CKD MWF, makes urine, htn who [facility]bridement of LLE ulcer with Dr. Gomes. Vascular studies showed severe PVD and is transferred to [place] for angiogram.    Plan    #Severe PVD  -arterial duplex 10/24: b/l severe peripheral arterial disease, occlusion of R popliteal and L SF arteries  -angiogram on Tuesday 10/30/18; NPO after midnight tonight  -vascular following    #Chronic DVT of LLE - found on imaging on March 2018  -not on anticoagulation  -vascular f/u    #DFU - left foot  -s/p debridement infected heel ulcer to the calcaneus  -c/w vanc and cefepime;   -ID: d/c cefepime and vanco; start unasyn 3g q24hrs   -podiatry consult appreciated.    #ESRD on HD started 1 month ago - MWF  -c/w HD; renal following  -    #Anemia  -s/p 1 prbc transfusion in [facility]  -EDWIN 40 mcg weekly  -monitor cbc    #Diabetes  -basal bolus insuin; iss    #HTN  -c/w lasix 40 qd	    #HLD  -c/w atorvastatin    DVT ppx: heparin subq  GI ppx: protonix  Diet: consistent carbohydrate  Activity: ambulate as tolerated Assessment 56y/o M w/ hx of neuropathy, acromegaly, diabetes, htn, CKD MWF, makes urine, htn who [facility]ement of LLE ulcer with Dr. Gomes. Vascular studies showed severe PVD and is transferred to [place] for angiogram.    Plan    #Severe PVD  -arterial duplex 10/24: b/l severe peripheral arterial disease, occlusion of R popliteal and L SF arteries  -angiogram, possible endovascular revascularization on Tuesday 10/30/18; NPO after midnight tonight  -vascular following    #Chronic DVT of LLE - found on imaging on March 2018  -not on anticoagulation  -vascular f/u    #DFU - left foot  -s/p debridement infected heel ulcer to the calcaneus  -c/w vanc and cefepime;   -ID: d/c cefepime and vanco; start unasyn 3g q24hrs   -podiatry consult appreciated.    #ESRD on HD started 1 month ago - MWF  -c/w HD; renal following  -    #Anemia  -s/p 1 prbc transfusion in [facility]  -EDWIN 40 mcg weekly  -monitor cbc    #Diabetes  -basal bolus insuin; iss    #HTN  -c/w lasix 40 qd	    #HLD  -c/w atorvastatin    DVT ppx: heparin subq  GI ppx: protonix  Diet: consistent carbohydrate  Activity: ambulate as tolerated

## 2018-10-30 LAB
ANION GAP SERPL CALC-SCNC: 14 MMOL/L — SIGNIFICANT CHANGE UP (ref 7–14)
APTT BLD: 28.5 SEC — SIGNIFICANT CHANGE UP (ref 27–39.2)
BUN SERPL-MCNC: 39 MG/DL — HIGH (ref 10–20)
CALCIUM SERPL-MCNC: 9 MG/DL — SIGNIFICANT CHANGE UP (ref 8.5–10.1)
CHLORIDE SERPL-SCNC: 99 MMOL/L — SIGNIFICANT CHANGE UP (ref 98–110)
CO2 SERPL-SCNC: 28 MMOL/L — SIGNIFICANT CHANGE UP (ref 17–32)
CREAT SERPL-MCNC: 4.5 MG/DL — CRITICAL HIGH (ref 0.7–1.5)
GLUCOSE BLDC GLUCOMTR-MCNC: 149 MG/DL — HIGH (ref 70–99)
GLUCOSE BLDC GLUCOMTR-MCNC: 154 MG/DL — HIGH (ref 70–99)
GLUCOSE BLDC GLUCOMTR-MCNC: 160 MG/DL — HIGH (ref 70–99)
GLUCOSE BLDC GLUCOMTR-MCNC: 171 MG/DL — HIGH (ref 70–99)
GLUCOSE SERPL-MCNC: 148 MG/DL — HIGH (ref 70–99)
HCT VFR BLD CALC: 25.7 % — LOW (ref 42–52)
HGB BLD-MCNC: 7.8 G/DL — LOW (ref 14–18)
INR BLD: 1.2 RATIO — SIGNIFICANT CHANGE UP (ref 0.65–1.3)
MCHC RBC-ENTMCNC: 28 PG — SIGNIFICANT CHANGE UP (ref 27–31)
MCHC RBC-ENTMCNC: 30.4 G/DL — LOW (ref 32–37)
MCV RBC AUTO: 92.1 FL — SIGNIFICANT CHANGE UP (ref 80–94)
NRBC # BLD: 0 /100 WBCS — SIGNIFICANT CHANGE UP (ref 0–0)
PHOSPHATE SERPL-MCNC: 3.4 MG/DL — SIGNIFICANT CHANGE UP (ref 2.1–4.9)
PLATELET # BLD AUTO: 364 K/UL — SIGNIFICANT CHANGE UP (ref 130–400)
POTASSIUM SERPL-MCNC: 4.5 MMOL/L — SIGNIFICANT CHANGE UP (ref 3.5–5)
POTASSIUM SERPL-SCNC: 4.5 MMOL/L — SIGNIFICANT CHANGE UP (ref 3.5–5)
PROTHROM AB SERPL-ACNC: 13.8 SEC — HIGH (ref 9.95–12.87)
RBC # BLD: 2.79 M/UL — LOW (ref 4.7–6.1)
RBC # FLD: 15.6 % — HIGH (ref 11.5–14.5)
SODIUM SERPL-SCNC: 141 MMOL/L — SIGNIFICANT CHANGE UP (ref 135–146)
WBC # BLD: 11.72 K/UL — HIGH (ref 4.8–10.8)
WBC # FLD AUTO: 11.72 K/UL — HIGH (ref 4.8–10.8)

## 2018-10-30 PROCEDURE — 99222 1ST HOSP IP/OBS MODERATE 55: CPT

## 2018-10-30 RX ORDER — MINERAL OIL
133 OIL (ML) MISCELLANEOUS ONCE
Qty: 0 | Refills: 0 | Status: DISCONTINUED | OUTPATIENT
Start: 2018-10-30 | End: 2018-10-30

## 2018-10-30 RX ADMIN — Medication 10 MILLIGRAM(S): at 11:35

## 2018-10-30 RX ADMIN — Medication 100 MILLIGRAM(S): at 17:30

## 2018-10-30 RX ADMIN — HEPARIN SODIUM 5000 UNIT(S): 5000 INJECTION INTRAVENOUS; SUBCUTANEOUS at 13:46

## 2018-10-30 RX ADMIN — Medication 1 MILLIGRAM(S): at 13:44

## 2018-10-30 RX ADMIN — AMPICILLIN SODIUM AND SULBACTAM SODIUM 200 GRAM(S): 250; 125 INJECTION, POWDER, FOR SUSPENSION INTRAMUSCULAR; INTRAVENOUS at 11:25

## 2018-10-30 RX ADMIN — LACTULOSE 20 GRAM(S): 10 SOLUTION ORAL at 13:45

## 2018-10-30 RX ADMIN — POLYETHYLENE GLYCOL 3350 17 GRAM(S): 17 POWDER, FOR SOLUTION ORAL at 17:31

## 2018-10-30 RX ADMIN — ATORVASTATIN CALCIUM 20 MILLIGRAM(S): 80 TABLET, FILM COATED ORAL at 21:21

## 2018-10-30 RX ADMIN — Medication 1334 MILLIGRAM(S): at 21:21

## 2018-10-30 RX ADMIN — CHLORHEXIDINE GLUCONATE 1 APPLICATION(S): 213 SOLUTION TOPICAL at 06:06

## 2018-10-30 RX ADMIN — TAMSULOSIN HYDROCHLORIDE 0.4 MILLIGRAM(S): 0.4 CAPSULE ORAL at 21:21

## 2018-10-30 RX ADMIN — HEPARIN SODIUM 5000 UNIT(S): 5000 INJECTION INTRAVENOUS; SUBCUTANEOUS at 21:21

## 2018-10-30 RX ADMIN — Medication 1334 MILLIGRAM(S): at 17:30

## 2018-10-30 NOTE — PROGRESS NOTE ADULT - SUBJECTIVE AND OBJECTIVE BOX
SU SAWYER  57y  Male  HPI:  58y/o M w/ hx of brain tumor--acromegaly, diabetes, htn, ckd stage on dialysis MWF, makes urine, htn, 2 blood transfusion recently being worked up--  pt on rehab after recent admission for infection to feet presents for hg under 7 at rehab-[facility]; and for worsening of left leg ulcer.  pt had dialysis today and was given iv antibiotics after.  pt has been feeling chills; no fever. no abdominal pain, cp or sob. +generalized weakness.  no blood in stool (22 Oct 2018 19:39)    MEDICATIONS  (STANDING):  ampicillin/sulbactam  IVPB      ampicillin/sulbactam  IVPB 3 Gram(s) IV Intermittent daily  atorvastatin 20 milliGRAM(s) Oral at bedtime  bisacodyl Suppository 10 milliGRAM(s) Rectal daily  calcium acetate 1334 milliGRAM(s) Oral four times a day with meals  chlorhexidine 4% Liquid 1 Application(s) Topical <User Schedule>  darbepoetin Injectable Syringe 40 MICROGram(s) IV Push <User Schedule>  docusate sodium 100 milliGRAM(s) Oral two times a day  ergocalciferol 32347 Unit(s) Oral every week  ferrous sulfate Oral Tab/Cap - Peds 325 milliGRAM(s) Oral daily  folic acid 1 milliGRAM(s) Oral daily  furosemide    Tablet 40 milliGRAM(s) Oral daily  heparin  Injectable 5000 Unit(s) SubCutaneous every 8 hours  lactulose Syrup 20 Gram(s) Oral daily  pantoprazole    Tablet 40 milliGRAM(s) Oral before breakfast  polyethylene glycol 3350 17 Gram(s) Oral two times a day  tamsulosin 0.4 milliGRAM(s) Oral at bedtime    MEDICATIONS  (PRN):  acetaminophen   Tablet .. 650 milliGRAM(s) Oral every 6 hours PRN Temp greater or equal to 38C (100.4F)  oxyCODONE    5 mG/acetaminophen 325 mG 1 Tablet(s) Oral every 4 hours PRN Moderate Pain (4 - 6)    INTERVAL EVENTS: Patient seen today with complaints of constipation.    T(C): 36.1 (10-30-18 @ 21:03), Max: 36.6 (10-30-18 @ 04:55)  HR: 85 (10-30-18 @ 21:03) (75 - 85)  BP: 139/68 (10-30-18 @ 21:03) (126/66 - 151/63)  RR: 17 (10-30-18 @ 21:03) (17 - 18)  SpO2: 95% (10-30-18 @ 20:02) (95% - 95%)  Wt(kg): --Vital Signs Last 24 Hrs  T(C): 36.1 (30 Oct 2018 21:03), Max: 36.6 (30 Oct 2018 04:55)  T(F): 97 (30 Oct 2018 21:03), Max: 97.8 (30 Oct 2018 04:55)  HR: 85 (30 Oct 2018 21:03) (75 - 85)  BP: 139/68 (30 Oct 2018 21:03) (126/66 - 151/63)  BP(mean): --  RR: 17 (30 Oct 2018 21:03) (17 - 18)  SpO2: 95% (30 Oct 2018 20:02) (95% - 95%)    PHYSICAL EXAM:  GENERAL: NAD  NECK: Supple, No JVD  CHEST/LUNG: Clear  HEART: S1, S2, Regular rate and rhythm;   ABDOMEN: Soft, Uncomfortable, Bowel sounds present  EXTREMITIES: ++ , left foot dressing in palce    LABS:  Labs:                        7.8    11.72 )-----------( 364      ( 30 Oct 2018 08:20 )             25.7             10-30    141  |  99  |  39<H>  ----------------------------<  148<H>  4.5   |  28  |  4.5<HH>    Ca    9.0      30 Oct 2018 08:20  Phos  3.4     10-30            PT/INR - ( 30 Oct 2018 08:20 )   PT: 13.80 sec;   INR: 1.20 ratio      PTT - ( 30 Oct 2018 08:20 )  PTT:28.5 sec      RADIOLOGY & ADDITIONAL TESTS:  < from: [facility] Physiol Extremity Lower 3+ Level, BI (10.24.18 @ 19:07) >  Impression:    Limited study. Unable to fully assess the lower extremity due to   overlying bandages. There is no significant pressure gradient obtained   high thigh and low thigh bilaterally. Suggest further evaluation with   arterial duplex.      < end of copied text >

## 2018-10-30 NOTE — PROGRESS NOTE ADULT - ASSESSMENT
Assessment 58y/o M w/ hx of neuropathy, acromegaly, diabetes, htn, CKD MWF, makes urine, htn [facility]LE ulcer with Dr. Gomes. Vascular studies showed severe PVD and is transferred to [place] for angiogram.    Plan  Severe PVD  - arterial duplex 10/24: b/l severe peripheral arterial disease, occlusion of R popliteal and L SF arteries  - angiogram, possible endovascular revascularization rescheduled for tomorrow 10/31/18  - NPO after midnight tonight    DM with vascular and renal manifestations  DFU - left foot  - post debridement infected heel ulcer to the calcaneus  - antibiotics change noted  - continue Unasyn 3g q24hrs   - podiatry consult noted  - continue basal insulin    Constipation  - give Ducolax supp today  - add Lactulose  - monitor    ESRD on HMD  - MWF  - renal following    Anemia  - post transfusion   - EDWIN 40 mcg weekly  - monitor cbc    HTN  - continue Lasix	    HLD  - continue Atorvastatin    DVT ppx: heparin subq  GI ppx: protonix  Diet: consistent carbohydrate  Activity: ambulate as tolerated

## 2018-10-30 NOTE — PROGRESS NOTE ADULT - ASSESSMENT
1)  ESRD: s/p hd yesterday, hd in am   2)  DFU right foot s/p debridement and on Abx, followed by ID   3)  Severe secondary HPT  will start hectorol 2 with HD , check ph level   4) Anemia Hb noted   no need for PRBC transfusion. resistant to EDWIN in view of infection , d/c feso4 po  5) Severe PVD ? OR today   6) will follow

## 2018-10-30 NOTE — PROGRESS NOTE ADULT - ASSESSMENT
IMPRESSION:  Right heel with ischemic ulcer with superinfection  For possible vascular procedure today    RECOMMENDATIONS:  Unasyn 3 gm iv q24h

## 2018-10-30 NOTE — PROGRESS NOTE ADULT - ASSESSMENT
Assessment 56y/o M w/ hx of neuropathy, acromegaly, diabetes, htn, CKD MWF, makes urine, htn who [facility]LE ulcer with Dr. Gomes. Vascular studies showed severe PVD and is transferred to [place] for angiogram.    Plan    #Severe PVD  -arterial duplex 10/24: b/l severe peripheral arterial disease, occlusion of R popliteal and L SF arteries  -angiogram, possible endovascular revascularization rescheduled for tomorrow 10/31/18; NPO after midnight tonight  -vascular following    #Chronic DVT of LLE - found on imaging on March 2018  -not on anticoagulation  -vascular f/u    #DFU - left foot  -s/p debridement infected heel ulcer to the calcaneus  -c/w vanc and cefepime;   -ID: c/w unasyn 3g q24hrs   -podiatry consult appreciated.    #ESRD on HD started 1 month ago - MWF  -c/w HD; renal following  -    #Anemia  -s/p 1 prbc transfusion in [facility]  -EDWIN 40 mcg weekly  -monitor cbc    #Diabetes  -basal bolus insuin; iss    #HTN  -c/w lasix 40 qd	    #HLD  -c/w atorvastatin    DVT ppx: heparin subq  GI ppx: protonix  Diet: consistent carbohydrate  Activity: ambulate as tolerated Assessment 58y/o M w/ hx of neuropathy, acromegaly, diabetes, htn, CKD MWF, makes urine, htn who [facility] ulcer with Dr. Gomes. Vascular studies showed severe PVD and is transferred to [place] for angiogram.    Plan    #Severe PVD  -arterial duplex 10/24: b/l severe peripheral arterial disease, occlusion of R popliteal and L SF arteries  -angiogram, possible endovascular revascularization rescheduled for tomorrow 10/31/18; NPO after midnight tonight  -vascular following    #Chronic DVT of LLE - found on imaging on March 2018  -not on anticoagulation  -vascular f/u    #DFU - left foot  -s/p debridement infected heel ulcer to the calcaneus  -c/w vanc and cefepime;   -ID: c/w unasyn 3g q24hrs   -podiatry consult appreciated.    #ESRD on HD started 1 month ago - MWF  -c/w HD; renal following  -    #Anemia  -s/p 1 prbc transfusion in [facility]  -EDWIN 40 mcg weekly  -monitor cbc    #Diabetes  -basal bolus insuin; iss    #HTN  -c/w lasix 40 qd	    #HLD  -c/w atorvastatin    DVT ppx: heparin subq  GI ppx: protonix  Diet: consistent carbohydrate  Activity: ambulate as tolerated

## 2018-10-30 NOTE — PROGRESS NOTE ADULT - SUBJECTIVE AND OBJECTIVE BOX
seen and examined  c/o constipation   s/p hd yesterday         Standing Inpatient Medications  ampicillin/sulbactam  IVPB      ampicillin/sulbactam  IVPB 3 Gram(s) IV Intermittent daily  atorvastatin 20 milliGRAM(s) Oral at bedtime  calcium acetate 1334 milliGRAM(s) Oral four times a day with meals  chlorhexidine 4% Liquid 1 Application(s) Topical <User Schedule>  darbepoetin Injectable Syringe 40 MICROGram(s) IV Push <User Schedule>  docusate sodium 100 milliGRAM(s) Oral two times a day  ergocalciferol 06330 Unit(s) Oral every week  ferrous sulfate Oral Tab/Cap - Peds 325 milliGRAM(s) Oral daily  folic acid 1 milliGRAM(s) Oral daily  furosemide    Tablet 40 milliGRAM(s) Oral daily  heparin  Injectable 5000 Unit(s) SubCutaneous every 8 hours  lactulose Syrup 20 Gram(s) Oral daily  pantoprazole    Tablet 40 milliGRAM(s) Oral before breakfast  polyethylene glycol 3350 17 Gram(s) Oral two times a day  tamsulosin 0.4 milliGRAM(s) Oral at bedtime      VITALS/PHYSICAL EXAM  --------------------------------------------------------------------------------  T(C): 36.6 (10-30-18 @ 04:55), Max: 36.6 (10-30-18 @ 04:55)  HR: 75 (10-30-18 @ 04:55) (72 - 75)  BP: 126/66 (10-30-18 @ 04:55) (108/66 - 126/66)  RR: 18 (10-30-18 @ 04:55) (17 - 18)  SpO2: 96% (10-29-18 @ 19:36) (96% - 96%)  Wt(kg): --  Height (cm): 193.04 (10-29-18 @ 19:09)  Weight (kg): 116 (10-29-18 @ 19:09)  BMI (kg/m2): 31.1 (10-29-18 @ 19:09)  BSA (m2): 2.46 (10-29-18 @ 19:09)      10-29-18 @ 07:01  -  10-30-18 @ 07:00  --------------------------------------------------------  IN: 0 mL / OUT: 1700 mL / NET: -1700 mL      Physical Exam:  	Gen: NAD  	Pulm: decrease BS  B/L  	CV: S1S2; no rub  	Abd: +distended  	Vascular access: AV FISTULA/tesio     LABS/STUDIES  --------------------------------------------------------------------------------    130  |  92  |  58  ----------------------------<  169      [10-29-18 @ 13:43]  4.4   |  24  |  5.9        Ca     8.9     [10-29-18 @ 13:43]            Creatinine Trend:  SCr 5.9 [10-29 @ 13:43]  SCr 5.4 [10-28 @ 07:39]  SCr 4.6 [10-27 @ 11:41]  SCr 4.4 [10-27 @ 07:59]  SCr 5.2 [10-24 @ 06:41]        Iron 38, TIBC 166, %sat 23      [09-19-18 @ 04:30]  Ferritin 55      [03-09-18 @ 18:01]  PTH -- (Ca 8.2)      [09-22-18 @ 08:40]   2071  PTH -- (Ca 8.0)      [09-19-18 @ 04:30]   1519  PTH -- (Ca 8.3)      [03-09-18 @ 18:01]   1545  Vitamin D (25OH) 14.4      [03-09-18 @ 18:01]

## 2018-10-30 NOTE — PROGRESS NOTE ADULT - SUBJECTIVE AND OBJECTIVE BOX
SU SAWYER  57y, Male      OVERNIGHT EVENTS:    none      VITALS:  T(F): 97.8, Max: 97.8 (10-30-18 @ 04:55)  HR: 75  BP: 126/66  RR: 18Vital Signs Last 24 Hrs  T(C): 36.6 (30 Oct 2018 04:55), Max: 36.6 (30 Oct 2018 04:55)  T(F): 97.8 (30 Oct 2018 04:55), Max: 97.8 (30 Oct 2018 04:55)  HR: 75 (30 Oct 2018 04:55) (72 - 75)  BP: 126/66 (30 Oct 2018 04:55) (108/66 - 126/66)  BP(mean): --  RR: 18 (30 Oct 2018 04:55) (17 - 18)  SpO2: 96% (29 Oct 2018 19:36) (96% - 96%)    TESTS & MEASUREMENTS:    10-29    130<L>  |  92<L>  |  58<H>  ----------------------------<  169<H>  4.4   |  24  |  5.9    Ca    8.9      29 Oct 2018 13:43          Culture - Other (collected 10-23-18 @ 14:05)  Source: Ulcer HEEL  Final Report (10-26-18 @ 14:28):    Few Proteus mirabilis    Few Enterococcus faecalis    Rare Staphylococcus aureus  Organism: Proteus mirabilis  Enterococcus faecalis  Staphylococcus aureus (10-26-18 @ 14:28)  Organism: Staphylococcus aureus (10-26-18 @ 14:28)      -  Ampicillin/Sulbactam: S <=8/4      -  Cefazolin: S <=4      -  Clindamycin: S 0.5      -  Erythromycin: R >4      -  Gentamicin: S 2 Should not be used as monotherapy      -  Oxacillin: S 0.5      -  Penicillin: R >8      -  RIF- Rifampin: S <=1 Should not be used as monotherapy      -  Tetra/Doxy: S <=1      -  Trimethoprim/Sulfamethoxazole: S <=0.5/9.5      -  Vancomycin: S 2      Method Type: ROSALIA  Organism: Enterococcus faecalis (10-26-18 @ 14:28)      -  Ampicillin: S <=2 Predicts results to ampicillin/sulbactam, amoxacillin-clavulanate and  piperacillin-tazobactam.      -  Tetra/Doxy: S <=1      -  Vancomycin: S 2      Method Type: ROSALIA  Organism: Proteus mirabilis (10-26-18 @ 14:28)      -  Amikacin: S <=8      -  Amoxicillin/Clavulanic Acid: R >16/8      -  Ampicillin: R >16 These ampicillin results predict results for amoxicillin      -  Ampicillin/Sulbactam: R >16/8      -  Aztreonam: S <=4      -  Cefazolin: R >16      -  Cefepime: S <=2      -  Cefoxitin: S 8      -  Ceftriaxone: S <=1 Enterobacter, Citrobacter, and Serratia may develop resistance during prolonged therapy      -  Ciprofloxacin: S <=0.5      -  Ertapenem: S <=0.5      -  Gentamicin: S <=1      -  Levofloxacin: S <=1      -  Meropenem: S <=1      -  Piperacillin/Tazobactam: S <=8      -  Tobramycin: S <=2      -  Trimethoprim/Sulfamethoxazole: S <=0.5/9.5      Method Type: ROSALIA            RADIOLOGY & ADDITIONAL TESTS:    ANTIBIOTICS:  ampicillin/sulbactam  IVPB      ampicillin/sulbactam  IVPB 3 Gram(s) IV Intermittent daily

## 2018-10-30 NOTE — PROGRESS NOTE ADULT - SUBJECTIVE AND OBJECTIVE BOX
SUBJECTIVE:    Patient is a 57y old Male who presents with a chief complaint of foot ulcer (30 Oct 2018 09:32)    Currently admitted to medicine with the primary diagnosis of Foot ulcer     Today is hospital day 8d. No complaints this morning. HD yesterday.     PAST MEDICAL & SURGICAL HISTORY  Dyslipidemia  DM (diabetes mellitus), type 2  Neuropathy  HTN (hypertension)  CAD (coronary atherosclerotic disease)  Acromegaly  CKD (chronic kidney disease), stage V  H/O eye surgery  H/O: pituitary tumor: s/p removal    SOCIAL HISTORY:  Negative for smoking/alcohol/drug use.     ALLERGIES:  bacitracin (Unknown)  Neosporin (Hypotension)    MEDICATIONS:  STANDING MEDICATIONS  ampicillin/sulbactam  IVPB      ampicillin/sulbactam  IVPB 3 Gram(s) IV Intermittent daily  atorvastatin 20 milliGRAM(s) Oral at bedtime  bisacodyl Suppository 10 milliGRAM(s) Rectal daily  calcium acetate 1334 milliGRAM(s) Oral four times a day with meals  chlorhexidine 4% Liquid 1 Application(s) Topical <User Schedule>  darbepoetin Injectable Syringe 40 MICROGram(s) IV Push <User Schedule>  docusate sodium 100 milliGRAM(s) Oral two times a day  ergocalciferol 76161 Unit(s) Oral every week  ferrous sulfate Oral Tab/Cap - Peds 325 milliGRAM(s) Oral daily  folic acid 1 milliGRAM(s) Oral daily  furosemide    Tablet 40 milliGRAM(s) Oral daily  heparin  Injectable 5000 Unit(s) SubCutaneous every 8 hours  lactulose Syrup 20 Gram(s) Oral daily  pantoprazole    Tablet 40 milliGRAM(s) Oral before breakfast  polyethylene glycol 3350 17 Gram(s) Oral two times a day  tamsulosin 0.4 milliGRAM(s) Oral at bedtime    PRN MEDICATIONS  acetaminophen   Tablet .. 650 milliGRAM(s) Oral every 6 hours PRN  oxyCODONE    5 mG/acetaminophen 325 mG 1 Tablet(s) Oral every 4 hours PRN    VITALS:   T(F): 97.8  HR: 75  BP: 126/66  RR: 18  SpO2: 96%    LABS:                        7.8    11.72 )-----------( 364      ( 30 Oct 2018 08:20 )             25.7     10-30    141  |  99  |  39<H>  ----------------------------<  148<H>  4.5   |  28  |  4.5<HH>    Ca    9.0      30 Oct 2018 08:20  Phos  3.4     10-30      PT/INR - ( 30 Oct 2018 08:20 )   PT: 13.80 sec;   INR: 1.20 ratio         PTT - ( 30 Oct 2018 08:20 )  PTT:28.5 sec              RADIOLOGY:        PHYSICAL EXAM:  GEN: No acute distress  LUNGS: Clear to auscultation bilaterally   HEART: S1/S2 present. RRR.   ABD: Soft, non-tender, non-distended. Bowel sounds present  EXT: bandage on LLE  NEURO: AAOX3 SUBJECTIVE:    Patient is a 57y old Male who presents with a chief complaint of foot ulcer (30 Oct 2018 09:32)    Currently admitted to medicine with the primary diagnosis of Foot ulcer     Today is hospital day 8d. No complaints this morning. HD yesterday. C/o of constipation.     PAST MEDICAL & SURGICAL HISTORY  Dyslipidemia  DM (diabetes mellitus), type 2  Neuropathy  HTN (hypertension)  CAD (coronary atherosclerotic disease)  Acromegaly  CKD (chronic kidney disease), stage V  H/O eye surgery  H/O: pituitary tumor: s/p removal    SOCIAL HISTORY:  Negative for smoking/alcohol/drug use.     ALLERGIES:  bacitracin (Unknown)  Neosporin (Hypotension)    MEDICATIONS:  STANDING MEDICATIONS  ampicillin/sulbactam  IVPB      ampicillin/sulbactam  IVPB 3 Gram(s) IV Intermittent daily  atorvastatin 20 milliGRAM(s) Oral at bedtime  bisacodyl Suppository 10 milliGRAM(s) Rectal daily  calcium acetate 1334 milliGRAM(s) Oral four times a day with meals  chlorhexidine 4% Liquid 1 Application(s) Topical <User Schedule>  darbepoetin Injectable Syringe 40 MICROGram(s) IV Push <User Schedule>  docusate sodium 100 milliGRAM(s) Oral two times a day  ergocalciferol 91966 Unit(s) Oral every week  ferrous sulfate Oral Tab/Cap - Peds 325 milliGRAM(s) Oral daily  folic acid 1 milliGRAM(s) Oral daily  furosemide    Tablet 40 milliGRAM(s) Oral daily  heparin  Injectable 5000 Unit(s) SubCutaneous every 8 hours  lactulose Syrup 20 Gram(s) Oral daily  pantoprazole    Tablet 40 milliGRAM(s) Oral before breakfast  polyethylene glycol 3350 17 Gram(s) Oral two times a day  tamsulosin 0.4 milliGRAM(s) Oral at bedtime    PRN MEDICATIONS  acetaminophen   Tablet .. 650 milliGRAM(s) Oral every 6 hours PRN  oxyCODONE    5 mG/acetaminophen 325 mG 1 Tablet(s) Oral every 4 hours PRN    VITALS:   T(F): 97.8  HR: 75  BP: 126/66  RR: 18  SpO2: 96%    LABS:                        7.8    11.72 )-----------( 364      ( 30 Oct 2018 08:20 )             25.7     10-30    141  |  99  |  39<H>  ----------------------------<  148<H>  4.5   |  28  |  4.5<HH>    Ca    9.0      30 Oct 2018 08:20  Phos  3.4     10-30      PT/INR - ( 30 Oct 2018 08:20 )   PT: 13.80 sec;   INR: 1.20 ratio         PTT - ( 30 Oct 2018 08:20 )  PTT:28.5 sec              RADIOLOGY:        PHYSICAL EXAM:  GEN: No acute distress  LUNGS: Clear to auscultation bilaterally   HEART: S1/S2 present. RRR.   ABD: Soft, non-tender, non-distended. Bowel sounds present  EXT: bandage on LLE  NEURO: AAOX3 SUBJECTIVE:    Patient is a 57y old Male who presents with a chief complaint of foot ulcer (30 Oct 2018 09:32)    Currently admitted to medicine with the primary diagnosis of Foot ulcer     Today is hospital day 8d. HD yesterday. C/o of constipation. Hasnt gone in several days.    PAST MEDICAL & SURGICAL HISTORY  Dyslipidemia  DM (diabetes mellitus), type 2  Neuropathy  HTN (hypertension)  CAD (coronary atherosclerotic disease)  Acromegaly  CKD (chronic kidney disease), stage V  H/O eye surgery  H/O: pituitary tumor: s/p removal    SOCIAL HISTORY:  Negative for smoking/alcohol/drug use.     ALLERGIES:  bacitracin (Unknown)  Neosporin (Hypotension)    MEDICATIONS:  STANDING MEDICATIONS  ampicillin/sulbactam  IVPB      ampicillin/sulbactam  IVPB 3 Gram(s) IV Intermittent daily  atorvastatin 20 milliGRAM(s) Oral at bedtime  bisacodyl Suppository 10 milliGRAM(s) Rectal daily  calcium acetate 1334 milliGRAM(s) Oral four times a day with meals  chlorhexidine 4% Liquid 1 Application(s) Topical <User Schedule>  darbepoetin Injectable Syringe 40 MICROGram(s) IV Push <User Schedule>  docusate sodium 100 milliGRAM(s) Oral two times a day  ergocalciferol 09007 Unit(s) Oral every week  ferrous sulfate Oral Tab/Cap - Peds 325 milliGRAM(s) Oral daily  folic acid 1 milliGRAM(s) Oral daily  furosemide    Tablet 40 milliGRAM(s) Oral daily  heparin  Injectable 5000 Unit(s) SubCutaneous every 8 hours  lactulose Syrup 20 Gram(s) Oral daily  pantoprazole    Tablet 40 milliGRAM(s) Oral before breakfast  polyethylene glycol 3350 17 Gram(s) Oral two times a day  tamsulosin 0.4 milliGRAM(s) Oral at bedtime    PRN MEDICATIONS  acetaminophen   Tablet .. 650 milliGRAM(s) Oral every 6 hours PRN  oxyCODONE    5 mG/acetaminophen 325 mG 1 Tablet(s) Oral every 4 hours PRN    VITALS:   T(F): 97.8  HR: 75  BP: 126/66  RR: 18  SpO2: 96%    LABS:                        7.8    11.72 )-----------( 364      ( 30 Oct 2018 08:20 )             25.7     10-30    141  |  99  |  39<H>  ----------------------------<  148<H>  4.5   |  28  |  4.5<HH>    Ca    9.0      30 Oct 2018 08:20  Phos  3.4     10-30      PT/INR - ( 30 Oct 2018 08:20 )   PT: 13.80 sec;   INR: 1.20 ratio         PTT - ( 30 Oct 2018 08:20 )  PTT:28.5 sec              RADIOLOGY:        PHYSICAL EXAM:  GEN: No acute distress  LUNGS: Clear to auscultation bilaterally   HEART: S1/S2 present. RRR.   ABD: Soft, non-tender, non-distended. Bowel sounds present  EXT: bandage on LLE  NEURO: AAOX3

## 2018-10-31 ENCOUNTER — APPOINTMENT (OUTPATIENT)
Dept: VASCULAR SURGERY | Facility: HOSPITAL | Age: 58
End: 2018-10-31
Payer: COMMERCIAL

## 2018-10-31 LAB
ANION GAP SERPL CALC-SCNC: 14 MMOL/L — SIGNIFICANT CHANGE UP (ref 7–14)
BUN SERPL-MCNC: 47 MG/DL — HIGH (ref 10–20)
CALCIUM SERPL-MCNC: 9.1 MG/DL — SIGNIFICANT CHANGE UP (ref 8.5–10.1)
CHLORIDE SERPL-SCNC: 99 MMOL/L — SIGNIFICANT CHANGE UP (ref 98–110)
CO2 SERPL-SCNC: 25 MMOL/L — SIGNIFICANT CHANGE UP (ref 17–32)
CREAT SERPL-MCNC: 5.3 MG/DL — CRITICAL HIGH (ref 0.7–1.5)
GLUCOSE BLDC GLUCOMTR-MCNC: 122 MG/DL — HIGH (ref 70–99)
GLUCOSE BLDC GLUCOMTR-MCNC: 157 MG/DL — HIGH (ref 70–99)
GLUCOSE BLDC GLUCOMTR-MCNC: 180 MG/DL — HIGH (ref 70–99)
GLUCOSE SERPL-MCNC: 104 MG/DL — HIGH (ref 70–99)
POTASSIUM SERPL-MCNC: 4.3 MMOL/L — SIGNIFICANT CHANGE UP (ref 3.5–5)
POTASSIUM SERPL-SCNC: 4.3 MMOL/L — SIGNIFICANT CHANGE UP (ref 3.5–5)
SODIUM SERPL-SCNC: 138 MMOL/L — SIGNIFICANT CHANGE UP (ref 135–146)

## 2018-10-31 PROCEDURE — 37226: CPT | Mod: LT

## 2018-10-31 PROCEDURE — 75710 ARTERY X-RAYS ARM/LEG: CPT | Mod: 26,59

## 2018-10-31 PROCEDURE — 76937 US GUIDE VASCULAR ACCESS: CPT | Mod: 26

## 2018-10-31 PROCEDURE — 36247 INS CATH ABD/L-EXT ART 3RD: CPT | Mod: 59,LT

## 2018-10-31 RX ORDER — DARBEPOETIN ALFA IN POLYSORBAT 200MCG/0.4
40 PEN INJECTOR (ML) SUBCUTANEOUS
Qty: 0 | Refills: 0 | Status: DISCONTINUED | OUTPATIENT
Start: 2018-10-31 | End: 2018-11-02

## 2018-10-31 RX ORDER — MORPHINE SULFATE 50 MG/1
4 CAPSULE, EXTENDED RELEASE ORAL
Qty: 0 | Refills: 0 | Status: DISCONTINUED | OUTPATIENT
Start: 2018-10-31 | End: 2018-10-31

## 2018-10-31 RX ORDER — DOCUSATE SODIUM 100 MG
100 CAPSULE ORAL
Qty: 0 | Refills: 0 | Status: DISCONTINUED | OUTPATIENT
Start: 2018-10-31 | End: 2018-11-02

## 2018-10-31 RX ORDER — SODIUM CHLORIDE 9 MG/ML
1000 INJECTION INTRAMUSCULAR; INTRAVENOUS; SUBCUTANEOUS
Qty: 0 | Refills: 0 | Status: DISCONTINUED | OUTPATIENT
Start: 2018-10-31 | End: 2018-10-31

## 2018-10-31 RX ORDER — PANTOPRAZOLE SODIUM 20 MG/1
40 TABLET, DELAYED RELEASE ORAL
Qty: 0 | Refills: 0 | Status: DISCONTINUED | OUTPATIENT
Start: 2018-10-31 | End: 2018-11-02

## 2018-10-31 RX ORDER — AMPICILLIN SODIUM AND SULBACTAM SODIUM 250; 125 MG/ML; MG/ML
3 INJECTION, POWDER, FOR SUSPENSION INTRAMUSCULAR; INTRAVENOUS ONCE
Qty: 0 | Refills: 0 | Status: COMPLETED | OUTPATIENT
Start: 2018-10-31 | End: 2018-10-31

## 2018-10-31 RX ORDER — DOXERCALCIFEROL 2.5 UG/1
2 CAPSULE ORAL
Qty: 0 | Refills: 0 | Status: DISCONTINUED | OUTPATIENT
Start: 2018-10-31 | End: 2018-11-02

## 2018-10-31 RX ORDER — HEPARIN SODIUM 5000 [USP'U]/ML
5000 INJECTION INTRAVENOUS; SUBCUTANEOUS EVERY 8 HOURS
Qty: 0 | Refills: 0 | Status: DISCONTINUED | OUTPATIENT
Start: 2018-10-31 | End: 2018-11-02

## 2018-10-31 RX ORDER — ASPIRIN/CALCIUM CARB/MAGNESIUM 324 MG
81 TABLET ORAL DAILY
Qty: 0 | Refills: 0 | Status: DISCONTINUED | OUTPATIENT
Start: 2018-11-01 | End: 2018-11-02

## 2018-10-31 RX ORDER — POLYETHYLENE GLYCOL 3350 17 G/17G
17 POWDER, FOR SOLUTION ORAL
Qty: 0 | Refills: 0 | Status: DISCONTINUED | OUTPATIENT
Start: 2018-10-31 | End: 2018-11-02

## 2018-10-31 RX ORDER — CHLORHEXIDINE GLUCONATE 213 G/1000ML
1 SOLUTION TOPICAL
Qty: 0 | Refills: 0 | Status: DISCONTINUED | OUTPATIENT
Start: 2018-10-31 | End: 2018-11-02

## 2018-10-31 RX ORDER — MORPHINE SULFATE 50 MG/1
2 CAPSULE, EXTENDED RELEASE ORAL
Qty: 0 | Refills: 0 | Status: DISCONTINUED | OUTPATIENT
Start: 2018-10-31 | End: 2018-10-31

## 2018-10-31 RX ORDER — FERROUS SULFATE 325(65) MG
325 TABLET ORAL DAILY
Qty: 0 | Refills: 0 | Status: DISCONTINUED | OUTPATIENT
Start: 2018-10-31 | End: 2018-11-01

## 2018-10-31 RX ORDER — ATORVASTATIN CALCIUM 80 MG/1
20 TABLET, FILM COATED ORAL AT BEDTIME
Qty: 0 | Refills: 0 | Status: DISCONTINUED | OUTPATIENT
Start: 2018-10-31 | End: 2018-11-02

## 2018-10-31 RX ORDER — ASPIRIN/CALCIUM CARB/MAGNESIUM 324 MG
325 TABLET ORAL ONCE
Qty: 0 | Refills: 0 | Status: COMPLETED | OUTPATIENT
Start: 2018-10-31 | End: 2018-10-31

## 2018-10-31 RX ORDER — ACETAMINOPHEN 500 MG
650 TABLET ORAL EVERY 6 HOURS
Qty: 0 | Refills: 0 | Status: DISCONTINUED | OUTPATIENT
Start: 2018-10-31 | End: 2018-11-02

## 2018-10-31 RX ORDER — AMPICILLIN SODIUM AND SULBACTAM SODIUM 250; 125 MG/ML; MG/ML
INJECTION, POWDER, FOR SUSPENSION INTRAMUSCULAR; INTRAVENOUS
Qty: 0 | Refills: 0 | Status: DISCONTINUED | OUTPATIENT
Start: 2018-10-31 | End: 2018-11-02

## 2018-10-31 RX ORDER — OXYCODONE AND ACETAMINOPHEN 5; 325 MG/1; MG/1
1 TABLET ORAL EVERY 4 HOURS
Qty: 0 | Refills: 0 | Status: DISCONTINUED | OUTPATIENT
Start: 2018-10-31 | End: 2018-11-02

## 2018-10-31 RX ORDER — TAMSULOSIN HYDROCHLORIDE 0.4 MG/1
0.4 CAPSULE ORAL AT BEDTIME
Qty: 0 | Refills: 0 | Status: DISCONTINUED | OUTPATIENT
Start: 2018-10-31 | End: 2018-11-02

## 2018-10-31 RX ORDER — FOLIC ACID 0.8 MG
1 TABLET ORAL DAILY
Qty: 0 | Refills: 0 | Status: DISCONTINUED | OUTPATIENT
Start: 2018-10-31 | End: 2018-11-02

## 2018-10-31 RX ORDER — ONDANSETRON 8 MG/1
4 TABLET, FILM COATED ORAL ONCE
Qty: 0 | Refills: 0 | Status: DISCONTINUED | OUTPATIENT
Start: 2018-10-31 | End: 2018-10-31

## 2018-10-31 RX ORDER — ERGOCALCIFEROL 1.25 MG/1
50000 CAPSULE ORAL
Qty: 0 | Refills: 0 | Status: DISCONTINUED | OUTPATIENT
Start: 2018-10-31 | End: 2018-11-02

## 2018-10-31 RX ORDER — LACTULOSE 10 G/15ML
20 SOLUTION ORAL DAILY
Qty: 0 | Refills: 0 | Status: DISCONTINUED | OUTPATIENT
Start: 2018-10-31 | End: 2018-11-02

## 2018-10-31 RX ORDER — MEPERIDINE HYDROCHLORIDE 50 MG/ML
12.5 INJECTION INTRAMUSCULAR; INTRAVENOUS; SUBCUTANEOUS
Qty: 0 | Refills: 0 | Status: DISCONTINUED | OUTPATIENT
Start: 2018-10-31 | End: 2018-10-31

## 2018-10-31 RX ORDER — AMPICILLIN SODIUM AND SULBACTAM SODIUM 250; 125 MG/ML; MG/ML
3 INJECTION, POWDER, FOR SUSPENSION INTRAMUSCULAR; INTRAVENOUS DAILY
Qty: 0 | Refills: 0 | Status: DISCONTINUED | OUTPATIENT
Start: 2018-11-01 | End: 2018-11-02

## 2018-10-31 RX ORDER — CALCIUM ACETATE 667 MG
1334 TABLET ORAL
Qty: 0 | Refills: 0 | Status: DISCONTINUED | OUTPATIENT
Start: 2018-10-31 | End: 2018-11-02

## 2018-10-31 RX ORDER — FUROSEMIDE 40 MG
40 TABLET ORAL DAILY
Qty: 0 | Refills: 0 | Status: DISCONTINUED | OUTPATIENT
Start: 2018-10-31 | End: 2018-11-02

## 2018-10-31 RX ADMIN — SODIUM CHLORIDE 10 MILLILITER(S): 9 INJECTION INTRAMUSCULAR; INTRAVENOUS; SUBCUTANEOUS at 10:30

## 2018-10-31 RX ADMIN — Medication 1334 MILLIGRAM(S): at 21:33

## 2018-10-31 RX ADMIN — ATORVASTATIN CALCIUM 20 MILLIGRAM(S): 80 TABLET, FILM COATED ORAL at 21:34

## 2018-10-31 RX ADMIN — CHLORHEXIDINE GLUCONATE 1 APPLICATION(S): 213 SOLUTION TOPICAL at 05:44

## 2018-10-31 RX ADMIN — Medication 1334 MILLIGRAM(S): at 17:23

## 2018-10-31 RX ADMIN — Medication 100 MILLIGRAM(S): at 17:23

## 2018-10-31 RX ADMIN — TAMSULOSIN HYDROCHLORIDE 0.4 MILLIGRAM(S): 0.4 CAPSULE ORAL at 21:34

## 2018-10-31 RX ADMIN — HEPARIN SODIUM 5000 UNIT(S): 5000 INJECTION INTRAVENOUS; SUBCUTANEOUS at 21:33

## 2018-10-31 RX ADMIN — Medication 325 MILLIGRAM(S): at 10:50

## 2018-10-31 RX ADMIN — AMPICILLIN SODIUM AND SULBACTAM SODIUM 200 GRAM(S): 250; 125 INJECTION, POWDER, FOR SUSPENSION INTRAMUSCULAR; INTRAVENOUS at 10:50

## 2018-10-31 NOTE — PROGRESS NOTE ADULT - ASSESSMENT
Assessment 58y/o M w/ hx of neuropathy, acromegaly, diabetes, htn, CKD MWF, makes urine, htn [facility]LE ulcer with Dr. Gomes. Vascular studies showed severe PVD and is transferred to [place] for angiogram, which was done this AM    Plan    #Severe PVD  -arterial duplex 10/24: b/l severe peripheral arterial disease, occlusion of R popliteal and L SF arteries     today - Angiogram, extremity, left  10/31/2018  Left SFA angioplasty and stent. Left pop angioplasty by Dr Comer  - will follow up with vascular       #Chronic DVT of LLE - found on imaging on March 2018  -not on anticoagulation  -vascular f/u    #DFU - left foot  -s/p debridement infected heel ulcer to the calcaneus  -c/w vanc and cefepime;   -ID: c/w unasyn 3g q24hrs   -podiatry consult appreciated.    #ESRD on HD started 1 month ago - MWF  -c/w HD; renal following  - HD today    #Anemia  -s/p 1 prbc transfusion in [facility]  -EDWIN 40 mcg weekly  -monitor cbc    #Diabetes  -basal bolus insuin; iss    #HTN  -c/w lasix 40 qd	    #HLD  -c/w atorvastatin    DVT ppx: heparin subq  GI ppx: protonix  Diet: consistent carbohydrate, renal  Activity: ambulate as tolerated  DC when cleared by Vascular & surgery

## 2018-10-31 NOTE — BRIEF OPERATIVE NOTE - PROCEDURE
<<-----Click on this checkbox to enter Procedure Angiogram, extremity, left  10/31/2018  Left SFA angioplasty and stent. Left pop angioplasty  Active  JSCHOR

## 2018-10-31 NOTE — PROGRESS NOTE ADULT - SUBJECTIVE AND OBJECTIVE BOX
DIGNASU VELAZQUEZ  57y, Male      OVERNIGHT EVENTS:    none    VITALS:  T(F): 97.5, Max: 97.5 (10-31-18 @ 06:30)  HR: 84  BP: 137/62  RR: 16Vital Signs Last 24 Hrs  T(C): 36.4 (31 Oct 2018 07:20), Max: 36.4 (31 Oct 2018 06:30)  T(F): 97.5 (31 Oct 2018 06:32), Max: 97.5 (31 Oct 2018 06:30)  HR: 84 (31 Oct 2018 07:20) (84 - 85)  BP: 137/62 (31 Oct 2018 07:20) (137/62 - 151/63)  BP(mean): --  RR: 16 (31 Oct 2018 07:20) (16 - 18)  SpO2: 95% (30 Oct 2018 20:02) (95% - 95%)    TESTS & MEASUREMENTS:                        7.8    11.72 )-----------( 364      ( 30 Oct 2018 08:20 )             25.7     10-30    141  |  99  |  39<H>  ----------------------------<  148<H>  4.5   |  28  |  4.5<HH>    Ca    9.0      30 Oct 2018 08:20  Phos  3.4     10-30                RADIOLOGY & ADDITIONAL TESTS:    ANTIBIOTICS:

## 2018-10-31 NOTE — PROGRESS NOTE ADULT - SUBJECTIVE AND OBJECTIVE BOX
Chart reviewed, patient examined. Pertinent results reviewed.  Case discussed with HO  specialist f/u reviewed  HD#10; s/p angiogram/angioplast today    SUBJECTIVE:    Patient is a 57y old Male who presened with a chief complaint of foot ulcer (31 Oct 2018 08:24)    Currently admitted to medicine with the primary diagnosis of Foot ulcer    Pt went for Angiogram this AM. Then had HD.     PAST MEDICAL & SURGICAL HISTORY  Dyslipidemia  DM (diabetes mellitus), type 2  Neuropathy  HTN (hypertension)  CAD (coronary atherosclerotic disease)  Acromegaly  CKD (chronic kidney disease), stage V  H/O eye surgery  H/O: pituitary tumor: s/p removal    SOCIAL HISTORY:  Negative for smoking/alcohol/drug use.     ALLERGIES:  bacitracin (Unknown)  Neosporin (Hypotension)    MEDICATIONS:  STANDING MEDICATIONS  ampicillin/sulbactam  IVPB      atorvastatin 20 milliGRAM(s) Oral at bedtime  bisacodyl Suppository 10 milliGRAM(s) Rectal daily  calcium acetate 1334 milliGRAM(s) Oral four times a day with meals  chlorhexidine 4% Liquid 1 Application(s) Topical <User Schedule>  darbepoetin Injectable Syringe 40 MICROGram(s) IV Push <User Schedule>  docusate sodium 100 milliGRAM(s) Oral two times a day  ergocalciferol 05715 Unit(s) Oral every week  ferrous sulfate Oral Tab/Cap - Peds 325 milliGRAM(s) Oral daily  folic acid 1 milliGRAM(s) Oral daily  furosemide    Tablet 40 milliGRAM(s) Oral daily  heparin  Injectable 5000 Unit(s) SubCutaneous every 8 hours  lactulose Syrup 20 Gram(s) Oral daily  pantoprazole    Tablet 40 milliGRAM(s) Oral before breakfast  polyethylene glycol 3350 17 Gram(s) Oral two times a day  sodium chloride 0.9%. 1000 milliLiter(s) IV Continuous <Continuous>  tamsulosin 0.4 milliGRAM(s) Oral at bedtime    PRN MEDICATIONS  acetaminophen   Tablet .. 650 milliGRAM(s) Oral every 6 hours PRN  meperidine     Injectable 12.5 milliGRAM(s) IV Push every 10 minutes PRN  morphine  - Injectable 4 milliGRAM(s) IV Push every 10 minutes PRN  morphine  - Injectable 2 milliGRAM(s) IV Push every 10 minutes PRN  ondansetron Injectable 4 milliGRAM(s) IV Push once PRN  oxyCODONE    5 mG/acetaminophen 325 mG 1 Tablet(s) Oral every 4 hours PRN    VITALS:   Vital Signs Last 24 Hrs  T(C): 37 (31 Oct 2018 21:18), Max: 37 (31 Oct 2018 21:18)  T(F): 98.6 (31 Oct 2018 21:18), Max: 98.6 (31 Oct 2018 21:18)  HR: 73 (31 Oct 2018 21:18) (70 - 84)  BP: 127/55 (31 Oct 2018 21:18) (93/54 - 137/62)  BP(mean): --  RR: 17 (31 Oct 2018 21:18) (13 - 20)  SpO2: 98% (31 Oct 2018 20:00) (96% - 99%)      LABS:                        7.8    11.72 )-----------( 364      ( 30 Oct 2018 08:20 )             25.7     10-30    141  |  99  |  39<H>  ----------------------------<  148<H>  4.5   |  28  |  4.5<HH>    Ca    9.0      30 Oct 2018 08:20  Phos  3.4     10-30      PT/INR - ( 30 Oct 2018 08:20 )   PT: 13.80 sec;   INR: 1.20 ratio         PTT - ( 30 Oct 2018 08:20 )  PTT:28.5 sec              RADIOLOGY:    PHYSICAL EXAM:  GEN: No acute distress; big PROMISE  LUNGS: Clear to auscultation bilaterally   HEART: S1/S2 present. RRR.   ABD: Soft, non-tender, non-distended. Bowel sounds present  EXT: bandage on LLE  NEURO: AAOX3

## 2018-10-31 NOTE — CHART NOTE - NSCHARTNOTEFT_GEN_A_CORE
PACU ANESTHESIA ADMISSION NOTE      Procedure: Angiogram, extremity, left: Left SFA angioplasty and stent. Left pop angioplasty  Debridement of ulcer of left heel    Post op diagnosis:  Heel ulceration, left, with unspecified severity      ____  Intubated  TV:______       Rate: ______      FiO2: ______    ___x_  Patent Airway    ___x_  Full return of protective reflexes    ___x_  Full recovery from anesthesia / back to baseline status    Vitals:  T(C): 36.8 (10-31-18 @ 09:20), Max: 36.8 (10-31-18 @ 09:20)  HR: 77 (10-31-18 @ 10:05) (77 - 85)  BP: 104/61 (10-31-18 @ 10:05) (100/55 - 151/63)  RR: 17 (10-31-18 @ 0___    Treatment: ____ None    _x___ See Post - Op/PCA Orders    Post - Operative Fluids:   ____ Oral   ___x_ See Post - Op Orders    Plan: Discharge:   ____Home       ___x__Floor     _____Critical Care    _____  Other:_________________    Comments: no anesth related complications. d/c when criteria met.

## 2018-10-31 NOTE — PROGRESS NOTE ADULT - ASSESSMENT
IMPRESSION:  Right heel with ischemic ulcer with superinfection  Procedure today    RECOMMENDATIONS:  Unasyn 3 gm iv q24h

## 2018-10-31 NOTE — CHART NOTE - NSCHARTNOTEFT_GEN_A_CORE
Post Operative Note  Patient: SU SAWYER 57y (1960) Male   MRN: [id number]  Location: [place] [address] [facility]  Visit: 10-22-18 Inpatient  Date: 10-31-18 @ 15:23    Procedure: S/P Left extremity angiogram w/ left SFA angioplasty and stent & left popliteal angioplasty for heel ulceration    Subjective: Pt is doing well, and cooperative. Pt just had dialysis and going back to #A. Pt has not eaten anything but he is feeling very well with no active complaint at this time. He denies NV, bloating, pain at the surgical site/LE.  Nausea: No, Vomiting: No, Ambulating: No, Flatus: No, Pain Assessment: No    Objective:  Vitals: T(F): 97.6 (10-31-18 @ 11:50), Max: 98.2 (10-31-18 @ 09:20)  HR: 70 (10-31-18 @ 15:21)  BP: 98/55 (10-31-18 @ 15:21) (93/54 - 139/68)  RR: 16 (10-31-18 @ 15:21)  SpO2: 96% (10-31-18 @ 11:50)  Vent Settings:     In:   10-30-18 @ 07:01  -  10-31-18 @ 07:00  --------------------------------------------------------  IN: 0 mL    10-31-18 @ 07:01  -  10-31-18 @ 15:23  --------------------------------------------------------  IN: 5 mL      IV Fluids: calcium acetate 1334 milliGRAM(s) Oral four times a day with meals  doxercalciferol Injectable 2 MICROGram(s) IV Push <User Schedule>  ergocalciferol 62939 Unit(s) Oral every week  ferrous sulfate Oral Tab/Cap - Peds 325 milliGRAM(s) Oral daily  folic acid 1 milliGRAM(s) Oral daily    Out:   10-30-18 @ 07:01  -  10-31-18 @ 07:00  --------------------------------------------------------  OUT: 1 mL    10-31-18 @ 07:01  -  10-31-18 @ 15:23  --------------------------------------------------------  OUT: 1500 mL    Voided Urine:   10-30-18 @ 07:01  -  10-31-18 @ 07:00  --------------------------------------------------------  OUT: 1 mL    10-31-18 @ 07:01  -  10-31-18 @ 15:23  --------------------------------------------------------  OUT: 1500 mL    Physical Examination:  General Appearance: NAD, alert and cooperative  HEENT: NCAT, WNL  Heart: S1 and S2. No murmurs.  Lungs: Clear to auscultation BL  Abdomen:  Positive bowel sounds. Soft, nondistended, nontender.  Neuro: Grossly intact  Skin: Warm/dry, Normal color, texture and turgor with no lesions or eruptions. No jaundice.   Wounds/Incions: R groin incisions w/ dressings in place, clean, dry, intact, no signs of infection    Medications: [Standing]  acetaminophen   Tablet .. 650 milliGRAM(s) Oral every 6 hours PRN  ampicillin/sulbactam  IVPB      atorvastatin 20 milliGRAM(s) Oral at bedtime  bisacodyl Suppository 10 milliGRAM(s) Rectal daily  calcium acetate 1334 milliGRAM(s) Oral four times a day with meals  chlorhexidine 4% Liquid 1 Application(s) Topical <User Schedule>  darbepoetin Injectable Syringe 40 MICROGram(s) IV Push <User Schedule>  docusate sodium 100 milliGRAM(s) Oral two times a day  doxercalciferol Injectable 2 MICROGram(s) IV Push <User Schedule>  ergocalciferol 52843 Unit(s) Oral every week  ferrous sulfate Oral Tab/Cap - Peds 325 milliGRAM(s) Oral daily  folic acid 1 milliGRAM(s) Oral daily  furosemide    Tablet 40 milliGRAM(s) Oral daily  heparin  Injectable 5000 Unit(s) SubCutaneous every 8 hours  lactulose Syrup 20 Gram(s) Oral daily  oxyCODONE    5 mG/acetaminophen 325 mG 1 Tablet(s) Oral every 4 hours PRN  pantoprazole    Tablet 40 milliGRAM(s) Oral before breakfast  polyethylene glycol 3350 17 Gram(s) Oral two times a day  tamsulosin 0.4 milliGRAM(s) Oral at bedtime    Medications: [PRN]  acetaminophen   Tablet .. 650 milliGRAM(s) Oral every 6 hours PRN  ampicillin/sulbactam  IVPB      atorvastatin 20 milliGRAM(s) Oral at bedtime  bisacodyl Suppository 10 milliGRAM(s) Rectal daily  calcium acetate 1334 milliGRAM(s) Oral four times a day with meals  chlorhexidine 4% Liquid 1 Application(s) Topical <User Schedule>  darbepoetin Injectable Syringe 40 MICROGram(s) IV Push <User Schedule>  docusate sodium 100 milliGRAM(s) Oral two times a day  doxercalciferol Injectable 2 MICROGram(s) IV Push <User Schedule>  ergocalciferol 93307 Unit(s) Oral every week  ferrous sulfate Oral Tab/Cap - Peds 325 milliGRAM(s) Oral daily  folic acid 1 milliGRAM(s) Oral daily  furosemide    Tablet 40 milliGRAM(s) Oral daily  heparin  Injectable 5000 Unit(s) SubCutaneous every 8 hours  lactulose Syrup 20 Gram(s) Oral daily  oxyCODONE    5 mG/acetaminophen 325 mG 1 Tablet(s) Oral every 4 hours PRN  pantoprazole    Tablet 40 milliGRAM(s) Oral before breakfast  polyethylene glycol 3350 17 Gram(s) Oral two times a day  tamsulosin 0.4 milliGRAM(s) Oral at bedtime    Labs:                        7.8    11.72 )-----------( 364      ( 30 Oct 2018 08:20 )             25.7     10-30    141  |  99  |  39<H>  ----------------------------<  148<H>  4.5   |  28  |  4.5<HH>    Ca    9.0      30 Oct 2018 08:20  Phos  3.4     10-30      PT/INR - ( 30 Oct 2018 08:20 )   PT: 13.80 sec;   INR: 1.20 ratio         PTT - ( 30 Oct 2018 08:20 )  PTT:28.5 sec      Imaging:  No post-op imaging studies    Assessment:  57yMale patient S/P Left extremity angiogram w/ left SFA angioplasty and stent & left popliteal angioplasty for heel ulceration.    Plan:  -Pain Control as needed  -Encourage ambulation and Incentive Spirometer (10x/hour when awake) use  -Continue GI and DVT prophylaxis  -Strict Input and output monitoring  -Continue dialysis and antibiotics  -Continue routine as per primary care team  -In case of any worsening clinical condition at surgical site, call surgery vascular team (#[phone])  -will follow.  Date/Time: 10-31-18 @ 15:23 Post Operative Note  Patient: SU SAWYER 57y (1960) Male   MRN: [id number]  Location: [place] [address] A  Visit: 10-22-18 Inpatient  Date: 10-31-18 @ 15:23    Procedure: S/P Left extremity angiogram w/ left SFA angioplasty and stent & left popliteal angioplasty for heel ulceration    Subjective: Pt is doing well, and cooperative. Pt just had dialysis and going back to #A. Pt has not eaten anything but he is feeling very well with no active complaint at this time. He denies NV, bloating, pain at the surgical site/LE.  Nausea: No, Vomiting: No, Ambulating: No, Flatus: No, Pain Assessment: No    Objective:  Vitals: T(F): 97.6 (10-31-18 @ 11:50), Max: 98.2 (10-31-18 @ 09:20)  HR: 70 (10-31-18 @ 15:21)  BP: 98/55 (10-31-18 @ 15:21) (93/54 - 139/68)  RR: 16 (10-31-18 @ 15:21)  SpO2: 96% (10-31-18 @ 11:50)  Vent Settings:     In:   10-30-18 @ 07:01  -  10-31-18 @ 07:00  --------------------------------------------------------  IN: 0 mL    10-31-18 @ 07:01  -  10-31-18 @ 15:23  --------------------------------------------------------  IN: 5 mL      IV Fluids: calcium acetate 1334 milliGRAM(s) Oral four times a day with meals  doxercalciferol Injectable 2 MICROGram(s) IV Push <User Schedule>  ergocalciferol 74163 Unit(s) Oral every week  ferrous sulfate Oral Tab/Cap - Peds 325 milliGRAM(s) Oral daily  folic acid 1 milliGRAM(s) Oral daily    Out:   10-30-18 @ 07:01  -  10-31-18 @ 07:00  --------------------------------------------------------  OUT: 1 mL    10-31-18 @ 07:01  -  10-31-18 @ 15:23  --------------------------------------------------------  OUT: 1500 mL    Voided Urine:   10-30-18 @ 07:01  -  10-31-18 @ 07:00  --------------------------------------------------------  OUT: 1 mL    10-31-18 @ 07:01  -  10-31-18 @ 15:23  --------------------------------------------------------  OUT: 1500 mL    Physical Examination:  General Appearance: NAD, alert and cooperative  HEENT: NCAT, WNL  Heart: S1 and S2. No murmurs.  Lungs: Clear to auscultation BL  Abdomen:  Positive bowel sounds. Soft, nondistended, nontender.  Neuro: Grossly intact  Skin: Warm/dry, Normal color, texture and turgor with no lesions or eruptions. No jaundice.   Wounds/Incions: R groin incisions w/ dressings in place, clean, dry, intact, no signs of infection    Medications: [Standing]  acetaminophen   Tablet .. 650 milliGRAM(s) Oral every 6 hours PRN  ampicillin/sulbactam  IVPB      atorvastatin 20 milliGRAM(s) Oral at bedtime  bisacodyl Suppository 10 milliGRAM(s) Rectal daily  calcium acetate 1334 milliGRAM(s) Oral four times a day with meals  chlorhexidine 4% Liquid 1 Application(s) Topical <User Schedule>  darbepoetin Injectable Syringe 40 MICROGram(s) IV Push <User Schedule>  docusate sodium 100 milliGRAM(s) Oral two times a day  doxercalciferol Injectable 2 MICROGram(s) IV Push <User Schedule>  ergocalciferol 52991 Unit(s) Oral every week  ferrous sulfate Oral Tab/Cap - Peds 325 milliGRAM(s) Oral daily  folic acid 1 milliGRAM(s) Oral daily  furosemide    Tablet 40 milliGRAM(s) Oral daily  heparin  Injectable 5000 Unit(s) SubCutaneous every 8 hours  lactulose Syrup 20 Gram(s) Oral daily  oxyCODONE    5 mG/acetaminophen 325 mG 1 Tablet(s) Oral every 4 hours PRN  pantoprazole    Tablet 40 milliGRAM(s) Oral before breakfast  polyethylene glycol 3350 17 Gram(s) Oral two times a day  tamsulosin 0.4 milliGRAM(s) Oral at bedtime    Medications: [PRN]  acetaminophen   Tablet .. 650 milliGRAM(s) Oral every 6 hours PRN  ampicillin/sulbactam  IVPB      atorvastatin 20 milliGRAM(s) Oral at bedtime  bisacodyl Suppository 10 milliGRAM(s) Rectal daily  calcium acetate 1334 milliGRAM(s) Oral four times a day with meals  chlorhexidine 4% Liquid 1 Application(s) Topical <User Schedule>  darbepoetin Injectable Syringe 40 MICROGram(s) IV Push <User Schedule>  docusate sodium 100 milliGRAM(s) Oral two times a day  doxercalciferol Injectable 2 MICROGram(s) IV Push <User Schedule>  ergocalciferol 90144 Unit(s) Oral every week  ferrous sulfate Oral Tab/Cap - Peds 325 milliGRAM(s) Oral daily  folic acid 1 milliGRAM(s) Oral daily  furosemide    Tablet 40 milliGRAM(s) Oral daily  heparin  Injectable 5000 Unit(s) SubCutaneous every 8 hours  lactulose Syrup 20 Gram(s) Oral daily  oxyCODONE    5 mG/acetaminophen 325 mG 1 Tablet(s) Oral every 4 hours PRN  pantoprazole    Tablet 40 milliGRAM(s) Oral before breakfast  polyethylene glycol 3350 17 Gram(s) Oral two times a day  tamsulosin 0.4 milliGRAM(s) Oral at bedtime    Labs:                        7.8    11.72 )-----------( 364      ( 30 Oct 2018 08:20 )             25.7     10-30    141  |  99  |  39<H>  ----------------------------<  148<H>  4.5   |  28  |  4.5<HH>    Ca    9.0      30 Oct 2018 08:20  Phos  3.4     10-30      PT/INR - ( 30 Oct 2018 08:20 )   PT: 13.80 sec;   INR: 1.20 ratio         PTT - ( 30 Oct 2018 08:20 )  PTT:28.5 sec      Imaging:  No post-op imaging studies    Assessment:  57yMale patient S/P Left extremity angiogram w/ left SFA angioplasty and stent & left popliteal angioplasty for heel ulceration.    Plan:  -CC diet can be started  -Pain Control as needed  -Encourage ambulation and Incentive Spirometer (10x/hour when awake) use  -Continue GI and DVT prophylaxis  -Strict Input and output monitoring  -Continue dialysis and antibiotics  -Continue routine as per primary care team  -In case of any worsening clinical condition at surgical site, call surgery vascular team (#[phone])  -will follow.  Date/Time: 10-31-18 @ 15:23

## 2018-10-31 NOTE — PROGRESS NOTE ADULT - SUBJECTIVE AND OBJECTIVE BOX
SUBJECTIVE:    Patient is a 57y old Male who presents with a chief complaint of foot ulcer (31 Oct 2018 08:24)    Currently admitted to medicine with the primary diagnosis of Foot ulcer     Today is hospital day 9d.v Pt went for Angiogram this AM. Then will go for HD.     PAST MEDICAL & SURGICAL HISTORY  Dyslipidemia  DM (diabetes mellitus), type 2  Neuropathy  HTN (hypertension)  CAD (coronary atherosclerotic disease)  Acromegaly  CKD (chronic kidney disease), stage V  H/O eye surgery  H/O: pituitary tumor: s/p removal    SOCIAL HISTORY:  Negative for smoking/alcohol/drug use.     ALLERGIES:  bacitracin (Unknown)  Neosporin (Hypotension)    MEDICATIONS:  STANDING MEDICATIONS  ampicillin/sulbactam  IVPB      atorvastatin 20 milliGRAM(s) Oral at bedtime  bisacodyl Suppository 10 milliGRAM(s) Rectal daily  calcium acetate 1334 milliGRAM(s) Oral four times a day with meals  chlorhexidine 4% Liquid 1 Application(s) Topical <User Schedule>  darbepoetin Injectable Syringe 40 MICROGram(s) IV Push <User Schedule>  docusate sodium 100 milliGRAM(s) Oral two times a day  ergocalciferol 95868 Unit(s) Oral every week  ferrous sulfate Oral Tab/Cap - Peds 325 milliGRAM(s) Oral daily  folic acid 1 milliGRAM(s) Oral daily  furosemide    Tablet 40 milliGRAM(s) Oral daily  heparin  Injectable 5000 Unit(s) SubCutaneous every 8 hours  lactulose Syrup 20 Gram(s) Oral daily  pantoprazole    Tablet 40 milliGRAM(s) Oral before breakfast  polyethylene glycol 3350 17 Gram(s) Oral two times a day  sodium chloride 0.9%. 1000 milliLiter(s) IV Continuous <Continuous>  tamsulosin 0.4 milliGRAM(s) Oral at bedtime    PRN MEDICATIONS  acetaminophen   Tablet .. 650 milliGRAM(s) Oral every 6 hours PRN  meperidine     Injectable 12.5 milliGRAM(s) IV Push every 10 minutes PRN  morphine  - Injectable 4 milliGRAM(s) IV Push every 10 minutes PRN  morphine  - Injectable 2 milliGRAM(s) IV Push every 10 minutes PRN  ondansetron Injectable 4 milliGRAM(s) IV Push once PRN  oxyCODONE    5 mG/acetaminophen 325 mG 1 Tablet(s) Oral every 4 hours PRN    VITALS:   T(F): 97.6  HR: 75  BP: 110/68  RR: 16  SpO2: 96%    LABS:                        7.8    11.72 )-----------( 364      ( 30 Oct 2018 08:20 )             25.7     10-30    141  |  99  |  39<H>  ----------------------------<  148<H>  4.5   |  28  |  4.5<HH>    Ca    9.0      30 Oct 2018 08:20  Phos  3.4     10-30      PT/INR - ( 30 Oct 2018 08:20 )   PT: 13.80 sec;   INR: 1.20 ratio         PTT - ( 30 Oct 2018 08:20 )  PTT:28.5 sec              RADIOLOGY:    PHYSICAL EXAM:  GEN: No acute distress  LUNGS: Clear to auscultation bilaterally   HEART: S1/S2 present. RRR.   ABD: Soft, non-tender, non-distended. Bowel sounds present  EXT: bandage on LLE  NEURO: AAOX3

## 2018-10-31 NOTE — PROGRESS NOTE ADULT - SUBJECTIVE AND OBJECTIVE BOX
Nephrology progress note    Patient is seen and examined, events over the last 24 h noted .  sp angiography and angioplasty     Allergies:  bacitracin (Unknown)  Neosporin (Hypotension)    Hospital Medications:   MEDICATIONS  (STANDING):  ampicillin/sulbactam  IVPB      atorvastatin 20 milliGRAM(s) Oral at bedtime  bisacodyl Suppository 10 milliGRAM(s) Rectal daily  calcium acetate 1334 milliGRAM(s) Oral four times a day with meals  chlorhexidine 4% Liquid 1 Application(s) Topical <User Schedule>  darbepoetin Injectable Syringe 40 MICROGram(s) IV Push <User Schedule>  docusate sodium 100 milliGRAM(s) Oral two times a day  ergocalciferol 28021 Unit(s) Oral every week  ferrous sulfate Oral Tab/Cap - Peds 325 milliGRAM(s) Oral daily  folic acid 1 milliGRAM(s) Oral daily  furosemide    Tablet 40 milliGRAM(s) Oral daily  heparin  Injectable 5000 Unit(s) SubCutaneous every 8 hours  lactulose Syrup 20 Gram(s) Oral daily  pantoprazole    Tablet 40 milliGRAM(s) Oral before breakfast  polyethylene glycol 3350 17 Gram(s) Oral two times a day  sodium chloride 0.9%. 1000 milliLiter(s) (10 mL/Hr) IV Continuous <Continuous>  tamsulosin 0.4 milliGRAM(s) Oral at bedtime        VITALS:  T(F): 97.6 (10-31-18 @ 11:50), Max: 98.2 (10-31-18 @ 09:20)  HR: 75 (10-31-18 @ 12:31)  BP: 110/68 (10-31-18 @ 12:31)  RR: 16 (10-31-18 @ 12:31)  SpO2: 96% (10-31-18 @ 11:50)      10-29 @ 07:01  -  10-30 @ 07:00  --------------------------------------------------------  IN: 0 mL / OUT: 1700 mL / NET: -1700 mL    10-30 @ 07:01  -  10-31 @ 07:00  --------------------------------------------------------  IN: 0 mL / OUT: 1 mL / NET: -1 mL    10-31 @ 07:01  -  10-31 @ 13:56  --------------------------------------------------------  IN: 5 mL / OUT: 0 mL / NET: 5 mL      Height (cm): 193.04 (10-31 @ 07:20)  Weight (kg): 116 (10-31 @ 07:20)  BMI (kg/m2): 31.1 (10-31 @ 07:20)  BSA (m2): 2.46 (10-31 @ 07:20)    PHYSICAL EXAM:  Constitutional: NAD  HEENT: anicteric sclera, oropharynx clear, MMM  Neck: No JVD  Respiratory: CTAB, no wheezes, rales or rhonchi  Cardiovascular: S1, S2, RRR  Gastrointestinal: BS+, soft, NT/ND  Extremities: No cyanosis or clubbing. No peripheral edema  :  No ortiz.   Skin: No rashes    LABS:  10-30    141  |  99  |  39<H>  ----------------------------<  148<H>  4.5   |  28  |  4.5<HH>    Ca    9.0      30 Oct 2018 08:20  Phos  3.4     10-30                            7.8    11.72 )-----------( 364      ( 30 Oct 2018 08:20 )             25.7       Urine Studies:      RADIOLOGY & ADDITIONAL STUDIES:

## 2018-10-31 NOTE — PROGRESS NOTE ADULT - ASSESSMENT
1)  ESRD: for HD today 3h opti 160 2k UF 3l   2)  DFU right foot s/p debridement and on Abx, followed by ID sp angioplasty   3)  Severe secondary HPT  will start hectorol 2 with HD , check ph level   4) Anemia Hb noted   no need for PRBC transfusion. resistant to EDWIN in view of infection , d/c feso4 po  5) Severe PVD   6) will follow

## 2018-10-31 NOTE — PROGRESS NOTE ADULT - ASSESSMENT
Assessment 56y/o M w/ hx of neuropathy, acromegaly, diabetes, htn, CKD MWF, makes urine, htn who [facility] debridement of LLE ulcer with Dr. Gomes. Vascular studies showed severe PVD and is transferred to [facility] for angiogram.    Plan    #Severe PVD  -arterial duplex 10/24: b/l severe peripheral arterial disease, occlusion of R popliteal and L SF arteries  -angiogram today  - will follow up with vascular      #Chronic DVT of LLE - found on imaging on March 2018  -not on anticoagulation  -vascular f/u    #DFU - left foot  -s/p debridement infected heel ulcer to the calcaneus  -c/w vanc and cefepime;   -ID: c/w unasyn 3g q24hrs   -podiatry consult appreciated.    #ESRD on HD started 1 month ago - MWF  -c/w HD; renal following  - HD today    #Anemia  -s/p 1 prbc transfusion in [facility]  -EDWIN 40 mcg weekly  -monitor cbc    #Diabetes  -basal bolus insuin; iss    #HTN  -c/w lasix 40 qd	    #HLD  -c/w atorvastatin    DVT ppx: heparin subq  GI ppx: protonix  Diet: consistent carbohydrate, renal  Activity: ambulate as tolerated

## 2018-11-01 LAB
ANION GAP SERPL CALC-SCNC: 11 MMOL/L — SIGNIFICANT CHANGE UP (ref 7–14)
ANION GAP SERPL CALC-SCNC: 13 MMOL/L — SIGNIFICANT CHANGE UP (ref 7–14)
BASOPHILS # BLD AUTO: 0.06 K/UL — SIGNIFICANT CHANGE UP (ref 0–0.2)
BASOPHILS NFR BLD AUTO: 0.6 % — SIGNIFICANT CHANGE UP (ref 0–1)
BLD GP AB SCN SERPL QL: SIGNIFICANT CHANGE UP
BUN SERPL-MCNC: 31 MG/DL — HIGH (ref 10–20)
BUN SERPL-MCNC: 38 MG/DL — HIGH (ref 10–20)
CALCIUM SERPL-MCNC: 8.6 MG/DL — SIGNIFICANT CHANGE UP (ref 8.5–10.1)
CALCIUM SERPL-MCNC: 8.9 MG/DL — SIGNIFICANT CHANGE UP (ref 8.5–10.1)
CHLORIDE SERPL-SCNC: 96 MMOL/L — LOW (ref 98–110)
CHLORIDE SERPL-SCNC: 97 MMOL/L — LOW (ref 98–110)
CO2 SERPL-SCNC: 29 MMOL/L — SIGNIFICANT CHANGE UP (ref 17–32)
CO2 SERPL-SCNC: 30 MMOL/L — SIGNIFICANT CHANGE UP (ref 17–32)
CREAT SERPL-MCNC: 4.1 MG/DL — CRITICAL HIGH (ref 0.7–1.5)
CREAT SERPL-MCNC: 4.5 MG/DL — CRITICAL HIGH (ref 0.7–1.5)
EOSINOPHIL # BLD AUTO: 0.4 K/UL — SIGNIFICANT CHANGE UP (ref 0–0.7)
EOSINOPHIL NFR BLD AUTO: 3.9 % — SIGNIFICANT CHANGE UP (ref 0–8)
GLUCOSE BLDC GLUCOMTR-MCNC: 145 MG/DL — HIGH (ref 70–99)
GLUCOSE BLDC GLUCOMTR-MCNC: 172 MG/DL — HIGH (ref 70–99)
GLUCOSE BLDC GLUCOMTR-MCNC: 172 MG/DL — HIGH (ref 70–99)
GLUCOSE BLDC GLUCOMTR-MCNC: 175 MG/DL — HIGH (ref 70–99)
GLUCOSE SERPL-MCNC: 130 MG/DL — HIGH (ref 70–99)
GLUCOSE SERPL-MCNC: 179 MG/DL — HIGH (ref 70–99)
HCT VFR BLD CALC: 25.3 % — LOW (ref 42–52)
HCT VFR BLD CALC: 25.3 % — LOW (ref 42–52)
HGB BLD-MCNC: 7.6 G/DL — LOW (ref 14–18)
HGB BLD-MCNC: 7.7 G/DL — LOW (ref 14–18)
IMM GRANULOCYTES NFR BLD AUTO: 1.1 % — HIGH (ref 0.1–0.3)
LYMPHOCYTES # BLD AUTO: 1.24 K/UL — SIGNIFICANT CHANGE UP (ref 1.2–3.4)
LYMPHOCYTES # BLD AUTO: 12 % — LOW (ref 20.5–51.1)
MAGNESIUM SERPL-MCNC: 2 MG/DL — SIGNIFICANT CHANGE UP (ref 1.8–2.4)
MCHC RBC-ENTMCNC: 27.7 PG — SIGNIFICANT CHANGE UP (ref 27–31)
MCHC RBC-ENTMCNC: 28 PG — SIGNIFICANT CHANGE UP (ref 27–31)
MCHC RBC-ENTMCNC: 30 G/DL — LOW (ref 32–37)
MCHC RBC-ENTMCNC: 30.4 G/DL — LOW (ref 32–37)
MCV RBC AUTO: 92 FL — SIGNIFICANT CHANGE UP (ref 80–94)
MCV RBC AUTO: 92.3 FL — SIGNIFICANT CHANGE UP (ref 80–94)
MONOCYTES # BLD AUTO: 0.96 K/UL — HIGH (ref 0.1–0.6)
MONOCYTES NFR BLD AUTO: 9.3 % — SIGNIFICANT CHANGE UP (ref 1.7–9.3)
NEUTROPHILS # BLD AUTO: 7.53 K/UL — HIGH (ref 1.4–6.5)
NEUTROPHILS NFR BLD AUTO: 73.1 % — SIGNIFICANT CHANGE UP (ref 42.2–75.2)
NRBC # BLD: 0 /100 WBCS — SIGNIFICANT CHANGE UP (ref 0–0)
NRBC # BLD: 0 /100 WBCS — SIGNIFICANT CHANGE UP (ref 0–0)
PHOSPHATE SERPL-MCNC: 3.1 MG/DL — SIGNIFICANT CHANGE UP (ref 2.1–4.9)
PHOSPHATE SERPL-MCNC: 3.5 MG/DL — SIGNIFICANT CHANGE UP (ref 2.1–4.9)
PLATELET # BLD AUTO: 361 K/UL — SIGNIFICANT CHANGE UP (ref 130–400)
PLATELET # BLD AUTO: 366 K/UL — SIGNIFICANT CHANGE UP (ref 130–400)
POTASSIUM SERPL-MCNC: 4.3 MMOL/L — SIGNIFICANT CHANGE UP (ref 3.5–5)
POTASSIUM SERPL-MCNC: 4.4 MMOL/L — SIGNIFICANT CHANGE UP (ref 3.5–5)
POTASSIUM SERPL-SCNC: 4.3 MMOL/L — SIGNIFICANT CHANGE UP (ref 3.5–5)
POTASSIUM SERPL-SCNC: 4.4 MMOL/L — SIGNIFICANT CHANGE UP (ref 3.5–5)
RBC # BLD: 2.74 M/UL — LOW (ref 4.7–6.1)
RBC # BLD: 2.75 M/UL — LOW (ref 4.7–6.1)
RBC # FLD: 15.5 % — HIGH (ref 11.5–14.5)
RBC # FLD: 15.8 % — HIGH (ref 11.5–14.5)
SODIUM SERPL-SCNC: 137 MMOL/L — SIGNIFICANT CHANGE UP (ref 135–146)
SODIUM SERPL-SCNC: 139 MMOL/L — SIGNIFICANT CHANGE UP (ref 135–146)
TYPE + AB SCN PNL BLD: SIGNIFICANT CHANGE UP
WBC # BLD: 10.3 K/UL — SIGNIFICANT CHANGE UP (ref 4.8–10.8)
WBC # BLD: 10.33 K/UL — SIGNIFICANT CHANGE UP (ref 4.8–10.8)
WBC # FLD AUTO: 10.3 K/UL — SIGNIFICANT CHANGE UP (ref 4.8–10.8)
WBC # FLD AUTO: 10.33 K/UL — SIGNIFICANT CHANGE UP (ref 4.8–10.8)

## 2018-11-01 RX ORDER — SODIUM CHLORIDE 9 MG/ML
1000 INJECTION INTRAMUSCULAR; INTRAVENOUS; SUBCUTANEOUS
Qty: 0 | Refills: 0 | Status: DISCONTINUED | OUTPATIENT
Start: 2018-11-01 | End: 2018-11-02

## 2018-11-01 RX ADMIN — ATORVASTATIN CALCIUM 20 MILLIGRAM(S): 80 TABLET, FILM COATED ORAL at 21:37

## 2018-11-01 RX ADMIN — Medication 1334 MILLIGRAM(S): at 21:37

## 2018-11-01 RX ADMIN — Medication 1334 MILLIGRAM(S): at 08:11

## 2018-11-01 RX ADMIN — Medication 1334 MILLIGRAM(S): at 17:26

## 2018-11-01 RX ADMIN — Medication 40 MILLIGRAM(S): at 05:43

## 2018-11-01 RX ADMIN — AMPICILLIN SODIUM AND SULBACTAM SODIUM 200 GRAM(S): 250; 125 INJECTION, POWDER, FOR SUSPENSION INTRAMUSCULAR; INTRAVENOUS at 11:53

## 2018-11-01 RX ADMIN — PANTOPRAZOLE SODIUM 40 MILLIGRAM(S): 20 TABLET, DELAYED RELEASE ORAL at 05:43

## 2018-11-01 RX ADMIN — Medication 81 MILLIGRAM(S): at 11:51

## 2018-11-01 RX ADMIN — HEPARIN SODIUM 5000 UNIT(S): 5000 INJECTION INTRAVENOUS; SUBCUTANEOUS at 05:43

## 2018-11-01 RX ADMIN — Medication 1 MILLIGRAM(S): at 11:51

## 2018-11-01 RX ADMIN — HEPARIN SODIUM 5000 UNIT(S): 5000 INJECTION INTRAVENOUS; SUBCUTANEOUS at 21:37

## 2018-11-01 RX ADMIN — Medication 1334 MILLIGRAM(S): at 11:51

## 2018-11-01 RX ADMIN — Medication 100 MILLIGRAM(S): at 05:43

## 2018-11-01 RX ADMIN — TAMSULOSIN HYDROCHLORIDE 0.4 MILLIGRAM(S): 0.4 CAPSULE ORAL at 21:37

## 2018-11-01 RX ADMIN — HEPARIN SODIUM 5000 UNIT(S): 5000 INJECTION INTRAVENOUS; SUBCUTANEOUS at 16:53

## 2018-11-01 NOTE — PROGRESS NOTE ADULT - ASSESSMENT
Assessment 56y/o M w/ hx of neuropathy, acromegaly, diabetes, htn, CKD MWF, makes urine, htn [facility]LE ulcer with Dr. Gomes. Vascular studies showed severe PVD and is transferred to [place] for angiogram.    Plan    #Severe PVD  -arterial duplex 10/24: b/l severe peripheral arterial disease, occlusion of R popliteal and L SF arteries  -angiogram yesterday: S/P Left extremity angiogram w/ left SFA angioplasty and stent & left popliteal angioplasty for heel ulceration  -f/u with vascular in 2 weeks outpatient  Dr. Comer      #Chronic DVT of LLE - found on imaging on March 2018  -not on anticoagulation  -vascular f/u    #DFU - left foot  -s/p debridement infected heel ulcer to the calcaneus  -c/w vanc and cefepime;   -ID: c/w unasyn 3g q24hrs   -podiatry consult appreciated.  -Dr. Gomes follow up today - possibly another debridement     #ESRD on HD started 1 month ago - MWF  -c/w HD; renal following      #Anemia  -s/p 1 prbc transfusion in [facility]  -EDWIN 40 mcg weekly  -monitor cbc    #Diabetes  -basal bolus insuin; iss    #HTN  -c/w lasix 40 qd	    #HLD  -c/w atorvastatin    DVT ppx: heparin subq  GI ppx: protonix  Diet: consistent carbohydrate, renal  Activity: ambulate as tolerated Assessment 58y/o M w/ hx of neuropathy, acromegaly, diabetes, htn, CKD MWF, makes urine, htn [facility]LE ulcer with Dr. Gomes. Vascular studies showed severe PVD and is transferred to [place] for angiogram.    Plan    #Severe PVD  -arterial duplex 10/24: b/l severe peripheral arterial disease, occlusion of R popliteal and L SF arteries  -angiogram yesterday: S/P Left extremity angiogram w/ left SFA angioplasty and stent & left popliteal angioplasty for heel ulceration  -f/u with vascular in 2 weeks outpatient  Dr. Comer      #Chronic DVT of LLE - found on imaging on March 2018  -not on anticoagulation  -vascular f/u    #DFU - left foot  -s/p debridement infected heel ulcer to the calcaneus  -c/w vanc and cefepime;   -ID: c/w unasyn 3g q24hrs   -podiatry consult appreciated.  -Dr. Gomes follow up today - possibly another debridement     #ESRD on HD started 1 month ago - MWF  -c/w HD; renal following      #Anemia  -s/p 1 prbc transfusion in [facility]  -EDWIN 40 mcg weekly  -monitor cbc    #Diabetes  -basal bolus insuin; iss    #HTN  -c/w lasix 40 qd	    #HLD  -c/w atorvastatin    DVT ppx: heparin subq  GI ppx: protonix  Diet: consistent carbohydrate, renal  Activity: ambulate as tolerated    PT pending

## 2018-11-01 NOTE — PROGRESS NOTE ADULT - SUBJECTIVE AND OBJECTIVE BOX
GENERAL SURGERY PROGRESS NOTE     SU SAWYER  57y  Male  Hospital day :10d  POD:  Procedure: Angiogram, extremity, left  Debridement of ulcer of left heel    OVERNIGHT EVENTS:  No acute overnight events. Patient is doing well. Went for HD 10/31. Denies n/v, pain at surgical site.      T(F): 98.6 (10-31-18 @ 21:18), Max: 98.6 (10-31-18 @ 21:18)  HR: 73 (10-31-18 @ 21:18) (70 - 84)  BP: 127/55 (10-31-18 @ 21:18) (93/54 - 137/62)  ABP: --  ABP(mean): --  RR: 17 (10-31-18 @ 21:18) (13 - 20)  SpO2: 98% (10-31-18 @ 20:00) (96% - 99%)    DIET/FLUIDS: calcium acetate 1334 milliGRAM(s) Oral four times a day with meals  doxercalciferol Injectable 2 MICROGram(s) IV Push <User Schedule>  ergocalciferol 48363 Unit(s) Oral every week  ferrous sulfate Oral Tab/Cap - Peds 325 milliGRAM(s) Oral daily  folic acid 1 milliGRAM(s) Oral daily    NG:                                                                                DRAINS:     BM:   10-30-18 @ 07:01  -  10-31-18 @ 07:00  --------------------------------------------------------  OUT: 1 mL      EMESIS:     URINE:      GI proph:  pantoprazole    Tablet 40 milliGRAM(s) Oral before breakfast    AC/ proph: aspirin enteric coated 81 milliGRAM(s) Oral daily  heparin  Injectable 5000 Unit(s) SubCutaneous every 8 hours    ABx: ampicillin/sulbactam  IVPB      ampicillin/sulbactam  IVPB 3 Gram(s) IV Intermittent daily      PHYSICAL EXAM:  GENERAL: NAD, well-appearing  CHEST/LUNG: No obvious increased WOB   GROIN R groin incisions w/ dressings in place, clean, dry, intact, no signs of infection      LABS  Labs:  CAPILLARY BLOOD GLUCOSE      POCT Blood Glucose.: 157 mg/dL (31 Oct 2018 21:26)  POCT Blood Glucose.: 122 mg/dL (31 Oct 2018 16:21)  POCT Blood Glucose.: 180 mg/dL (31 Oct 2018 06:37)                          7.8    11.72 )-----------( 364      ( 30 Oct 2018 08:20 )             25.7         10-31    138  |  99  |  47<H>  ----------------------------<  104<H>  4.3   |  25  |  5.3<HH>      Calcium, Total Serum: 9.1 mg/dL (10-31-18 @ 13:50)      LFTs:         Coags:     13.80  ----< 1.20    ( 30 Oct 2018 08:20 )     28.5          A/P  s/p LLE angio with SFA angioplasty and stenting; 3 vessel runoff to foot on 10/31    PLAN:    - Pt doing well post op    - encourage ambulation and IS    - Patient is to follow up with Dr. Comer 2 weeks after D/C

## 2018-11-01 NOTE — PROGRESS NOTE ADULT - ASSESSMENT
IMPRESSION:  Right heel with ischemic ulcer with superinfection  S/P angio and debridement of Left heel ulcer    RECOMMENDATIONS:  Unasyn 3 gm iv q24h

## 2018-11-01 NOTE — PROGRESS NOTE ADULT - SUBJECTIVE AND OBJECTIVE BOX
seen and examined  no distress  no new complaints       Standing Inpatient Medications  ampicillin/sulbactam  IVPB      ampicillin/sulbactam  IVPB 3 Gram(s) IV Intermittent daily  aspirin enteric coated 81 milliGRAM(s) Oral daily  atorvastatin 20 milliGRAM(s) Oral at bedtime  bisacodyl Suppository 10 milliGRAM(s) Rectal daily  calcium acetate 1334 milliGRAM(s) Oral four times a day with meals  chlorhexidine 4% Liquid 1 Application(s) Topical <User Schedule>  darbepoetin Injectable Syringe 40 MICROGram(s) IV Push <User Schedule>  docusate sodium 100 milliGRAM(s) Oral two times a day  doxercalciferol Injectable 2 MICROGram(s) IV Push <User Schedule>  ergocalciferol 50040 Unit(s) Oral every week  ferrous sulfate Oral Tab/Cap - Peds 325 milliGRAM(s) Oral daily  folic acid 1 milliGRAM(s) Oral daily  furosemide    Tablet 40 milliGRAM(s) Oral daily  heparin  Injectable 5000 Unit(s) SubCutaneous every 8 hours  lactulose Syrup 20 Gram(s) Oral daily  pantoprazole    Tablet 40 milliGRAM(s) Oral before breakfast  polyethylene glycol 3350 17 Gram(s) Oral two times a day  tamsulosin 0.4 milliGRAM(s) Oral at bedtime    PRN Inpatient Medications  acetaminophen   Tablet .. 650 milliGRAM(s) Oral every 6 hours PRN  oxyCODONE    5 mG/acetaminophen 325 mG 1 Tablet(s) Oral every 4 hours PRN        VITALS/PHYSICAL EXAM  --------------------------------------------------------------------------------  T(C): 36.9 (11-01-18 @ 04:45), Max: 37 (10-31-18 @ 21:18)  HR: 73 (11-01-18 @ 04:45) (70 - 81)  BP: 130/66 (11-01-18 @ 04:45) (93/54 - 130/66)  RR: 16 (11-01-18 @ 04:45) (13 - 20)  SpO2: 98% (10-31-18 @ 20:00) (96% - 99%)  Wt(kg): --  Height (cm): 193.04 (10-31-18 @ 07:20)  Weight (kg): 116 (10-31-18 @ 07:20)  BMI (kg/m2): 31.1 (10-31-18 @ 07:20)  BSA (m2): 2.46 (10-31-18 @ 07:20)      10-31-18 @ 07:01  -  11-01-18 @ 07:00  --------------------------------------------------------  IN: 5 mL / OUT: 2100 mL / NET: -2095 mL      Physical Exam:  	Gen: NAD  	Pulm:  decrease BS B/L  	CV:  S1S2; no rub  	Abd: +distended  	LE: dressings   	Vascular access: tesio/av fistula     LABS/STUDIES  --------------------------------------------------------------------------------    138  |  99  |  47  ----------------------------<  104      [10-31-18 @ 13:50]  4.3   |  25  |  5.3        Ca     9.1     [10-31-18 @ 13:50]            Creatinine Trend:  SCr 5.3 [10-31 @ 13:50]  SCr 4.5 [10-30 @ 08:20]  SCr 5.9 [10-29 @ 13:43]  SCr 5.4 [10-28 @ 07:39]  SCr 4.6 [10-27 @ 11:41]        Iron 38, TIBC 166, %sat 23      [09-19-18 @ 04:30]  Ferritin 55      [03-09-18 @ 18:01]  PTH -- (Ca 8.2)      [09-22-18 @ 08:40]   2071  PTH -- (Ca 8.0)      [09-19-18 @ 04:30]   1519  PTH -- (Ca 8.3)      [03-09-18 @ 18:01]   1545  Vitamin D (25OH) 14.4      [03-09-18 @ 18:01]

## 2018-11-01 NOTE — PROGRESS NOTE ADULT - SUBJECTIVE AND OBJECTIVE BOX
DIGNASU VELAZQUEZ  57y, Male      OVERNIGHT EVENTS:    none    VITALS:  T(F): 98.4, Max: 98.6 (10-31-18 @ 21:18)  HR: 73  BP: 130/66  RR: 16Vital Signs Last 24 Hrs  T(C): 36.9 (01 Nov 2018 04:45), Max: 37 (31 Oct 2018 21:18)  T(F): 98.4 (01 Nov 2018 04:45), Max: 98.6 (31 Oct 2018 21:18)  HR: 73 (01 Nov 2018 04:45) (70 - 81)  BP: 130/66 (01 Nov 2018 04:45) (93/54 - 130/66)  BP(mean): --  RR: 16 (01 Nov 2018 04:45) (13 - 20)  SpO2: 98% (31 Oct 2018 20:00) (96% - 99%)    TESTS & MEASUREMENTS:    10-31    138  |  99  |  47<H>  ----------------------------<  104<H>  4.3   |  25  |  5.3<HH>    Ca    9.1      31 Oct 2018 13:50                RADIOLOGY & ADDITIONAL TESTS:    ANTIBIOTICS:  ampicillin/sulbactam  IVPB      ampicillin/sulbactam  IVPB 3 Gram(s) IV Intermittent daily

## 2018-11-01 NOTE — PROGRESS NOTE ADULT - ASSESSMENT
Assessment 58y/o M w/ hx of neuropathy, acromegaly, diabetes, htn, CKD MWF, makes urine, [facility]tn [facility] with Dr. Gomes. Vascular studies showed severe PVD and is transferred to [place] for angiogram.    Plan  Severe PVD  - post angiogram, and left SFA angioplasty, stenting  - vascular surgery f/u appreciated  - podiatry f/u    DM with vascular and renal manifestations  DFU - left foot  - post debridement infected heel ulcer to the calcaneus  - continue Unasyn 3g q24hrs   - continue basal insulin    Constipation  - resolved  - continue rx  - monitor    ESRD on HMD  - MWF  - renal following    Anemia  - post transfusion   - EDWIN 40 mcg weekly  - monitor cbc    HTN  - continue Lasix	    HLD  - continue Atorvastatin    DVT ppx: heparin subq  GI ppx: protonix  Diet: consistent carbohydrate  Activity: ambulate as tolerated

## 2018-11-01 NOTE — PROGRESS NOTE ADULT - SUBJECTIVE AND OBJECTIVE BOX
SU SAWYER  57y  Male  HPI:  56y/o M w/ hx of brain tumor--acromegaly, diabetes, htn, ckd stage on dialysis MWF, makes urine, htn, 2 blood transfusion recently being worked up--  pt on rehab after recent admission for infection to feet presents for hg under 7 at rehab-[facility]; and for worsening of left leg ulcer.  pt had dialysis today and was given iv antibiotics after.  pt has been feeling chills; no fever. no abdominal pain, cp or sob. +generalized weakness.  no blood in stool (22 Oct 2018 19:39)    MEDICATIONS  (STANDING):  ampicillin/sulbactam  IVPB      ampicillin/sulbactam  IVPB 3 Gram(s) IV Intermittent daily  aspirin enteric coated 81 milliGRAM(s) Oral daily  atorvastatin 20 milliGRAM(s) Oral at bedtime  bisacodyl Suppository 10 milliGRAM(s) Rectal daily  calcium acetate 1334 milliGRAM(s) Oral four times a day with meals  chlorhexidine 4% Liquid 1 Application(s) Topical <User Schedule>  darbepoetin Injectable Syringe 40 MICROGram(s) IV Push <User Schedule>  docusate sodium 100 milliGRAM(s) Oral two times a day  doxercalciferol Injectable 2 MICROGram(s) IV Push <User Schedule>  ergocalciferol 66181 Unit(s) Oral every week  folic acid 1 milliGRAM(s) Oral daily  furosemide    Tablet 40 milliGRAM(s) Oral daily  heparin  Injectable 5000 Unit(s) SubCutaneous every 8 hours  lactulose Syrup 20 Gram(s) Oral daily  pantoprazole    Tablet 40 milliGRAM(s) Oral before breakfast  polyethylene glycol 3350 17 Gram(s) Oral two times a day  sodium chloride 0.9%. 1000 milliLiter(s) (30 mL/Hr) IV Continuous <Continuous>  tamsulosin 0.4 milliGRAM(s) Oral at bedtime    MEDICATIONS  (PRN):  acetaminophen   Tablet .. 650 milliGRAM(s) Oral every 6 hours PRN Temp greater or equal to 38C (100.4F)  oxyCODONE    5 mG/acetaminophen 325 mG 1 Tablet(s) Oral every 4 hours PRN Moderate Pain (4 - 6)    INTERVAL EVENTS: Patient seen today without distress, denies pain.    T(C): 36.7 (11-01-18 @ 13:30), Max: 37 (10-31-18 @ 21:18)  HR: 83 (11-01-18 @ 13:30) (73 - 83)  BP: 139/63 (11-01-18 @ 13:30) (127/55 - 139/63)  RR: 17 (11-01-18 @ 13:30) (16 - 17)  SpO2: --  Wt(kg): --Vital Signs Last 24 Hrs  T(C): 36.7 (01 Nov 2018 13:30), Max: 37 (31 Oct 2018 21:18)  T(F): 98.1 (01 Nov 2018 13:30), Max: 98.6 (31 Oct 2018 21:18)  HR: 83 (01 Nov 2018 13:30) (73 - 83)  BP: 139/63 (01 Nov 2018 13:30) (127/55 - 139/63)  BP(mean): --  RR: 17 (01 Nov 2018 13:30) (16 - 17)  SpO2: --    PHYSICAL EXAM:  GENERAL: NAD  NECK: Supple, No JVD, right chest wall catheter site clean  CHEST/LUNG: Clear  HEART: S1, S2, Regular rate and rhythm  ABDOMEN: Soft, Nontender,  Bowel sounds present  EXTREMITIES: Trace edema, dressing to both legs in place    LABS:                        7.7    10.30 )-----------( 366      ( 01 Nov 2018 19:11 )             25.3             11-01    137  |  97<L>  |  38<H>  ----------------------------<  179<H>  4.3   |  29  |  4.5<HH>    Ca    8.9      01 Nov 2018 19:11  Phos  3.1     11-01  Mg     2.0     11-01        RADIOLOGY & ADDITIONAL TESTS:

## 2018-11-01 NOTE — PROGRESS NOTE ADULT - SUBJECTIVE AND OBJECTIVE BOX
SUBJECTIVE:    Patient is a 57y old Male who presents with a chief complaint of foot ulcer (01 Nov 2018 09:01)    Currently admitted to medicine with the primary diagnosis of Foot ulcer     Today is hospital day 10d. No complaints this AM. Feeling better. Went for angiogram and HD yesterday    PAST MEDICAL & SURGICAL HISTORY  Dyslipidemia  DM (diabetes mellitus), type 2  Neuropathy  HTN (hypertension)  CAD (coronary atherosclerotic disease)  Acromegaly  CKD (chronic kidney disease), stage V  H/O eye surgery  H/O: pituitary tumor: s/p removal    SOCIAL HISTORY:  Negative for smoking/alcohol/drug use.     ALLERGIES:  bacitracin (Unknown)  Neosporin (Hypotension)    MEDICATIONS:  STANDING MEDICATIONS  ampicillin/sulbactam  IVPB      ampicillin/sulbactam  IVPB 3 Gram(s) IV Intermittent daily  aspirin enteric coated 81 milliGRAM(s) Oral daily  atorvastatin 20 milliGRAM(s) Oral at bedtime  bisacodyl Suppository 10 milliGRAM(s) Rectal daily  calcium acetate 1334 milliGRAM(s) Oral four times a day with meals  chlorhexidine 4% Liquid 1 Application(s) Topical <User Schedule>  darbepoetin Injectable Syringe 40 MICROGram(s) IV Push <User Schedule>  docusate sodium 100 milliGRAM(s) Oral two times a day  doxercalciferol Injectable 2 MICROGram(s) IV Push <User Schedule>  ergocalciferol 96812 Unit(s) Oral every week  folic acid 1 milliGRAM(s) Oral daily  furosemide    Tablet 40 milliGRAM(s) Oral daily  heparin  Injectable 5000 Unit(s) SubCutaneous every 8 hours  lactulose Syrup 20 Gram(s) Oral daily  pantoprazole    Tablet 40 milliGRAM(s) Oral before breakfast  polyethylene glycol 3350 17 Gram(s) Oral two times a day  tamsulosin 0.4 milliGRAM(s) Oral at bedtime    PRN MEDICATIONS  acetaminophen   Tablet .. 650 milliGRAM(s) Oral every 6 hours PRN  oxyCODONE    5 mG/acetaminophen 325 mG 1 Tablet(s) Oral every 4 hours PRN    VITALS:   T(F): 98.4  HR: 73  BP: 130/66  RR: 16  SpO2: 98%    LABS:                        7.6    10.33 )-----------( 361      ( 01 Nov 2018 06:38 )             25.3     11-01    139  |  96<L>  |  31<H>  ----------------------------<  130<H>  4.4   |  30  |  4.1<HH>    Ca    8.6      01 Nov 2018 06:38  Phos  3.5     11-01                    RADIOLOGY:    PHYSICAL EXAM:  GEN: No acute distress  LUNGS: Clear to auscultation bilaterally   HEART: S1/S2 present. RRR.   ABD: Soft, non-tender, non-distended. Bowel sounds present  EXT: LLE bandage  NEURO: AAOX3

## 2018-11-01 NOTE — PROGRESS NOTE ADULT - ASSESSMENT
1)  ESRD: s/p hd yesterday, hd in am    2)  DFU right foot s/p debridement ,followed by ID sp angioplasty , on unasyn   3)  Severe secondary HPT  on hectorol 2 with HD , check ph level   4) Anemia Hb noted   no need for PRBC transfusion. resistant to EDWIN in view of infection, will not increase dose  , d/c feso4 po  5) will follow

## 2018-11-01 NOTE — CHART NOTE - NSCHARTNOTEFT_GEN_A_CORE
Registered Dietitian Follow-Up     Patient Profile Reviewed                           Yes [x]   No []     Nutrition History Previously Obtained        Yes [x]  No []       Pertinent Subjective Information: Spoke to pt who reports good appetite/intake continues, eats almost all or finishes trays entirely. Agreeable to Nepro trial.      Pertinent Medical Interventions: Pt angiogram yesterday: S/P Left extremity angiogram w/ left SFA angioplasty and stent & left popliteal angioplasty for heel ulceration. Continues with HD.      Diet order: carbohydrate consistent, renal with evening snack      Anthropometrics:  - Ht. 76"   - Wt.: wt fluctuations from 113.8 kg - 121.5 kg - pt with edema and on HD, some wt fluctuations expected - will continue to monitor wt changes      Pertinent Lab Data: (10/20) WBC 11.72, RBC 2.79, H/H 7.8/25.7, (10/31) BUN 47, creatinine 5.3, glucose 104      Pertinent Meds: heparin, abx, atorvastatin, bisacodyl, calcium acetate, darbepoetin nikolas, docusate, doxercalciferol, ergocalciferol, ferrous sulfate, folic acid, furosemide, lactulose, oxycodone, pantoprazole, polyethylene glycol      Physical Findings:  - Appearance: alert, oriented   - GI function: pt reports constipation still persists despite BM 10/31  - Oral/Mouth cavity: pt reports sometimes feeling weaker after HD so usually orders softer foods but does not feel the need for a softer diet at this time  - Skin: surgical incision, BS =19, (10/30 edema 2+ R foot, edema 3+ L foot)      Nutrition Requirements  Weight Used: ideal  91.8 kg     Energy needs: (6609-8474 kcal/day = 30-35 kcal/kg IBW as pt on HD)  Protein needs: (110-129 g/day = 1.2-1.4 g/kg IBW as pt on HD)   Fluid needs: 1000 ml + urine outpt or per LIP/nephro     Nutrient Intake: not meeting needs      [x] Previous Nutrition Diagnosis: Increased nutrient needs            [x] Ongoing          [] Resolved     Nutrition Intervention Meals and Snacks, Medical Food Supplements  Continue current diet order and add high fiber modifier. Please consider ordering Nepro q 24 hrs.     Goal/Expected Outcome: Pt to consume >75% of meals, snacks, supplements within 4 days.     Indicator/Monitoring: RD to monitor diet order, energy intake, renal and glucose profile, NFPF.

## 2018-11-02 ENCOUNTER — RESULT REVIEW (OUTPATIENT)
Age: 58
End: 2018-11-02

## 2018-11-02 LAB
GLUCOSE BLDC GLUCOMTR-MCNC: 139 MG/DL — HIGH (ref 70–99)
GLUCOSE BLDC GLUCOMTR-MCNC: 141 MG/DL — HIGH (ref 70–99)
GLUCOSE BLDC GLUCOMTR-MCNC: 149 MG/DL — HIGH (ref 70–99)
GLUCOSE BLDC GLUCOMTR-MCNC: 183 MG/DL — HIGH (ref 70–99)

## 2018-11-02 RX ORDER — LACTULOSE 10 G/15ML
20 SOLUTION ORAL DAILY
Qty: 0 | Refills: 0 | Status: DISCONTINUED | OUTPATIENT
Start: 2018-11-02 | End: 2018-11-16

## 2018-11-02 RX ORDER — ASPIRIN/CALCIUM CARB/MAGNESIUM 324 MG
81 TABLET ORAL DAILY
Qty: 0 | Refills: 0 | Status: DISCONTINUED | OUTPATIENT
Start: 2018-11-02 | End: 2018-11-16

## 2018-11-02 RX ORDER — OXYCODONE AND ACETAMINOPHEN 5; 325 MG/1; MG/1
1 TABLET ORAL EVERY 4 HOURS
Qty: 0 | Refills: 0 | Status: DISCONTINUED | OUTPATIENT
Start: 2018-11-02 | End: 2018-11-02

## 2018-11-02 RX ORDER — FUROSEMIDE 40 MG
40 TABLET ORAL DAILY
Qty: 0 | Refills: 0 | Status: DISCONTINUED | OUTPATIENT
Start: 2018-11-02 | End: 2018-11-05

## 2018-11-02 RX ORDER — ONDANSETRON 8 MG/1
4 TABLET, FILM COATED ORAL ONCE
Qty: 0 | Refills: 0 | Status: DISCONTINUED | OUTPATIENT
Start: 2018-11-02 | End: 2018-11-02

## 2018-11-02 RX ORDER — ATORVASTATIN CALCIUM 80 MG/1
20 TABLET, FILM COATED ORAL AT BEDTIME
Qty: 0 | Refills: 0 | Status: DISCONTINUED | OUTPATIENT
Start: 2018-11-02 | End: 2018-11-16

## 2018-11-02 RX ORDER — HYDROMORPHONE HYDROCHLORIDE 2 MG/ML
1 INJECTION INTRAMUSCULAR; INTRAVENOUS; SUBCUTANEOUS
Qty: 0 | Refills: 0 | Status: DISCONTINUED | OUTPATIENT
Start: 2018-11-02 | End: 2018-11-02

## 2018-11-02 RX ORDER — POLYETHYLENE GLYCOL 3350 17 G/17G
17 POWDER, FOR SOLUTION ORAL EVERY 12 HOURS
Qty: 0 | Refills: 0 | Status: DISCONTINUED | OUTPATIENT
Start: 2018-11-02 | End: 2018-11-16

## 2018-11-02 RX ORDER — DARBEPOETIN ALFA IN POLYSORBAT 200MCG/0.4
40 PEN INJECTOR (ML) SUBCUTANEOUS
Qty: 0 | Refills: 0 | Status: DISCONTINUED | OUTPATIENT
Start: 2018-11-02 | End: 2018-11-02

## 2018-11-02 RX ORDER — TAMSULOSIN HYDROCHLORIDE 0.4 MG/1
0.4 CAPSULE ORAL AT BEDTIME
Qty: 0 | Refills: 0 | Status: DISCONTINUED | OUTPATIENT
Start: 2018-11-02 | End: 2018-11-16

## 2018-11-02 RX ORDER — AMPICILLIN SODIUM AND SULBACTAM SODIUM 250; 125 MG/ML; MG/ML
3 INJECTION, POWDER, FOR SUSPENSION INTRAMUSCULAR; INTRAVENOUS DAILY
Qty: 0 | Refills: 0 | Status: DISCONTINUED | OUTPATIENT
Start: 2018-11-02 | End: 2018-11-06

## 2018-11-02 RX ORDER — SODIUM CHLORIDE 9 MG/ML
1000 INJECTION INTRAMUSCULAR; INTRAVENOUS; SUBCUTANEOUS
Qty: 0 | Refills: 0 | Status: DISCONTINUED | OUTPATIENT
Start: 2018-11-02 | End: 2018-11-02

## 2018-11-02 RX ORDER — CALCIUM ACETATE 667 MG
1334 TABLET ORAL
Qty: 0 | Refills: 0 | Status: DISCONTINUED | OUTPATIENT
Start: 2018-11-02 | End: 2018-11-16

## 2018-11-02 RX ORDER — ACETAMINOPHEN 500 MG
650 TABLET ORAL EVERY 6 HOURS
Qty: 0 | Refills: 0 | Status: DISCONTINUED | OUTPATIENT
Start: 2018-11-02 | End: 2018-11-16

## 2018-11-02 RX ORDER — CHLORHEXIDINE GLUCONATE 213 G/1000ML
1 SOLUTION TOPICAL
Qty: 0 | Refills: 0 | Status: DISCONTINUED | OUTPATIENT
Start: 2018-11-02 | End: 2018-11-16

## 2018-11-02 RX ORDER — DARBEPOETIN ALFA IN POLYSORBAT 200MCG/0.4
40 PEN INJECTOR (ML) SUBCUTANEOUS
Qty: 0 | Refills: 0 | Status: DISCONTINUED | OUTPATIENT
Start: 2018-11-02 | End: 2018-11-16

## 2018-11-02 RX ORDER — HYDROMORPHONE HYDROCHLORIDE 2 MG/ML
0.5 INJECTION INTRAMUSCULAR; INTRAVENOUS; SUBCUTANEOUS
Qty: 0 | Refills: 0 | Status: DISCONTINUED | OUTPATIENT
Start: 2018-11-02 | End: 2018-11-02

## 2018-11-02 RX ORDER — DOXERCALCIFEROL 2.5 UG/1
2 CAPSULE ORAL ONCE
Qty: 0 | Refills: 0 | Status: COMPLETED | OUTPATIENT
Start: 2018-11-02 | End: 2018-11-02

## 2018-11-02 RX ORDER — PANTOPRAZOLE SODIUM 20 MG/1
40 TABLET, DELAYED RELEASE ORAL
Qty: 0 | Refills: 0 | Status: DISCONTINUED | OUTPATIENT
Start: 2018-11-02 | End: 2018-11-16

## 2018-11-02 RX ORDER — FOLIC ACID 0.8 MG
1 TABLET ORAL DAILY
Qty: 0 | Refills: 0 | Status: DISCONTINUED | OUTPATIENT
Start: 2018-11-02 | End: 2018-11-16

## 2018-11-02 RX ORDER — DOCUSATE SODIUM 100 MG
100 CAPSULE ORAL
Qty: 0 | Refills: 0 | Status: DISCONTINUED | OUTPATIENT
Start: 2018-11-02 | End: 2018-11-16

## 2018-11-02 RX ORDER — HEPARIN SODIUM 5000 [USP'U]/ML
5000 INJECTION INTRAVENOUS; SUBCUTANEOUS EVERY 8 HOURS
Qty: 0 | Refills: 0 | Status: DISCONTINUED | OUTPATIENT
Start: 2018-11-02 | End: 2018-11-16

## 2018-11-02 RX ORDER — ERGOCALCIFEROL 1.25 MG/1
50000 CAPSULE ORAL
Qty: 0 | Refills: 0 | Status: DISCONTINUED | OUTPATIENT
Start: 2018-11-02 | End: 2018-11-16

## 2018-11-02 RX ORDER — AMPICILLIN SODIUM AND SULBACTAM SODIUM 250; 125 MG/ML; MG/ML
3 INJECTION, POWDER, FOR SUSPENSION INTRAMUSCULAR; INTRAVENOUS EVERY 6 HOURS
Qty: 0 | Refills: 0 | Status: DISCONTINUED | OUTPATIENT
Start: 2018-11-02 | End: 2018-11-02

## 2018-11-02 RX ORDER — FUROSEMIDE 40 MG
40 TABLET ORAL ONCE
Qty: 0 | Refills: 0 | Status: COMPLETED | OUTPATIENT
Start: 2018-11-02 | End: 2018-11-02

## 2018-11-02 RX ADMIN — ATORVASTATIN CALCIUM 20 MILLIGRAM(S): 80 TABLET, FILM COATED ORAL at 22:38

## 2018-11-02 RX ADMIN — Medication 1334 MILLIGRAM(S): at 17:19

## 2018-11-02 RX ADMIN — CHLORHEXIDINE GLUCONATE 1 APPLICATION(S): 213 SOLUTION TOPICAL at 12:03

## 2018-11-02 RX ADMIN — ERGOCALCIFEROL 50000 UNIT(S): 1.25 CAPSULE ORAL at 17:18

## 2018-11-02 RX ADMIN — TAMSULOSIN HYDROCHLORIDE 0.4 MILLIGRAM(S): 0.4 CAPSULE ORAL at 22:42

## 2018-11-02 RX ADMIN — DOXERCALCIFEROL 2 MICROGRAM(S): 2.5 CAPSULE ORAL at 15:12

## 2018-11-02 RX ADMIN — Medication 1 MILLIGRAM(S): at 12:05

## 2018-11-02 RX ADMIN — Medication 100 MILLIGRAM(S): at 21:30

## 2018-11-02 RX ADMIN — Medication 81 MILLIGRAM(S): at 12:04

## 2018-11-02 RX ADMIN — Medication 40 MILLIGRAM(S): at 12:04

## 2018-11-02 RX ADMIN — OXYCODONE AND ACETAMINOPHEN 1 TABLET(S): 5; 325 TABLET ORAL at 18:08

## 2018-11-02 RX ADMIN — Medication 1334 MILLIGRAM(S): at 12:03

## 2018-11-02 RX ADMIN — Medication 100 MILLIGRAM(S): at 17:36

## 2018-11-02 RX ADMIN — HEPARIN SODIUM 5000 UNIT(S): 5000 INJECTION INTRAVENOUS; SUBCUTANEOUS at 21:30

## 2018-11-02 RX ADMIN — Medication 40 MICROGRAM(S): at 15:12

## 2018-11-02 RX ADMIN — SODIUM CHLORIDE 30 MILLILITER(S): 9 INJECTION INTRAMUSCULAR; INTRAVENOUS; SUBCUTANEOUS at 00:41

## 2018-11-02 RX ADMIN — PANTOPRAZOLE SODIUM 40 MILLIGRAM(S): 20 TABLET, DELAYED RELEASE ORAL at 12:04

## 2018-11-02 RX ADMIN — Medication 1334 MILLIGRAM(S): at 21:31

## 2018-11-02 RX ADMIN — AMPICILLIN SODIUM AND SULBACTAM SODIUM 200 GRAM(S): 250; 125 INJECTION, POWDER, FOR SUSPENSION INTRAMUSCULAR; INTRAVENOUS at 17:18

## 2018-11-02 RX ADMIN — HEPARIN SODIUM 5000 UNIT(S): 5000 INJECTION INTRAVENOUS; SUBCUTANEOUS at 05:41

## 2018-11-02 RX ADMIN — Medication 40 MILLIGRAM(S): at 06:42

## 2018-11-02 NOTE — PROGRESS NOTE ADULT - ASSESSMENT
1)  ESRD: HD today 3 hrs 2 K bat h1-2 L UF  2)  DFU right foot s/p debridement ,followed by ID sp angioplasty , on unasyn   3)  Severe secondary HPT  on hectorol 2 with HD , phos at goal on binder  4) Anemia Hb noted   no need for PRBC transfusion. resistant to EDWIN in view of infection, will not increase dose  , o  5) will follow

## 2018-11-02 NOTE — PROGRESS NOTE ADULT - SUBJECTIVE AND OBJECTIVE BOX
SU SAWYER  57y, Male      OVERNIGHT EVENTS:    no fevers, minimal pain LLE    VITALS:  T(F): 98.3, Max: 98.3 (11-02-18 @ 06:18)  HR: 69  BP: 117/58  RR: 18Vital Signs Last 24 Hrs  T(C): 36.8 (02 Nov 2018 07:19), Max: 36.8 (02 Nov 2018 06:18)  T(F): 98.3 (02 Nov 2018 06:43), Max: 98.3 (02 Nov 2018 06:18)  HR: 69 (02 Nov 2018 07:19) (69 - 83)  BP: 117/58 (02 Nov 2018 07:19) (117/58 - 139/63)  BP(mean): --  RR: 18 (02 Nov 2018 07:19) (17 - 18)  SpO2: 97% (02 Nov 2018 07:19) (97% - 97%)    TESTS & MEASUREMENTS:                        7.7    10.30 )-----------( 366      ( 01 Nov 2018 19:11 )             25.3     11-01    137  |  97<L>  |  38<H>  ----------------------------<  179<H>  4.3   |  29  |  4.5<HH>    Ca    8.9      01 Nov 2018 19:11  Phos  3.1     11-01  Mg     2.0     11-01                RADIOLOGY & ADDITIONAL TESTS:    ANTIBIOTICS:  ampicillin/sulbactam  IVPB      ampicillin/sulbactam  IVPB 3 Gram(s) IV Intermittent daily

## 2018-11-02 NOTE — PROGRESS NOTE ADULT - ASSESSMENT
Assessment 56y/o M w/ hx of neuropathy, acromegaly, diabetes, htn, CKD MWF, makes urine, htn [facility] LLE ulcer with Dr. Gomes. Vascular studies showed severe PVD and is transferred to [place] for angiogram.    Plan  Severe PVD  - post angiogram, and left SFA angioplasty, stenting  - vascular surgery f/u appreciated  - podiatry debrided today  - ID added Clindamycin today  - monitor closely    DM with vascular and renal manifestations  DFU - left foot  - post debridement infected heel ulcer to the calcaneus  - continue Unasyn, Clindamycin added  - continue basal insulin    Constipation  - resolved  - continue rx  - monitor    ESRD on HMD  - MWF  - renal following  - rx as per renal    Anemia  - post transfusion   - EDWIN 40 mcg weekly  - monitor cbc    HTN  - continue Lasix	    HLD  - continue Atorvastatin    DVT ppx: heparin subq  GI ppx: protonix  Diet: consistent carbohydrate  Activity: clarify ambulation, weight baring status with podiatry

## 2018-11-02 NOTE — PROGRESS NOTE ADULT - SUBJECTIVE AND OBJECTIVE BOX
SU SAWYER  57y  Male  HPI:  56y/o M w/ hx of brain tumor--acromegaly, diabetes, htn, ckd stage on dialysis MWF, makes urine, htn, 2 blood transfusion recently being worked up--  pt on rehab after recent admission for infection to feet presents for hg under 7 at rehab-[facility]; and for worsening of left leg ulcer.  pt had dialysis today and was given iv antibiotics after.  pt has been feeling chills; no fever. no abdominal pain, cp or sob. +generalized weakness.  no blood in stool (22 Oct 2018 19:39)    MEDICATIONS  (STANDING):  ampicillin/sulbactam  IVPB 3 Gram(s) IV Intermittent daily  aspirin enteric coated 81 milliGRAM(s) Oral daily  atorvastatin 20 milliGRAM(s) Oral at bedtime  bisacodyl Suppository 10 milliGRAM(s) Rectal daily  calcium acetate 1334 milliGRAM(s) Oral four times a day with meals  chlorhexidine 4% Liquid 1 Application(s) Topical <User Schedule>  clindamycin IVPB 900 milliGRAM(s) IV Intermittent every 8 hours  darbepoetin Injectable Syringe 40 MICROGram(s) IV Push <User Schedule>  docusate sodium 100 milliGRAM(s) Oral two times a day  ergocalciferol 02259 Unit(s) Oral every week  folic acid 1 milliGRAM(s) Oral daily  furosemide    Tablet 40 milliGRAM(s) Oral daily  heparin  Injectable 5000 Unit(s) SubCutaneous every 8 hours  lactulose Syrup 20 Gram(s) Oral daily  pantoprazole    Tablet 40 milliGRAM(s) Oral before breakfast  polyethylene glycol 3350 17 Gram(s) Oral every 12 hours  tamsulosin 0.4 milliGRAM(s) Oral at bedtime    MEDICATIONS  (PRN):  acetaminophen   Tablet .. 650 milliGRAM(s) Oral every 6 hours PRN Temp greater or equal to 38C (100.4F)  oxyCODONE    5 mG/acetaminophen 325 mG 1 Tablet(s) Oral every 4 hours PRN Moderate Pain (4 - 6)    INTERVAL EVENTS: Patient seen this am post surgical debridement of the left heel. Patient without complaint.    T(C): 36.7 (11-02-18 @ 12:04), Max: 36.8 (11-02-18 @ 06:18)  HR: 72 (11-02-18 @ 16:09) (68 - 80)  BP: 92/55 (11-02-18 @ 16:09) (87/61 - 134/76)  RR: 16 (11-02-18 @ 16:09) (12 - 19)  SpO2: 97% (11-02-18 @ 10:33) (96% - 100%)  Wt(kg): --Vital Signs Last 24 Hrs  T(C): 36.7 (02 Nov 2018 12:04), Max: 36.8 (02 Nov 2018 06:18)  T(F): 98 (02 Nov 2018 12:04), Max: 98.3 (02 Nov 2018 06:18)  HR: 72 (02 Nov 2018 16:09) (68 - 80)  BP: 92/55 (02 Nov 2018 16:09) (87/61 - 134/76)  BP(mean): --  RR: 16 (02 Nov 2018 16:09) (12 - 19)  SpO2: 97% (02 Nov 2018 10:33) (96% - 100%)    PHYSICAL EXAM:  GENERAL: NAD  NECK: Supple, No JVD  CHEST/LUNG: Clear  HEART: S1, S2, Regular rate and rhythm  ABDOMEN: Soft, Nontender, Bowel sounds present  EXTREMITIES: No edema, dressing in place    LABS:  Labs:                        7.7    10.30 )-----------( 366      ( 01 Nov 2018 19:11 )             25.3             11-01    137  |  97<L>  |  38<H>  ----------------------------<  179<H>  4.3   |  29  |  4.5<HH>    Ca    8.9      01 Nov 2018 19:11  Phos  3.1     11-01  Mg     2.0     11-01    RADIOLOGY & ADDITIONAL TESTS:

## 2018-11-02 NOTE — PROGRESS NOTE ADULT - SUBJECTIVE AND OBJECTIVE BOX
Nephrology progress note    Patient was seen and examined, events over the last 24 h noted .  Lt heel debridement today   HD today    Allergies:  bacitracin (Unknown)  Neosporin (Hypotension)    Hospital Medications:   MEDICATIONS  (STANDING):  ampicillin/sulbactam  IVPB 3 Gram(s) IV Intermittent daily  aspirin enteric coated 81 milliGRAM(s) Oral daily  atorvastatin 20 milliGRAM(s) Oral at bedtime  bisacodyl Suppository 10 milliGRAM(s) Rectal daily  calcium acetate 1334 milliGRAM(s) Oral four times a day with meals  chlorhexidine 4% Liquid 1 Application(s) Topical <User Schedule>  darbepoetin Injectable Syringe 40 MICROGram(s) IV Push every 7 days  docusate sodium 100 milliGRAM(s) Oral two times a day  doxercalciferol Injectable 2 MICROGram(s) IV Push once  ergocalciferol 09878 Unit(s) Oral every week  folic acid 1 milliGRAM(s) Oral daily  furosemide    Tablet 40 milliGRAM(s) Oral daily  heparin  Injectable 5000 Unit(s) SubCutaneous every 8 hours  lactulose Syrup 20 Gram(s) Oral daily  pantoprazole    Tablet 40 milliGRAM(s) Oral before breakfast  polyethylene glycol 3350 17 Gram(s) Oral every 12 hours  sodium chloride 0.9%. 1000 milliLiter(s) (10 mL/Hr) IV Continuous <Continuous>  tamsulosin 0.4 milliGRAM(s) Oral at bedtime        VITALS:  T(F): 97.9 (11-02-18 @ 08:48), Max: 98.3 (11-02-18 @ 06:18)  HR: 76 (11-02-18 @ 10:03)  BP: 124/65 (11-02-18 @ 10:03)  RR: 16 (11-02-18 @ 10:03)  SpO2: 100% (11-02-18 @ 10:03)  Wt(kg): --    10-31 @ 07:01  -  11-01 @ 07:00  --------------------------------------------------------  IN: 5 mL / OUT: 2100 mL / NET: -2095 mL      Height (cm): 193.04 (11-02 @ 07:19)  Weight (kg): 117.6 (11-02 @ 07:19)  BMI (kg/m2): 31.6 (11-02 @ 07:19)  BSA (m2): 2.47 (11-02 @ 07:19)    PHYSICAL EXAM:  	Gen: NAD  	Pulm:  decrease BS B/L  	CV:  S1S2; no rub  	Abd: +distended  	LE: dressings   	Vascular access: tesio/av fistula     LABS:  11-01    137  |  97<L>  |  38<H>  ----------------------------<  179<H>  4.3   |  29  |  4.5<HH>    Ca    8.9      01 Nov 2018 19:11  Phos  3.1     11-01  Mg     2.0     11-01                            7.7    10.30 )-----------( 366      ( 01 Nov 2018 19:11 )             25.3       Urine Studies:      RADIOLOGY & ADDITIONAL STUDIES:

## 2018-11-02 NOTE — CHART NOTE - NSCHARTNOTEFT_GEN_A_CORE
PACU ANESTHESIA ADMISSION NOTE      Procedure: Angiogram, extremity, left: Left SFA angioplasty and stent. Left pop angioplasty  Debridement of ulcer of left heel    Post op diagnosis:  Heel ulceration, left, with unspecified severity      ____  Intubated  TV:______       Rate: ______      FiO2: ______    __x__  Patent Airway    __x__  Full return of protective reflexes    __x__  Full recovery from anesthesia / back to baseline     Vitals:   T:  97.6F        R:      23            BP:     114/54             Sat:    100               P: 80      Mental Status:  __x__ Awake   __x___ Alert   _____ Drowsy   _____ Sedated    Nausea/Vomiting:  _x___ NO  ______Yes,   See Post - Op Orders          Pain Scale (0-10):  ___0/10__    Treatment: ____ None    __x__ See Post - Op/PCA Orders    Post - Operative Fluids:   ____ Oral   __x__ See Post - Op Orders    Plan: Discharge:   ____Home       ___x__Floor     _____Critical Care    _____  Other:_________________    Comments: Pt tolerated procedure well, no anesthesia related complications. Care of pt endorsed to PACU, report given to PACU RN.

## 2018-11-02 NOTE — BRIEF OPERATIVE NOTE - PRE-OP DX
Heel ulceration, left, with unspecified severity  10/23/2018    Active  HamiltonJourdan Heel ulceration  11/02/2018  Left  Active  Ann Ma  Heel ulceration, left, with unspecified severity  10/23/2018    Active  Jourdan Terrazas

## 2018-11-02 NOTE — BRIEF OPERATIVE NOTE - PROCEDURE
<<-----Click on this checkbox to enter Procedure Debridement of ulcer of left heel  11/02/2018  debridement of bone and soft tissue of L lateral heel  Active  KLYGRISSE Debridement of ulcer of left heel  11/02/2018  debridement of bone and soft tissue of L lateral heel  Active  Ann Ma

## 2018-11-02 NOTE — BRIEF OPERATIVE NOTE - POST-OP DX
Heel ulceration, left, with unspecified severity  10/23/2018    Active  EdenJourdan Heel ulceration  11/02/2018  Left  Active  Ann Ma  Heel ulceration, left, with unspecified severity  10/23/2018    Active  Jourdan Terrazas

## 2018-11-02 NOTE — PROGRESS NOTE ADULT - ASSESSMENT
IMPRESSION:  Right heel with ischemic ulcer with extensive gangrenous changes  I doubt the foot can be salvaged  S/P angio and debridement of Left heel ulcer    RECOMMENDATIONS:  Surgery planed for today  Will in all probability need an amputation  Unasyn 3 gm iv q24h  Add Clindamycin 900 mg iv q8h

## 2018-11-02 NOTE — PROGRESS NOTE ADULT - ASSESSMENT
Assessment 56y/o M w/ hx of neuropathy, acromegaly, diabetes, htn, CKD MWF, makes urine, htn [facility]LE ulcer with Dr. Gomes. Vascular studies showed severe PVD and is transferred to [place] for angiogram.    Plan    #Severe PVD  -arterial duplex 10/24: b/l severe peripheral arterial disease, occlusion of R popliteal and L SF arteries  -angiogram 10/31: S/P Left extremity angiogram w/ left SFA angioplasty and stent & left popliteal angioplasty for heel ulceration  -f/u with vascular in 2 weeks outpatient  Dr. Comer      #Chronic DVT of LLE - found on imaging on March 2018  -not on anticoagulation  -vascular f/u    #DFU - left foot  -s/p debridement infected heel ulcer to the calcaneus ~1 week ago  -ID: c/w unasyn 3g q24hrs; add clindamycin 900 q8hrs  -surgery: LLE debridement this AM; First dressing change to be done Sunday, w/ saline irrigation, 4x4, kerlix.   -received 1 unit prbc during procedure today for hypotension and hgb of 7.7  -podiatry consult appreciated.      #ESRD on HD started 1 month ago - MWF  -c/w HD; renal following      #Anemia  -s/p 1 prbc transfusion in [facility]  -EDWIN 40 mcg weekly  -monitor cbc    #Diabetes  -basal bolus insuin; iss    #HTN  -c/w lasix 40 qd	    #HLD  -c/w atorvastatin    DVT ppx: heparin subq  GI ppx: protonix  Diet: consistent carbohydrate, renal  Activity: ambulate as tolerated    PT pending

## 2018-11-02 NOTE — PROGRESS NOTE ADULT - SUBJECTIVE AND OBJECTIVE BOX
SUBJECTIVE:    Patient is a 57y old Male who presents with a chief complaint of foot ulcer (02 Nov 2018 12:32)    Currently admitted to medicine with the primary diagnosis of Foot ulcer     Today is hospital day 11d. Went for debridement this am and then will go for HD. pt was not in room all day    PAST MEDICAL & SURGICAL HISTORY  Dyslipidemia  DM (diabetes mellitus), type 2  Neuropathy  HTN (hypertension)  CAD (coronary atherosclerotic disease)  Acromegaly  CKD (chronic kidney disease), stage V  H/O eye surgery  H/O: pituitary tumor: s/p removal    SOCIAL HISTORY:  Negative for smoking/alcohol/drug use.     ALLERGIES:  bacitracin (Unknown)  Neosporin (Hypotension)    MEDICATIONS:  STANDING MEDICATIONS  ampicillin/sulbactam  IVPB 3 Gram(s) IV Intermittent daily  aspirin enteric coated 81 milliGRAM(s) Oral daily  atorvastatin 20 milliGRAM(s) Oral at bedtime  bisacodyl Suppository 10 milliGRAM(s) Rectal daily  calcium acetate 1334 milliGRAM(s) Oral four times a day with meals  chlorhexidine 4% Liquid 1 Application(s) Topical <User Schedule>  clindamycin IVPB 900 milliGRAM(s) IV Intermittent every 8 hours  darbepoetin Injectable Syringe 40 MICROGram(s) IV Push every 7 days  docusate sodium 100 milliGRAM(s) Oral two times a day  doxercalciferol Injectable 2 MICROGram(s) IV Push once  ergocalciferol 54278 Unit(s) Oral every week  folic acid 1 milliGRAM(s) Oral daily  furosemide    Tablet 40 milliGRAM(s) Oral daily  heparin  Injectable 5000 Unit(s) SubCutaneous every 8 hours  lactulose Syrup 20 Gram(s) Oral daily  pantoprazole    Tablet 40 milliGRAM(s) Oral before breakfast  polyethylene glycol 3350 17 Gram(s) Oral every 12 hours  tamsulosin 0.4 milliGRAM(s) Oral at bedtime    PRN MEDICATIONS  acetaminophen   Tablet .. 650 milliGRAM(s) Oral every 6 hours PRN  oxyCODONE    5 mG/acetaminophen 325 mG 1 Tablet(s) Oral every 4 hours PRN    VITALS:   T(F): 98  HR: 72  BP: 106/22  RR: 16  SpO2: 97%    LABS:                        7.7    10.30 )-----------( 366      ( 01 Nov 2018 19:11 )             25.3     11-01    137  |  97<L>  |  38<H>  ----------------------------<  179<H>  4.3   |  29  |  4.5<HH>    Ca    8.9      01 Nov 2018 19:11  Phos  3.1     11-01  Mg     2.0     11-01                    RADIOLOGY:        PHYSICAL EXAM:  unable to perform

## 2018-11-02 NOTE — PRE-ANESTHESIA EVALUATION ADULT - NS MD HP INPLANTS MED DEV
Right subclavian tessio catheter; Right arm AV fistula
Right subclavian tessio catheter; Right arm AV fistula
Right subclavian tessio catheter
Right subclavian tessio catheter; Right arm AV fistula

## 2018-11-03 LAB
GLUCOSE BLDC GLUCOMTR-MCNC: 137 MG/DL — HIGH (ref 70–99)
GLUCOSE BLDC GLUCOMTR-MCNC: 142 MG/DL — HIGH (ref 70–99)
GLUCOSE BLDC GLUCOMTR-MCNC: 142 MG/DL — HIGH (ref 70–99)
HCT VFR BLD CALC: 23.2 % — LOW (ref 42–52)
HGB BLD-MCNC: 7.3 G/DL — CRITICAL LOW (ref 14–18)
MCHC RBC-ENTMCNC: 28.7 PG — SIGNIFICANT CHANGE UP (ref 27–31)
MCHC RBC-ENTMCNC: 31.5 G/DL — LOW (ref 32–37)
MCV RBC AUTO: 91.3 FL — SIGNIFICANT CHANGE UP (ref 80–94)
NIGHT BLUE STAIN TISS: SIGNIFICANT CHANGE UP
NRBC # BLD: 0 /100 WBCS — SIGNIFICANT CHANGE UP (ref 0–0)
PHOSPHATE SERPL-MCNC: 3.7 MG/DL — SIGNIFICANT CHANGE UP (ref 2.1–4.9)
PLATELET # BLD AUTO: 329 K/UL — SIGNIFICANT CHANGE UP (ref 130–400)
RBC # BLD: 2.54 M/UL — LOW (ref 4.7–6.1)
RBC # FLD: 15.1 % — HIGH (ref 11.5–14.5)
SPECIMEN SOURCE: SIGNIFICANT CHANGE UP
SPECIMEN SOURCE: SIGNIFICANT CHANGE UP
WBC # BLD: 9.21 K/UL — SIGNIFICANT CHANGE UP (ref 4.8–10.8)
WBC # FLD AUTO: 9.21 K/UL — SIGNIFICANT CHANGE UP (ref 4.8–10.8)

## 2018-11-03 RX ADMIN — TAMSULOSIN HYDROCHLORIDE 0.4 MILLIGRAM(S): 0.4 CAPSULE ORAL at 22:38

## 2018-11-03 RX ADMIN — LACTULOSE 20 GRAM(S): 10 SOLUTION ORAL at 11:49

## 2018-11-03 RX ADMIN — HEPARIN SODIUM 5000 UNIT(S): 5000 INJECTION INTRAVENOUS; SUBCUTANEOUS at 22:38

## 2018-11-03 RX ADMIN — Medication 1334 MILLIGRAM(S): at 11:48

## 2018-11-03 RX ADMIN — Medication 40 MILLIGRAM(S): at 05:05

## 2018-11-03 RX ADMIN — AMPICILLIN SODIUM AND SULBACTAM SODIUM 200 GRAM(S): 250; 125 INJECTION, POWDER, FOR SUSPENSION INTRAMUSCULAR; INTRAVENOUS at 11:49

## 2018-11-03 RX ADMIN — HEPARIN SODIUM 5000 UNIT(S): 5000 INJECTION INTRAVENOUS; SUBCUTANEOUS at 05:05

## 2018-11-03 RX ADMIN — POLYETHYLENE GLYCOL 3350 17 GRAM(S): 17 POWDER, FOR SOLUTION ORAL at 05:05

## 2018-11-03 RX ADMIN — ATORVASTATIN CALCIUM 20 MILLIGRAM(S): 80 TABLET, FILM COATED ORAL at 22:38

## 2018-11-03 RX ADMIN — Medication 100 MILLIGRAM(S): at 05:05

## 2018-11-03 RX ADMIN — Medication 100 MILLIGRAM(S): at 17:08

## 2018-11-03 RX ADMIN — CHLORHEXIDINE GLUCONATE 1 APPLICATION(S): 213 SOLUTION TOPICAL at 05:04

## 2018-11-03 RX ADMIN — Medication 81 MILLIGRAM(S): at 11:48

## 2018-11-03 RX ADMIN — Medication 100 MILLIGRAM(S): at 14:09

## 2018-11-03 RX ADMIN — PANTOPRAZOLE SODIUM 40 MILLIGRAM(S): 20 TABLET, DELAYED RELEASE ORAL at 05:05

## 2018-11-03 RX ADMIN — Medication 100 MILLIGRAM(S): at 22:38

## 2018-11-03 RX ADMIN — Medication 1334 MILLIGRAM(S): at 22:38

## 2018-11-03 RX ADMIN — POLYETHYLENE GLYCOL 3350 17 GRAM(S): 17 POWDER, FOR SOLUTION ORAL at 17:07

## 2018-11-03 RX ADMIN — HEPARIN SODIUM 5000 UNIT(S): 5000 INJECTION INTRAVENOUS; SUBCUTANEOUS at 14:09

## 2018-11-03 RX ADMIN — Medication 1334 MILLIGRAM(S): at 08:04

## 2018-11-03 RX ADMIN — Medication 1 MILLIGRAM(S): at 11:49

## 2018-11-03 RX ADMIN — Medication 100 MILLIGRAM(S): at 05:03

## 2018-11-03 RX ADMIN — Medication 1334 MILLIGRAM(S): at 17:08

## 2018-11-03 NOTE — PROGRESS NOTE ADULT - ASSESSMENT
Assessment 56y/o M w/ hx of neuropathy, acromegaly, diabetes, htn, CKD MWF, makes urine, htn [facility]LE ulcer with Dr. Gomes. Vascular studies showed severe PVD and is transferred to [place] for angiogram.    Plan    #Severe PVD  -arterial duplex 10/24: b/l severe peripheral arterial disease, occlusion of R popliteal and L SF arteries  -angiogram 10/31: S/P Left extremity angiogram w/ left SFA angioplasty and stent & left popliteal angioplasty for heel ulceration  -f/u with vascular in 2 weeks outpatient  Dr. Comer      #Chronic DVT of LLE - found on imaging on March 2018  -not on anticoagulation  -vascular f/u    #DFU - left foot  -s/p debridement infected heel ulcer to the calcaneus ~1 week ago  -ID: c/w unasyn 3g q24hrs; c/w clindamycin 900 q8hrs  -surgery: s/p LLE debridement ; First dressing change to be done Sunday, w/ saline irrigation, 4x4, kerlix.   -received 1 unit prbc during procedure for hypotension and hgb of 7.7  -podiatry consult appreciated.      #ESRD on HD started 1 month ago - MWF  -c/w HD; renal following      #Anemia  -s/p 1 prbc transfusion in [facility] and another prbc during debridement in [place]  -EDWIN 40 mcg weekly  -monitor cbc    #Diabetes  -basal bolus insuin; iss    #HTN  -d/c lasix for low BPs    #HLD  -c/w atorvastatin    DVT ppx: heparin subq  GI ppx: protonix  Diet: consistent carbohydrate, renal  Activity: ambulate as tolerated    Seen by PT

## 2018-11-03 NOTE — PROGRESS NOTE ADULT - SUBJECTIVE AND OBJECTIVE BOX
SUBJECTIVE:    Patient is a 57y old Male who presents with a chief complaint of foot ulcer (03 Nov 2018 11:39)    Currently admitted to medicine with the primary diagnosis of Foot ulcer     Today is hospital day 12d. This morning he is resting comfortably in bed and reports no new issues or overnight events.     PAST MEDICAL & SURGICAL HISTORY  Dyslipidemia  DM (diabetes mellitus), type 2  Neuropathy  HTN (hypertension)  CAD (coronary atherosclerotic disease)  Acromegaly  CKD (chronic kidney disease), stage V  H/O eye surgery  H/O: pituitary tumor: s/p removal    SOCIAL HISTORY:  Negative for smoking/alcohol/drug use.     ALLERGIES:  bacitracin (Unknown)  Neosporin (Hypotension)    MEDICATIONS:  STANDING MEDICATIONS  ampicillin/sulbactam  IVPB 3 Gram(s) IV Intermittent daily  aspirin enteric coated 81 milliGRAM(s) Oral daily  atorvastatin 20 milliGRAM(s) Oral at bedtime  bisacodyl Suppository 10 milliGRAM(s) Rectal daily  calcium acetate 1334 milliGRAM(s) Oral four times a day with meals  chlorhexidine 4% Liquid 1 Application(s) Topical <User Schedule>  clindamycin IVPB 900 milliGRAM(s) IV Intermittent every 8 hours  darbepoetin Injectable Syringe 40 MICROGram(s) IV Push <User Schedule>  docusate sodium 100 milliGRAM(s) Oral two times a day  ergocalciferol 91056 Unit(s) Oral every week  folic acid 1 milliGRAM(s) Oral daily  furosemide    Tablet 40 milliGRAM(s) Oral daily  heparin  Injectable 5000 Unit(s) SubCutaneous every 8 hours  lactulose Syrup 20 Gram(s) Oral daily  pantoprazole    Tablet 40 milliGRAM(s) Oral before breakfast  polyethylene glycol 3350 17 Gram(s) Oral every 12 hours  tamsulosin 0.4 milliGRAM(s) Oral at bedtime    PRN MEDICATIONS  acetaminophen   Tablet .. 650 milliGRAM(s) Oral every 6 hours PRN  oxyCODONE    5 mG/acetaminophen 325 mG 1 Tablet(s) Oral every 4 hours PRN    VITALS:   T(F): 96.8  HR: 83  BP: 144/69  RR: 18  SpO2: --    LABS:                        7.3    9.21  )-----------( 329      ( 03 Nov 2018 07:40 )             23.2       Phos  3.7     11-03                Culture - Acid Fast - Other w/Smear (collected 02 Nov 2018 11:29)  Source: .Other None    Culture - Surgical Swab (collected 02 Nov 2018 11:29)  Source: .Surgical Swab ulcer left foot  Preliminary Report (03 Nov 2018 21:18):    Rare Proteus mirabilis    Rare Staphylococcus aureus    Culture - Acid Fast - Other w/Smear (collected 02 Nov 2018 11:29)  Source: .Other None          RADIOLOGY:    PHYSICAL EXAM:  GEN: No acute distress  LUNGS: Clear to auscultation bilaterally   HEART: S1/S2 present. RRR.   ABD: Soft, non-tender, non-distended. Bowel sounds present  EXT: Dressings intact, no significant drainage, + odor  NEURO: AAOX3

## 2018-11-03 NOTE — PROGRESS NOTE ADULT - ASSESSMENT
1)  ESRD: s/p hd yesterday, hd on monday   2)  DFU right foot s/p debridement ,followed by ID sp angioplasty , on unasyn   3)  Severe secondary HPT  on hectorol 2 with HD , phos at goal   4) Anemia Hb noted   no need for PRBC transfusion. resistant to EDWIN in view of infection, will not increase dose   5) BP on the low side, d/c lasix   6) will follow

## 2018-11-03 NOTE — PROGRESS NOTE ADULT - ASSESSMENT
Assessment 56y/o M w/ hx of neuropathy, acromegaly, diabetes, htn, CKD MWF, makes urine, htn [facility]LE ulcer with Dr. Gomes. Vascular studies showed severe PVD and is transferred to [place] for angiogram.    Plan  Severe PVD  - post angiogram, and left SFA angioplasty, stenting  - vascular surgery f/u appreciated  - podiatry debrided today  - ID added Clindamycin today  - monitor closely  - first dressing change tomorrow  - further plans pending evaluation    DM with vascular and renal manifestations  DFU - left foot  - post debridement infected heel ulcer to the calcaneus  - continue Unasyn, Clindamycin   - continue basal insulin    Constipation  - continue rx  - monitor    ESRD on [facility]  - MWF  - renal following  - rx as per renal    Anemia  - post transfusion   - EDWIN 40 mcg weekly  - monitor cbc    HTN  - Lasix discontinued today   - monitor BP    HLD  - continue Atorvastatin    DVT ppx: heparin subq  GI ppx: protonix  Diet: consistent carbohydrate  Activity: Bedrest

## 2018-11-03 NOTE — PROGRESS NOTE ADULT - SUBJECTIVE AND OBJECTIVE BOX
seen and examined   no distress  no new complaints       Standing Inpatient Medications  ampicillin/sulbactam  IVPB 3 Gram(s) IV Intermittent daily  aspirin enteric coated 81 milliGRAM(s) Oral daily  atorvastatin 20 milliGRAM(s) Oral at bedtime  bisacodyl Suppository 10 milliGRAM(s) Rectal daily  calcium acetate 1334 milliGRAM(s) Oral four times a day with meals  chlorhexidine 4% Liquid 1 Application(s) Topical <User Schedule>  clindamycin IVPB 900 milliGRAM(s) IV Intermittent every 8 hours  darbepoetin Injectable Syringe 40 MICROGram(s) IV Push <User Schedule>  docusate sodium 100 milliGRAM(s) Oral two times a day  ergocalciferol 44807 Unit(s) Oral every week  folic acid 1 milliGRAM(s) Oral daily  furosemide    Tablet 40 milliGRAM(s) Oral daily  heparin  Injectable 5000 Unit(s) SubCutaneous every 8 hours  lactulose Syrup 20 Gram(s) Oral daily  pantoprazole    Tablet 40 milliGRAM(s) Oral before breakfast  polyethylene glycol 3350 17 Gram(s) Oral every 12 hours  tamsulosin 0.4 milliGRAM(s) Oral at bedtime      VITALS/PHYSICAL EXAM  --------------------------------------------------------------------------------  T(C): 37.1 (11-03-18 @ 04:45), Max: 37.6 (11-02-18 @ 21:03)  HR: 73 (11-03-18 @ 04:45) (68 - 80)  BP: 117/63 (11-03-18 @ 04:45) (87/61 - 125/60)  RR: 18 (11-03-18 @ 04:45) (12 - 19)  SpO2: 98% (11-02-18 @ 19:25) (96% - 100%)  Wt(kg): --  Height (cm): 193.04 (11-02-18 @ 07:19)  Weight (kg): 117.6 (11-02-18 @ 07:19)  BMI (kg/m2): 31.6 (11-02-18 @ 07:19)  BSA (m2): 2.47 (11-02-18 @ 07:19)      11-02-18 @ 07:01  -  11-03-18 @ 07:00  --------------------------------------------------------  IN: 30 mL / OUT: 800 mL / NET: -770 mL      Physical Exam:  	Gen: NAD  	Pulm: CTA B/L  	CV:  S1S2; no rub  	Abd:  soft, nontender/nondistended  	LE: dressing  	Vascular access: tesio , av fistula     LABS/STUDIES  --------------------------------------------------------------------------------              7.3    9.21  >-----------<  329      [11-03-18 @ 07:40]              23.2     137  |  97  |  38  ----------------------------<  179      [11-01-18 @ 19:11]  4.3   |  29  |  4.5        Ca     8.9     [11-01-18 @ 19:11]      Mg     2.0     [11-01-18 @ 19:11]      Phos  3.7     [11-03-18 @ 07:40]            Creatinine Trend:  SCr 4.5 [11-01 @ 19:11]  SCr 4.1 [11-01 @ 06:38]  SCr 5.3 [10-31 @ 13:50]  SCr 4.5 [10-30 @ 08:20]  SCr 5.9 [10-29 @ 13:43]        Iron 38, TIBC 166, %sat 23      [09-19-18 @ 04:30]  Ferritin 55      [03-09-18 @ 18:01]  PTH -- (Ca 8.2)      [09-22-18 @ 08:40]   2071  PTH -- (Ca 8.0)      [09-19-18 @ 04:30]   1519  PTH -- (Ca 8.3)      [03-09-18 @ 18:01]   1545  Vitamin D (25OH) 14.4      [03-09-18 @ 18:01]

## 2018-11-03 NOTE — PHYSICAL THERAPY INITIAL EVALUATION ADULT - GENERAL OBSERVATIONS, REHAB EVAL
Chart reviewed, attempted to see pt for PT IE, pt rec in bed in NAD, pt declined PT stating that MD told him to stay off his L foot today, PT called vascular (spectra [phone]) who stated pt has no restrictions, PT relayed this info to the pt, he was still reluctant to get up, he does not want to injure his L foot, PT educated pt on the benefits of OOB to chair and on supine UE/LE therex, pt verbalized understanding of all, pt left in NAD, will f/u for IE 15:12-15:38 26 min  Chart reviewed, attempted to see pt for PT IE, pt rec in bed in NAD, pt declined PT stating that MD told him to stay off his L foot today, PT called vascular (spectra [phone]) who stated pt has no restrictions, PT relayed this info to the pt, he was still reluctant to get up, he does not want to injure his L foot, PT educated pt on the benefits of OOB to chair and on supine UE/LE therex, pt verbalized understanding of all, pt left in NAD, will f/u for IE

## 2018-11-03 NOTE — PROGRESS NOTE ADULT - ASSESSMENT
Assessment:  57yMale patient S/P L heel debridement    Plan:    First dressing change to be done Sunday, w/ saline irrigation, 4x4, kerlix.   F/u BP  Trend H/H  Reg diet  IS  Pain control

## 2018-11-03 NOTE — PROGRESS NOTE ADULT - SUBJECTIVE AND OBJECTIVE BOX
SU SAWYER  57y Male   [id number]    Hospital Day:   Post Operative Day:    Procedure/dx: s/p left heel debridement 11/2    Patient is a 57y old  Male who presents with a chief complaint of foot ulcer (02 Nov 2018 19:12)    PAST MEDICAL & SURGICAL HISTORY:  Dyslipidemia  DM (diabetes mellitus), type 2  Neuropathy  HTN (hypertension)  CAD (coronary atherosclerotic disease)  Acromegaly  CKD (chronic kidney disease), stage V  H/O eye surgery  H/O: pituitary tumor: s/p removal      Vital Signs Last 24 Hrs  T(C): 37.1 (03 Nov 2018 04:45), Max: 37.6 (02 Nov 2018 21:03)  T(F): 98.7 (03 Nov 2018 04:45), Max: 99.6 (02 Nov 2018 21:03)  HR: 73 (03 Nov 2018 04:45) (68 - 80)  BP: 117/63 (03 Nov 2018 04:45) (87/61 - 125/60)  BP(mean): --  RR: 18 (03 Nov 2018 04:45) (12 - 19)  SpO2: 98% (02 Nov 2018 19:25) (96% - 100%)    Diet, Consistent Carbohydrate Renal w/Evening Snack (11-02-18 @ 08:59)    I&O's Detail    02 Nov 2018 07:01  -  03 Nov 2018 05:59  --------------------------------------------------------  IN:    Oral Fluid: 30 mL  Total IN: 30 mL    OUT:    Other: 800 mL  Total OUT: 800 mL    Total NET: -770 mL    MEDICATIONS  (STANDING):  ampicillin/sulbactam  IVPB 3 Gram(s) IV Intermittent daily  aspirin enteric coated 81 milliGRAM(s) Oral daily  atorvastatin 20 milliGRAM(s) Oral at bedtime  bisacodyl Suppository 10 milliGRAM(s) Rectal daily  calcium acetate 1334 milliGRAM(s) Oral four times a day with meals  chlorhexidine 4% Liquid 1 Application(s) Topical <User Schedule>  clindamycin IVPB 900 milliGRAM(s) IV Intermittent every 8 hours  darbepoetin Injectable Syringe 40 MICROGram(s) IV Push <User Schedule>  docusate sodium 100 milliGRAM(s) Oral two times a day  ergocalciferol 96047 Unit(s) Oral every week  folic acid 1 milliGRAM(s) Oral daily  furosemide    Tablet 40 milliGRAM(s) Oral daily  heparin  Injectable 5000 Unit(s) SubCutaneous every 8 hours  lactulose Syrup 20 Gram(s) Oral daily  pantoprazole    Tablet 40 milliGRAM(s) Oral before breakfast  polyethylene glycol 3350 17 Gram(s) Oral every 12 hours  tamsulosin 0.4 milliGRAM(s) Oral at bedtime    MEDICATIONS  (PRN):  acetaminophen   Tablet .. 650 milliGRAM(s) Oral every 6 hours PRN Temp greater or equal to 38C (100.4F)  oxyCODONE    5 mG/acetaminophen 325 mG 1 Tablet(s) Oral every 4 hours PRN Moderate Pain (4 - 6)    PHYSICAL EXAM:  GENERAL: NAD, well-developed  EYES: conjunctiva and sclera clear  NECK: Supple. No JVD.   CHEST/LUNG: Clear to auscultation bilaterally;   HEART: S1 and S2  ABDOMEN: Soft, Nontender, Nondistended;  EXTREMITIES: Leg dressing in place, intact. Motor and sensory grosly intact. No cyanosis, or edema  PSYCH: AAOx3  NEUROLOGY: non-focal deficits  SKIN: No rashes or lesions    LABS:                         7.7    10.30 )-----------( 366      ( 01 Nov 2018 19:11 )             25.3        11-01    137  |  97<L>  |  38<H>  ----------------------------<  179<H>  4.3   |  29  |  4.5<HH>    Ca    8.9      01 Nov 2018 19:11  Phos  3.1     11-01  Mg     2.0     11-01      IMAGING:  None

## 2018-11-03 NOTE — PROGRESS NOTE ADULT - SUBJECTIVE AND OBJECTIVE BOX
SU SAWYER  57y  Male  HPI:  58y/o M w/ hx of brain tumor--acromegaly, diabetes, htn, ckd stage on dialysis MWF, makes urine, htn, 2 blood transfusion recently being worked up--  pt on rehab after recent admission for infection to feet presents for hg under 7 at rehab-[facility]; and for worsening of left leg ulcer.  pt had dialysis today and was given iv antibiotics after.  pt has been feeling chills; no fever. no abdominal pain, cp or sob. +generalized weakness.  no blood in stool (22 Oct 2018 19:39)    MEDICATIONS  (STANDING):  ampicillin/sulbactam  IVPB 3 Gram(s) IV Intermittent daily  aspirin enteric coated 81 milliGRAM(s) Oral daily  atorvastatin 20 milliGRAM(s) Oral at bedtime  bisacodyl Suppository 10 milliGRAM(s) Rectal daily  calcium acetate 1334 milliGRAM(s) Oral four times a day with meals  chlorhexidine 4% Liquid 1 Application(s) Topical <User Schedule>  clindamycin IVPB 900 milliGRAM(s) IV Intermittent every 8 hours  darbepoetin Injectable Syringe 40 MICROGram(s) IV Push <User Schedule>  docusate sodium 100 milliGRAM(s) Oral two times a day  ergocalciferol 68774 Unit(s) Oral every week  folic acid 1 milliGRAM(s) Oral daily  furosemide    Tablet 40 milliGRAM(s) Oral daily  heparin  Injectable 5000 Unit(s) SubCutaneous every 8 hours  lactulose Syrup 20 Gram(s) Oral daily  pantoprazole    Tablet 40 milliGRAM(s) Oral before breakfast  polyethylene glycol 3350 17 Gram(s) Oral every 12 hours  tamsulosin 0.4 milliGRAM(s) Oral at bedtime    MEDICATIONS  (PRN):  acetaminophen   Tablet .. 650 milliGRAM(s) Oral every 6 hours PRN Temp greater or equal to 38C (100.4F)  oxyCODONE    5 mG/acetaminophen 325 mG 1 Tablet(s) Oral every 4 hours PRN Moderate Pain (4 - 6)    INTERVAL EVENTS: Patient seen today without distress nor complaints. Last BM Wednesday?    T(C): 37.1 (11-03-18 @ 04:45), Max: 37.6 (11-02-18 @ 21:03)  HR: 73 (11-03-18 @ 04:45) (72 - 80)  BP: 117/63 (11-03-18 @ 04:45) (92/55 - 125/60)  RR: 18 (11-03-18 @ 04:45) (16 - 19)  SpO2: 98% (11-02-18 @ 19:25) (98% - 98%)  Wt(kg): --Vital Signs Last 24 Hrs  T(C): 37.1 (03 Nov 2018 04:45), Max: 37.6 (02 Nov 2018 21:03)  T(F): 98.7 (03 Nov 2018 04:45), Max: 99.6 (02 Nov 2018 21:03)  HR: 73 (03 Nov 2018 04:45) (72 - 80)  BP: 117/63 (03 Nov 2018 04:45) (92/55 - 125/60)  BP(mean): --  RR: 18 (03 Nov 2018 04:45) (16 - 19)  SpO2: 98% (02 Nov 2018 19:25) (98% - 98%)    PHYSICAL EXAM:  GENERAL: NAD  NECK: Supple, No JVD  CHEST/LUNG: Clear  HEART: S1, S2  ABDOMEN: Soft, Nontender, Nondistended; Bowel sounds present  EXTREMITIES: No  edema  SKIN: Dressings intact, no significant drainage, + odor    LABS:  Labs:                        7.3    9.21  )-----------( 329      ( 03 Nov 2018 07:40 )             23.2             11-01    137  |  97<L>  |  38<H>  ----------------------------<  179<H>  4.3   |  29  |  4.5<HH>    Ca    8.9      01 Nov 2018 19:11  Phos  3.7     11-03  Mg     2.0     11-01      Culture - Acid Fast - Other w/Smear (collected 02 Nov 2018 11:29)  Source: .Other None    Culture - Acid Fast - Other w/Smear (collected 02 Nov 2018 11:29)  Source: .Other None      RADIOLOGY & ADDITIONAL TESTS:

## 2018-11-03 NOTE — PHYSICAL THERAPY INITIAL EVALUATION ADULT - RANGE OF MOTION EXAMINATION, REHAB EVAL
B UE shldr flex <90*, as per pt he has RTC tears, elb/hand WFL, pt reports B hips and knees are "ok", unable to DF ankles 2* pt reports neuropathy

## 2018-11-04 LAB
-  AMIKACIN: SIGNIFICANT CHANGE UP
-  AMOXICILLIN/CLAVULANIC ACID: SIGNIFICANT CHANGE UP
-  AMPICILLIN/SULBACTAM: SIGNIFICANT CHANGE UP
-  AMPICILLIN: SIGNIFICANT CHANGE UP
-  AMPICILLIN: SIGNIFICANT CHANGE UP
-  AZTREONAM: SIGNIFICANT CHANGE UP
-  CEFAZOLIN: SIGNIFICANT CHANGE UP
-  CEFEPIME: SIGNIFICANT CHANGE UP
-  CEFOXITIN: SIGNIFICANT CHANGE UP
-  CEFTRIAXONE: SIGNIFICANT CHANGE UP
-  CIPROFLOXACIN: SIGNIFICANT CHANGE UP
-  CLINDAMYCIN: SIGNIFICANT CHANGE UP
-  CLINDAMYCIN: SIGNIFICANT CHANGE UP
-  ERTAPENEM: SIGNIFICANT CHANGE UP
-  ERYTHROMYCIN: SIGNIFICANT CHANGE UP
-  ERYTHROMYCIN: SIGNIFICANT CHANGE UP
-  GENTAMICIN: SIGNIFICANT CHANGE UP
-  LEVOFLOXACIN: SIGNIFICANT CHANGE UP
-  MEROPENEM: SIGNIFICANT CHANGE UP
-  OXACILLIN: SIGNIFICANT CHANGE UP
-  OXACILLIN: SIGNIFICANT CHANGE UP
-  PENICILLIN: SIGNIFICANT CHANGE UP
-  PENICILLIN: SIGNIFICANT CHANGE UP
-  PIPERACILLIN/TAZOBACTAM: SIGNIFICANT CHANGE UP
-  RIFAMPIN: SIGNIFICANT CHANGE UP
-  RIFAMPIN: SIGNIFICANT CHANGE UP
-  TETRACYCLINE: SIGNIFICANT CHANGE UP
-  TOBRAMYCIN: SIGNIFICANT CHANGE UP
-  TRIMETHOPRIM/SULFAMETHOXAZOLE: SIGNIFICANT CHANGE UP
-  VANCOMYCIN: SIGNIFICANT CHANGE UP
ANION GAP SERPL CALC-SCNC: 27 MMOL/L — HIGH (ref 7–14)
BLD GP AB SCN SERPL QL: SIGNIFICANT CHANGE UP
BUN SERPL-MCNC: 31 MG/DL — HIGH (ref 10–20)
CALCIUM SERPL-MCNC: 7.2 MG/DL — LOW (ref 8.5–10.1)
CHLORIDE SERPL-SCNC: 101 MMOL/L — SIGNIFICANT CHANGE UP (ref 98–110)
CO2 SERPL-SCNC: 23 MMOL/L — SIGNIFICANT CHANGE UP (ref 17–32)
CREAT SERPL-MCNC: 4.1 MG/DL — CRITICAL HIGH (ref 0.7–1.5)
CULTURE RESULTS: SIGNIFICANT CHANGE UP
CULTURE RESULTS: SIGNIFICANT CHANGE UP
GLUCOSE BLDC GLUCOMTR-MCNC: 144 MG/DL — HIGH (ref 70–99)
GLUCOSE BLDC GLUCOMTR-MCNC: 160 MG/DL — HIGH (ref 70–99)
GLUCOSE BLDC GLUCOMTR-MCNC: 168 MG/DL — HIGH (ref 70–99)
GLUCOSE BLDC GLUCOMTR-MCNC: 188 MG/DL — HIGH (ref 70–99)
GLUCOSE SERPL-MCNC: 127 MG/DL — HIGH (ref 70–99)
HCT VFR BLD CALC: 20.1 % — LOW (ref 42–52)
HCT VFR BLD CALC: 24.6 % — LOW (ref 42–52)
HGB BLD-MCNC: 6.3 G/DL — CRITICAL LOW (ref 14–18)
HGB BLD-MCNC: 7.7 G/DL — LOW (ref 14–18)
MCHC RBC-ENTMCNC: 28 PG — SIGNIFICANT CHANGE UP (ref 27–31)
MCHC RBC-ENTMCNC: 28.8 PG — SIGNIFICANT CHANGE UP (ref 27–31)
MCHC RBC-ENTMCNC: 31.3 G/DL — LOW (ref 32–37)
MCHC RBC-ENTMCNC: 31.3 G/DL — LOW (ref 32–37)
MCV RBC AUTO: 89.5 FL — SIGNIFICANT CHANGE UP (ref 80–94)
MCV RBC AUTO: 91.8 FL — SIGNIFICANT CHANGE UP (ref 80–94)
METHOD TYPE: SIGNIFICANT CHANGE UP
NRBC # BLD: 0 /100 WBCS — SIGNIFICANT CHANGE UP (ref 0–0)
NRBC # BLD: 0 /100 WBCS — SIGNIFICANT CHANGE UP (ref 0–0)
ORGANISM # SPEC MICROSCOPIC CNT: SIGNIFICANT CHANGE UP
PHOSPHATE SERPL-MCNC: 3.4 MG/DL — SIGNIFICANT CHANGE UP (ref 2.1–4.9)
PLATELET # BLD AUTO: 281 K/UL — SIGNIFICANT CHANGE UP (ref 130–400)
PLATELET # BLD AUTO: 332 K/UL — SIGNIFICANT CHANGE UP (ref 130–400)
POTASSIUM SERPL-MCNC: 4.1 MMOL/L — SIGNIFICANT CHANGE UP (ref 3.5–5)
POTASSIUM SERPL-SCNC: 4.1 MMOL/L — SIGNIFICANT CHANGE UP (ref 3.5–5)
RBC # BLD: 2.19 M/UL — LOW (ref 4.7–6.1)
RBC # BLD: 2.75 M/UL — LOW (ref 4.7–6.1)
RBC # FLD: 15.1 % — HIGH (ref 11.5–14.5)
RBC # FLD: 16.8 % — HIGH (ref 11.5–14.5)
SODIUM SERPL-SCNC: 151 MMOL/L — HIGH (ref 135–146)
SPECIMEN SOURCE: SIGNIFICANT CHANGE UP
SPECIMEN SOURCE: SIGNIFICANT CHANGE UP
TYPE + AB SCN PNL BLD: SIGNIFICANT CHANGE UP
WBC # BLD: 10.28 K/UL — SIGNIFICANT CHANGE UP (ref 4.8–10.8)
WBC # BLD: 7.59 K/UL — SIGNIFICANT CHANGE UP (ref 4.8–10.8)
WBC # FLD AUTO: 10.28 K/UL — SIGNIFICANT CHANGE UP (ref 4.8–10.8)
WBC # FLD AUTO: 7.59 K/UL — SIGNIFICANT CHANGE UP (ref 4.8–10.8)

## 2018-11-04 RX ADMIN — TAMSULOSIN HYDROCHLORIDE 0.4 MILLIGRAM(S): 0.4 CAPSULE ORAL at 22:05

## 2018-11-04 RX ADMIN — Medication 40 MILLIGRAM(S): at 05:55

## 2018-11-04 RX ADMIN — Medication 81 MILLIGRAM(S): at 11:34

## 2018-11-04 RX ADMIN — ATORVASTATIN CALCIUM 20 MILLIGRAM(S): 80 TABLET, FILM COATED ORAL at 22:05

## 2018-11-04 RX ADMIN — POLYETHYLENE GLYCOL 3350 17 GRAM(S): 17 POWDER, FOR SOLUTION ORAL at 05:56

## 2018-11-04 RX ADMIN — Medication 1 MILLIGRAM(S): at 11:34

## 2018-11-04 RX ADMIN — Medication 10 MILLIGRAM(S): at 11:35

## 2018-11-04 RX ADMIN — PANTOPRAZOLE SODIUM 40 MILLIGRAM(S): 20 TABLET, DELAYED RELEASE ORAL at 05:55

## 2018-11-04 RX ADMIN — Medication 100 MILLIGRAM(S): at 17:31

## 2018-11-04 RX ADMIN — Medication 1334 MILLIGRAM(S): at 22:05

## 2018-11-04 RX ADMIN — Medication 100 MILLIGRAM(S): at 22:05

## 2018-11-04 RX ADMIN — Medication 100 MILLIGRAM(S): at 14:56

## 2018-11-04 RX ADMIN — HEPARIN SODIUM 5000 UNIT(S): 5000 INJECTION INTRAVENOUS; SUBCUTANEOUS at 05:55

## 2018-11-04 RX ADMIN — Medication 1334 MILLIGRAM(S): at 07:53

## 2018-11-04 RX ADMIN — HEPARIN SODIUM 5000 UNIT(S): 5000 INJECTION INTRAVENOUS; SUBCUTANEOUS at 22:05

## 2018-11-04 RX ADMIN — Medication 1334 MILLIGRAM(S): at 17:31

## 2018-11-04 RX ADMIN — LACTULOSE 20 GRAM(S): 10 SOLUTION ORAL at 11:35

## 2018-11-04 RX ADMIN — Medication 100 MILLIGRAM(S): at 05:55

## 2018-11-04 RX ADMIN — Medication 1334 MILLIGRAM(S): at 11:35

## 2018-11-04 RX ADMIN — AMPICILLIN SODIUM AND SULBACTAM SODIUM 200 GRAM(S): 250; 125 INJECTION, POWDER, FOR SUSPENSION INTRAMUSCULAR; INTRAVENOUS at 11:56

## 2018-11-04 RX ADMIN — CHLORHEXIDINE GLUCONATE 1 APPLICATION(S): 213 SOLUTION TOPICAL at 05:54

## 2018-11-04 NOTE — PROGRESS NOTE ADULT - ASSESSMENT
Assessment 58y/o M w/ hx of neuropathy, acromegaly, diabetes, htn, CKD MWF, makes urine, htn [facility] LLE ulcer with Dr. Gomes. Vascular studies showed severe PVD and is transferred to [place] for angiogram.    Plan  Severe PVD  - post angiogram, and left SFA angioplasty, stenting  - vascular surgery f/u appreciated  - podiatry debrided today  - on Unasyn and Clindamycin  - first dressing change today? not done as of yet  - further plans pending evaluation    DM with vascular and renal manifestations  DFU - left foot  - post debridement infected heel ulcer to the calcaneus  - continue Unasyn, Clindamycin   - continue basal insulin    Constipation  - continue rx, multiple  - monitor, may need to be digitally disimpacted    ESRD on HMD  - MWF  - renal following  - rx as per renal    Anemia  - post transfusion   - EDWIN 40 mcg weekly  - monitor cbc    HTN  - Lasix discontinued today   - monitor BP    HLD  - continue Atorvastatin    DVT ppx: heparin subq  GI ppx: protonix  Diet: consistent carbohydrate  Activity: Bedrest    Supportive measures, further plan pending the above

## 2018-11-04 NOTE — PROGRESS NOTE ADULT - SUBJECTIVE AND OBJECTIVE BOX
Progress Note: General Surgery  Patient: SU SAWYER , 58y (1960)Male   MRN: [id number]  Location: [address]  Visit: 10-22-18 Inpatient  Date: 11-04-18 @ 02:01    Procedure/Diagnosis: S/P L heel debridement    Events/ 24h: No acute events overnight. Pain controlled.    Vitals: T(F): 96.8 (11-03-18 @ 20:35), Max: 98.7 (11-03-18 @ 04:45)  HR: 83 (11-03-18 @ 20:35)  BP: 144/69 (11-03-18 @ 20:35) (117/63 - 144/69)  RR: 18 (11-03-18 @ 20:35)  SpO2: --    In:   11-02-18 @ 07:01  -  11-03-18 @ 07:00  --------------------------------------------------------  IN: 30 mL      Out:   11-02-18 @ 07:01  -  11-03-18 @ 07:00  --------------------------------------------------------  OUT:    Other: 800 mL  Total OUT: 800 mL        Net:   11-02-18 @ 07:01  -  11-03-18 @ 07:00  --------------------------------------------------------  NET: -770 mL        Diet: Diet, Consistent Carbohydrate Renal w/Evening Snack (11-02-18 @ 08:59)    IV Fluids: calcium acetate 1334 milliGRAM(s) Oral four times a day with meals  ergocalciferol 67216 Unit(s) Oral every week  folic acid 1 milliGRAM(s) Oral daily      Physical Examination:  General Appearance: NAD  HEENT: EOMI, sclera non-icteric.  Heart: RRR   Lungs: CTABL.   Abdomen:  Soft, nontender, nondistended. No rigidity, guarding, or rebound tenderness.   MSK/Extremities: Dressing c/d/i. Warm & well-perfused. Peripheral pulses intact.  Skin: Warm, dry. No jaundice.       Medications: [Standing]  ampicillin/sulbactam  IVPB 3 Gram(s) IV Intermittent daily  aspirin enteric coated 81 milliGRAM(s) Oral daily  atorvastatin 20 milliGRAM(s) Oral at bedtime  bisacodyl Suppository 10 milliGRAM(s) Rectal daily  calcium acetate 1334 milliGRAM(s) Oral four times a day with meals  chlorhexidine 4% Liquid 1 Application(s) Topical <User Schedule>  clindamycin IVPB 900 milliGRAM(s) IV Intermittent every 8 hours  darbepoetin Injectable Syringe 40 MICROGram(s) IV Push <User Schedule>  docusate sodium 100 milliGRAM(s) Oral two times a day  ergocalciferol 52612 Unit(s) Oral every week  folic acid 1 milliGRAM(s) Oral daily  furosemide    Tablet 40 milliGRAM(s) Oral daily  heparin  Injectable 5000 Unit(s) SubCutaneous every 8 hours  lactulose Syrup 20 Gram(s) Oral daily  pantoprazole    Tablet 40 milliGRAM(s) Oral before breakfast  polyethylene glycol 3350 17 Gram(s) Oral every 12 hours  tamsulosin 0.4 milliGRAM(s) Oral at bedtime    DVT Prophylaxis: heparin  Injectable 5000 Unit(s) SubCutaneous every 8 hours    GI Prophylaxis: pantoprazole    Tablet 40 milliGRAM(s) Oral before breakfast    Antibiotics: ampicillin/sulbactam  IVPB 3 Gram(s) IV Intermittent daily  clindamycin IVPB 900 milliGRAM(s) IV Intermittent every 8 hours    Anticoagulation:   Medications:[PRN]  acetaminophen   Tablet .. 650 milliGRAM(s) Oral every 6 hours PRN  oxyCODONE    5 mG/acetaminophen 325 mG 1 Tablet(s) Oral every 4 hours PRN      Labs:                        7.3    9.21  )-----------( 329      ( 03 Nov 2018 07:40 )             23.2       Phos  3.7     11-03                Urine/Micro:    Culture - Acid Fast - Other w/Smear (collected 02 Nov 2018 11:29)  Source: .Other None    Culture - Surgical Swab (collected 02 Nov 2018 11:29)  Source: .Surgical Swab ulcer left foot  Preliminary Report (03 Nov 2018 21:18):    Rare Proteus mirabilis    Rare Staphylococcus aureus    Culture - Acid Fast - Other w/Smear (collected 02 Nov 2018 11:29)  Source: .Other None    Culture - Surgical Swab (collected 02 Nov 2018 11:21)  Source: .Surgical Swab left foot  Preliminary Report (03 Nov 2018 21:58):    Few Proteus mirabilis    See previous culture [id number]    Few Coag Negative Staphylococcus    Few Enterococcus faecalis      Imaging:     < from: Xray Foot AP + Lateral, Left (10.22.18 @ 19:06) >  Impression:    Diffuse dorsal soft tissue swelling, without any evidence of soft tissue   gas.     Diffuse vascular calcifications. Degenerative changes of 1st MTP joint   and tarsal joints.     < end of copied text >      Assessment:  58y Male patient admitted S/P L heel debridement    Plan:    First dressing change to be done Sunday, w/ saline irrigation, 4x4, kerlix.   Got 1U intraop for hypotension and Hb of 7.7. EBL 50  F/u BP  Trend H/H  Reg diet  IS  Pain control  DVT/GI ppx  OOBAT  IS  Pain control    Date/Time: 11-04-18 @ 02:01

## 2018-11-04 NOTE — PROGRESS NOTE ADULT - SUBJECTIVE AND OBJECTIVE BOX
SU SAWYER  58y  Male  HPI:  56y/o M w/ hx of brain tumor--acromegaly, diabetes, htn, ckd stage on dialysis MWF, makes urine, htn, 2 blood transfusion recently being worked up--  pt on rehab after recent admission for infection to feet presents for hg under 7 at rehab-[facility]; and for worsening of left leg ulcer.  pt had dialysis today and was given iv antibiotics after.  pt has been feeling chills; no fever. no abdominal pain, cp or sob. +generalized weakness.  no blood in stool (22 Oct 2018 19:39)    MEDICATIONS  (STANDING):  ampicillin/sulbactam  IVPB 3 Gram(s) IV Intermittent daily  aspirin enteric coated 81 milliGRAM(s) Oral daily  atorvastatin 20 milliGRAM(s) Oral at bedtime  bisacodyl Suppository 10 milliGRAM(s) Rectal daily  calcium acetate 1334 milliGRAM(s) Oral four times a day with meals  chlorhexidine 4% Liquid 1 Application(s) Topical <User Schedule>  clindamycin IVPB 900 milliGRAM(s) IV Intermittent every 8 hours  darbepoetin Injectable Syringe 40 MICROGram(s) IV Push <User Schedule>  docusate sodium 100 milliGRAM(s) Oral two times a day  ergocalciferol 95865 Unit(s) Oral every week  folic acid 1 milliGRAM(s) Oral daily  furosemide    Tablet 40 milliGRAM(s) Oral daily  heparin  Injectable 5000 Unit(s) SubCutaneous every 8 hours  lactulose Syrup 20 Gram(s) Oral daily  pantoprazole    Tablet 40 milliGRAM(s) Oral before breakfast  polyethylene glycol 3350 17 Gram(s) Oral every 12 hours  tamsulosin 0.4 milliGRAM(s) Oral at bedtime    MEDICATIONS  (PRN):  acetaminophen   Tablet .. 650 milliGRAM(s) Oral every 6 hours PRN Temp greater or equal to 38C (100.4F)  oxyCODONE    5 mG/acetaminophen 325 mG 1 Tablet(s) Oral every 4 hours PRN Moderate Pain (4 - 6)    INTERVAL EVENTS: Patient seen today without distress, sitting on the side of the bed with his feet in the dependent position. Toes of his left foot mottled, he states reverses when reclined. Patient still with no BM since Wednesday.    T(C): 35.6 (11-04-18 @ 04:56), Max: 37 (11-03-18 @ 14:30)  HR: 73 (11-04-18 @ 04:56) (73 - 83)  BP: 132/65 (11-04-18 @ 04:56) (132/65 - 144/69)  RR: 18 (11-04-18 @ 04:56) (18 - 18)  SpO2: 94% (11-03-18 @ 21:00) (94% - 94%)  Wt(kg): --Vital Signs Last 24 Hrs  T(C): 35.6 (04 Nov 2018 04:56), Max: 37 (03 Nov 2018 14:30)  T(F): 96.1 (04 Nov 2018 04:56), Max: 98.6 (03 Nov 2018 14:30)  HR: 73 (04 Nov 2018 04:56) (73 - 83)  BP: 132/65 (04 Nov 2018 04:56) (132/65 - 144/69)  BP(mean): --  RR: 18 (04 Nov 2018 04:56) (18 - 18)  SpO2: 94% (03 Nov 2018 21:00) (94% - 94%)    PHYSICAL EXAM:  GENERAL: NAD  NECK: Supple, No JVD, right chest wall catheter  CHEST/LUNG: Clear  HEART: S1, S2  ABDOMEN: Soft, Nontender, Nondistended, Bowel sounds present  EXTREMITIES: dressings in place      LABS:    Culture - Acid Fast - Other w/Smear (collected 02 Nov 2018 11:29)  Source: .Other None    Culture - Surgical Swab (collected 02 Nov 2018 11:29)  Source: .Surgical Swab ulcer left foot  Preliminary Report (03 Nov 2018 21:18):    Rare Proteus mirabilis    Rare Staphylococcus aureus    Culture - Acid Fast - Other w/Smear (collected 02 Nov 2018 11:29)  Source: .Other None    Culture - Surgical Swab (collected 02 Nov 2018 11:21)  Source: .Surgical Swab left foot  Preliminary Report (03 Nov 2018 21:58):    Few Proteus mirabilis    See previous culture [id number]    Few Coag Negative Staphylococcus    Few Enterococcus faecalis      RADIOLOGY & ADDITIONAL TESTS:

## 2018-11-05 LAB
ANION GAP SERPL CALC-SCNC: 17 MMOL/L — HIGH (ref 7–14)
BASOPHILS # BLD AUTO: 0.08 K/UL — SIGNIFICANT CHANGE UP (ref 0–0.2)
BASOPHILS NFR BLD AUTO: 0.9 % — SIGNIFICANT CHANGE UP (ref 0–1)
BUN SERPL-MCNC: 41 MG/DL — HIGH (ref 10–20)
CALCIUM SERPL-MCNC: 8.9 MG/DL — SIGNIFICANT CHANGE UP (ref 8.5–10.1)
CHLORIDE SERPL-SCNC: 98 MMOL/L — SIGNIFICANT CHANGE UP (ref 98–110)
CO2 SERPL-SCNC: 22 MMOL/L — SIGNIFICANT CHANGE UP (ref 17–32)
CREAT SERPL-MCNC: 5.3 MG/DL — CRITICAL HIGH (ref 0.7–1.5)
EOSINOPHIL # BLD AUTO: 0.48 K/UL — SIGNIFICANT CHANGE UP (ref 0–0.7)
EOSINOPHIL NFR BLD AUTO: 5.4 % — SIGNIFICANT CHANGE UP (ref 0–8)
GLUCOSE BLDC GLUCOMTR-MCNC: 138 MG/DL — HIGH (ref 70–99)
GLUCOSE BLDC GLUCOMTR-MCNC: 158 MG/DL — HIGH (ref 70–99)
GLUCOSE BLDC GLUCOMTR-MCNC: 166 MG/DL — HIGH (ref 70–99)
GLUCOSE BLDC GLUCOMTR-MCNC: 180 MG/DL — HIGH (ref 70–99)
GLUCOSE SERPL-MCNC: 154 MG/DL — HIGH (ref 70–99)
HCT VFR BLD CALC: 26.2 % — LOW (ref 42–52)
HGB BLD-MCNC: 8.1 G/DL — LOW (ref 14–18)
IMM GRANULOCYTES NFR BLD AUTO: 1 % — HIGH (ref 0.1–0.3)
LYMPHOCYTES # BLD AUTO: 1.41 K/UL — SIGNIFICANT CHANGE UP (ref 1.2–3.4)
LYMPHOCYTES # BLD AUTO: 15.8 % — LOW (ref 20.5–51.1)
MCHC RBC-ENTMCNC: 28 PG — SIGNIFICANT CHANGE UP (ref 27–31)
MCHC RBC-ENTMCNC: 30.9 G/DL — LOW (ref 32–37)
MCV RBC AUTO: 90.7 FL — SIGNIFICANT CHANGE UP (ref 80–94)
MONOCYTES # BLD AUTO: 0.77 K/UL — HIGH (ref 0.1–0.6)
MONOCYTES NFR BLD AUTO: 8.6 % — SIGNIFICANT CHANGE UP (ref 1.7–9.3)
NEUTROPHILS # BLD AUTO: 6.12 K/UL — SIGNIFICANT CHANGE UP (ref 1.4–6.5)
NEUTROPHILS NFR BLD AUTO: 68.3 % — SIGNIFICANT CHANGE UP (ref 42.2–75.2)
NRBC # BLD: 0 /100 WBCS — SIGNIFICANT CHANGE UP (ref 0–0)
PLATELET # BLD AUTO: 385 K/UL — SIGNIFICANT CHANGE UP (ref 130–400)
POTASSIUM SERPL-MCNC: 4.7 MMOL/L — SIGNIFICANT CHANGE UP (ref 3.5–5)
POTASSIUM SERPL-SCNC: 4.7 MMOL/L — SIGNIFICANT CHANGE UP (ref 3.5–5)
RBC # BLD: 2.89 M/UL — LOW (ref 4.7–6.1)
RBC # FLD: 16.8 % — HIGH (ref 11.5–14.5)
SODIUM SERPL-SCNC: 137 MMOL/L — SIGNIFICANT CHANGE UP (ref 135–146)
WBC # BLD: 8.95 K/UL — SIGNIFICANT CHANGE UP (ref 4.8–10.8)
WBC # FLD AUTO: 8.95 K/UL — SIGNIFICANT CHANGE UP (ref 4.8–10.8)

## 2018-11-05 RX ADMIN — Medication 1334 MILLIGRAM(S): at 11:41

## 2018-11-05 RX ADMIN — HEPARIN SODIUM 5000 UNIT(S): 5000 INJECTION INTRAVENOUS; SUBCUTANEOUS at 13:18

## 2018-11-05 RX ADMIN — Medication 100 MILLIGRAM(S): at 05:29

## 2018-11-05 RX ADMIN — Medication 100 MILLIGRAM(S): at 13:18

## 2018-11-05 RX ADMIN — Medication 100 MILLIGRAM(S): at 05:37

## 2018-11-05 RX ADMIN — CHLORHEXIDINE GLUCONATE 1 APPLICATION(S): 213 SOLUTION TOPICAL at 05:27

## 2018-11-05 RX ADMIN — Medication 1334 MILLIGRAM(S): at 21:29

## 2018-11-05 RX ADMIN — TAMSULOSIN HYDROCHLORIDE 0.4 MILLIGRAM(S): 0.4 CAPSULE ORAL at 21:29

## 2018-11-05 RX ADMIN — Medication 1334 MILLIGRAM(S): at 07:58

## 2018-11-05 RX ADMIN — HEPARIN SODIUM 5000 UNIT(S): 5000 INJECTION INTRAVENOUS; SUBCUTANEOUS at 21:30

## 2018-11-05 RX ADMIN — AMPICILLIN SODIUM AND SULBACTAM SODIUM 200 GRAM(S): 250; 125 INJECTION, POWDER, FOR SUSPENSION INTRAMUSCULAR; INTRAVENOUS at 11:41

## 2018-11-05 RX ADMIN — PANTOPRAZOLE SODIUM 40 MILLIGRAM(S): 20 TABLET, DELAYED RELEASE ORAL at 05:41

## 2018-11-05 RX ADMIN — Medication 100 MILLIGRAM(S): at 21:31

## 2018-11-05 RX ADMIN — ATORVASTATIN CALCIUM 20 MILLIGRAM(S): 80 TABLET, FILM COATED ORAL at 21:30

## 2018-11-05 RX ADMIN — HEPARIN SODIUM 5000 UNIT(S): 5000 INJECTION INTRAVENOUS; SUBCUTANEOUS at 05:29

## 2018-11-05 RX ADMIN — Medication 81 MILLIGRAM(S): at 11:42

## 2018-11-05 RX ADMIN — Medication 40 MILLIGRAM(S): at 05:29

## 2018-11-05 RX ADMIN — Medication 1 MILLIGRAM(S): at 11:42

## 2018-11-05 RX ADMIN — Medication 1334 MILLIGRAM(S): at 17:18

## 2018-11-05 NOTE — PROGRESS NOTE ADULT - SUBJECTIVE AND OBJECTIVE BOX
seen and examined  no distress  lying comfortable       Standing Inpatient Medications  ampicillin/sulbactam  IVPB 3 Gram(s) IV Intermittent daily  aspirin enteric coated 81 milliGRAM(s) Oral daily  atorvastatin 20 milliGRAM(s) Oral at bedtime  bisacodyl Suppository 10 milliGRAM(s) Rectal daily  calcium acetate 1334 milliGRAM(s) Oral four times a day with meals  chlorhexidine 4% Liquid 1 Application(s) Topical <User Schedule>  clindamycin IVPB 900 milliGRAM(s) IV Intermittent every 8 hours  darbepoetin Injectable Syringe 40 MICROGram(s) IV Push <User Schedule>  docusate sodium 100 milliGRAM(s) Oral two times a day  ergocalciferol 12350 Unit(s) Oral every week  folic acid 1 milliGRAM(s) Oral daily  furosemide    Tablet 40 milliGRAM(s) Oral daily  heparin  Injectable 5000 Unit(s) SubCutaneous every 8 hours  lactulose Syrup 20 Gram(s) Oral daily  pantoprazole    Tablet 40 milliGRAM(s) Oral before breakfast  polyethylene glycol 3350 17 Gram(s) Oral every 12 hours  tamsulosin 0.4 milliGRAM(s) Oral at bedtime        VITALS/PHYSICAL EXAM  --------------------------------------------------------------------------------  T(C): 36.5 (11-05-18 @ 04:46), Max: 36.8 (11-04-18 @ 17:53)  HR: 74 (11-05-18 @ 04:46) (71 - 74)  BP: 118/58 (11-05-18 @ 04:46) (118/58 - 153/71)  RR: 18 (11-05-18 @ 04:46) (18 - 20)  SpO2: 99% (11-05-18 @ 05:41) (95% - 99%)  Wt(kg): --        11-04-18 @ 07:01  -  11-05-18 @ 07:00  --------------------------------------------------------  IN: 308 mL / OUT: 500 mL / NET: -192 mL      Physical Exam:  	Gen: NAD  	Pulm: CTA B/L  	CV: S1S2; no rub  	Abd: +distended  	LE: dressings   	Vascular access: av fistula/tesio     LABS/STUDIES  --------------------------------------------------------------------------------              7.7    10.28 >-----------<  332      [11-04-18 @ 22:38]              24.6     151  |  101  |  31  ----------------------------<  127      [11-04-18 @ 12:23]  4.1   |  23  |  4.1        Ca     7.2     [11-04-18 @ 12:23]      Phos  3.4     [11-04-18 @ 12:23]            Creatinine Trend:  SCr 4.1 [11-04 @ 12:23]  SCr 4.5 [11-01 @ 19:11]  SCr 4.1 [11-01 @ 06:38]  SCr 5.3 [10-31 @ 13:50]  SCr 4.5 [10-30 @ 08:20]        Iron 38, TIBC 166, %sat 23      [09-19-18 @ 04:30]  Ferritin 55      [03-09-18 @ 18:01]  PTH -- (Ca 8.2)      [09-22-18 @ 08:40]   2071  PTH -- (Ca 8.0)      [09-19-18 @ 04:30]   1519  PTH -- (Ca 8.3)      [03-09-18 @ 18:01]   1545  Vitamin D (25OH) 14.4      [03-09-18 @ 18:01]

## 2018-11-05 NOTE — PROGRESS NOTE ADULT - SUBJECTIVE AND OBJECTIVE BOX
SU SAWYER  58y Male   [id number]    Hospital Day: 3  Post Operative Day:3  Procedure:s/p debridement of left heel   Patient is a 58y old  Male who presents with a chief complaint of foot ulcer (04 Nov 2018 13:01)    PAST MEDICAL & SURGICAL HISTORY:  Dyslipidemia  DM (diabetes mellitus), type 2  Neuropathy  HTN (hypertension)  CAD (coronary atherosclerotic disease)  Acromegaly  CKD (chronic kidney disease), stage V  H/O eye surgery  H/O: pituitary tumor: s/p removal      Events of the Last 24h:dressing changed  Vital Signs Last 24 Hrs  T(C): 36.5 (05 Nov 2018 04:46), Max: 36.8 (04 Nov 2018 17:53)  T(F): 97.7 (05 Nov 2018 04:46), Max: 98.3 (04 Nov 2018 17:53)  HR: 74 (05 Nov 2018 04:46) (71 - 74)  BP: 118/58 (05 Nov 2018 04:46) (118/58 - 153/71)  BP(mean): --  RR: 18 (05 Nov 2018 04:46) (18 - 20)  SpO2: 95% (04 Nov 2018 20:39) (95% - 96%)        Diet, Consistent Carbohydrate Renal w/Evening Snack (11-02-18 @ 08:59)      I&O's Summary    04 Nov 2018 07:01  -  05 Nov 2018 05:33  --------------------------------------------------------  IN: 308 mL / OUT: 0 mL / NET: 308 mL     I&O's Detail    04 Nov 2018 07:01  -  05 Nov 2018 05:33  --------------------------------------------------------  IN:    PRBCs (Packed Red Blood Cells): 308 mL  Total IN: 308 mL    OUT:  Total OUT: 0 mL    Total NET: 308 mL          MEDICATIONS  (STANDING):  ampicillin/sulbactam  IVPB 3 Gram(s) IV Intermittent daily  aspirin enteric coated 81 milliGRAM(s) Oral daily  atorvastatin 20 milliGRAM(s) Oral at bedtime  bisacodyl Suppository 10 milliGRAM(s) Rectal daily  calcium acetate 1334 milliGRAM(s) Oral four times a day with meals  chlorhexidine 4% Liquid 1 Application(s) Topical <User Schedule>  clindamycin IVPB 900 milliGRAM(s) IV Intermittent every 8 hours  darbepoetin Injectable Syringe 40 MICROGram(s) IV Push <User Schedule>  docusate sodium 100 milliGRAM(s) Oral two times a day  ergocalciferol 63263 Unit(s) Oral every week  folic acid 1 milliGRAM(s) Oral daily  furosemide    Tablet 40 milliGRAM(s) Oral daily  heparin  Injectable 5000 Unit(s) SubCutaneous every 8 hours  lactulose Syrup 20 Gram(s) Oral daily  pantoprazole    Tablet 40 milliGRAM(s) Oral before breakfast  polyethylene glycol 3350 17 Gram(s) Oral every 12 hours  tamsulosin 0.4 milliGRAM(s) Oral at bedtime    MEDICATIONS  (PRN):  acetaminophen   Tablet .. 650 milliGRAM(s) Oral every 6 hours PRN Temp greater or equal to 38C (100.4F)  oxyCODONE    5 mG/acetaminophen 325 mG 1 Tablet(s) Oral every 4 hours PRN Moderate Pain (4 - 6)      PHYSICAL EXAM:    GENERAL: NAD    HEENT: NCAT    CHEST/LUNGS: CTAB    HEART: RRR,  No murmurs, rubs, or gallops    ABDOMEN: SNTND +BS    EXTREMITIES:  FROM, No clubbing, cyanosis, or edema, palpable pulse, left heel debrided xeroform and curlex dressed    NEURO: No focal neurological deficits    SKIN: No rashes or lesions    INCISION/WOUNDS:                          7.7    10.28 )-----------( 332      ( 04 Nov 2018 22:38 )             24.6        CBC Full  -  ( 04 Nov 2018 22:38 )  WBC Count : 10.28 K/uL  Hemoglobin : 7.7 g/dL  Hematocrit : 24.6 %  Platelet Count - Automated : 332 K/uL  Mean Cell Volume : 89.5 fL  Mean Cell Hemoglobin : 28.0 pg  Mean Cell Hemoglobin Concentration : 31.3 g/dL  Auto Neutrophil # : x  Auto Lymphocyte # : x  Auto Monocyte # : x  Auto Eosinophil # : x  Auto Basophil # : x  Auto Neutrophil % : x  Auto Lymphocyte % : x  Auto Monocyte % : x  Auto Eosinophil % : x  Auto Basophil % : x               151   |  101   |  31                 Ca: 7.2    BMP:   ----------------------------< 127    Mg: x     (11-04-18 @ 12:23)             4.1    |  23    | 4.1                Ph: 3.4                    Culture - Acid Fast - Other w/Smear (collected 02 Nov 2018 11:29)  Source: .Other None    Culture - Surgical Swab (collected 02 Nov 2018 11:29)  Source: .Surgical Swab ulcer left foot  Final Report (04 Nov 2018 21:49):    Rare Proteus mirabilis    Rare Staphylococcus aureus    Rare Enterococcus faecalis    See previous culture [id number]  Organism: Proteus mirabilis  Staphylococcus aureus (04 Nov 2018 21:49)  Organism: Staphylococcus aureus (04 Nov 2018 21:49)  Organism: Proteus mirabilis (04 Nov 2018 21:49)    Culture - Acid Fast - Other w/Smear (collected 02 Nov 2018 11:29)  Source: .Other None    Culture - Surgical Swab (collected 02 Nov 2018 11:21)  Source: .Surgical Swab left foot  Final Report (04 Nov 2018 21:47):    Few Proteus mirabilis    See previous culture [id number]    Few Coag Negative Staphylococcus    Few Enterococcus faecalis    Moderate Corynebacterium species "Susceptibilities not performed"    Few Bacteroides vulgatus "Susceptibilities not performed"  Organism: Enterococcus faecalis  Coag Negative Staphylococcus (04 Nov 2018 21:47)  Organism: Coag Negative Staphylococcus (04 Nov 2018 21:47)  Organism: Enterococcus faecalis (04 Nov 2018 21:47)

## 2018-11-05 NOTE — PROGRESS NOTE ADULT - ASSESSMENT
Assessment 56y/o M w/ hx of neuropathy, acromegaly, diabetes, htn, CKD MWF, makes urine, htn who [facility] LLE ulcer with Dr. Gomes. Vascular studies showed severe PVD and is transferred to [place] for angiogram.    Plan    #Severe PVD  -arterial duplex 10/24: b/l severe peripheral arterial disease, occlusion of R popliteal and L SF arteries  -angiogram 10/31: S/P Left extremity angiogram w/ left SFA angioplasty and stent & left popliteal angioplasty for heel ulceration  -f/u with vascular in 2 weeks outpatient  Dr. Comer      #Chronic DVT of LLE - found on imaging on March 2018  -not on anticoagulation  -vascular f/u    #DFU - left foot  -s/p debridement infected heel ulcer to the calcaneus ~1 week ago  -ID: c/w unasyn 3g q24hrs; c/w clindamycin 900 q8hrs -will f/u with ID if may change to PO abx  -surgery: s/p LLE debridement ; daily dressing changes  -will f/u with surgery if any more procedures anticipated.   -received 1 unit prbc during procedure for hypotension and hgb of 7.7  -podiatry consult appreciated.        #ESRD on HD started 1 month ago - MWF  -c/w HD; renal following      #Anemia  -s/p 1 prbc transfusion in [facility] and another prbc during debridement in [place]  -EDWIN 40 mcg weekly  -monitor cbc; review with hematology-  -pt requesting to speak with a hematologist regarding anemia    #Diabetes  -basal bolus insuin; iss    #HTN  -d/c Lasix for low BPs    #HLD  -c/w atorvastatin    DVT ppx: heparin subq  GI ppx: protonix  Diet: consistent carbohydrate, renal  Activity: ambulate as tolerated    Waiting for PT/Rehab to d/c back to [facility]  Restart plavix?

## 2018-11-05 NOTE — PROGRESS NOTE ADULT - ASSESSMENT
Assessment 56y/o M w/ hx of neuropathy, acromegaly, diabetes, htn, CKD MWF, makes urine, htn [facility] with Dr. Gomes. Vascular studies showed severe PVD and is transferred to [place] for angiogram.    Plan    #Severe PVD  -arterial duplex 10/24: b/l severe peripheral arterial disease, occlusion of R popliteal and L SF arteries  -angiogram 10/31: S/P Left extremity angiogram w/ left SFA angioplasty and stent & left popliteal angioplasty for heel ulceration  -f/u with vascular in 2 weeks outpatient  Dr. Comer      #Chronic DVT of LLE - found on imaging on March 2018  -not on anticoagulation  -vascular f/u    #DFU - left foot  -s/p debridement infected heel ulcer to the calcaneus ~1 week ago  -ID: c/w unasyn 3g q24hrs; c/w clindamycin 900 q8hrs -will f/u with ID if may change to PO abx  -surgery: s/p LLE debridement ; daily dressing changes  -will f/u with surgery if cleared for discharge to [facility]  -received 1 unit prbc during procedure for hypotension and hgb of 7.7  -podiatry consult appreciated.    #hypernatremia  -Na 151 yesterday  -will monitor Na      #ESRD on HD started 1 month ago - MW  -c/w HD; renal following      #Anemia  -s/p 1 prbc transfusion in [facility] and another prbc during debridement in [place]  -EDWIN 40 mcg weekly  -monitor cbc    #Diabetes  -basal bolus insuin; iss    #HTN  -d/c Lasix for low BPs    #HLD  -c/w atorvastatin    DVT ppx: heparin subq  GI ppx: protonix  Diet: consistent carbohydrate, renal  Activity: ambulate as tolerated    Waiting for PT/Rehab to d/c back to [facility] Assessment 58y/o M w/ hx of neuropathy, acromegaly, diabetes, htn, CKD MWF, makes urine, htn who [facility] with Dr. Gomes. Vascular studies showed severe PVD and is transferred to [place] for angiogram.    Plan    #Severe PVD  -arterial duplex 10/24: b/l severe peripheral arterial disease, occlusion of R popliteal and L SF arteries  -angiogram 10/31: S/P Left extremity angiogram w/ left SFA angioplasty and stent & left popliteal angioplasty for heel ulceration  -f/u with vascular in 2 weeks outpatient  Dr. Comer      #Chronic DVT of LLE - found on imaging on March 2018  -not on anticoagulation  -vascular f/u    #DFU - left foot  -s/p debridement infected heel ulcer to the calcaneus ~1 week ago  -ID: c/w unasyn 3g q24hrs; c/w clindamycin 900 q8hrs -will f/u with ID if may change to PO abx  -surgery: s/p LLE debridement ; daily dressing changes  -will f/u with surgery if any more procedures anticipated.   -received 1 unit prbc during procedure for hypotension and hgb of 7.7  -podiatry consult appreciated.    #hypernatremia  -Na 151 yesterday  -will monitor Na      #ESRD on HD started 1 month ago - MW  -c/w HD; renal following      #Anemia  -s/p 1 prbc transfusion in [facility] and another prbc during debridement in [place]  -EDWIN 40 mcg weekly  -monitor cbc    #Diabetes  -basal bolus insuin; iss    #HTN  -d/c Lasix for low BPs    #HLD  -c/w atorvastatin    DVT ppx: heparin subq  GI ppx: protonix  Diet: consistent carbohydrate, renal  Activity: ambulate as tolerated    Waiting for PT/Rehab to d/c back to [facility]  Restart plavix? Assessment 58y/o M w/ hx of neuropathy, acromegaly, diabetes, htn, CKD MWF, makes urine, htn who [facility]LE ulcer with Dr. Gomes. Vascular studies showed severe PVD and is transferred to [place] for angiogram.    Plan    #Severe PVD  -arterial duplex 10/24: b/l severe peripheral arterial disease, occlusion of R popliteal and L SF arteries  -angiogram 10/31: S/P Left extremity angiogram w/ left SFA angioplasty and stent & left popliteal angioplasty for heel ulceration  -f/u with vascular in 2 weeks outpatient  Dr. Comer      #Chronic DVT of LLE - found on imaging on March 2018  -not on anticoagulation  -vascular f/u    #DFU - left foot  -s/p debridement infected heel ulcer to the calcaneus ~1 week ago  -ID: c/w unasyn 3g q24hrs; c/w clindamycin 900 q8hrs -will f/u with ID if may change to PO abx  -surgery: s/p LLE debridement ; daily dressing changes  -will f/u with surgery if any more procedures anticipated.   -received 1 unit prbc during procedure for hypotension and hgb of 7.7  -podiatry consult appreciated.        #ESRD on HD started 1 month ago - MWF  -c/w HD; renal following      #Anemia  -s/p 1 prbc transfusion in [facility] and another prbc during debridement in [place]  -EDWIN 40 mcg weekly  -monitor cbc  -pt requesting to speak with a hematologist regarding anemia    #Diabetes  -basal bolus insuin; iss    #HTN  -d/c Lasix for low BPs    #HLD  -c/w atorvastatin    DVT ppx: heparin subq  GI ppx: protonix  Diet: consistent carbohydrate, renal  Activity: ambulate as tolerated    Waiting for PT/Rehab to d/c back to [facility]  Restart plavix?

## 2018-11-05 NOTE — CONSULT NOTE ADULT - SUBJECTIVE AND OBJECTIVE BOX
HPI:  56y/o M w/ hx of brain tumor--acromegaly, diabetes, htn, ckd stage on dialysis MWF, makes urine, htn, 2 blood transfusion recently being worked up--  pt on rehab after recent admission for infection to feet presents for hg under 7 at rehab-[facility]; and for worsening of left leg ulcer.  pt had dialysis today and was given iv antibiotics after.  pt has been feeling chills; no fever. no abdominal pain, cp or sob. +generalized weakness.  no blood in stool (22 Oct 2018 19:39). s/p angiogram / debridement.      PAST MEDICAL & SURGICAL HISTORY:  Dyslipidemia  DM (diabetes mellitus), type 2  Neuropathy  HTN (hypertension)  CAD (coronary atherosclerotic disease)  Acromegaly  CKD (chronic kidney disease), stage V  H/O eye surgery  H/O: pituitary tumor: s/p removal      Hospital Course:    TODAY'S SUBJECTIVE & REVIEW OF SYMPTOMS:     Constitutional WNL   Cardio WNL   Resp WNL   GI WNL  Heme WNL  Endo WNL  Skin WNL  MSK Weakness  Neuro WNL  Cognitive WNL  Psych WNL      MEDICATIONS  (STANDING):  ampicillin/sulbactam  IVPB 3 Gram(s) IV Intermittent daily  aspirin enteric coated 81 milliGRAM(s) Oral daily  atorvastatin 20 milliGRAM(s) Oral at bedtime  bisacodyl Suppository 10 milliGRAM(s) Rectal daily  calcium acetate 1334 milliGRAM(s) Oral four times a day with meals  chlorhexidine 4% Liquid 1 Application(s) Topical <User Schedule>  clindamycin IVPB 900 milliGRAM(s) IV Intermittent every 8 hours  darbepoetin Injectable Syringe 40 MICROGram(s) IV Push <User Schedule>  docusate sodium 100 milliGRAM(s) Oral two times a day  ergocalciferol 65572 Unit(s) Oral every week  folic acid 1 milliGRAM(s) Oral daily  heparin  Injectable 5000 Unit(s) SubCutaneous every 8 hours  lactulose Syrup 20 Gram(s) Oral daily  pantoprazole    Tablet 40 milliGRAM(s) Oral before breakfast  polyethylene glycol 3350 17 Gram(s) Oral every 12 hours  tamsulosin 0.4 milliGRAM(s) Oral at bedtime    MEDICATIONS  (PRN):  acetaminophen   Tablet .. 650 milliGRAM(s) Oral every 6 hours PRN Temp greater or equal to 38C (100.4F)  oxyCODONE    5 mG/acetaminophen 325 mG 1 Tablet(s) Oral every 4 hours PRN Moderate Pain (4 - 6)      FAMILY HISTORY:  Family history of myocardial infarction (Father)      Allergies    bacitracin (Unknown)  Neosporin (Hypotension)    Intolerances        SOCIAL HISTORY:    [  ] Etoh  [  ] Smoking  [  ] Substance abuse     Home Environment:  [  ] Home Alone  [  ] Lives with Family  [  ] Home Health Aid    Dwelling:  [  ] Apartment  [  ] Private House  [  ] Adult Home  [  ] Skilled Nursing Facility      [x  ] Short Term  [  ] Long Term  [  ] Stairs       Elevator [  ]    FUNCTIONAL STATUS PTA: (Check all that apply)  Ambulation: [   ]Independent    [x  ] Dependent     [  ] Non-Ambulatory  Assistive Device: [  ] SA Cane  [  ]  Q Cane  [ x ] Walker  [  ]  Wheelchair  ADL : [  ] Independent  [x  ]  Dependent       Vital Signs Last 24 Hrs  T(C): 36.5 (05 Nov 2018 04:46), Max: 36.8 (04 Nov 2018 17:53)  T(F): 97.7 (05 Nov 2018 04:46), Max: 98.3 (04 Nov 2018 17:53)  HR: 75 (05 Nov 2018 10:45) (71 - 76)  BP: 116/73 (05 Nov 2018 10:45) (116/73 - 153/71)  BP(mean): --  RR: 17 (05 Nov 2018 10:45) (16 - 20)  SpO2: 98% (05 Nov 2018 07:30) (95% - 99%)      PHYSICAL EXAM: Alert & Oriented X3  GENERAL: NAD, well-groomed, well-developed  HEAD:  Atraumatic, Normocephalic  CHEST/LUNG: Clear   HEART: S1S2+  ABDOMEN: Soft, Nontender  EXTREMITIES:  no calf tenderness    NERVOUS SYSTEM:  Cranial Nerves 2-12 intact [  ] Abnormal  [  ]  ROM: WFL all extremities [  ]  Abnormal [x  ]limited brandt le  Motor Strength: WFL all extremities  [  ]  Abnormal [x  ]limited brandt le  Sensation: intact to light touch [  ] Abnormal [x  ]decreased ligt touch distal ext.  Reflexes: Symmetric [  ]  Abnormal [  ]    FUNCTIONAL STATUS:  Bed Mobility: Independent [  ]  Supervision [  ]  Needs Assistance [x  ]  N/A [  ]  Transfers: Independent [  ]  Supervision [  ]  Needs Assistance [x  ]  N/A [  ]   Ambulation: Independent [  ]  Supervision [  ]  Needs Assistance [  ]  N/A [  ]  ADL: Independent [  ] Requires Assistance [  ] N/A [  ]      LABS:                        8.1    8.95  )-----------( 385      ( 05 Nov 2018 10:47 )             26.2     11-04    151<H>  |  101  |  31<H>  ----------------------------<  127<H>  4.1   |  23  |  4.1<HH>    Ca    7.2<L>      04 Nov 2018 12:23  Phos  3.4     11-04            RADIOLOGY & ADDITIONAL STUDIES:    Assesment:

## 2018-11-05 NOTE — CONSULT NOTE ADULT - ASSESSMENT
IMPRESSION: Rehab of gait dysfunction      PRECAUTIONS: [  ] Cardiac  [  ] Respiratory  [  ] Seizures [  ] Contact Isolation  [  ] Droplet Isolation  [  ] Other    Weight Bearing Status:     RECOMMENDATION:    Out of Bed to Chair     DVT/Decubiti Prophylaxis    REHAB PLAN:     [ x  ] Bedside P/T 3-5 times a week   [   ]   Bedside O/T  2-3 times a week             [   ] No Rehab Therapy Indicated                   [   ]  Speech Therapy   Conditioning/ROM                                    ADL  Bed Mobility                                               Conditioning/ROM  Transfers                                                     Bed Mobility  Sitting /Standing Balance                         Transfers                                        Gait Training                                               Sitting/Standing Balance  Stair Training [   ]Applicable                    Home equipment Eval                                                                        Splinting  [   ] Only      GOALS:   ADL   [   ]   Independent                    Transfers  [x   ] Independent                          Ambulation  [  x ] Independent     [  x  ] With device                            [   ]  CG                                                         [   ]  CG                                                                  [   ] CG                            [    ] Min A                                                   [   ] Min A                                                              [   ] Min  A          DISCHARGE PLAN:   [   ]  Good candidate for Intensive Rehabilitation/Hospital based                                             Will tolerate 3hrs Intensive Rehab Daily                                       [  x  ]  Short Term Rehab in Skilled Nursing Facility                                       [    ]  Home with Outpatient or  services                                         [    ]  Possible Candidate for Intensive Hospital based Rehab

## 2018-11-05 NOTE — PROGRESS NOTE ADULT - SUBJECTIVE AND OBJECTIVE BOX
SUBJECTIVE:    Patient is a 58y old Male who presents with a chief complaint of foot ulcer (05 Nov 2018 08:07)    Currently admitted to medicine with the primary diagnosis of Foot ulcer     Today is hospital day 14d. got 1 unit prbc transfused for hgb of 6.3 yesterday .   in HD this am.     PAST MEDICAL & SURGICAL HISTORY  Dyslipidemia  DM (diabetes mellitus), type 2  Neuropathy  HTN (hypertension)  CAD (coronary atherosclerotic disease)  Acromegaly  CKD (chronic kidney disease), stage V  H/O eye surgery  H/O: pituitary tumor: s/p removal    SOCIAL HISTORY:  Negative for smoking/alcohol/drug use.     ALLERGIES:  bacitracin (Unknown)  Neosporin (Hypotension)    MEDICATIONS:  STANDING MEDICATIONS  ampicillin/sulbactam  IVPB 3 Gram(s) IV Intermittent daily  aspirin enteric coated 81 milliGRAM(s) Oral daily  atorvastatin 20 milliGRAM(s) Oral at bedtime  bisacodyl Suppository 10 milliGRAM(s) Rectal daily  calcium acetate 1334 milliGRAM(s) Oral four times a day with meals  chlorhexidine 4% Liquid 1 Application(s) Topical <User Schedule>  clindamycin IVPB 900 milliGRAM(s) IV Intermittent every 8 hours  darbepoetin Injectable Syringe 40 MICROGram(s) IV Push <User Schedule>  docusate sodium 100 milliGRAM(s) Oral two times a day  ergocalciferol 15021 Unit(s) Oral every week  folic acid 1 milliGRAM(s) Oral daily  heparin  Injectable 5000 Unit(s) SubCutaneous every 8 hours  lactulose Syrup 20 Gram(s) Oral daily  pantoprazole    Tablet 40 milliGRAM(s) Oral before breakfast  polyethylene glycol 3350 17 Gram(s) Oral every 12 hours  tamsulosin 0.4 milliGRAM(s) Oral at bedtime    PRN MEDICATIONS  acetaminophen   Tablet .. 650 milliGRAM(s) Oral every 6 hours PRN  oxyCODONE    5 mG/acetaminophen 325 mG 1 Tablet(s) Oral every 4 hours PRN    VITALS:   T(F): 97.7  HR: 75  BP: 116/73  RR: 17  SpO2: 98%    LABS:                        7.7    10.28 )-----------( 332      ( 04 Nov 2018 22:38 )             24.6     11-04    151<H>  |  101  |  31<H>  ----------------------------<  127<H>  4.1   |  23  |  4.1<HH>    Ca    7.2<L>      04 Nov 2018 12:23  Phos  3.4     11-04                Culture - Acid Fast - Other w/Smear (collected 02 Nov 2018 11:29)  Source: .Other None    Culture - Surgical Swab (collected 02 Nov 2018 11:29)  Source: .Surgical Swab ulcer left foot  Final Report (04 Nov 2018 21:49):    Rare Proteus mirabilis    Rare Staphylococcus aureus    Rare Enterococcus faecalis    See previous culture [id number]  Organism: Proteus mirabilis  Staphylococcus aureus (04 Nov 2018 21:49)  Organism: Staphylococcus aureus (04 Nov 2018 21:49)  Organism: Proteus mirabilis (04 Nov 2018 21:49)    Culture - Acid Fast - Other w/Smear (collected 02 Nov 2018 11:29)  Source: .Other None          RADIOLOGY:    PHYSICAL EXAM:  GEN: No acute distress  LUNGS: Clear to auscultation bilaterally   HEART: S1/S2 present. RRR.   ABD: Soft, non-tender, non-distended. Bowel sounds present  EXT: left leg bandaged  NEURO: AAOX3

## 2018-11-05 NOTE — PROGRESS NOTE ADULT - SUBJECTIVE AND OBJECTIVE BOX
Chart reviewed, patient examined. Pertinent results reviewed.  Case discussed with HO  specialist f/u reviewed  HD#15; POD#3(2nd debridement); POD#7(vascular intervention)    SUBJECTIVE:    Patient is a 58y old Male who presented with a chief complaint of foot ulcer (05 Nov 2018 08:07)    Currently admitted to medicine with the primary diagnosis of Foot ulcer     Today is hospital day #15; He got 1 unit prbc transfused for hgb of 6.3 yesterday .   in HD this am. Pt is concerned with his anemia, and why he needs more frequent transfusions.    PAST MEDICAL & SURGICAL HISTORY  Dyslipidemia  DM (diabetes mellitus), type 2  Neuropathy  HTN (hypertension)  CAD (coronary atherosclerotic disease)  Acromegaly  CKD (chronic kidney disease), stage V  H/O eye surgery  H/O: pituitary tumor: s/p removal    SOCIAL HISTORY:  Negative for smoking/alcohol/drug use.     ALLERGIES:  bacitracin (Unknown)  Neosporin (Hypotension)    MEDICATIONS:  STANDING MEDICATIONS  ampicillin/sulbactam  IVPB 3 Gram(s) IV Intermittent daily  aspirin enteric coated 81 milliGRAM(s) Oral daily  atorvastatin 20 milliGRAM(s) Oral at bedtime  bisacodyl Suppository 10 milliGRAM(s) Rectal daily  calcium acetate 1334 milliGRAM(s) Oral four times a day with meals  chlorhexidine 4% Liquid 1 Application(s) Topical <User Schedule>  clindamycin IVPB 900 milliGRAM(s) IV Intermittent every 8 hours  darbepoetin Injectable Syringe 40 MICROGram(s) IV Push <User Schedule>  docusate sodium 100 milliGRAM(s) Oral two times a day  ergocalciferol 86417 Unit(s) Oral every week  folic acid 1 milliGRAM(s) Oral daily  heparin  Injectable 5000 Unit(s) SubCutaneous every 8 hours  lactulose Syrup 20 Gram(s) Oral daily  pantoprazole    Tablet 40 milliGRAM(s) Oral before breakfast  polyethylene glycol 3350 17 Gram(s) Oral every 12 hours  tamsulosin 0.4 milliGRAM(s) Oral at bedtime    PRN MEDICATIONS  acetaminophen   Tablet .. 650 milliGRAM(s) Oral every 6 hours PRN  oxyCODONE    5 mG/acetaminophen 325 mG 1 Tablet(s) Oral every 4 hours PRN    VITALS:   Vital Signs Last 24 Hrs  T(C): 37.1 (05 Nov 2018 20:13), Max: 37.1 (05 Nov 2018 20:13)  T(F): 98.7 (05 Nov 2018 20:13), Max: 98.7 (05 Nov 2018 20:13)  HR: 79 (05 Nov 2018 20:13) (74 - 79)  BP: 129/66 (05 Nov 2018 20:13) (115/56 - 139/81)  BP(mean): --  RR: 18 (05 Nov 2018 20:13) (16 - 19)  SpO2: 98% (05 Nov 2018 07:30) (98% - 99%)    LABS:                        7.7    10.28 )-----------( 332      ( 04 Nov 2018 22:38 )             24.6     11-04    151<H>  |  101  |  31<H>  ----------------------------<  127<H>  4.1   |  23  |  4.1<HH>    Ca    7.2<L>      04 Nov 2018 12:23  Phos  3.4     11-04                Culture - Acid Fast - Other w/Smear (collected 02 Nov 2018 11:29)  Source: .Other None    Culture - Surgical Swab (collected 02 Nov 2018 11:29)  Source: .Surgical Swab ulcer left foot  Final Report (04 Nov 2018 21:49):    Rare Proteus mirabilis    Rare Staphylococcus aureus    Rare Enterococcus faecalis    See previous culture [id number]  Organism: Proteus mirabilis  Staphylococcus aureus (04 Nov 2018 21:49)  Organism: Staphylococcus aureus (04 Nov 2018 21:49)  Organism: Proteus mirabilis (04 Nov 2018 21:49)    Culture - Acid Fast - Other w/Smear (collected 02 Nov 2018 11:29)  Source: .Other None          RADIOLOGY:    PHYSICAL EXAM:  GEN: No acute distress  LUNGS: Clear to auscultation bilaterally   HEART: S1/S2 present. RRR.   ABD: Soft, non-tender, non-distended. Bowel sounds present  EXT: left leg bandaged; foot with edema, warm, dark pink  NEURO: AAOX3; sl weak LE

## 2018-11-05 NOTE — PROGRESS NOTE ADULT - ASSESSMENT
IMPRESSION:  Right heel with ischemic ulcer with extensive gangrenous changes  I doubt the foot can be salvaged  S/P angio and debridement of Left heel ulcer  Cultures repeatedly positive for LINDA    RECOMMENDATIONS:  If cleared by Vascular   Ancef 2 gm iv post HD and po Flagyl 500 mg q12h for 6 weeks  D/C other ABx  Recall prn please.

## 2018-11-05 NOTE — CHART NOTE - NSCHARTNOTEFT_GEN_A_CORE
Registered Dietitian Follow-Up     Patient Profile Reviewed                           Yes [x]   No []     Nutrition History Previously Obtained        Yes [x]  No []       Pertinent Subjective Information: Pt in HD at time of visit. Spoke to PCA who reports pt eating well with no GI distress, constipations resolved.      Pertinent Medical Interventions: DFU - left foot: s/p debridement infected heel ulcer to the calcaneus ~1 week ago, will f/u with surgery if any more procedures anticipated. Hypernatremia: Na 151 yesterday, will monitor Na. ESRD on HD started 1 month ago - MWF: c/w HD; renal following. Waiting for PT/Rehab to d/c back to [facility]. Restart plavix?      Diet order: carbohydrate consistent, renal with evening snack      Anthropometrics:  - Ht. 76"   - Wt.: wt fluctuations from 113.8 kg - 121.5 kg - pt with edema and on HD, some wt fluctuations expected - will continue to monitor wt changes      Pertinent Lab Data: (11/4) RBC 2.75, H/H 7.7/24.6, Na 151, BUN 31, creatinine 4.1, glucose 1267, Ca 7.2      Pertinent Meds: heparin, abx, atorvastatin, bisacodyl, calcium acetate, darbepoetin nikolas, docusate, ergocalciferol, folic acid, furosemide, lactulose, oxycodone, pantoprazole, polyethylene glycol     Physical Findings:  - Appearance: alert, oriented   - GI function: last BM 11/4 per EMR - PCA reports pt previous constipations has since resolved   - Oral/Mouth cavity: no issues per PCA   - Skin: surgical incision, BS =19, (11/5) edema 2+ B/L      Nutrition Requirements  Weight Used: ideal  91.8 kg     Energy needs: (8904-6427 kcal/day = 30-35 kcal/kg IBW as pt on HD)  Protein needs: (110-129 g/day = 1.2-1.4 g/kg IBW as pt on HD)   Fluid needs: 1000 ml + urine outpt or per LIP/nephro     Nutrient Intake: not meeting needs      [x] Previous Nutrition Diagnosis: Increased nutrient needs            [x] Ongoing          [] Resolved    Nutrition Intervention Meals and Snacks, Medical Food Supplements  Continue current diet order and ruth consider ordering Nepro q 24 hrs.     Goal/Expected Outcome: Pt to consume >75% of meals, snacks, supplements within 3 days.     Indicator/Monitoring: RD to monitor diet order, energy intake, renal and glucose profile, NFPF.

## 2018-11-05 NOTE — PROGRESS NOTE ADULT - SUBJECTIVE AND OBJECTIVE BOX
SU SAWYER  58y, Male      OVERNIGHT EVENTS:    none    VITALS:  T(F): 97.7, Max: 98.3 (11-04-18 @ 17:53)  HR: 75  BP: 116/73  RR: 17Vital Signs Last 24 Hrs  T(C): 36.5 (05 Nov 2018 04:46), Max: 36.8 (04 Nov 2018 17:53)  T(F): 97.7 (05 Nov 2018 04:46), Max: 98.3 (04 Nov 2018 17:53)  HR: 75 (05 Nov 2018 10:45) (71 - 76)  BP: 116/73 (05 Nov 2018 10:45) (116/73 - 153/71)  BP(mean): --  RR: 17 (05 Nov 2018 10:45) (16 - 20)  SpO2: 98% (05 Nov 2018 07:30) (95% - 99%)    TESTS & MEASUREMENTS:                        7.7    10.28 )-----------( 332      ( 04 Nov 2018 22:38 )             24.6     11-04    151<H>  |  101  |  31<H>  ----------------------------<  127<H>  4.1   |  23  |  4.1<HH>    Ca    7.2<L>      04 Nov 2018 12:23  Phos  3.4     11-04          Culture - Acid Fast - Other w/Smear (collected 11-02-18 @ 11:29)  Source: .Other None    Culture - Surgical Swab (collected 11-02-18 @ 11:29)  Source: .Surgical Swab ulcer left foot  Final Report (11-04-18 @ 21:49):    Rare Proteus mirabilis    Rare Staphylococcus aureus    Rare Enterococcus faecalis    See previous culture [id number]  Organism: Proteus mirabilis  Staphylococcus aureus (11-04-18 @ 21:49)  Organism: Staphylococcus aureus (11-04-18 @ 21:49)      -  Ampicillin/Sulbactam: S <=8/4      -  Cefazolin: S <=4      -  Clindamycin: S 0.5      -  Erythromycin: S <=0.25      -  Gentamicin: S 2 Should not be used as monotherapy      -  Oxacillin: S 0.5      -  Penicillin: R >8      -  RIF- Rifampin: S <=1 Should not be used as monotherapy      -  Tetra/Doxy: S <=1      -  Trimethoprim/Sulfamethoxazole: S <=0.5/9.5      -  Vancomycin: S 2      Method Type: ROSALIA  Organism: Proteus mirabilis (11-04-18 @ 21:49)      -  Amikacin: S 16      -  Amoxicillin/Clavulanic Acid: S <=8/4      -  Ampicillin: S <=2 These ampicillin results predict results for amoxicillin      -  Ampicillin/Sulbactam: S <=4/2      -  Aztreonam: S <=4      -  Cefazolin: S <=2      -  Cefepime: S <=2      -  Cefoxitin: S <=4      -  Ceftriaxone: S <=1 Enterobacter, Citrobacter, and Serratia may develop resistance during prolonged therapy      -  Ciprofloxacin: S <=0.5      -  Ertapenem: S <=0.5      -  Gentamicin: S 4      -  Levofloxacin: S <=1      -  Meropenem: S <=1      -  Piperacillin/Tazobactam: S <=8      -  Tobramycin: S 4      -  Trimethoprim/Sulfamethoxazole: S <=0.5/9.5      Method Type: ROSALIA    Culture - Acid Fast - Other w/Smear (collected 11-02-18 @ 11:29)  Source: .Other None    Culture - Surgical Swab (collected 11-02-18 @ 11:21)  Source: .Surgical Swab left foot  Final Report (11-04-18 @ 21:47):    Few Proteus mirabilis    See previous culture [id number]    Few Coag Negative Staphylococcus    Few Enterococcus faecalis    Moderate Corynebacterium species "Susceptibilities not performed"    Few Bacteroides vulgatus "Susceptibilities not performed"  Organism: Enterococcus faecalis  Coag Negative Staphylococcus (11-04-18 @ 21:47)  Organism: Coag Negative Staphylococcus (11-04-18 @ 21:47)      -  Ampicillin/Sulbactam: R >16/8      -  Cefazolin: R >16      -  Clindamycin: R >4      -  Erythromycin: R >4      -  Gentamicin: R >8 Should not be used as monotherapy      -  Oxacillin: R >2      -  Penicillin: R >8      -  RIF- Rifampin: S <=1 Should not be used as monotherapy      -  Tetra/Doxy: S <=1      -  Trimethoprim/Sulfamethoxazole: R >2/38      -  Vancomycin: S 4      Method Type: ROSALIA  Organism: Enterococcus faecalis (11-04-18 @ 21:47)      -  Ampicillin: S <=2 Predicts results to ampicillin/sulbactam, amoxacillin-clavulanate and  piperacillin-tazobactam.      -  Tetra/Doxy: S <=1      -  Vancomycin: S 2      Method Type: ROSALIA            RADIOLOGY & ADDITIONAL TESTS:    ANTIBIOTICS:  ampicillin/sulbactam  IVPB 3 Gram(s) IV Intermittent daily  clindamycin IVPB 900 milliGRAM(s) IV Intermittent every 8 hours

## 2018-11-06 LAB
ANION GAP SERPL CALC-SCNC: 11 MMOL/L — SIGNIFICANT CHANGE UP (ref 7–14)
BLD GP AB SCN SERPL QL: SIGNIFICANT CHANGE UP
BUN SERPL-MCNC: 29 MG/DL — HIGH (ref 10–20)
CALCIUM SERPL-MCNC: 8.8 MG/DL — SIGNIFICANT CHANGE UP (ref 8.5–10.1)
CHLORIDE SERPL-SCNC: 101 MMOL/L — SIGNIFICANT CHANGE UP (ref 98–110)
CO2 SERPL-SCNC: 28 MMOL/L — SIGNIFICANT CHANGE UP (ref 17–32)
CREAT SERPL-MCNC: 4.1 MG/DL — CRITICAL HIGH (ref 0.7–1.5)
GLUCOSE BLDC GLUCOMTR-MCNC: 135 MG/DL — HIGH (ref 70–99)
GLUCOSE BLDC GLUCOMTR-MCNC: 145 MG/DL — HIGH (ref 70–99)
GLUCOSE BLDC GLUCOMTR-MCNC: 147 MG/DL — HIGH (ref 70–99)
GLUCOSE BLDC GLUCOMTR-MCNC: 174 MG/DL — HIGH (ref 70–99)
GLUCOSE SERPL-MCNC: 134 MG/DL — HIGH (ref 70–99)
HCT VFR BLD CALC: 26.1 % — LOW (ref 42–52)
HGB BLD-MCNC: 8.1 G/DL — LOW (ref 14–18)
MCHC RBC-ENTMCNC: 28.2 PG — SIGNIFICANT CHANGE UP (ref 27–31)
MCHC RBC-ENTMCNC: 31 G/DL — LOW (ref 32–37)
MCV RBC AUTO: 90.9 FL — SIGNIFICANT CHANGE UP (ref 80–94)
NRBC # BLD: 0 /100 WBCS — SIGNIFICANT CHANGE UP (ref 0–0)
PHOSPHATE SERPL-MCNC: 4.2 MG/DL — SIGNIFICANT CHANGE UP (ref 2.1–4.9)
PLATELET # BLD AUTO: 360 K/UL — SIGNIFICANT CHANGE UP (ref 130–400)
POTASSIUM SERPL-MCNC: 4.8 MMOL/L — SIGNIFICANT CHANGE UP (ref 3.5–5)
POTASSIUM SERPL-SCNC: 4.8 MMOL/L — SIGNIFICANT CHANGE UP (ref 3.5–5)
RBC # BLD: 2.87 M/UL — LOW (ref 4.7–6.1)
RBC # FLD: 16.6 % — HIGH (ref 11.5–14.5)
SODIUM SERPL-SCNC: 140 MMOL/L — SIGNIFICANT CHANGE UP (ref 135–146)
TYPE + AB SCN PNL BLD: SIGNIFICANT CHANGE UP
WBC # BLD: 9.09 K/UL — SIGNIFICANT CHANGE UP (ref 4.8–10.8)
WBC # FLD AUTO: 9.09 K/UL — SIGNIFICANT CHANGE UP (ref 4.8–10.8)

## 2018-11-06 RX ORDER — CEFAZOLIN SODIUM 1 G
2000 VIAL (EA) INJECTION
Qty: 0 | Refills: 0 | Status: DISCONTINUED | OUTPATIENT
Start: 2018-11-06 | End: 2018-11-13

## 2018-11-06 RX ADMIN — Medication 100 MILLIGRAM(S): at 06:07

## 2018-11-06 RX ADMIN — Medication 100 MILLIGRAM(S): at 13:05

## 2018-11-06 RX ADMIN — Medication 100 MILLIGRAM(S): at 21:22

## 2018-11-06 RX ADMIN — PANTOPRAZOLE SODIUM 40 MILLIGRAM(S): 20 TABLET, DELAYED RELEASE ORAL at 06:07

## 2018-11-06 RX ADMIN — TAMSULOSIN HYDROCHLORIDE 0.4 MILLIGRAM(S): 0.4 CAPSULE ORAL at 21:24

## 2018-11-06 RX ADMIN — Medication 1334 MILLIGRAM(S): at 17:24

## 2018-11-06 RX ADMIN — Medication 1334 MILLIGRAM(S): at 21:24

## 2018-11-06 RX ADMIN — Medication 81 MILLIGRAM(S): at 12:10

## 2018-11-06 RX ADMIN — Medication 1334 MILLIGRAM(S): at 12:11

## 2018-11-06 RX ADMIN — HEPARIN SODIUM 5000 UNIT(S): 5000 INJECTION INTRAVENOUS; SUBCUTANEOUS at 06:07

## 2018-11-06 RX ADMIN — HEPARIN SODIUM 5000 UNIT(S): 5000 INJECTION INTRAVENOUS; SUBCUTANEOUS at 21:22

## 2018-11-06 RX ADMIN — HEPARIN SODIUM 5000 UNIT(S): 5000 INJECTION INTRAVENOUS; SUBCUTANEOUS at 13:06

## 2018-11-06 RX ADMIN — ATORVASTATIN CALCIUM 20 MILLIGRAM(S): 80 TABLET, FILM COATED ORAL at 21:24

## 2018-11-06 RX ADMIN — Medication 1 MILLIGRAM(S): at 12:10

## 2018-11-06 RX ADMIN — Medication 1334 MILLIGRAM(S): at 08:29

## 2018-11-06 RX ADMIN — CHLORHEXIDINE GLUCONATE 1 APPLICATION(S): 213 SOLUTION TOPICAL at 06:07

## 2018-11-06 RX ADMIN — Medication 100 MILLIGRAM(S): at 17:24

## 2018-11-06 NOTE — PROGRESS NOTE ADULT - ASSESSMENT
Assessment 58y/o M w/ hx of neuropathy, acromegaly, diabetes, htn, CKD MWF, makes urine, htn [facility] LLE ulcer with Dr. Gomes. Vascular studies showed severe PVD and is transferred to [place] for angiogram.    Plan  Severe PVD  - post angiogram, and left SFA angioplasty, stenting  - post debridement  - on Unasyn and Clindamycin  - for repeat debridement again in am      DM with vascular and renal manifestations  DFU - left foot  - post debridement infected heel ulcer to the calcaneus  - continue Unasyn, Clindamycin   - continue basal insulin    Constipation  - continue rx, multiple  - monitor, may need to be digitally disimpacted    ESRD on HMD  - MWF  - renal following  - rx as per renal    Anemia  - post transfusion   - EDWIN 40 mcg weekly  - monitor cbc    HTN  - off rx  - monitor BP    HLD  - continue Atorvastatin    DVT ppx: heparin subq  GI ppx: protonix  Diet: consistent carbohydrate  Activity: Bedrest    Supportive measures, further plan pending the above

## 2018-11-06 NOTE — PROGRESS NOTE ADULT - SUBJECTIVE AND OBJECTIVE BOX
seen and examined  no distress  s/p hd yesterday       Standing Inpatient Medications  ampicillin/sulbactam  IVPB 3 Gram(s) IV Intermittent daily  aspirin enteric coated 81 milliGRAM(s) Oral daily  atorvastatin 20 milliGRAM(s) Oral at bedtime  bisacodyl Suppository 10 milliGRAM(s) Rectal daily  calcium acetate 1334 milliGRAM(s) Oral four times a day with meals  chlorhexidine 4% Liquid 1 Application(s) Topical <User Schedule>  clindamycin IVPB 900 milliGRAM(s) IV Intermittent every 8 hours  darbepoetin Injectable Syringe 40 MICROGram(s) IV Push <User Schedule>  docusate sodium 100 milliGRAM(s) Oral two times a day  ergocalciferol 51557 Unit(s) Oral every week  folic acid 1 milliGRAM(s) Oral daily  heparin  Injectable 5000 Unit(s) SubCutaneous every 8 hours  lactulose Syrup 20 Gram(s) Oral daily  pantoprazole    Tablet 40 milliGRAM(s) Oral before breakfast  polyethylene glycol 3350 17 Gram(s) Oral every 12 hours  tamsulosin 0.4 milliGRAM(s) Oral at bedtime      VITALS/PHYSICAL EXAM  --------------------------------------------------------------------------------  T(C): 36.7 (11-06-18 @ 05:49), Max: 37.1 (11-05-18 @ 20:13)  HR: 73 (11-06-18 @ 05:49) (73 - 79)  BP: 131/69 (11-06-18 @ 05:49) (115/56 - 131/69)  RR: 18 (11-06-18 @ 05:49) (17 - 19)  SpO2: --  Wt(kg): --        11-05-18 @ 07:01  -  11-06-18 @ 07:00  --------------------------------------------------------  IN: 0 mL / OUT: 150 mL / NET: -150 mL      Physical Exam:  	Gen: NAD  	Pulm: decrease BS  B/L  	CV:  S1S2; no rub  	Abd:  soft, nontender/nondistended  	LE: dressing   	Vascular access:tesio, av fistula     LABS/STUDIES  --------------------------------------------------------------------------------              8.1    9.09  >-----------<  360      [11-06-18 @ 07:58]              26.1     137  |  98  |  41  ----------------------------<  154      [11-05-18 @ 10:47]  4.7   |  22  |  5.3        Ca     8.9     [11-05-18 @ 10:47]      Phos  3.4     [11-04-18 @ 12:23]            Creatinine Trend:  SCr 5.3 [11-05 @ 10:47]  SCr 4.1 [11-04 @ 12:23]  SCr 4.5 [11-01 @ 19:11]  SCr 4.1 [11-01 @ 06:38]  SCr 5.3 [10-31 @ 13:50]        Iron 38, TIBC 166, %sat 23      [09-19-18 @ 04:30]  Ferritin 55      [03-09-18 @ 18:01]  PTH -- (Ca 8.2)      [09-22-18 @ 08:40]   2071  PTH -- (Ca 8.0)      [09-19-18 @ 04:30]   1519  PTH -- (Ca 8.3)      [03-09-18 @ 18:01]   1545  Vitamin D (25OH) 14.4      [03-09-18 @ 18:01]

## 2018-11-06 NOTE — PROGRESS NOTE ADULT - SUBJECTIVE AND OBJECTIVE BOX
SU SAWYER  58y, Male      OVERNIGHT EVENTS:    no fevers, no pain left LE    VITALS:  T(F): 98, Max: 98.7 (11-05-18 @ 20:13)  HR: 73  BP: 131/69  RR: 18Vital Signs Last 24 Hrs  T(C): 36.7 (06 Nov 2018 05:49), Max: 37.1 (05 Nov 2018 20:13)  T(F): 98 (06 Nov 2018 05:49), Max: 98.7 (05 Nov 2018 20:13)  HR: 73 (06 Nov 2018 05:49) (73 - 79)  BP: 131/69 (06 Nov 2018 05:49) (115/56 - 131/69)  BP(mean): --  RR: 18 (06 Nov 2018 05:49) (17 - 19)  SpO2: --    TESTS & MEASUREMENTS:                        8.1    9.09  )-----------( 360      ( 06 Nov 2018 07:58 )             26.1     11-06    140  |  101  |  29<H>  ----------------------------<  134<H>  4.8   |  28  |  4.1<HH>    Ca    8.8      06 Nov 2018 07:58  Phos  4.2     11-06          Culture - Acid Fast - Other w/Smear (collected 11-02-18 @ 11:29)  Source: .Other None    Culture - Surgical Swab (collected 11-02-18 @ 11:29)  Source: .Surgical Swab ulcer left foot  Final Report (11-04-18 @ 21:49):    Rare Proteus mirabilis    Rare Staphylococcus aureus    Rare Enterococcus faecalis    See previous culture [id number]  Organism: Proteus mirabilis  Staphylococcus aureus (11-04-18 @ 21:49)  Organism: Staphylococcus aureus (11-04-18 @ 21:49)      -  Ampicillin/Sulbactam: S <=8/4      -  Cefazolin: S <=4      -  Clindamycin: S 0.5      -  Erythromycin: S <=0.25      -  Gentamicin: S 2 Should not be used as monotherapy      -  Oxacillin: S 0.5      -  Penicillin: R >8      -  RIF- Rifampin: S <=1 Should not be used as monotherapy      -  Tetra/Doxy: S <=1      -  Trimethoprim/Sulfamethoxazole: S <=0.5/9.5      -  Vancomycin: S 2      Method Type: ROSALIA  Organism: Proteus mirabilis (11-04-18 @ 21:49)      -  Amikacin: S 16      -  Amoxicillin/Clavulanic Acid: S <=8/4      -  Ampicillin: S <=2 These ampicillin results predict results for amoxicillin      -  Ampicillin/Sulbactam: S <=4/2      -  Aztreonam: S <=4      -  Cefazolin: S <=2      -  Cefepime: S <=2      -  Cefoxitin: S <=4      -  Ceftriaxone: S <=1 Enterobacter, Citrobacter, and Serratia may develop resistance during prolonged therapy      -  Ciprofloxacin: S <=0.5      -  Ertapenem: S <=0.5      -  Gentamicin: S 4      -  Levofloxacin: S <=1      -  Meropenem: S <=1      -  Piperacillin/Tazobactam: S <=8      -  Tobramycin: S 4      -  Trimethoprim/Sulfamethoxazole: S <=0.5/9.5      Method Type: ROSALIA    Culture - Acid Fast - Other w/Smear (collected 11-02-18 @ 11:29)  Source: .Other None    Culture - Surgical Swab (collected 11-02-18 @ 11:21)  Source: .Surgical Swab left foot  Final Report (11-04-18 @ 21:47):    Few Proteus mirabilis    See previous culture [id number]    Few Coag Negative Staphylococcus    Few Enterococcus faecalis    Moderate Corynebacterium species "Susceptibilities not performed"    Few Bacteroides vulgatus "Susceptibilities not performed"  Organism: Enterococcus faecalis  Coag Negative Staphylococcus (11-04-18 @ 21:47)  Organism: Coag Negative Staphylococcus (11-04-18 @ 21:47)      -  Ampicillin/Sulbactam: R >16/8      -  Cefazolin: R >16      -  Clindamycin: R >4      -  Erythromycin: R >4      -  Gentamicin: R >8 Should not be used as monotherapy      -  Oxacillin: R >2      -  Penicillin: R >8      -  RIF- Rifampin: S <=1 Should not be used as monotherapy      -  Tetra/Doxy: S <=1      -  Trimethoprim/Sulfamethoxazole: R >2/38      -  Vancomycin: S 4      Method Type: ROSALIA  Organism: Enterococcus faecalis (11-04-18 @ 21:47)      -  Ampicillin: S <=2 Predicts results to ampicillin/sulbactam, amoxacillin-clavulanate and  piperacillin-tazobactam.      -  Tetra/Doxy: S <=1      -  Vancomycin: S 2      Method Type: ROSALIA            RADIOLOGY & ADDITIONAL TESTS:    ANTIBIOTICS:  ampicillin/sulbactam  IVPB 3 Gram(s) IV Intermittent daily  clindamycin IVPB 900 milliGRAM(s) IV Intermittent every 8 hours

## 2018-11-06 NOTE — PROGRESS NOTE ADULT - ASSESSMENT
Assessment 56y/o M w/ hx of neuropathy, acromegaly, diabetes, htn, CKD MWF, makes urine, htn [facility] with Dr. Gomes. Vascular studies showed severe PVD and is transferred to [place] for angiogram.    Plan    #Severe PVD  -arterial duplex 10/24: b/l severe peripheral arterial disease, occlusion of R popliteal and L SF arteries  -angiogram 10/31: S/P Left extremity angiogram w/ left SFA angioplasty and stent & left popliteal angioplasty for heel ulceration  -f/u with vascular in 2 weeks outpatient  Dr. Comer      #Chronic DVT of LLE - found on imaging on March 2018  -not on anticoagulation  -vascular f/u    #DFU - left foot  -s/p debridement infected heel ulcer to the calcaneus ~1 week ago  -ID: c/w unasyn 3g q24hrs; c/w clindamycin 900 q8hrs for now  -upon discharge, will d/c with PO Flagyl 500mg BID x6 weeks and Ancef 2gm post HD x6wks  -surgery: s/p LLE debridement 11/2/18 ; -received 1 unit prbc during procedure for hypotension and hgb of 7.7; daily dressing changes  -surgery planning for LLE heel debridement tomorrow 11/7/18  -podiatry consult appreciated.        #ESRD on HD started 1 month ago - MWF  -c/w HD; renal following      #Anemia  -s/p 1 prbc transfusion in [facility] and another prbc during debridement in [place]  -EDWIN 40 mcg weekly  -monitor cbc  -pt requesting to speak with a hematologist regarding anemia    #Diabetes  -basal bolus insuin; iss    #HTN  -d/c Lasix for low BPs  -BP improved    #HLD  -c/w atorvastatin    DVT ppx: heparin subq  GI ppx: protonix  Diet: consistent carbohydrate, renal  Activity: ambulate as tolerated    When medically stable - d/c back to [facility]  When to restart plavix? - will d/w surgery Assessment 58y/o M w/ hx of neuropathy, acromegaly, diabetes, htn, CKD MWF, makes urine, htn [facility] with Dr. Gomes. Vascular studies showed severe PVD and is transferred to [place] for angiogram.    Plan    #Severe PVD  -arterial duplex 10/24: b/l severe peripheral arterial disease, occlusion of R popliteal and L SF arteries  -angiogram 10/31: S/P Left extremity angiogram w/ left SFA angioplasty and stent & left popliteal angioplasty for heel ulceration  -f/u with vascular in 2 weeks outpatient  Dr. Comer      #Chronic DVT of LLE - found on imaging on March 2018  -not on anticoagulation  -vascular f/u    #DFU - left foot  -s/p debridement infected heel ulcer to the calcaneus ~1 week ago  -ID: d/c unasyn 3g q24hrs; start Ancef 2gm post dialysis; c/w clindamycin 900 q8hrs for now  -upon discharge, will d/c with PO Flagyl 500mg BID x6 weeks and Ancef 2gm post HD x6wks  -surgery: s/p LLE debridement 11/2/18 ; -received 1 unit prbc during procedure for hypotension and hgb of 7.7; daily dressing changes  -surgery planning for LLE heel debridement tomorrow 11/7/18  -podiatry consult appreciated.        #ESRD on HD started 1 month ago - MWF  -c/w HD; renal following      #Anemia  -s/p 1 prbc transfusion in [facility] and another prbc during debridement in [place]  -EDWIN 40 mcg weekly  -monitor cbc  -pt requesting to speak with a hematologist regarding anemia    #Diabetes  -basal bolus insuin; iss    #HTN  -d/c Lasix for low BPs  -BP improved    #HLD  -c/w atorvastatin    DVT ppx: heparin subq  GI ppx: protonix  Diet: consistent carbohydrate, renal  Activity: ambulate as tolerated    When medically stable - d/c back to [facility]  When to restart plavix? - will d/w surgery

## 2018-11-06 NOTE — PROGRESS NOTE ADULT - SUBJECTIVE AND OBJECTIVE BOX
SU SAWYER  58y  Male  HPI:  56y/o M w/ hx of brain tumor--acromegaly, diabetes, htn, ckd stage on dialysis MWF, makes urine, htn, 2 blood transfusion recently being worked up--  pt on rehab after recent admission for infection to feet presents for hg under 7 at rehab-[facility]; and for worsening of left leg ulcer.  pt had dialysis today and was given iv antibiotics after.  pt has been feeling chills; no fever. no abdominal pain, cp or sob. +generalized weakness.  no blood in stool (22 Oct 2018 19:39)    MEDICATIONS  (STANDING):  aspirin enteric coated 81 milliGRAM(s) Oral daily  atorvastatin 20 milliGRAM(s) Oral at bedtime  bisacodyl Suppository 10 milliGRAM(s) Rectal daily  calcium acetate 1334 milliGRAM(s) Oral four times a day with meals  ceFAZolin   IVPB 2000 milliGRAM(s) IV Intermittent <User Schedule>  chlorhexidine 4% Liquid 1 Application(s) Topical <User Schedule>  clindamycin IVPB 900 milliGRAM(s) IV Intermittent every 8 hours  darbepoetin Injectable Syringe 40 MICROGram(s) IV Push <User Schedule>  docusate sodium 100 milliGRAM(s) Oral two times a day  ergocalciferol 99289 Unit(s) Oral every week  folic acid 1 milliGRAM(s) Oral daily  heparin  Injectable 5000 Unit(s) SubCutaneous every 8 hours  lactulose Syrup 20 Gram(s) Oral daily  pantoprazole    Tablet 40 milliGRAM(s) Oral before breakfast  polyethylene glycol 3350 17 Gram(s) Oral every 12 hours  tamsulosin 0.4 milliGRAM(s) Oral at bedtime    MEDICATIONS  (PRN):  acetaminophen   Tablet .. 650 milliGRAM(s) Oral every 6 hours PRN Temp greater or equal to 38C (100.4F)  oxyCODONE    5 mG/acetaminophen 325 mG 1 Tablet(s) Oral every 4 hours PRN Moderate Pain (4 - 6)    INTERVAL EVENTS: Patient seen today without distress, asking about his Sandostatin injection which was sent from the nursing home to the hospital. Patient is due for the shot. RN informed, floor satellite pharmacist not available.    T(C): 36.8 (11-06-18 @ 20:55), Max: 36.8 (11-06-18 @ 20:55)  HR: 73 (11-06-18 @ 20:55) (73 - 73)  BP: 160/74 (11-06-18 @ 20:55) (131/69 - 160/74)  RR: 18 (11-06-18 @ 20:55) (18 - 18)  SpO2: 99% (11-06-18 @ 19:57) (99% - 99%)  Wt(kg): --Vital Signs Last 24 Hrs  T(C): 36.8 (06 Nov 2018 20:55), Max: 36.8 (06 Nov 2018 20:55)  T(F): 98.2 (06 Nov 2018 20:55), Max: 98.2 (06 Nov 2018 20:55)  HR: 73 (06 Nov 2018 20:55) (73 - 73)  BP: 160/74 (06 Nov 2018 20:55) (131/69 - 160/74)  BP(mean): --  RR: 18 (06 Nov 2018 20:55) (18 - 18)  SpO2: 99% (06 Nov 2018 19:57) (99% - 99%)    PHYSICAL EXAM:  GENERAL:  NAD  NECK: Supple, No JVD  CHEST/LUNG: Clear; No wheezing  HEART: S1, S2, Regular rate and rhythm  ABDOMEN: Soft, Nontender, Bowel sounds present  EXTREMITIES: ++ edema, dressing in place    LABS:  Labs:                        8.1    9.09  )-----------( 360      ( 06 Nov 2018 07:58 )             26.1             11-06    140  |  101  |  29<H>  ----------------------------<  134<H>  4.8   |  28  |  4.1<HH>    Ca    8.8      06 Nov 2018 07:58  Phos  4.2     11-06          RADIOLOGY & ADDITIONAL TESTS:

## 2018-11-06 NOTE — PROGRESS NOTE ADULT - ASSESSMENT
IMPRESSION:  Right heel with ischemic ulcer with extensive gangrenous changes along the edges which persist post op.  I doubt the foot can be salvaged  S/P angio and debridement of Left heel ulcer  Cultures repeatedly positive for LINDA    RECOMMENDATIONS:  Ancef 2 gm iv post HD  Clindamycin 900 mg iv q8h

## 2018-11-06 NOTE — PROGRESS NOTE ADULT - ASSESSMENT
1)  ESRD: s/p hd yesterday, hd in am   2)  DFU right foot s/p debridement ,followed by ID sp angioplasty , on ATB  ? [id number] again tomorrow   3)  Severe secondary HPT  start  hectorol 2 with HD , phos at goaL  4) Anemia Hb noted   no need for PRBC transfusion. resistant to EDWIN in view of infection, will not increase dose   5) BP improved, off lasix    6) hypernatremia resolved   7) will follow

## 2018-11-06 NOTE — PROGRESS NOTE ADULT - SUBJECTIVE AND OBJECTIVE BOX
SUBJECTIVE:    Patient is a 58y old Male who presents with a chief complaint of foot ulcer (06 Nov 2018 08:40)    Currently admitted to medicine with the primary diagnosis of Foot ulcer     Today is hospital day 15d. no complaints this am.     PAST MEDICAL & SURGICAL HISTORY  Dyslipidemia  DM (diabetes mellitus), type 2  Neuropathy  HTN (hypertension)  CAD (coronary atherosclerotic disease)  Acromegaly  CKD (chronic kidney disease), stage V  H/O eye surgery  H/O: pituitary tumor: s/p removal    SOCIAL HISTORY:  Negative for smoking/alcohol/drug use.     ALLERGIES:  bacitracin (Unknown)  Neosporin (Hypotension)    MEDICATIONS:  STANDING MEDICATIONS  ampicillin/sulbactam  IVPB 3 Gram(s) IV Intermittent daily  aspirin enteric coated 81 milliGRAM(s) Oral daily  atorvastatin 20 milliGRAM(s) Oral at bedtime  bisacodyl Suppository 10 milliGRAM(s) Rectal daily  calcium acetate 1334 milliGRAM(s) Oral four times a day with meals  chlorhexidine 4% Liquid 1 Application(s) Topical <User Schedule>  clindamycin IVPB 900 milliGRAM(s) IV Intermittent every 8 hours  darbepoetin Injectable Syringe 40 MICROGram(s) IV Push <User Schedule>  docusate sodium 100 milliGRAM(s) Oral two times a day  ergocalciferol 61312 Unit(s) Oral every week  folic acid 1 milliGRAM(s) Oral daily  heparin  Injectable 5000 Unit(s) SubCutaneous every 8 hours  lactulose Syrup 20 Gram(s) Oral daily  pantoprazole    Tablet 40 milliGRAM(s) Oral before breakfast  polyethylene glycol 3350 17 Gram(s) Oral every 12 hours  tamsulosin 0.4 milliGRAM(s) Oral at bedtime    PRN MEDICATIONS  acetaminophen   Tablet .. 650 milliGRAM(s) Oral every 6 hours PRN  oxyCODONE    5 mG/acetaminophen 325 mG 1 Tablet(s) Oral every 4 hours PRN    VITALS:   T(F): 98  HR: 73  BP: 131/69  RR: 18  SpO2: --    LABS:                        8.1    9.09  )-----------( 360      ( 06 Nov 2018 07:58 )             26.1     11-06    140  |  101  |  29<H>  ----------------------------<  134<H>  4.8   |  28  |  4.1<HH>    Ca    8.8      06 Nov 2018 07:58  Phos  4.2     11-06                    RADIOLOGY:    PHYSICAL EXAM:  GEN: No acute distress  LUNGS: Clear to auscultation bilaterally   HEART: S1/S2 present. RRR.   ABD: Soft, non-tender, non-distended. Bowel sounds present  EXT: right heel bandaged; left shin ulcer  NEURO: [id number]

## 2018-11-06 NOTE — PROGRESS NOTE ADULT - SUBJECTIVE AND OBJECTIVE BOX
SU SAWYER  58y Male   [id number]    Hospital Day: 8  Procedure:s/p left heel debridement   Patient is a 58y old  Male who presents with a chief complaint of foot ulcer (05 Nov 2018 23:28)    PAST MEDICAL & SURGICAL HISTORY:  Dyslipidemia  DM (diabetes mellitus), type 2  Neuropathy  HTN (hypertension)  CAD (coronary atherosclerotic disease)  Acromegaly  CKD (chronic kidney disease), stage V  H/O eye surgery  H/O: pituitary tumor: s/p removal      Events of the Last 24h:none  Vital Signs Last 24 Hrs  T(C): 37.1 (05 Nov 2018 20:13), Max: 37.1 (05 Nov 2018 20:13)  T(F): 98.7 (05 Nov 2018 20:13), Max: 98.7 (05 Nov 2018 20:13)  HR: 79 (05 Nov 2018 20:13) (74 - 79)  BP: 129/66 (05 Nov 2018 20:13) (115/56 - 139/81)  BP(mean): --  RR: 18 (05 Nov 2018 20:13) (16 - 19)  SpO2: 98% (05 Nov 2018 07:30) (98% - 99%)        Diet, Consistent Carbohydrate Renal w/Evening Snack (11-02-18 @ 08:59)      I&O's Summary    04 Nov 2018 07:01  -  05 Nov 2018 07:00  --------------------------------------------------------  IN: 308 mL / OUT: 500 mL / NET: -192 mL     I&O's Detail    04 Nov 2018 07:01  -  05 Nov 2018 07:00  --------------------------------------------------------  IN:    PRBCs (Packed Red Blood Cells): 308 mL  Total IN: 308 mL    OUT:    Voided: 500 mL  Total OUT: 500 mL    Total NET: -192 mL          MEDICATIONS  (STANDING):  ampicillin/sulbactam  IVPB 3 Gram(s) IV Intermittent daily  aspirin enteric coated 81 milliGRAM(s) Oral daily  atorvastatin 20 milliGRAM(s) Oral at bedtime  bisacodyl Suppository 10 milliGRAM(s) Rectal daily  calcium acetate 1334 milliGRAM(s) Oral four times a day with meals  chlorhexidine 4% Liquid 1 Application(s) Topical <User Schedule>  clindamycin IVPB 900 milliGRAM(s) IV Intermittent every 8 hours  darbepoetin Injectable Syringe 40 MICROGram(s) IV Push <User Schedule>  docusate sodium 100 milliGRAM(s) Oral two times a day  ergocalciferol 50694 Unit(s) Oral every week  folic acid 1 milliGRAM(s) Oral daily  heparin  Injectable 5000 Unit(s) SubCutaneous every 8 hours  lactulose Syrup 20 Gram(s) Oral daily  pantoprazole    Tablet 40 milliGRAM(s) Oral before breakfast  polyethylene glycol 3350 17 Gram(s) Oral every 12 hours  tamsulosin 0.4 milliGRAM(s) Oral at bedtime    MEDICATIONS  (PRN):  acetaminophen   Tablet .. 650 milliGRAM(s) Oral every 6 hours PRN Temp greater or equal to 38C (100.4F)  oxyCODONE    5 mG/acetaminophen 325 mG 1 Tablet(s) Oral every 4 hours PRN Moderate Pain (4 - 6)      PHYSICAL EXAM:    GENERAL: NAD    HEENT: NCAT    CHEST/LUNGS: CTAB    HEART: RRR,  No murmurs, rubs, or gallops    ABDOMEN: SNTND +BS    EXTREMITIES:  FROM, No clubbing, cyanosis, or edema, palpable pulse    NEURO: No focal neurological deficits    SKIN: No rashes or lesions    INCISION/WOUNDS:                          8.1    8.95  )-----------( 385      ( 05 Nov 2018 10:47 )             26.2        CBC Full  -  ( 05 Nov 2018 10:47 )  WBC Count : 8.95 K/uL  Hemoglobin : 8.1 g/dL  Hematocrit : 26.2 %  Platelet Count - Automated : 385 K/uL  Mean Cell Volume : 90.7 fL  Mean Cell Hemoglobin : 28.0 pg  Mean Cell Hemoglobin Concentration : 30.9 g/dL  Auto Neutrophil # : 6.12 K/uL  Auto Lymphocyte # : 1.41 K/uL  Auto Monocyte # : 0.77 K/uL  Auto Eosinophil # : 0.48 K/uL  Auto Basophil # : 0.08 K/uL  Auto Neutrophil % : 68.3 %  Auto Lymphocyte % : 15.8 %  Auto Monocyte % : 8.6 %  Auto Eosinophil % : 5.4 %  Auto Basophil % : 0.9 %               137   |  98    |  41                 Ca: 8.9    BMP:   ----------------------------< 154    Mg: x     (11-05-18 @ 10:47)             4.7    |  22    | 5.3                Ph: x

## 2018-11-07 LAB
GLUCOSE BLDC GLUCOMTR-MCNC: 116 MG/DL — HIGH (ref 70–99)
GLUCOSE BLDC GLUCOMTR-MCNC: 118 MG/DL — HIGH (ref 70–99)
GLUCOSE BLDC GLUCOMTR-MCNC: 85 MG/DL — SIGNIFICANT CHANGE UP (ref 70–99)
GLUCOSE BLDC GLUCOMTR-MCNC: 97 MG/DL — SIGNIFICANT CHANGE UP (ref 70–99)
SURGICAL PATHOLOGY STUDY: SIGNIFICANT CHANGE UP

## 2018-11-07 RX ORDER — DOXERCALCIFEROL 2.5 UG/1
2 CAPSULE ORAL ONCE
Qty: 0 | Refills: 0 | Status: COMPLETED | OUTPATIENT
Start: 2018-11-07 | End: 2018-11-07

## 2018-11-07 RX ADMIN — Medication 100 MILLIGRAM(S): at 11:02

## 2018-11-07 RX ADMIN — CHLORHEXIDINE GLUCONATE 1 APPLICATION(S): 213 SOLUTION TOPICAL at 05:51

## 2018-11-07 RX ADMIN — Medication 1334 MILLIGRAM(S): at 17:52

## 2018-11-07 RX ADMIN — Medication 1334 MILLIGRAM(S): at 21:25

## 2018-11-07 RX ADMIN — DOXERCALCIFEROL 2 MICROGRAM(S): 2.5 CAPSULE ORAL at 11:01

## 2018-11-07 RX ADMIN — Medication 100 MILLIGRAM(S): at 17:55

## 2018-11-07 RX ADMIN — Medication 100 MILLIGRAM(S): at 17:54

## 2018-11-07 RX ADMIN — TAMSULOSIN HYDROCHLORIDE 0.4 MILLIGRAM(S): 0.4 CAPSULE ORAL at 21:25

## 2018-11-07 RX ADMIN — Medication 100 MILLIGRAM(S): at 21:26

## 2018-11-07 RX ADMIN — HEPARIN SODIUM 5000 UNIT(S): 5000 INJECTION INTRAVENOUS; SUBCUTANEOUS at 21:26

## 2018-11-07 RX ADMIN — Medication 100 MILLIGRAM(S): at 05:51

## 2018-11-07 RX ADMIN — ATORVASTATIN CALCIUM 20 MILLIGRAM(S): 80 TABLET, FILM COATED ORAL at 21:25

## 2018-11-07 RX ADMIN — HEPARIN SODIUM 5000 UNIT(S): 5000 INJECTION INTRAVENOUS; SUBCUTANEOUS at 17:44

## 2018-11-07 NOTE — PROGRESS NOTE ADULT - SUBJECTIVE AND OBJECTIVE BOX
· Subjective and Objective: 	  Chart reviewed, patient examined. Pertinent results reviewed.  Case discussed with HO  specialist f/u reviewed  HD#17; POD#5(2nd debridement); POD#7(vascular intervention),     & POD#1-3rd debridement          Patient is a 58y old Male who presented with a chief complaint of foot ulcer (07 Nov 2018 08:57)    Currently admitted to medicine with the primary diagnosis of Foot ulcer-refractory due to ongoing ischemic issues     Today is hospital day 16d. No o/n events. Pt is pleasant this morning - no complaints. LLE heel debridement yesterday-3rd time; HD today. .     patient seen while on dialysis    PAST MEDICAL & SURGICAL HISTORY  Dyslipidemia  DM (diabetes mellitus), type 2  Neuropathy  HTN (hypertension)  CAD (coronary atherosclerotic disease)  Acromegaly  CKD (chronic kidney disease), stage V  H/O eye surgery  H/O: pituitary tumor: s/p removal    SOCIAL HISTORY:  Negative for smoking/alcohol/drug use.     ALLERGIES:  bacitracin (Unknown)  Neosporin (Hypotension)    MEDICATIONS:  STANDING MEDICATIONS  aspirin enteric coated 81 milliGRAM(s) Oral daily  atorvastatin 20 milliGRAM(s) Oral at bedtime  bisacodyl Suppository 10 milliGRAM(s) Rectal daily  calcium acetate 1334 milliGRAM(s) Oral four times a day with meals  ceFAZolin   IVPB 2000 milliGRAM(s) IV Intermittent <User Schedule>  chlorhexidine 4% Liquid 1 Application(s) Topical <User Schedule>  clindamycin IVPB 900 milliGRAM(s) IV Intermittent every 8 hours  darbepoetin Injectable Syringe 40 MICROGram(s) IV Push <User Schedule>  docusate sodium 100 milliGRAM(s) Oral two times a day  ergocalciferol 40770 Unit(s) Oral every week  folic acid 1 milliGRAM(s) Oral daily  heparin  Injectable 5000 Unit(s) SubCutaneous every 8 hours  lactulose Syrup 20 Gram(s) Oral daily  pantoprazole    Tablet 40 milliGRAM(s) Oral before breakfast  polyethylene glycol 3350 17 Gram(s) Oral every 12 hours  tamsulosin 0.4 milliGRAM(s) Oral at bedtime    PRN MEDICATIONS  acetaminophen   Tablet .. 650 milliGRAM(s) Oral every 6 hours PRN  oxyCODONE    5 mG/acetaminophen 325 mG 1 Tablet(s) Oral every 4 hours PRN    VITALS:   T(F): 97.9  HR: 77  BP: 109/53  RR: 18  SpO2: 99%    LABS:                        8.1    9.09  )-----------( 360      ( 06 Nov 2018 07:58 )             26.1     11-06    140  |  101  |  29<H>  ----------------------------<  134<H>  4.8   |  28  |  4.1<HH>    Ca    8.8      06 Nov 2018 07:58  Phos  4.2     11-06                    RADIOLOGY:    PHYSICAL EXAM:  GEN: No acute distress; on HD; easily arousable  LUNGS: Clear to auscultation bilaterally   HEART: S1/S2 present. RRR.   ABD: Soft, non-tender, non-distended. Bowel sounds present  EXT: right heel bandaged; left shin ulcer  NEURO: AAOX3

## 2018-11-07 NOTE — PROGRESS NOTE ADULT - ASSESSMENT
Assessment 56y/o M w/ hx of neuropathy, acromegaly, diabetes, htn, CKD MWF, makes urine, htn [facility]LE ulcer with Dr. Gomes. Vascular studies showed severe PVD and was transferred to [place] for angiogram, & angioplasty.  He also has had 3 debridements & true healing continues to be elusive.    Plan    #Severe PVD  -arterial duplex 10/24: b/l severe peripheral arterial disease, occlusion of R popliteal and L SF arteries  -angiogram 10/31: S/P Left extremity angiogram w/ left SFA angioplasty and stent & left popliteal angioplasty for heel ulceration  -f/u with vascular in 2 weeks outpatient  Dr. Comer      #Chronic DVT of LLE - found on imaging on March 2018  -not on anticoagulation  -vascular f/u    #DFU - left foot  -s/p debridement infected heel ulcer to the calcaneus ~1 week ago  -ID:   RECOMMENDATIONS:  If cleared by Vascular   Ancef 2 gm iv post HD and po Flagyl 500 mg q12h for 6 weeks  D/C other ABx  Recall prn please.       -surgery: s/p LLE debridement 11/2/18 ; -received 1 unit prbc during procedure for hypotension and hgb of 7.7; daily dressing changes  -surgery - LLE heel debridement yesterday- and surgery states will get        Burn to see & attempt wound vac  -podiatry consult appreciated.        #ESRD on HD started 1 month ago - MWF  -c/w HD; renal following      #Anemia  -s/p 1 prbc transfusion in [facility] and another prbc during debridement in [place]  -EDWIN 40 mcg weekly  -monitor cbc  -pt requesting to speak with a hematologist regarding anemia    #Diabetes  -basal bolus insuin; iss    #HTN  -d/c Lasix for low BPs  -BP improved    #HLD  -c/w atorvastatin    DVT ppx: heparin subq  GI ppx: protonix  Diet: consistent carbohydrate, renal  Activity: ambulate as tolerated    When medically stable - d/c back to [facility]; Rehab consult agrees   When to restart plavix? - will d/w surgery

## 2018-11-07 NOTE — PROGRESS NOTE ADULT - SUBJECTIVE AND OBJECTIVE BOX
Nephrology progress note    Patient was seen and examined on HD, events over the last 24 h noted .  S/P angio and debridement of Left heel ulcer  Cultures repeatedly positive for LINDA    for possible surgery today     Allergies:  bacitracin (Unknown)  Neosporin (Hypotension)    Hospital Medications:   MEDICATIONS  (STANDING):  aspirin enteric coated 81 milliGRAM(s) Oral daily  atorvastatin 20 milliGRAM(s) Oral at bedtime  bisacodyl Suppository 10 milliGRAM(s) Rectal daily  calcium acetate 1334 milliGRAM(s) Oral four times a day with meals  ceFAZolin   IVPB 2000 milliGRAM(s) IV Intermittent <User Schedule>  chlorhexidine 4% Liquid 1 Application(s) Topical <User Schedule>  clindamycin IVPB 900 milliGRAM(s) IV Intermittent every 8 hours  darbepoetin Injectable Syringe 40 MICROGram(s) IV Push <User Schedule>  docusate sodium 100 milliGRAM(s) Oral two times a day  ergocalciferol 41771 Unit(s) Oral every week  folic acid 1 milliGRAM(s) Oral daily  heparin  Injectable 5000 Unit(s) SubCutaneous every 8 hours  lactulose Syrup 20 Gram(s) Oral daily  pantoprazole    Tablet 40 milliGRAM(s) Oral before breakfast  polyethylene glycol 3350 17 Gram(s) Oral every 12 hours  tamsulosin 0.4 milliGRAM(s) Oral at bedtime        VITALS:  T(F): 96.7 (11-07-18 @ 03:25), Max: 98.2 (11-06-18 @ 20:55)  HR: 71 (11-07-18 @ 08:35)  BP: 133/80 (11-07-18 @ 08:35)  RR: 18 (11-07-18 @ 08:35)  SpO2: 99% (11-07-18 @ 03:25)  Wt(kg): --    11-05 @ 07:01  -  11-06 @ 07:00  --------------------------------------------------------  IN: 0 mL / OUT: 150 mL / NET: -150 mL    11-06 @ 07:01  -  11-07 @ 07:00  --------------------------------------------------------  IN: 50 mL / OUT: 850 mL / NET: -800 mL          PHYSICAL EXAM:  Constitutional: NAD  HEENT: anicteric sclera, oropharynx clear, MMM  Neck: No JVD  Respiratory: CTAB, no wheezes, rales or rhonchi  Cardiovascular: S1, S2, RRR  Gastrointestinal: BS+, soft, NT/ND  Extremities: No cyanosis or clubbing. No peripheral edema  :  No ortiz.   Skin: No rashes    LABS:  11-06    140  |  101  |  29<H>  ----------------------------<  134<H>  4.8   |  28  |  4.1<HH>    Ca    8.8      06 Nov 2018 07:58  Phos  4.2     11-06                            8.1    9.09  )-----------( 360      ( 06 Nov 2018 07:58 )             26.1       Urine Studies:      RADIOLOGY & ADDITIONAL STUDIES:

## 2018-11-07 NOTE — PROGRESS NOTE ADULT - SUBJECTIVE AND OBJECTIVE BOX
DIAGNOSIS:   HOSPITAL DAY #:    STATUS POST:    POST OPERATIVE DAY #:     Vital Signs Last 24 Hrs  T(C): 36.6 (07 Nov 2018 20:25), Max: 36.6 (07 Nov 2018 12:00)  T(F): 97.8 (07 Nov 2018 20:25), Max: 97.9 (07 Nov 2018 12:00)  HR: 77 (07 Nov 2018 20:25) (71 - 77)  BP: 125/59 (07 Nov 2018 20:25) (102/66 - 143/74)  BP(mean): --  RR: 18 (07 Nov 2018 20:25) (16 - 18)  SpO2: 99% (07 Nov 2018 03:25) (99% - 99%)    SUBJECTIVE: Pt seen    Pain: YES  [ ]   NO [ ]   Nausea: [ ] YES [ ] NO           Vomiting: [ ] YES [ ] NO  Flatus: [ ] YES [ ] NO             Bowel Movement: [ ] YES [ ] NO     Void: [ ]YES [ ]No      DARNELL DRAINAGE: SIGNIFICANT [ ]   NOT SIGNIFICANT [ ]   NOT APPLICABLE [ ]  YES [ ] NO    General Appearance: Appears well, NAD  Neck: Supple  Chest: Equal expansion bilaterally, equal breath sounds  CV: Pulse regular presently  Abdomen: Soft [s ] YES [ ]NO  DISTENDED [ ] YES [s ] NO TENDERNESS [ ]YES [ ]NO  INCISIONS: HEALING WELL [ ] YES  [ ] NO ERYTHEMA [ ] YES [ ] NO DRAINAGE [ ] YES  [ ] NO  Extremities: Grossly symmetric, CALF TENDERNESS [ ] YES  [ ] NO  LT ankle   ulcerdebrided by bed side    LABS:                        8.1    9.09  )-----------( 360      ( 06 Nov 2018 07:58 )             26.1     11-06    140  |  101  |  29<H>  ----------------------------<  134<H>  4.8   |  28  |  4.1<HH>    Ca    8.8      06 Nov 2018 07:58  Phos  4.2     11-06              ASSESSMENT:     GOOD POST OP COURSE [ ]  YES  [ ] NO  CONDITION IMPROVING  []  YES  [ ]  NO          PLAN:    CONTINUE PRESENT MANAGEMENT  [ ] YES  [ ] NO

## 2018-11-07 NOTE — PROGRESS NOTE ADULT - SUBJECTIVE AND OBJECTIVE BOX
SUBJECTIVE:    Patient is a 58y old Male who presents with a chief complaint of foot ulcer (07 Nov 2018 08:57)    Currently admitted to medicine with the primary diagnosis of Foot ulcer     Today is hospital day 16d. No o/n events. Pt is pleasant this morning - no complaints. LLE heel debridement today. HD today.     PAST MEDICAL & SURGICAL HISTORY  Dyslipidemia  DM (diabetes mellitus), type 2  Neuropathy  HTN (hypertension)  CAD (coronary atherosclerotic disease)  Acromegaly  CKD (chronic kidney disease), stage V  H/O eye surgery  H/O: pituitary tumor: s/p removal    SOCIAL HISTORY:  Negative for smoking/alcohol/drug use.     ALLERGIES:  bacitracin (Unknown)  Neosporin (Hypotension)    MEDICATIONS:  STANDING MEDICATIONS  aspirin enteric coated 81 milliGRAM(s) Oral daily  atorvastatin 20 milliGRAM(s) Oral at bedtime  bisacodyl Suppository 10 milliGRAM(s) Rectal daily  calcium acetate 1334 milliGRAM(s) Oral four times a day with meals  ceFAZolin   IVPB 2000 milliGRAM(s) IV Intermittent <User Schedule>  chlorhexidine 4% Liquid 1 Application(s) Topical <User Schedule>  clindamycin IVPB 900 milliGRAM(s) IV Intermittent every 8 hours  darbepoetin Injectable Syringe 40 MICROGram(s) IV Push <User Schedule>  docusate sodium 100 milliGRAM(s) Oral two times a day  ergocalciferol 21474 Unit(s) Oral every week  folic acid 1 milliGRAM(s) Oral daily  heparin  Injectable 5000 Unit(s) SubCutaneous every 8 hours  lactulose Syrup 20 Gram(s) Oral daily  pantoprazole    Tablet 40 milliGRAM(s) Oral before breakfast  polyethylene glycol 3350 17 Gram(s) Oral every 12 hours  tamsulosin 0.4 milliGRAM(s) Oral at bedtime    PRN MEDICATIONS  acetaminophen   Tablet .. 650 milliGRAM(s) Oral every 6 hours PRN  oxyCODONE    5 mG/acetaminophen 325 mG 1 Tablet(s) Oral every 4 hours PRN    VITALS:   T(F): 97.9  HR: 77  BP: 109/53  RR: 18  SpO2: 99%    LABS:                        8.1    9.09  )-----------( 360      ( 06 Nov 2018 07:58 )             26.1     11-06    140  |  101  |  29<H>  ----------------------------<  134<H>  4.8   |  28  |  4.1<HH>    Ca    8.8      06 Nov 2018 07:58  Phos  4.2     11-06                    RADIOLOGY:    PHYSICAL EXAM:  GEN: No acute distress  LUNGS: Clear to auscultation bilaterally   HEART: S1/S2 present. RRR.   ABD: Soft, non-tender, non-distended. Bowel sounds present  EXT: right heel bandaged; left shin ulcer  NEURO: AAOX3

## 2018-11-07 NOTE — PROGRESS NOTE ADULT - SUBJECTIVE AND OBJECTIVE BOX
SU SAWYER  58y, Male      OVERNIGHT EVENTS:    no fevers, surgery today possibly    VITALS:  T(F): 96.7, Max: 98.2 (11-06-18 @ 20:55)  HR: 71  BP: 133/80  RR: 18Vital Signs Last 24 Hrs  T(C): 35.9 (07 Nov 2018 03:25), Max: 36.8 (06 Nov 2018 20:55)  T(F): 96.7 (07 Nov 2018 03:25), Max: 98.2 (06 Nov 2018 20:55)  HR: 71 (07 Nov 2018 08:35) (71 - 73)  BP: 133/80 (07 Nov 2018 08:35) (133/80 - 160/74)  BP(mean): --  RR: 18 (07 Nov 2018 08:35) (16 - 18)  SpO2: 99% (07 Nov 2018 03:25) (99% - 99%)    TESTS & MEASUREMENTS:                        8.1    9.09  )-----------( 360      ( 06 Nov 2018 07:58 )             26.1     11-06    140  |  101  |  29<H>  ----------------------------<  134<H>  4.8   |  28  |  4.1<HH>    Ca    8.8      06 Nov 2018 07:58  Phos  4.2     11-06          Culture - Acid Fast - Other w/Smear (collected 11-02-18 @ 11:29)  Source: .Other None    Culture - Surgical Swab (collected 11-02-18 @ 11:29)  Source: .Surgical Swab ulcer left foot  Final Report (11-04-18 @ 21:49):    Rare Proteus mirabilis    Rare Staphylococcus aureus    Rare Enterococcus faecalis    See previous culture [id number]  Organism: Proteus mirabilis  Staphylococcus aureus (11-04-18 @ 21:49)  Organism: Staphylococcus aureus (11-04-18 @ 21:49)      -  Ampicillin/Sulbactam: S <=8/4      -  Cefazolin: S <=4      -  Clindamycin: S 0.5      -  Erythromycin: S <=0.25      -  Gentamicin: S 2 Should not be used as monotherapy      -  Oxacillin: S 0.5      -  Penicillin: R >8      -  RIF- Rifampin: S <=1 Should not be used as monotherapy      -  Tetra/Doxy: S <=1      -  Trimethoprim/Sulfamethoxazole: S <=0.5/9.5      -  Vancomycin: S 2      Method Type: ROSALIA  Organism: Proteus mirabilis (11-04-18 @ 21:49)      -  Amikacin: S 16      -  Amoxicillin/Clavulanic Acid: S <=8/4      -  Ampicillin: S <=2 These ampicillin results predict results for amoxicillin      -  Ampicillin/Sulbactam: S <=4/2      -  Aztreonam: S <=4      -  Cefazolin: S <=2      -  Cefepime: S <=2      -  Cefoxitin: S <=4      -  Ceftriaxone: S <=1 Enterobacter, Citrobacter, and Serratia may develop resistance during prolonged therapy      -  Ciprofloxacin: S <=0.5      -  Ertapenem: S <=0.5      -  Gentamicin: S 4      -  Levofloxacin: S <=1      -  Meropenem: S <=1      -  Piperacillin/Tazobactam: S <=8      -  Tobramycin: S 4      -  Trimethoprim/Sulfamethoxazole: S <=0.5/9.5      Method Type: ROSALIA    Culture - Acid Fast - Other w/Smear (collected 11-02-18 @ 11:29)  Source: .Other None    Culture - Surgical Swab (collected 11-02-18 @ 11:21)  Source: .Surgical Swab left foot  Final Report (11-04-18 @ 21:47):    Few Proteus mirabilis    See previous culture [id number]    Few Coag Negative Staphylococcus    Few Enterococcus faecalis    Moderate Corynebacterium species "Susceptibilities not performed"    Few Bacteroides vulgatus "Susceptibilities not performed"  Organism: Enterococcus faecalis  Coag Negative Staphylococcus (11-04-18 @ 21:47)  Organism: Coag Negative Staphylococcus (11-04-18 @ 21:47)      -  Ampicillin/Sulbactam: R >16/8      -  Cefazolin: R >16      -  Clindamycin: R >4      -  Erythromycin: R >4      -  Gentamicin: R >8 Should not be used as monotherapy      -  Oxacillin: R >2      -  Penicillin: R >8      -  RIF- Rifampin: S <=1 Should not be used as monotherapy      -  Tetra/Doxy: S <=1      -  Trimethoprim/Sulfamethoxazole: R >2/38      -  Vancomycin: S 4      Method Type: ROSALIA  Organism: Enterococcus faecalis (11-04-18 @ 21:47)      -  Ampicillin: S <=2 Predicts results to ampicillin/sulbactam, amoxacillin-clavulanate and  piperacillin-tazobactam.      -  Tetra/Doxy: S <=1      -  Vancomycin: S 2      Method Type: ROSALIA            RADIOLOGY & ADDITIONAL TESTS:    ANTIBIOTICS:  ceFAZolin   IVPB 2000 milliGRAM(s) IV Intermittent <User Schedule>  clindamycin IVPB 900 milliGRAM(s) IV Intermittent every 8 hours

## 2018-11-07 NOTE — PROGRESS NOTE ADULT - ASSESSMENT
IMPRESSION:  Right heel with ischemic ulcer with extensive gangrenous changes along the edges which persist post op.  I doubt the foot can be salvaged  S/P angio and debridement of Left heel ulcer  Cultures repeatedly positive for LINDA    RECOMMENDATIONS:  Ancef 2 gm iv post HD  Clindamycin 900 mg iv q8h  Surgery possibly today

## 2018-11-07 NOTE — PROGRESS NOTE ADULT - ASSESSMENT
Assessment 58y/o M w/ hx of neuropathy, acromegaly, diabetes, htn, CKD MWF, makes urine, htn who [facility] LLE ulcer with Dr. Gomes. Vascular studies showed severe PVD and is transferred to [place] for angiogram.    Plan    #Severe PVD  -arterial duplex 10/24: b/l severe peripheral arterial disease, occlusion of R popliteal and L SF arteries  -angiogram 10/31: S/P Left extremity angiogram w/ left SFA angioplasty and stent & left popliteal angioplasty for heel ulceration  -f/u with vascular in 2 weeks outpatient  Dr. Comer      #Chronic DVT of LLE - found on imaging on March 2018  -not on anticoagulation  -vascular f/u    #DFU - left foot  -s/p debridement infected heel ulcer to the calcaneus ~1 week ago  -ID: c/w Ancef 2gm post dialysis; c/w clindamycin 900 q8hrs for now  -upon discharge, will d/c with PO Flagyl 500mg BID x6 weeks and Ancef 2gm post HD x6wks  -surgery: s/p LLE debridement 11/2/18 ; -received 1 unit prbc during procedure for hypotension and hgb of 7.7; daily dressing changes  -surgery - LLE heel debridement today 11/7/18  -podiatry consult appreciated.        #ESRD on HD started 1 month ago - MWF  -c/w HD; renal following      #Anemia  -s/p 1 prbc transfusion in [facility] and another prbc during debridement in [place]  -EDWIN 40 mcg weekly  -monitor cbc  -pt requesting to speak with a hematologist regarding anemia    #Diabetes  -basal bolus insuin; iss    #HTN  -d/c Lasix for low BPs  -BP improved    #HLD  -c/w atorvastatin    DVT ppx: heparin subq  GI ppx: protonix  Diet: consistent carbohydrate, renal  Activity: ambulate as tolerated    When medically stable - d/c back to [facility]  When to restart plavix? - will d/w surgery

## 2018-11-07 NOTE — PROGRESS NOTE ADULT - ASSESSMENT
1)  ESRD: HD today 3 hrs 2 K bath 2 L UF as malissa  2)  DFU right foot s/p debridement ,followed by ID sp angioplasty , on ATB  ? OR again today  3)  Severe secondary HPT  ordered  hectorol 2 with HD , phos at goaL  4) Anemia Hb noted   no need for PRBC transfusion. resistant to EDWIN in view of infection, will not increase dose   5) BP improved, off lasix    6) hypernatremia resolved   7) will follow

## 2018-11-07 NOTE — PROGRESS NOTE ADULT - SUBJECTIVE AND OBJECTIVE BOX
SU SAWYER  58y Male   [id number]    Hospital Day:   Post Operative Day:    Procedure/dx:  Events of the Last 24h:    Patient is a 58y old  Male who presents with a chief complaint of foot ulcer (06 Nov 2018 22:49)    PAST MEDICAL & SURGICAL HISTORY:  Dyslipidemia  DM (diabetes mellitus), type 2  Neuropathy  HTN (hypertension)  CAD (coronary atherosclerotic disease)  Acromegaly  CKD (chronic kidney disease), stage V  H/O eye surgery  H/O: pituitary tumor: s/p removal      Vital Signs Last 24 Hrs  T(C): 36.8 (06 Nov 2018 20:55), Max: 36.8 (06 Nov 2018 20:55)  T(F): 98.2 (06 Nov 2018 20:55), Max: 98.2 (06 Nov 2018 20:55)  HR: 73 (06 Nov 2018 20:55) (73 - 73)  BP: 160/74 (06 Nov 2018 20:55) (131/69 - 160/74)  BP(mean): --  RR: 18 (06 Nov 2018 20:55) (18 - 18)  SpO2: 99% (06 Nov 2018 19:57) (99% - 99%)        Diet, Consistent Carbohydrate Renal w/Evening Snack (11-02-18 @ 08:59)  Diet, NPO after Midnight:      NPO Start Date: 06-Nov-2018,   NPO Start Time: 23:59  Except Medications (11-06-18 @ 16:07)      I&O's Detail    05 Nov 2018 07:01  -  06 Nov 2018 07:00  --------------------------------------------------------  IN:  Total IN: 0 mL    OUT:    Voided: 150 mL  Total OUT: 150 mL    Total NET: -150 mL      06 Nov 2018 07:01  -  07 Nov 2018 04:25  --------------------------------------------------------  IN:    Oral Fluid: 50 mL  Total IN: 50 mL    OUT:    Voided: 450 mL  Total OUT: 450 mL    Total NET: -400 mL          MEDICATIONS  (STANDING):  aspirin enteric coated 81 milliGRAM(s) Oral daily  atorvastatin 20 milliGRAM(s) Oral at bedtime  bisacodyl Suppository 10 milliGRAM(s) Rectal daily  calcium acetate 1334 milliGRAM(s) Oral four times a day with meals  ceFAZolin   IVPB 2000 milliGRAM(s) IV Intermittent <User Schedule>  chlorhexidine 4% Liquid 1 Application(s) Topical <User Schedule>  clindamycin IVPB 900 milliGRAM(s) IV Intermittent every 8 hours  darbepoetin Injectable Syringe 40 MICROGram(s) IV Push <User Schedule>  docusate sodium 100 milliGRAM(s) Oral two times a day  ergocalciferol 98241 Unit(s) Oral every week  folic acid 1 milliGRAM(s) Oral daily  heparin  Injectable 5000 Unit(s) SubCutaneous every 8 hours  lactulose Syrup 20 Gram(s) Oral daily  pantoprazole    Tablet 40 milliGRAM(s) Oral before breakfast  polyethylene glycol 3350 17 Gram(s) Oral every 12 hours  tamsulosin 0.4 milliGRAM(s) Oral at bedtime    MEDICATIONS  (PRN):  acetaminophen   Tablet .. 650 milliGRAM(s) Oral every 6 hours PRN Temp greater or equal to 38C (100.4F)  oxyCODONE    5 mG/acetaminophen 325 mG 1 Tablet(s) Oral every 4 hours PRN Moderate Pain (4 - 6)    PHYSICAL EXAM:  GENERAL: NAD, well-developed  NECK:. Supple. No JVD.   CHEST/LUNG: Clear to auscultation bilaterally;   HEART: S1 and S2 noted  ABDOMEN: Soft, Nontender, Nondistended;   EXTREMITIES:  No clubbing, or edema. Dressing present in the LLE  PSYCH: AAOx3     LABS:                      8.1    9.09  )-----------( 360      ( 06 Nov 2018 07:58 )             26.1        11-06    140  |  101  |  29<H>  ----------------------------<  134<H>  4.8   |  28  |  4.1<HH>    Ca    8.8      06 Nov 2018 07:58  Phos  4.2     11-06

## 2018-11-08 LAB
GLUCOSE BLDC GLUCOMTR-MCNC: 114 MG/DL — HIGH (ref 70–99)
GLUCOSE BLDC GLUCOMTR-MCNC: 123 MG/DL — HIGH (ref 70–99)
GLUCOSE BLDC GLUCOMTR-MCNC: 152 MG/DL — HIGH (ref 70–99)
GLUCOSE BLDC GLUCOMTR-MCNC: 156 MG/DL — HIGH (ref 70–99)
HCT VFR BLD CALC: 27.2 % — LOW (ref 42–52)
HGB BLD-MCNC: 8.5 G/DL — LOW (ref 14–18)
MCHC RBC-ENTMCNC: 28.5 PG — SIGNIFICANT CHANGE UP (ref 27–31)
MCHC RBC-ENTMCNC: 31.3 G/DL — LOW (ref 32–37)
MCV RBC AUTO: 91.3 FL — SIGNIFICANT CHANGE UP (ref 80–94)
NRBC # BLD: 0 /100 WBCS — SIGNIFICANT CHANGE UP (ref 0–0)
PHOSPHATE SERPL-MCNC: 4.4 MG/DL — SIGNIFICANT CHANGE UP (ref 2.1–4.9)
PLATELET # BLD AUTO: 363 K/UL — SIGNIFICANT CHANGE UP (ref 130–400)
RBC # BLD: 2.98 M/UL — LOW (ref 4.7–6.1)
RBC # FLD: 16.6 % — HIGH (ref 11.5–14.5)
WBC # BLD: 9.02 K/UL — SIGNIFICANT CHANGE UP (ref 4.8–10.8)
WBC # FLD AUTO: 9.02 K/UL — SIGNIFICANT CHANGE UP (ref 4.8–10.8)

## 2018-11-08 RX ORDER — DOXERCALCIFEROL 2.5 UG/1
2 CAPSULE ORAL
Qty: 0 | Refills: 0 | Status: DISCONTINUED | OUTPATIENT
Start: 2018-11-08 | End: 2018-11-08

## 2018-11-08 RX ADMIN — ATORVASTATIN CALCIUM 20 MILLIGRAM(S): 80 TABLET, FILM COATED ORAL at 22:10

## 2018-11-08 RX ADMIN — Medication 100 MILLIGRAM(S): at 13:36

## 2018-11-08 RX ADMIN — Medication 100 MILLIGRAM(S): at 17:55

## 2018-11-08 RX ADMIN — Medication 1 MILLIGRAM(S): at 12:18

## 2018-11-08 RX ADMIN — Medication 1334 MILLIGRAM(S): at 22:11

## 2018-11-08 RX ADMIN — Medication 81 MILLIGRAM(S): at 12:17

## 2018-11-08 RX ADMIN — HEPARIN SODIUM 5000 UNIT(S): 5000 INJECTION INTRAVENOUS; SUBCUTANEOUS at 06:03

## 2018-11-08 RX ADMIN — TAMSULOSIN HYDROCHLORIDE 0.4 MILLIGRAM(S): 0.4 CAPSULE ORAL at 22:10

## 2018-11-08 RX ADMIN — HEPARIN SODIUM 5000 UNIT(S): 5000 INJECTION INTRAVENOUS; SUBCUTANEOUS at 22:11

## 2018-11-08 RX ADMIN — POLYETHYLENE GLYCOL 3350 17 GRAM(S): 17 POWDER, FOR SOLUTION ORAL at 17:55

## 2018-11-08 RX ADMIN — Medication 1334 MILLIGRAM(S): at 17:55

## 2018-11-08 RX ADMIN — Medication 100 MILLIGRAM(S): at 06:02

## 2018-11-08 RX ADMIN — Medication 1334 MILLIGRAM(S): at 12:17

## 2018-11-08 RX ADMIN — POLYETHYLENE GLYCOL 3350 17 GRAM(S): 17 POWDER, FOR SOLUTION ORAL at 06:03

## 2018-11-08 RX ADMIN — Medication 1334 MILLIGRAM(S): at 08:46

## 2018-11-08 RX ADMIN — CHLORHEXIDINE GLUCONATE 1 APPLICATION(S): 213 SOLUTION TOPICAL at 06:02

## 2018-11-08 RX ADMIN — HEPARIN SODIUM 5000 UNIT(S): 5000 INJECTION INTRAVENOUS; SUBCUTANEOUS at 13:38

## 2018-11-08 RX ADMIN — Medication 100 MILLIGRAM(S): at 22:13

## 2018-11-08 RX ADMIN — PANTOPRAZOLE SODIUM 40 MILLIGRAM(S): 20 TABLET, DELAYED RELEASE ORAL at 06:03

## 2018-11-08 RX ADMIN — LACTULOSE 20 GRAM(S): 10 SOLUTION ORAL at 12:17

## 2018-11-08 NOTE — PROGRESS NOTE ADULT - SUBJECTIVE AND OBJECTIVE BOX
SU SAWYER  58y, Male      OVERNIGHT EVENTS:    none    VITALS:  T(F): 97.7, Max: 97.9 (11-07-18 @ 12:00)  HR: 74  BP: 121/61  RR: 18Vital Signs Last 24 Hrs  T(C): 36.5 (08 Nov 2018 04:55), Max: 36.6 (07 Nov 2018 12:00)  T(F): 97.7 (08 Nov 2018 04:55), Max: 97.9 (07 Nov 2018 12:00)  HR: 74 (08 Nov 2018 04:55) (71 - 77)  BP: 121/61 (08 Nov 2018 04:55) (102/66 - 125/59)  BP(mean): --  RR: 18 (08 Nov 2018 04:55) (18 - 18)  SpO2: --    TESTS & MEASUREMENTS:                        8.5    9.02  )-----------( 363      ( 08 Nov 2018 06:07 )             27.2       Phos  4.4     11-08          Culture - Acid Fast - Other w/Smear (collected 11-02-18 @ 11:29)  Source: .Other None    Culture - Surgical Swab (collected 11-02-18 @ 11:29)  Source: .Surgical Swab ulcer left foot  Final Report (11-04-18 @ 21:49):    Rare Proteus mirabilis    Rare Staphylococcus aureus    Rare Enterococcus faecalis    See previous culture [id number]  Organism: Proteus mirabilis  Staphylococcus aureus (11-04-18 @ 21:49)  Organism: Staphylococcus aureus (11-04-18 @ 21:49)      -  Ampicillin/Sulbactam: S <=8/4      -  Cefazolin: S <=4      -  Clindamycin: S 0.5      -  Erythromycin: S <=0.25      -  Gentamicin: S 2 Should not be used as monotherapy      -  Oxacillin: S 0.5      -  Penicillin: R >8      -  RIF- Rifampin: S <=1 Should not be used as monotherapy      -  Tetra/Doxy: S <=1      -  Trimethoprim/Sulfamethoxazole: S <=0.5/9.5      -  Vancomycin: S 2      Method Type: ROSALIA  Organism: Proteus mirabilis (11-04-18 @ 21:49)      -  Amikacin: S 16      -  Amoxicillin/Clavulanic Acid: S <=8/4      -  Ampicillin: S <=2 These ampicillin results predict results for amoxicillin      -  Ampicillin/Sulbactam: S <=4/2      -  Aztreonam: S <=4      -  Cefazolin: S <=2      -  Cefepime: S <=2      -  Cefoxitin: S <=4      -  Ceftriaxone: S <=1 Enterobacter, Citrobacter, and Serratia may develop resistance during prolonged therapy      -  Ciprofloxacin: S <=0.5      -  Ertapenem: S <=0.5      -  Gentamicin: S 4      -  Levofloxacin: S <=1      -  Meropenem: S <=1      -  Piperacillin/Tazobactam: S <=8      -  Tobramycin: S 4      -  Trimethoprim/Sulfamethoxazole: S <=0.5/9.5      Method Type: ROSALIA    Culture - Acid Fast - Other w/Smear (collected 11-02-18 @ 11:29)  Source: .Other None    Culture - Surgical Swab (collected 11-02-18 @ 11:21)  Source: .Surgical Swab left foot  Final Report (11-04-18 @ 21:47):    Few Proteus mirabilis    See previous culture [id number]    Few Coag Negative Staphylococcus    Few Enterococcus faecalis    Moderate Corynebacterium species "Susceptibilities not performed"    Few Bacteroides vulgatus "Susceptibilities not performed"  Organism: Enterococcus faecalis  Coag Negative Staphylococcus (11-04-18 @ 21:47)  Organism: Coag Negative Staphylococcus (11-04-18 @ 21:47)      -  Ampicillin/Sulbactam: R >16/8      -  Cefazolin: R >16      -  Clindamycin: R >4      -  Erythromycin: R >4      -  Gentamicin: R >8 Should not be used as monotherapy      -  Oxacillin: R >2      -  Penicillin: R >8      -  RIF- Rifampin: S <=1 Should not be used as monotherapy      -  Tetra/Doxy: S <=1      -  Trimethoprim/Sulfamethoxazole: R >2/38      -  Vancomycin: S 4      Method Type: ROSALIA  Organism: Enterococcus faecalis (11-04-18 @ 21:47)      -  Ampicillin: S <=2 Predicts results to ampicillin/sulbactam, amoxacillin-clavulanate and  piperacillin-tazobactam.      -  Tetra/Doxy: S <=1      -  Vancomycin: S 2      Method Type: ROSALIA            RADIOLOGY & ADDITIONAL TESTS:    ANTIBIOTICS:  ceFAZolin   IVPB 2000 milliGRAM(s) IV Intermittent <User Schedule>  clindamycin IVPB 900 milliGRAM(s) IV Intermittent every 8 hours

## 2018-11-08 NOTE — PROGRESS NOTE ADULT - ASSESSMENT
Assessment 56y/o M w/ hx of neuropathy, acromegaly, diabetes, htn, CKD MWF, makes urine, htn who [facility] LLE ulcer with Dr. Gomes. Vascular studies showed severe PVD and is transferred to [place] for angiogram.    Plan  Severe PVD  - post angiogram, and left SFA angioplasty, stenting  - post multiple debridements  - pain controlled  - color improved  - on Ancef and Clindamycin  - ID f/u noted  - ? plastics evalation    DM with vascular and renal manifestations  DFU - left foot  - post debridement infected heel ulcer to the calcaneus  - continue basal insulin  - monitor fs    Constipation  - continue rx, multiple  - monitor    ESRD on HMD  - MWF  - renal following  - rx as per renal    Anemia  - post transfusion   - EDWIN 40 mcg weekly  - monitor H/H    HTN  - off rx  - BP stable    Acromegaly  - on Sandostatin monthly  - medication was delivered to the hospital   - should be given, late in dosing    HLD  - continue Atorvastatin    DVT ppx: heparin   GI ppx: Protonix  Diet: carbohydrate consistent  Activity: Bedrest    Supportive measures, further plan pending the above

## 2018-11-08 NOTE — CHART NOTE - NSCHARTNOTEFT_GEN_A_CORE
Registered Dietitian Follow-Up     Patient Profile Reviewed                           Yes [x]   No []     Nutrition History Previously Obtained        Yes [x]  No []       Pertinent Subjective Information: Pt asleep at time of visit. Spoke to PCA who reports pt eating well with no complaints.      Pertinent Medical Interventions: Surgery - LLE heel debridement 11/6 - and surgery states will get Burn to see & attempt wound vac. When medically stable - d/c back to [facility]; Rehab consult agrees. When to restart plavix? - will d/w surgery.     Diet order: carbohydrate consistent, renal, evening snack      Anthropometrics:  - Ht. 76"   - Wt.: wt fluctuations from 113.8 kg - 121.5 kg - pt with edema and on HD, some wt fluctuations expected - will continue to monitor wt changes      Pertinent Lab Data: (11/8) 2.98, H/H 8.5/27.2, BUN 29, creatinine 4.1, glucose 134     Pertinent Meds: heparin, abx, atorvastatin, bisacodyl, calcium acetate, darbepoetin, docusate, ergocalciferol, folic acid, lactulose, oxycodone, pantoprazole, polyethylene glycol      Physical Findings:  - Appearance: alert, oriented   - GI function: last BM 11/5 per EMR - PCA reports no c/o of GI distress    - Oral/Mouth cavity: no issues per PCA   - Skin: wound, L DFU, BS =18, (11/5) edema 2+ B/L      Nutrition Requirements  Weight Used: ideal  91.8 kg     Energy needs: (2256-6137 kcal/day = 30-35 kcal/kg IBW as pt on HD)  Protein needs: (110-129 g/day = 1.2-1.4 g/kg IBW as pt on HD)   Fluid needs: 1000 ml + urine outpt or per LIP/nephro     Nutrient Intake: not meeting needs      [x] Previous Nutrition Diagnosis: Increased nutrient needs            [x] Ongoing          [] Resolved    Nutrition Intervention Meals and Snacks, Medical Food Supplements  Continue current diet order and ruth consider ordering Nepro q 24 hrs.     Goal/Expected Outcome: Pt to consume >75% of meals, snacks, supplements within 4 days.     Indicator/Monitoring: RD to monitor diet order, energy intake, renal and glucose profile, NFPF.

## 2018-11-08 NOTE — PROGRESS NOTE ADULT - ASSESSMENT
Assessment 56y/o M w/ hx of neuropathy, acromegaly, diabetes, htn, CKD MWF, makes urine, htn who [facility] LLE ulcer with Dr. Gomes. Vascular studies showed severe PVD and is transferred to [place] for angiogram.    Plan    #Severe PVD  -arterial duplex 10/24: b/l severe peripheral arterial disease, occlusion of R popliteal and L SF arteries  -angiogram 10/31: S/P Left extremity angiogram w/ left SFA angioplasty and stent & left popliteal angioplasty for heel ulceration  -f/u with vascular in 2 weeks outpatient  Dr. Comer      #Chronic DVT of LLE - found on imaging on March 2018  -not on anticoagulation  -vascular f/u    #DFU - left foot  -s/p debridement infected heel ulcer to the calcaneus ~1 week ago  -ID: c/w Ancef 2gm post dialysis; c/w clindamycin 900 q8hrs for now  -upon discharge, will d/c with PO Flagyl 500mg BID x6 weeks and Ancef 2gm post HD x6wks  -surgery: s/p LLE debridement 11/2/18 ; -received 1 unit prbc during procedure for hypotension and hgb of 7.7; daily dressing changes  -surgery - LLE heel debridement yesterday;   -will follow up with surgery regarding their plan. possible plastics consult? wound vac?  -podiatry consult appreciated.        #ESRD on HD started 1 month ago - MWF  -c/w HD; renal following      #Anemia  -s/p 1 prbc transfusion in [facility] and another prbc during debridement in [place]  -EDWIN 40 mcg weekly  -monitor cbc  -pt requesting to speak with a hematologist regarding anemia    #Diabetes  -basal bolus insuin; iss    #HTN  -d/c Lasix for low BPs  -BP improved    #HLD  -c/w atorvastatin    DVT ppx: heparin subq  GI ppx: protonix  Diet: consistent carbohydrate, renal  Activity: ambulate as tolerated    When medically stable - d/c back to [facility]  When to restart plavix? - will d/w surgery

## 2018-11-08 NOTE — PROGRESS NOTE ADULT - ASSESSMENT
1)  ESRD: s/p hd yesterday, hd in am   2)  DFU right foot s/p debridement ,followed by ID sp angioplasty , on ATB    3)  Severe secondary HPT  start  hectorol 2 with HD , phos at goaL  4) Anemia Hb noted   resistant to EDWIN in view of infection, will not increase dose   5) BP at goal    6) ph at goal, on binders   7) will follow   8) ? d/c plan

## 2018-11-08 NOTE — CONSULT NOTE ADULT - SUBJECTIVE AND OBJECTIVE BOX
Consult Note: Plastic Surgery  Patient: SU SAWYER , 58y (1960)Male   MRN: [id number]  Location: [address]  Visit: 10-22-18 Inpatient  Date: 11-08-18 @ 19:36    Procedure/Diagnosis: L heel ulcer s/p debridement. Plastics consulted for possible future coverage    Events/ 24h: No acute events overnight. Pain controlled.    Vitals: T(F): 97.9 (11-08-18 @ 12:15), Max: 97.9 (11-08-18 @ 12:15)  HR: 70 (11-08-18 @ 12:15)  BP: 149/63 (11-08-18 @ 12:15) (121/61 - 149/63)  RR: 18 (11-08-18 @ 12:15)  SpO2: --    In:   11-07-18 @ 07:01  -  11-08-18 @ 07:00  --------------------------------------------------------  IN: 0 mL    11-08-18 @ 07:01  -  11-08-18 @ 19:36  --------------------------------------------------------  IN: 0 mL      Out:   11-07-18 @ 07:01  -  11-08-18 @ 07:00  --------------------------------------------------------  OUT:    Other: 2000 mL    Voided: 600 mL  Total OUT: 2600 mL      11-08-18 @ 07:01  -  11-08-18 @ 19:36  --------------------------------------------------------  OUT:    Voided: 500 mL  Total OUT: 500 mL        Net:   11-07-18 @ 07:01  -  11-08-18 @ 07:00  --------------------------------------------------------  NET: -2600 mL    11-08-18 @ 07:01  -  11-08-18 @ 19:36  --------------------------------------------------------  NET: -500 mL        Diet: Diet, Consistent Carbohydrate Renal w/Evening Snack (11-02-18 @ 08:59)  Diet, NPO after Midnight:      NPO Start Date: 06-Nov-2018,   NPO Start Time: 23:59  Except Medications (11-06-18 @ 16:07)    IV Fluids: calcium acetate 1334 milliGRAM(s) Oral four times a day with meals  doxercalciferol  Capsule 2 MICROGram(s) Oral every 48 hours  ergocalciferol 32334 Unit(s) Oral every week  folic acid 1 milliGRAM(s) Oral daily      Physical Examination:  General Appearance: NAD  HEENT: EOMI, sclera non-icteric.  Heart: RRR   Lungs: CTABL.   Abdomen:  Soft, nontender, nondistended. No rigidity, guarding, or rebound tenderness.   MSK/Extremities: L heel ulcer w/ granulation tissue in center, black necrotic tissue around edges of the wound. Edematous, asensate, pulses dopplerable. RLE dressing c/d/i.  Skin: Warm, dry. No jaundice.       Medications: [Standing]  aspirin enteric coated 81 milliGRAM(s) Oral daily  atorvastatin 20 milliGRAM(s) Oral at bedtime  bisacodyl Suppository 10 milliGRAM(s) Rectal daily  calcium acetate 1334 milliGRAM(s) Oral four times a day with meals  ceFAZolin   IVPB 2000 milliGRAM(s) IV Intermittent <User Schedule>  chlorhexidine 4% Liquid 1 Application(s) Topical <User Schedule>  clindamycin IVPB 900 milliGRAM(s) IV Intermittent every 8 hours  darbepoetin Injectable Syringe 40 MICROGram(s) IV Push <User Schedule>  docusate sodium 100 milliGRAM(s) Oral two times a day  doxercalciferol  Capsule 2 MICROGram(s) Oral every 48 hours  ergocalciferol 46247 Unit(s) Oral every week  folic acid 1 milliGRAM(s) Oral daily  heparin  Injectable 5000 Unit(s) SubCutaneous every 8 hours  lactulose Syrup 20 Gram(s) Oral daily  pantoprazole    Tablet 40 milliGRAM(s) Oral before breakfast  polyethylene glycol 3350 17 Gram(s) Oral every 12 hours  tamsulosin 0.4 milliGRAM(s) Oral at bedtime    DVT Prophylaxis: heparin  Injectable 5000 Unit(s) SubCutaneous every 8 hours    GI Prophylaxis: pantoprazole    Tablet 40 milliGRAM(s) Oral before breakfast    Antibiotics: ceFAZolin   IVPB 2000 milliGRAM(s) IV Intermittent <User Schedule>  clindamycin IVPB 900 milliGRAM(s) IV Intermittent every 8 hours    Anticoagulation:   Medications:[PRN]  acetaminophen   Tablet .. 650 milliGRAM(s) Oral every 6 hours PRN  oxyCODONE    5 mG/acetaminophen 325 mG 1 Tablet(s) Oral every 4 hours PRN      Labs:                        8.5    9.02  )-----------( 363      ( 08 Nov 2018 06:07 )             27.2       Phos  4.4     11-08    Cultures:    Culture - Surgical Swab (11.02.18 @ 11:29)    -  Amoxicillin/Clavulanic Acid: S <=8/4    -  Amikacin: S 16    -  Ampicillin: S <=2 These ampicillin results predict results for amoxicillin    -  Aztreonam: S <=4    -  Cefazolin: S <=2    -  Cefazolin: S <=4    -  Ampicillin/Sulbactam: S <=4/2    -  Ampicillin/Sulbactam: S <=8/4    -  Cefepime: S <=2    -  Clindamycin: S 0.5    -  Ciprofloxacin: S <=0.5    -  Ceftriaxone: S <=1 Enterobacter, Citrobacter, and Serratia may develop resistance during prolonged therapy    -  Cefoxitin: S <=4    -  Erythromycin: S <=0.25    -  Ertapenem: S <=0.5    -  Gentamicin: S 4    -  Gentamicin: S 2 Should not be used as monotherapy    -  Oxacillin: S 0.5    -  Penicillin: R >8    -  Meropenem: S <=1    -  Levofloxacin: S <=1    -  RIF- Rifampin: S <=1 Should not be used as monotherapy    -  Piperacillin/Tazobactam: S <=8    -  Tetra/Doxy: S <=1    -  Trimethoprim/Sulfamethoxazole: S <=0.5/9.5    -  Trimethoprim/Sulfamethoxazole: S <=0.5/9.5    -  Vancomycin: S 2    -  Tobramycin: S 4    Specimen Source: .Surgical Swab ulcer left foot    Culture Results:   Rare Proteus mirabilis  Rare Staphylococcus aureus  Rare Enterococcus faecalis  See previous culture [id number]    Organism Identification: Proteus mirabilis  Staphylococcus aureus    Organism: Proteus mirabilis    Organism: Staphylococcus aureus    Method Type: ROSALIA    Method Type: ROSALIA        Imaging:     < from: Xray Foot AP + Lateral, Left (10.22.18 @ 19:06) >    Diffuse dorsal soft tissue swelling, without any evidence of soft tissue   gas.     Diffuse vascular calcifications. Degenerative changes of 1st MTP joint   and tarsal joints.     < end of copied text >    Assessment:  58y Male patient admitted w/ L heel ulcer    Plan:    As per ID: poor prognosis  Recurrent infx with multiple species of bacteria, as above  Necrotic edges  No coverage at this time  Attg to see    Date/Time: 11-08-18 @ 19:36

## 2018-11-08 NOTE — PROGRESS NOTE ADULT - SUBJECTIVE AND OBJECTIVE BOX
Progress Note: General Surgery  Patient: SU SAWYER , 58y (1960)Male   MRN: [id number]  Location: [address]  Visit: 10-22-18 Inpatient  Date: 11-08-18 @ 12:57    Procedure/Diagnosis: s/p L heel debridement    Events/ 24h: No acute events overnight. Pain controlled.    Vitals: T(F): 97.9 (11-08-18 @ 12:15), Max: 97.9 (11-08-18 @ 12:15)  HR: 70 (11-08-18 @ 12:15)  BP: 149/63 (11-08-18 @ 12:15) (121/61 - 149/63)  RR: 18 (11-08-18 @ 12:15)  SpO2: --    In:   11-07-18 @ 07:01  -  11-08-18 @ 07:00  --------------------------------------------------------  IN: 0 mL    11-08-18 @ 07:01  -  11-08-18 @ 12:57  --------------------------------------------------------  IN: 0 mL      Out:   11-07-18 @ 07:01  -  11-08-18 @ 07:00  --------------------------------------------------------  OUT:    Other: 2000 mL    Voided: 600 mL  Total OUT: 2600 mL      11-08-18 @ 07:01  -  11-08-18 @ 12:57  --------------------------------------------------------  OUT:    Voided: 500 mL  Total OUT: 500 mL        Net:   11-07-18 @ 07:01  -  11-08-18 @ 07:00  --------------------------------------------------------  NET: -2600 mL    11-08-18 @ 07:01  -  11-08-18 @ 12:57  --------------------------------------------------------  NET: -500 mL        Diet: Diet, Consistent Carbohydrate Renal w/Evening Snack (11-02-18 @ 08:59)  Diet, NPO after Midnight:      NPO Start Date: 06-Nov-2018,   NPO Start Time: 23:59  Except Medications (11-06-18 @ 16:07)    IV Fluids: calcium acetate 1334 milliGRAM(s) Oral four times a day with meals  doxercalciferol  Capsule 2 MICROGram(s) Oral every 48 hours  ergocalciferol 74763 Unit(s) Oral every week  folic acid 1 milliGRAM(s) Oral daily      Physical Examination:  General Appearance: NAD  HEENT: EOMI, sclera non-icteric.  Heart: RRR   Lungs: CTABL.   Abdomen:  Soft, nontender, nondistended. No rigidity, guarding, or rebound tenderness.   MSK/Extremities: L foot dressing c/d/i. Warm & well-perfused. Peripheral pulses intact.  Skin: Warm, dry. No jaundice.       Medications: [Standing]  aspirin enteric coated 81 milliGRAM(s) Oral daily  atorvastatin 20 milliGRAM(s) Oral at bedtime  bisacodyl Suppository 10 milliGRAM(s) Rectal daily  calcium acetate 1334 milliGRAM(s) Oral four times a day with meals  ceFAZolin   IVPB 2000 milliGRAM(s) IV Intermittent <User Schedule>  chlorhexidine 4% Liquid 1 Application(s) Topical <User Schedule>  clindamycin IVPB 900 milliGRAM(s) IV Intermittent every 8 hours  darbepoetin Injectable Syringe 40 MICROGram(s) IV Push <User Schedule>  docusate sodium 100 milliGRAM(s) Oral two times a day  doxercalciferol  Capsule 2 MICROGram(s) Oral every 48 hours  ergocalciferol 16629 Unit(s) Oral every week  folic acid 1 milliGRAM(s) Oral daily  heparin  Injectable 5000 Unit(s) SubCutaneous every 8 hours  lactulose Syrup 20 Gram(s) Oral daily  pantoprazole    Tablet 40 milliGRAM(s) Oral before breakfast  polyethylene glycol 3350 17 Gram(s) Oral every 12 hours  tamsulosin 0.4 milliGRAM(s) Oral at bedtime    DVT Prophylaxis: heparin  Injectable 5000 Unit(s) SubCutaneous every 8 hours    GI Prophylaxis: pantoprazole    Tablet 40 milliGRAM(s) Oral before breakfast    Antibiotics: ceFAZolin   IVPB 2000 milliGRAM(s) IV Intermittent <User Schedule>  clindamycin IVPB 900 milliGRAM(s) IV Intermittent every 8 hours    Anticoagulation:   Medications:[PRN]  acetaminophen   Tablet .. 650 milliGRAM(s) Oral every 6 hours PRN  oxyCODONE    5 mG/acetaminophen 325 mG 1 Tablet(s) Oral every 4 hours PRN      Labs:                        8.5    9.02  )-----------( 363      ( 08 Nov 2018 06:07 )             27.2       Phos  4.4     11-08        Imaging:     < from: Xray Foot AP + Lateral, Left (10.22.18 @ 19:06) >  Diffuse dorsal soft tissue swelling, without any evidence of soft tissue   gas.     Diffuse vascular calcifications. Degenerative changes of 1st MTP joint   and tarsal joints.     < end of copied text >      Assessment:  58y Male patient admitted S/P L heel debridement    Plan:    Dressing change today w/ betadine scrub  f/u clx  Plastics consult  DVT/GI ppx  OOBAT  IS  Pain control    Date/Time: 11-08-18 @ 12:57

## 2018-11-08 NOTE — PROGRESS NOTE ADULT - SUBJECTIVE AND OBJECTIVE BOX
SU SAWYER  58y  Male  HPI:  58y/o M w/ hx of brain tumor--acromegaly, diabetes, htn, ckd stage on dialysis MWF, makes urine, htn, 2 blood transfusion recently being worked up--  pt on rehab after recent admission for infection to feet presents for hg under 7 at rehab-[facility]; and for worsening of left leg ulcer.  pt had dialysis today and was given iv antibiotics after.  pt has been feeling chills; no fever. no abdominal pain, cp or sob. +generalized weakness.  no blood in stool (22 Oct 2018 19:39)    MEDICATIONS  (STANDING):  aspirin enteric coated 81 milliGRAM(s) Oral daily  atorvastatin 20 milliGRAM(s) Oral at bedtime  bisacodyl Suppository 10 milliGRAM(s) Rectal daily  calcium acetate 1334 milliGRAM(s) Oral four times a day with meals  ceFAZolin   IVPB 2000 milliGRAM(s) IV Intermittent <User Schedule>  chlorhexidine 4% Liquid 1 Application(s) Topical <User Schedule>  clindamycin IVPB 900 milliGRAM(s) IV Intermittent every 8 hours  darbepoetin Injectable Syringe 40 MICROGram(s) IV Push <User Schedule>  docusate sodium 100 milliGRAM(s) Oral two times a day  ergocalciferol 39920 Unit(s) Oral every week  folic acid 1 milliGRAM(s) Oral daily  heparin  Injectable 5000 Unit(s) SubCutaneous every 8 hours  lactulose Syrup 20 Gram(s) Oral daily  pantoprazole    Tablet 40 milliGRAM(s) Oral before breakfast  polyethylene glycol 3350 17 Gram(s) Oral every 12 hours  tamsulosin 0.4 milliGRAM(s) Oral at bedtime    MEDICATIONS  (PRN):  acetaminophen   Tablet .. 650 milliGRAM(s) Oral every 6 hours PRN Temp greater or equal to 38C (100.4F)  oxyCODONE    5 mG/acetaminophen 325 mG 1 Tablet(s) Oral every 4 hours PRN Moderate Pain (4 - 6)    INTERVAL EVENTS: Patient seen today without distress, has no new complaints.    T(C): 36.7 (11-08-18 @ 20:30), Max: 36.7 (11-08-18 @ 20:30)  HR: 71 (11-08-18 @ 20:30) (70 - 74)  BP: 142/74 (11-08-18 @ 20:30) (121/61 - 149/63)  RR: 18 (11-08-18 @ 20:30) (18 - 18)  SpO2: --  Wt(kg): --Vital Signs Last 24 Hrs  T(C): 36.7 (08 Nov 2018 20:30), Max: 36.7 (08 Nov 2018 20:30)  T(F): 98.1 (08 Nov 2018 20:30), Max: 98.1 (08 Nov 2018 20:30)  HR: 71 (08 Nov 2018 20:30) (70 - 74)  BP: 142/74 (08 Nov 2018 20:30) (121/61 - 149/63)  BP(mean): --  RR: 18 (08 Nov 2018 20:30) (18 - 18)  SpO2: --    PHYSICAL EXAM:  GENERAL: NAD  NECK: Supple, No JVD  CHEST/LUNG: Clear  HEART: S1, S2, Regular rate and rhythm;   ABDOMEN: Soft, Nontender,  Bowel sounds present  EXTREMITIES: Trace edema, dressings in place    LABS:  Labs:                        8.5    9.02  )-----------( 363      ( 08 Nov 2018 06:07 )             27.2               Phos  4.4     11-08    RADIOLOGY & ADDITIONAL TESTS:

## 2018-11-08 NOTE — PROGRESS NOTE ADULT - ASSESSMENT
IMPRESSION:  Right heel with ischemic ulcer with extensive gangrenous changes along the edges which persist post op.  I doubt the foot can be salvaged  S/P angio and debridement of Left heel ulcer  Cultures repeatedly positive for LINDA    RECOMMENDATIONS:  Ancef 2 gm iv post HD  Clindamycin 900 mg iv q8h  F/u with Sx

## 2018-11-08 NOTE — PROGRESS NOTE ADULT - SUBJECTIVE AND OBJECTIVE BOX
seen and examined  no distress  s/p hd yesterday       Standing Inpatient Medications  aspirin enteric coated 81 milliGRAM(s) Oral daily  atorvastatin 20 milliGRAM(s) Oral at bedtime  bisacodyl Suppository 10 milliGRAM(s) Rectal daily  calcium acetate 1334 milliGRAM(s) Oral four times a day with meals  ceFAZolin   IVPB 2000 milliGRAM(s) IV Intermittent <User Schedule>  chlorhexidine 4% Liquid 1 Application(s) Topical <User Schedule>  clindamycin IVPB 900 milliGRAM(s) IV Intermittent every 8 hours  darbepoetin Injectable Syringe 40 MICROGram(s) IV Push <User Schedule>  docusate sodium 100 milliGRAM(s) Oral two times a day  ergocalciferol 85045 Unit(s) Oral every week  folic acid 1 milliGRAM(s) Oral daily  heparin  Injectable 5000 Unit(s) SubCutaneous every 8 hours  lactulose Syrup 20 Gram(s) Oral daily  pantoprazole    Tablet 40 milliGRAM(s) Oral before breakfast  polyethylene glycol 3350 17 Gram(s) Oral every 12 hours  tamsulosin 0.4 milliGRAM(s) Oral at bedtime      VITALS/PHYSICAL EXAM  --------------------------------------------------------------------------------  T(C): 36.5 (11-08-18 @ 04:55), Max: 36.6 (11-07-18 @ 12:00)  HR: 74 (11-08-18 @ 04:55) (71 - 77)  BP: 121/61 (11-08-18 @ 04:55) (102/66 - 133/80)  RR: 18 (11-08-18 @ 04:55) (18 - 18)  SpO2: --  Wt(kg): --        11-07-18 @ 07:01  -  11-08-18 @ 07:00  --------------------------------------------------------  IN: 0 mL / OUT: 2600 mL / NET: -2600 mL      Physical Exam:  	Gen: NAD  	Pulm: decrease BS  B/L  	CV:  S1S2; no rub  	Abd: soft, nontender/nondistended  	LE:  dressing   	Vascular access: tesio/av fistula     LABS/STUDIES  --------------------------------------------------------------------------------              8.5    9.02  >-----------<  363      [11-08-18 @ 06:07]              27.2     140  |  101  |  29  ----------------------------<  134      [11-06-18 @ 07:58]  4.8   |  28  |  4.1        Ca     8.8     [11-06-18 @ 07:58]      Phos  4.2     [11-06-18 @ 07:58]            Creatinine Trend:  SCr 4.1 [11-06 @ 07:58]  SCr 5.3 [11-05 @ 10:47]  SCr 4.1 [11-04 @ 12:23]  SCr 4.5 [11-01 @ 19:11]  SCr 4.1 [11-01 @ 06:38]        Iron 38, TIBC 166, %sat 23      [09-19-18 @ 04:30]  Ferritin 55      [03-09-18 @ 18:01]  PTH -- (Ca 8.2)      [09-22-18 @ 08:40]   2071  PTH -- (Ca 8.0)      [09-19-18 @ 04:30]   1519  PTH -- (Ca 8.3)      [03-09-18 @ 18:01]   1545  Vitamin D (25OH) 14.4      [03-09-18 @ 18:01]

## 2018-11-08 NOTE — PROGRESS NOTE ADULT - SUBJECTIVE AND OBJECTIVE BOX
SUBJECTIVE:    Patient is a 58y old Male who presents with a chief complaint of foot ulcer (08 Nov 2018 09:45)    Currently admitted to medicine with the primary diagnosis of Foot ulcer     Today is hospital day 17d. no complaints this am. LLE heel bedside debridement yesterday.    PAST MEDICAL & SURGICAL HISTORY  Dyslipidemia  DM (diabetes mellitus), type 2  Neuropathy  HTN (hypertension)  CAD (coronary atherosclerotic disease)  Acromegaly  CKD (chronic kidney disease), stage V  H/O eye surgery  H/O: pituitary tumor: s/p removal    SOCIAL HISTORY:  Negative for smoking/alcohol/drug use.     ALLERGIES:  bacitracin (Unknown)  Neosporin (Hypotension)    MEDICATIONS:  STANDING MEDICATIONS  aspirin enteric coated 81 milliGRAM(s) Oral daily  atorvastatin 20 milliGRAM(s) Oral at bedtime  bisacodyl Suppository 10 milliGRAM(s) Rectal daily  calcium acetate 1334 milliGRAM(s) Oral four times a day with meals  ceFAZolin   IVPB 2000 milliGRAM(s) IV Intermittent <User Schedule>  chlorhexidine 4% Liquid 1 Application(s) Topical <User Schedule>  clindamycin IVPB 900 milliGRAM(s) IV Intermittent every 8 hours  darbepoetin Injectable Syringe 40 MICROGram(s) IV Push <User Schedule>  docusate sodium 100 milliGRAM(s) Oral two times a day  doxercalciferol  Capsule 2 MICROGram(s) Oral every 48 hours  ergocalciferol 66118 Unit(s) Oral every week  folic acid 1 milliGRAM(s) Oral daily  heparin  Injectable 5000 Unit(s) SubCutaneous every 8 hours  lactulose Syrup 20 Gram(s) Oral daily  pantoprazole    Tablet 40 milliGRAM(s) Oral before breakfast  polyethylene glycol 3350 17 Gram(s) Oral every 12 hours  tamsulosin 0.4 milliGRAM(s) Oral at bedtime    PRN MEDICATIONS  acetaminophen   Tablet .. 650 milliGRAM(s) Oral every 6 hours PRN  oxyCODONE    5 mG/acetaminophen 325 mG 1 Tablet(s) Oral every 4 hours PRN    VITALS:   T(F): 97.7  HR: 74  BP: 121/61  RR: 18  SpO2: --    LABS:                        8.5    9.02  )-----------( 363      ( 08 Nov 2018 06:07 )             27.2       Phos  4.4     11-08                    RADIOLOGY:    PHYSICAL EXAM:  GEN: No acute distress  LUNGS: Clear to auscultation bilaterally   HEART: S1/S2 present. RRR.   ABD: Soft, non-tender, non-distended. Bowel sounds present  EXT: right heel bandaged; left shin ulcer  NEURO: AAOX3

## 2018-11-09 ENCOUNTER — TRANSCRIPTION ENCOUNTER (OUTPATIENT)
Age: 58
End: 2018-11-09

## 2018-11-09 LAB
GLUCOSE BLDC GLUCOMTR-MCNC: 129 MG/DL — HIGH (ref 70–99)
GLUCOSE BLDC GLUCOMTR-MCNC: 151 MG/DL — HIGH (ref 70–99)
GLUCOSE BLDC GLUCOMTR-MCNC: 153 MG/DL — HIGH (ref 70–99)
GLUCOSE BLDC GLUCOMTR-MCNC: 154 MG/DL — HIGH (ref 70–99)

## 2018-11-09 RX ORDER — OXYCODONE HYDROCHLORIDE 5 MG/1
15 TABLET ORAL
Qty: 0 | Refills: 0 | Status: DISCONTINUED | OUTPATIENT
Start: 2018-11-09 | End: 2018-11-09

## 2018-11-09 RX ADMIN — TAMSULOSIN HYDROCHLORIDE 0.4 MILLIGRAM(S): 0.4 CAPSULE ORAL at 22:01

## 2018-11-09 RX ADMIN — Medication 100 MILLIGRAM(S): at 05:27

## 2018-11-09 RX ADMIN — ERGOCALCIFEROL 50000 UNIT(S): 1.25 CAPSULE ORAL at 12:46

## 2018-11-09 RX ADMIN — Medication 40 MICROGRAM(S): at 11:19

## 2018-11-09 RX ADMIN — Medication 100 MILLIGRAM(S): at 15:03

## 2018-11-09 RX ADMIN — Medication 1334 MILLIGRAM(S): at 08:17

## 2018-11-09 RX ADMIN — LACTULOSE 20 GRAM(S): 10 SOLUTION ORAL at 12:47

## 2018-11-09 RX ADMIN — CHLORHEXIDINE GLUCONATE 1 APPLICATION(S): 213 SOLUTION TOPICAL at 05:27

## 2018-11-09 RX ADMIN — POLYETHYLENE GLYCOL 3350 17 GRAM(S): 17 POWDER, FOR SOLUTION ORAL at 05:29

## 2018-11-09 RX ADMIN — HEPARIN SODIUM 5000 UNIT(S): 5000 INJECTION INTRAVENOUS; SUBCUTANEOUS at 22:01

## 2018-11-09 RX ADMIN — POLYETHYLENE GLYCOL 3350 17 GRAM(S): 17 POWDER, FOR SOLUTION ORAL at 17:59

## 2018-11-09 RX ADMIN — Medication 1334 MILLIGRAM(S): at 12:46

## 2018-11-09 RX ADMIN — HEPARIN SODIUM 5000 UNIT(S): 5000 INJECTION INTRAVENOUS; SUBCUTANEOUS at 05:27

## 2018-11-09 RX ADMIN — Medication 1334 MILLIGRAM(S): at 22:01

## 2018-11-09 RX ADMIN — Medication 100 MILLIGRAM(S): at 11:19

## 2018-11-09 RX ADMIN — Medication 100 MILLIGRAM(S): at 05:29

## 2018-11-09 RX ADMIN — ATORVASTATIN CALCIUM 20 MILLIGRAM(S): 80 TABLET, FILM COATED ORAL at 22:01

## 2018-11-09 RX ADMIN — Medication 100 MILLIGRAM(S): at 22:01

## 2018-11-09 RX ADMIN — PANTOPRAZOLE SODIUM 40 MILLIGRAM(S): 20 TABLET, DELAYED RELEASE ORAL at 05:29

## 2018-11-09 RX ADMIN — Medication 100 MILLIGRAM(S): at 18:00

## 2018-11-09 RX ADMIN — Medication 10 MILLIGRAM(S): at 15:00

## 2018-11-09 RX ADMIN — Medication 1 MILLIGRAM(S): at 12:46

## 2018-11-09 RX ADMIN — HEPARIN SODIUM 5000 UNIT(S): 5000 INJECTION INTRAVENOUS; SUBCUTANEOUS at 15:01

## 2018-11-09 RX ADMIN — Medication 1334 MILLIGRAM(S): at 17:59

## 2018-11-09 RX ADMIN — Medication 81 MILLIGRAM(S): at 12:46

## 2018-11-09 NOTE — PROGRESS NOTE ADULT - ASSESSMENT
Assessment 58y/o M w/ hx of neuropathy, acromegaly, diabetes, htn, CKD MWF, makes urine, htn who [facility] debridement of LLE ulcer with Dr. Gomes. Vascular studies showed severe PVD and is transferred to [place] for angiogram.    Plan    #Severe PVD  -arterial duplex 10/24: b/l severe peripheral arterial disease, occlusion of R popliteal and L SF arteries  -angiogram 10/31: S/P Left extremity angiogram w/ left SFA angioplasty and stent & left popliteal angioplasty for heel ulceration  -f/u with vascular in 2 weeks outpatient  Dr. Comer      #Chronic DVT of LLE - found on imaging on March 2018  -not on anticoagulation  -vascular f/u    #DFU - left foot  -s/p debridement infected heel ulcer to the calcaneus ~1 week ago  -ID: c/w Ancef 2gm post dialysis; c/w clindamycin 900 q8hrs for now  -upon discharge, will d/c with PO Flagyl 500mg BID x6 weeks and Ancef 2gm post HD x6wks  -surgery: s/p LLE debridement 11/2/18 ; -received 1 unit prbc during procedure for hypotension and hgb of 7.7; daily dressing changes  -surgery -s/p LLE heel debridement ;   -will follow up with surgery regarding their plan.  plastics consult and then maybe wound vac?  -podiatry consult appreciated.        #ESRD on HD started 1 month ago - MWF  -c/w HD; renal following      #Anemia  -s/p 1 prbc transfusion in [facility] and another prbc during debridement in [place]  -EDWIN 40 mcg weekly  -monitor cbc  -pt requesting to speak with a hematologist regarding anemia    #Diabetes  -basal bolus insuin; iss    #HTN  -d/c Lasix for low BPs  -BP improved    #HLD  -c/w atorvastatin    DVT ppx: heparin subq  GI ppx: protonix  Diet: consistent carbohydrate, renal  Activity: ambulate as tolerated    When medically stable - d/c back to [facility]  When to restart plavix? - will d/w surgery

## 2018-11-09 NOTE — DISCHARGE NOTE ADULT - HOSPITAL COURSE
Pt admitted on 10/22 from [facility] with b/l LE ulcers and edema. Seen by nephro, surgery, medicine, physiatry. Underwent debridement of L heel ulcer w/ Dr. Gomes on 11/2. Uneventful recovery. Tolerating dressing changes WTD daily wi	th betadine scrub. Vitals stable, tolerating diet, pt at baseline. Pt admitted on 10/22 from [facility] with b/l LE ulcers and edema. Seen by nephro, surgery, medicine, physiatry. Pt had circular ulcer on the lateral-posterior aspect of the left heel. Measurements are roughly 5cm in diameter by 1.5 cm deep. Pink granulation tissue in the center surrounded by some necrotic tissue. A wound vaccuum was placed. An angiogram was performed on 10/31, a left superficial femoral artery (for an occlusion of the artery) angioplasty with stent and left popliteal artery stent was placed by Dr. Comer. Debridement of the left heel ulcer was done on 11/2 by Dr. Gomes, and on 11/16, left heel debridement and skin graft was placed by plastics surgery. Leave integra in place for 2-3 weeks; left dressing in place. Follow up with Dr. Yee in 7-8 days. Underwent debridement of L heel ulcer w/ Dr. Gomes on 11/2. Uneventful recovery. The pt had HD 3 times per week, on day of discharge his R AVF was accessed for HD. The pt tolerated the procedure well without complications. A midline was placed on day of discharge for antibiotics for 4 weeks (Unasyn 3 g q d) and doxy po for 4 weeks.

## 2018-11-09 NOTE — DISCHARGE NOTE ADULT - MEDICATION SUMMARY - MEDICATIONS TO STOP TAKING
I will STOP taking the medications listed below when I get home from the hospital:    aspirin 325 mg oral tablet  -- 1 tab(s) by mouth once a day    clopidogrel 75 mg oral tablet  -- 1 tab(s) by mouth once a day I will STOP taking the medications listed below when I get home from the hospital:  None

## 2018-11-09 NOTE — CHART NOTE - NSCHARTNOTEFT_GEN_A_CORE
Dressing change instructions for d/c to [place]: Pt has a circular ulcer on the lateral-posterior aspect of the left heel. Measurements are roughly 5cm in diameter by 1.5 cm deep. Pink granulation tissue in te center surrounded by some necrotic tissue. Needs wound vac. Until vac is obtained, please perform BID dressing changes, wet to dry, with betadine scrub.

## 2018-11-09 NOTE — DISCHARGE NOTE ADULT - MEDICATION SUMMARY - MEDICATIONS TO TAKE
I will START or STAY ON the medications listed below when I get home from the hospital:    sodium bicarbonate 650 mg oral tablet  -- 1 tab(s) by mouth 2 times a day  -- Indication: For Home med    tamsulosin 0.4 mg oral capsule  -- 1 cap(s) by mouth once a day  -- Indication: For Home med    Crestor 40 mg oral tablet  -- 1 tab(s) by mouth once a day   -- Do not take dairy products, antacids, or iron preparations within one hour of this medication.  Do not take this drug if you are pregnant.  It is very important that you take or use this exactly as directed.  Do not skip doses or discontinue unless directed by your doctor.    -- Indication: For Home med    Lasix 40 mg oral tablet  -- 1 tab(s) by mouth once a day  -- Indication: For Home med    ferrous sulfate 325 mg (65 mg elemental iron) oral tablet  -- Indication: For Home med    Senna Lax 8.6 mg oral tablet  -- 1 tab(s) by mouth once a day (at bedtime)  -- Indication: For Home med    Colace 100 mg oral capsule  -- 1 cap(s) by mouth 2 times a day  -- Indication: For Home med    lactulose 10 g/15 mL oral syrup  -- Indication: For Home med    Calphron 667 mg oral tablet  -- 1334 milligram(s) by mouth 3 times a day   -- Indication: For Home med    folic acid 1 mg oral tablet  -- 1 tab(s) by mouth once a day  -- Indication: For Home med I will START or STAY ON the medications listed below when I get home from the hospital:    aspirin 325 mg oral tablet  -- 1 tab(s) by mouth once a day  -- Indication: For CAD    sodium bicarbonate 650 mg oral tablet  -- 1 tab(s) by mouth 2 times a day  -- Indication: For Home med    tamsulosin 0.4 mg oral capsule  -- 1 cap(s) by mouth once a day  -- Indication: For Home med    Crestor 40 mg oral tablet  -- 1 tab(s) by mouth once a day   -- Do not take dairy products, antacids, or iron preparations within one hour of this medication.  Do not take this drug if you are pregnant.  It is very important that you take or use this exactly as directed.  Do not skip doses or discontinue unless directed by your doctor.    -- Indication: For DLD    Plavix 75 mg oral tablet  -- 1 tab(s) by mouth once a day  -- Indication: For CAD    Lasix 40 mg oral tablet  -- 1 tab(s) by mouth once a day  -- Indication: For Diuretic    Aranesp Albumin Free 40 mcg/mL injectable solution  -- 40 microgram(s) injectable once a week, 14:00  -- Indication: For anemia    ferrous sulfate 325 mg (65 mg elemental iron) oral tablet  -- Indication: For anemia    Bisac-Evac 10 mg rectal suppository  -- 1 suppository(ies) rectally once a day  -- Indication: For Constipation    polyethylene glycol 3350 oral powder for reconstitution  -- 17 gram(s) by mouth every 12 hours  -- Indication: For Constipation    Senna Lax 8.6 mg oral tablet  -- 1 tab(s) by mouth once a day (at bedtime)  -- Indication: For Constipation    Colace 100 mg oral capsule  -- 1 cap(s) by mouth 2 times a day  -- Indication: For Constipation    lactulose 10 g/15 mL oral syrup  -- Indication: For Constipation    ampicillin-sulbactam  -- 3 gram(s) intravenous once a day  -- Indication: For VRE    Calphron 667 mg oral tablet  -- 1334 milligram(s) by mouth 3 times a day   -- Indication: For phosphate binder    Protonix 40 mg oral delayed release tablet  -- 1 tab(s) by mouth once a day (before a meal)  -- Indication: For GERD    doxercalciferol 2 mcg/mL injectable solution  -- 2 microgram(s) injectable Monday, Wednesday, and Friday  -- Indication: For Vit D supplementation    folic acid 1 mg oral tablet  -- 1 tab(s) by mouth once a day  -- Indication: For supplement

## 2018-11-09 NOTE — PROGRESS NOTE ADULT - SUBJECTIVE AND OBJECTIVE BOX
SUBJECTIVE:    Patient is a 58y old Male who presents with a chief complaint of foot ulcer (09 Nov 2018 10:20)    Currently admitted to medicine with the primary diagnosis of Foot ulcer     Today is hospital day 18d. Hd this am.    PAST MEDICAL & SURGICAL HISTORY  Dyslipidemia  DM (diabetes mellitus), type 2  Neuropathy  HTN (hypertension)  CAD (coronary atherosclerotic disease)  Acromegaly  CKD (chronic kidney disease), stage V  H/O eye surgery  H/O: pituitary tumor: s/p removal    SOCIAL HISTORY:  Negative for smoking/alcohol/drug use.     ALLERGIES:  bacitracin (Unknown)  Neosporin (Hypotension)    MEDICATIONS:  STANDING MEDICATIONS  aspirin enteric coated 81 milliGRAM(s) Oral daily  atorvastatin 20 milliGRAM(s) Oral at bedtime  bisacodyl Suppository 10 milliGRAM(s) Rectal daily  calcium acetate 1334 milliGRAM(s) Oral four times a day with meals  ceFAZolin   IVPB 2000 milliGRAM(s) IV Intermittent <User Schedule>  chlorhexidine 4% Liquid 1 Application(s) Topical <User Schedule>  clindamycin IVPB 900 milliGRAM(s) IV Intermittent every 8 hours  darbepoetin Injectable Syringe 40 MICROGram(s) IV Push <User Schedule>  docusate sodium 100 milliGRAM(s) Oral two times a day  ergocalciferol 69659 Unit(s) Oral every week  folic acid 1 milliGRAM(s) Oral daily  heparin  Injectable 5000 Unit(s) SubCutaneous every 8 hours  lactulose Syrup 20 Gram(s) Oral daily  pantoprazole    Tablet 40 milliGRAM(s) Oral before breakfast  polyethylene glycol 3350 17 Gram(s) Oral every 12 hours  tamsulosin 0.4 milliGRAM(s) Oral at bedtime    PRN MEDICATIONS  acetaminophen   Tablet .. 650 milliGRAM(s) Oral every 6 hours PRN  oxyCODONE    5 mG/acetaminophen 325 mG 1 Tablet(s) Oral every 4 hours PRN    VITALS:   T(F): 98.8  HR: 76  BP: 143/69  RR: 18  SpO2: --    LABS:                        8.5    9.02  )-----------( 363      ( 08 Nov 2018 06:07 )             27.2       Phos  4.4     11-08                    RADIOLOGY:    PHYSICAL EXAM:  GEN: No acute distress  LUNGS: Clear to auscultation bilaterally   HEART: S1/S2 present. RRR.   ABD: Soft, non-tender, non-distended. Bowel sounds present  EXT: left heel bandaged  NEURO: AAOX3

## 2018-11-09 NOTE — DISCHARGE NOTE ADULT - PROVIDER TOKENS
TOKEN:'[id number]',TOKEN:'[id number] TOKEN:'[id number]',TOKEN:'[id number]:'[id number]:'[id number]' TOKEN:'[id number]:'[id number]:'[id number]:'[id number]'

## 2018-11-09 NOTE — DISCHARGE NOTE ADULT - PLAN OF CARE
Complete recovery Pt has a circular ulcer on the lateral-posterior aspect of the left heel. Measurements are roughly 5cm in diameter by 1.5 cm deep. Pink granulation tissue in te center surrounded by some necrotic tissue. Needs wound vac. Until vac is obtained, please perform BID dressing changes, wet to dry, with betadine scrub. you had circular ulcer on the lateral-posterior aspect of the left heel. Measurements are roughly 5cm in diameter by 1.5 cm deep. Pink granulation tissue in the center surrounded by some necrotic tissue. A wound vaccuum was placed. An angiogram was performed on 10/31, a left superficial femoral artery (for an occlusion of the artery) angioplasty with stent and left popliteal artery stent was placed by Dr. Comer. Debridement of the left heel ulcer was done on 11/2 by Dr. Gomes, and on 11/16, left heel debridement and skin graft was placed by plastics surgery. Leave integra in place for 2-3 weeks; please leave dressing in place-do not remove. Follow up with Dr. Yee in 7-8 days. A midline was placed for antibiotic therapy for 4 weeks. Continue with unasyn 3g q 24h for 4 more weeks per ID, to be completed December 17th, and start doxycycline 100 mg twice a day for 4 weeks (start doxy 11/20), stop on Dec 18 2018 Continued Management - used AV fistula for dialysis on 11/19  - please follow-up with your nephrologist outpatient, continue with dialysis

## 2018-11-09 NOTE — PROGRESS NOTE ADULT - ASSESSMENT
1)  ESRD: hd today, standard bath, uf 2 liters as tolerated   2)  DFU right foot s/p debridement ,followed by ID sp angioplasty , on ATB    3)  Severe secondary HPT  start  hectorol 2 with HD , phos at goaL  4) Anemia Hb noted   resistant to EDWIN in view of infection, will not increase dose , no venofer in view of infection  5) BP at goal    6) ph at goal, on binders   7) will follow   8) ? d/c plan

## 2018-11-09 NOTE — DISCHARGE NOTE ADULT - CARE PLAN
Principal Discharge DX:	Foot ulcer  Goal:	Complete recovery  Assessment and plan of treatment:	Pt has a circular ulcer on the lateral-posterior aspect of the left heel. Measurements are roughly 5cm in diameter by 1.5 cm deep. Pink granulation tissue in te center surrounded by some necrotic tissue. Needs wound vac. Until vac is obtained, please perform BID dressing changes, wet to dry, with betadine scrub. Principal Discharge DX:	Foot ulcer  Goal:	Complete recovery  Assessment and plan of treatment:	you had circular ulcer on the lateral-posterior aspect of the left heel. Measurements are roughly 5cm in diameter by 1.5 cm deep. Pink granulation tissue in the center surrounded by some necrotic tissue. A wound vaccuum was placed. An angiogram was performed on 10/31, a left superficial femoral artery (for an occlusion of the artery) angioplasty with stent and left popliteal artery stent was placed by Dr. Comer. Debridement of the left heel ulcer was done on 11/2 by Dr. Gomes, and on 11/16, left heel debridement and skin graft was placed by plastics surgery. Leave integra in place for 2-3 weeks; please leave dressing in place-do not remove. Follow up with Dr. Yee in 7-8 days. A midline was placed for antibiotic therapy for 4 weeks. Continue with unasyn 3g q 24h for 4 more weeks per ID, to be completed December 17th, and start doxycycline 100 mg twice a day for 4 weeks (start doxy 11/20), stop on Dec 18 2018  Secondary Diagnosis:	ESRD (end stage renal disease) on dialysis  Goal:	Continued Management  Assessment and plan of treatment:	- used AV fistula for dialysis on 11/19  - please follow-up with your nephrologist outpatient, continue with dialysis

## 2018-11-09 NOTE — PROGRESS NOTE ADULT - SUBJECTIVE AND OBJECTIVE BOX
SU SAWYER  58y  Male  HPI:  58y/o M w/ hx of brain tumor--acromegaly, diabetes, htn, ckd stage on dialysis MWF, makes urine, htn, 2 blood transfusion recently being worked up--  pt on rehab after recent admission for infection to feet presents for hg under 7 at rehab-[facility]; and for worsening of left leg ulcer.  pt had dialysis today and was given iv antibiotics after.  pt has been feeling chills; no fever. no abdominal pain, cp or sob. +generalized weakness.  no blood in stool (22 Oct 2018 19:39)    MEDICATIONS  (STANDING):  aspirin enteric coated 81 milliGRAM(s) Oral daily  atorvastatin 20 milliGRAM(s) Oral at bedtime  bisacodyl Suppository 10 milliGRAM(s) Rectal daily  calcium acetate 1334 milliGRAM(s) Oral four times a day with meals  ceFAZolin   IVPB 2000 milliGRAM(s) IV Intermittent <User Schedule>  chlorhexidine 4% Liquid 1 Application(s) Topical <User Schedule>  clindamycin IVPB 900 milliGRAM(s) IV Intermittent every 8 hours  darbepoetin Injectable Syringe 40 MICROGram(s) IV Push <User Schedule>  docusate sodium 100 milliGRAM(s) Oral two times a day  ergocalciferol 61830 Unit(s) Oral every week  folic acid 1 milliGRAM(s) Oral daily  heparin  Injectable 5000 Unit(s) SubCutaneous every 8 hours  lactulose Syrup 20 Gram(s) Oral daily  pantoprazole    Tablet 40 milliGRAM(s) Oral before breakfast  polyethylene glycol 3350 17 Gram(s) Oral every 12 hours  tamsulosin 0.4 milliGRAM(s) Oral at bedtime    MEDICATIONS  (PRN):  acetaminophen   Tablet .. 650 milliGRAM(s) Oral every 6 hours PRN Temp greater or equal to 38C (100.4F)  oxyCODONE    5 mG/acetaminophen 325 mG 1 Tablet(s) Oral every 4 hours PRN Moderate Pain (4 - 6)    INTERVAL EVENTS: Patient seen today in dialysis. Patient without complaints.     T(C): 36.6 (11-09-18 @ 12:15), Max: 37.1 (11-09-18 @ 05:35)  HR: 74 (11-09-18 @ 12:15) (71 - 78)  BP: 164/74 (11-09-18 @ 12:15) (119/65 - 164/74)  RR: 18 (11-09-18 @ 12:15) (18 - 18)  SpO2: --  Wt(kg): --Vital Signs Last 24 Hrs  T(C): 36.6 (09 Nov 2018 12:15), Max: 37.1 (09 Nov 2018 05:35)  T(F): 97.9 (09 Nov 2018 12:15), Max: 98.8 (09 Nov 2018 05:35)  HR: 74 (09 Nov 2018 12:15) (71 - 78)  BP: 164/74 (09 Nov 2018 12:15) (119/65 - 164/74)  BP(mean): --  RR: 18 (09 Nov 2018 12:15) (18 - 18)  SpO2: --    PHYSICAL EXAM:  GENERAL: NAD  NECK: Supple, No JVD  CHEST/LUNG: Clear  HEART: S1, S2, Regular rate and rhythm  ABDOMEN: Soft, Nontender,  Bowel sounds present  EXTREMITIES: Trace edema, dressings intact      LABS:                          8.5    9.02  )-----------( 363      ( 08 Nov 2018 06:07 )             27.2               Phos  4.4     11-08      Culture - Surgical Swab (collected 07 Nov 2018 17:46)  Source: .Surgical Swab L heel wound  Preliminary Report (09 Nov 2018 15:29):    Moderate Enterococcus faecium      RADIOLOGY & ADDITIONAL TESTS:

## 2018-11-09 NOTE — PROGRESS NOTE ADULT - SUBJECTIVE AND OBJECTIVE BOX
seen and examined  no distress  no new complaints       Standing Inpatient Medications  aspirin enteric coated 81 milliGRAM(s) Oral daily  atorvastatin 20 milliGRAM(s) Oral at bedtime  bisacodyl Suppository 10 milliGRAM(s) Rectal daily  calcium acetate 1334 milliGRAM(s) Oral four times a day with meals  ceFAZolin   IVPB 2000 milliGRAM(s) IV Intermittent <User Schedule>  chlorhexidine 4% Liquid 1 Application(s) Topical <User Schedule>  clindamycin IVPB 900 milliGRAM(s) IV Intermittent every 8 hours  darbepoetin Injectable Syringe 40 MICROGram(s) IV Push <User Schedule>  docusate sodium 100 milliGRAM(s) Oral two times a day  ergocalciferol 80713 Unit(s) Oral every week  folic acid 1 milliGRAM(s) Oral daily  heparin  Injectable 5000 Unit(s) SubCutaneous every 8 hours  lactulose Syrup 20 Gram(s) Oral daily  pantoprazole    Tablet 40 milliGRAM(s) Oral before breakfast  polyethylene glycol 3350 17 Gram(s) Oral every 12 hours  tamsulosin 0.4 milliGRAM(s) Oral at bedtime      VITALS/PHYSICAL EXAM  --------------------------------------------------------------------------------  T(C): 37.1 (11-09-18 @ 05:35), Max: 37.1 (11-09-18 @ 05:35)  HR: 72 (11-09-18 @ 05:35) (70 - 72)  BP: 119/65 (11-09-18 @ 05:35) (119/65 - 149/63)  RR: 18 (11-09-18 @ 05:35) (18 - 18)  SpO2: --  Wt(kg): --        11-07-18 @ 07:01  -  11-08-18 @ 07:00  --------------------------------------------------------  IN: 0 mL / OUT: 2600 mL / NET: -2600 mL    11-08-18 @ 07:01  -  11-09-18 @ 06:59  --------------------------------------------------------  IN: 0 mL / OUT: 1050 mL / NET: -1050 mL      Physical Exam:  	Gen: NAD  	Pulm: decrease BS B/L  	CV:  S1S2; no rub  	Abd: soft, nontender/nondistended  	LE:  b/l dressings   	Vascular access: tesio/ av fistula     LABS/STUDIES  --------------------------------------------------------------------------------              8.5    9.02  >-----------<  363      [11-08-18 @ 06:07]              27.2           Phos  4.4     [11-08-18 @ 06:07]            Creatinine Trend:  SCr 4.1 [11-06 @ 07:58]  SCr 5.3 [11-05 @ 10:47]  SCr 4.1 [11-04 @ 12:23]  SCr 4.5 [11-01 @ 19:11]  SCr 4.1 [11-01 @ 06:38]        Iron 38, TIBC 166, %sat 23      [09-19-18 @ 04:30]  Ferritin 55      [03-09-18 @ 18:01]  PTH -- (Ca 8.2)      [09-22-18 @ 08:40]   2071  PTH -- (Ca 8.0)      [09-19-18 @ 04:30]   1519  PTH -- (Ca 8.3)      [03-09-18 @ 18:01]   1545  Vitamin D (25OH) 14.4      [03-09-18 @ 18:01]

## 2018-11-09 NOTE — DISCHARGE NOTE ADULT - CARE PROVIDERS DIRECT ADDRESSES
,DirectAddress_Unknown,DirectAddress_Unknown ,DirectAddress_Unknown,DirectAddress_Unknown,[email or web address] ,DirectAddress_Unknown,DirectAddress_Unknown,[email or web address]

## 2018-11-09 NOTE — DISCHARGE NOTE ADULT - CARE PROVIDER_API CALL
Keyanna Gomes), Surgery  [address] [address]  Phone: [phone]  Fax: [phone]    Sriram Yee (), Plastic Surgery  [address] [place] [address]  Phone: [phone]  Fax: [phone] Keyanna Gomes), Surgery  [address] [facility] [address]  [place] [address]  Phone: [phone]  Fax: [phone]    Sriram Yee (), Plastic Surgery  [address] [place] [address]  Phone: [phone]  Fax: [phone]    Adan Comer), Surgery; Vascular Surgery  [address] [facility] [address]  [place] [address]  Phone: [phone]  Fax: [phone]    Eduarda Everett), Internal Medicine  [address] [place] [address]  Phone: [phone]  Fax: [phone] Keyanna Gomes), Surgery  [address] [facility] [address]  [place] [address]  Phone: [phone]  Fax: [phone]    Sriram Yee (), Plastic Surgery  [address] [place] [address]  Phone: [phone]  Fax: [phone]    Adan Comer), Surgery; Vascular Surgery  [address] [address]  Phone: [phone]  Fax: [phone]    Velia Esquivel), Internal Medicine; Nephrology  [address] [place] [address]  Phone: [phone]  Fax: [phone]

## 2018-11-09 NOTE — PROGRESS NOTE ADULT - SUBJECTIVE AND OBJECTIVE BOX
SU SAWYER  58y, Male      OVERNIGHT EVENTS:    no fevers, no complants    VITALS:  T(F): 98.8, Max: 98.8 (11-09-18 @ 05:35)  HR: 72  BP: 119/65  RR: 18Vital Signs Last 24 Hrs  T(C): 37.1 (09 Nov 2018 05:35), Max: 37.1 (09 Nov 2018 05:35)  T(F): 98.8 (09 Nov 2018 05:35), Max: 98.8 (09 Nov 2018 05:35)  HR: 72 (09 Nov 2018 05:35) (70 - 72)  BP: 119/65 (09 Nov 2018 05:35) (119/65 - 149/63)  BP(mean): --  RR: 18 (09 Nov 2018 05:35) (18 - 18)  SpO2: --    TESTS & MEASUREMENTS:                        8.5    9.02  )-----------( 363      ( 08 Nov 2018 06:07 )             27.2       Phos  4.4     11-08          Culture - Acid Fast - Other w/Smear (collected 11-02-18 @ 11:29)  Source: .Other None    Culture - Surgical Swab (collected 11-02-18 @ 11:29)  Source: .Surgical Swab ulcer left foot  Final Report (11-04-18 @ 21:49):    Rare Proteus mirabilis    Rare Staphylococcus aureus    Rare Enterococcus faecalis    See previous culture [id number]  Organism: Proteus mirabilis  Staphylococcus aureus (11-04-18 @ 21:49)  Organism: Staphylococcus aureus (11-04-18 @ 21:49)      -  Ampicillin/Sulbactam: S <=8/4      -  Cefazolin: S <=4      -  Clindamycin: S 0.5      -  Erythromycin: S <=0.25      -  Gentamicin: S 2 Should not be used as monotherapy      -  Oxacillin: S 0.5      -  Penicillin: R >8      -  RIF- Rifampin: S <=1 Should not be used as monotherapy      -  Tetra/Doxy: S <=1      -  Trimethoprim/Sulfamethoxazole: S <=0.5/9.5      -  Vancomycin: S 2      Method Type: ROSALIA  Organism: Proteus mirabilis (11-04-18 @ 21:49)      -  Amikacin: S 16      -  Amoxicillin/Clavulanic Acid: S <=8/4      -  Ampicillin: S <=2 These ampicillin results predict results for amoxicillin      -  Ampicillin/Sulbactam: S <=4/2      -  Aztreonam: S <=4      -  Cefazolin: S <=2      -  Cefepime: S <=2      -  Cefoxitin: S <=4      -  Ceftriaxone: S <=1 Enterobacter, Citrobacter, and Serratia may develop resistance during prolonged therapy      -  Ciprofloxacin: S <=0.5      -  Ertapenem: S <=0.5      -  Gentamicin: S 4      -  Levofloxacin: S <=1      -  Meropenem: S <=1      -  Piperacillin/Tazobactam: S <=8      -  Tobramycin: S 4      -  Trimethoprim/Sulfamethoxazole: S <=0.5/9.5      Method Type: ROSALIA    Culture - Acid Fast - Other w/Smear (collected 11-02-18 @ 11:29)  Source: .Other None    Culture - Surgical Swab (collected 11-02-18 @ 11:21)  Source: .Surgical Swab left foot  Final Report (11-04-18 @ 21:47):    Few Proteus mirabilis    See previous culture [id number]    Few Coag Negative Staphylococcus    Few Enterococcus faecalis    Moderate Corynebacterium species "Susceptibilities not performed"    Few Bacteroides vulgatus "Susceptibilities not performed"  Organism: Enterococcus faecalis  Coag Negative Staphylococcus (11-04-18 @ 21:47)  Organism: Coag Negative Staphylococcus (11-04-18 @ 21:47)      -  Ampicillin/Sulbactam: R >16/8      -  Cefazolin: R >16      -  Clindamycin: R >4      -  Erythromycin: R >4      -  Gentamicin: R >8 Should not be used as monotherapy      -  Oxacillin: R >2      -  Penicillin: R >8      -  RIF- Rifampin: S <=1 Should not be used as monotherapy      -  Tetra/Doxy: S <=1      -  Trimethoprim/Sulfamethoxazole: R >2/38      -  Vancomycin: S 4      Method Type: ROSALIA  Organism: Enterococcus faecalis (11-04-18 @ 21:47)      -  Ampicillin: S <=2 Predicts results to ampicillin/sulbactam, amoxacillin-clavulanate and  piperacillin-tazobactam.      -  Tetra/Doxy: S <=1      -  Vancomycin: S 2      Method Type: ROSALIA            RADIOLOGY & ADDITIONAL TESTS:    ANTIBIOTICS:  ceFAZolin   IVPB 2000 milliGRAM(s) IV Intermittent <User Schedule>  clindamycin IVPB 900 milliGRAM(s) IV Intermittent every 8 hours

## 2018-11-09 NOTE — DISCHARGE NOTE ADULT - PATIENT PORTAL LINK FT
You can access the [facility] Patient Portal, offered by [facility], by registering with the following website: [email or web address]

## 2018-11-09 NOTE — PROGRESS NOTE ADULT - ASSESSMENT
Assessment 56y/o M w/ hx of neuropathy, acromegaly, diabetes, htn, CKD MWF, makes urine, htn [facility]ement [facility] LLE ulcer with Dr. Gomes. Vascular studies showed severe PVD and is transferred to [place] for angiogram.    Plan  Severe PVD  - post angiogram, and left SFA angioplasty, stenting  - post multiple debridements  - pain controlled  - color improved  - on Ancef and Clindamycin  - ID f/u noted  - plastics evaluation pending    DM with vascular and renal manifestations  DFU - left foot  - post debridement infected heel ulcer to the calcaneus  - continue basal insulin  - monitor fs    Constipation  - continue rx, multiple  - monitor    ESRD on HMD  - MWF  - renal following  - rx as per renal    Anemia  - post transfusion   - EDWIN 40 mcg weekly  - monitor H/H    HTN  - off rx  - BP stable    Acromegaly  - on Sandostatin monthly  - medication was delivered to the hospital   - should be given, late in dosing    HLD  - continue Atorvastatin    DVT ppx: heparin   GI ppx: Protonix  Diet: carbohydrate consistent  Activity: Bedrest    Supportive measures, further plan pending the above

## 2018-11-09 NOTE — DISCHARGE NOTE ADULT - ADDITIONAL INSTRUCTIONS
Please return to the hospital on the morning of 11/13 for outpatient procedure: debridement of left heel ulcer and placement of integra graft under local with sedation with Dr. Yee.    Do not take aspirin or plavix this weekend due to surgery on Tuesday. follow-up with Dr. Yee within a week, follow-up with vascular sx, podiatry, nephrology and your PMD

## 2018-11-09 NOTE — PROGRESS NOTE ADULT - SUBJECTIVE AND OBJECTIVE BOX
Progress Note: General Surgery  Patient: SU SAWYER , 58y (1960)Male   MRN: [id number]  Location: [address]  Visit: 10-22-18 Inpatient  Date: 11-09-18 @ 10:21    Procedure/Diagnosis: s/p L heel debridement    Events/ 24h: No acute events overnight. Pain controlled.    Vitals: T(F): 98.8 (11-09-18 @ 05:35), Max: 98.8 (11-09-18 @ 05:35)  HR: 76 (11-09-18 @ 08:45)  BP: 143/69 (11-09-18 @ 08:45) (119/65 - 149/63)  RR: 18 (11-09-18 @ 08:45)  SpO2: --    In:   11-08-18 @ 07:01  -  11-09-18 @ 07:00  --------------------------------------------------------  IN: 0 mL      Out:   11-08-18 @ 07:01  -  11-09-18 @ 07:00  --------------------------------------------------------  OUT:    Voided: 1050 mL  Total OUT: 1050 mL        Net:   11-08-18 @ 07:01  -  11-09-18 @ 07:00  --------------------------------------------------------  NET: -1050 mL        Diet: Diet, Consistent Carbohydrate Renal w/Evening Snack (11-02-18 @ 08:59)  Diet, NPO after Midnight:      NPO Start Date: 06-Nov-2018,   NPO Start Time: 23:59  Except Medications (11-06-18 @ 16:07)    IV Fluids: calcium acetate 1334 milliGRAM(s) Oral four times a day with meals  ergocalciferol 82778 Unit(s) Oral every week  folic acid 1 milliGRAM(s) Oral daily      Physical Examination:  General Appearance: NAD  HEENT: EOMI, sclera non-icteric.  Heart: RRR   Lungs: CTABL.   Abdomen:  Soft, nontender, nondistended. No rigidity, guarding, or rebound tenderness.   MSK/Extremities: Dressing c/d/i. Warm & well-perfused. Peripheral pulses intact.  Skin: Warm, dry. No jaundice.       Medications: [Standing]  aspirin enteric coated 81 milliGRAM(s) Oral daily  atorvastatin 20 milliGRAM(s) Oral at bedtime  bisacodyl Suppository 10 milliGRAM(s) Rectal daily  calcium acetate 1334 milliGRAM(s) Oral four times a day with meals  ceFAZolin   IVPB 2000 milliGRAM(s) IV Intermittent <User Schedule>  chlorhexidine 4% Liquid 1 Application(s) Topical <User Schedule>  clindamycin IVPB 900 milliGRAM(s) IV Intermittent every 8 hours  darbepoetin Injectable Syringe 40 MICROGram(s) IV Push <User Schedule>  docusate sodium 100 milliGRAM(s) Oral two times a day  ergocalciferol 76150 Unit(s) Oral every week  folic acid 1 milliGRAM(s) Oral daily  heparin  Injectable 5000 Unit(s) SubCutaneous every 8 hours  lactulose Syrup 20 Gram(s) Oral daily  pantoprazole    Tablet 40 milliGRAM(s) Oral before breakfast  polyethylene glycol 3350 17 Gram(s) Oral every 12 hours  tamsulosin 0.4 milliGRAM(s) Oral at bedtime    DVT Prophylaxis: heparin  Injectable 5000 Unit(s) SubCutaneous every 8 hours    GI Prophylaxis: pantoprazole    Tablet 40 milliGRAM(s) Oral before breakfast    Antibiotics: ceFAZolin   IVPB 2000 milliGRAM(s) IV Intermittent <User Schedule>  clindamycin IVPB 900 milliGRAM(s) IV Intermittent every 8 hours    Anticoagulation:   Medications:[PRN]  acetaminophen   Tablet .. 650 milliGRAM(s) Oral every 6 hours PRN  oxyCODONE    5 mG/acetaminophen 325 mG 1 Tablet(s) Oral every 4 hours PRN      Labs:                        8.5    9.02  )-----------( 363      ( 08 Nov 2018 06:07 )             27.2       Phos  4.4     11-08        Imaging:     < from: Xray Foot AP + Lateral, Left (10.22.18 @ 19:06) >    Diffuse dorsal soft tissue swelling, without any evidence of soft tissue   gas.     Diffuse vascular calcifications. Degenerative changes of 1st MTP joint   and tarsal joints.     < end of copied text >      Assessment:  58y Male patient admitted S/P L heel debridement    Plan:    Apply wound vac after plastics attg evaluates  DVT/GI ppx  OOBAT  IS  Pain control    Date/Time: 11-09-18 @ 10:21

## 2018-11-10 LAB
-  AMPICILLIN: SIGNIFICANT CHANGE UP
-  DAPTOMYCIN: SIGNIFICANT CHANGE UP
-  LEVOFLOXACIN: SIGNIFICANT CHANGE UP
-  LINEZOLID: SIGNIFICANT CHANGE UP
-  TETRACYCLINE: SIGNIFICANT CHANGE UP
-  VANCOMYCIN: SIGNIFICANT CHANGE UP
ANION GAP SERPL CALC-SCNC: 14 MMOL/L — SIGNIFICANT CHANGE UP (ref 7–14)
BUN SERPL-MCNC: 31 MG/DL — HIGH (ref 10–20)
CALCIUM SERPL-MCNC: 9.3 MG/DL — SIGNIFICANT CHANGE UP (ref 8.5–10.1)
CHLORIDE SERPL-SCNC: 98 MMOL/L — SIGNIFICANT CHANGE UP (ref 98–110)
CO2 SERPL-SCNC: 29 MMOL/L — SIGNIFICANT CHANGE UP (ref 17–32)
CREAT SERPL-MCNC: 4.1 MG/DL — CRITICAL HIGH (ref 0.7–1.5)
GLUCOSE BLDC GLUCOMTR-MCNC: 117 MG/DL — HIGH (ref 70–99)
GLUCOSE BLDC GLUCOMTR-MCNC: 148 MG/DL — HIGH (ref 70–99)
GLUCOSE BLDC GLUCOMTR-MCNC: 149 MG/DL — HIGH (ref 70–99)
GLUCOSE BLDC GLUCOMTR-MCNC: 153 MG/DL — HIGH (ref 70–99)
GLUCOSE SERPL-MCNC: 114 MG/DL — HIGH (ref 70–99)
HCT VFR BLD CALC: 29.4 % — LOW (ref 42–52)
HGB BLD-MCNC: 9.1 G/DL — LOW (ref 14–18)
MCHC RBC-ENTMCNC: 29 PG — SIGNIFICANT CHANGE UP (ref 27–31)
MCHC RBC-ENTMCNC: 31 G/DL — LOW (ref 32–37)
MCV RBC AUTO: 93.6 FL — SIGNIFICANT CHANGE UP (ref 80–94)
METHOD TYPE: SIGNIFICANT CHANGE UP
NRBC # BLD: 0 /100 WBCS — SIGNIFICANT CHANGE UP (ref 0–0)
PLATELET # BLD AUTO: 337 K/UL — SIGNIFICANT CHANGE UP (ref 130–400)
POTASSIUM SERPL-MCNC: 5.1 MMOL/L — HIGH (ref 3.5–5)
POTASSIUM SERPL-SCNC: 5.1 MMOL/L — HIGH (ref 3.5–5)
RBC # BLD: 3.14 M/UL — LOW (ref 4.7–6.1)
RBC # FLD: 17 % — HIGH (ref 11.5–14.5)
SODIUM SERPL-SCNC: 141 MMOL/L — SIGNIFICANT CHANGE UP (ref 135–146)
WBC # BLD: 7.4 K/UL — SIGNIFICANT CHANGE UP (ref 4.8–10.8)
WBC # FLD AUTO: 7.4 K/UL — SIGNIFICANT CHANGE UP (ref 4.8–10.8)

## 2018-11-10 RX ADMIN — ATORVASTATIN CALCIUM 20 MILLIGRAM(S): 80 TABLET, FILM COATED ORAL at 21:57

## 2018-11-10 RX ADMIN — Medication 100 MILLIGRAM(S): at 05:45

## 2018-11-10 RX ADMIN — LACTULOSE 20 GRAM(S): 10 SOLUTION ORAL at 11:07

## 2018-11-10 RX ADMIN — Medication 100 MILLIGRAM(S): at 21:58

## 2018-11-10 RX ADMIN — HEPARIN SODIUM 5000 UNIT(S): 5000 INJECTION INTRAVENOUS; SUBCUTANEOUS at 21:57

## 2018-11-10 RX ADMIN — PANTOPRAZOLE SODIUM 40 MILLIGRAM(S): 20 TABLET, DELAYED RELEASE ORAL at 05:46

## 2018-11-10 RX ADMIN — Medication 1 MILLIGRAM(S): at 11:06

## 2018-11-10 RX ADMIN — TAMSULOSIN HYDROCHLORIDE 0.4 MILLIGRAM(S): 0.4 CAPSULE ORAL at 21:57

## 2018-11-10 RX ADMIN — CHLORHEXIDINE GLUCONATE 1 APPLICATION(S): 213 SOLUTION TOPICAL at 05:45

## 2018-11-10 RX ADMIN — HEPARIN SODIUM 5000 UNIT(S): 5000 INJECTION INTRAVENOUS; SUBCUTANEOUS at 13:06

## 2018-11-10 RX ADMIN — Medication 81 MILLIGRAM(S): at 11:51

## 2018-11-10 RX ADMIN — Medication 1334 MILLIGRAM(S): at 16:50

## 2018-11-10 RX ADMIN — Medication 100 MILLIGRAM(S): at 15:58

## 2018-11-10 RX ADMIN — POLYETHYLENE GLYCOL 3350 17 GRAM(S): 17 POWDER, FOR SOLUTION ORAL at 16:50

## 2018-11-10 RX ADMIN — Medication 1334 MILLIGRAM(S): at 07:58

## 2018-11-10 RX ADMIN — HEPARIN SODIUM 5000 UNIT(S): 5000 INJECTION INTRAVENOUS; SUBCUTANEOUS at 05:46

## 2018-11-10 RX ADMIN — POLYETHYLENE GLYCOL 3350 17 GRAM(S): 17 POWDER, FOR SOLUTION ORAL at 05:46

## 2018-11-10 RX ADMIN — Medication 1334 MILLIGRAM(S): at 21:57

## 2018-11-10 RX ADMIN — Medication 1334 MILLIGRAM(S): at 11:51

## 2018-11-10 RX ADMIN — Medication 100 MILLIGRAM(S): at 16:50

## 2018-11-10 NOTE — PROGRESS NOTE ADULT - ASSESSMENT
Assessment:  58y Male patient admitted S/P L heel debridement    Plan:  continue to monitor the wound vac  DVT/GI ppx  OOBAT  IS  Pain control  possible OR on 11/13 with plastics surgery for debridement and placement of integra graft

## 2018-11-10 NOTE — PROGRESS NOTE ADULT - SUBJECTIVE AND OBJECTIVE BOX
SUBJECTIVE:    Patient is a 58y old Male who presents with a chief complaint of foot ulcer (10 Nov 2018 08:55)    Currently admitted to medicine with the primary diagnosis of Foot ulcer     Today is hospital day 19d. no o/n events.     PAST MEDICAL & SURGICAL HISTORY  Dyslipidemia  DM (diabetes mellitus), type 2  Neuropathy  HTN (hypertension)  CAD (coronary atherosclerotic disease)  Acromegaly  CKD (chronic kidney disease), stage V  H/O eye surgery  H/O: pituitary tumor: s/p removal    SOCIAL HISTORY:  Negative for smoking/alcohol/drug use.     ALLERGIES:  bacitracin (Unknown)  Neosporin (Hypotension)    MEDICATIONS:  STANDING MEDICATIONS  aspirin enteric coated 81 milliGRAM(s) Oral daily  atorvastatin 20 milliGRAM(s) Oral at bedtime  bisacodyl Suppository 10 milliGRAM(s) Rectal daily  bisacodyl Suppository 10 milliGRAM(s) Rectal once  calcium acetate 1334 milliGRAM(s) Oral four times a day with meals  ceFAZolin   IVPB 2000 milliGRAM(s) IV Intermittent <User Schedule>  chlorhexidine 4% Liquid 1 Application(s) Topical <User Schedule>  clindamycin IVPB 900 milliGRAM(s) IV Intermittent every 8 hours  darbepoetin Injectable Syringe 40 MICROGram(s) IV Push <User Schedule>  docusate sodium 100 milliGRAM(s) Oral two times a day  ergocalciferol 87124 Unit(s) Oral every week  folic acid 1 milliGRAM(s) Oral daily  heparin  Injectable 5000 Unit(s) SubCutaneous every 8 hours  lactulose Syrup 20 Gram(s) Oral daily  pantoprazole    Tablet 40 milliGRAM(s) Oral before breakfast  polyethylene glycol 3350 17 Gram(s) Oral every 12 hours  tamsulosin 0.4 milliGRAM(s) Oral at bedtime    PRN MEDICATIONS  acetaminophen   Tablet .. 650 milliGRAM(s) Oral every 6 hours PRN    VITALS:   T(F): 97.7  HR: 77  BP: 113/59  RR: 18  SpO2: --    LABS:                    Culture - Surgical Swab (collected 07 Nov 2018 17:46)  Source: .Surgical Swab L heel wound  Preliminary Report (09 Nov 2018 15:29):    Moderate Enterococcus faecium          RADIOLOGY:    PHYSICAL EXAM:  GEN: No acute distress  LUNGS: Clear to auscultation bilaterally   HEART: S1/S2 present. RRR.   ABD: Soft, non-tender, non-distended. Bowel sounds present  EXT: left heel bandaged  NEURO: AAOX3

## 2018-11-10 NOTE — PROGRESS NOTE ADULT - SUBJECTIVE AND OBJECTIVE BOX
Chart reviewed, patient examined. Pertinent results reviewed.  Case discussed with HO  specialist f/u reviewed; HD#20; s/p multiple surgical procedures    SUBJECTIVE:    Patient is a 58y old Male who presented with a chief complaint of foot ulcer (10 Nov 2018 08:55)    Currently admitted to medicine with the primary diagnosis of Foot ulcer & PVD-ischemic.    No o/n events.     PAST MEDICAL & SURGICAL HISTORY  Dyslipidemia  DM (diabetes mellitus), type 2  Neuropathy  HTN (hypertension)  CAD (coronary atherosclerotic disease)  Acromegaly  CKD (chronic kidney disease), stage V  H/O eye surgery  H/O: pituitary tumor: s/p removal    SOCIAL HISTORY:  Negative for smoking/alcohol/drug use.     ALLERGIES:  bacitracin (Unknown)  Neosporin (Hypotension)    MEDICATIONS:  STANDING MEDICATIONS  aspirin enteric coated 81 milliGRAM(s) Oral daily  atorvastatin 20 milliGRAM(s) Oral at bedtime  bisacodyl Suppository 10 milliGRAM(s) Rectal daily  bisacodyl Suppository 10 milliGRAM(s) Rectal once  calcium acetate 1334 milliGRAM(s) Oral four times a day with meals  ceFAZolin   IVPB 2000 milliGRAM(s) IV Intermittent <User Schedule>  chlorhexidine 4% Liquid 1 Application(s) Topical <User Schedule>  clindamycin IVPB 900 milliGRAM(s) IV Intermittent every 8 hours  darbepoetin Injectable Syringe 40 MICROGram(s) IV Push <User Schedule>  docusate sodium 100 milliGRAM(s) Oral two times a day  ergocalciferol 36751 Unit(s) Oral every week  folic acid 1 milliGRAM(s) Oral daily  heparin  Injectable 5000 Unit(s) SubCutaneous every 8 hours  lactulose Syrup 20 Gram(s) Oral daily  pantoprazole    Tablet 40 milliGRAM(s) Oral before breakfast  polyethylene glycol 3350 17 Gram(s) Oral every 12 hours  tamsulosin 0.4 milliGRAM(s) Oral at bedtime    PRN MEDICATIONS  acetaminophen   Tablet .. 650 milliGRAM(s) Oral every 6 hours PRN    VITALS:   T(F): 97.7  HR: 77  BP: 113/59  RR: 18  SpO2: --    LABS:                    Culture - Surgical Swab (collected 07 Nov 2018 17:46)  Source: .Surgical Swab L heel wound  Preliminary Report (09 Nov 2018 15:29):    Moderate Enterococcus faecium          RADIOLOGY:    PHYSICAL EXAM:  GEN: No acute distress  LUNGS: Clear to auscultation bilaterally   HEART: S1/S2 present. RRR.   ABD: Soft, obese, non-tender, non-distended. Bowel sounds present  EXT: left heel bandaged; ischemic changes/atropht-both distal LE  NEURO: AAOX3

## 2018-11-10 NOTE — PROGRESS NOTE ADULT - SUBJECTIVE AND OBJECTIVE BOX
SU SAWYER  58y Male   [id number]    Procedure/Diagnosis: s/p L heel debridement    Events/ 24h: No acute events overnight. Pain controlled. Wound vac was delivered and placed to the left heel    Patient is a 58y old  Male who presents with a chief complaint of foot ulcer (09 Nov 2018 20:06)    PAST MEDICAL & SURGICAL HISTORY:  Dyslipidemia  DM (diabetes mellitus), type 2  Neuropathy  HTN (hypertension)  CAD (coronary atherosclerotic disease)  Acromegaly  CKD (chronic kidney disease), stage V  H/O eye surgery  H/O: pituitary tumor: s/p removal    Vital Signs Last 24 Hrs  T(C): 37.3 (09 Nov 2018 20:59), Max: 37.3 (09 Nov 2018 20:59)  T(F): 99.2 (09 Nov 2018 20:59), Max: 99.2 (09 Nov 2018 20:59)  HR: 82 (09 Nov 2018 20:59) (72 - 82)  BP: 130/62 (09 Nov 2018 20:59) (119/65 - 164/74)  RR: 18 (09 Nov 2018 20:59) (18 - 18)    Diet, Consistent Carbohydrate Renal w/Evening Snack (11-02-18 @ 08:59)    I&O's Detail    08 Nov 2018 07:01  -  09 Nov 2018 07:00  --------------------------------------------------------  IN:  Total IN: 0 mL    OUT:    Voided: 1050 mL  Total OUT: 1050 mL    Total NET: -1050 mL    09 Nov 2018 07:01  -  10 Nov 2018 04:47  --------------------------------------------------------  IN:    Oral Fluid: 400 mL  Total IN: 400 mL    OUT:    Other: 2000 mL  Total OUT: 2000 mL    Total NET: -1600 mL    MEDICATIONS  (STANDING):  aspirin enteric coated 81 milliGRAM(s) Oral daily  atorvastatin 20 milliGRAM(s) Oral at bedtime  bisacodyl Suppository 10 milliGRAM(s) Rectal daily  bisacodyl Suppository 10 milliGRAM(s) Rectal once  calcium acetate 1334 milliGRAM(s) Oral four times a day with meals  ceFAZolin   IVPB 2000 milliGRAM(s) IV Intermittent <User Schedule>  chlorhexidine 4% Liquid 1 Application(s) Topical <User Schedule>  clindamycin IVPB 900 milliGRAM(s) IV Intermittent every 8 hours  darbepoetin Injectable Syringe 40 MICROGram(s) IV Push <User Schedule>  docusate sodium 100 milliGRAM(s) Oral two times a day  ergocalciferol 31423 Unit(s) Oral every week  folic acid 1 milliGRAM(s) Oral daily  heparin  Injectable 5000 Unit(s) SubCutaneous every 8 hours  lactulose Syrup 20 Gram(s) Oral daily  pantoprazole    Tablet 40 milliGRAM(s) Oral before breakfast  polyethylene glycol 3350 17 Gram(s) Oral every 12 hours  tamsulosin 0.4 milliGRAM(s) Oral at bedtime    MEDICATIONS  (PRN):  acetaminophen   Tablet .. 650 milliGRAM(s) Oral every 6 hours PRN Temp greater or equal to 38C (100.4F)    Physical Examination:  General Appearance: NAD  HEENT: EOMI, sclera non-icteric.  Heart: RRR   Lungs: CTABL.   Abdomen:  Soft, nontender, nondistended.   MSK/Extremities: wound vac on suction no leak. Warm & well-perfused.   Skin: Warm, dry. No jaundice.     LABS:                 8.5    9.02  )-----------( 363      ( 08 Nov 2018 06:07 )             27.2          Phos  4.4     11-08    Culture - Surgical Swab (collected 07 Nov 2018 17:46)  Source: .Surgical Swab L heel wound  Preliminary Report (09 Nov 2018 15:29):    Moderate Enterococcus faecium      IMAGING:  None

## 2018-11-10 NOTE — PROGRESS NOTE ADULT - ASSESSMENT
Assessment 56y/o M w/ hx of neuropathy, acromegaly, diabetes, htn, CKD MWF, makes urine, [facility] with Dr. Gomes. Vascular studies showed severe PVD and is transferred to [place] for angiogram.    Plan      #DFU - left foot  -s/p debridement infected heel ulcer to the calcaneus ~1 week ago  -ID: c/w Ancef 2gm post dialysis; c/w clindamycin 900 q8hrs for now  -upon discharge, will d/c with PO Flagyl 500mg BID x6 weeks and Ancef 2gm post HD x6wks  -surgery: s/p LLE debridement 11/2/18 ; -received 1 unit prbc during procedure for hypotension and hgb of 7.7; daily dressing changes  -surgery -s/p LLE heel debridement ;   -surgery - placed wound vac on left heel; possible OR on 11/13 with plastics surgery for debridement and placement of integra graft  -podiatry consult appreciated.    #Severe PVD  -arterial duplex 10/24: b/l severe peripheral arterial disease, occlusion of R popliteal and L SF arteries  -angiogram 10/31: S/P Left extremity angiogram w/ left SFA angioplasty and stent & left popliteal angioplasty for heel ulceration  -f/u with vascular in 2 weeks outpatient  Dr. Comer      #Chronic DVT of LLE - found on imaging on March 2018  -not on anticoagulation  -vascular f/u      #ESRD on HD started 1 month ago - MWF  -c/w HD; renal following      #Anemia  -s/p 1 prbc transfusion in [facility] and another prbc during debridement in [place]  -EDWIN 40 mcg weekly  -monitor cbc  -pt requesting to speak with a hematologist regarding anemia    #Diabetes  -basal bolus insuin; iss    #HTN  -d/c Lasix for low BPs  -BP improved    #HLD  -c/w atorvastatin    DVT ppx: heparin subq  GI ppx: protonix  Diet: consistent carbohydrate, renal  Activity: ambulate as tolerated    When medically stable - d/c back to [facility]  When to restart plavix? - will d/w surgery

## 2018-11-10 NOTE — PROGRESS NOTE ADULT - ASSESSMENT
1)  ESRD: s/p hd yesterday, hd on monday   2)  DFU right foot s/p debridement ,followed by ID sp angioplasty , on ATB    3)  Severe secondary HPT  start  hectorol 2 with HD  4) Anemia Hb noted   resistant to EDWIN in view of infection, will not increase dose , no venofer in view of infection  5) BP at goal    6) will follow   7) ? d/c plan

## 2018-11-10 NOTE — PROGRESS NOTE ADULT - SUBJECTIVE AND OBJECTIVE BOX
seen and examined  no distress  no new complaints       Standing Inpatient Medications  aspirin enteric coated 81 milliGRAM(s) Oral daily  atorvastatin 20 milliGRAM(s) Oral at bedtime  bisacodyl Suppository 10 milliGRAM(s) Rectal daily  bisacodyl Suppository 10 milliGRAM(s) Rectal once  calcium acetate 1334 milliGRAM(s) Oral four times a day with meals  ceFAZolin   IVPB 2000 milliGRAM(s) IV Intermittent <User Schedule>  chlorhexidine 4% Liquid 1 Application(s) Topical <User Schedule>  clindamycin IVPB 900 milliGRAM(s) IV Intermittent every 8 hours  darbepoetin Injectable Syringe 40 MICROGram(s) IV Push <User Schedule>  docusate sodium 100 milliGRAM(s) Oral two times a day  ergocalciferol 60731 Unit(s) Oral every week  folic acid 1 milliGRAM(s) Oral daily  heparin  Injectable 5000 Unit(s) SubCutaneous every 8 hours  lactulose Syrup 20 Gram(s) Oral daily  pantoprazole    Tablet 40 milliGRAM(s) Oral before breakfast  polyethylene glycol 3350 17 Gram(s) Oral every 12 hours  tamsulosin 0.4 milliGRAM(s) Oral at bedtime        VITALS/PHYSICAL EXAM  --------------------------------------------------------------------------------  T(C): 36.5 (11-10-18 @ 05:36), Max: 37.3 (11-09-18 @ 20:59)  HR: 77 (11-10-18 @ 05:36) (74 - 82)  BP: 113/59 (11-10-18 @ 05:36) (113/59 - 164/74)  RR: 18 (11-10-18 @ 05:36) (18 - 18)  SpO2: --  Wt(kg): --        11-09-18 @ 07:01  -  11-10-18 @ 07:00  --------------------------------------------------------  IN: 400 mL / OUT: 2000 mL / NET: -1600 mL      Physical Exam:  	Gen: NAD  	Pulm: CTA B/L  	CV: S1S2; no rub  	Abd: soft, nontender/nondistendeds  	LE:dressing   	Vascular access:tesio, av fistula     LABS/STUDIES  --------------------------------------------------------------------------------                Creatinine Trend:  SCr 4.1 [11-06 @ 07:58]  SCr 5.3 [11-05 @ 10:47]  SCr 4.1 [11-04 @ 12:23]  SCr 4.5 [11-01 @ 19:11]  SCr 4.1 [11-01 @ 06:38]        Iron 38, TIBC 166, %sat 23      [09-19-18 @ 04:30]  Ferritin 55      [03-09-18 @ 18:01]  PTH -- (Ca 8.2)      [09-22-18 @ 08:40]   2071  PTH -- (Ca 8.0)      [09-19-18 @ 04:30]   1519  PTH -- (Ca 8.3)      [03-09-18 @ 18:01]   1545  Vitamin D (25OH) 14.4      [03-09-18 @ 18:01]

## 2018-11-10 NOTE — PROGRESS NOTE ADULT - ASSESSMENT
Assessment 58y/o M w/ hx of neuropathy, acromegaly, diabetes, htn, CKD MWF, makes urine, [facility]tn [facility] with Dr. Gomes. Vascular studies showed severe PVD and is transferred to [place] for angiogram.    Plan      #DFU - left foot  -s/p debridement infected heel ulcer to the calcaneus ~1 week ago  -ID: c/w Ancef 2gm post dialysis; c/w clindamycin 900 q8hrs for now  -upon discharge, will d/c with PO Flagyl 500mg BID x6 weeks and Ancef 2gm post HD x6wks  -surgery: s/p LLE debridement 11/2/18 ; -received 1 unit prbc during procedure for hypotension and hgb of 7.7; daily dressing changes  -surgery -s/p LLE heel debridement ;   -surgery - placed wound vac on left heel; possible OR on 11/13 with plastics surgery for debridement and placement of integra graft  -podiatry consult appreciated. Call in to review with surgical attending  --see ID IMPression-nonviable foot??    #Severe PVD  -arterial duplex 10/24: b/l severe peripheral arterial disease, occlusion of R popliteal and L SF arteries  -angiogram 10/31: S/P Left extremity angiogram w/ left SFA angioplasty and stent & left popliteal angioplasty for heel ulceration  -f/u with vascular in 2 weeks outpatient  Dr. Comer      #Chronic DVT of LLE - found on imaging on March 2018  -not on anticoagulation  -vascular f/u      #ESRD on HD started 1 month ago - MWF  -c/w HD; renal following      #Anemia  -s/p 1 prbc transfusion in [facility] and another prbc during debridement in [place]  -EDWIN 40 mcg weekly  -monitor cbc  -pt requesting to speak with a hematologist regarding anemia    #Diabetes  -basal bolus insuin; iss    #HTN  -d/c Lasix for low BPs  -BP improved    #HLD  -c/w atorvastatin    DVT ppx: heparin subq  GI ppx: protonix  Diet: consistent carbohydrate, renal  Activity: ambulate as tolerated    When medically stable - d/c back to [facility]  When to restart plavix? - will d/w surgery

## 2018-11-11 LAB
GLUCOSE BLDC GLUCOMTR-MCNC: 131 MG/DL — HIGH (ref 70–99)
GLUCOSE BLDC GLUCOMTR-MCNC: 132 MG/DL — HIGH (ref 70–99)
GLUCOSE BLDC GLUCOMTR-MCNC: 135 MG/DL — HIGH (ref 70–99)
GLUCOSE BLDC GLUCOMTR-MCNC: 166 MG/DL — HIGH (ref 70–99)
HCT VFR BLD CALC: 27 % — LOW (ref 42–52)
HGB BLD-MCNC: 8.2 G/DL — LOW (ref 14–18)
MCHC RBC-ENTMCNC: 28.4 PG — SIGNIFICANT CHANGE UP (ref 27–31)
MCHC RBC-ENTMCNC: 30.4 G/DL — LOW (ref 32–37)
MCV RBC AUTO: 93.4 FL — SIGNIFICANT CHANGE UP (ref 80–94)
NRBC # BLD: 0 /100 WBCS — SIGNIFICANT CHANGE UP (ref 0–0)
PHOSPHATE SERPL-MCNC: 4.8 MG/DL — SIGNIFICANT CHANGE UP (ref 2.1–4.9)
PLATELET # BLD AUTO: 297 K/UL — SIGNIFICANT CHANGE UP (ref 130–400)
RBC # BLD: 2.89 M/UL — LOW (ref 4.7–6.1)
RBC # FLD: 16.7 % — HIGH (ref 11.5–14.5)
WBC # BLD: 7.78 K/UL — SIGNIFICANT CHANGE UP (ref 4.8–10.8)
WBC # FLD AUTO: 7.78 K/UL — SIGNIFICANT CHANGE UP (ref 4.8–10.8)

## 2018-11-11 RX ADMIN — ATORVASTATIN CALCIUM 20 MILLIGRAM(S): 80 TABLET, FILM COATED ORAL at 21:23

## 2018-11-11 RX ADMIN — Medication 100 MILLIGRAM(S): at 13:19

## 2018-11-11 RX ADMIN — Medication 1334 MILLIGRAM(S): at 17:15

## 2018-11-11 RX ADMIN — TAMSULOSIN HYDROCHLORIDE 0.4 MILLIGRAM(S): 0.4 CAPSULE ORAL at 21:24

## 2018-11-11 RX ADMIN — Medication 1334 MILLIGRAM(S): at 21:23

## 2018-11-11 RX ADMIN — Medication 100 MILLIGRAM(S): at 06:13

## 2018-11-11 RX ADMIN — Medication 1334 MILLIGRAM(S): at 12:20

## 2018-11-11 RX ADMIN — PANTOPRAZOLE SODIUM 40 MILLIGRAM(S): 20 TABLET, DELAYED RELEASE ORAL at 06:13

## 2018-11-11 RX ADMIN — Medication 81 MILLIGRAM(S): at 11:08

## 2018-11-11 RX ADMIN — HEPARIN SODIUM 5000 UNIT(S): 5000 INJECTION INTRAVENOUS; SUBCUTANEOUS at 06:13

## 2018-11-11 RX ADMIN — Medication 1 MILLIGRAM(S): at 11:08

## 2018-11-11 RX ADMIN — HEPARIN SODIUM 5000 UNIT(S): 5000 INJECTION INTRAVENOUS; SUBCUTANEOUS at 13:19

## 2018-11-11 RX ADMIN — Medication 1334 MILLIGRAM(S): at 08:36

## 2018-11-11 RX ADMIN — POLYETHYLENE GLYCOL 3350 17 GRAM(S): 17 POWDER, FOR SOLUTION ORAL at 06:13

## 2018-11-11 RX ADMIN — Medication 100 MILLIGRAM(S): at 21:22

## 2018-11-11 RX ADMIN — Medication 100 MILLIGRAM(S): at 06:12

## 2018-11-11 RX ADMIN — LACTULOSE 20 GRAM(S): 10 SOLUTION ORAL at 11:09

## 2018-11-11 RX ADMIN — Medication 100 MILLIGRAM(S): at 17:14

## 2018-11-11 RX ADMIN — CHLORHEXIDINE GLUCONATE 1 APPLICATION(S): 213 SOLUTION TOPICAL at 06:12

## 2018-11-11 RX ADMIN — HEPARIN SODIUM 5000 UNIT(S): 5000 INJECTION INTRAVENOUS; SUBCUTANEOUS at 21:24

## 2018-11-11 NOTE — PHYSICAL THERAPY INITIAL EVALUATION ADULT - PERTINENT HX OF CURRENT PROBLEM, REHAB EVAL
pt adm for DFU, s/p L LE angiogram/stenting, debridement of L heel ulcer
has not ambulated in 1 mth. previously independent. back and forth between [facility] rehab and the hospital due to LT heel infection and other med problems (low Hgb, etc.)

## 2018-11-11 NOTE — PROGRESS NOTE ADULT - ASSESSMENT
Assessment:  58y Male patient admitted S/P L heel debridement, with the above physical exam, labs, and imaging findings.    Plan:  -Will place the Wound VAC once will arrive  -possible OR on 11/13 with plastics surgery for debridement and placement of Integra graft  -Pre-op on Sunday (11/12)  -Pain Control as needed  -Encourage ambulation and Incentive Spirometer (10x/hour when awake) use  -Continue GI and DVT prophylaxis  -Strict Input and output monitoring      Date/Time: 11-11-18 @ 03:39

## 2018-11-11 NOTE — PHYSICAL THERAPY INITIAL EVALUATION ADULT - ACTIVE RANGE OF MOTION EXAMINATION, REHAB EVAL
no Active ROM deficits were identified/deficits as listed below/bilat shldrs RCT, shldr flex limited to only about 20 deg

## 2018-11-11 NOTE — PROGRESS NOTE ADULT - SUBJECTIVE AND OBJECTIVE BOX
Progress Note: General Surgery  Patient: SU SAWYER , 58y (1960)Male   MRN: [id number]  Location: [place] [address] A  Visit: 10-22-18 Inpatient  Date: 11-11-18 @ 03:39  Hospital Day: 8    Procedure/Diagnosis: L heel debridement  Events over 24h: GABINO overnight. Pain controlled. Wound vac was delivered and was tried to be placed but it was non-functional/malfunctional so new one was ordered.     Vitals: T(F): 97.8 (11-10-18 @ 20:29), Max: 97.8 (11-10-18 @ 20:29)  HR: 79 (11-10-18 @ 20:29)  BP: 157/81 (11-10-18 @ 20:29) (113/59 - 157/81)  RR: 20 (11-10-18 @ 20:29)  SpO2: --      Diet: Diet, Consistent Carbohydrate Renal w/Evening Snack (11-02-18 @ 08:59)    IV Fluids: yes no , Type: calcium acetate 1334 milliGRAM(s) Oral four times a day with meals  ergocalciferol 16320 Unit(s) Oral every week  folic acid 1 milliGRAM(s) Oral daily    Bowel Function: Bowel movement [  ] Flatus [  ]   Intake and Output:   11-09-18 @ 07:01  -  11-10-18 @ 07:00  --------------------------------------------------------  IN: 400 mL    11-10-18 @ 07:01  -  11-11-18 @ 03:39  --------------------------------------------------------  IN: 430 mL       11-09-18 @ 07:01  -  11-10-18 @ 07:00  --------------------------------------------------------  OUT:    Other: 2000 mL  Total OUT: 2000 mL      11-10-18 @ 07:01  -  11-11-18 @ 03:39  --------------------------------------------------------  OUT:    Voided: 850 mL  Total OUT: 850 mL        11-09-18 @ 07:01  -  11-10-18 @ 07:00  --------------------------------------------------------  NET: -1600 mL    11-10-18 @ 07:01  -  11-11-18 @ 03:39  --------------------------------------------------------  NET: -420 mL        Physical Examination:  General Appearance: NAD  HEENT: EOMI, sclera non-icteric.  Heart: RRR   Lungs: CTABL.   Abdomen:  Soft, nontender, nondistended.   MSK/Extremities: wound vac on suction no leak. Warm & well-perfused.   Skin: Warm, dry. No jaundice.     Medications: [Standing]  aspirin enteric coated 81 milliGRAM(s) Oral daily  atorvastatin 20 milliGRAM(s) Oral at bedtime  bisacodyl Suppository 10 milliGRAM(s) Rectal daily  calcium acetate 1334 milliGRAM(s) Oral four times a day with meals  ceFAZolin   IVPB 2000 milliGRAM(s) IV Intermittent <User Schedule>  chlorhexidine 4% Liquid 1 Application(s) Topical <User Schedule>  clindamycin IVPB 900 milliGRAM(s) IV Intermittent every 8 hours  darbepoetin Injectable Syringe 40 MICROGram(s) IV Push <User Schedule>  docusate sodium 100 milliGRAM(s) Oral two times a day  ergocalciferol 15604 Unit(s) Oral every week  folic acid 1 milliGRAM(s) Oral daily  heparin  Injectable 5000 Unit(s) SubCutaneous every 8 hours  lactulose Syrup 20 Gram(s) Oral daily  pantoprazole    Tablet 40 milliGRAM(s) Oral before breakfast  polyethylene glycol 3350 17 Gram(s) Oral every 12 hours  tamsulosin 0.4 milliGRAM(s) Oral at bedtime    DVT Prophylaxis: heparin  Injectable 5000 Unit(s) SubCutaneous every 8 hours    GI Prophylaxis: pantoprazole    Tablet 40 milliGRAM(s) Oral before breakfast    Antibiotics: ceFAZolin   IVPB 2000 milliGRAM(s) IV Intermittent <User Schedule>  clindamycin IVPB 900 milliGRAM(s) IV Intermittent every 8 hours    Anticoagulation:   Medications:[PRN]  acetaminophen   Tablet .. 650 milliGRAM(s) Oral every 6 hours PRN    Labs:                        9.1    7.40  )-----------( 337      ( 10 Nov 2018 08:33 )             29.4   11-10    141  |  98  |  31<H>  ----------------------------<  114<H>  5.1<H>   |  29  |  4.1<HH>    Ca    9.3      10 Nov 2018 08:33        Urine/Micro:    Imaging:  None/24h

## 2018-11-11 NOTE — PROGRESS NOTE ADULT - ASSESSMENT
Assessment 58y/o M w/ hx of neuropathy, acromegaly, diabetes, htn, CKD MWF, makes urine, htn [facility] with Dr. Gomes. Vascular studies showed severe PVD and is transferred to [place] for angiogram.He has had multiple surgical procedures.    Plan      #DFU - left foot  -s/p debridement infected heel ulcer to the calcaneus ~1 week ago  -ID: c/w Ancef 2gm post dialysis; c/w clindamycin 900 q8hrs for now  -upon discharge, will d/c with PO Flagyl 500mg BID x6 weeks and Ancef 2gm post HD x6wks  -surgery: s/p LLE debridement 11/2/18 ; -received 1 unit prbc during procedure for hypotension and hgb of 7.7; daily dressing changes  -surgery -s/p LLE heel debridement ;   -surgery - placed wound vac on left heel; possible OR on 11/13 with plastics surgery for debridement and placement of integra graft  -podiatry consult appreciated. Call in to review with surgical attending  --see ID IMPression-nonviable foot??    #Severe PVD  -arterial duplex 10/24: b/l severe peripheral arterial disease, occlusion of R popliteal and L SF arteries  -angiogram 10/31: S/P Left extremity angiogram w/ left SFA angioplasty and stent & left popliteal angioplasty for heel ulceration  -f/u with vascular in 2 weeks outpatient  Dr. Comer      #Chronic DVT of LLE - found on imaging on March 2018  -not on anticoagulation  -vascular f/u      #ESRD on HD started 1 month ago - MWF  -c/w HD; renal following      #Anemia  -s/p 1 prbc transfusion in [facility] and another prbc during debridement in [place]  -EDWIN 40 mcg weekly  -monitor cbc  -pt requesting to speak with a hematologist regarding anemia    #Diabetes  -basal bolus insuin; iss    #HTN  -d/c Lasix for low BPs  -BP improved    #HLD  -c/w atorvastatin    DVT ppx: heparin subq  GI ppx: protonix  Diet: consistent carbohydrate, renal  Activity: ambulate as tolerated    When medically stable - d/c back to [facility]  When to restart plavix? - will d/w surgery

## 2018-11-11 NOTE — PROGRESS NOTE ADULT - ASSESSMENT
Patient presents with ESRD on HD, diabetic foot ulcer, ID and podiatry noted. Plan for surgical procedure this tuesday, dialysis support continued. labs and meds reviewed.

## 2018-11-11 NOTE — PROGRESS NOTE ADULT - SUBJECTIVE AND OBJECTIVE BOX
[facility] FOLLOW UP NOTE  --------------------------------------------------------------------------------  Chief Complaint:    24 hour events/subjective:        PAST HISTORY  --------------------------------------------------------------------------------  No significant changes to PMH, PSH, FHx, SHx, unless otherwise noted    ALLERGIES & MEDICATIONS  --------------------------------------------------------------------------------  Allergies    bacitracin (Unknown)  Neosporin (Hypotension)    Intolerances      Standing Inpatient Medications  aspirin enteric coated 81 milliGRAM(s) Oral daily  atorvastatin 20 milliGRAM(s) Oral at bedtime  bisacodyl Suppository 10 milliGRAM(s) Rectal daily  calcium acetate 1334 milliGRAM(s) Oral four times a day with meals  ceFAZolin   IVPB 2000 milliGRAM(s) IV Intermittent <User Schedule>  chlorhexidine 4% Liquid 1 Application(s) Topical <User Schedule>  clindamycin IVPB 900 milliGRAM(s) IV Intermittent every 8 hours  darbepoetin Injectable Syringe 40 MICROGram(s) IV Push <User Schedule>  docusate sodium 100 milliGRAM(s) Oral two times a day  ergocalciferol 14399 Unit(s) Oral every week  folic acid 1 milliGRAM(s) Oral daily  heparin  Injectable 5000 Unit(s) SubCutaneous every 8 hours  lactulose Syrup 20 Gram(s) Oral daily  pantoprazole    Tablet 40 milliGRAM(s) Oral before breakfast  polyethylene glycol 3350 17 Gram(s) Oral every 12 hours  tamsulosin 0.4 milliGRAM(s) Oral at bedtime    PRN Inpatient Medications  acetaminophen   Tablet .. 650 milliGRAM(s) Oral every 6 hours PRN      REVIEW OF SYSTEMS  --------------------------------------------------------------------------------  Gen: No weight changes, fatigue, fevers/chills, weakness  Skin: No rashes  Head/Eyes/Ears/Mouth: No headache; Normal hearing; Normal vision w/o blurriness; No sinus pain/discomfort, sore throat  Respiratory: No dyspnea, cough, wheezing, hemoptysis  CV: No chest pain, PND, orthopnea  GI: No abdominal pain, diarrhea, constipation, nausea, vomiting, melena, hematochezia  : No increased frequency, dysuria, hematuria, nocturia  MSK: No joint pain/swelling; no back pain; no edema  Neuro: No dizziness/lightheadedness, weakness, seizures, numbness, tingling  Heme: No easy bruising or bleeding  Endo: No heat/cold intolerance  Psych: No significant nervousness, anxiety, stress, depression    All other systems were reviewed and are negative, except as noted.    VITALS/PHYSICAL EXAM  --------------------------------------------------------------------------------  T(C): 36.6 (11-11-18 @ 12:30), Max: 36.6 (11-10-18 @ 20:29)  HR: 73 (11-11-18 @ 12:30) (73 - 79)  BP: 130/62 (11-11-18 @ 12:30) (130/62 - 157/81)  RR: 18 (11-11-18 @ 12:30) (18 - 20)  SpO2: --  Wt(kg): --        11-10-18 @ 07:01  -  11-11-18 @ 07:00  --------------------------------------------------------  IN: 430 mL / OUT: 850 mL / NET: -420 mL    11-11-18 @ 07:01  -  11-11-18 @ 14:39  --------------------------------------------------------  IN: 230 mL / OUT: 400 mL / NET: -170 mL      Physical Exam:  	Gen: NAD, well-appearing  	HEENT: PERRL, supple neck, clear oropharynx  	Pulm: CTA B/L  	CV: RRR, S1S2; no rub  	Back: No spinal or CVA tenderness; no sacral edema  	Abd: +BS, soft, nontender/nondistended  	: No suprapubic tenderness  	UE: Warm, FROM, no clubbing, intact strength; no edema; no asterixis  	LE: Warm, FROM, no clubbing, intact strength; no edema  	Neuro: No focal deficits, intact gait  	Psych: Normal affect and mood  	Skin: Warm, without rashes  	Vascular access:    LABS/STUDIES  --------------------------------------------------------------------------------              8.2    7.78  >-----------<  297      [11-11-18 @ 05:38]              27.0     141  |  98  |  31  ----------------------------<  114      [11-10-18 @ 08:33]  5.1   |  29  |  4.1        Ca     9.3     [11-10-18 @ 08:33]      Phos  4.8     [11-11-18 @ 05:38]            Creatinine Trend:  SCr 4.1 [11-10 @ 08:33]  SCr 4.1 [11-06 @ 07:58]  SCr 5.3 [11-05 @ 10:47]  SCr 4.1 [11-04 @ 12:23]  SCr 4.5 [11-01 @ 19:11]        Iron 38, TIBC 166, %sat 23      [09-19-18 @ 04:30]  Ferritin 55      [03-09-18 @ 18:01]  PTH -- (Ca 8.2)      [09-22-18 @ 08:40]   2071  PTH -- (Ca 8.0)      [09-19-18 @ 04:30]   1519  PTH -- (Ca 8.3)      [03-09-18 @ 18:01]   1545  Vitamin D (25OH) 14.4      [03-09-18 @ 18:01]

## 2018-11-11 NOTE — PHYSICAL THERAPY INITIAL EVALUATION ADULT - DIAGNOSIS, PT EVAL
Gait dysfunction; s/p LT LE angio with SFA stent. Pending LT heel debridement and grafting on 11/13.

## 2018-11-11 NOTE — PHYSICAL THERAPY INITIAL EVALUATION ADULT - GENERAL OBSERVATIONS, REHAB EVAL
[id number] am. patient rec'd/left in bed, nad, + bed alarm, LT foot and RT leg drsgs. *** pending plastic sx on 11/13 for debridement and grafting of the LT heel.

## 2018-11-11 NOTE — PROGRESS NOTE ADULT - SUBJECTIVE AND OBJECTIVE BOX
Chart reviewed, patient examined. Pertinent results reviewed.  Case discussed with HO  specialist f/u reviewed; HD#21; s/p multiple surgical procedures    SUBJECTIVE:    Patient is a 58y old Male who presented with a chief complaint of foot ulcer (10 Nov 2018 08:55)    Currently admitted to medicine with the primary diagnosis of Foot ulcer & PVD-ischemic.    No o/n events. Pt c/o worsening of his chronic constipation(4-5 D now), and states that he's due for his pituitary shot(1 Q Month)    PAST MEDICAL & SURGICAL HISTORY  Dyslipidemia  DM (diabetes mellitus), type 2  Neuropathy  HTN (hypertension)  CAD (coronary atherosclerotic disease)  Acromegaly  CKD (chronic kidney disease), stage V  H/O eye surgery  H/O: pituitary tumor: s/p removal- about 20 yrs ago    SOCIAL HISTORY:  Negative for smoking/alcohol/drug use.     ALLERGIES:  bacitracin (Unknown)  Neosporin (Hypotension)    MEDICATIONS:  STANDING MEDICATIONS  aspirin enteric coated 81 milliGRAM(s) Oral daily  atorvastatin 20 milliGRAM(s) Oral at bedtime  bisacodyl Suppository 10 milliGRAM(s) Rectal daily  bisacodyl Suppository 10 milliGRAM(s) Rectal once  calcium acetate 1334 milliGRAM(s) Oral four times a day with meals  ceFAZolin   IVPB 2000 milliGRAM(s) IV Intermittent <User Schedule>  chlorhexidine 4% Liquid 1 Application(s) Topical <User Schedule>  clindamycin IVPB 900 milliGRAM(s) IV Intermittent every 8 hours  darbepoetin Injectable Syringe 40 MICROGram(s) IV Push <User Schedule>  docusate sodium 100 milliGRAM(s) Oral two times a day  ergocalciferol 79977 Unit(s) Oral every week  folic acid 1 milliGRAM(s) Oral daily  heparin  Injectable 5000 Unit(s) SubCutaneous every 8 hours  lactulose Syrup 20 Gram(s) Oral daily  pantoprazole    Tablet 40 milliGRAM(s) Oral before breakfast  polyethylene glycol 3350 17 Gram(s) Oral every 12 hours  tamsulosin 0.4 milliGRAM(s) Oral at bedtime    PRN MEDICATIONS  acetaminophen   Tablet .. 650 milliGRAM(s) Oral every 6 hours PRN    VITALS:   Vital Signs Last 24 Hrs  T(C): 36.6 (11 Nov 2018 05:30), Max: 36.6 (10 Nov 2018 20:29)  T(F): 97.8 (11 Nov 2018 05:30), Max: 97.8 (10 Nov 2018 20:29)  HR: 73 (11 Nov 2018 05:30) (73 - 79)  BP: 141/66 (11 Nov 2018 05:30) (141/66 - 157/81)  BP(mean): --  RR: 20 (11 Nov 2018 05:30) (20 - 20)  SpO2: -- --    LABS:                    Culture - Surgical Swab (collected 07 Nov 2018 17:46)  Source: .Surgical Swab L heel wound  Preliminary Report (09 Nov 2018 15:29):    Moderate Enterococcus faecium          RADIOLOGY:    PHYSICAL EXAM:  GEN: No acute distress; A & Ox4  LUNGS: Clear to auscultation bilaterally   HEART: S1/S2 present. RRR.   ABD: Soft, obese, non-tender, non-distended. Bowel sounds present  EXT: left heel bandaged; ischemic changes/atrophic; no vac  NEURO: AAOX3

## 2018-11-11 NOTE — PHYSICAL THERAPY INITIAL EVALUATION ADULT - LEVEL OF INDEPENDENCE: SIT/STAND, REHAB EVAL
only cleared the bed by 1-2 inches; fearful to attempt complete sit to stand due to deconditioning and bilat shldrs RCT/maximum assist (25% patients effort)

## 2018-11-12 LAB
ANION GAP SERPL CALC-SCNC: 12 MMOL/L — SIGNIFICANT CHANGE UP (ref 7–14)
APTT BLD: 34.3 SEC — SIGNIFICANT CHANGE UP (ref 27–39.2)
BASOPHILS # BLD AUTO: 0.07 K/UL — SIGNIFICANT CHANGE UP (ref 0–0.2)
BASOPHILS NFR BLD AUTO: 1.1 % — HIGH (ref 0–1)
BLD GP AB SCN SERPL QL: SIGNIFICANT CHANGE UP
BUN SERPL-MCNC: 25 MG/DL — HIGH (ref 10–20)
CALCIUM SERPL-MCNC: 9 MG/DL — SIGNIFICANT CHANGE UP (ref 8.5–10.1)
CHLORIDE SERPL-SCNC: 101 MMOL/L — SIGNIFICANT CHANGE UP (ref 98–110)
CO2 SERPL-SCNC: 30 MMOL/L — SIGNIFICANT CHANGE UP (ref 17–32)
CREAT SERPL-MCNC: 3.5 MG/DL — HIGH (ref 0.7–1.5)
CULTURE RESULTS: SIGNIFICANT CHANGE UP
EOSINOPHIL # BLD AUTO: 0.39 K/UL — SIGNIFICANT CHANGE UP (ref 0–0.7)
EOSINOPHIL NFR BLD AUTO: 6 % — SIGNIFICANT CHANGE UP (ref 0–8)
GLUCOSE BLDC GLUCOMTR-MCNC: 140 MG/DL — HIGH (ref 70–99)
GLUCOSE BLDC GLUCOMTR-MCNC: 146 MG/DL — HIGH (ref 70–99)
GLUCOSE BLDC GLUCOMTR-MCNC: 165 MG/DL — HIGH (ref 70–99)
GLUCOSE BLDC GLUCOMTR-MCNC: 99 MG/DL — SIGNIFICANT CHANGE UP (ref 70–99)
GLUCOSE SERPL-MCNC: 130 MG/DL — HIGH (ref 70–99)
HCT VFR BLD CALC: 27.3 % — LOW (ref 42–52)
HGB BLD-MCNC: 8.5 G/DL — LOW (ref 14–18)
IMM GRANULOCYTES NFR BLD AUTO: 0.3 % — SIGNIFICANT CHANGE UP (ref 0.1–0.3)
INR BLD: 1.18 RATIO — SIGNIFICANT CHANGE UP (ref 0.65–1.3)
LYMPHOCYTES # BLD AUTO: 1.06 K/UL — LOW (ref 1.2–3.4)
LYMPHOCYTES # BLD AUTO: 16.4 % — LOW (ref 20.5–51.1)
MAGNESIUM SERPL-MCNC: 2.2 MG/DL — SIGNIFICANT CHANGE UP (ref 1.8–2.4)
MCHC RBC-ENTMCNC: 28.7 PG — SIGNIFICANT CHANGE UP (ref 27–31)
MCHC RBC-ENTMCNC: 31.1 G/DL — LOW (ref 32–37)
MCV RBC AUTO: 92.2 FL — SIGNIFICANT CHANGE UP (ref 80–94)
MONOCYTES # BLD AUTO: 0.61 K/UL — HIGH (ref 0.1–0.6)
MONOCYTES NFR BLD AUTO: 9.4 % — HIGH (ref 1.7–9.3)
NEUTROPHILS # BLD AUTO: 4.31 K/UL — SIGNIFICANT CHANGE UP (ref 1.4–6.5)
NEUTROPHILS NFR BLD AUTO: 66.8 % — SIGNIFICANT CHANGE UP (ref 42.2–75.2)
NRBC # BLD: 0 /100 WBCS — SIGNIFICANT CHANGE UP (ref 0–0)
ORGANISM # SPEC MICROSCOPIC CNT: SIGNIFICANT CHANGE UP
ORGANISM # SPEC MICROSCOPIC CNT: SIGNIFICANT CHANGE UP
PHOSPHATE SERPL-MCNC: 3.6 MG/DL — SIGNIFICANT CHANGE UP (ref 2.1–4.9)
PLATELET # BLD AUTO: 307 K/UL — SIGNIFICANT CHANGE UP (ref 130–400)
POTASSIUM SERPL-MCNC: 4.4 MMOL/L — SIGNIFICANT CHANGE UP (ref 3.5–5)
POTASSIUM SERPL-SCNC: 4.4 MMOL/L — SIGNIFICANT CHANGE UP (ref 3.5–5)
PROTHROM AB SERPL-ACNC: 13.6 SEC — HIGH (ref 9.95–12.87)
RBC # BLD: 2.96 M/UL — LOW (ref 4.7–6.1)
RBC # FLD: 16.9 % — HIGH (ref 11.5–14.5)
SODIUM SERPL-SCNC: 143 MMOL/L — SIGNIFICANT CHANGE UP (ref 135–146)
SPECIMEN SOURCE: SIGNIFICANT CHANGE UP
TYPE + AB SCN PNL BLD: SIGNIFICANT CHANGE UP
WBC # BLD: 6.46 K/UL — SIGNIFICANT CHANGE UP (ref 4.8–10.8)
WBC # FLD AUTO: 6.46 K/UL — SIGNIFICANT CHANGE UP (ref 4.8–10.8)

## 2018-11-12 RX ADMIN — Medication 1334 MILLIGRAM(S): at 22:56

## 2018-11-12 RX ADMIN — CHLORHEXIDINE GLUCONATE 1 APPLICATION(S): 213 SOLUTION TOPICAL at 05:27

## 2018-11-12 RX ADMIN — POLYETHYLENE GLYCOL 3350 17 GRAM(S): 17 POWDER, FOR SOLUTION ORAL at 17:46

## 2018-11-12 RX ADMIN — Medication 100 MILLIGRAM(S): at 05:28

## 2018-11-12 RX ADMIN — Medication 1334 MILLIGRAM(S): at 08:17

## 2018-11-12 RX ADMIN — Medication 100 MILLIGRAM(S): at 22:56

## 2018-11-12 RX ADMIN — HEPARIN SODIUM 5000 UNIT(S): 5000 INJECTION INTRAVENOUS; SUBCUTANEOUS at 05:28

## 2018-11-12 RX ADMIN — Medication 1 MILLIGRAM(S): at 12:22

## 2018-11-12 RX ADMIN — HEPARIN SODIUM 5000 UNIT(S): 5000 INJECTION INTRAVENOUS; SUBCUTANEOUS at 22:57

## 2018-11-12 RX ADMIN — TAMSULOSIN HYDROCHLORIDE 0.4 MILLIGRAM(S): 0.4 CAPSULE ORAL at 22:57

## 2018-11-12 RX ADMIN — POLYETHYLENE GLYCOL 3350 17 GRAM(S): 17 POWDER, FOR SOLUTION ORAL at 05:28

## 2018-11-12 RX ADMIN — Medication 100 MILLIGRAM(S): at 10:51

## 2018-11-12 RX ADMIN — Medication 1334 MILLIGRAM(S): at 17:46

## 2018-11-12 RX ADMIN — Medication 100 MILLIGRAM(S): at 17:45

## 2018-11-12 RX ADMIN — Medication 81 MILLIGRAM(S): at 12:22

## 2018-11-12 RX ADMIN — Medication 1334 MILLIGRAM(S): at 12:22

## 2018-11-12 RX ADMIN — Medication 100 MILLIGRAM(S): at 13:57

## 2018-11-12 RX ADMIN — PANTOPRAZOLE SODIUM 40 MILLIGRAM(S): 20 TABLET, DELAYED RELEASE ORAL at 06:05

## 2018-11-12 RX ADMIN — ATORVASTATIN CALCIUM 20 MILLIGRAM(S): 80 TABLET, FILM COATED ORAL at 22:57

## 2018-11-12 RX ADMIN — LACTULOSE 20 GRAM(S): 10 SOLUTION ORAL at 12:23

## 2018-11-12 RX ADMIN — HEPARIN SODIUM 5000 UNIT(S): 5000 INJECTION INTRAVENOUS; SUBCUTANEOUS at 13:57

## 2018-11-12 NOTE — CONSULT NOTE ADULT - ATTENDING COMMENTS
above noted infected foul smelling ulcer of LT foot
Pt with infected heel ulcer on the left and PVD. Will plan for angiogram this week.
Patient reinterviewed, exam confirmed.  58 yo man with history of acromegaly s/p pituitary surgery 20 yr's ago with history of some degree of residual GH secretion.  Pt denies other hormone excess or deficiency.  No acral growth in recent years.  Known T2DM stable on diet only.  Known ESRD on hemodialysis.  In hospital for foot ulcer and PAD.  Pt usually treated with Sandostatin LAR preparation given IM every 4 weeks.  Last dose was about 8 weeks ago.    Impression: Acromegaly s/p pituitary surgery; history of residual growth hormone secretion.  Suggest: restart Sandostatin LAR at usual dose (pt says that med was brought to hospital with him).  Would check IGF-1 (draw before octreotide is given), prolactin, free T4, testosterone.   Will follow.

## 2018-11-12 NOTE — CONSULT NOTE ADULT - ASSESSMENT
58 y/o M w/ hx of neuropathy, acromegaly, diabetes, htn, ESRD on HD (MWF), HTN p/w with LLE DFU. 58 y/o M w/ hx of neuropathy, acromegaly, diabetes, htn, ESRD on HD (MWF), HTN p/w with LLE DFU. Patient with his of acromegaly s/p resection of the adenoma 21 years ago. patient has been on octreotide since then.      #Acromegaly   c/w octreotide 20 mg injection every month  f/u with endocrinologist as out-patient    #DM II  patient is not on any medications  Monitor FS and start insulin if more than 180  patient needs to f/u as out-patient to start oral medicaitons

## 2018-11-12 NOTE — PROGRESS NOTE ADULT - SUBJECTIVE AND OBJECTIVE BOX
Chart reviewed, patient examined. Pertinent results reviewed.  Case discussed with HO  specialist f/u reviewed; HD#22; s/p multiple surgical procedures    SUBJECTIVE:    Patient is a 58y old Male who presented with a chief complaint of foot ulcer (10 Nov 2018 08:55)    Currently admitted to medicine with the primary diagnosis of Foot ulcer & PVD-ischemic.    No o/n events. Pt c/o worsening of his chronic constipation(4-5 D now), and states that he's due for his pituitary shot(1 Q Month)    PAST MEDICAL & SURGICAL HISTORY  Dyslipidemia  DM (diabetes mellitus), type 2  Neuropathy  HTN (hypertension)  CAD (coronary atherosclerotic disease)  Acromegaly  CKD (chronic kidney disease), stage V  H/O eye surgery  H/O: pituitary tumor: s/p removal- about 20 yrs ago    SOCIAL HISTORY:  Negative for smoking/alcohol/drug use.     ALLERGIES:  bacitracin (Unknown)  Neosporin (Hypotension)    MEDICATIONS:  STANDING MEDICATIONS  aspirin enteric coated 81 milliGRAM(s) Oral daily  atorvastatin 20 milliGRAM(s) Oral at bedtime  bisacodyl Suppository 10 milliGRAM(s) Rectal daily  bisacodyl Suppository 10 milliGRAM(s) Rectal once  calcium acetate 1334 milliGRAM(s) Oral four times a day with meals  ceFAZolin   IVPB 2000 milliGRAM(s) IV Intermittent <User Schedule>  chlorhexidine 4% Liquid 1 Application(s) Topical <User Schedule>  clindamycin IVPB 900 milliGRAM(s) IV Intermittent every 8 hours  darbepoetin Injectable Syringe 40 MICROGram(s) IV Push <User Schedule>  docusate sodium 100 milliGRAM(s) Oral two times a day  ergocalciferol 96131 Unit(s) Oral every week  folic acid 1 milliGRAM(s) Oral daily  heparin  Injectable 5000 Unit(s) SubCutaneous every 8 hours  lactulose Syrup 20 Gram(s) Oral daily  pantoprazole    Tablet 40 milliGRAM(s) Oral before breakfast  polyethylene glycol 3350 17 Gram(s) Oral every 12 hours  tamsulosin 0.4 milliGRAM(s) Oral at bedtime    PRN MEDICATIONS  acetaminophen   Tablet .. 650 milliGRAM(s) Oral every 6 hours PRN    VITALS:   Vital Signs Last 24 Hrs  T(C): 36.9 (12 Nov 2018 05:00), Max: 36.9 (12 Nov 2018 05:00)  T(F): 98.5 (12 Nov 2018 05:00), Max: 98.5 (12 Nov 2018 05:00)  HR: 72 (12 Nov 2018 05:00) (70 - 73)  BP: 132/67 (12 Nov 2018 05:00) (130/62 - 166/75)  BP(mean): --  RR: 20 (12 Nov 2018 05:00) (18 - 20)  SpO2: --    LABS:                          8.2    7.78  )-----------( 297      ( 11 Nov 2018 05:38 )             27.0   11-10    141  |  98  |  31<H>  ----------------------------<  114<H>  5.1<H>   |  29  |  4.1<HH>    Ca    9.3      10 Nov 2018 08:33  Phos  4.8     11-11                    Culture - Surgical Swab (collected 07 Nov 2018 17:46)  Source: .Surgical Swab L heel wound  Preliminary Report (09 Nov 2018 15:29):    Moderate Enterococcus faecium          RADIOLOGY:    PHYSICAL EXAM:  GEN: No acute distress; A & Ox4  LUNGS: Clear to auscultation bilaterally   HEART: S1/S2 present. RRR.   ABD: Soft, obese, non-tender, non-distended. Bowel sounds present  EXT: left heel bandaged; ischemic changes/atrophic; no vac  NEURO: AAOX3 Chart reviewed, patient examined. Pertinent results reviewed.  Case discussed with HO  specialist f/u reviewed; HD#22; s/p multiple surgical procedures    SUBJECTIVE:    Patient is a 58y old Male who presented with a chief complaint of foot ulcer (10 Nov 2018 08:55)    Currently admitted to medicine with the primary diagnosis of Foot ulcer & PVD-ischemic.    No o/n events. Pt c/o worsening of his chronic constipation(4-5 D now), and states that he's due for his pituitary shot(1 Q Month)    PAST MEDICAL & SURGICAL HISTORY  Dyslipidemia  DM (diabetes mellitus), type 2  Neuropathy  HTN (hypertension)  CAD (coronary atherosclerotic disease)  Acromegaly  CKD (chronic kidney disease), stage V  H/O eye surgery  H/O: pituitary tumor: s/p removal- about 20 yrs ago    SOCIAL HISTORY:  Negative for smoking/alcohol/drug use.     ALLERGIES:  bacitracin (Unknown)  Neosporin (Hypotension)    MEDICATIONS:  STANDING MEDICATIONS  aspirin enteric coated 81 milliGRAM(s) Oral daily  atorvastatin 20 milliGRAM(s) Oral at bedtime  bisacodyl Suppository 10 milliGRAM(s) Rectal daily  bisacodyl Suppository 10 milliGRAM(s) Rectal once  calcium acetate 1334 milliGRAM(s) Oral four times a day with meals  ceFAZolin   IVPB 2000 milliGRAM(s) IV Intermittent <User Schedule>  chlorhexidine 4% Liquid 1 Application(s) Topical <User Schedule>  clindamycin IVPB 900 milliGRAM(s) IV Intermittent every 8 hours  darbepoetin Injectable Syringe 40 MICROGram(s) IV Push <User Schedule>  docusate sodium 100 milliGRAM(s) Oral two times a day  ergocalciferol 98953 Unit(s) Oral every week  folic acid 1 milliGRAM(s) Oral daily  heparin  Injectable 5000 Unit(s) SubCutaneous every 8 hours  lactulose Syrup 20 Gram(s) Oral daily  pantoprazole    Tablet 40 milliGRAM(s) Oral before breakfast  polyethylene glycol 3350 17 Gram(s) Oral every 12 hours  tamsulosin 0.4 milliGRAM(s) Oral at bedtime    PRN MEDICATIONS  acetaminophen   Tablet .. 650 milliGRAM(s) Oral every 6 hours PRN    VITALS:   Vital Signs Last 24 Hrs  T(C): 36.9 (12 Nov 2018 05:00), Max: 36.9 (12 Nov 2018 05:00)  T(F): 98.5 (12 Nov 2018 05:00), Max: 98.5 (12 Nov 2018 05:00)  HR: 72 (12 Nov 2018 05:00) (70 - 73)  BP: 132/67 (12 Nov 2018 05:00) (130/62 - 166/75)  BP(mean): --  RR: 20 (12 Nov 2018 05:00) (18 - 20)  SpO2: --    LABS:                          8.2    7.78  )-----------( 297      ( 11 Nov 2018 05:38 )             27.0   11-10    141  |  98  |  31<H>  ----------------------------<  114<H>  5.1<H>   |  29  |  4.1<HH>    Ca    9.3      10 Nov 2018 08:33  Phos  4.8     11-11                    Culture - Surgical Swab (collected 07 Nov 2018 17:46)  Source: .Surgical Swab L heel wound  Preliminary Report (09 Nov 2018 15:29):    Moderate Enterococcus faecium          RADIOLOGY:    PHYSICAL EXAM:  GEN: No acute distress; A & Ox4  LUNGS: Clear to auscultation bilaterally   HEART: S1/S2 present. RRR.   ABD: Soft, obese, non-tender, non-distended. Bowel sounds present  EXT: left heel bandaged; ischemic changes/atrophic; Vac on L heel; dystrohic changes-R foot and toes       RUE AVF/thrill+  NEURO: AAOX3;  DECreased sensation and mvt of feet

## 2018-11-12 NOTE — CHART NOTE - NSCHARTNOTEFT_GEN_A_CORE
Registered Dietitian Follow-Up (T1-31a)    ***Scroll to the bottom for RD recommendation***    Patient Profile Reviewed                           Yes [x]   No []  Nutrition History Previously Obtained        Yes [x]  No []  - looks well and happy eating 100%. Just severely constipated (on multiple bm regimen).      PERTINENT MEDICAL INFORMATIONS:  (1) Now pt c/o chronic constipation (5-6 days now).  (2) DFU s/p debridement. ID rec c/w abx after dialysis.  (3) Sx following for LLE heel debridement. Possible OR 11/13 for plastic Sx with graft  (4) Severe PVD f/u vascular outpatient.  (5) ESRD on HD - RENAL following.   (6) on Insulin for DM.      PERTINENT SUBJECTIVE INFORMATION:  (1) pt reported good baseline appetite and PO. 100%. tolerating. He thinks he is not drinking a lot of liquids, which is not helpful for constipation. Pt is on dialysis.       DIET ORDER:  CC renal w/ snacks        ANTHROPOMETRICS:  - Ht.  193.04cm  - Wt.   (11/2): 117.6kg  (11/9): 117.2 kg - likely stable. HD - weight fluctuation may occur.  - BMI  - IBW  91.8kg       PERTINENT LAB DATA:  11/11: h/h 8.2/29.0, K 5.1, BUN 31, Cr 4.1, glucose 114, GFR 15  PERTINENT MEDS:  aspirin, heparin, abx, acetaminophen, bisacodyl, phoslo, docusate, vitamin D, b9, lactulose, protonix, miralax      PHYSICAL FINDINGS  - APPEARANCE:          - GI FUNCTION:          - TUBES:                       - ORAL/MOUTH:        - SKIN:                                NUTRITION REQUIREMENTS  WEIGHT USED:                          IBW 91.8 kg  ESTIMATED ENERGY NEEDS:       CONTINUE [ x ]      ADJUST [  ]    ESTIMATED ENERGY NEEDS:         (3825-2737 kcal/day = 30-35 kcal/kg IBW as pt on HD) - However, Aim at the low end. Height is considered.  ESTIMATED PROTEIN NEEDS:        (110-129 g/day = 1.2-1.4 g/kg IBW as pt on HD)   ESTIMATED FLUID NEEDS:             1000 ml + urine outpt or per LIP/nephro    CURRENT NUTRIENT NEEDS:    Pt likely meeting          [  x] PREVIOUS NUTRITION DIAGNOSIS:   (1) Increased nutrient needs - resolved.            [  ] ONGOING        [ x ] RESOLVED              NUTRITION INTERVENTION:    [ x ] ORAL        [ ] EN/TF     GOAL/EXPECTED OUTCOME:     pt to consume and tolerate >95% of all meals and snacks through LOS. Pt to have 1BM!!!  INDICATOR/MONITORING:       RD to monitor diet order, energy intake, body composition, nutrition focused physical findings (BM), lytes, and renal profile  PATIENT's INTERVENTION:        Meals and snacks.     RECS: (1) Continue diet order and adjust and monitor bowel regimen/movement.

## 2018-11-12 NOTE — PROGRESS NOTE ADULT - ASSESSMENT
Assessment 58y/o M w/ hx of neuropathy, acromegaly, diabetes, htn, CKD MWF, makes urine, htn [facility]LE [facility] with Dr. Gomes. Vascular studies showed severe PVD and is transferred to [place] for angiogram.He has had multiple surgical procedures.    Plan      #DFU - left foot  -s/p debridement infected heel ulcer to the calcaneus ~1 week ago  -ID: c/w Ancef 2gm post dialysis; c/w clindamycin 900 q8hrs for now  -upon discharge, will d/c with PO Flagyl 500mg BID x6 weeks and Ancef 2gm post HD x6wks  -surgery: s/p LLE debridement 11/2/18 ; -received 1 unit prbc during procedure for hypotension and hgb of 7.7; daily dressing changes  -surgery -s/p LLE heel debridement ;   -surgery - placed wound vac on left heel; possible OR on 11/13 with plastics surgery for debridement and placement of integra graft  -podiatry consult appreciated. Call in to review with surgical attending  --see ID IMPression-nonviable foot??  Surgery disagrees    #Severe PVD  -arterial duplex 10/24: b/l severe peripheral arterial disease, occlusion of R popliteal and L SF arteries  -angiogram 10/31: S/P Left extremity angiogram w/ left SFA angioplasty and stent & left popliteal angioplasty for heel ulceration  -f/u with vascular in 2 weeks outpatient  Dr. Comer      #Chronic DVT of LLE - found on imaging on March 2018  -not on anticoagulation  -vascular f/u      #ESRD on HD started 1 month PTA - MWF  -c/w HD; renal following  -constipation    #Anemia  -s/p 1 prbc transfusion in [facility] and another prbc during debridement in [place]  -EDWIN 40 mcg weekly  -monitor cbc  -pt requesting to speak with a hematologist regarding anemia    #Diabetes  -basal bolus insuin; iss    #HTN  -d/c Lasix for low BPs  -BP improved    #HLD  -c/w atorvastatin    DVT ppx: heparin subq  GI ppx: protonix  Diet: consistent carbohydrate, renal  Activity: ambulate as tolerated    When medically stable - d/c back to [facility]  When to restart plavix? - will d/w surgery Assessment 58y/o M w/ hx of neuropathy, acromegaly, diabetes, htn, CKD MWF, makes urine, htn who [facility] ulcer with Dr. Gomes. Vascular studies showed severe PVD and is transferred to [place] for angiogram.He has had multiple surgical procedures.    Plan      #DFU - left foot  -s/p debridement infected heel ulcer to the calcaneus ~1 week ago  -ID: c/w Ancef 2gm post dialysis; c/w clindamycin 900 q8hrs for now  -upon discharge, will d/c with PO Flagyl 500mg BID x6 weeks and Ancef 2gm post HD x6wks  -surgery: s/p LLE debridement 11/2/18 ; -received 1 unit prbc during procedure for hypotension and hgb of 7.7; daily dressing changes  -surgery -s/p LLE heel debridement ;   -surgery - placed wound vac on left heel; possible OR on 11/13 with plastics surgery for debridement and placement of integra graft  -podiatry consult appreciated. Call in to review with surgical attending  --see ID IMPression-nonviable foot??  Surgery disagrees  - get EKG today    #Severe PVD  -arterial duplex 10/24: b/l severe peripheral arterial disease, occlusion of R popliteal and L SF arteries  -angiogram 10/31: S/P Left extremity angiogram w/ left SFA angioplasty and stent & left popliteal angioplasty for heel ulceration  -f/u with vascular in 2 weeks outpatient  Dr. Comer      #Chronic DVT of LLE - found on imaging on March 2018  -not on anticoagulation  -vascular f/u      #ESRD on HD started 1 month PTA - MWF  -c/w HD; renal following  -constipation-give tap water enema today preop;    #Anemia  -s/p 1 prbc transfusion in [facility] and another prbc during debridement in [place]  -EDWIN 40 mcg weekly  -monitor cbc  -pt requesting to speak with a hematologist regarding anemia    #Diabetes  -basal bolus insuin; iss    #HTN  -d/c Lasix for low BPs  -BP improved    #HLD  -c/w atorvastatin    DVT ppx: heparin subq  GI ppx: protonix  Diet: consistent carbohydrate, renal  Activity: ambulate as tolerated    When medically stable - d/c back to [facility]  When to restart plavix? - will d/w surgery

## 2018-11-12 NOTE — PROGRESS NOTE ADULT - ASSESSMENT
1)  ESRD: hd today, standard bath, uf 3 liters as tolerated   2)  DFU right foot s/p debridement ,followed by ID sp angioplasty , on ATB    3)  Severe secondary HPT  start  hectorol 2 with HD  4) Anemia Hb noted   resistant to EDWIN in view of infection, will not increase dose , no venofer in view of infection  5) BP at goal    6) will follow   7) ? d/c plan

## 2018-11-12 NOTE — PROGRESS NOTE ADULT - SUBJECTIVE AND OBJECTIVE BOX
seen and examined  no distress  no new complaints       Standing Inpatient Medications  aspirin enteric coated 81 milliGRAM(s) Oral daily  atorvastatin 20 milliGRAM(s) Oral at bedtime  bisacodyl Suppository 10 milliGRAM(s) Rectal daily  calcium acetate 1334 milliGRAM(s) Oral four times a day with meals  ceFAZolin   IVPB 2000 milliGRAM(s) IV Intermittent <User Schedule>  chlorhexidine 4% Liquid 1 Application(s) Topical <User Schedule>  clindamycin IVPB 900 milliGRAM(s) IV Intermittent every 8 hours  darbepoetin Injectable Syringe 40 MICROGram(s) IV Push <User Schedule>  docusate sodium 100 milliGRAM(s) Oral two times a day  ergocalciferol 64640 Unit(s) Oral every week  folic acid 1 milliGRAM(s) Oral daily  heparin  Injectable 5000 Unit(s) SubCutaneous every 8 hours  lactulose Syrup 20 Gram(s) Oral daily  pantoprazole    Tablet 40 milliGRAM(s) Oral before breakfast  polyethylene glycol 3350 17 Gram(s) Oral every 12 hours  tamsulosin 0.4 milliGRAM(s) Oral at bedtime      VITALS/PHYSICAL EXAM  --------------------------------------------------------------------------------  T(C): 36.9 (11-12-18 @ 05:00), Max: 36.9 (11-12-18 @ 05:00)  HR: 72 (11-12-18 @ 05:00) (70 - 73)  BP: 132/67 (11-12-18 @ 05:00) (130/62 - 166/75)  RR: 20 (11-12-18 @ 05:00) (18 - 20)  SpO2: --  Wt(kg): --        11-11-18 @ 07:01  -  11-12-18 @ 07:00  --------------------------------------------------------  IN: 230 mL / OUT: 800 mL / NET: -570 mL      Physical Exam:  	Gen: NAD  	Pulm: decrease BS  B/L  	CV:  S1S2; no rub  	Abd: distended  	LE: dressing   	Vascular access: tesio/av fistula     LABS/STUDIES  --------------------------------------------------------------------------------              8.2    7.78  >-----------<  297      [11-11-18 @ 05:38]              27.0     141  |  98  |  31  ----------------------------<  114      [11-10-18 @ 08:33]  5.1   |  29  |  4.1        Ca     9.3     [11-10-18 @ 08:33]      Phos  4.8     [11-11-18 @ 05:38]            Creatinine Trend:  SCr 4.1 [11-10 @ 08:33]  SCr 4.1 [11-06 @ 07:58]  SCr 5.3 [11-05 @ 10:47]  SCr 4.1 [11-04 @ 12:23]  SCr 4.5 [11-01 @ 19:11]        Iron 38, TIBC 166, %sat 23      [09-19-18 @ 04:30]  Ferritin 55      [03-09-18 @ 18:01]  PTH -- (Ca 8.2)      [09-22-18 @ 08:40]   2071  PTH -- (Ca 8.0)      [09-19-18 @ 04:30]   1519  PTH -- (Ca 8.3)      [03-09-18 @ 18:01]   1545  Vitamin D (25OH) 14.4      [03-09-18 @ 18:01]

## 2018-11-12 NOTE — PROGRESS NOTE ADULT - ASSESSMENT
Assessment 58y/o M w/ hx of neuropathy, acromegaly, diabetes, htn, CKD MWF, makes urine, htn [facility] with Dr. Gomes. Vascular studies showed severe PVD and is transferred to [place] for angiogram.He has had multiple surgical procedures.    #DFU - left foot  - Ancef 2gm post dialysis; c/w clindamycin 900 q8hrs for now  - to OR tomorrow with plastics surgery for debridement and placement of integra graft    #Severe PVD  -arterial duplex 10/24: b/l severe peripheral arterial disease, occlusion of R popliteal and L SF arteries  -angiogram 10/31: S/P Left extremity angiogram w/ left SFA angioplasty and stent & left popliteal angioplasty for heel ulceration  -f/u with vascular in 2 weeks outpatient Dr. Comer    #Chronic DVT of LLE - found on imaging on March 2018  -not on anticoagulation  -vascular f/u    #ESRD on HD started 1 month PTA - MWF  -c/w HD; renal following  -constipation-give tap water enema today preop;    #Anemia  -EDWIN 40 mcg weekly  -monitor cbc    #acromegaly  -patient will need Sandostatin LAR (long acting) however we only have short acting in pharmacy  -patient brought his own sandostatin - will try to locate it today  -will draw AM cortisol, TSH, T3/T4, prolactin, testosterone  -if cannot find patients Sandostatin LAR then will need to put in non-formulary request     #Diabetes  - basal and bolus insulin, finger sticks have been good    #HTN  -d/c Lasix for low BPs  -BP improved    #HLD  -c/w atorvastatin    DVT ppx: heparin subq  GI ppx: protonix  Diet: consistent carbohydrate, renal  Activity: ambulate as tolerated

## 2018-11-12 NOTE — CONSULT NOTE ADULT - SUBJECTIVE AND OBJECTIVE BOX
HPI:  58 y/o M w/ hx of neuropathy, acromegaly, diabetes, ESRD on HD (MWF) and HTN [facility] LLE ulcer with Dr. Gomes. Vascular studies showed severe PVD and is transferred to [place] for angiogram. He has had multiple surgical procedures.      ROS:  All other systems reviewed and are negative    PAST MEDICAL & SURGICAL HISTORY  Dyslipidemia  DM (diabetes mellitus), type 2  Neuropathy  HTN (hypertension)  CAD (coronary atherosclerotic disease)  Acromegaly  CKD (chronic kidney disease), stage V  H/O eye surgery  H/O: pituitary tumor: s/p removal      FAMILY HISTORY:  FAMILY HISTORY:  Family history of myocardial infarction (Father)      SOCIAL HISTORY:  Denies smoking. alcohol use and drug abuse    ALLERGIES:  bacitracin (Unknown)  Neosporin (Hypotension)      MEDICATIONS:  MEDICATIONS  (STANDING):  aspirin enteric coated 81 milliGRAM(s) Oral daily  atorvastatin 20 milliGRAM(s) Oral at bedtime  bisacodyl Suppository 10 milliGRAM(s) Rectal daily  calcium acetate 1334 milliGRAM(s) Oral four times a day with meals  ceFAZolin   IVPB 2000 milliGRAM(s) IV Intermittent <User Schedule>  chlorhexidine 4% Liquid 1 Application(s) Topical <User Schedule>  clindamycin IVPB 900 milliGRAM(s) IV Intermittent every 8 hours  darbepoetin Injectable Syringe 40 MICROGram(s) IV Push <User Schedule>  docusate sodium 100 milliGRAM(s) Oral two times a day  ergocalciferol 20174 Unit(s) Oral every week  folic acid 1 milliGRAM(s) Oral daily  heparin  Injectable 5000 Unit(s) SubCutaneous every 8 hours  lactulose Syrup 20 Gram(s) Oral daily  pantoprazole    Tablet 40 milliGRAM(s) Oral before breakfast  polyethylene glycol 3350 17 Gram(s) Oral every 12 hours  tamsulosin 0.4 milliGRAM(s) Oral at bedtime    MEDICATIONS  (PRN):  acetaminophen   Tablet .. 650 milliGRAM(s) Oral every 6 hours PRN Temp greater or equal to 38C (100.4F)      HOME MEDICATIONS:  Home Medications:  Colace 100 mg oral capsule: 1 cap(s) orally 2 times a day (22 Oct 2018 17:44)  ferrous sulfate 325 mg (65 mg elemental iron) oral tablet:  (22 Oct 2018 17:44)  folic acid 1 mg oral tablet: 1 tab(s) orally once a day (22 Oct 2018 17:44)  lactulose 10 g/15 mL oral syrup:  (22 Oct 2018 17:44)  Lasix 40 mg oral tablet: 1 tab(s) orally once a day (22 Oct 2018 17:44)  Senna Lax 8.6 mg oral tablet: 1 tab(s) orally once a day (at bedtime) (22 Oct 2018 17:44)  sodium bicarbonate 650 mg oral tablet: 1 tab(s) orally 2 times a day (22 Oct 2018 17:44)  tamsulosin 0.4 mg oral capsule: 1 cap(s) orally once a day (22 Oct 2018 17:44)      VITALS:   T(F): 98.5 (11-12 @ 05:00), Max: 99.2 (11-09 @ 20:59)  HR: 79 (11-12 @ 08:35) (70 - 82)  BP: 119/77 (11-12 @ 08:35) (113/59 - 166/75)  BP(mean): --  RR: 18 (11-12 @ 08:35) (18 - 20)  SpO2: --    I&O's Summary    11 Nov 2018 07:01  -  12 Nov 2018 07:00  --------------------------------------------------------  IN: 230 mL / OUT: 800 mL / NET: -570 mL    12 Nov 2018 07:01  -  12 Nov 2018 09:10  --------------------------------------------------------  IN: 0 mL / OUT: 500 mL / NET: -500 mL        PHYSICAL EXAM:  GEN: Alert and oriented X 3, Well nourished, No acute distress  NECK: Supple, no JVD  LUNGS: Clear to auscultation bilaterally  CARDIOVASCULAR: S1/S2 regular , no murmus or rubs  ABD: Soft, non-tender  EXT: No Lower extremity edema/cyanosis  NEURO: Non focal  SKIN: Intact    LABS:                        8.2    7.78  )-----------( 297      ( 11 Nov 2018 05:38 )             27.0       Phos  4.8     11-11        RADIOLOGY:  -CXR:  < from: Xray Chest 1 View AP/PA (10.10.18 @ 17:58) >  Impression:      Low lung volumes with bibasilar linear atelectasis, similar to prior exam. HPI:  58 y/o M w/ hx of neuropathy, acromegaly, diabetes, ESRD on HD (MWF) and HTN who [facility]LE ulcer with Dr. Gomes. Vascular studies showed severe PVD and is transferred to [place] for angiogram. Patient is s/p LLE debridement 11/2/18 and S/P Left extremity angiogram w/ left SFA angioplasty and stent & left popliteal angioplasty for heel ulceration  10/31.  Patient has a history pituitary adenoma with acromegaly 21 years ago. patient underwent adenectomy at this time and since then has been getting Octreotide every month. patient missed the last 2 as his mother as the caregiver passed away.  Patient has a history of type 2 diabetes which he control with diet.    ROS:  All other systems reviewed and are negative    PAST MEDICAL & SURGICAL HISTORY  Dyslipidemia  DM (diabetes mellitus), type 2  Neuropathy  HTN (hypertension)  CAD (coronary atherosclerotic disease)  Acromegaly  CKD (chronic kidney disease), stage V  H/O eye surgery  H/O: pituitary tumor: s/p removal      FAMILY HISTORY:  FAMILY HISTORY:  Family history of myocardial infarction (Father)      SOCIAL HISTORY:  Denies smoking. alcohol use and drug abuse    ALLERGIES:  bacitracin (Unknown)  Neosporin (Hypotension)      MEDICATIONS:  MEDICATIONS  (STANDING):  aspirin enteric coated 81 milliGRAM(s) Oral daily  atorvastatin 20 milliGRAM(s) Oral at bedtime  bisacodyl Suppository 10 milliGRAM(s) Rectal daily  calcium acetate 1334 milliGRAM(s) Oral four times a day with meals  ceFAZolin   IVPB 2000 milliGRAM(s) IV Intermittent <User Schedule>  chlorhexidine 4% Liquid 1 Application(s) Topical <User Schedule>  clindamycin IVPB 900 milliGRAM(s) IV Intermittent every 8 hours  darbepoetin Injectable Syringe 40 MICROGram(s) IV Push <User Schedule>  docusate sodium 100 milliGRAM(s) Oral two times a day  ergocalciferol 32378 Unit(s) Oral every week  folic acid 1 milliGRAM(s) Oral daily  heparin  Injectable 5000 Unit(s) SubCutaneous every 8 hours  lactulose Syrup 20 Gram(s) Oral daily  pantoprazole    Tablet 40 milliGRAM(s) Oral before breakfast  polyethylene glycol 3350 17 Gram(s) Oral every 12 hours  tamsulosin 0.4 milliGRAM(s) Oral at bedtime    MEDICATIONS  (PRN):  acetaminophen   Tablet .. 650 milliGRAM(s) Oral every 6 hours PRN Temp greater or equal to 38C (100.4F)      HOME MEDICATIONS:  Home Medications:  Colace 100 mg oral capsule: 1 cap(s) orally 2 times a day (22 Oct 2018 17:44)  ferrous sulfate 325 mg (65 mg elemental iron) oral tablet:  (22 Oct 2018 17:44)  folic acid 1 mg oral tablet: 1 tab(s) orally once a day (22 Oct 2018 17:44)  lactulose 10 g/15 mL oral syrup:  (22 Oct 2018 17:44)  Lasix 40 mg oral tablet: 1 tab(s) orally once a day (22 Oct 2018 17:44)  Senna Lax 8.6 mg oral tablet: 1 tab(s) orally once a day (at bedtime) (22 Oct 2018 17:44)  sodium bicarbonate 650 mg oral tablet: 1 tab(s) orally 2 times a day (22 Oct 2018 17:44)  tamsulosin 0.4 mg oral capsule: 1 cap(s) orally once a day (22 Oct 2018 17:44)      VITALS:   T(F): 98.5 (11-12 @ 05:00), Max: 99.2 (11-09 @ 20:59)  HR: 79 (11-12 @ 08:35) (70 - 82)  BP: 119/77 (11-12 @ 08:35) (113/59 - 166/75)  BP(mean): --  RR: 18 (11-12 @ 08:35) (18 - 20)  SpO2: --    I&O's Summary    11 Nov 2018 07:01  -  12 Nov 2018 07:00  --------------------------------------------------------  IN: 230 mL / OUT: 800 mL / NET: -570 mL    12 Nov 2018 07:01  -  12 Nov 2018 09:10  --------------------------------------------------------  IN: 0 mL / OUT: 500 mL / NET: -500 mL        PHYSICAL EXAM:  GEN: Alert and oriented X 3, Well nourished, No acute distress  HEENT: coarse features  NECK: Supple, no JVD  LUNGS: Clear to auscultation bilaterally  CARDIOVASCULAR: S1/S2 regular , no murmus or rubs  ABD: Soft, non-tender  EXT: b/l LE +2-3 edema   NEURO: Non focal  SKIN: Intact    LABS:                        8.2    7.78  )-----------( 297      ( 11 Nov 2018 05:38 )             27.0       Phos  4.8     11-11        RADIOLOGY:  -CXR:  < from: Xray Chest 1 View AP/PA (10.10.18 @ 17:58) >  Impression:      Low lung volumes with bibasilar linear atelectasis, similar to prior exam.

## 2018-11-12 NOTE — PROGRESS NOTE ADULT - SUBJECTIVE AND OBJECTIVE BOX
Progress Note: General Surgery  Patient: SU SAWYER , 58y (1960)Male   MRN: [id number]  Location: [address]  Visit: 10-22-18 Inpatient  Date: 11-12-18 @ 02:15    Procedure/Diagnosis: L heel ulcer    Events/ 24h: Wound vac applied. No acute events overnight. Pain controlled.    Vitals: T(F): 98.1 (11-11-18 @ 21:38), Max: 98.1 (11-11-18 @ 21:38)  HR: 70 (11-11-18 @ 21:38)  BP: 166/75 (11-11-18 @ 21:38) (130/62 - 166/75)  RR: 20 (11-11-18 @ 21:38)  SpO2: --    In:   11-10-18 @ 07:01  -  11-11-18 @ 07:00  --------------------------------------------------------  IN: 430 mL    11-11-18 @ 07:01  -  11-12-18 @ 02:15  --------------------------------------------------------  IN: 230 mL      Out:   11-10-18 @ 07:01  -  11-11-18 @ 07:00  --------------------------------------------------------  OUT:    Voided: 850 mL  Total OUT: 850 mL      11-11-18 @ 07:01  -  11-12-18 @ 02:15  --------------------------------------------------------  OUT:    Voided: 800 mL  Total OUT: 800 mL        Net:   11-10-18 @ 07:01  -  11-11-18 @ 07:00  --------------------------------------------------------  NET: -420 mL    11-11-18 @ 07:01  -  11-12-18 @ 02:15  --------------------------------------------------------  NET: -570 mL        Diet: Diet, Consistent Carbohydrate Renal w/Evening Snack (11-02-18 @ 08:59)    IV Fluids: calcium acetate 1334 milliGRAM(s) Oral four times a day with meals  ergocalciferol 10730 Unit(s) Oral every week  folic acid 1 milliGRAM(s) Oral daily      Physical Examination:  General Appearance: NAD  HEENT: EOMI, sclera non-icteric.  Heart: RRR   Lungs: CTABL.   Abdomen:  Soft, nontender, nondistended. No rigidity, guarding, or rebound tenderness.   MSK/Extremities: WV in place holding suction. Minimal drainage. Warm & well-perfused. Peripheral pulses intact.  Skin: Warm, dry. No jaundice.       Medications: [Standing]  aspirin enteric coated 81 milliGRAM(s) Oral daily  atorvastatin 20 milliGRAM(s) Oral at bedtime  bisacodyl Suppository 10 milliGRAM(s) Rectal daily  calcium acetate 1334 milliGRAM(s) Oral four times a day with meals  ceFAZolin   IVPB 2000 milliGRAM(s) IV Intermittent <User Schedule>  chlorhexidine 4% Liquid 1 Application(s) Topical <User Schedule>  clindamycin IVPB 900 milliGRAM(s) IV Intermittent every 8 hours  darbepoetin Injectable Syringe 40 MICROGram(s) IV Push <User Schedule>  docusate sodium 100 milliGRAM(s) Oral two times a day  ergocalciferol 77517 Unit(s) Oral every week  folic acid 1 milliGRAM(s) Oral daily  heparin  Injectable 5000 Unit(s) SubCutaneous every 8 hours  lactulose Syrup 20 Gram(s) Oral daily  pantoprazole    Tablet 40 milliGRAM(s) Oral before breakfast  polyethylene glycol 3350 17 Gram(s) Oral every 12 hours  tamsulosin 0.4 milliGRAM(s) Oral at bedtime    DVT Prophylaxis: heparin  Injectable 5000 Unit(s) SubCutaneous every 8 hours    GI Prophylaxis: pantoprazole    Tablet 40 milliGRAM(s) Oral before breakfast    Antibiotics: ceFAZolin   IVPB 2000 milliGRAM(s) IV Intermittent <User Schedule>  clindamycin IVPB 900 milliGRAM(s) IV Intermittent every 8 hours    Anticoagulation:   Medications:[PRN]  acetaminophen   Tablet .. 650 milliGRAM(s) Oral every 6 hours PRN      Labs:                        8.2    7.78  )-----------( 297      ( 11 Nov 2018 05:38 )             27.0     11-10    141  |  98  |  31<H>  ----------------------------<  114<H>  5.1<H>   |  29  |  4.1<HH>    Ca    9.3      10 Nov 2018 08:33  Phos  4.8     11-11        Imaging:     < from: Xray Foot AP + Lateral, Left (10.22.18 @ 19:06) >  Diffuse dorsal soft tissue swelling, without any evidence of soft tissue   gas.     Diffuse vascular calcifications. Degenerative changes of 1st MTP joint   and tarsal joints.     < end of copied text >      Assessment:  58y Male patient admitted w/ L heel ulcer    Plan:    Preop today for debridement in OR tomorrow with plastics  WV holding suction  DVT/GI ppx  OOBAT  IS  Pain control    Date/Time: 11-12-18 @ 02:15

## 2018-11-12 NOTE — CONSULT NOTE ADULT - CONSULT REQUESTED DATE/TIME
22-Oct-2018 23:22
05-Nov-2018 13:10
08-Nov-2018 19:35
12-Nov-2018 09:08
23-Oct-2018 09:12
24-Oct-2018 21:19
26-Oct-2018 15:27
27-Oct-2018 13:06

## 2018-11-12 NOTE — PROGRESS NOTE ADULT - SUBJECTIVE AND OBJECTIVE BOX
SUBJECTIVE:    Patient is a 58y old Male who presents with a chief complaint of Foot ulcer (12 Nov 2018 09:07)    Stable, no acute events overnight. Today patients only complaint is constipation. Otherwise denies fever, shakes, chills.    PAST MEDICAL & SURGICAL HISTORY  Dyslipidemia  DM (diabetes mellitus), type 2  Neuropathy  HTN (hypertension)  CAD (coronary atherosclerotic disease)  Acromegaly  CKD (chronic kidney disease), stage V  H/O eye surgery  H/O: pituitary tumor: s/p removal       ALLERGIES:  bacitracin (Unknown)  Neosporin (Hypotension)    MEDICATIONS:  STANDING MEDICATIONS  aspirin enteric coated 81 milliGRAM(s) Oral daily  atorvastatin 20 milliGRAM(s) Oral at bedtime  bisacodyl Suppository 10 milliGRAM(s) Rectal daily  calcium acetate 1334 milliGRAM(s) Oral four times a day with meals  ceFAZolin   IVPB 2000 milliGRAM(s) IV Intermittent <User Schedule>  chlorhexidine 4% Liquid 1 Application(s) Topical <User Schedule>  clindamycin IVPB 900 milliGRAM(s) IV Intermittent every 8 hours  darbepoetin Injectable Syringe 40 MICROGram(s) IV Push <User Schedule>  docusate sodium 100 milliGRAM(s) Oral two times a day  ergocalciferol 16122 Unit(s) Oral every week  folic acid 1 milliGRAM(s) Oral daily  heparin  Injectable 5000 Unit(s) SubCutaneous every 8 hours  lactulose Syrup 20 Gram(s) Oral daily  pantoprazole    Tablet 40 milliGRAM(s) Oral before breakfast  polyethylene glycol 3350 17 Gram(s) Oral every 12 hours  tamsulosin 0.4 milliGRAM(s) Oral at bedtime    PRN MEDICATIONS  acetaminophen   Tablet .. 650 milliGRAM(s) Oral every 6 hours PRN    VITALS:   T(F): 98.1  HR: 83  BP: 141/86  RR: 18  SpO2: --    LABS:                        8.5    6.46  )-----------( 307      ( 12 Nov 2018 17:55 )             27.3       Phos  4.8     11-11      PT/INR - ( 12 Nov 2018 17:55 )   PT: 13.60 sec;   INR: 1.18 ratio         PTT - ( 12 Nov 2018 17:55 )  PTT:34.3 sec                  11-11-18 @ 07:01  -  11-12-18 @ 07:00  --------------------------------------------------------  IN: 230 mL / OUT: 800 mL / NET: -570 mL    11-12-18 @ 07:01  -  11-12-18 @ 18:49  --------------------------------------------------------  IN: 0 mL / OUT: 2700 mL / NET: -2700 mL

## 2018-11-13 LAB
ANION GAP SERPL CALC-SCNC: 10 MMOL/L — SIGNIFICANT CHANGE UP (ref 7–14)
ANION GAP SERPL CALC-SCNC: 12 MMOL/L — SIGNIFICANT CHANGE UP (ref 7–14)
APTT BLD: 35.7 SEC — SIGNIFICANT CHANGE UP (ref 27–39.2)
BASOPHILS # BLD AUTO: 0.08 K/UL — SIGNIFICANT CHANGE UP (ref 0–0.2)
BASOPHILS NFR BLD AUTO: 1 % — SIGNIFICANT CHANGE UP (ref 0–1)
BLD GP AB SCN SERPL QL: SIGNIFICANT CHANGE UP
BUN SERPL-MCNC: 31 MG/DL — HIGH (ref 10–20)
BUN SERPL-MCNC: 33 MG/DL — HIGH (ref 10–20)
CALCIUM SERPL-MCNC: 8.8 MG/DL — SIGNIFICANT CHANGE UP (ref 8.5–10.1)
CALCIUM SERPL-MCNC: 8.9 MG/DL — SIGNIFICANT CHANGE UP (ref 8.5–10.1)
CHLORIDE SERPL-SCNC: 103 MMOL/L — SIGNIFICANT CHANGE UP (ref 98–110)
CHLORIDE SERPL-SCNC: 106 MMOL/L — SIGNIFICANT CHANGE UP (ref 98–110)
CO2 SERPL-SCNC: 26 MMOL/L — SIGNIFICANT CHANGE UP (ref 17–32)
CO2 SERPL-SCNC: 28 MMOL/L — SIGNIFICANT CHANGE UP (ref 17–32)
CREAT SERPL-MCNC: 3.9 MG/DL — HIGH (ref 0.7–1.5)
CREAT SERPL-MCNC: 4.2 MG/DL — CRITICAL HIGH (ref 0.7–1.5)
EOSINOPHIL # BLD AUTO: 0.47 K/UL — SIGNIFICANT CHANGE UP (ref 0–0.7)
EOSINOPHIL NFR BLD AUTO: 5.8 % — SIGNIFICANT CHANGE UP (ref 0–8)
GLUCOSE BLDC GLUCOMTR-MCNC: 100 MG/DL — HIGH (ref 70–99)
GLUCOSE BLDC GLUCOMTR-MCNC: 102 MG/DL — HIGH (ref 70–99)
GLUCOSE BLDC GLUCOMTR-MCNC: 112 MG/DL — HIGH (ref 70–99)
GLUCOSE BLDC GLUCOMTR-MCNC: 126 MG/DL — HIGH (ref 70–99)
GLUCOSE BLDC GLUCOMTR-MCNC: 89 MG/DL — SIGNIFICANT CHANGE UP (ref 70–99)
GLUCOSE BLDC GLUCOMTR-MCNC: 91 MG/DL — SIGNIFICANT CHANGE UP (ref 70–99)
GLUCOSE SERPL-MCNC: 130 MG/DL — HIGH (ref 70–99)
GLUCOSE SERPL-MCNC: 99 MG/DL — SIGNIFICANT CHANGE UP (ref 70–99)
HCT VFR BLD CALC: 25.4 % — LOW (ref 42–52)
HCT VFR BLD CALC: 26.7 % — LOW (ref 42–52)
HGB BLD-MCNC: 7.9 G/DL — LOW (ref 14–18)
HGB BLD-MCNC: 8.2 G/DL — LOW (ref 14–18)
IMM GRANULOCYTES NFR BLD AUTO: 0.6 % — HIGH (ref 0.1–0.3)
INR BLD: 1.18 RATIO — SIGNIFICANT CHANGE UP (ref 0.65–1.3)
LYMPHOCYTES # BLD AUTO: 1.57 K/UL — SIGNIFICANT CHANGE UP (ref 1.2–3.4)
LYMPHOCYTES # BLD AUTO: 19.3 % — LOW (ref 20.5–51.1)
MAGNESIUM SERPL-MCNC: 2.2 MG/DL — SIGNIFICANT CHANGE UP (ref 1.8–2.4)
MCHC RBC-ENTMCNC: 28.6 PG — SIGNIFICANT CHANGE UP (ref 27–31)
MCHC RBC-ENTMCNC: 28.8 PG — SIGNIFICANT CHANGE UP (ref 27–31)
MCHC RBC-ENTMCNC: 30.7 G/DL — LOW (ref 32–37)
MCHC RBC-ENTMCNC: 31.1 G/DL — LOW (ref 32–37)
MCV RBC AUTO: 92.7 FL — SIGNIFICANT CHANGE UP (ref 80–94)
MCV RBC AUTO: 93 FL — SIGNIFICANT CHANGE UP (ref 80–94)
MONOCYTES # BLD AUTO: 0.81 K/UL — HIGH (ref 0.1–0.6)
MONOCYTES NFR BLD AUTO: 10 % — HIGH (ref 1.7–9.3)
NEUTROPHILS # BLD AUTO: 5.14 K/UL — SIGNIFICANT CHANGE UP (ref 1.4–6.5)
NEUTROPHILS NFR BLD AUTO: 63.3 % — SIGNIFICANT CHANGE UP (ref 42.2–75.2)
NRBC # BLD: 0 /100 WBCS — SIGNIFICANT CHANGE UP (ref 0–0)
NRBC # BLD: 0 /100 WBCS — SIGNIFICANT CHANGE UP (ref 0–0)
PHOSPHATE SERPL-MCNC: 4.2 MG/DL — SIGNIFICANT CHANGE UP (ref 2.1–4.9)
PLATELET # BLD AUTO: 274 K/UL — SIGNIFICANT CHANGE UP (ref 130–400)
PLATELET # BLD AUTO: 276 K/UL — SIGNIFICANT CHANGE UP (ref 130–400)
POTASSIUM SERPL-MCNC: 4.4 MMOL/L — SIGNIFICANT CHANGE UP (ref 3.5–5)
POTASSIUM SERPL-MCNC: 4.9 MMOL/L — SIGNIFICANT CHANGE UP (ref 3.5–5)
POTASSIUM SERPL-SCNC: 4.4 MMOL/L — SIGNIFICANT CHANGE UP (ref 3.5–5)
POTASSIUM SERPL-SCNC: 4.9 MMOL/L — SIGNIFICANT CHANGE UP (ref 3.5–5)
PROTHROM AB SERPL-ACNC: 13.5 SEC — HIGH (ref 9.95–12.87)
RBC # BLD: 2.74 M/UL — LOW (ref 4.7–6.1)
RBC # BLD: 2.87 M/UL — LOW (ref 4.7–6.1)
RBC # FLD: 17.1 % — HIGH (ref 11.5–14.5)
RBC # FLD: 17.2 % — HIGH (ref 11.5–14.5)
SODIUM SERPL-SCNC: 141 MMOL/L — SIGNIFICANT CHANGE UP (ref 135–146)
SODIUM SERPL-SCNC: 144 MMOL/L — SIGNIFICANT CHANGE UP (ref 135–146)
TYPE + AB SCN PNL BLD: SIGNIFICANT CHANGE UP
WBC # BLD: 6.82 K/UL — SIGNIFICANT CHANGE UP (ref 4.8–10.8)
WBC # BLD: 8.12 K/UL — SIGNIFICANT CHANGE UP (ref 4.8–10.8)
WBC # FLD AUTO: 6.82 K/UL — SIGNIFICANT CHANGE UP (ref 4.8–10.8)
WBC # FLD AUTO: 8.12 K/UL — SIGNIFICANT CHANGE UP (ref 4.8–10.8)

## 2018-11-13 RX ORDER — AMPICILLIN SODIUM AND SULBACTAM SODIUM 250; 125 MG/ML; MG/ML
3 INJECTION, POWDER, FOR SUSPENSION INTRAMUSCULAR; INTRAVENOUS EVERY 24 HOURS
Qty: 0 | Refills: 0 | Status: DISCONTINUED | OUTPATIENT
Start: 2018-11-13 | End: 2018-11-16

## 2018-11-13 RX ORDER — DAPTOMYCIN 500 MG/10ML
1000 INJECTION, POWDER, LYOPHILIZED, FOR SOLUTION INTRAVENOUS ONCE
Qty: 0 | Refills: 0 | Status: DISCONTINUED | OUTPATIENT
Start: 2018-11-13 | End: 2018-11-16

## 2018-11-13 RX ORDER — DAPTOMYCIN 500 MG/10ML
1000 INJECTION, POWDER, LYOPHILIZED, FOR SOLUTION INTRAVENOUS
Qty: 0 | Refills: 0 | Status: DISCONTINUED | OUTPATIENT
Start: 2018-11-13 | End: 2018-11-16

## 2018-11-13 RX ORDER — SODIUM CHLORIDE 9 MG/ML
1000 INJECTION INTRAMUSCULAR; INTRAVENOUS; SUBCUTANEOUS
Qty: 0 | Refills: 0 | Status: DISCONTINUED | OUTPATIENT
Start: 2018-11-13 | End: 2018-11-13

## 2018-11-13 RX ADMIN — Medication 100 MILLIGRAM(S): at 17:25

## 2018-11-13 RX ADMIN — TAMSULOSIN HYDROCHLORIDE 0.4 MILLIGRAM(S): 0.4 CAPSULE ORAL at 21:23

## 2018-11-13 RX ADMIN — Medication 1334 MILLIGRAM(S): at 21:23

## 2018-11-13 RX ADMIN — Medication 1334 MILLIGRAM(S): at 17:25

## 2018-11-13 RX ADMIN — HEPARIN SODIUM 5000 UNIT(S): 5000 INJECTION INTRAVENOUS; SUBCUTANEOUS at 21:23

## 2018-11-13 RX ADMIN — POLYETHYLENE GLYCOL 3350 17 GRAM(S): 17 POWDER, FOR SOLUTION ORAL at 17:26

## 2018-11-13 RX ADMIN — CHLORHEXIDINE GLUCONATE 1 APPLICATION(S): 213 SOLUTION TOPICAL at 06:49

## 2018-11-13 RX ADMIN — ATORVASTATIN CALCIUM 20 MILLIGRAM(S): 80 TABLET, FILM COATED ORAL at 21:23

## 2018-11-13 RX ADMIN — Medication 100 MILLIGRAM(S): at 06:52

## 2018-11-13 RX ADMIN — AMPICILLIN SODIUM AND SULBACTAM SODIUM 200 GRAM(S): 250; 125 INJECTION, POWDER, FOR SUSPENSION INTRAMUSCULAR; INTRAVENOUS at 13:46

## 2018-11-13 NOTE — PROGRESS NOTE ADULT - ASSESSMENT
ASSESSMENT/PLAN:   58M, admitted w/ L heel ulcer   - OR for debridement today  - patient is pre-oped   - NPO   - IVF

## 2018-11-13 NOTE — PROGRESS NOTE ADULT - SUBJECTIVE AND OBJECTIVE BOX
Nephrology progress note    Patient is seen and examined, events over the last 24 h noted.  No new complaints.    Allergies:  bacitracin (Unknown)  Neosporin (Hypotension)    Hospital Medications:   MEDICATIONS  (STANDING):  aspirin enteric coated 81 milliGRAM(s) Oral daily  atorvastatin 20 milliGRAM(s) Oral at bedtime  bisacodyl Suppository 10 milliGRAM(s) Rectal daily  calcium acetate 1334 milliGRAM(s) Oral four times a day with meals  ceFAZolin   IVPB 2000 milliGRAM(s) IV Intermittent <User Schedule>  chlorhexidine 4% Liquid 1 Application(s) Topical <User Schedule>  clindamycin IVPB 900 milliGRAM(s) IV Intermittent every 8 hours  darbepoetin Injectable Syringe 40 MICROGram(s) IV Push <User Schedule>  docusate sodium 100 milliGRAM(s) Oral two times a day  ergocalciferol 65148 Unit(s) Oral every week  folic acid 1 milliGRAM(s) Oral daily  heparin  Injectable 5000 Unit(s) SubCutaneous every 8 hours  lactulose Syrup 20 Gram(s) Oral daily  pantoprazole    Tablet 40 milliGRAM(s) Oral before breakfast  polyethylene glycol 3350 17 Gram(s) Oral every 12 hours  tamsulosin 0.4 milliGRAM(s) Oral at bedtime        VITALS:  T(F): 98.1 (11-13-18 @ 04:55), Max: 98.9 (11-12-18 @ 20:01)  HR: 71 (11-13-18 @ 04:55)  BP: 143/68 (11-13-18 @ 04:55)  RR: 18 (11-13-18 @ 04:55)      11-11 @ 07:01  -  11-12 @ 07:00  --------------------------------------------------------  IN: 230 mL / OUT: 800 mL / NET: -570 mL    11-12 @ 07:01  -  11-13 @ 07:00  --------------------------------------------------------  IN: 0 mL / OUT: 2700 mL / NET: -2700 mL          PHYSICAL EXAM:  Constitutional: NAD  Respiratory: CTAB, no wheezes, rales or rhonchi  Cardiovascular: S1, S2, RRR  Gastrointestinal: BS+, soft, NT/ND  Extremities: No peripheral edema  :  No ortiz.       LABS:  11-13    141  |  103  |  31<H>  ----------------------------<  130<H>  4.4   |  26  |  3.9<H>    Ca    8.9      13 Nov 2018 02:05  Phos  3.6     11-12  Mg     2.2     11-13                            8.2    8.12  )-----------( 274      ( 13 Nov 2018 02:05 )             26.7       Urine Studies:      RADIOLOGY & ADDITIONAL STUDIES:

## 2018-11-13 NOTE — PROGRESS NOTE ADULT - ASSESSMENT
1)  ESRD: s/p hd yesterday, Will HD tomorrow, if still admitted, otherwise HD as OP   2)  DFU right foot s/p debridement ,followed by ID sp angioplasty , on ATB  , scheduled for skin grafting today  3)  Severe secondary HPT  start  hectorol 2 with HD  4) Anemia Hb noted   resistant to EDWIN in view of infection, will not increase dose , no venofer in view of infection  5) BP at goal    6) will follow   7) ? d/c plan

## 2018-11-13 NOTE — PRE-ANESTHESIA EVALUATION ADULT - MALLAMPATI CLASS
Class IV (difficult) - the soft palate is not visible at all
Class III - visualization of the soft palate and the base of the uvula
Class III - visualization of the soft palate and the base of the uvula
Class II - visualization of the soft palate, fauces, and uvula
Class III - visualization of the soft palate and the base of the uvula
Class II - visualization of the soft palate, fauces, and uvula

## 2018-11-13 NOTE — PROGRESS NOTE ADULT - SUBJECTIVE AND OBJECTIVE BOX
SU SAWYER  58y  Male  HPI:  58y/o M w/ hx of brain tumor--acromegaly, diabetes, htn, ckd stage on dialysis MWF, makes urine, htn, 2 blood transfusion recently being worked up--  pt on rehab after recent admission for infection to feet presents for hg under 7 at rehab-[facility]; and for worsening of left leg ulcer.  pt had dialysis today and was given iv antibiotics after.  pt has been feeling chills; no fever. no abdominal pain, cp or sob. +generalized weakness.  no blood in stool (22 Oct 2018 19:39)    MEDICATIONS  (STANDING):  ampicillin/sulbactam  IVPB 3 Gram(s) IV Intermittent every 24 hours  aspirin enteric coated 81 milliGRAM(s) Oral daily  atorvastatin 20 milliGRAM(s) Oral at bedtime  bisacodyl Suppository 10 milliGRAM(s) Rectal daily  calcium acetate 1334 milliGRAM(s) Oral four times a day with meals  chlorhexidine 4% Liquid 1 Application(s) Topical <User Schedule>  DAPTOmycin IVPB 1000 milliGRAM(s) IV Intermittent once  DAPTOmycin IVPB 1000 milliGRAM(s) IV Intermittent <User Schedule>  darbepoetin Injectable Syringe 40 MICROGram(s) IV Push <User Schedule>  docusate sodium 100 milliGRAM(s) Oral two times a day  ergocalciferol 65757 Unit(s) Oral every week  folic acid 1 milliGRAM(s) Oral daily  heparin  Injectable 5000 Unit(s) SubCutaneous every 8 hours  lactulose Syrup 20 Gram(s) Oral daily  pantoprazole    Tablet 40 milliGRAM(s) Oral before breakfast  polyethylene glycol 3350 17 Gram(s) Oral every 12 hours  tamsulosin 0.4 milliGRAM(s) Oral at bedtime    MEDICATIONS  (PRN):  acetaminophen   Tablet .. 650 milliGRAM(s) Oral every 6 hours PRN Temp greater or equal to 38C (100.4F)    INTERVAL EVENTS: Patient seen earlier today without distress. Patient NPO for OR    T(C): 36.2 (11-13-18 @ 13:30), Max: 36.7 (11-13-18 @ 04:55)  HR: 87 (11-13-18 @ 13:30) (71 - 87)  BP: 128/60 (11-13-18 @ 13:30) (128/60 - 143/68)  RR: 18 (11-13-18 @ 13:30) (18 - 18)  SpO2: --  Wt(kg): --Vital Signs Last 24 Hrs  T(C): 36.2 (13 Nov 2018 13:30), Max: 36.7 (13 Nov 2018 04:55)  T(F): 97.1 (13 Nov 2018 13:30), Max: 98.1 (13 Nov 2018 04:55)  HR: 87 (13 Nov 2018 13:30) (71 - 87)  BP: 128/60 (13 Nov 2018 13:30) (128/60 - 143/68)  BP(mean): --  RR: 18 (13 Nov 2018 13:30) (18 - 18)  SpO2: --    PHYSICAL EXAM:  GENERAL: NAD  NECK: Supple, No JVD  CHEST/LUNG: Clear  HEART: S1, S2, Regular rate and rhythm;   ABDOMEN: Soft, Nontender, Nondistended; Bowel sounds present  EXTREMITIES: Decreased edema, dressing in place  SKIN: No rashes or lesions    LABS:  Labs:                        7.9    6.82  )-----------( 276      ( 13 Nov 2018 10:10 )             25.4             11-13    144  |  106  |  33<H>  ----------------------------<  99  4.9   |  28  |  4.2<HH>    Ca    8.8      13 Nov 2018 10:10  Phos  4.2     11-13  Mg     2.2     11-13    PT/INR - ( 13 Nov 2018 02:05 )   PT: 13.50 sec;   INR: 1.18 ratio       PTT - ( 13 Nov 2018 02:05 )  PTT:35.7 sec      RADIOLOGY & ADDITIONAL TESTS:

## 2018-11-13 NOTE — PRE-ANESTHESIA EVALUATION ADULT - NSRADCARDRESULTSFT_GEN_ALL_CORE
EKG - NSR  CXR - decreased lung volumes with bibasilar linear atelectasis
10/24  - Arterial Duplex : Bilateral severe PAD; occlusion of Right popliteal and Left SF arteries

## 2018-11-13 NOTE — PRE-OP CHECKLIST - PATIENT PROBLEMS/NEEDS
Patient expressed no known problems or needs

## 2018-11-13 NOTE — PROGRESS NOTE ADULT - ASSESSMENT
Assessment 56y/o M w/ hx of neuropathy, acromegaly, diabetes, htn, CKD MWF, makes urine, htn who [facility] of LLE ulcer with Dr. Gomse. Vascular studies showed severe PVD and is transferred to [place] for angiogram.    Plan  Severe PVD  - post angiogram, and left SFA angioplasty, stenting  - post multiple debridements, scheduled for debridement and skin graft today  - pain controlled  - color improved  - culture + for VRE  - antibiotics changed to Daptomycin and Unasyn  - ID f/u appreciated    DM with vascular and renal manifestations  DFU - left foot  - post multiple debridements of infected heel ulcer to the calcaneus  - continue basal insulin  - monitor fs    Constipation  - continue rx, multiple  - monitor    ESRD on HMD  - MWF  - renal following  - rx as per renal    Anemia  - post transfusion   - EDWIN 40 mcg weekly  - monitor H/H    HTN  - off rx  - BP stable    Acromegaly  - on Sandostatin monthly  - medication was delivered to the hospital   - should be given, late in dosing, stil to be located in pharmacy?    HLD  - continue Atorvastatin    DVT ppx: heparin   GI ppx: Protonix  Diet: carbohydrate consistent  Activity: Bedrest    Supportive measures, further plan pending the above

## 2018-11-13 NOTE — PROGRESS NOTE ADULT - ASSESSMENT
56y/o M w/ hx of neuropathy, acromegaly, diabetes, htn, CKD MWF, makes urine, htn who [facility] of LLE ulcer with Dr. Gomes. Vascular studies showed severe PVD and is transferred to [place] for angiogram.      Severe PVD  - post angiogram, and left SFA angioplasty, stenting  - post multiple debridements, scheduled for debridement and skin graft today; procedure cancelled due to miscommunication. Sx planned for later in week  - pain controlled  - color improved  - culture + for VRE in July  - antibiotics changed to Daptomycin and Unasyn per ID      DFU - left foot  - post multiple debridements of infected heel ulcer to the calcaneus  - continue basal insulin  - monitor fs  - Culture Results: Moderate Enterococcus faecium (vancomycin resistant) (11.07.18 @ 17:46)       Constipation  - continue rx, multiple  - monitor    ESRD on HMD  - MWF  - renal following  - rx as per renal    Anemia  - post transfusion   - EDWIN 40 mcg weekly  - monitor H/H    HTN  - off rx  - BP stable    Acromegaly  - on Sandostatin monthly  - medication was delivered to the hospital   - spoke with pharmacy, pt will receive med tomorrow    HLD  - continue Atorvastatin    DVT ppx: heparin   GI ppx: Protonix  Diet: carbohydrate consistent  Activity: Bedrest    Supportive measures, further plan pending the above

## 2018-11-13 NOTE — PROGRESS NOTE ADULT - ASSESSMENT
IMPRESSION:  Right heel with ischemic ulcer with changes which have improved post repeated debridement.  Cultures positive for VRE    RECOMMENDATIONS:  Daptomycin 8 mg ? Kg iv x once now then same dose post HD  Unasyn 3 gm iv q24h  D/c other ABx.

## 2018-11-13 NOTE — PROGRESS NOTE ADULT - SUBJECTIVE AND OBJECTIVE BOX
SU SAWYER  58y, Male      OVERNIGHT EVENTS:    none    VITALS:  T(F): 98.1, Max: 98.9 (11-12-18 @ 20:01)  HR: 71  BP: 143/68  RR: 18Vital Signs Last 24 Hrs  T(C): 36.7 (13 Nov 2018 04:55), Max: 37.2 (12 Nov 2018 20:01)  T(F): 98.1 (13 Nov 2018 04:55), Max: 98.9 (12 Nov 2018 20:01)  HR: 71 (13 Nov 2018 04:55) (71 - 83)  BP: 143/68 (13 Nov 2018 04:55) (132/80 - 143/68)  BP(mean): --  RR: 18 (13 Nov 2018 04:55) (18 - 18)  SpO2: --    TESTS & MEASUREMENTS:                        8.2    8.12  )-----------( 274      ( 13 Nov 2018 02:05 )             26.7     11-13    141  |  103  |  31<H>  ----------------------------<  130<H>  4.4   |  26  |  3.9<H>    Ca    8.9      13 Nov 2018 02:05  Phos  3.6     11-12  Mg     2.2     11-13          Culture - Surgical Swab (collected 11-07-18 @ 17:46)  Source: .Surgical Swab L heel wound  Final Report (11-12-18 @ 16:39):    Moderate Enterococcus faecium (vancomycin resistant)  Organism: Enterococcus faecium (vancomycin resistant) (11-12-18 @ 16:39)  Organism: Enterococcus faecium (vancomycin resistant) (11-12-18 @ 16:39)      -  Ampicillin: R >8 Predicts results to ampicillin/sulbactam, amoxacillin-clavulanate and  piperacillin-tazobactam.      -  Daptomycin: S 4      -  Levofloxacin: R >4      -  Linezolid: S 2      -  Tetra/Doxy: S <=1      -  Vancomycin: R >16      Method Type: ROSALIA            RADIOLOGY & ADDITIONAL TESTS:    ANTIBIOTICS:  ceFAZolin   IVPB 2000 milliGRAM(s) IV Intermittent <User Schedule>  clindamycin IVPB 900 milliGRAM(s) IV Intermittent every 8 hours

## 2018-11-13 NOTE — PROGRESS NOTE ADULT - SUBJECTIVE AND OBJECTIVE BOX
SU SAWYER  58y Male   [id number]    Hospital Day:   Post Operative Day:  Procedure:  Patient is a 58y old  Male who presents with a chief complaint of foot ulcer (2018 18:49)    PAST MEDICAL & SURGICAL HISTORY:  Dyslipidemia  DM (diabetes mellitus), type 2  Neuropathy  HTN (hypertension)  CAD (coronary atherosclerotic disease)  Acromegaly  CKD (chronic kidney disease), stage V  H/O eye surgery  H/O: pituitary tumor: s/p removal      Events of the Last 24h: No acute events overnight.     Vital Signs Last 24 Hrs  T(C): 37.2 (2018 20:01), Max: 37.2 (2018 20:01)  T(F): 98.9 (2018 20:01), Max: 98.9 (2018 20:01)  HR: 79 (2018 20:01) (79 - 83)  BP: 132/80 (2018 20:01) (119/77 - 141/86)  BP(mean): --  RR: 18 (2018 20:01) (18 - 18)  SpO2: --        Diet, Consistent Carbohydrate Renal w/Evening Snack (18 @ 08:59)  Diet, NPO after Midnight:      NPO Start Date: 2018,   NPO Start Time: 23:59  Except Medications (18 @ 10:18)  Diet, NPO after Midnight:      NPO Start Date: 2018,   NPO Start Time: 23:59 (18 @ 10:30)  Diet, NPO after Midnight:      NPO Start Date: 2018,   NPO Start Time: 23:59 (18 @ 17:38)      I&O's Summary    2018 07:  -  2018 07:00  --------------------------------------------------------  IN: 230 mL / OUT: 800 mL / NET: -570 mL    2018 07:  -  2018 05:47  --------------------------------------------------------  IN: 0 mL / OUT: 2700 mL / NET: -2700 mL     I&O's Detail    2018 07:  -  2018 07:00  --------------------------------------------------------  IN:    Oral Fluid: 230 mL  Total IN: 230 mL    OUT:    Voided: 800 mL  Total OUT: 800 mL    Total NET: -570 mL      2018 07:  -  2018 05:47  --------------------------------------------------------  IN:  Total IN: 0 mL    OUT:    Other: 2200 mL    Voided: 500 mL  Total OUT: 2700 mL    Total NET: -2700 mL          MEDICATIONS  (STANDING):  aspirin enteric coated 81 milliGRAM(s) Oral daily  atorvastatin 20 milliGRAM(s) Oral at bedtime  bisacodyl Suppository 10 milliGRAM(s) Rectal daily  calcium acetate 1334 milliGRAM(s) Oral four times a day with meals  ceFAZolin   IVPB 2000 milliGRAM(s) IV Intermittent <User Schedule>  chlorhexidine 4% Liquid 1 Application(s) Topical <User Schedule>  clindamycin IVPB 900 milliGRAM(s) IV Intermittent every 8 hours  darbepoetin Injectable Syringe 40 MICROGram(s) IV Push <User Schedule>  docusate sodium 100 milliGRAM(s) Oral two times a day  ergocalciferol 58085 Unit(s) Oral every week  folic acid 1 milliGRAM(s) Oral daily  heparin  Injectable 5000 Unit(s) SubCutaneous every 8 hours  lactulose Syrup 20 Gram(s) Oral daily  pantoprazole    Tablet 40 milliGRAM(s) Oral before breakfast  polyethylene glycol 3350 17 Gram(s) Oral every 12 hours  sodium chloride 0.9%. 1000 milliLiter(s) (100 mL/Hr) IV Continuous <Continuous>  tamsulosin 0.4 milliGRAM(s) Oral at bedtime    MEDICATIONS  (PRN):  acetaminophen   Tablet .. 650 milliGRAM(s) Oral every 6 hours PRN Temp greater or equal to 38C (100.4F)      Physical Examination:  General Appearance: NAD  HEENT: EOMI, sclera non-icteric.  Heart: RRR   Lungs: CTABL.   Abdomen:  Soft, nontender, nondistended. No rigidity, guarding, or rebound tenderness.   MSK/Extremities: WV in place holding suction. Minimal drainage. Warm & well-perfused. Peripheral pulses intact.  Skin: Warm, dry. No jaundice.                           8.2    8.12  )-----------( 274      ( 2018 02:05 )             26.7        CBC Full  -  ( 2018 02:05 )  WBC Count : 8.12 K/uL  Hemoglobin : 8.2 g/dL  Hematocrit : 26.7 %  Platelet Count - Automated : 274 K/uL  Mean Cell Volume : 93.0 fL  Mean Cell Hemoglobin : 28.6 pg  Mean Cell Hemoglobin Concentration : 30.7 g/dL  Auto Neutrophil # : 5.14 K/uL  Auto Lymphocyte # : 1.57 K/uL  Auto Monocyte # : 0.81 K/uL  Auto Eosinophil # : 0.47 K/uL  Auto Basophil # : 0.08 K/uL  Auto Neutrophil % : 63.3 %  Auto Lymphocyte % : 19.3 %  Auto Monocyte % : 10.0 %  Auto Eosinophil % : 5.8 %  Auto Basophil % : 1.0 %               141   |  103   |  31                 Ca: 8.9    BMP:   ----------------------------< 130    M.2   (18 @ 02:05)             4.4    |  26    | 3.9                Ph: x            PT/INR - ( 2018 02:05 )   PT: 13.50 sec;   INR: 1.18 ratio         PTT - ( 2018 02:05 )  PTT:35.7 sec            IMAGING:    PATHOLOGY:      SPECTRA:

## 2018-11-13 NOTE — PROGRESS NOTE ADULT - SUBJECTIVE AND OBJECTIVE BOX
OVERNIGHT EVENTS: NPO for debridement with plastic sx today  Subjective: pt s/e at bedside, no active complaints, pt awaiting debridement     HISTORY OF PRESENTING ILLNESS:    56y/o M w/ hx of brain tumor--acromegaly, diabetes, htn, ckd stage on dialysis MWF, makes urine, htn, 2 blood transfusion recently being worked up--  pt on rehab after recent admission for infection to feet presents for hg under 7 at rehab-[facility]; and for worsening of left leg ulcer.  pt had dialysis today and was given iv antibiotics after.  pt has been feeling chills; no fever. no abdominal pain, cp or sob. +generalized weakness.  no blood in stool    Past Medical History:  Acromegaly    CAD (coronary atherosclerotic disease)    CKD (chronic kidney disease), stage V    DM (diabetes mellitus), type 2    Dyslipidemia    HTN (hypertension)    Neuropathy.    Past Surgical History:  H/O eye surgery    H/O: pituitary tumor  s/p removal.    MEDICATIONS:  STANDING MEDICATIONS  ampicillin/sulbactam  IVPB 3 Gram(s) IV Intermittent every 24 hours  aspirin enteric coated 81 milliGRAM(s) Oral daily  atorvastatin 20 milliGRAM(s) Oral at bedtime  bisacodyl Suppository 10 milliGRAM(s) Rectal daily  calcium acetate 1334 milliGRAM(s) Oral four times a day with meals  chlorhexidine 4% Liquid 1 Application(s) Topical <User Schedule>  DAPTOmycin IVPB 1000 milliGRAM(s) IV Intermittent once  DAPTOmycin IVPB 1000 milliGRAM(s) IV Intermittent <User Schedule>  darbepoetin Injectable Syringe 40 MICROGram(s) IV Push <User Schedule>  docusate sodium 100 milliGRAM(s) Oral two times a day  ergocalciferol 29512 Unit(s) Oral every week  folic acid 1 milliGRAM(s) Oral daily  heparin  Injectable 5000 Unit(s) SubCutaneous every 8 hours  lactulose Syrup 20 Gram(s) Oral daily  pantoprazole    Tablet 40 milliGRAM(s) Oral before breakfast  polyethylene glycol 3350 17 Gram(s) Oral every 12 hours  tamsulosin 0.4 milliGRAM(s) Oral at bedtime    PRN MEDICATIONS  acetaminophen   Tablet .. 650 milliGRAM(s) Oral every 6 hours PRN    VITALS:   T(F): 97.1  HR: 87  BP: 128/60  RR: 18  SpO2: --    LABS:                        7.9    6.82  )-----------( 276      ( 13 Nov 2018 10:10 )             25.4     11-13    144  |  106  |  33<H>  ----------------------------<  99  4.9   |  28  |  4.2<HH>    Ca    8.8      13 Nov 2018 10:10  Phos  4.2     11-13  Mg     2.2     11-13      PT/INR - ( 13 Nov 2018 02:05 )   PT: 13.50 sec;   INR: 1.18 ratio         PTT - ( 13 Nov 2018 02:05 )  PTT:35.7 sec    RADIOLOGY:  < from: Xray Foot AP + Lateral, Left (10.22.18 @ 19:06) >  Impression:    Diffuse dorsal soft tissue swelling, without any evidence of soft tissue   gas.     Diffuse vascular calcifications. Degenerative changes of 1st MTP joint   and tarsal joints.         PHYSICAL EXAM:  GENERAL: No acute distress, well-developed  HEAD:  Atraumatic, Normocephalic, large. Protrutding jaw  EYES: EOMI, PERRLA, conjunctiva and sclera clear  NECK: Supple, no lymphadenopathy, no JVD  CHEST/LUNG: CTAB; No wheezes, rales, or rhonchi  HEART: Regular rate and rhythm; No murmurs, rubs, or gallops  ABDOMEN: Soft, non-tender, non-distended; normal bowel sounds, no organomegaly  EXTREMITIES:  2+ peripheral pulses b/l, No clubbing, cyanosis, or edema, large hands and feet  NEUROLOGY: A&O x 3, no focal deficits  SKIN: No rashes or lesions

## 2018-11-14 LAB
CORTIS AM PEAK SERPL-MCNC: 10.1 UG/DL — SIGNIFICANT CHANGE UP (ref 6–18.4)
GLUCOSE BLDC GLUCOMTR-MCNC: 126 MG/DL — HIGH (ref 70–99)
GLUCOSE BLDC GLUCOMTR-MCNC: 143 MG/DL — HIGH (ref 70–99)
GLUCOSE BLDC GLUCOMTR-MCNC: 181 MG/DL — HIGH (ref 70–99)
GLUCOSE BLDC GLUCOMTR-MCNC: 98 MG/DL — SIGNIFICANT CHANGE UP (ref 70–99)
HCT VFR BLD CALC: 27 % — LOW (ref 42–52)
HGB BLD-MCNC: 8.2 G/DL — LOW (ref 14–18)
INSULIN-LIKE GROWTH FACTOR 1 INTERPRETATION: SIGNIFICANT CHANGE UP
INSULIN-LIKE GROWTH FACTOR 1: 221 NG/ML — HIGH (ref 59–206)
MCHC RBC-ENTMCNC: 28.5 PG — SIGNIFICANT CHANGE UP (ref 27–31)
MCHC RBC-ENTMCNC: 30.4 G/DL — LOW (ref 32–37)
MCV RBC AUTO: 93.8 FL — SIGNIFICANT CHANGE UP (ref 80–94)
NRBC # BLD: 0 /100 WBCS — SIGNIFICANT CHANGE UP (ref 0–0)
PHOSPHATE SERPL-MCNC: 4.5 MG/DL — SIGNIFICANT CHANGE UP (ref 2.1–4.9)
PLATELET # BLD AUTO: 280 K/UL — SIGNIFICANT CHANGE UP (ref 130–400)
PROLACTIN SERPL-MCNC: 47.2 NG/ML — HIGH (ref 4.1–18.4)
RBC # BLD: 2.88 M/UL — LOW (ref 4.7–6.1)
RBC # FLD: 17.3 % — HIGH (ref 11.5–14.5)
T3 SERPL-MCNC: 71 NG/DL — LOW (ref 80–200)
T4 AB SER-ACNC: 5.5 UG/DL — SIGNIFICANT CHANGE UP (ref 4.6–12)
TSH SERPL-MCNC: 1.69 UIU/ML — SIGNIFICANT CHANGE UP (ref 0.27–4.2)
WBC # BLD: 6.79 K/UL — SIGNIFICANT CHANGE UP (ref 4.8–10.8)
WBC # FLD AUTO: 6.79 K/UL — SIGNIFICANT CHANGE UP (ref 4.8–10.8)

## 2018-11-14 RX ORDER — DOXERCALCIFEROL 2.5 UG/1
2 CAPSULE ORAL
Qty: 0 | Refills: 0 | Status: DISCONTINUED | OUTPATIENT
Start: 2018-11-14 | End: 2018-11-16

## 2018-11-14 RX ORDER — OCTREOTIDE ACETATE 200 UG/ML
20 INJECTION, SOLUTION INTRAVENOUS; SUBCUTANEOUS
Qty: 0 | Refills: 0 | Status: DISCONTINUED | OUTPATIENT
Start: 2018-11-14 | End: 2018-11-14

## 2018-11-14 RX ORDER — OCTREOTIDE ACETATE 200 UG/ML
20000 INJECTION, SOLUTION INTRAVENOUS; SUBCUTANEOUS
Qty: 0 | Refills: 0 | Status: DISCONTINUED | OUTPATIENT
Start: 2018-11-14 | End: 2018-11-14

## 2018-11-14 RX ORDER — OCTREOTIDE ACETATE 200 UG/ML
20 INJECTION, SOLUTION INTRAVENOUS; SUBCUTANEOUS
Qty: 0 | Refills: 0 | Status: DISCONTINUED | OUTPATIENT
Start: 2018-11-14 | End: 2018-11-16

## 2018-11-14 RX ADMIN — HEPARIN SODIUM 5000 UNIT(S): 5000 INJECTION INTRAVENOUS; SUBCUTANEOUS at 13:31

## 2018-11-14 RX ADMIN — HEPARIN SODIUM 5000 UNIT(S): 5000 INJECTION INTRAVENOUS; SUBCUTANEOUS at 21:46

## 2018-11-14 RX ADMIN — Medication 1334 MILLIGRAM(S): at 21:46

## 2018-11-14 RX ADMIN — Medication 1334 MILLIGRAM(S): at 12:31

## 2018-11-14 RX ADMIN — Medication 100 MILLIGRAM(S): at 18:04

## 2018-11-14 RX ADMIN — AMPICILLIN SODIUM AND SULBACTAM SODIUM 200 GRAM(S): 250; 125 INJECTION, POWDER, FOR SUSPENSION INTRAMUSCULAR; INTRAVENOUS at 12:31

## 2018-11-14 RX ADMIN — Medication 1334 MILLIGRAM(S): at 08:29

## 2018-11-14 RX ADMIN — POLYETHYLENE GLYCOL 3350 17 GRAM(S): 17 POWDER, FOR SOLUTION ORAL at 05:43

## 2018-11-14 RX ADMIN — Medication 1 MILLIGRAM(S): at 12:31

## 2018-11-14 RX ADMIN — CHLORHEXIDINE GLUCONATE 1 APPLICATION(S): 213 SOLUTION TOPICAL at 05:43

## 2018-11-14 RX ADMIN — TAMSULOSIN HYDROCHLORIDE 0.4 MILLIGRAM(S): 0.4 CAPSULE ORAL at 21:46

## 2018-11-14 RX ADMIN — Medication 81 MILLIGRAM(S): at 12:31

## 2018-11-14 RX ADMIN — HEPARIN SODIUM 5000 UNIT(S): 5000 INJECTION INTRAVENOUS; SUBCUTANEOUS at 05:43

## 2018-11-14 RX ADMIN — Medication 1334 MILLIGRAM(S): at 18:04

## 2018-11-14 RX ADMIN — PANTOPRAZOLE SODIUM 40 MILLIGRAM(S): 20 TABLET, DELAYED RELEASE ORAL at 06:10

## 2018-11-14 RX ADMIN — DOXERCALCIFEROL 2 MICROGRAM(S): 2.5 CAPSULE ORAL at 10:44

## 2018-11-14 RX ADMIN — ATORVASTATIN CALCIUM 20 MILLIGRAM(S): 80 TABLET, FILM COATED ORAL at 21:46

## 2018-11-14 RX ADMIN — Medication 100 MILLIGRAM(S): at 05:43

## 2018-11-14 NOTE — PROGRESS NOTE ADULT - SUBJECTIVE AND OBJECTIVE BOX
OVERNIGHT EVENTS: None  Subjective: pt s/e at bedside, pt has no active complaints. Would like to stay in the hospital to wait for his debridement. Otherwise, HD today    HISTORY OF PRESENTING ILLNESS:   56y/o M w/ hx of brain tumor--acromegaly, diabetes, htn, ckd stage on dialysis MWF, makes urine, htn, 2 blood transfusion recently being worked up--  pt on rehab after recent admission for infection to feet presents for hg under 7 at rehab-[facility]; and for worsening of left leg ulcer.  pt had dialysis today and was given iv antibiotics after.  pt has been feeling chills; no fever. no abdominal pain, cp or sob. +generalized weakness.  no blood in stool    Past Medical History:  Acromegaly    CAD (coronary atherosclerotic disease)    CKD (chronic kidney disease), stage V    DM (diabetes mellitus), type 2    Dyslipidemia    HTN (hypertension)    Neuropathy.    Past Surgical History:  H/O eye surgery    H/O: pituitary tumor  s/p removal.  MEDICATIONS:  STANDING MEDICATIONS  ampicillin/sulbactam  IVPB 3 Gram(s) IV Intermittent every 24 hours  aspirin enteric coated 81 milliGRAM(s) Oral daily  atorvastatin 20 milliGRAM(s) Oral at bedtime  bisacodyl Suppository 10 milliGRAM(s) Rectal daily  calcium acetate 1334 milliGRAM(s) Oral four times a day with meals  chlorhexidine 4% Liquid 1 Application(s) Topical <User Schedule>  DAPTOmycin IVPB 1000 milliGRAM(s) IV Intermittent once  DAPTOmycin IVPB 1000 milliGRAM(s) IV Intermittent <User Schedule>  darbepoetin Injectable Syringe 40 MICROGram(s) IV Push <User Schedule>  docusate sodium 100 milliGRAM(s) Oral two times a day  doxercalciferol Injectable 2 MICROGram(s) IV Push <User Schedule>  ergocalciferol 62399 Unit(s) Oral every week  folic acid 1 milliGRAM(s) Oral daily  heparin  Injectable 5000 Unit(s) SubCutaneous every 8 hours  lactulose Syrup 20 Gram(s) Oral daily  pantoprazole    Tablet 40 milliGRAM(s) Oral before breakfast  polyethylene glycol 3350 17 Gram(s) Oral every 12 hours  tamsulosin 0.4 milliGRAM(s) Oral at bedtime    PRN MEDICATIONS  acetaminophen   Tablet .. 650 milliGRAM(s) Oral every 6 hours PRN    VITALS:   T(F): 98.3  HR: 76  BP: 141/83  RR: 18  SpO2: --    LABS:                        8.2    6.79  )-----------( 280      ( 14 Nov 2018 06:15 )             27.0     11-13    144  |  106  |  33<H>  ----------------------------<  99  4.9   |  28  |  4.2<HH>    Ca    8.8      13 Nov 2018 10:10  Phos  4.5     11-14  Mg     2.2     11-13      PT/INR - ( 13 Nov 2018 02:05 )   PT: 13.50 sec;   INR: 1.18 ratio         PTT - ( 13 Nov 2018 02:05 )  PTT:35.7 sec              RADIOLOGY:  < from: Xray Foot AP + Lateral, Left (10.22.18 @ 19:06) >  Impression:    Diffuse dorsal soft tissue swelling, without any evidence of soft tissue   gas.     Diffuse vascular calcifications. Degenerative changes of 1st MTP joint   and tarsal joints.     PHYSICAL EXAM:  GENERAL: No acute distress, well-developed  HEAD:  Atraumatic, Normocephalic, large. Protrutding jaw  EYES: EOMI, PERRLA, conjunctiva and sclera clear  NECK: Supple, no lymphadenopathy, no JVD  CHEST/LUNG: CTAB; No wheezes, rales, or rhonchi  HEART: Regular rate and rhythm; No murmurs, rubs, or gallops  ABDOMEN: Soft, non-tender, non-distended; normal bowel sounds, no organomegaly  EXTREMITIES:  2+ peripheral pulses b/l, No clubbing, cyanosis, or edema, large hands and feet  NEUROLOGY: A&O x 3, no focal deficits  SKIN: No rashes or lesions

## 2018-11-14 NOTE — PROGRESS NOTE ADULT - ASSESSMENT
Assessment 58y/o M w/ hx of neuropathy, acromegaly, diabetes, htn, CKD MWF, makes urine, htn who [facility] of LLE ulcer with Dr. Gomes. Vascular studies showed severe PVD and is transferred to [place] for angiogram.He has had multiple surgical procedures.    Plan      #DFU - left foot  -s/p debridement infected heel ulcer to the calcaneus ~1 week ago  -ID: c/w -See changes based on VRE culture  -surgery: s/p LLE debridement 11/2/18 ; -received 1 unit prbc during procedure for hypotension and hgb of 7.7; daily dressing changes  -surgery -s/p LLE heel debridement ;   -surgery - placed wound vac on left heel; plan for OR on 11/13 with plastics surgery-Delayed because of logistical scheduling issues; is now rescheduled for 11/16 with a different plastic surgical team  -podiatry consult appreciated. Call in to review with surgical attending-Dr. Gomes agrees with: Podiatry service for recommendations on walking shoe and postop care  --see ID IMPression-nonviable foot??  Surgery disagrees  - get EKG today    #Severe PVD  -arterial duplex 10/24: b/l severe peripheral arterial disease, occlusion of R popliteal and L SF arteries  -angiogram 10/31: S/P Left extremity angiogram w/ left SFA angioplasty and stent & left popliteal angioplasty for heel ulceration  -f/u with vascular in 2 weeks outpatient  Dr. Comer      #Chronic DVT of LLE - found on imaging on March 2018  -not on anticoagulation  -vascular f/u      #ESRD on HD started 1 month PTA - MWF  -c/w HD; renal following  -constipation-give tap water enema today preop;    #Anemia  -s/p 1 prbc transfusion in [facility] and another prbc during debridement in [place]  -EDWIN 40 mcg weekly  -monitor cbc  -pt requesting to speak with a hematologist regarding anemia    #Diabetes  -basal bolus insuin; iss    #HTN  -d/c Lasix for low BPs  -BP improved    #HLD  -c/w atorvastatin    DVT ppx: heparin subq  GI ppx: protonix  Diet: consistent carbohydrate, renal  Activity: ambulate as tolerated    When medically stable - d/c back to [facility]  When to restart plavix? - will d/w surgery

## 2018-11-14 NOTE — PROGRESS NOTE ADULT - SUBJECTIVE AND OBJECTIVE BOX
Progress Note: General Surgery  Patient: SU SAWYER , 58y (1960)Male   MRN: [id number]  Location: [address]  Visit: 10-22-18 Inpatient  Date: 11-14-18 @ 05:44    Procedure/Diagnosis: L heel ulcer    Events/ 24h: No acute events overnight. Pain controlled.    Vitals: T(F): 98.7 (11-13-18 @ 22:41), Max: 98.7 (11-13-18 @ 22:41)  HR: 69 (11-13-18 @ 22:41)  BP: 131/65 (11-13-18 @ 22:41) (128/60 - 143/18)  RR: 17 (11-13-18 @ 22:41)  SpO2: --    In:   11-12-18 @ 07:01  -  11-13-18 @ 07:00  --------------------------------------------------------  IN: 0 mL      Out:   11-12-18 @ 07:01  -  11-13-18 @ 07:00  --------------------------------------------------------  OUT:    Other: 2200 mL    Voided: 500 mL  Total OUT: 2700 mL        Net:   11-12-18 @ 07:01  -  11-13-18 @ 07:00  --------------------------------------------------------  NET: -2700 mL        Diet: Diet, Consistent Carbohydrate Renal w/Evening Snack (11-02-18 @ 08:59)    IV Fluids: calcium acetate 1334 milliGRAM(s) Oral four times a day with meals  ergocalciferol 44852 Unit(s) Oral every week  folic acid 1 milliGRAM(s) Oral daily      Physical Examination:  General Appearance: NAD  HEENT: EOMI, sclera non-icteric.  Heart: RRR   Lungs: CTABL.   Abdomen:  Soft, nontender, nondistended. No rigidity, guarding, or rebound tenderness.   MSK/Extremities: LLE WV in place, holding suction. Warm & well-perfused. Peripheral pulses intact.  Skin: Warm, dry. No jaundice.       Medications: [Standing]  ampicillin/sulbactam  IVPB 3 Gram(s) IV Intermittent every 24 hours  aspirin enteric coated 81 milliGRAM(s) Oral daily  atorvastatin 20 milliGRAM(s) Oral at bedtime  bisacodyl Suppository 10 milliGRAM(s) Rectal daily  calcium acetate 1334 milliGRAM(s) Oral four times a day with meals  chlorhexidine 4% Liquid 1 Application(s) Topical <User Schedule>  DAPTOmycin IVPB 1000 milliGRAM(s) IV Intermittent once  DAPTOmycin IVPB 1000 milliGRAM(s) IV Intermittent <User Schedule>  darbepoetin Injectable Syringe 40 MICROGram(s) IV Push <User Schedule>  docusate sodium 100 milliGRAM(s) Oral two times a day  ergocalciferol 16458 Unit(s) Oral every week  folic acid 1 milliGRAM(s) Oral daily  heparin  Injectable 5000 Unit(s) SubCutaneous every 8 hours  lactulose Syrup 20 Gram(s) Oral daily  pantoprazole    Tablet 40 milliGRAM(s) Oral before breakfast  polyethylene glycol 3350 17 Gram(s) Oral every 12 hours  tamsulosin 0.4 milliGRAM(s) Oral at bedtime    DVT Prophylaxis: heparin  Injectable 5000 Unit(s) SubCutaneous every 8 hours    GI Prophylaxis: pantoprazole    Tablet 40 milliGRAM(s) Oral before breakfast    Antibiotics: ampicillin/sulbactam  IVPB 3 Gram(s) IV Intermittent every 24 hours  DAPTOmycin IVPB 1000 milliGRAM(s) IV Intermittent once  DAPTOmycin IVPB 1000 milliGRAM(s) IV Intermittent <User Schedule>    Anticoagulation:   Medications:[PRN]  acetaminophen   Tablet .. 650 milliGRAM(s) Oral every 6 hours PRN      Labs:                        7.9    6.82  )-----------( 276      ( 13 Nov 2018 10:10 )             25.4     11-13    144  |  106  |  33<H>  ----------------------------<  99  4.9   |  28  |  4.2<HH>    Ca    8.8      13 Nov 2018 10:10  Phos  4.2     11-13  Mg     2.2     11-13        PT/INR - ( 13 Nov 2018 02:05 )   PT: 13.50 sec;   INR: 1.18 ratio         PTT - ( 13 Nov 2018 02:05 )  PTT:35.7 sec          Assessment:  58y Male patient admitted w/ LLE heel ulcer    Plan:    Attgs to reschedule debridement  DVT/GI ppx  OOBAT  IS  Pain control    Date/Time: 11-14-18 @ 05:44

## 2018-11-14 NOTE — PROGRESS NOTE ADULT - SUBJECTIVE AND OBJECTIVE BOX
Chart reviewed, patient examined. Pertinent results reviewed.  Case discussed with HO  specialist f/u reviewed; HD#24; s/p multiple surgical procedures; yesterday's Procedure rescheduled for Friday    SUBJECTIVE:    Patient is a 58y old Male who presented with a chief complaint of foot ulcer (10 Nov 2018 08:55)    Currently admitted to medicine with the primary diagnosis of Foot ulcer & PVD-ischemic.    No o/n events. Pt c/o worsening of his chronic constipation(4-5 D now), and states that he's due for his pituitary shot(1 Q Month)    PAST MEDICAL & SURGICAL HISTORY  Dyslipidemia  DM (diabetes mellitus), type 2  Neuropathy  HTN (hypertension)  CAD (coronary atherosclerotic disease)  Acromegaly  CKD (chronic kidney disease), stage V  H/O eye surgery  H/O: pituitary tumor: s/p removal- about 20 yrs ago    SOCIAL HISTORY:  Negative for smoking/alcohol/drug use.     ALLERGIES:  bacitracin (Unknown)  Neosporin (Hypotension)    MEDICATIONS:  MEDICATIONS  (STANDING):  ampicillin/sulbactam  IVPB 3 Gram(s) IV Intermittent every 24 hours  aspirin enteric coated 81 milliGRAM(s) Oral daily  atorvastatin 20 milliGRAM(s) Oral at bedtime  bisacodyl Suppository 10 milliGRAM(s) Rectal daily  calcium acetate 1334 milliGRAM(s) Oral four times a day with meals  chlorhexidine 4% Liquid 1 Application(s) Topical <User Schedule>  DAPTOmycin IVPB 1000 milliGRAM(s) IV Intermittent once  DAPTOmycin IVPB 1000 milliGRAM(s) IV Intermittent <User Schedule>  darbepoetin Injectable Syringe 40 MICROGram(s) IV Push <User Schedule>  docusate sodium 100 milliGRAM(s) Oral two times a day  doxercalciferol Injectable 2 MICROGram(s) IV Push <User Schedule>  ergocalciferol 20761 Unit(s) Oral every week  folic acid 1 milliGRAM(s) Oral daily  heparin  Injectable 5000 Unit(s) SubCutaneous every 8 hours  lactulose Syrup 20 Gram(s) Oral daily  octreotide [place] Injectable 20 milliGRAM(s) IntraMuscular every 4 weeks  pantoprazole    Tablet 40 milliGRAM(s) Oral before breakfast  polyethylene glycol 3350 17 Gram(s) Oral every 12 hours  tamsulosin 0.4 milliGRAM(s) Oral at bedtime    MEDICATIONS  (PRN):  acetaminophen   Tablet .. 650 milliGRAM(s) Oral every 6 hours PRN Temp greater or equal to 38C (100.4F)          VITALS: Vital Signs Last 24 Hrs  T(C): 37.1 (14 Nov 2018 21:56), Max: 37.1 (14 Nov 2018 21:56)  T(F): 98.7 (14 Nov 2018 21:56), Max: 98.7 (14 Nov 2018 21:56)  HR: 77 (14 Nov 2018 21:56) (70 - 78)  BP: 129/73 (14 Nov 2018 21:56) (103/63 - 150/65)  BP(mean): --  RR: 18 (14 Nov 2018 21:56) (18 - 18)  SpO2: 98% (14 Nov 2018 11:38) (98% - 98%)                              8.2    7.78  )-----------( 297      ( 11 Nov 2018 05:38 )             27.0   11-10    141  |  98  |  31<H>  ----------------------------<  114<H>  5.1<H>   |  29  |  4.1<HH>    Ca    9.3      10 Nov 2018 08:33  Phos  4.8     11-11                    Culture - Surgical Swab (collected 07 Nov 2018 17:46)  Source: .Surgical Swab L heel wound  Preliminary Report (09 Nov 2018 15:29):    Moderate Enterococcus faecium          RADIOLOGY:    PHYSICAL EXAM:  GEN: No acute distress; A & Ox4; coarse facial features  LUNGS: Clear to auscultation bilaterally   HEART: S1/S2 present. RRR.   ABD: Soft, obese, non-tender, non-distended. Bowel sounds present  EXT: left heel bandaged; ischemic changes/atrophic; Vac on L heel; dystrohic changes-R foot and toes       RUE AVF/thrill+  NEURO: AAOX3;  DECreased sensation and mvt of feet

## 2018-11-14 NOTE — PROGRESS NOTE ADULT - ASSESSMENT
IMPRESSION:  Right heel with ischemic ulcer with changes which have improved post repeated debridement.  Cultures positive for VRE    RECOMMENDATIONS:  Daptomycin 1 gm iv  post HD  Unasyn 3 gm iv q24h

## 2018-11-14 NOTE — PROGRESS NOTE ADULT - ASSESSMENT
56 y/o M w/ hx of neuropathy, acromegaly, diabetes, htn, ESRD on HD (MWF), HTN p/w with LLE DFU. Patient with his of acromegaly s/p resection of the adenoma 21 years ago. patient has been on octreotide since then.        #Acromegaly   Insulin-Like Growth Factor 1: 221 ng/mL   Prolactin, Serum: 47.2 ng/mL  Pending pharmacy to get octerotide to give it to the patient       #DM  c/w monitoring finger stickes  carb consistent diet 57 y/o M w/ hx of neuropathy, acromegaly, diabetes, htn, ESRD on HD (MWF), HTN p/w with LLE DFU. Patient with his of acromegaly s/p resection of the adenoma 21 years ago. patient has been on octreotide since then.        #Acromegaly   Insulin-Like Growth Factor 1: 221 ng/mL   Prolactin, Serum: 47.2 ng/mL  Pending pharmacy to get octerotide to give it to the patient       #DM  c/w monitoring finger stickes  carb consistent diet

## 2018-11-14 NOTE — PROGRESS NOTE ADULT - ATTENDING COMMENTS
57 year old s/p angiogram and SFA stent.     Continue local wound care   Follow up as outpatient
Plan for angiogram tomorrow AM
Pt seen and examined  above note reviewed, and I agree with the findings  Case discussed w/ fellow    for HD tomorrow
above noted dressing noted dresing changed may need more debridment
as above, patient wants follow up at [facility] high prolactin level is probably due to renal failure.

## 2018-11-14 NOTE — PROGRESS NOTE ADULT - ASSESSMENT
1)  ESRD: for HD today 3h opti 160 2k UF 3l as tolerated    2)  DFU right foot s/p debridement ,followed by ID sp angioplasty , on ATB  , scheduled for skin grafting on Dapto  3)  Severe secondary HPT  start  hectorol 2 with HD  4) Anemia Hb noted   resistant to EDWIN in view of infection, will not increase dose , no venofer in view of infection transfuse if Hb<7  5) BP at goal    6) will follow   7) ? d/c plan

## 2018-11-14 NOTE — PROGRESS NOTE ADULT - SUBJECTIVE AND OBJECTIVE BOX
Nephrology progress note    Patient is seen and examined, events over the last 24 h noted .  denied any complaints  still has the vac drain     Allergies:  bacitracin (Unknown)  Neosporin (Hypotension)    Hospital Medications:   MEDICATIONS  (STANDING):  ampicillin/sulbactam  IVPB 3 Gram(s) IV Intermittent every 24 hours  aspirin enteric coated 81 milliGRAM(s) Oral daily  atorvastatin 20 milliGRAM(s) Oral at bedtime  bisacodyl Suppository 10 milliGRAM(s) Rectal daily  calcium acetate 1334 milliGRAM(s) Oral four times a day with meals  chlorhexidine 4% Liquid 1 Application(s) Topical <User Schedule>  DAPTOmycin IVPB 1000 milliGRAM(s) IV Intermittent once  DAPTOmycin IVPB 1000 milliGRAM(s) IV Intermittent <User Schedule>  darbepoetin Injectable Syringe 40 MICROGram(s) IV Push <User Schedule>  docusate sodium 100 milliGRAM(s) Oral two times a day  ergocalciferol 40910 Unit(s) Oral every week  folic acid 1 milliGRAM(s) Oral daily  heparin  Injectable 5000 Unit(s) SubCutaneous every 8 hours  lactulose Syrup 20 Gram(s) Oral daily  pantoprazole    Tablet 40 milliGRAM(s) Oral before breakfast  polyethylene glycol 3350 17 Gram(s) Oral every 12 hours  tamsulosin 0.4 milliGRAM(s) Oral at bedtime        VITALS:  T(F): 98.3 (11-14-18 @ 04:54), Max: 98.7 (11-13-18 @ 22:41)  HR: 76 (11-14-18 @ 09:00)  BP: 141/83 (11-14-18 @ 09:00)  RR: 18 (11-14-18 @ 09:00)      11-12 @ 07:01  -  11-13 @ 07:00  --------------------------------------------------------  IN: 0 mL / OUT: 2700 mL / NET: -2700 mL    11-14 @ 07:01  -  11-14 @ 09:16  --------------------------------------------------------  IN: 0 mL / OUT: 250 mL / NET: -250 mL      Height (cm): 193 (11-13 @ 12:15)    PHYSICAL EXAM:  Constitutional: NAD  HEENT: anicteric sclera, oropharynx clear, MMM  Neck: No JVD  Respiratory: CTAB, no wheezes, rales or rhonchi  Cardiovascular: S1, S2, RRR  Gastrointestinal: BS+, soft, NT/ND  Extremities: No cyanosis or clubbing. No peripheral edema left leg dressing with vac drain   :  No ortiz.   Skin: No rashes    LABS:  11-13    144  |  106  |  33<H>  ----------------------------<  99  4.9   |  28  |  4.2<HH>    Ca    8.8      13 Nov 2018 10:10  Phos  4.5     11-14  Mg     2.2     11-13                            8.2    6.79  )-----------( 280      ( 14 Nov 2018 06:15 )             27.0       Urine Studies:      RADIOLOGY & ADDITIONAL STUDIES:

## 2018-11-14 NOTE — PROGRESS NOTE ADULT - ASSESSMENT
56y/o M w/ hx of neuropathy, acromegaly, diabetes, htn, CKD MWF, makes urine, htn [facility] LLE ulcer with Dr. Gomes. Vascular studies showed severe PVD and is transferred to [place] for angiogram.    #Severe PVD  -s/p angiogram, and left SFA angioplasty, stenting  - s/p multiple debridements, scheduled for debridement and skin graft today; procedure cancelled due to miscommunication. Sx planned for later in week  - pain controlled  - color improved  - culture + for VRE   - antibiotics changed to Daptomycin and Unasyn per ID    #DFU - left foot  - post multiple debridements of infected heel ulcer to the calcaneus  - continue basal insulin  - monitor fs  - Culture Results: Moderate Enterococcus faecium (vancomycin resistant) (11.07.18 @ 17:46)     #Constipation  - continue rx, multiple  - monitor    #ESRD on HMD  - MWF  - renal following  - rx as per renal      #Anemia  - post transfusion   - EDWIN 40 mcg weekly  - monitor H/H     #HTN  - off rx  - BP stable    #Acromegaly  - on Sandostatin monthly  - medication was delivered to the hospital   - spoke with pharmacy, pt will receive med today  - Insulin-Like Growth Factor 1: 221 ng/mL , Prolactin, Serum: 47.2 ng/mL    #HLD  - continue Atorvastatin    DVT ppx: heparin   GI ppx: Protonix  Diet: carbohydrate consistent  Activity: Bedrest    Supportive measures, further plan pending the above

## 2018-11-14 NOTE — PROGRESS NOTE ADULT - SUBJECTIVE AND OBJECTIVE BOX
SUBJECTIVE:    Patient is a 58y old Male who presents with a chief complaint of foot ulcer (14 Nov 2018 09:16)    Currently admitted to medicine with the primary diagnosis of Foot ulcer     Today is hospital day 23d. This morning he is resting comfortably in bed and reports no new issues or overnight events.     PAST MEDICAL & SURGICAL HISTORY  Dyslipidemia  DM (diabetes mellitus), type 2  Neuropathy  HTN (hypertension)  CAD (coronary atherosclerotic disease)  Acromegaly  CKD (chronic kidney disease), stage V  H/O eye surgery  H/O: pituitary tumor: s/p removal    SOCIAL HISTORY:  Negative for smoking/alcohol/drug use.     ALLERGIES:  bacitracin (Unknown)  Neosporin (Hypotension)    MEDICATIONS:  STANDING MEDICATIONS  ampicillin/sulbactam  IVPB 3 Gram(s) IV Intermittent every 24 hours  aspirin enteric coated 81 milliGRAM(s) Oral daily  atorvastatin 20 milliGRAM(s) Oral at bedtime  bisacodyl Suppository 10 milliGRAM(s) Rectal daily  calcium acetate 1334 milliGRAM(s) Oral four times a day with meals  chlorhexidine 4% Liquid 1 Application(s) Topical <User Schedule>  DAPTOmycin IVPB 1000 milliGRAM(s) IV Intermittent once  DAPTOmycin IVPB 1000 milliGRAM(s) IV Intermittent <User Schedule>  darbepoetin Injectable Syringe 40 MICROGram(s) IV Push <User Schedule>  docusate sodium 100 milliGRAM(s) Oral two times a day  doxercalciferol Injectable 2 MICROGram(s) IV Push <User Schedule>  ergocalciferol 18605 Unit(s) Oral every week  folic acid 1 milliGRAM(s) Oral daily  heparin  Injectable 5000 Unit(s) SubCutaneous every 8 hours  lactulose Syrup 20 Gram(s) Oral daily  pantoprazole    Tablet 40 milliGRAM(s) Oral before breakfast  polyethylene glycol 3350 17 Gram(s) Oral every 12 hours  tamsulosin 0.4 milliGRAM(s) Oral at bedtime    PRN MEDICATIONS  acetaminophen   Tablet .. 650 milliGRAM(s) Oral every 6 hours PRN    VITALS:   T(F): 98.3  HR: 76  BP: 141/83  RR: 18  SpO2: --    PHYSICAL EXAM:  GEN: No acute distress  LUNGS: Clear to auscultation bilaterally   HEART: S1/S2 present. RRR.   ABD: Soft, non-tender, non-distended. Bowel sounds present  EXT: NC/NC/NE/2+PP/[place]  NEURO: AAOX3      LABS:                        8.2    6.79  )-----------( 280      ( 14 Nov 2018 06:15 )             27.0     11-13    144  |  106  |  33<H>  ----------------------------<  99  4.9   |  28  |  4.2<HH>    Ca    8.8      13 Nov 2018 10:10  Phos  4.5     11-14  Mg     2.2     11-13      PT/INR - ( 13 Nov 2018 02:05 )   PT: 13.50 sec;   INR: 1.18 ratio         PTT - ( 13 Nov 2018 02:05 )  PTT:35.7 sec        Insulin-Like Growth Factor 1: 221 ng/mL (11.13.18 @ 10:10)    Cortisol AM, Serum: 10.1: Note reference range change for CORTAM and CORTPM. ug/dL (11.13.18 @ 10:10)    T4, Serum: 5.5 ug/dL (11.13.18 @ 10:10)    Thyroid Stimulating Hormone, Serum: 1.69 uIU/mL (11.13.18 @ 10:10)    Triiodothyronine, Total (T3 Total): 71 ng/dL (11.13.18 @ 10:10)    Prolactin, Serum: 47.2 ng/mL (11.13.18 @ 10:10) SUBJECTIVE:    Patient is a 58y old Male who presents with a chief complaint of foot ulcer (14 Nov 2018 09:16)    Currently admitted to medicine with the primary diagnosis of Foot ulcer     Today is hospital day 23d. This morning he is resting comfortably in bed and reports no new issues or overnight events.     PAST MEDICAL & SURGICAL HISTORY  Dyslipidemia  DM (diabetes mellitus), type 2  Neuropathy  HTN (hypertension)  CAD (coronary atherosclerotic disease)  Acromegaly  CKD (chronic kidney disease), stage V  H/O eye surgery  H/O: pituitary tumor: s/p removal    SOCIAL HISTORY:  Negative for smoking/alcohol/drug use.     ALLERGIES:  bacitracin (Unknown)  Neosporin (Hypotension)    MEDICATIONS:  STANDING MEDICATIONS  ampicillin/sulbactam  IVPB 3 Gram(s) IV Intermittent every 24 hours  aspirin enteric coated 81 milliGRAM(s) Oral daily  atorvastatin 20 milliGRAM(s) Oral at bedtime  bisacodyl Suppository 10 milliGRAM(s) Rectal daily  calcium acetate 1334 milliGRAM(s) Oral four times a day with meals  chlorhexidine 4% Liquid 1 Application(s) Topical <User Schedule>  DAPTOmycin IVPB 1000 milliGRAM(s) IV Intermittent once  DAPTOmycin IVPB 1000 milliGRAM(s) IV Intermittent <User Schedule>  darbepoetin Injectable Syringe 40 MICROGram(s) IV Push <User Schedule>  docusate sodium 100 milliGRAM(s) Oral two times a day  doxercalciferol Injectable 2 MICROGram(s) IV Push <User Schedule>  ergocalciferol 06372 Unit(s) Oral every week  folic acid 1 milliGRAM(s) Oral daily  heparin  Injectable 5000 Unit(s) SubCutaneous every 8 hours  lactulose Syrup 20 Gram(s) Oral daily  pantoprazole    Tablet 40 milliGRAM(s) Oral before breakfast  polyethylene glycol 3350 17 Gram(s) Oral every 12 hours  tamsulosin 0.4 milliGRAM(s) Oral at bedtime    PRN MEDICATIONS  acetaminophen   Tablet .. 650 milliGRAM(s) Oral every 6 hours PRN    VITALS:   T(F): 98.3  HR: 76  BP: 141/83  RR: 18  SpO2: --    PHYSICAL EXAM:  GEN: No acute distress, course facial features  LUNGS: Clear to auscultation bilaterally   HEART: S1/S2 present. RRR.   ABD: Soft, non-tender, non-distended. Bowel sounds present  EXT: NC/NC/NE/2+PP/[place]  NEURO: AAOX3      LABS:                        8.2    6.79  )-----------( 280      ( 14 Nov 2018 06:15 )             27.0     11-13    144  |  106  |  33<H>  ----------------------------<  99  4.9   |  28  |  4.2<HH>    Ca    8.8      13 Nov 2018 10:10  Phos  4.5     11-14  Mg     2.2     11-13      PT/INR - ( 13 Nov 2018 02:05 )   PT: 13.50 sec;   INR: 1.18 ratio         PTT - ( 13 Nov 2018 02:05 )  PTT:35.7 sec        Insulin-Like Growth Factor 1: 221 ng/mL (11.13.18 @ 10:10)    Cortisol AM, Serum: 10.1: Note reference range change for CORTAM and CORTPM. ug/dL (11.13.18 @ 10:10)    T4, Serum: 5.5 ug/dL (11.13.18 @ 10:10)    Thyroid Stimulating Hormone, Serum: 1.69 uIU/mL (11.13.18 @ 10:10)    Triiodothyronine, Total (T3 Total): 71 ng/dL (11.13.18 @ 10:10)    Prolactin, Serum: 47.2 ng/mL (11.13.18 @ 10:10)

## 2018-11-14 NOTE — PROGRESS NOTE ADULT - SUBJECTIVE AND OBJECTIVE BOX
SU SAWYER  58y, Male      OVERNIGHT EVENTS:    none    VITALS:  T(F): 97.1, Max: 98.7 (11-13-18 @ 22:41)  HR: 70  BP: 150/65  RR: 18Vital Signs Last 24 Hrs  T(C): 36.2 (14 Nov 2018 12:01), Max: 37.1 (13 Nov 2018 22:41)  T(F): 97.1 (14 Nov 2018 12:01), Max: 98.7 (13 Nov 2018 22:41)  HR: 70 (14 Nov 2018 12:01) (69 - 87)  BP: 150/65 (14 Nov 2018 12:01) (103/63 - 150/65)  BP(mean): --  RR: 18 (14 Nov 2018 12:01) (17 - 18)  SpO2: 98% (14 Nov 2018 11:38) (98% - 98%)    TESTS & MEASUREMENTS:                        8.2    6.79  )-----------( 280      ( 14 Nov 2018 06:15 )             27.0     11-13    144  |  106  |  33<H>  ----------------------------<  99  4.9   |  28  |  4.2<HH>    Ca    8.8      13 Nov 2018 10:10  Phos  4.5     11-14  Mg     2.2     11-13          Culture - Surgical Swab (collected 11-07-18 @ 17:46)  Source: .Surgical Swab L heel wound  Final Report (11-12-18 @ 16:39):    Moderate Enterococcus faecium (vancomycin resistant)  Organism: Enterococcus faecium (vancomycin resistant) (11-12-18 @ 16:39)  Organism: Enterococcus faecium (vancomycin resistant) (11-12-18 @ 16:39)      -  Ampicillin: R >8 Predicts results to ampicillin/sulbactam, amoxacillin-clavulanate and  piperacillin-tazobactam.      -  Daptomycin: S 4      -  Levofloxacin: R >4      -  Linezolid: S 2      -  Tetra/Doxy: S <=1      -  Vancomycin: R >16      Method Type: ROSALIA            RADIOLOGY & ADDITIONAL TESTS:    ANTIBIOTICS:  ampicillin/sulbactam  IVPB 3 Gram(s) IV Intermittent every 24 hours  DAPTOmycin IVPB 1000 milliGRAM(s) IV Intermittent once  DAPTOmycin IVPB 1000 milliGRAM(s) IV Intermittent <User Schedule>

## 2018-11-15 LAB
ANION GAP SERPL CALC-SCNC: 12 MMOL/L — SIGNIFICANT CHANGE UP (ref 7–14)
BUN SERPL-MCNC: 35 MG/DL — HIGH (ref 10–20)
CALCIUM SERPL-MCNC: 9.1 MG/DL — SIGNIFICANT CHANGE UP (ref 8.5–10.1)
CHLORIDE SERPL-SCNC: 98 MMOL/L — SIGNIFICANT CHANGE UP (ref 98–110)
CO2 SERPL-SCNC: 29 MMOL/L — SIGNIFICANT CHANGE UP (ref 17–32)
CREAT SERPL-MCNC: 4.5 MG/DL — CRITICAL HIGH (ref 0.7–1.5)
GLUCOSE BLDC GLUCOMTR-MCNC: 111 MG/DL — HIGH (ref 70–99)
GLUCOSE BLDC GLUCOMTR-MCNC: 118 MG/DL — HIGH (ref 70–99)
GLUCOSE BLDC GLUCOMTR-MCNC: 130 MG/DL — HIGH (ref 70–99)
GLUCOSE BLDC GLUCOMTR-MCNC: 137 MG/DL — HIGH (ref 70–99)
GLUCOSE BLDC GLUCOMTR-MCNC: 151 MG/DL — HIGH (ref 70–99)
GLUCOSE SERPL-MCNC: 114 MG/DL — HIGH (ref 70–99)
HCT VFR BLD CALC: 27.9 % — LOW (ref 42–52)
HGB BLD-MCNC: 8.5 G/DL — LOW (ref 14–18)
MCHC RBC-ENTMCNC: 28.6 PG — SIGNIFICANT CHANGE UP (ref 27–31)
MCHC RBC-ENTMCNC: 30.5 G/DL — LOW (ref 32–37)
MCV RBC AUTO: 93.9 FL — SIGNIFICANT CHANGE UP (ref 80–94)
NRBC # BLD: 0 /100 WBCS — SIGNIFICANT CHANGE UP (ref 0–0)
PHOSPHATE SERPL-MCNC: 4.2 MG/DL — SIGNIFICANT CHANGE UP (ref 2.1–4.9)
PLATELET # BLD AUTO: 258 K/UL — SIGNIFICANT CHANGE UP (ref 130–400)
POTASSIUM SERPL-MCNC: 5.2 MMOL/L — HIGH (ref 3.5–5)
POTASSIUM SERPL-SCNC: 5.2 MMOL/L — HIGH (ref 3.5–5)
RBC # BLD: 2.97 M/UL — LOW (ref 4.7–6.1)
RBC # FLD: 17.2 % — HIGH (ref 11.5–14.5)
SODIUM SERPL-SCNC: 139 MMOL/L — SIGNIFICANT CHANGE UP (ref 135–146)
WBC # BLD: 7.08 K/UL — SIGNIFICANT CHANGE UP (ref 4.8–10.8)
WBC # FLD AUTO: 7.08 K/UL — SIGNIFICANT CHANGE UP (ref 4.8–10.8)

## 2018-11-15 RX ADMIN — DAPTOMYCIN 140 MILLIGRAM(S): 500 INJECTION, POWDER, LYOPHILIZED, FOR SOLUTION INTRAVENOUS at 09:10

## 2018-11-15 RX ADMIN — Medication 1334 MILLIGRAM(S): at 22:19

## 2018-11-15 RX ADMIN — Medication 1 MILLIGRAM(S): at 12:10

## 2018-11-15 RX ADMIN — TAMSULOSIN HYDROCHLORIDE 0.4 MILLIGRAM(S): 0.4 CAPSULE ORAL at 22:19

## 2018-11-15 RX ADMIN — AMPICILLIN SODIUM AND SULBACTAM SODIUM 200 GRAM(S): 250; 125 INJECTION, POWDER, FOR SUSPENSION INTRAMUSCULAR; INTRAVENOUS at 12:10

## 2018-11-15 RX ADMIN — Medication 81 MILLIGRAM(S): at 12:10

## 2018-11-15 RX ADMIN — Medication 100 MILLIGRAM(S): at 05:27

## 2018-11-15 RX ADMIN — HEPARIN SODIUM 5000 UNIT(S): 5000 INJECTION INTRAVENOUS; SUBCUTANEOUS at 05:27

## 2018-11-15 RX ADMIN — HEPARIN SODIUM 5000 UNIT(S): 5000 INJECTION INTRAVENOUS; SUBCUTANEOUS at 22:19

## 2018-11-15 RX ADMIN — PANTOPRAZOLE SODIUM 40 MILLIGRAM(S): 20 TABLET, DELAYED RELEASE ORAL at 05:27

## 2018-11-15 RX ADMIN — HEPARIN SODIUM 5000 UNIT(S): 5000 INJECTION INTRAVENOUS; SUBCUTANEOUS at 13:20

## 2018-11-15 RX ADMIN — POLYETHYLENE GLYCOL 3350 17 GRAM(S): 17 POWDER, FOR SOLUTION ORAL at 05:27

## 2018-11-15 RX ADMIN — Medication 1334 MILLIGRAM(S): at 12:10

## 2018-11-15 RX ADMIN — Medication 1334 MILLIGRAM(S): at 08:32

## 2018-11-15 RX ADMIN — Medication 1334 MILLIGRAM(S): at 17:13

## 2018-11-15 RX ADMIN — ATORVASTATIN CALCIUM 20 MILLIGRAM(S): 80 TABLET, FILM COATED ORAL at 22:19

## 2018-11-15 RX ADMIN — OCTREOTIDE ACETATE 20 MILLIGRAM(S): 200 INJECTION, SOLUTION INTRAVENOUS; SUBCUTANEOUS at 00:15

## 2018-11-15 NOTE — PROGRESS NOTE ADULT - SUBJECTIVE AND OBJECTIVE BOX
SU SAWYER  58y, Male      OVERNIGHT EVENTS:    no fevers, no new com;plaints    VITALS:  T(F): 98.4, Max: 98.7 (11-14-18 @ 21:56)  HR: 77  BP: 108/60  RR: 18Vital Signs Last 24 Hrs  T(C): 36.9 (15 Nov 2018 04:44), Max: 37.1 (14 Nov 2018 21:56)  T(F): 98.4 (15 Nov 2018 04:44), Max: 98.7 (14 Nov 2018 21:56)  HR: 77 (15 Nov 2018 04:44) (70 - 77)  BP: 108/60 (15 Nov 2018 04:44) (103/63 - 150/65)  BP(mean): --  RR: 18 (15 Nov 2018 04:44) (18 - 18)  SpO2: 98% (14 Nov 2018 11:38) (98% - 98%)    TESTS & MEASUREMENTS:                        8.2    6.79  )-----------( 280      ( 14 Nov 2018 06:15 )             27.0     11-13    144  |  106  |  33<H>  ----------------------------<  99  4.9   |  28  |  4.2<HH>    Ca    8.8      13 Nov 2018 10:10  Phos  4.5     11-14                RADIOLOGY & ADDITIONAL TESTS:    ANTIBIOTICS:  ampicillin/sulbactam  IVPB 3 Gram(s) IV Intermittent every 24 hours  DAPTOmycin IVPB 1000 milliGRAM(s) IV Intermittent once  DAPTOmycin IVPB 1000 milliGRAM(s) IV Intermittent <User Schedule>

## 2018-11-15 NOTE — PROGRESS NOTE ADULT - ASSESSMENT
58y/o M w/ hx of neuropathy, acromegaly, diabetes, htn, CKD MWF, makes urine, htn [facility] LLE ulcer with Dr. Gomes. Vascular studies showed severe PVD and is transferred to [place] for angiogram.    #Severe PVD  -s/p angiogram, and left SFA angioplasty, stenting  -s/p multiple debridements, scheduled for debridement and skin graft; procedure cancelled.  - Surgery planned for Friday 11/16  - pre-op today  - pain controlled  - color improved  - culture + for VRE   - antibiotics changed to Daptomycin and Unasyn per ID    #DFU - left foot  - post multiple debridements of infected heel ulcer to the calcaneus  - continue basal insulin  - monitor fs  - Culture Results: Moderate Enterococcus faecium (vancomycin resistant) (11.07.18 @ 17:46)     #Constipation  - continue rx, multiple  - monitor    #ESRD on HMD  - MWF  - renal following  - rx as per renal   - HD in am       #Anemia  - post transfusion   - EDWIN 40 mcg weekly  - monitor H/H     #HTN  - off rx  - BP stable    #Acromegaly  - on Sandostatin monthly  - medication was delivered to the hospital   - spoke with pharmacy, pt will receive med 11/14  - Insulin-Like Growth Factor 1: 221 ng/mL , Prolactin, Serum: 47.2 ng/mL    #HLD  - continue Atorvastatin    DVT ppx: heparin   GI ppx: Protonix  Diet: carbohydrate consistent  Activity: Bedrest    Supportive measures, further plan pending the above

## 2018-11-15 NOTE — PROGRESS NOTE ADULT - SUBJECTIVE AND OBJECTIVE BOX
SU SAWYER  58y Male   [id number]    Procedure/Diagnosis: L heel ulcer    Events/ 24h: No acute events overnight. Pain controlled.    Patient is a 58y old  Male who presents with a chief complaint of foot ulcer (14 Nov 2018 22:54)    PAST MEDICAL & SURGICAL HISTORY:  Dyslipidemia  DM (diabetes mellitus), type 2  Neuropathy  HTN (hypertension)  CAD (coronary atherosclerotic disease)  Acromegaly  CKD (chronic kidney disease), stage V  H/O eye surgery  H/O: pituitary tumor: s/p removal    Vital Signs Last 24 Hrs  T(C): 37.1 (14 Nov 2018 21:56), Max: 37.1 (14 Nov 2018 21:56)  T(F): 98.7 (14 Nov 2018 21:56), Max: 98.7 (14 Nov 2018 21:56)  HR: 77 (14 Nov 2018 21:56) (70 - 78)  BP: 129/73 (14 Nov 2018 21:56) (103/63 - 150/65)  BP(mean): --  RR: 18 (14 Nov 2018 21:56) (18 - 18)  SpO2: 98% (14 Nov 2018 11:38) (98% - 98%)    Diet, Consistent Carbohydrate Renal w/Evening Snack (11-02-18 @ 08:59)    I&O's Detail    14 Nov 2018 07:01  -  15 Nov 2018 02:11  --------------------------------------------------------  IN:  Total IN: 0 mL    OUT:    Other: 3000 mL    Voided: 650 mL  Total OUT: 3650 mL    Total NET: -3650 mL    MEDICATIONS  (STANDING):  ampicillin/sulbactam  IVPB 3 Gram(s) IV Intermittent every 24 hours  aspirin enteric coated 81 milliGRAM(s) Oral daily  atorvastatin 20 milliGRAM(s) Oral at bedtime  bisacodyl Suppository 10 milliGRAM(s) Rectal daily  calcium acetate 1334 milliGRAM(s) Oral four times a day with meals  chlorhexidine 4% Liquid 1 Application(s) Topical <User Schedule>  DAPTOmycin IVPB 1000 milliGRAM(s) IV Intermittent once  DAPTOmycin IVPB 1000 milliGRAM(s) IV Intermittent <User Schedule>  darbepoetin Injectable Syringe 40 MICROGram(s) IV Push <User Schedule>  docusate sodium 100 milliGRAM(s) Oral two times a day  doxercalciferol Injectable 2 MICROGram(s) IV Push <User Schedule>  ergocalciferol 37075 Unit(s) Oral every week  folic acid 1 milliGRAM(s) Oral daily  heparin  Injectable 5000 Unit(s) SubCutaneous every 8 hours  lactulose Syrup 20 Gram(s) Oral daily  octreotide [place] Injectable 20 milliGRAM(s) IntraMuscular every 4 weeks  pantoprazole    Tablet 40 milliGRAM(s) Oral before breakfast  polyethylene glycol 3350 17 Gram(s) Oral every 12 hours  tamsulosin 0.4 milliGRAM(s) Oral at bedtime    MEDICATIONS  (PRN):  acetaminophen   Tablet .. 650 milliGRAM(s) Oral every 6 hours PRN Temp greater or equal to 38C (100.4F)      Physical Examination:  General Appearance: NAD  HEENT: EOMI, sclera non-icteric.  Heart: RRR   Lungs: CTABL.   Abdomen:  Soft, nontender, nondistended. No rigidity, guarding, or rebound tenderness.   MSK/Extremities: LLE WV in place, holding suction. Warm & well-perfused. Peripheral pulses intact.  Skin: Warm, dry. No jaundice.     LABS:                      8.2    6.79  )-----------( 280      ( 14 Nov 2018 06:15 )             27.0        11-13    144  |  106  |  33<H>  ----------------------------<  99  4.9   |  28  |  4.2<HH>    Ca    8.8      13 Nov 2018 10:10  Phos  4.5     11-14    IMAGING:  None

## 2018-11-15 NOTE — PROGRESS NOTE ADULT - ASSESSMENT
Assessment:  59 y/o Male patient admitted w/ LLE heel ulcer, s/p debridement    Plan:  OR Friday for debridement of L heel wound and placement of integra graft   Preop today  NPO after midnight  DVT/GI ppx  OOBAT  IS  Pain control

## 2018-11-15 NOTE — PROGRESS NOTE ADULT - SUBJECTIVE AND OBJECTIVE BOX
seen and  examined  no new complaints  s/p hd yesterday         Standing Inpatient Medications  ampicillin/sulbactam  IVPB 3 Gram(s) IV Intermittent every 24 hours  aspirin enteric coated 81 milliGRAM(s) Oral daily  atorvastatin 20 milliGRAM(s) Oral at bedtime  bisacodyl Suppository 10 milliGRAM(s) Rectal daily  calcium acetate 1334 milliGRAM(s) Oral four times a day with meals  chlorhexidine 4% Liquid 1 Application(s) Topical <User Schedule>  DAPTOmycin IVPB 1000 milliGRAM(s) IV Intermittent once  DAPTOmycin IVPB 1000 milliGRAM(s) IV Intermittent <User Schedule>  darbepoetin Injectable Syringe 40 MICROGram(s) IV Push <User Schedule>  docusate sodium 100 milliGRAM(s) Oral two times a day  doxercalciferol Injectable 2 MICROGram(s) IV Push <User Schedule>  ergocalciferol 59502 Unit(s) Oral every week  folic acid 1 milliGRAM(s) Oral daily  heparin  Injectable 5000 Unit(s) SubCutaneous every 8 hours  lactulose Syrup 20 Gram(s) Oral daily  octreotide [place] Injectable 20 milliGRAM(s) IntraMuscular every 4 weeks  pantoprazole    Tablet 40 milliGRAM(s) Oral before breakfast  polyethylene glycol 3350 17 Gram(s) Oral every 12 hours  tamsulosin 0.4 milliGRAM(s) Oral at bedtime        VITALS/PHYSICAL EXAM  --------------------------------------------------------------------------------  T(C): 36.9 (11-15-18 @ 04:44), Max: 37.1 (11-14-18 @ 21:56)  HR: 77 (11-15-18 @ 04:44) (70 - 77)  BP: 108/60 (11-15-18 @ 04:44) (103/63 - 150/65)  RR: 18 (11-15-18 @ 04:44) (18 - 18)  SpO2: 98% (11-14-18 @ 11:38) (98% - 98%)  Wt(kg): --  Height (cm): 193 (11-13-18 @ 12:15)      11-14-18 @ 07:01  -  11-15-18 @ 07:00  --------------------------------------------------------  IN: 0 mL / OUT: 3650 mL / NET: -3650 mL      Physical Exam:  	Gen: NAD  	Pulm: decrease BS  B/L  	CV:  S1S2; no rub  	Abd: +distended  	LE: dressing   	Vascular access: tesio/ av fistula     LABS/STUDIES  --------------------------------------------------------------------------------              8.5    7.08  >-----------<  258      [11-15-18 @ 07:15]              27.9     144  |  106  |  33  ----------------------------<  99      [11-13-18 @ 10:10]  4.9   |  28  |  4.2        Ca     8.8     [11-13-18 @ 10:10]      Phos  4.5     [11-14-18 @ 06:15]            Creatinine Trend:  SCr 4.2 [11-13 @ 10:10]  SCr 3.9 [11-13 @ 02:05]  SCr 3.5 [11-12 @ 17:55]  SCr 4.1 [11-10 @ 08:33]  SCr 4.1 [11-06 @ 07:58]        Iron 38, TIBC 166, %sat 23      [09-19-18 @ 04:30]  Ferritin 55      [03-09-18 @ 18:01]  PTH -- (Ca 8.2)      [09-22-18 @ 08:40]   2071  PTH -- (Ca 8.0)      [09-19-18 @ 04:30]   1519  PTH -- (Ca 8.3)      [03-09-18 @ 18:01]   1545  Vitamin D (25OH) 14.4      [03-09-18 @ 18:01]  TSH 1.69      [11-13-18 @ 10:10]

## 2018-11-15 NOTE — PROGRESS NOTE ADULT - SUBJECTIVE AND OBJECTIVE BOX
OVERNIGHT EVENTS: Pt received his octreotide injection in the evening    Subjective:   pt s/e at bedside.     HISTORY OF PRESENTING ILLNESS:   58y/o M w/ hx of brain tumor--acromegaly, diabetes, htn, ckd stage on dialysis MWF, makes urine, htn, 2 blood transfusion recently being worked up--  pt on rehab after recent admission for infection to feet presents for hg under 7 at rehab-[facility]; and for worsening of left leg ulcer.  pt had dialysis today and was given iv antibiotics after.  pt has been feeling chills; no fever. no abdominal pain, cp or sob. +generalized weakness.  no blood in stool    Past Medical History:  Acromegaly    CAD (coronary atherosclerotic disease)    CKD (chronic kidney disease), stage V    DM (diabetes mellitus), type 2    Dyslipidemia    HTN (hypertension)    Neuropathy.    Past Surgical History:  H/O eye surgery    H/O: pituitary tumor  s/p removal.  MEDICATIONS:  STANDING MEDICATIONS  ampicillin/sulbactam  IVPB 3 Gram(s) IV Intermittent every 24 hours  aspirin enteric coated 81 milliGRAM(s) Oral daily  atorvastatin 20 milliGRAM(s) Oral at bedtime  bisacodyl Suppository 10 milliGRAM(s) Rectal daily  calcium acetate 1334 milliGRAM(s) Oral four times a day with meals  chlorhexidine 4% Liquid 1 Application(s) Topical <User Schedule>  DAPTOmycin IVPB 1000 milliGRAM(s) IV Intermittent once  DAPTOmycin IVPB 1000 milliGRAM(s) IV Intermittent <User Schedule>  darbepoetin Injectable Syringe 40 MICROGram(s) IV Push <User Schedule>  docusate sodium 100 milliGRAM(s) Oral two times a day  doxercalciferol Injectable 2 MICROGram(s) IV Push <User Schedule>  ergocalciferol 52348 Unit(s) Oral every week  folic acid 1 milliGRAM(s) Oral daily  heparin  Injectable 5000 Unit(s) SubCutaneous every 8 hours  lactulose Syrup 20 Gram(s) Oral daily  octreotide Depot Injectable 20 milliGRAM(s) IntraMuscular every 4 weeks  pantoprazole    Tablet 40 milliGRAM(s) Oral before breakfast  polyethylene glycol 3350 17 Gram(s) Oral every 12 hours  tamsulosin 0.4 milliGRAM(s) Oral at bedtime    PRN MEDICATIONS  acetaminophen   Tablet .. 650 milliGRAM(s) Oral every 6 hours PRN    VITALS:   T(F): 98.4  HR: 77  BP: 108/60  RR: 18  SpO2: 98%    LABS:                        8.5    7.08  )-----------( 258      ( 15 Nov 2018 07:15 )             27.9     11-15    139  |  98  |  35<H>  ----------------------------<  114<H>  5.2<H>   |  29  |  4.5<HH>    Ca    9.1      15 Nov 2018 07:15  Phos  4.2     11-15                    RADIOLOGY:  < from: Xray Foot AP + Lateral, Left (10.22.18 @ 19:06) >  Impression:    Diffuse dorsal soft tissue swelling, without any evidence of soft tissue   gas.     Diffuse vascular calcifications. Degenerative changes of 1st MTP joint   and tarsal joints.     PHYSICAL EXAM:  GENERAL: No acute distress, well-developed  HEAD:  Atraumatic, Normocephalic, large. Protrutding jaw  EYES: EOMI, PERRLA, conjunctiva and sclera clear  NECK: Supple, no lymphadenopathy, no JVD  CHEST/LUNG: CTAB; No wheezes, rales, or rhonchi  HEART: Regular rate and rhythm; No murmurs, rubs, or gallops  ABDOMEN: Soft, non-tender, non-distended; normal bowel sounds, no organomegaly  EXTREMITIES:  2+ peripheral pulses b/l, No clubbing, cyanosis, or edema, large hands and feet  NEUROLOGY: A&O x 3, no focal deficits  SKIN: No rashes or lesions

## 2018-11-15 NOTE — PROGRESS NOTE ADULT - ASSESSMENT
1)  ESRD: s/p hd yesterday, hd in am   2)  DFU right foot s/p debridement ,followed by ID sp angioplasty , on ATB    3)  Severe secondary HPT  started  hectorol 2 with HD  4) Anemia Hb noted   resistant to EDWIN in view of infection, will not increase dose , no venofer in view of infection transfuse if Hb<7  5) BP at goal    6) ph at goal , continue phsolo  7) will follow

## 2018-11-15 NOTE — PROGRESS NOTE ADULT - SUBJECTIVE AND OBJECTIVE BOX
SU SAWYER  58y  Male      Patient is a 58y old  Male who presents with a chief complaint of foot ulcer (15 Nov 2018 08:25)  s/p lt heel debridement,s/p lt angiogram,s/p stent      REVIEW OF SYSTEMS:  CONSTITUTIONAL: No fever, weight loss, or fatigue  EYES: No eye pain, visual disturbances, or discharge  ENMT:  No difficulty hearing, tinnitus, vertigo; No sinus or throat pain  NECK: No pain or stiffness  BREASTS: No pain, masses, or nipple discharge  RESPIRATORY: No cough, wheezing, chills or hemoptysis; No shortness of breath  CARDIOVASCULAR: No chest pain, palpitations, dizziness, or leg swelling  GASTROINTESTINAL: No abdominal or epigastric pain. No nausea, vomiting, or hematemesis; No diarrhea or constipation. No melena or hematochezia.  GENITOURINARY: No dysuria, frequency, hematuria, or incontinence  NEUROLOGICAL: No headaches, memory loss, loss of strength, numbness, or tremors  SKIN: No itching, burning, rashes, or lesions   LYMPH NODES: No enlarged glands  ENDOCRINE: No heat or cold intolerance; No hair loss  MUSCULOSKELETAL: No joint pain or swelling; No muscle, back, or extremity pain  PSYCHIATRIC: No depression, anxiety, mood swings, or difficulty sleeping  HEME/LYMPH: No easy bruising, or bleeding gums  ALLERY AND IMMUNOLOGIC: No hives or eczema  FAMILY HISTORY:  Family history of myocardial infarction (Father)    T(C): 36.9 (11-15-18 @ 04:44), Max: 37.1 (11-14-18 @ 21:56)  HR: 77 (11-15-18 @ 04:44) (70 - 77)  BP: 108/60 (11-15-18 @ 04:44) (103/63 - 150/65)  RR: 18 (11-15-18 @ 04:44) (18 - 18)  SpO2: 98% (11-14-18 @ 11:38) (98% - 98%)  Wt(kg): --Vital Signs Last 24 Hrs  T(C): 36.9 (15 Nov 2018 04:44), Max: 37.1 (14 Nov 2018 21:56)  T(F): 98.4 (15 Nov 2018 04:44), Max: 98.7 (14 Nov 2018 21:56)  HR: 77 (15 Nov 2018 04:44) (70 - 77)  BP: 108/60 (15 Nov 2018 04:44) (103/63 - 150/65)  BP(mean): --  RR: 18 (15 Nov 2018 04:44) (18 - 18)  SpO2: 98% (14 Nov 2018 11:38) (98% - 98%)  bacitracin (Unknown)  Neosporin (Hypotension)      PHYSICAL EXAM:  GENERAL: NAD, well-groomed, well-developed  HEAD:  Atraumatic, Normocephalic  EYES: EOMI, PERRLA, conjunctiva and sclera clear  ENMT: No tonsillar erythema, exudates, or enlargement; Moist mucous membranes, Good dentition, No lesions  NECK: Supple, No JVD, Normal thyroid  NERVOUS SYSTEM:  Alert & Oriented X3,   CHEST/LUNG: Clear to percussion bilaterally; No rales, rhonchi, wheezing, or rubs  HEART: Regular rate and rhythm; No murmurs, rubs, or gallops  ABDOMEN: Soft, Nontender, Nondistended; Bowel sounds present  EXTREMITIES:  2+ Peripheral Pulses, No clubbing, cyanosis, or edema  LYMPH: No lymphadenopathy noted  SKIN: No rashes or lesions      LABS:  11-13    144  |  106  |  33<H>  ----------------------------<  99  4.9   |  28  |  4.2<HH>    Ca    8.8      13 Nov 2018 10:10  Phos  4.5     11-14                          8.5    7.08  )-----------( 258      ( 15 Nov 2018 07:15 )             27.9           RADIOLOGY & ADDITIONAL TESTS:    MEDICATION:  acetaminophen   Tablet .. 650 milliGRAM(s) Oral every 6 hours PRN  ampicillin/sulbactam  IVPB 3 Gram(s) IV Intermittent every 24 hours  aspirin enteric coated 81 milliGRAM(s) Oral daily  atorvastatin 20 milliGRAM(s) Oral at bedtime  bisacodyl Suppository 10 milliGRAM(s) Rectal daily  calcium acetate 1334 milliGRAM(s) Oral four times a day with meals  chlorhexidine 4% Liquid 1 Application(s) Topical <User Schedule>  DAPTOmycin IVPB 1000 milliGRAM(s) IV Intermittent once  DAPTOmycin IVPB 1000 milliGRAM(s) IV Intermittent <User Schedule>  darbepoetin Injectable Syringe 40 MICROGram(s) IV Push <User Schedule>  docusate sodium 100 milliGRAM(s) Oral two times a day  doxercalciferol Injectable 2 MICROGram(s) IV Push <User Schedule>  ergocalciferol 45686 Unit(s) Oral every week  folic acid 1 milliGRAM(s) Oral daily  heparin  Injectable 5000 Unit(s) SubCutaneous every 8 hours  lactulose Syrup 20 Gram(s) Oral daily  octreotide Depot Injectable 20 milliGRAM(s) IntraMuscular every 4 weeks  pantoprazole    Tablet 40 milliGRAM(s) Oral before breakfast  polyethylene glycol 3350 17 Gram(s) Oral every 12 hours  tamsulosin 0.4 milliGRAM(s) Oral at bedtime      HEALTH ISSUES - PROBLEM Dx:  Osteomyelitis, chronic, ankle or foot, left: Osteomyelitis, chronic, ankle or foot, left on daptomycin,unayn  Neuropathy: Neuropathy  HTN (hypertension): HTN (hypertension)  CKD (chronic kidney disease), stage V: CKD (chronic kidney disease), stage V on HD MWF  DM (diabetes mellitus), type 2: DM (diabetes mellitus), type 2 on   Dyslipidemia: Dyslipidemia  Foot ulcer: Foot ulcer going for debridement on 11/16  PVD ,s/p stent lt leg

## 2018-11-16 LAB
ANION GAP SERPL CALC-SCNC: 14 MMOL/L — SIGNIFICANT CHANGE UP (ref 7–14)
ANION GAP SERPL CALC-SCNC: 14 MMOL/L — SIGNIFICANT CHANGE UP (ref 7–14)
ANION GAP SERPL CALC-SCNC: 15 MMOL/L — HIGH (ref 7–14)
APTT BLD: 34.9 SEC — SIGNIFICANT CHANGE UP (ref 27–39.2)
BUN SERPL-MCNC: 45 MG/DL — HIGH (ref 10–20)
BUN SERPL-MCNC: 47 MG/DL — HIGH (ref 10–20)
BUN SERPL-MCNC: 47 MG/DL — HIGH (ref 10–20)
CALCIUM SERPL-MCNC: 8.9 MG/DL — SIGNIFICANT CHANGE UP (ref 8.5–10.1)
CALCIUM SERPL-MCNC: 9.1 MG/DL — SIGNIFICANT CHANGE UP (ref 8.5–10.1)
CALCIUM SERPL-MCNC: 9.1 MG/DL — SIGNIFICANT CHANGE UP (ref 8.5–10.1)
CHLORIDE SERPL-SCNC: 97 MMOL/L — LOW (ref 98–110)
CHLORIDE SERPL-SCNC: 98 MMOL/L — SIGNIFICANT CHANGE UP (ref 98–110)
CHLORIDE SERPL-SCNC: 99 MMOL/L — SIGNIFICANT CHANGE UP (ref 98–110)
CO2 SERPL-SCNC: 25 MMOL/L — SIGNIFICANT CHANGE UP (ref 17–32)
CO2 SERPL-SCNC: 27 MMOL/L — SIGNIFICANT CHANGE UP (ref 17–32)
CO2 SERPL-SCNC: 29 MMOL/L — SIGNIFICANT CHANGE UP (ref 17–32)
CREAT SERPL-MCNC: 5.5 MG/DL — CRITICAL HIGH (ref 0.7–1.5)
CREAT SERPL-MCNC: 5.5 MG/DL — CRITICAL HIGH (ref 0.7–1.5)
CREAT SERPL-MCNC: 5.7 MG/DL — CRITICAL HIGH (ref 0.7–1.5)
GLUCOSE BLDC GLUCOMTR-MCNC: 102 MG/DL — HIGH (ref 70–99)
GLUCOSE BLDC GLUCOMTR-MCNC: 111 MG/DL — HIGH (ref 70–99)
GLUCOSE BLDC GLUCOMTR-MCNC: 123 MG/DL — HIGH (ref 70–99)
GLUCOSE SERPL-MCNC: 108 MG/DL — HIGH (ref 70–99)
GLUCOSE SERPL-MCNC: 119 MG/DL — HIGH (ref 70–99)
GLUCOSE SERPL-MCNC: 141 MG/DL — HIGH (ref 70–99)
HCT VFR BLD CALC: 26.3 % — LOW (ref 42–52)
HCT VFR BLD CALC: 26.6 % — LOW (ref 42–52)
HGB BLD-MCNC: 8.1 G/DL — LOW (ref 14–18)
HGB BLD-MCNC: 8.1 G/DL — LOW (ref 14–18)
INR BLD: 1.2 RATIO — SIGNIFICANT CHANGE UP (ref 0.65–1.3)
MAGNESIUM SERPL-MCNC: 2.4 MG/DL — SIGNIFICANT CHANGE UP (ref 1.8–2.4)
MCHC RBC-ENTMCNC: 28.5 PG — SIGNIFICANT CHANGE UP (ref 27–31)
MCHC RBC-ENTMCNC: 28.6 PG — SIGNIFICANT CHANGE UP (ref 27–31)
MCHC RBC-ENTMCNC: 30.5 G/DL — LOW (ref 32–37)
MCHC RBC-ENTMCNC: 30.8 G/DL — LOW (ref 32–37)
MCV RBC AUTO: 92.9 FL — SIGNIFICANT CHANGE UP (ref 80–94)
MCV RBC AUTO: 93.7 FL — SIGNIFICANT CHANGE UP (ref 80–94)
NRBC # BLD: 0 /100 WBCS — SIGNIFICANT CHANGE UP (ref 0–0)
NRBC # BLD: 0 /100 WBCS — SIGNIFICANT CHANGE UP (ref 0–0)
PHOSPHATE SERPL-MCNC: 4.8 MG/DL — SIGNIFICANT CHANGE UP (ref 2.1–4.9)
PHOSPHATE SERPL-MCNC: 5.1 MG/DL — HIGH (ref 2.1–4.9)
PLATELET # BLD AUTO: 244 K/UL — SIGNIFICANT CHANGE UP (ref 130–400)
PLATELET # BLD AUTO: 257 K/UL — SIGNIFICANT CHANGE UP (ref 130–400)
POTASSIUM SERPL-MCNC: 4.8 MMOL/L — SIGNIFICANT CHANGE UP (ref 3.5–5)
POTASSIUM SERPL-MCNC: 4.9 MMOL/L — SIGNIFICANT CHANGE UP (ref 3.5–5)
POTASSIUM SERPL-MCNC: 5.2 MMOL/L — HIGH (ref 3.5–5)
POTASSIUM SERPL-SCNC: 4.8 MMOL/L — SIGNIFICANT CHANGE UP (ref 3.5–5)
POTASSIUM SERPL-SCNC: 4.9 MMOL/L — SIGNIFICANT CHANGE UP (ref 3.5–5)
POTASSIUM SERPL-SCNC: 5.2 MMOL/L — HIGH (ref 3.5–5)
PROTHROM AB SERPL-ACNC: 13.8 SEC — HIGH (ref 9.95–12.87)
RBC # BLD: 2.83 M/UL — LOW (ref 4.7–6.1)
RBC # BLD: 2.84 M/UL — LOW (ref 4.7–6.1)
RBC # FLD: 17.2 % — HIGH (ref 11.5–14.5)
RBC # FLD: 17.2 % — HIGH (ref 11.5–14.5)
SODIUM SERPL-SCNC: 138 MMOL/L — SIGNIFICANT CHANGE UP (ref 135–146)
SODIUM SERPL-SCNC: 138 MMOL/L — SIGNIFICANT CHANGE UP (ref 135–146)
SODIUM SERPL-SCNC: 142 MMOL/L — SIGNIFICANT CHANGE UP (ref 135–146)
TYPE + AB SCN PNL BLD: SIGNIFICANT CHANGE UP
WBC # BLD: 6.34 K/UL — SIGNIFICANT CHANGE UP (ref 4.8–10.8)
WBC # BLD: 7.12 K/UL — SIGNIFICANT CHANGE UP (ref 4.8–10.8)
WBC # FLD AUTO: 6.34 K/UL — SIGNIFICANT CHANGE UP (ref 4.8–10.8)
WBC # FLD AUTO: 7.12 K/UL — SIGNIFICANT CHANGE UP (ref 4.8–10.8)

## 2018-11-16 RX ORDER — HEPARIN SODIUM 5000 [USP'U]/ML
5000 INJECTION INTRAVENOUS; SUBCUTANEOUS EVERY 8 HOURS
Qty: 0 | Refills: 0 | Status: DISCONTINUED | OUTPATIENT
Start: 2018-11-16 | End: 2018-11-19

## 2018-11-16 RX ORDER — DOCUSATE SODIUM 100 MG
100 CAPSULE ORAL
Qty: 0 | Refills: 0 | Status: DISCONTINUED | OUTPATIENT
Start: 2018-11-16 | End: 2018-11-19

## 2018-11-16 RX ORDER — CHLORHEXIDINE GLUCONATE 213 G/1000ML
1 SOLUTION TOPICAL
Qty: 0 | Refills: 0 | Status: DISCONTINUED | OUTPATIENT
Start: 2018-11-16 | End: 2018-11-19

## 2018-11-16 RX ORDER — DAPTOMYCIN 500 MG/10ML
1000 INJECTION, POWDER, LYOPHILIZED, FOR SOLUTION INTRAVENOUS
Qty: 0 | Refills: 0 | Status: DISCONTINUED | OUTPATIENT
Start: 2018-11-16 | End: 2018-11-19

## 2018-11-16 RX ORDER — AMPICILLIN SODIUM AND SULBACTAM SODIUM 250; 125 MG/ML; MG/ML
3 INJECTION, POWDER, FOR SUSPENSION INTRAMUSCULAR; INTRAVENOUS EVERY 24 HOURS
Qty: 0 | Refills: 0 | Status: DISCONTINUED | OUTPATIENT
Start: 2018-11-16 | End: 2018-11-19

## 2018-11-16 RX ORDER — ONDANSETRON 8 MG/1
4 TABLET, FILM COATED ORAL ONCE
Qty: 0 | Refills: 0 | Status: DISCONTINUED | OUTPATIENT
Start: 2018-11-16 | End: 2018-11-16

## 2018-11-16 RX ORDER — POLYETHYLENE GLYCOL 3350 17 G/17G
17 POWDER, FOR SOLUTION ORAL EVERY 12 HOURS
Qty: 0 | Refills: 0 | Status: DISCONTINUED | OUTPATIENT
Start: 2018-11-16 | End: 2018-11-19

## 2018-11-16 RX ORDER — ACETAMINOPHEN 500 MG
650 TABLET ORAL EVERY 6 HOURS
Qty: 0 | Refills: 0 | Status: DISCONTINUED | OUTPATIENT
Start: 2018-11-16 | End: 2018-11-19

## 2018-11-16 RX ORDER — ASPIRIN/CALCIUM CARB/MAGNESIUM 324 MG
81 TABLET ORAL DAILY
Qty: 0 | Refills: 0 | Status: DISCONTINUED | OUTPATIENT
Start: 2018-11-16 | End: 2018-11-19

## 2018-11-16 RX ORDER — PANTOPRAZOLE SODIUM 20 MG/1
40 TABLET, DELAYED RELEASE ORAL
Qty: 0 | Refills: 0 | Status: DISCONTINUED | OUTPATIENT
Start: 2018-11-16 | End: 2018-11-19

## 2018-11-16 RX ORDER — ATORVASTATIN CALCIUM 80 MG/1
20 TABLET, FILM COATED ORAL AT BEDTIME
Qty: 0 | Refills: 0 | Status: DISCONTINUED | OUTPATIENT
Start: 2018-11-16 | End: 2018-11-19

## 2018-11-16 RX ORDER — FOLIC ACID 0.8 MG
1 TABLET ORAL DAILY
Qty: 0 | Refills: 0 | Status: DISCONTINUED | OUTPATIENT
Start: 2018-11-16 | End: 2018-11-19

## 2018-11-16 RX ORDER — CALCIUM ACETATE 667 MG
1334 TABLET ORAL
Qty: 0 | Refills: 0 | Status: DISCONTINUED | OUTPATIENT
Start: 2018-11-16 | End: 2018-11-19

## 2018-11-16 RX ORDER — DAPTOMYCIN 500 MG/10ML
1000 INJECTION, POWDER, LYOPHILIZED, FOR SOLUTION INTRAVENOUS ONCE
Qty: 0 | Refills: 0 | Status: DISCONTINUED | OUTPATIENT
Start: 2018-11-16 | End: 2018-11-19

## 2018-11-16 RX ORDER — DOXERCALCIFEROL 2.5 UG/1
2 CAPSULE ORAL
Qty: 0 | Refills: 0 | Status: DISCONTINUED | OUTPATIENT
Start: 2018-11-16 | End: 2018-11-19

## 2018-11-16 RX ORDER — DARBEPOETIN ALFA IN POLYSORBAT 200MCG/0.4
40 PEN INJECTOR (ML) SUBCUTANEOUS
Qty: 0 | Refills: 0 | Status: DISCONTINUED | OUTPATIENT
Start: 2018-11-16 | End: 2018-11-19

## 2018-11-16 RX ORDER — LACTULOSE 10 G/15ML
20 SOLUTION ORAL DAILY
Qty: 0 | Refills: 0 | Status: DISCONTINUED | OUTPATIENT
Start: 2018-11-16 | End: 2018-11-19

## 2018-11-16 RX ORDER — ERGOCALCIFEROL 1.25 MG/1
50000 CAPSULE ORAL
Qty: 0 | Refills: 0 | Status: DISCONTINUED | OUTPATIENT
Start: 2018-11-16 | End: 2018-11-19

## 2018-11-16 RX ORDER — TAMSULOSIN HYDROCHLORIDE 0.4 MG/1
0.4 CAPSULE ORAL AT BEDTIME
Qty: 0 | Refills: 0 | Status: DISCONTINUED | OUTPATIENT
Start: 2018-11-16 | End: 2018-11-19

## 2018-11-16 RX ORDER — OXYCODONE HYDROCHLORIDE 5 MG/1
5 TABLET ORAL ONCE
Qty: 0 | Refills: 0 | Status: DISCONTINUED | OUTPATIENT
Start: 2018-11-16 | End: 2018-11-16

## 2018-11-16 RX ADMIN — Medication 1334 MILLIGRAM(S): at 21:38

## 2018-11-16 RX ADMIN — HEPARIN SODIUM 5000 UNIT(S): 5000 INJECTION INTRAVENOUS; SUBCUTANEOUS at 21:39

## 2018-11-16 RX ADMIN — Medication 40 MICROGRAM(S): at 10:49

## 2018-11-16 RX ADMIN — ATORVASTATIN CALCIUM 20 MILLIGRAM(S): 80 TABLET, FILM COATED ORAL at 21:39

## 2018-11-16 RX ADMIN — AMPICILLIN SODIUM AND SULBACTAM SODIUM 200 GRAM(S): 250; 125 INJECTION, POWDER, FOR SUSPENSION INTRAMUSCULAR; INTRAVENOUS at 21:38

## 2018-11-16 RX ADMIN — AMPICILLIN SODIUM AND SULBACTAM SODIUM 200 GRAM(S): 250; 125 INJECTION, POWDER, FOR SUSPENSION INTRAMUSCULAR; INTRAVENOUS at 13:17

## 2018-11-16 RX ADMIN — TAMSULOSIN HYDROCHLORIDE 0.4 MILLIGRAM(S): 0.4 CAPSULE ORAL at 21:38

## 2018-11-16 RX ADMIN — DOXERCALCIFEROL 2 MICROGRAM(S): 2.5 CAPSULE ORAL at 10:50

## 2018-11-16 NOTE — PROGRESS NOTE ADULT - EXTREMITIES
detailed exam

## 2018-11-16 NOTE — PROGRESS NOTE ADULT - SUBJECTIVE AND OBJECTIVE BOX
SU SAWYER  58y Male   [id number]    Procedure/Diagnosis: L heel ulcer    Events/ 24h: No acute events overnight. Pain controlled.    Patient is a 58y old  Male who presents with a chief complaint of foot ulcer (15 Nov 2018 10:33)    PAST MEDICAL & SURGICAL HISTORY:  Dyslipidemia  DM (diabetes mellitus), type 2  Neuropathy  HTN (hypertension)  CAD (coronary atherosclerotic disease)  Acromegaly  CKD (chronic kidney disease), stage V  H/O eye surgery  H/O: pituitary tumor: s/p removal    Vital Signs Last 24 Hrs  T(C): 36.9 (15 Nov 2018 22:02), Max: 36.9 (15 Nov 2018 04:44)  T(F): 98.5 (15 Nov 2018 22:02), Max: 98.5 (15 Nov 2018 22:02)  HR: 70 (15 Nov 2018 22:02) (70 - 92)  BP: 136/68 (15 Nov 2018 22:02) (108/60 - 136/68)  RR: 18 (15 Nov 2018 22:02) (18 - 18)    Diet, Consistent Carbohydrate Renal w/Evening Snack (11-02-18 @ 08:59)  Diet, NPO after Midnight:      NPO Start Date: 15-Nov-2018,   NPO Start Time: 23:59 (11-15-18 @ 18:36)  Diet, NPO after Midnight:      NPO Start Date: 15-Nov-2018,   NPO Start Time: 23:59 (11-15-18 @ 19:09)    I&O's Detail    14 Nov 2018 07:01  -  15 Nov 2018 07:00  --------------------------------------------------------  IN:  Total IN: 0 mL    OUT:    Other: 3000 mL    Voided: 650 mL  Total OUT: 3650 mL    Total NET: -3650 mL    MEDICATIONS  (STANDING):  ampicillin/sulbactam  IVPB 3 Gram(s) IV Intermittent every 24 hours  aspirin enteric coated 81 milliGRAM(s) Oral daily  atorvastatin 20 milliGRAM(s) Oral at bedtime  bisacodyl Suppository 10 milliGRAM(s) Rectal daily  calcium acetate 1334 milliGRAM(s) Oral four times a day with meals  chlorhexidine 4% Liquid 1 Application(s) Topical <User Schedule>  DAPTOmycin IVPB 1000 milliGRAM(s) IV Intermittent once  DAPTOmycin IVPB 1000 milliGRAM(s) IV Intermittent <User Schedule>  darbepoetin Injectable Syringe 40 MICROGram(s) IV Push <User Schedule>  docusate sodium 100 milliGRAM(s) Oral two times a day  doxercalciferol Injectable 2 MICROGram(s) IV Push <User Schedule>  ergocalciferol 55199 Unit(s) Oral every week  folic acid 1 milliGRAM(s) Oral daily  heparin  Injectable 5000 Unit(s) SubCutaneous every 8 hours  lactulose Syrup 20 Gram(s) Oral daily  octreotide Depot Injectable 20 milliGRAM(s) IntraMuscular every 4 weeks  pantoprazole    Tablet 40 milliGRAM(s) Oral before breakfast  polyethylene glycol 3350 17 Gram(s) Oral every 12 hours  tamsulosin 0.4 milliGRAM(s) Oral at bedtime    MEDICATIONS  (PRN):  acetaminophen   Tablet .. 650 milliGRAM(s) Oral every 6 hours PRN Temp greater or equal to 38C (100.4F)    Physical Examination:  General Appearance: NAD  HEENT: EOMI, sclera non-icteric.  Heart: RRR   Lungs: CTABL.   Abdomen:  Soft, nontender, nondistended. No rigidity, guarding, or rebound tenderness.   MSK/Extremities: LLE WV in place, holding suction. Warm & well-perfused. Peripheral pulses intact.  Skin: Warm, dry. No jaundice.     LABS:                        8.5    7.08  )-----------( 258      ( 15 Nov 2018 07:15 )             27.9        11-15    139  |  98  |  35<H>  ----------------------------<  114<H>  5.2<H>   |  29  |  4.5<HH>    Ca    9.1      15 Nov 2018 07:15  Phos  4.2     11-15    IMAGING:  None

## 2018-11-16 NOTE — BRIEF OPERATIVE NOTE - POST-OP DX
Heel ulceration  11/02/2018  Left  Active  Ann Ma  Heel ulceration, left, with unspecified severity  10/23/2018    Active  Jourdan Terrazas

## 2018-11-16 NOTE — PROGRESS NOTE ADULT - SUBJECTIVE AND OBJECTIVE BOX
OVERNIGHT EVENTS: None  Subjective:   pt s/e at bedside, no active complaints, pt prepped for surgery today    HISTORY OF PRESENTING ILLNESS:   56y/o M w/ hx of brain tumor--acromegaly, diabetes, htn, ckd stage on dialysis MWF, makes urine, htn, 2 blood transfusion recently being worked up--  pt on rehab after recent admission for infection to feet presents for hg under 7 at rehab-[facility]; and for worsening of left leg ulcer.  pt had dialysis today and was given iv antibiotics after.  pt has been feeling chills; no fever. no abdominal pain, cp or sob. +generalized weakness.  no blood in stool    Past Medical History:  Acromegaly    CAD (coronary atherosclerotic disease)    CKD (chronic kidney disease), stage V    DM (diabetes mellitus), type 2    Dyslipidemia    HTN (hypertension)    Neuropathy.    Past Surgical History:  H/O eye surgery    H/O: pituitary tumor  s/p removal.  MEDICATIONS:  STANDING MEDICATIONS  ampicillin/sulbactam  IVPB 3 Gram(s) IV Intermittent every 24 hours  aspirin enteric coated 81 milliGRAM(s) Oral daily  atorvastatin 20 milliGRAM(s) Oral at bedtime  bisacodyl Suppository 10 milliGRAM(s) Rectal daily  calcium acetate 1334 milliGRAM(s) Oral four times a day with meals  chlorhexidine 4% Liquid 1 Application(s) Topical <User Schedule>  DAPTOmycin IVPB 1000 milliGRAM(s) IV Intermittent once  DAPTOmycin IVPB 1000 milliGRAM(s) IV Intermittent <User Schedule>  darbepoetin Injectable Syringe 40 MICROGram(s) IV Push <User Schedule>  docusate sodium 100 milliGRAM(s) Oral two times a day  doxercalciferol Injectable 2 MICROGram(s) IV Push <User Schedule>  ergocalciferol 20034 Unit(s) Oral every week  folic acid 1 milliGRAM(s) Oral daily  heparin  Injectable 5000 Unit(s) SubCutaneous every 8 hours  lactulose Syrup 20 Gram(s) Oral daily  octreotide Depot Injectable 20 milliGRAM(s) IntraMuscular every 4 weeks  pantoprazole    Tablet 40 milliGRAM(s) Oral before breakfast  polyethylene glycol 3350 17 Gram(s) Oral every 12 hours  tamsulosin 0.4 milliGRAM(s) Oral at bedtime    PRN MEDICATIONS  acetaminophen   Tablet .. 650 milliGRAM(s) Oral every 6 hours PRN    VITALS:   T(F): 97.8  HR: 75  BP: 140/77  RR: 18  SpO2: 92%    LABS:                        8.1    6.34  )-----------( 257      ( 16 Nov 2018 06:07 )             26.6     11-16    142  |  99  |  47<H>  ----------------------------<  119<H>  5.2<H>   |  29  |  5.7<HH>    Ca    9.1      16 Nov 2018 06:07  Phos  5.1     11-16  Mg     2.4     11-16      PT/INR - ( 16 Nov 2018 02:03 )   PT: 13.80 sec;   INR: 1.20 ratio         PTT - ( 16 Nov 2018 02:03 )  PTT:34.9 sec              RADIOLOGY:  < from: Xray Foot AP + Lateral, Left (10.22.18 @ 19:06) >  Impression:    Diffuse dorsal soft tissue swelling, without any evidence of soft tissue   gas.     Diffuse vascular calcifications. Degenerative changes of 1st MTP joint   and tarsal joints.     PHYSICAL EXAM:  GENERAL: No acute distress, well-developed  HEAD:  Atraumatic, Normocephalic, large. Protrutding jaw  EYES: EOMI, PERRLA, conjunctiva and sclera clear  NECK: Supple, no lymphadenopathy, no JVD  CHEST/LUNG: CTAB; No wheezes, rales, or rhonchi  HEART: Regular rate and rhythm; No murmurs, rubs, or gallops  ABDOMEN: Soft, non-tender, non-distended; normal bowel sounds, no organomegaly  EXTREMITIES:  2+ peripheral pulses b/l, No clubbing, cyanosis, or edema, large hands and feet  NEUROLOGY: A&O x 3, no focal deficits  SKIN: No rashes or lesions

## 2018-11-16 NOTE — PRE-ANESTHESIA EVALUATION ADULT - LAST CARDIAC ANGIOGRAM/IMAGING
2-Echo on 3/9/18, EF 55-60%
2-Echo on 3/9/18
2-Echo on 3/9/18, EF 55-60%
2-Echo on 3/9/18
2-Echo on 3/9/18

## 2018-11-16 NOTE — PRE-ANESTHESIA EVALUATION ADULT - NSANTHAPLANRD_GEN_ALL_CORE
monitored anesthesia care (MAC)
general
monitored anesthesia care (MAC)

## 2018-11-16 NOTE — BRIEF OPERATIVE NOTE - OPERATION/FINDINGS
Left SFA occlusion  3 vessel runoff
Necrotic tissue
Debridement of left heel ulcer and placement of integra graft
necrotic skin and subcutaneous tissue of Left lateral heel

## 2018-11-16 NOTE — BRIEF OPERATIVE NOTE - SPECIMENS
none
L calcaneus and soft tissue
None
cultures and calcaneus sent for ishmael/assess for osteomyelitis

## 2018-11-16 NOTE — PRE-ANESTHESIA EVALUATION ADULT - NSDENTALSD_ENT_ALL_CORE
appears normal and intact
appears normal and intact
Multiple missing teeth/missing teeth
missing teeth

## 2018-11-16 NOTE — CHART NOTE - NSCHARTNOTESELECT_GEN_ALL_CORE
Post-Op Check/Event Note
Anesthesia
Event Note
Event Note/f/u
Transfer Note
post op check/Event Note

## 2018-11-16 NOTE — PROGRESS NOTE ADULT - SUBJECTIVE AND OBJECTIVE BOX
Chart reviewed, patient examined. Pertinent results reviewed.  Case discussed with HO  specialist f/u reviewed; HD#26; s/p multiple surgical procedures; Plastic surg. Procedure scheduled for Today    SUBJECTIVE:    Patient is a 58y old Male who presented with a chief complaint of foot ulcer (10 Nov 2018 08:55)    Currently admitted to medicine with the primary diagnosis of Foot ulcer & PVD-ischemic.    No o/n events. Pt c/o worsening of his chronic constipation(4-5 D now), and states that he's due for his pituitary shot(1 Q Month)    PAST MEDICAL & SURGICAL HISTORY  Dyslipidemia  DM (diabetes mellitus), type 2  Neuropathy  HTN (hypertension)  CAD (coronary atherosclerotic disease)  Acromegaly  CKD (chronic kidney disease), stage V  H/O eye surgery  H/O: pituitary tumor: s/p removal- about 20 yrs ago    SOCIAL HISTORY:  Negative for smoking/alcohol/drug use.     ALLERGIES:  bacitracin (Unknown)  Neosporin (Hypotension)    MEDICATIONS:  MEDICATIONS  (STANDING):  ampicillin/sulbactam  IVPB 3 Gram(s) IV Intermittent every 24 hours  aspirin enteric coated 81 milliGRAM(s) Oral daily  atorvastatin 20 milliGRAM(s) Oral at bedtime  bisacodyl Suppository 10 milliGRAM(s) Rectal daily  calcium acetate 1334 milliGRAM(s) Oral four times a day with meals  chlorhexidine 4% Liquid 1 Application(s) Topical <User Schedule>  DAPTOmycin IVPB 1000 milliGRAM(s) IV Intermittent once  DAPTOmycin IVPB 1000 milliGRAM(s) IV Intermittent <User Schedule>  darbepoetin Injectable Syringe 40 MICROGram(s) IV Push <User Schedule>  docusate sodium 100 milliGRAM(s) Oral two times a day  doxercalciferol Injectable 2 MICROGram(s) IV Push <User Schedule>  ergocalciferol 21403 Unit(s) Oral every week  folic acid 1 milliGRAM(s) Oral daily  heparin  Injectable 5000 Unit(s) SubCutaneous every 8 hours  lactulose Syrup 20 Gram(s) Oral daily  octreotide Depot Injectable 20 milliGRAM(s) IntraMuscular every 4 weeks  pantoprazole    Tablet 40 milliGRAM(s) Oral before breakfast  polyethylene glycol 3350 17 Gram(s) Oral every 12 hours  tamsulosin 0.4 milliGRAM(s) Oral at bedtime    MEDICATIONS  (PRN):  acetaminophen   Tablet .. 650 milliGRAM(s) Oral every 6 hours PRN Temp greater or equal to 38C (100.4F)        Vital Signs Last 24 Hrs  T(C): 36.9 (16 Nov 2018 14:16), Max: 36.9 (15 Nov 2018 22:02)  T(F): 98.4 (16 Nov 2018 14:16), Max: 98.5 (15 Nov 2018 22:02)  HR: 73 (16 Nov 2018 14:16) (66 - 75)  BP: 115/61 (16 Nov 2018 14:16) (115/61 - 140/77)  BP(mean): --  RR: 18 (16 Nov 2018 14:16) (18 - 18)  SpO2: 92% (16 Nov 2018 03:35) (92% - 92%)                          8.1    6.34  )-----------( 257      ( 16 Nov 2018 06:07 )             26.6                         8.2    7.78  )-----------( 297      ( 11 Nov 2018 05:38 )             27.0     11-16    138  |  98  |  47<H>  ----------------------------<  108<H>  4.8   |  25  |  5.5<HH>    Ca    9.1      16 Nov 2018 07:00  Phos  5.1     11-16  Mg     2.4     11-16                    Culture - Surgical Swab (collected 07 Nov 2018 17:46)  Source: .Surgical Swab L heel wound  Preliminary Report (09 Nov 2018 15:29):    Moderate Enterococcus faecium          RADIOLOGY:    PHYSICAL EXAM:  GEN: No acute distress; A & Ox4; coarse facial features  LUNGS: Clear to auscultation bilaterally   HEART: S1/S2 present. RRR.   ABD: Soft, obese, non-tender, non-distended. Bowel sounds present  EXT: left heel bandaged; ischemic changes/atrophic; Vac on L heel; dystrohic changes-R foot and toes       RUE AVF/thrill+  NEURO: AAOX3;  DECreased sensation and mvt of feet

## 2018-11-16 NOTE — CHART NOTE - NSCHARTNOTEFT_GEN_A_CORE
PACU ANESTHESIA ADMISSION NOTE      Procedure: Debridement of ulcer of left heel: debridement of bone and soft tissue of L lateral heel  Angiogram, extremity, left: Left SFA angioplasty and stent. Left pop angioplasty  Debridement of ulcer of left heel    Post op diagnosis:  Heel ulceration  Heel ulceration, left, with unspecified severity      ____  Intubated  TV:______       Rate: ______      FiO2: ______    _x___  Patent Airway    __x__  Full return of protective reflexes    __x__  Full recovery from anesthesia / back to baseline status    Vitals:  T(C): 98.2  HR: 89  BP: 128/65  RR: 12  SpO2: 95%    Mental Status:  __x__ Awake   _____ Alert   _____ Drowsy   _____ Sedated    Nausea/Vomiting:  _x___ NO  ______Yes,   See Post - Op Orders          Pain Scale (0-10):  _0____    Treatment: ____ None    ____ See Post - Op/PCA Orders    Post - Operative Fluids:   __x__ Oral   ____ See Post - Op Orders    Plan: Discharge:   ____Home       ___x__Floor     _____Critical Care    _____  Other:_________________    Comments: uneventful anesthesia course no complications. VItals stable. Pt transferred to PACU

## 2018-11-16 NOTE — PRE-OP CHECKLIST - 1.
Right arm precaution; AV Fistula maturing
left heel kerlex dressing noted
right  arm precaution/ right arm a-v fistula
surgery cancelled today by  dr lowery. report given to sanjuanita rn, pt transferred to  3a with pcas

## 2018-11-16 NOTE — PRE-ANESTHESIA EVALUATION ADULT - NSPROPOSEDPROCEDFT_GEN_ALL_CORE
Debridement
Debridement of left foot
Left lower extremity angiogram. Possible endovascular revascularization
Left lower extremity angiogram
Debridement of left heel wound and application of integra graft

## 2018-11-16 NOTE — PRE-ANESTHESIA EVALUATION ADULT - NSANTHOSAYNRD_GEN_A_CORE
No. JABARI screening performed.  STOP BANG Legend: 0-2 = LOW Risk; 3-4 = INTERMEDIATE Risk; 5-8 = HIGH Risk

## 2018-11-16 NOTE — PROGRESS NOTE ADULT - SUBJECTIVE AND OBJECTIVE BOX
Nephrology progress note    Patient was seen and examined on HD, events over the last 24 h noted .  planned for OR today for debridement of L heel wound and placement of integra graft       Allergies:  bacitracin (Unknown)  Neosporin (Hypotension)    Hospital Medications:   MEDICATIONS  (STANDING):  ampicillin/sulbactam  IVPB 3 Gram(s) IV Intermittent every 24 hours  aspirin enteric coated 81 milliGRAM(s) Oral daily  atorvastatin 20 milliGRAM(s) Oral at bedtime  bisacodyl Suppository 10 milliGRAM(s) Rectal daily  calcium acetate 1334 milliGRAM(s) Oral four times a day with meals  chlorhexidine 4% Liquid 1 Application(s) Topical <User Schedule>  DAPTOmycin IVPB 1000 milliGRAM(s) IV Intermittent once  DAPTOmycin IVPB 1000 milliGRAM(s) IV Intermittent <User Schedule>  darbepoetin Injectable Syringe 40 MICROGram(s) IV Push <User Schedule>  docusate sodium 100 milliGRAM(s) Oral two times a day  doxercalciferol Injectable 2 MICROGram(s) IV Push <User Schedule>  ergocalciferol 06424 Unit(s) Oral every week  folic acid 1 milliGRAM(s) Oral daily  heparin  Injectable 5000 Unit(s) SubCutaneous every 8 hours  lactulose Syrup 20 Gram(s) Oral daily  octreotide Depot Injectable 20 milliGRAM(s) IntraMuscular every 4 weeks  pantoprazole    Tablet 40 milliGRAM(s) Oral before breakfast  polyethylene glycol 3350 17 Gram(s) Oral every 12 hours  tamsulosin 0.4 milliGRAM(s) Oral at bedtime        VITALS:  T(F): 97.8 (11-16-18 @ 06:41), Max: 98.5 (11-15-18 @ 22:02)  HR: 66 (11-16-18 @ 06:41)  BP: 126/62 (11-16-18 @ 06:41)  RR: 18 (11-16-18 @ 06:41)  SpO2: 92% (11-16-18 @ 03:35)  Wt(kg): --    11-14 @ 07:01  -  11-15 @ 07:00  --------------------------------------------------------  IN: 0 mL / OUT: 3650 mL / NET: -3650 mL      Height (cm): 190.5 (11-16 @ 06:23)  Weight (kg): 120.6 (11-16 @ 06:23)  BMI (kg/m2): 33.2 (11-16 @ 06:23)  BSA (m2): 2.48 (11-16 @ 06:23)    PHYSICAL EXAM:  	Gen: NAD  	Pulm: decrease BS  B/L  	CV:  S1S2; no rub  	Abd: +distended  	LE: dressing   	Vascular access: tesio/ av fistula     LABS:  11-16    142  |  99  |  47<H>  ----------------------------<  119<H>  5.2<H>   |  29  |  5.7<HH>    Ca    9.1      16 Nov 2018 06:07  Phos  5.1     11-16  Mg     2.4     11-16                            8.1    6.34  )-----------( 257      ( 16 Nov 2018 06:07 )             26.6       Urine Studies:      RADIOLOGY & ADDITIONAL STUDIES:

## 2018-11-16 NOTE — PROGRESS NOTE ADULT - ASSESSMENT
1)  ESRD: HD today 3 hrs 2 K bath 2-3 L UF  2)  DFU right foot s/p debridement ,followed by ID sp angioplasty , on ATB    3) planned for OR today for debridement of L heel wound   4)  Severe secondary HPT  on   hectorol 2 with HD  5) Anemia Hb noted   resistant to EDWIN in view of infection, will not increase dose , no venofer in view of infection transfuse if Hb<7  6) BP at goal    7) phos at goal , continue phsolo

## 2018-11-16 NOTE — PRE-OP CHECKLIST - NOTHING BY MOUTH SINCE
23-Oct-2018 00:00
29-Oct-2018 23:59
30-Oct-2018 11:59
01-Nov-2018 23:59
15-Nov-2018 23:59
07-Nov-2018 00:00
01-Nov-2018 23:59
13-Nov-2018 00:00

## 2018-11-16 NOTE — PROGRESS NOTE ADULT - ASSESSMENT
Assessment 58y/o M w/ hx of neuropathy, acromegaly, diabetes, htn, CKD MWF, makes urine, htn [facility]LE ulcer with Dr. Gomes. Vascular studies showed severe PVD and is transferred to [place] for angiogram.He has had multiple surgical procedures.    Plan      #DFU - left foot  -s/p debridement infected heel ulcer to the calcaneus ~1 week ago  -ID: c/w -See changes based on VRE culture  -surgery: s/p LLE debridement 11/2/18 ; -received 1 unit prbc during procedure for hypotension and hgb of 7.7; daily dressing changes  -surgery -s/p LLE heel debridement ;   -surgery - placed wound vac on left heel; plan for OR on 11/13 with plastics surgery-Delayed because of logistical scheduling issues; is now rescheduled for today with a different plastic surgical team  -podiatry consult appreciated. Call in to review with surgical attending-Dr. Gomes agrees with: Podiatry service for recommendations on walking shoe and postop care  --see ID IMPression-nonviable foot??  Surgery disagrees  - get EKG today    #Severe PVD  -arterial duplex 10/24: b/l severe peripheral arterial disease, occlusion of R popliteal and L SF arteries  -angiogram 10/31: S/P Left extremity angiogram w/ left SFA angioplasty and stent & left popliteal angioplasty for heel ulceration  -f/u with vascular in 2 weeks outpatient  Dr. Comer      #Chronic DVT of LLE - found on imaging on March 2018  -not on anticoagulation  -vascular f/u      #ESRD on HD started 1 month PTA - MWF  -c/w HD; renal following  -constipation-give tap water enema today preop;    #Anemia  -s/p 1 prbc transfusion in [facility] and another prbc during debridement in [place]  -EDWIN 40 mcg weekly  -monitor cbc  -pt requesting to speak with a hematologist regarding anemia    #Diabetes  -basal bolus insuin; iss    #HTN  -d/c Lasix for low BPs  -BP improved    #HLD  -c/w atorvastatin  # Acromegaly- give octreotide as recommended by Endo.  DVT ppx: heparin subq  GI ppx: protonix  Diet: consistent carbohydrate, renal  Activity: ambulate as tolerated    When medically stable - d/c back to [facility]  When to restart plavix? - will d/w surgery

## 2018-11-16 NOTE — PROGRESS NOTE ADULT - SUBJECTIVE AND OBJECTIVE BOX
SU SAWYER  58y, Male      OVERNIGHT EVENTS:    none    VITALS:  T(F): 97.8, Max: 98.5 (11-15-18 @ 22:02)  HR: 75  BP: 140/77  RR: 18Vital Signs Last 24 Hrs  T(C): 36.6 (16 Nov 2018 06:41), Max: 36.9 (15 Nov 2018 22:02)  T(F): 97.8 (16 Nov 2018 06:41), Max: 98.5 (15 Nov 2018 22:02)  HR: 75 (16 Nov 2018 09:00) (66 - 92)  BP: 140/77 (16 Nov 2018 09:00) (126/62 - 140/77)  BP(mean): --  RR: 18 (16 Nov 2018 09:00) (18 - 18)  SpO2: 92% (16 Nov 2018 03:35) (92% - 92%)    TESTS & MEASUREMENTS:                        8.1    6.34  )-----------( 257      ( 16 Nov 2018 06:07 )             26.6     11-16    142  |  99  |  47<H>  ----------------------------<  119<H>  5.2<H>   |  29  |  5.7<HH>    Ca    9.1      16 Nov 2018 06:07  Phos  5.1     11-16  Mg     2.4     11-16                RADIOLOGY & ADDITIONAL TESTS:    ANTIBIOTICS:  ampicillin/sulbactam  IVPB 3 Gram(s) IV Intermittent every 24 hours  DAPTOmycin IVPB 1000 milliGRAM(s) IV Intermittent once  DAPTOmycin IVPB 1000 milliGRAM(s) IV Intermittent <User Schedule>

## 2018-11-16 NOTE — PRE-ANESTHESIA EVALUATION ADULT - NSATTENDATTESTRD_GEN_ALL_CORE
The patient has been re-examined and I agree with the above assessment or I updated with my findings.

## 2018-11-16 NOTE — PROGRESS NOTE ADULT - ASSESSMENT
56y/o M w/ hx of neuropathy, acromegaly, diabetes, htn, CKD MWF, makes urine, htn [facility]LE [facility] with Dr. Gomes. Vascular studies showed severe PVD and is transferred to [place] for angiogram.    #Severe PVD  -s/p angiogram, and left SFA angioplasty, stenting  -s/p multiple debridements, scheduled for debridement and skin graft; procedure cancelled.  - Surgery for Friday 11/16 for debridement of L heel ulcer and placement of skin graft  - pain controlled  - color improved  - culture + for VRE   - antibiotics changed to Daptomycin and Unasyn per ID     #DFU - left foot  - post multiple debridements of infected heel ulcer to the calcaneus  - continue basal insulin  - monitor fs  - Culture Results: Moderate Enterococcus faecium (vancomycin resistant) (11.07.18 @ 17:46)     #Constipation  - continue rx, multiple  - monitor    #ESRD on HMD  - MWF  - renal following  - rx as per renal   - HD in am     #Anemia  - post transfusion   - EDWIN 40 mcg weekly  - monitor H/H     #HTN  - off rx  - BP stable    #Acromegaly  - on Sandostatin monthly  - medication was delivered to the hospital   - spoke with pharmacy, pt will receive med 11/14  - Insulin-Like Growth Factor 1: 221 ng/mL , Prolactin, Serum: 47.2 ng/mL    #HLD  - continue Atorvastatin    DVT ppx: heparin   GI ppx: Protonix  Diet: carbohydrate consistent  Activity: Bedrest    Supportive measures, further plan pending the above

## 2018-11-16 NOTE — PRE-ANESTHESIA EVALUATION ADULT - NSANTHPEFT_GEN_ALL_CORE
Lungs clear  Heart regular
Heart,Lungs-WNL
AAO x 3  Lungs clear  Acromegaly
cor: rrr s1s2 nl  lungs: clear
CTAB  RRR
Lungs: CTAB  Heart: normal S1-S2

## 2018-11-16 NOTE — PRE-ANESTHESIA EVALUATION ADULT - NSANTHPMHFT_GEN_ALL_CORE
Reviewed
CAD s/p stent in 2008  CKD on dialysis MWF, dialysis today  L SFA stent angio and stent   HTN  DM  Acromegaly s/p pituitary brain tumor
reviewed
Pituitary Tumor removed 1997  On HD (M-W-F)  Anemia; Dyslipidemia
Reviewed

## 2018-11-16 NOTE — CHART NOTE - NSCHARTNOTEFT_GEN_A_CORE
GENERAL SURGERY PROGRESS NOTE     SU SAWYER  58y  Male  Hospital day :25d  POD:  Procedure: Debridement of ulcer of left heel  Angiogram, extremity, left  Debridement of ulcer of left heel    Patient doing well post procedure. L heel with dressing in place c/d/i. Pt states he has minimal pain in feet due decreased sensation and is feeling no pain at this time. Denies fever/chills.     T(F): 97.6 (11-16-18 @ 20:15), Max: 98.4 (11-16-18 @ 14:16)  HR: 85 (11-16-18 @ 20:15) (66 - 85)  BP: 125/67 (11-16-18 @ 20:15) (115/61 - 163/74)  ABP: --  ABP(mean): --  RR: 16 (11-16-18 @ 20:15) (8 - 18)  SpO2: 97% (11-16-18 @ 20:15) (92% - 99%)    DIET/FLUIDS: calcium acetate 1334 milliGRAM(s) Oral four times a day with meals  doxercalciferol Injectable 2 MICROGram(s) IV Push <User Schedule>  ergocalciferol 07710 Unit(s) Oral every week  folic acid 1 milliGRAM(s) Oral daily    NG:                                                                              DRAINS:   BM:   EMESIS:   URINE:    GI proph:  pantoprazole    Tablet 40 milliGRAM(s) Oral before breakfast    AC/ proph: aspirin enteric coated 81 milliGRAM(s) Oral daily  heparin  Injectable 5000 Unit(s) SubCutaneous every 8 hours    ABx: ampicillin/sulbactam  IVPB 3 Gram(s) IV Intermittent every 24 hours  DAPTOmycin IVPB 1000 milliGRAM(s) IV Intermittent once  DAPTOmycin IVPB 1000 milliGRAM(s) IV Intermittent <User Schedule>      PHYSICAL EXAM:  GENERAL: NAD, well-appearing  CHEST/LUNG: no obvious increased wob   EXTREMITIES:  L heel with sterile dressing c/d/i.       LABS  Labs:  CAPILLARY BLOOD GLUCOSE      POCT Blood Glucose.: 123 mg/dL (16 Nov 2018 21:24)  POCT Blood Glucose.: 102 mg/dL (16 Nov 2018 13:16)  POCT Blood Glucose.: 111 mg/dL (16 Nov 2018 08:04)                          8.1    6.34  )-----------( 257      ( 16 Nov 2018 06:07 )             26.6         11-16    138  |  98  |  47<H>  ----------------------------<  108<H>  4.8   |  25  |  5.5<HH>      Calcium, Total Serum: 9.1 mg/dL (11-16-18 @ 07:00)      LFTs:         Coags:     13.80  ----< 1.20    ( 16 Nov 2018 02:03 )     34.9        A/P  Debridement of left heel ulcer and placement of integra graft    PLAN:     - leave integro in place for 2-3 weeks; please leave dressing in place-do not remove.     - care per primary

## 2018-11-16 NOTE — PROGRESS NOTE ADULT - ASSESSMENT
Assessment:  59 y/o Male patient admitted w/ LLE heel ulcer, s/p debridement    Plan:  OR today for debridement of L heel wound and placement of integra graft   NPO   DVT/GI ppx  OOBAT  IS  Pain control

## 2018-11-16 NOTE — PRE-ANESTHESIA EVALUATION ADULT - NSANTHRISKNONERD_GEN_ALL_CORE
No risk alerts present
No risk alerts present
Risk Alerts:
No risk alerts present
Risk Alerts:

## 2018-11-16 NOTE — PRE-ANESTHESIA EVALUATION ADULT - NSANTHAIRWAYFT_ENT_ALL_CORE
Acromegaly, large tongue, protruding jaw, TMD 3 FB, mouth opening 3 FB, free neck ROM
Protruding Mandible
MP2  Full range of cervical motion  Large jaw and beard   Multiple missing  teeth

## 2018-11-16 NOTE — PROGRESS NOTE ADULT - NSHPATTENDINGPLANDISCUSS_GEN_ALL_CORE
Pt; call to Dr Gomes
HO, surgery
pt, HO, surgery
pt, dr. douglas
HO, surgery

## 2018-11-17 LAB
ANION GAP SERPL CALC-SCNC: 11 MMOL/L — SIGNIFICANT CHANGE UP (ref 7–14)
BUN SERPL-MCNC: 32 MG/DL — HIGH (ref 10–20)
CALCIUM SERPL-MCNC: 8.9 MG/DL — SIGNIFICANT CHANGE UP (ref 8.5–10.1)
CHLORIDE SERPL-SCNC: 99 MMOL/L — SIGNIFICANT CHANGE UP (ref 98–110)
CO2 SERPL-SCNC: 29 MMOL/L — SIGNIFICANT CHANGE UP (ref 17–32)
CREAT SERPL-MCNC: 4.4 MG/DL — CRITICAL HIGH (ref 0.7–1.5)
GLUCOSE BLDC GLUCOMTR-MCNC: 130 MG/DL — HIGH (ref 70–99)
GLUCOSE BLDC GLUCOMTR-MCNC: 134 MG/DL — HIGH (ref 70–99)
GLUCOSE BLDC GLUCOMTR-MCNC: 143 MG/DL — HIGH (ref 70–99)
GLUCOSE BLDC GLUCOMTR-MCNC: 157 MG/DL — HIGH (ref 70–99)
GLUCOSE SERPL-MCNC: 129 MG/DL — HIGH (ref 70–99)
HCT VFR BLD CALC: 29.8 % — LOW (ref 42–52)
HGB BLD-MCNC: 8.9 G/DL — LOW (ref 14–18)
MCHC RBC-ENTMCNC: 28.6 PG — SIGNIFICANT CHANGE UP (ref 27–31)
MCHC RBC-ENTMCNC: 29.9 G/DL — LOW (ref 32–37)
MCV RBC AUTO: 95.8 FL — HIGH (ref 80–94)
NRBC # BLD: 0 /100 WBCS — SIGNIFICANT CHANGE UP (ref 0–0)
PHOSPHATE SERPL-MCNC: 4.6 MG/DL — SIGNIFICANT CHANGE UP (ref 2.1–4.9)
PLATELET # BLD AUTO: 235 K/UL — SIGNIFICANT CHANGE UP (ref 130–400)
POTASSIUM SERPL-MCNC: 5.1 MMOL/L — HIGH (ref 3.5–5)
POTASSIUM SERPL-SCNC: 5.1 MMOL/L — HIGH (ref 3.5–5)
RBC # BLD: 3.11 M/UL — LOW (ref 4.7–6.1)
RBC # FLD: 17.1 % — HIGH (ref 11.5–14.5)
SODIUM SERPL-SCNC: 139 MMOL/L — SIGNIFICANT CHANGE UP (ref 135–146)
WBC # BLD: 5.52 K/UL — SIGNIFICANT CHANGE UP (ref 4.8–10.8)
WBC # FLD AUTO: 5.52 K/UL — SIGNIFICANT CHANGE UP (ref 4.8–10.8)

## 2018-11-17 RX ADMIN — Medication 1 MILLIGRAM(S): at 11:28

## 2018-11-17 RX ADMIN — PANTOPRAZOLE SODIUM 40 MILLIGRAM(S): 20 TABLET, DELAYED RELEASE ORAL at 05:19

## 2018-11-17 RX ADMIN — ERGOCALCIFEROL 50000 UNIT(S): 1.25 CAPSULE ORAL at 11:28

## 2018-11-17 RX ADMIN — HEPARIN SODIUM 5000 UNIT(S): 5000 INJECTION INTRAVENOUS; SUBCUTANEOUS at 22:06

## 2018-11-17 RX ADMIN — ATORVASTATIN CALCIUM 20 MILLIGRAM(S): 80 TABLET, FILM COATED ORAL at 22:07

## 2018-11-17 RX ADMIN — Medication 1334 MILLIGRAM(S): at 22:06

## 2018-11-17 RX ADMIN — Medication 1334 MILLIGRAM(S): at 12:07

## 2018-11-17 RX ADMIN — HEPARIN SODIUM 5000 UNIT(S): 5000 INJECTION INTRAVENOUS; SUBCUTANEOUS at 05:19

## 2018-11-17 RX ADMIN — Medication 81 MILLIGRAM(S): at 11:28

## 2018-11-17 RX ADMIN — Medication 100 MILLIGRAM(S): at 05:19

## 2018-11-17 RX ADMIN — TAMSULOSIN HYDROCHLORIDE 0.4 MILLIGRAM(S): 0.4 CAPSULE ORAL at 22:06

## 2018-11-17 RX ADMIN — HEPARIN SODIUM 5000 UNIT(S): 5000 INJECTION INTRAVENOUS; SUBCUTANEOUS at 13:18

## 2018-11-17 RX ADMIN — AMPICILLIN SODIUM AND SULBACTAM SODIUM 200 GRAM(S): 250; 125 INJECTION, POWDER, FOR SUSPENSION INTRAMUSCULAR; INTRAVENOUS at 22:07

## 2018-11-17 RX ADMIN — Medication 100 MILLIGRAM(S): at 17:11

## 2018-11-17 RX ADMIN — Medication 1334 MILLIGRAM(S): at 08:08

## 2018-11-17 RX ADMIN — Medication 1334 MILLIGRAM(S): at 17:11

## 2018-11-17 NOTE — PROGRESS NOTE ADULT - SUBJECTIVE AND OBJECTIVE BOX
seen and examined  no distress  no new complaints       Standing Inpatient Medications  ampicillin/sulbactam  IVPB 3 Gram(s) IV Intermittent every 24 hours  aspirin enteric coated 81 milliGRAM(s) Oral daily  atorvastatin 20 milliGRAM(s) Oral at bedtime  bisacodyl Suppository 10 milliGRAM(s) Rectal daily  calcium acetate 1334 milliGRAM(s) Oral four times a day with meals  chlorhexidine 4% Liquid 1 Application(s) Topical <User Schedule>  DAPTOmycin IVPB 1000 milliGRAM(s) IV Intermittent once  DAPTOmycin IVPB 1000 milliGRAM(s) IV Intermittent <User Schedule>  darbepoetin Injectable Syringe 40 MICROGram(s) IV Push <User Schedule>  docusate sodium 100 milliGRAM(s) Oral two times a day  doxercalciferol Injectable 2 MICROGram(s) IV Push <User Schedule>  ergocalciferol 79131 Unit(s) Oral every week  folic acid 1 milliGRAM(s) Oral daily  heparin  Injectable 5000 Unit(s) SubCutaneous every 8 hours  lactulose Syrup 20 Gram(s) Oral daily  pantoprazole    Tablet 40 milliGRAM(s) Oral before breakfast  polyethylene glycol 3350 17 Gram(s) Oral every 12 hours  tamsulosin 0.4 milliGRAM(s) Oral at bedtime        VITALS/PHYSICAL EXAM  --------------------------------------------------------------------------------  T(C): 37.1 (11-17-18 @ 05:20), Max: 37.1 (11-17-18 @ 05:20)  HR: 69 (11-17-18 @ 05:20) (69 - 85)  BP: 111/61 (11-17-18 @ 05:20) (111/61 - 163/74)  RR: 19 (11-17-18 @ 05:20) (8 - 19)  SpO2: 97% (11-16-18 @ 20:15) (95% - 99%)  Wt(kg): --  Height (cm): 190.5 (11-16-18 @ 18:14)  Weight (kg): 120.6 (11-16-18 @ 18:14)  BMI (kg/m2): 33.2 (11-16-18 @ 18:14)  BSA (m2): 2.48 (11-16-18 @ 18:14)      Physical Exam:  	Gen: NAD  	Pulm: decrease BS  B/L  	CV:  S1S2; no rub  	Abd: soft, nontender/nondistended  	LE: dressing  	Vascular access: av fistula, tesio     LABS/STUDIES  --------------------------------------------------------------------------------              8.1    6.34  >-----------<  257      [11-16-18 @ 06:07]              26.6     138  |  98  |  47  ----------------------------<  108      [11-16-18 @ 07:00]  4.8   |  25  |  5.5        Ca     9.1     [11-16-18 @ 07:00]      Mg     2.4     [11-16-18 @ 02:03]      Phos  5.1     [11-16-18 @ 06:07]      PT/INR: PT 13.80, INR 1.20       [11-16-18 @ 02:03]  PTT: 34.9       [11-16-18 @ 02:03]      Creatinine Trend:  SCr 5.5 [11-16 @ 07:00]  SCr 5.7 [11-16 @ 06:07]  SCr 5.5 [11-16 @ 02:03]  SCr 4.5 [11-15 @ 07:15]  SCr 4.2 [11-13 @ 10:10]        Iron 38, TIBC 166, %sat 23      [09-19-18 @ 04:30]  Ferritin 55      [03-09-18 @ 18:01]  PTH -- (Ca 8.2)      [09-22-18 @ 08:40]   2071  PTH -- (Ca 8.0)      [09-19-18 @ 04:30]   1519  PTH -- (Ca 8.3)      [03-09-18 @ 18:01]   1545  Vitamin D (25OH) 14.4      [03-09-18 @ 18:01]  TSH 1.69      [11-13-18 @ 10:10]

## 2018-11-17 NOTE — PROGRESS NOTE ADULT - CARDIOVASCULAR
Regular rate & rhythm, normal S1, S2; no murmurs, gallops or rubs; no S3, S4

## 2018-11-17 NOTE — PROGRESS NOTE ADULT - ASSESSMENT
1)  ESRD: s/p hd yesterday, hd on monday, might be avle to use av fistula   2)  DFU right foot s/p debridement ,followed by ID sp angioplasty , on ATB    3)  Severe secondary HPT  on   hectorol 2 with HD  4) Anemia Hb noted   resistant to EDWIN in view of infection, will not increase dose , no venofer in view of infection transfuse if Hb<7  5) BP  well controlled  6) phos at goal , continue phsolo  7) ? d/c plan

## 2018-11-17 NOTE — PROGRESS NOTE ADULT - SUBJECTIVE AND OBJECTIVE BOX
DIGNASU VELAZQUEZ  58y, Male      OVERNIGHT EVENTS:    Debridement of left heel ulcer and placement of integra graft    VITALS:  T(F): 98.7, Max: 98.7 (11-17-18 @ 05:20)  HR: 69  BP: 111/61  	  RR: 19Vital Signs Last 24 Hrs  T(C): 37.1 (17 Nov 2018 05:20), Max: 37.1 (17 Nov 2018 05:20)  T(F): 98.7 (17 Nov 2018 05:20), Max: 98.7 (17 Nov 2018 05:20)  HR: 69 (17 Nov 2018 05:20) (69 - 85)  BP: 111/61 (17 Nov 2018 05:20) (111/61 - 163/74)  BP(mean): --  RR: 19 (17 Nov 2018 05:20) (8 - 19)  SpO2: 97% (16 Nov 2018 20:15) (95% - 99%)    TESTS & MEASUREMENTS:                        8.1    6.34  )-----------( 257      ( 16 Nov 2018 06:07 )             26.6     11-16    138  |  98  |  47<H>  ----------------------------<  108<H>  4.8   |  25  |  5.5<HH>    Ca    9.1      16 Nov 2018 07:00  Phos  5.1     11-16  Mg     2.4     11-16                RADIOLOGY & ADDITIONAL TESTS:    ANTIBIOTICS:  ampicillin/sulbactam  IVPB 3 Gram(s) IV Intermittent every 24 hours  DAPTOmycin IVPB 1000 milliGRAM(s) IV Intermittent once  DAPTOmycin IVPB 1000 milliGRAM(s) IV Intermittent <User Schedule>

## 2018-11-17 NOTE — PROGRESS NOTE ADULT - GUM GEN PE MLT EXAM PC
Normal genitalia; no lesions; no discharge

## 2018-11-17 NOTE — PROGRESS NOTE ADULT - SUBJECTIVE AND OBJECTIVE BOX
GENERAL SURGERY PROGRESS NOTE     SU SAWYER  58y  Male  Hospital day :25d  POD:  Procedure: Debridement of ulcer of left heel  Angiogram, extremity, left  Debridement of ulcer of left heel    Patient doing well post procedure. L heel with dressing in place c/d/i. Pt states he has minimal pain in feet due decreased sensation and is feeling no pain at this time. Denies fever/chills.     T(F): 97.6 (11-16-18 @ 20:15), Max: 98.4 (11-16-18 @ 14:16)  HR: 85 (11-16-18 @ 20:15) (66 - 85)  BP: 125/67 (11-16-18 @ 20:15) (115/61 - 163/74)  ABP: --  ABP(mean): --  RR: 16 (11-16-18 @ 20:15) (8 - 18)  SpO2: 97% (11-16-18 @ 20:15) (92% - 99%)    DIET/FLUIDS: calcium acetate 1334 milliGRAM(s) Oral four times a day with meals  doxercalciferol Injectable 2 MICROGram(s) IV Push <User Schedule>  ergocalciferol 95582 Unit(s) Oral every week  folic acid 1 milliGRAM(s) Oral daily    NG:                                                                              DRAINS:   BM:   EMESIS:   URINE:    GI proph:  pantoprazole    Tablet 40 milliGRAM(s) Oral before breakfast    AC/ proph: aspirin enteric coated 81 milliGRAM(s) Oral daily  heparin  Injectable 5000 Unit(s) SubCutaneous every 8 hours    ABx: ampicillin/sulbactam  IVPB 3 Gram(s) IV Intermittent every 24 hours  DAPTOmycin IVPB 1000 milliGRAM(s) IV Intermittent once  DAPTOmycin IVPB 1000 milliGRAM(s) IV Intermittent <User Schedule>      PHYSICAL EXAM:  GENERAL: NAD, well-appearing  CHEST/LUNG: no obvious increased wob   EXTREMITIES:  L heel with sterile dressing c/d/i.       LABS  Labs:  CAPILLARY BLOOD GLUCOSE      POCT Blood Glucose.: 123 mg/dL (16 Nov 2018 21:24)  POCT Blood Glucose.: 102 mg/dL (16 Nov 2018 13:16)  POCT Blood Glucose.: 111 mg/dL (16 Nov 2018 08:04)                          8.1    6.34  )-----------( 257      ( 16 Nov 2018 06:07 )             26.6         11-16    138  |  98  |  47<H>  ----------------------------<  108<H>  4.8   |  25  |  5.5<HH>      Calcium, Total Serum: 9.1 mg/dL (11-16-18 @ 07:00)      LFTs:         Coags:     13.80  ----< 1.20    ( 16 Nov 2018 02:03 )     34.9        A/P  Debridement of left heel ulcer and placement of integra graft    PLAN:     - leave integro in place for 2-3 weeks; please leave dressing in place-do not remove.     - care per primary.

## 2018-11-17 NOTE — PROGRESS NOTE ADULT - GASTROINTESTINAL
Soft, non-tender, no hepatosplenomegaly, normal bowel sounds

## 2018-11-17 NOTE — PROGRESS NOTE ADULT - SUBJECTIVE AND OBJECTIVE BOX
SU SAWYER  58y  Male      Patient is a 58y old  Male who presents with a chief complaint of foot ulcer (17 Nov 2018 08:50)    s/p rt heel debridement    REVIEW OF SYSTEMS:  CONSTITUTIONAL: No fever, weight loss, or fatigue  EYES: No eye pain, visual disturbances, or discharge  ENMT:  No difficulty hearing, tinnitus, vertigo; No sinus or throat pain  NECK: No pain or stiffness  BREASTS: No pain, masses, or nipple discharge  RESPIRATORY: No cough, wheezing, chills or hemoptysis; No shortness of breath  CARDIOVASCULAR: No chest pain, palpitations, dizziness, or leg swelling  GASTROINTESTINAL: No abdominal or epigastric pain. No nausea, vomiting, or hematemesis; No diarrhea or constipation. No melena or hematochezia.  GENITOURINARY: No dysuria, frequency, hematuria, or incontinence  NEUROLOGICAL: No headaches, memory loss, loss of strength, numbness, or tremors  SKIN: No itching, burning, rashes, or lesions   LYMPH NODES: No enlarged glands  ENDOCRINE: No heat or cold intolerance; No hair loss  MUSCULOSKELETAL: No joint pain or swelling; No muscle, back, or extremity pain,rt heel s/p skin graft,rt arm av fistula+  PSYCHIATRIC: No depression, anxiety, mood swings, or difficulty sleeping  HEME/LYMPH: No easy bruising, or bleeding gums  ALLERY AND IMMUNOLOGIC: No hives or eczema  FAMILY HISTORY:  Family history of myocardial infarction (Father)    T(C): 37.1 (11-17-18 @ 05:20), Max: 37.1 (11-17-18 @ 05:20)  HR: 69 (11-17-18 @ 05:20) (69 - 85)  BP: 111/61 (11-17-18 @ 05:20) (111/61 - 163/74)  RR: 19 (11-17-18 @ 05:20) (8 - 19)  SpO2: 97% (11-16-18 @ 20:15) (95% - 99%)  Wt(kg): --Vital Signs Last 24 Hrs  T(C): 37.1 (17 Nov 2018 05:20), Max: 37.1 (17 Nov 2018 05:20)  T(F): 98.7 (17 Nov 2018 05:20), Max: 98.7 (17 Nov 2018 05:20)  HR: 69 (17 Nov 2018 05:20) (69 - 85)  BP: 111/61 (17 Nov 2018 05:20) (111/61 - 163/74)  BP(mean): --  RR: 19 (17 Nov 2018 05:20) (8 - 19)  SpO2: 97% (16 Nov 2018 20:15) (95% - 99%)  bacitracin (Unknown)  Neosporin (Hypotension)      PHYSICAL EXAM:  GENERAL: NAD, well-groomed, well-developed  HEAD:  Atraumatic, Normocephalic  EYES: EOMI, PERRLA, conjunctiva and sclera clear  ENMT: No tonsillar erythema, exudates, or enlargement;   NECK: Supple, No JVD, Normal thyroid  NERVOUS SYSTEM:  Alert & Oriented X3, Good concentration;   CHEST/LUNG: Clear to percussion bilaterally; No rales, rhonchi, wheezing, or rubs  HEART: Regular rate and rhythm; No murmurs, rubs, or gallops  ABDOMEN: Soft, Nontender, Nondistended; Bowel sounds present  EXTREMITIES:  2+ Peripheral Pulses, No clubbing,   LYMPH: No lymphadenopathy noted  SKIN: No rashes or lesions      LABS:  11-16    138  |  98  |  47<H>  ----------------------------<  108<H>  4.8   |  25  |  5.5<HH>    Ca    9.1      16 Nov 2018 07:00  Phos  5.1     11-16  Mg     2.4     11-16                          8.9    5.52  )-----------( 235      ( 17 Nov 2018 08:16 )             29.8           RADIOLOGY & ADDITIONAL TESTS:    MEDICATION:  acetaminophen   Tablet .. 650 milliGRAM(s) Oral every 6 hours PRN  ampicillin/sulbactam  IVPB 3 Gram(s) IV Intermittent every 24 hours  aspirin enteric coated 81 milliGRAM(s) Oral daily  atorvastatin 20 milliGRAM(s) Oral at bedtime  bisacodyl Suppository 10 milliGRAM(s) Rectal daily  calcium acetate 1334 milliGRAM(s) Oral four times a day with meals  chlorhexidine 4% Liquid 1 Application(s) Topical <User Schedule>  DAPTOmycin IVPB 1000 milliGRAM(s) IV Intermittent once  DAPTOmycin IVPB 1000 milliGRAM(s) IV Intermittent <User Schedule>  darbepoetin Injectable Syringe 40 MICROGram(s) IV Push <User Schedule>  docusate sodium 100 milliGRAM(s) Oral two times a day  doxercalciferol Injectable 2 MICROGram(s) IV Push <User Schedule>  ergocalciferol 98095 Unit(s) Oral every week  folic acid 1 milliGRAM(s) Oral daily  heparin  Injectable 5000 Unit(s) SubCutaneous every 8 hours  lactulose Syrup 20 Gram(s) Oral daily  pantoprazole    Tablet 40 milliGRAM(s) Oral before breakfast  polyethylene glycol 3350 17 Gram(s) Oral every 12 hours  tamsulosin 0.4 milliGRAM(s) Oral at bedtime      HEALTH ISSUES - PROBLEM Dx:  Osteomyelitis, chronic, ankle or foot, left: Osteomyelitis, chronic, ankle or foot, left daptomycin,unasyn  Neuropathy: Neuropathy  HTN (hypertension): HTN (hypertension)  CKD (chronic kidney disease), stage V: CKD (chronic kidney disease), stage V on HD  DM (diabetes mellitus), type 2: DM (diabetes mellitus), type 2  Dyslipidemia: Dyslipidemia  Foot ulcer: Foot ulcer s/p debridement,skin graft    PVD s/p stent in rt leg

## 2018-11-17 NOTE — PROGRESS NOTE ADULT - SUBJECTIVE AND OBJECTIVE BOX
GENERAL SURGERY PROGRESS NOTE     SU SAWYER  58y  Male  Hospital day :25d  POD:  Procedure: Debridement of ulcer of left heel  Angiogram, extremity, left  Debridement of ulcer of left heel    Patient doing well post procedure. L heel with dressing in place c/d/i. Pt states he has minimal pain in feet due decreased sensation and is feeling no pain at this time. Denies fever/chills.     T(F): 97.6 (11-16-18 @ 20:15), Max: 98.4 (11-16-18 @ 14:16)  HR: 85 (11-16-18 @ 20:15) (66 - 85)  BP: 125/67 (11-16-18 @ 20:15) (115/61 - 163/74)  ABP: --  ABP(mean): --  RR: 16 (11-16-18 @ 20:15) (8 - 18)  SpO2: 97% (11-16-18 @ 20:15) (92% - 99%)    DIET/FLUIDS: calcium acetate 1334 milliGRAM(s) Oral four times a day with meals  doxercalciferol Injectable 2 MICROGram(s) IV Push <User Schedule>  ergocalciferol 69651 Unit(s) Oral every week  folic acid 1 milliGRAM(s) Oral daily    NG:                                                                              DRAINS:   BM:   EMESIS:   URINE:    GI proph:  pantoprazole    Tablet 40 milliGRAM(s) Oral before breakfast    AC/ proph: aspirin enteric coated 81 milliGRAM(s) Oral daily  heparin  Injectable 5000 Unit(s) SubCutaneous every 8 hours    ABx: ampicillin/sulbactam  IVPB 3 Gram(s) IV Intermittent every 24 hours  DAPTOmycin IVPB 1000 milliGRAM(s) IV Intermittent once  DAPTOmycin IVPB 1000 milliGRAM(s) IV Intermittent <User Schedule>      PHYSICAL EXAM:  GENERAL: NAD, well-appearing  CHEST/LUNG: no obvious increased wob   EXTREMITIES:  L heel with sterile dressing c/d/i.       LABS  Labs:  CAPILLARY BLOOD GLUCOSE      POCT Blood Glucose.: 123 mg/dL (16 Nov 2018 21:24)  POCT Blood Glucose.: 102 mg/dL (16 Nov 2018 13:16)  POCT Blood Glucose.: 111 mg/dL (16 Nov 2018 08:04)                          8.1    6.34  )-----------( 257      ( 16 Nov 2018 06:07 )             26.6         11-16    138  |  98  |  47<H>  ----------------------------<  108<H>  4.8   |  25  |  5.5<HH>      Calcium, Total Serum: 9.1 mg/dL (11-16-18 @ 07:00)      LFTs:         Coags:     13.80  ----< 1.20    ( 16 Nov 2018 02:03 )     34.9        A/P  Debridement of left heel ulcer and placement of integra graft    PLAN:     - leave integro in place for 2-3 weeks; please leave dressing in place-do not remove.     - care per primary.

## 2018-11-17 NOTE — PROGRESS NOTE ADULT - RESPIRATORY
Breath Sounds equal & clear to percussion & auscultation, no accessory muscle use

## 2018-11-17 NOTE — PROGRESS NOTE ADULT - ENMT
No oral lesions; no gross abnormalities

## 2018-11-18 LAB
GLUCOSE BLDC GLUCOMTR-MCNC: 111 MG/DL — HIGH (ref 70–99)
GLUCOSE BLDC GLUCOMTR-MCNC: 118 MG/DL — HIGH (ref 70–99)
GLUCOSE BLDC GLUCOMTR-MCNC: 124 MG/DL — HIGH (ref 70–99)
GLUCOSE BLDC GLUCOMTR-MCNC: 125 MG/DL — HIGH (ref 70–99)
HCT VFR BLD CALC: 26.5 % — LOW (ref 42–52)
HGB BLD-MCNC: 8.1 G/DL — LOW (ref 14–18)
MCHC RBC-ENTMCNC: 28.9 PG — SIGNIFICANT CHANGE UP (ref 27–31)
MCHC RBC-ENTMCNC: 30.6 G/DL — LOW (ref 32–37)
MCV RBC AUTO: 94.6 FL — HIGH (ref 80–94)
NRBC # BLD: 0 /100 WBCS — SIGNIFICANT CHANGE UP (ref 0–0)
PHOSPHATE SERPL-MCNC: 5.5 MG/DL — HIGH (ref 2.1–4.9)
PLATELET # BLD AUTO: 213 K/UL — SIGNIFICANT CHANGE UP (ref 130–400)
RBC # BLD: 2.8 M/UL — LOW (ref 4.7–6.1)
RBC # FLD: 17 % — HIGH (ref 11.5–14.5)
WBC # BLD: 5.87 K/UL — SIGNIFICANT CHANGE UP (ref 4.8–10.8)
WBC # FLD AUTO: 5.87 K/UL — SIGNIFICANT CHANGE UP (ref 4.8–10.8)

## 2018-11-18 RX ADMIN — HEPARIN SODIUM 5000 UNIT(S): 5000 INJECTION INTRAVENOUS; SUBCUTANEOUS at 13:42

## 2018-11-18 RX ADMIN — Medication 1334 MILLIGRAM(S): at 12:06

## 2018-11-18 RX ADMIN — HEPARIN SODIUM 5000 UNIT(S): 5000 INJECTION INTRAVENOUS; SUBCUTANEOUS at 06:09

## 2018-11-18 RX ADMIN — Medication 100 MILLIGRAM(S): at 06:09

## 2018-11-18 RX ADMIN — Medication 1334 MILLIGRAM(S): at 21:23

## 2018-11-18 RX ADMIN — Medication 81 MILLIGRAM(S): at 12:06

## 2018-11-18 RX ADMIN — TAMSULOSIN HYDROCHLORIDE 0.4 MILLIGRAM(S): 0.4 CAPSULE ORAL at 21:22

## 2018-11-18 RX ADMIN — Medication 1 MILLIGRAM(S): at 12:06

## 2018-11-18 RX ADMIN — Medication 1334 MILLIGRAM(S): at 17:11

## 2018-11-18 RX ADMIN — PANTOPRAZOLE SODIUM 40 MILLIGRAM(S): 20 TABLET, DELAYED RELEASE ORAL at 06:09

## 2018-11-18 RX ADMIN — ATORVASTATIN CALCIUM 20 MILLIGRAM(S): 80 TABLET, FILM COATED ORAL at 21:23

## 2018-11-18 RX ADMIN — Medication 1334 MILLIGRAM(S): at 08:05

## 2018-11-18 RX ADMIN — AMPICILLIN SODIUM AND SULBACTAM SODIUM 200 GRAM(S): 250; 125 INJECTION, POWDER, FOR SUSPENSION INTRAMUSCULAR; INTRAVENOUS at 21:23

## 2018-11-18 RX ADMIN — HEPARIN SODIUM 5000 UNIT(S): 5000 INJECTION INTRAVENOUS; SUBCUTANEOUS at 21:23

## 2018-11-18 NOTE — PROGRESS NOTE ADULT - ASSESSMENT
1)  ESRD: s/p hd Friday  hd on monday, might be able to use av fistula   2)  DFU right foot s/p debridement ,followed by ID sp angioplasty , on ATB    3)  Severe secondary HPT  on   hectorol 2 with HD  4) Anemia Hb noted   resistant to EDWIN in view of infection, will not increase dose , no venofer in view of infection transfuse if Hb<7  5) BP  well controlled  6) phos at goal , continue phsolo  7) ? d/c plan

## 2018-11-18 NOTE — PROGRESS NOTE ADULT - SUBJECTIVE AND OBJECTIVE BOX
Nephrology progress note    Patient was seen and examined, events over the last 24 h noted .  HD friday     Allergies:  bacitracin (Unknown)  Neosporin (Hypotension)    Hospital Medications:   MEDICATIONS  (STANDING):  ampicillin/sulbactam  IVPB 3 Gram(s) IV Intermittent every 24 hours  aspirin enteric coated 81 milliGRAM(s) Oral daily  atorvastatin 20 milliGRAM(s) Oral at bedtime  bisacodyl Suppository 10 milliGRAM(s) Rectal daily  calcium acetate 1334 milliGRAM(s) Oral four times a day with meals  chlorhexidine 4% Liquid 1 Application(s) Topical <User Schedule>  DAPTOmycin IVPB 1000 milliGRAM(s) IV Intermittent once  DAPTOmycin IVPB 1000 milliGRAM(s) IV Intermittent <User Schedule>  darbepoetin Injectable Syringe 40 MICROGram(s) IV Push <User Schedule>  docusate sodium 100 milliGRAM(s) Oral two times a day  doxercalciferol Injectable 2 MICROGram(s) IV Push <User Schedule>  ergocalciferol 85186 Unit(s) Oral every week  folic acid 1 milliGRAM(s) Oral daily  heparin  Injectable 5000 Unit(s) SubCutaneous every 8 hours  lactulose Syrup 20 Gram(s) Oral daily  pantoprazole    Tablet 40 milliGRAM(s) Oral before breakfast  polyethylene glycol 3350 17 Gram(s) Oral every 12 hours  tamsulosin 0.4 milliGRAM(s) Oral at bedtime        VITALS:  T(F): 98.4 (11-18-18 @ 05:35), Max: 98.9 (11-17-18 @ 14:03)  HR: 73 (11-18-18 @ 05:35)  BP: 118/57 (11-18-18 @ 05:35)  RR: 18 (11-18-18 @ 05:35)  SpO2: --  Wt(kg): --    11-17 @ 07:01  -  11-18 @ 07:00  --------------------------------------------------------  IN: 0 mL / OUT: 1450 mL / NET: -1450 mL          PHYSICAL EXAM:  	Gen: NAD  	Pulm: decrease BS  B/L  	CV:  S1S2; no rub  	Abd: soft, nontender/nondistended  	LE: dressing  	Vascular access: av fistula, tesio     LABS:  11-17    139  |  99  |  32<H>  ----------------------------<  129<H>  5.1<H>   |  29  |  4.4<HH>    Ca    8.9      17 Nov 2018 08:16  Phos  5.5     11-18                            8.1    5.87  )-----------( 213      ( 18 Nov 2018 07:25 )             26.5       Urine Studies:      RADIOLOGY & ADDITIONAL STUDIES:

## 2018-11-18 NOTE — PROGRESS NOTE ADULT - SUBJECTIVE AND OBJECTIVE BOX
Progress Note: General Surgery  Patient: SU SAWYER , 58y (1960)Male   MRN: [id number]  Location: [address]  Visit: 10-22-18 Inpatient  Date: 11-18-18 @ 06:23  Hospital Day: 9    Procedure/Diagnosis: Left foot ulcer, integra placement  Events over 24h: No acute events overnight. Integra dressing in place, CDI. Keep dressing    Vitals: T(F): 98.4 (11-18-18 @ 05:35), Max: 98.9 (11-17-18 @ 14:03)  HR: 73 (11-18-18 @ 05:35)  BP: 118/57 (11-18-18 @ 05:35) (118/57 - 160/77)  RR: 18 (11-18-18 @ 05:35)  SpO2: --    In:   11-17-18 @ 07:01  -  11-18-18 @ 06:23  --------------------------------------------------------  IN: 0 mL      Out:   11-17-18 @ 07:01  -  11-18-18 @ 06:23  --------------------------------------------------------  OUT:    Voided: 950 mL  Total OUT: 950 mL        Net:   11-17-18 @ 07:01  -  11-18-18 @ 06:23  --------------------------------------------------------  NET: -950 mL      Diet: Diet, Consistent Carbohydrate Renal w/Evening Snack (11-16-18 @ 19:37)    IV Fluids: yes , Type: calcium acetate 1334 milliGRAM(s) Oral four times a day with meals  doxercalciferol Injectable 2 MICROGram(s) IV Push <User Schedule>  [id number] Unit(s) Oral every week  folic acid 1 milliGRAM(s) Oral daily    Physical Examination:  General Appearance: NAD, alert and cooperative  HEENT: NCAT, WNL  Heart: S1 and S2. No murmurs. RRR  Lungs: Clear to auscultation BL without rales, rhonchi, wheezing, crackles or diminished breath sounds.  Abdomen: Soft, nondistended      Medications: [Standing]  ampicillin/sulbactam  IVPB 3 Gram(s) IV Intermittent every 24 hours  aspirin enteric coated 81 milliGRAM(s) Oral daily  atorvastatin 20 milliGRAM(s) Oral at bedtime  bisacodyl Suppository 10 milliGRAM(s) Rectal daily  calcium acetate 1334 milliGRAM(s) Oral four times a day with meals  chlorhexidine 4% Liquid 1 Application(s) Topical <User Schedule>  DAPTOmycin IVPB 1000 milliGRAM(s) IV Intermittent once  DAPTOmycin IVPB 1000 milliGRAM(s) IV Intermittent <User Schedule>  darbepoetin Injectable Syringe 40 MICROGram(s) IV Push <User Schedule>  docusate sodium 100 milliGRAM(s) Oral two times a day  doxercalciferol Injectable 2 MICROGram(s) IV Push <User Schedule>  ergocalciferol 62564 Unit(s) Oral every week  folic acid 1 milliGRAM(s) Oral daily  heparin  Injectable 5000 Unit(s) SubCutaneous every 8 hours  lactulose Syrup 20 Gram(s) Oral daily  pantoprazole    Tablet 40 milliGRAM(s) Oral before breakfast  polyethylene glycol 3350 17 Gram(s) Oral every 12 hours  tamsulosin 0.4 milliGRAM(s) Oral at bedtime    DVT Prophylaxis: heparin  Injectable 5000 Unit(s) SubCutaneous every 8 hours    GI Prophylaxis: pantoprazole    Tablet 40 milliGRAM(s) Oral before breakfast    Antibiotics: ampicillin/sulbactam  IVPB 3 Gram(s) IV Intermittent every 24 hours  DAPTOmycin IVPB 1000 milliGRAM(s) IV Intermittent once  DAPTOmycin IVPB 1000 milliGRAM(s) IV Intermittent <User Schedule>    Anticoagulation:   Medications:[PRN]  acetaminophen   Tablet .. 650 milliGRAM(s) Oral every 6 hours PRN    Labs:                        8.9    5.52  )-----------( 235      ( 17 Nov 2018 08:16 )             29.8     11-17    139  |  99  |  32<H>  ----------------------------<  129<H>  5.1<H>   |  29  |  4.4<HH>    Ca    8.9      17 Nov 2018 08:16  Phos  4.6     11-17      A/P  Debridement of left heel ulcer and placement of integra graft    PLAN:     - leave integro in place for 2-3 weeks; please leave dressing in place-do not remove.     - care per primary. Progress Note: General Surgery  Patient: SU SAWYER , 58y (1960)Male   MRN: [id number]  Location: [address]  Visit: 10-22-18 Inpatient  Date: 11-18-18 @ 06:23  Hospital Day: 9    Procedure/Diagnosis: Left foot ulcer, integra placement  Events over 24h: No acute events overnight. Integra dressing in place, CDI. Keep dressing    Vitals: T(F): 98.4 (11-18-18 @ 05:35), Max: 98.9 (11-17-18 @ 14:03)  HR: 73 (11-18-18 @ 05:35)  BP: 118/57 (11-18-18 @ 05:35) (118/57 - 160/77)  RR: 18 (11-18-18 @ 05:35)  SpO2: --    In:   11-17-18 @ 07:01  -  11-18-18 @ 06:23  --------------------------------------------------------  IN: 0 mL      Out:   11-17-18 @ 07:01  -  11-18-18 @ 06:23  --------------------------------------------------------  OUT:    Voided: 950 mL  Total OUT: 950 mL        Net:   11-17-18 @ 07:01  -  11-18-18 @ 06:23  --------------------------------------------------------  NET: -950 mL      Diet: Diet, Consistent Carbohydrate Renal w/Evening Snack (11-16-18 @ 19:37)    IV Fluids: yes , Type: calcium acetate 1334 milliGRAM(s) Oral four times a day with meals  doxercalciferol Injectable 2 MICROGram(s) IV Push <User Schedule>  [id number] Unit(s) Oral every week  folic acid 1 milliGRAM(s) Oral daily    Physical Examination:  General Appearance: NAD, alert and cooperative  HEENT: NCAT, WNL  Heart: S1 and S2. No murmurs. RRR  Lungs: Clear to auscultation BL without rales, rhonchi, wheezing, crackles or diminished breath sounds.  Abdomen: Soft, nondistended      Medications: [Standing]  ampicillin/sulbactam  IVPB 3 Gram(s) IV Intermittent every 24 hours  aspirin enteric coated 81 milliGRAM(s) Oral daily  atorvastatin 20 milliGRAM(s) Oral at bedtime  bisacodyl Suppository 10 milliGRAM(s) Rectal daily  calcium acetate 1334 milliGRAM(s) Oral four times a day with meals  chlorhexidine 4% Liquid 1 Application(s) Topical <User Schedule>  DAPTOmycin IVPB 1000 milliGRAM(s) IV Intermittent once  DAPTOmycin IVPB 1000 milliGRAM(s) IV Intermittent <User Schedule>  darbepoetin Injectable Syringe 40 MICROGram(s) IV Push <User Schedule>  docusate sodium 100 milliGRAM(s) Oral two times a day  doxercalciferol Injectable 2 MICROGram(s) IV Push <User Schedule>  ergocalciferol 15918 Unit(s) Oral every week  folic acid 1 milliGRAM(s) Oral daily  heparin  Injectable 5000 Unit(s) SubCutaneous every 8 hours  lactulose Syrup 20 Gram(s) Oral daily  pantoprazole    Tablet 40 milliGRAM(s) Oral before breakfast  polyethylene glycol 3350 17 Gram(s) Oral every 12 hours  tamsulosin 0.4 milliGRAM(s) Oral at bedtime    DVT Prophylaxis: heparin  Injectable 5000 Unit(s) SubCutaneous every 8 hours    GI Prophylaxis: pantoprazole    Tablet 40 milliGRAM(s) Oral before breakfast    Antibiotics: ampicillin/sulbactam  IVPB 3 Gram(s) IV Intermittent every 24 hours  DAPTOmycin IVPB 1000 milliGRAM(s) IV Intermittent once  DAPTOmycin IVPB 1000 milliGRAM(s) IV Intermittent <User Schedule>    Anticoagulation:   Medications:[PRN]  acetaminophen   Tablet .. 650 milliGRAM(s) Oral every 6 hours PRN    Labs:                        8.9    5.52  )-----------( 235      ( 17 Nov 2018 08:16 )             29.8     11-17    139  |  99  |  32<H>  ----------------------------<  129<H>  5.1<H>   |  29  |  4.4<HH>    Ca    8.9      17 Nov 2018 08:16  Phos  4.6     11-17

## 2018-11-18 NOTE — PROGRESS NOTE ADULT - ASSESSMENT
A/P  Debridement of left heel ulcer and placement of integra graft     PLAN:   - Leave integro in place for 2-3 weeks; please leave dressing in place-do not remove.   - Care per primary.  - Pt can be discharged from surgery perspective  - Follow up with Dr. Yee in 7-8 days

## 2018-11-18 NOTE — PROGRESS NOTE ADULT - SUBJECTIVE AND OBJECTIVE BOX
SU SAWYER  58y  Male      Patient is a 58y old  Male who presents with a chief complaint of foot ulcer (18 Nov 2018 06:22)    s/p debridement and skin graft on 11/15    REVIEW OF SYSTEMS:  CONSTITUTIONAL: No fever, weight loss, or fatigue  EYES: No eye pain, visual disturbances, or discharge  ENMT:  No difficulty hearing, tinnitus, vertigo; No sinus or throat pain  NECK: No pain or stiffness  BREASTS: No pain, masses, or nipple discharge  RESPIRATORY: No cough, wheezing, chills or hemoptysis; No shortness of breath  CARDIOVASCULAR: No chest pain, palpitations, dizziness, or leg swelling  GASTROINTESTINAL: No abdominal or epigastric pain. No nausea, vomiting, or hematemesis; No diarrhea or constipation. No melena or hematochezia.  GENITOURINARY: No dysuria, frequency, hematuria, or incontinence  NEUROLOGICAL: No headaches, memory loss, loss of strength, numbness, or tremors  SKIN: No itching, burning, rashes, or lesions   LYMPH NODES: No enlarged glands  ENDOCRINE: No heat or cold intolerance; No hair loss  MUSCULOSKELETAL: No joint pain or swelling; No muscle, back, or extremity pain  PSYCHIATRIC: No depression, anxiety, mood swings, or difficulty sleeping  HEME/LYMPH: No easy bruising, or bleeding gums  ALLERY AND IMMUNOLOGIC: No hives or eczema  FAMILY HISTORY:  Family history of myocardial infarction (Father)    T(C): 36.9 (11-18-18 @ 05:35), Max: 37.2 (11-17-18 @ 14:03)  HR: 73 (11-18-18 @ 05:35) (70 - 77)  BP: 118/57 (11-18-18 @ 05:35) (118/57 - 160/77)  RR: 18 (11-18-18 @ 05:35) (18 - 18)  SpO2: --  Wt(kg): --Vital Signs Last 24 Hrs  T(C): 36.9 (18 Nov 2018 05:35), Max: 37.2 (17 Nov 2018 14:03)  T(F): 98.4 (18 Nov 2018 05:35), Max: 98.9 (17 Nov 2018 14:03)  HR: 73 (18 Nov 2018 05:35) (70 - 77)  BP: 118/57 (18 Nov 2018 05:35) (118/57 - 160/77)  BP(mean): --  RR: 18 (18 Nov 2018 05:35) (18 - 18)  SpO2: --  bacitracin (Unknown)  Neosporin (Hypotension)      PHYSICAL EXAM:  GENERAL: NAD, well-groomed, well-developed  HEAD:  Atraumatic, Normocephalic  EYES: EOMI, PERRLA, conjunctiva and sclera clear  ENMT: No tonsillar erythema, exudates, or enlargement;  NECK: Supple, No JVD, Normal thyroid  NERVOUS SYSTEM:  Alert & Oriented X3, Good concentration; Motor Strength 5/5 B/L upper and lower extremities; DTRs 2+ intact and symmetric  CHEST/LUNG: Clear to percussion bilaterally; No rales, rhonchi, wheezing, or rubs  HEART: Regular rate and rhythm; No murmurs, rubs, or gallops  ABDOMEN: Soft, Nontender, Nondistended; Bowel sounds present  EXTREMITIES:  2+ Peripheral Pulses, No clubbing, cyanosis, or edema  LYMPH: No lymphadenopathy noted  SKIN: No rashes or lesions      LABS:  11-17    139  |  99  |  32<H>  ----------------------------<  129<H>  5.1<H>   |  29  |  4.4<HH>    Ca    8.9      17 Nov 2018 08:16  Phos  5.5     11-18                            8.1    5.87  )-----------( 213      ( 18 Nov 2018 07:25 )             26.5         RADIOLOGY & ADDITIONAL TESTS:    MEDICATION:  acetaminophen   Tablet .. 650 milliGRAM(s) Oral every 6 hours PRN  ampicillin/sulbactam  IVPB 3 Gram(s) IV Intermittent every 24 hours  aspirin enteric coated 81 milliGRAM(s) Oral daily  atorvastatin 20 milliGRAM(s) Oral at bedtime  bisacodyl Suppository 10 milliGRAM(s) Rectal daily  calcium acetate 1334 milliGRAM(s) Oral four times a day with meals  chlorhexidine 4% Liquid 1 Application(s) Topical <User Schedule>  DAPTOmycin IVPB 1000 milliGRAM(s) IV Intermittent once  DAPTOmycin IVPB 1000 milliGRAM(s) IV Intermittent <User Schedule>  darbepoetin Injectable Syringe 40 MICROGram(s) IV Push <User Schedule>  docusate sodium 100 milliGRAM(s) Oral two times a day  doxercalciferol Injectable 2 MICROGram(s) IV Push <User Schedule>  ergocalciferol 39138 Unit(s) Oral every week  folic acid 1 milliGRAM(s) Oral daily  heparin  Injectable 5000 Unit(s) SubCutaneous every 8 hours  lactulose Syrup 20 Gram(s) Oral daily  pantoprazole    Tablet 40 milliGRAM(s) Oral before breakfast  polyethylene glycol 3350 17 Gram(s) Oral every 12 hours  tamsulosin 0.4 milliGRAM(s) Oral at bedtime      HEALTH ISSUES - PROBLEM Dx:  Osteomyelitis, chronic, ankle or foot, left: Osteomyelitis, chronic, ankle or foot, left daptomycin,unasyn    Neuropathy: Neuropathy  HTN (hypertension): HTN (hypertension)  CKD (chronic kidney disease), stage V: CKD (chronic kidney disease), stage V on HD  DM (diabetes mellitus), type 2: DM (diabetes mellitus), type 2  Dyslipidemia: Dyslipidemia  Foot ulcer: Foot ulcer s/p debridement and skin graft on 11/15

## 2018-11-18 NOTE — PROGRESS NOTE ADULT - SUBJECTIVE AND OBJECTIVE BOX
OVERNIGHT EVENTS: None    Subjective:   Pt s/e at bedside, s/p L heel debridement and integro skin graft     HISTORY OF PRESENTING ILLNESS:   56y/o M w/ hx of brain tumor--acromegaly, diabetes, htn, ckd stage on dialysis MWF, makes urine, htn, 2 blood transfusion recently being worked up--  pt on rehab after recent admission for infection to feet presents for hg under 7 at rehab-[facility]; and for worsening of left leg ulcer.  pt had dialysis today and was given iv antibiotics after.  pt has been feeling chills; no fever. no abdominal pain, cp or sob. +generalized weakness.  no blood in stool    Past Medical History:  Acromegaly    CAD (coronary atherosclerotic disease)    CKD (chronic kidney disease), stage V    DM (diabetes mellitus), type 2    Dyslipidemia    HTN (hypertension)    Neuropathy.    Past Surgical History:  H/O eye surgery    H/O: pituitary tumor  s/p removal.  MEDICATIONS:  STANDING MEDICATIONS  ampicillin/sulbactam  IVPB 3 Gram(s) IV Intermittent every 24 hours  aspirin enteric coated 81 milliGRAM(s) Oral daily  atorvastatin 20 milliGRAM(s) Oral at bedtime  bisacodyl Suppository 10 milliGRAM(s) Rectal daily  calcium acetate 1334 milliGRAM(s) Oral four times a day with meals  chlorhexidine 4% Liquid 1 Application(s) Topical <User Schedule>  DAPTOmycin IVPB 1000 milliGRAM(s) IV Intermittent once  DAPTOmycin IVPB 1000 milliGRAM(s) IV Intermittent <User Schedule>  darbepoetin Injectable Syringe 40 MICROGram(s) IV Push <User Schedule>  docusate sodium 100 milliGRAM(s) Oral two times a day  doxercalciferol Injectable 2 MICROGram(s) IV Push <User Schedule>  ergocalciferol 96059 Unit(s) Oral every week  folic acid 1 milliGRAM(s) Oral daily  heparin  Injectable 5000 Unit(s) SubCutaneous every 8 hours  lactulose Syrup 20 Gram(s) Oral daily  pantoprazole    Tablet 40 milliGRAM(s) Oral before breakfast  polyethylene glycol 3350 17 Gram(s) Oral every 12 hours  tamsulosin 0.4 milliGRAM(s) Oral at bedtime    PRN MEDICATIONS  acetaminophen   Tablet .. 650 milliGRAM(s) Oral every 6 hours PRN    VITALS:   T(F): 96.8  HR: 69  BP: 134/63  RR: 18  SpO2: --    LABS:                        8.1    5.87  )-----------( 213      ( 18 Nov 2018 07:25 )             26.5     11-17    139  |  99  |  32<H>  ----------------------------<  129<H>  5.1<H>   |  29  |  4.4<HH>    Ca    8.9      17 Nov 2018 08:16  Phos  5.5     11-18      RADIOLOGY:  < from: Xray Foot AP + Lateral, Left (10.22.18 @ 19:06) >  Impression:    Diffuse dorsal soft tissue swelling, without any evidence of soft tissue   gas.     Diffuse vascular calcifications. Degenerative changes of 1st MTP joint   and tarsal joints.     PHYSICAL EXAM:  GENERAL: No acute distress, well-developed  HEAD:  Atraumatic, Normocephalic, large. Protrutding jaw  EYES: EOMI, PERRLA, conjunctiva and sclera clear  NECK: Supple, no lymphadenopathy, no JVD  CHEST/LUNG: CTAB; No wheezes, rales, or rhonchi  HEART: Regular rate and rhythm; No murmurs, rubs, or gallops  ABDOMEN: Soft, non-tender, non-distended; normal bowel sounds, no organomegaly  EXTREMITIES:  2+ peripheral pulses b/l, No clubbing, cyanosis, or edema, large hands and feet  NEUROLOGY: A&O x 3, no focal deficits  SKIN: No rashes or lesions OVERNIGHT EVENTS: None    Subjective:   Pt s/e at bedside, no active complaints. s/p L heel debridement and integro skin graft     HISTORY OF PRESENTING ILLNESS:   58y/o M w/ hx of brain tumor--acromegaly, diabetes, htn, ckd stage on dialysis MWF, makes urine, htn, 2 blood transfusion recently being worked up--  pt on rehab after recent admission for infection to feet presents for hg under 7 at rehab-[facility]; and for worsening of left leg ulcer.  pt had dialysis today and was given iv antibiotics after.  pt has been feeling chills; no fever. no abdominal pain, cp or sob. +generalized weakness.  no blood in stool    Past Medical History:  Acromegaly    CAD (coronary atherosclerotic disease)    CKD (chronic kidney disease), stage V    DM (diabetes mellitus), type 2    Dyslipidemia    HTN (hypertension)    Neuropathy.    Past Surgical History:  H/O eye surgery    H/O: pituitary tumor  s/p removal.  MEDICATIONS:  STANDING MEDICATIONS  ampicillin/sulbactam  IVPB 3 Gram(s) IV Intermittent every 24 hours  aspirin enteric coated 81 milliGRAM(s) Oral daily  atorvastatin 20 milliGRAM(s) Oral at bedtime  bisacodyl Suppository 10 milliGRAM(s) Rectal daily  calcium acetate 1334 milliGRAM(s) Oral four times a day with meals  chlorhexidine 4% Liquid 1 Application(s) Topical <User Schedule>  DAPTOmycin IVPB 1000 milliGRAM(s) IV Intermittent once  DAPTOmycin IVPB 1000 milliGRAM(s) IV Intermittent <User Schedule>  darbepoetin Injectable Syringe 40 MICROGram(s) IV Push <User Schedule>  docusate sodium 100 milliGRAM(s) Oral two times a day  doxercalciferol Injectable 2 MICROGram(s) IV Push <User Schedule>  ergocalciferol 88010 Unit(s) Oral every week  folic acid 1 milliGRAM(s) Oral daily  heparin  Injectable 5000 Unit(s) SubCutaneous every 8 hours  lactulose Syrup 20 Gram(s) Oral daily  pantoprazole    Tablet 40 milliGRAM(s) Oral before breakfast  polyethylene glycol 3350 17 Gram(s) Oral every 12 hours  tamsulosin 0.4 milliGRAM(s) Oral at bedtime    PRN MEDICATIONS  acetaminophen   Tablet .. 650 milliGRAM(s) Oral every 6 hours PRN    VITALS:   T(F): 96.8  HR: 69  BP: 134/63  RR: 18  SpO2: --    LABS:                        8.1    5.87  )-----------( 213      ( 18 Nov 2018 07:25 )             26.5     11-17    139  |  99  |  32<H>  ----------------------------<  129<H>  5.1<H>   |  29  |  4.4<HH>    Ca    8.9      17 Nov 2018 08:16  Phos  5.5     11-18      RADIOLOGY:  < from: Xray Foot AP + Lateral, Left (10.22.18 @ 19:06) >  Impression:    Diffuse dorsal soft tissue swelling, without any evidence of soft tissue   gas.     Diffuse vascular calcifications. Degenerative changes of 1st MTP joint   and tarsal joints.     PHYSICAL EXAM:  GENERAL: No acute distress, well-developed  HEAD:  Atraumatic, Normocephalic, large. Protrutding jaw  EYES: EOMI, PERRLA, conjunctiva and sclera clear  NECK: Supple, no lymphadenopathy, no JVD  CHEST/LUNG: CTAB; No wheezes, rales, or rhonchi  HEART: Regular rate and rhythm; No murmurs, rubs, or gallops  ABDOMEN: Soft, non-tender, non-distended; normal bowel sounds, no organomegaly  EXTREMITIES:  2+ peripheral pulses b/l, No clubbing, cyanosis, or edema, large hands and feet  NEUROLOGY: A&O x 3, no focal deficits  SKIN: No rashes or lesions

## 2018-11-18 NOTE — PROGRESS NOTE ADULT - ASSESSMENT
57 M w/ hx of neuropathy, acromegaly, diabetes, htn, CKD MWF, makes urine, htn [facility]LE ulcer with Dr. Gomes. Vascular studies showed severe PVD and is transferred to [place] for angiogram.    #Severe PVD  -s/p angiogram, and left SFA angioplasty, stenting  -s/p multiple debridements, scheduled for debridement and skin graft; procedure cancelled.  - s/p debridement Friday 11/16 for of L heel ulcer and placement of skin graft placement  - pain controlled  - color improved  - culture + for VRE   - antibiotics changed to Daptomycin and Unasyn per ID     #DFU - left foot  - post multiple debridements of infected heel ulcer to the calcaneus  - continue basal insulin  - monitor fs  - Culture Results: Moderate Enterococcus faecium (vancomycin resistant) (11.07.18 @ 17:46)     #Constipation  - continue rx, multiple  - monitor    #ESRD on HMD  - MWF  - renal following  - rx as per renal   - HD in am     #Anemia  - post transfusion   - EDWIN 40 mcg weekly  - monitor H/H     #HTN  - off rx  - BP stable    #Acromegaly  - on Sandostatin monthly  - medication was delivered to the hospital   - spoke with pharmacy, pt received med 11/14  - Insulin-Like Growth Factor 1: 221 ng/mL , Prolactin, Serum: 47.2 ng/mL    #HLD  - continue Atorvastatin    DVT ppx: heparin   GI ppx: Protonix  Diet: carbohydrate consistent  Activity: Bedrest    Dispo: home tomorrow 57 M w/ hx of neuropathy, acromegaly, diabetes, htn, CKD MWF, makes urine, htn [facility] LLE ulcer with Dr. Gomes. Vascular studies showed severe PVD and is transferred to [place] for angiogram.    #Severe PVD  -s/p angiogram, and left SFA angioplasty, stenting  -s/p multiple debridements, scheduled for debridement and skin graft; procedure cancelled.  - s/p debridement Friday 11/16 for of L heel ulcer and placement of skin graft placement  - pain controlled  - color improved  - culture + for VRE   - antibiotics changed to Daptomycin and Unasyn per ID   - plavix has been on hold 2/2 multiple surgeries for 3 weeks, pt needs new cardiologist    #DFU - left foot  - post multiple debridements of infected heel ulcer to the calcaneus  - continue basal insulin  - monitor fs  - Culture Results: Moderate Enterococcus faecium (vancomycin resistant) (11.07.18 @ 17:46)     #Constipation  - continue rx, multiple  - monitor    #ESRD on HMD  - MWF  - renal following  - rx as per renal   - HD in am     #Anemia  - post transfusion   - EDWIN 40 mcg weekly  - monitor H/H     #HTN  - off rx  - BP stable    #Acromegaly  - on Sandostatin monthly  - medication was delivered to the hospital   - spoke with pharmacy, pt received med 11/14  - Insulin-Like Growth Factor 1: 221 ng/mL , Prolactin, Serum: 47.2 ng/mL    #HLD  - continue Atorvastatin    DVT ppx: heparin   GI ppx: Protonix  Diet: carbohydrate consistent  Activity: Bedrest    Dispo: home tomorrow

## 2018-11-19 VITALS — TEMPERATURE: 98 F

## 2018-11-19 LAB
ANION GAP SERPL CALC-SCNC: 14 MMOL/L — SIGNIFICANT CHANGE UP (ref 7–14)
BUN SERPL-MCNC: 48 MG/DL — HIGH (ref 10–20)
CALCIUM SERPL-MCNC: 8.9 MG/DL — SIGNIFICANT CHANGE UP (ref 8.5–10.1)
CHLORIDE SERPL-SCNC: 99 MMOL/L — SIGNIFICANT CHANGE UP (ref 98–110)
CO2 SERPL-SCNC: 26 MMOL/L — SIGNIFICANT CHANGE UP (ref 17–32)
CREAT SERPL-MCNC: 6 MG/DL — CRITICAL HIGH (ref 0.7–1.5)
GLUCOSE BLDC GLUCOMTR-MCNC: 110 MG/DL — HIGH (ref 70–99)
GLUCOSE BLDC GLUCOMTR-MCNC: 140 MG/DL — HIGH (ref 70–99)
GLUCOSE BLDC GLUCOMTR-MCNC: 153 MG/DL — HIGH (ref 70–99)
GLUCOSE SERPL-MCNC: 110 MG/DL — HIGH (ref 70–99)
HCT VFR BLD CALC: 26.1 % — LOW (ref 42–52)
HGB BLD-MCNC: 8.2 G/DL — LOW (ref 14–18)
MCHC RBC-ENTMCNC: 29.6 PG — SIGNIFICANT CHANGE UP (ref 27–31)
MCHC RBC-ENTMCNC: 31.4 G/DL — LOW (ref 32–37)
MCV RBC AUTO: 94.2 FL — HIGH (ref 80–94)
NRBC # BLD: 0 /100 WBCS — SIGNIFICANT CHANGE UP (ref 0–0)
PHOSPHATE SERPL-MCNC: 5.8 MG/DL — HIGH (ref 2.1–4.9)
PLATELET # BLD AUTO: 225 K/UL — SIGNIFICANT CHANGE UP (ref 130–400)
POTASSIUM SERPL-MCNC: 5.5 MMOL/L — HIGH (ref 3.5–5)
POTASSIUM SERPL-SCNC: 5.5 MMOL/L — HIGH (ref 3.5–5)
RBC # BLD: 2.77 M/UL — LOW (ref 4.7–6.1)
RBC # FLD: 16.9 % — HIGH (ref 11.5–14.5)
SODIUM SERPL-SCNC: 139 MMOL/L — SIGNIFICANT CHANGE UP (ref 135–146)
WBC # BLD: 6.35 K/UL — SIGNIFICANT CHANGE UP (ref 4.8–10.8)
WBC # FLD AUTO: 6.35 K/UL — SIGNIFICANT CHANGE UP (ref 4.8–10.8)

## 2018-11-19 RX ORDER — ATORVASTATIN CALCIUM 80 MG/1
1 TABLET, FILM COATED ORAL
Qty: 0 | Refills: 0 | COMMUNITY
Start: 2018-11-19

## 2018-11-19 RX ORDER — POLYETHYLENE GLYCOL 3350 17 G/17G
17 POWDER, FOR SOLUTION ORAL
Qty: 0 | Refills: 0 | DISCHARGE
Start: 2018-11-19

## 2018-11-19 RX ORDER — ERGOCALCIFEROL 1.25 MG/1
1 CAPSULE ORAL
Qty: 4 | Refills: 0 | OUTPATIENT
Start: 2018-11-19 | End: 2018-12-18

## 2018-11-19 RX ORDER — CLOPIDOGREL BISULFATE 75 MG/1
1 TABLET, FILM COATED ORAL
Qty: 0 | Refills: 0 | DISCHARGE
Start: 2018-11-19

## 2018-11-19 RX ORDER — ASPIRIN/CALCIUM CARB/MAGNESIUM 324 MG
1 TABLET ORAL
Qty: 0 | Refills: 0 | COMMUNITY
Start: 2018-11-19

## 2018-11-19 RX ORDER — ASPIRIN/CALCIUM CARB/MAGNESIUM 324 MG
1 TABLET ORAL
Qty: 30 | Refills: 0
Start: 2018-11-19 | End: 2018-12-18

## 2018-11-19 RX ORDER — AMPICILLIN SODIUM AND SULBACTAM SODIUM 250; 125 MG/ML; MG/ML
3 INJECTION, POWDER, FOR SUSPENSION INTRAMUSCULAR; INTRAVENOUS
Qty: 0 | Refills: 0 | COMMUNITY
Start: 2018-11-19

## 2018-11-19 RX ORDER — DOXERCALCIFEROL 2.5 UG/1
2 CAPSULE ORAL
Qty: 0 | Refills: 0 | DISCHARGE
Start: 2018-11-19

## 2018-11-19 RX ORDER — CLOPIDOGREL BISULFATE 75 MG/1
75 TABLET, FILM COATED ORAL DAILY
Qty: 0 | Refills: 0 | Status: DISCONTINUED | OUTPATIENT
Start: 2018-11-19 | End: 2018-11-19

## 2018-11-19 RX ORDER — PANTOPRAZOLE SODIUM 20 MG/1
1 TABLET, DELAYED RELEASE ORAL
Qty: 0 | Refills: 0 | DISCHARGE
Start: 2018-11-19

## 2018-11-19 RX ADMIN — DAPTOMYCIN 140 MILLIGRAM(S): 500 INJECTION, POWDER, LYOPHILIZED, FOR SOLUTION INTRAVENOUS at 10:35

## 2018-11-19 RX ADMIN — Medication 1334 MILLIGRAM(S): at 12:25

## 2018-11-19 RX ADMIN — Medication 81 MILLIGRAM(S): at 12:26

## 2018-11-19 RX ADMIN — Medication 1 MILLIGRAM(S): at 12:26

## 2018-11-19 RX ADMIN — CLOPIDOGREL BISULFATE 75 MILLIGRAM(S): 75 TABLET, FILM COATED ORAL at 12:26

## 2018-11-19 RX ADMIN — DOXERCALCIFEROL 2 MICROGRAM(S): 2.5 CAPSULE ORAL at 10:42

## 2018-11-19 RX ADMIN — Medication 1334 MILLIGRAM(S): at 08:01

## 2018-11-19 RX ADMIN — HEPARIN SODIUM 5000 UNIT(S): 5000 INJECTION INTRAVENOUS; SUBCUTANEOUS at 14:27

## 2018-11-19 RX ADMIN — PANTOPRAZOLE SODIUM 40 MILLIGRAM(S): 20 TABLET, DELAYED RELEASE ORAL at 05:09

## 2018-11-19 RX ADMIN — HEPARIN SODIUM 5000 UNIT(S): 5000 INJECTION INTRAVENOUS; SUBCUTANEOUS at 05:09

## 2018-11-19 NOTE — PROGRESS NOTE ADULT - SUBJECTIVE AND OBJECTIVE BOX
seen and examined  no distress  no new complaints       Standing Inpatient Medications  ampicillin/sulbactam  IVPB 3 Gram(s) IV Intermittent every 24 hours  aspirin enteric coated 81 milliGRAM(s) Oral daily  atorvastatin 20 milliGRAM(s) Oral at bedtime  bisacodyl Suppository 10 milliGRAM(s) Rectal daily  calcium acetate 1334 milliGRAM(s) Oral four times a day with meals  chlorhexidine 4% Liquid 1 Application(s) Topical <User Schedule>  clopidogrel Tablet 75 milliGRAM(s) Oral daily  DAPTOmycin IVPB 1000 milliGRAM(s) IV Intermittent once  DAPTOmycin IVPB 1000 milliGRAM(s) IV Intermittent <User Schedule>  darbepoetin Injectable Syringe 40 MICROGram(s) IV Push <User Schedule>  docusate sodium 100 milliGRAM(s) Oral two times a day  doxercalciferol Injectable 2 MICROGram(s) IV Push <User Schedule>  ergocalciferol 62067 Unit(s) Oral every week  folic acid 1 milliGRAM(s) Oral daily  heparin  Injectable 5000 Unit(s) SubCutaneous every 8 hours  lactulose Syrup 20 Gram(s) Oral daily  pantoprazole    Tablet 40 milliGRAM(s) Oral before breakfast  polyethylene glycol 3350 17 Gram(s) Oral every 12 hours  tamsulosin 0.4 milliGRAM(s) Oral at bedtime        VITALS/PHYSICAL EXAM  --------------------------------------------------------------------------------  T(C): 36.2 (11-19-18 @ 05:48), Max: 36.8 (11-18-18 @ 21:00)  HR: 72 (11-19-18 @ 08:10) (66 - 72)  BP: 151/87 (11-19-18 @ 08:10) (134/63 - 151/87)  RR: 17 (11-19-18 @ 08:10) (17 - 18)  SpO2: --  Wt(kg): --        11-18-18 @ 07:01  -  11-19-18 @ 07:00  --------------------------------------------------------  IN: 0 mL / OUT: 500 mL / NET: -500 mL      Physical Exam:  	Gen: NAD  	Pulm: CTA B/L  	CV:  S1S2; no rub  	Abd: +distended  	LE: av fistula, tesio  	Vascular access:    LABS/STUDIES  --------------------------------------------------------------------------------              8.2    6.35  >-----------<  225      [11-19-18 @ 06:19]              26.1     139  |  99  |  48  ----------------------------<  110      [11-18-18 @ 21:40]  5.5   |  26  |  6.0        Ca     8.9     [11-18-18 @ 21:40]      Phos  5.8     [11-19-18 @ 06:19]            Creatinine Trend:  SCr 6.0 [11-18 @ 21:40]  SCr 4.4 [11-17 @ 08:16]  SCr 5.5 [11-16 @ 07:00]  SCr 5.7 [11-16 @ 06:07]  SCr 5.5 [11-16 @ 02:03]        Iron 38, TIBC 166, %sat 23      [09-19-18 @ 04:30]  Ferritin 55      [03-09-18 @ 18:01]  PTH -- (Ca 8.2)      [09-22-18 @ 08:40]   2071  PTH -- (Ca 8.0)      [09-19-18 @ 04:30]   1519  PTH -- (Ca 8.3)      [03-09-18 @ 18:01]   1545  Vitamin D (25OH) 14.4      [03-09-18 @ 18:01]  TSH 1.69      [11-13-18 @ 10:10]

## 2018-11-19 NOTE — PROGRESS NOTE ADULT - ASSESSMENT
57 M w/ hx of neuropathy, acromegaly, diabetes, htn, CKD MWF, makes urine, htn [facility]LE ulcer with Dr. Gomes. Vascular studies showed severe PVD and is transferred to [place] for angiogram.    #Severe PVD  -s/p angiogram, and left SFA angioplasty, stenting  -s/p multiple debridements, scheduled for debridement and skin graft; procedure cancelled.  - s/p debridement Friday 11/16 for of L heel ulcer and placement of skin graft placement  - pain controlled  - color improved  - culture + for VRE   - antibiotics changed to Daptomycin and Unasyn per ID   - plavix has been on hold 2/2 multiple surgeries for 3 weeks, pt needs new cardiologist    #DFU - left foot  - post multiple debridements of infected heel ulcer to the calcaneus  - continue basal insulin  - monitor fs  - Culture Results: Moderate Enterococcus faecium (vancomycin resistant) (11.07.18 @ 17:46)   - c/w IV unasyn 3g q 24h for 4 more weeks per ID- will place midline, possible PO doxy as well 100 bid for 4 weeks    #Constipation  - continue rx, multiple  - monitor    #ESRD on HMD  - MWF  - renal following  - rx as per renal   - HD in am     #Anemia  - post transfusion   - EDWIN 40 mcg weekly  - monitor H/H     #HTN  - off rx  - BP stable    #Acromegaly  - on Sandostatin monthly  - medication was delivered to the hospital   - spoke with pharmacy, pt received med 11/14  - Insulin-Like Growth Factor 1: 221 ng/mL , Prolactin, Serum: 47.2 ng/mL    #HLD  - continue Atorvastatin    DVT ppx: heparin   GI ppx: Protonix  Diet: carbohydrate consistent  Activity: Bedrest    Dispo: home tomorrow 57 M w/ hx of neuropathy, acromegaly, diabetes, htn, CKD MWF, makes urine, htn [facility] LLE ulcer with Dr. Gomes. Vascular studies showed severe PVD and is transferred to [place] for angiogram.    #Severe PVD  -s/p angiogram, and left SFA angioplasty, stenting  -s/p multiple debridements, scheduled for debridement and skin graft; procedure cancelled.  - s/p debridement Friday 11/16 for of L heel ulcer and placement of skin graft placement  - pain controlled  - color improved  - culture + for VRE   - antibiotics changed to Daptomycin and Unasyn per ID   - plavix has been on hold 2/2 multiple surgeries for 3 weeks, pt needs new cardiologist    #DFU - left foot  - post multiple debridements of infected heel ulcer to the calcaneus  - continue basal insulin  - monitor fs  - Culture Results: Moderate Enterococcus faecium (vancomycin resistant) (11.07.18 @ 17:46)   - c/w IV unasyn 3g q 24h for 4 more weeks per ID, stop on Dec 17th - will place midline, possible PO doxy as well 100 mg bid for 4 weeks (start doxy 11/20), stop on Dec 18 2018    #Constipation  - continue rx, multiple  - monitor    #ESRD on HMD  - MWF  - renal following  - rx as per renal   - HD in am     #Anemia  - post transfusion   - EDWIN 40 mcg weekly  - monitor H/H     #HTN  - off rx  - BP stable    #Acromegaly  - on Sandostatin monthly  - medication was delivered to the hospital   - spoke with pharmacy, pt received med 11/14  - Insulin-Like Growth Factor 1: 221 ng/mL , Prolactin, Serum: 47.2 ng/mL    #HLD  - continue Atorvastatin    DVT ppx: heparin   GI ppx: Protonix  Diet: carbohydrate consistent  Activity: Bedrest    Dispo: home tomorrow

## 2018-11-19 NOTE — PROGRESS NOTE ADULT - ASSESSMENT
1)  ESRD: s/p hd Friday  hd on monday, might be able to use av fistula   2)  DFU right foot s/p debridement ,followed by ID sp angioplasty , on ATB; surgery F/U        see ID; 4 wks of Unasyn, weekly CPK; NEEDS a PICC    3)  Severe secondary HPT  on   hectorol 2 with HD  4) Anemia Hb noted   resistant to EDWIN in view of infection, will not increase dose , no venofer in view of infection transfuse if Hb<7  5) BP  well controlled  6) phos at goal , continue phsolo  7) ? d/c plan - back to SNF/ clarify Rehab/wt bearing/ambulation

## 2018-11-19 NOTE — PROGRESS NOTE ADULT - SUBJECTIVE AND OBJECTIVE BOX
Chart reviewed, patient examined. Pertinent results reviewed.  Case discussed with HO  HD#   ; POD#3-graft;  specialist f/u reviewed-kin ID & Surgery    Medicine progress note    Patient was seen and examined, events over the last 24 h noted .  HD friday ; surgery Friday    Allergies:  bacitracin (Unknown)  Neosporin (Hypotension)    Hospital Medications:   MEDICATIONS  (STANDING):  ampicillin/sulbactam  IVPB 3 Gram(s) IV Intermittent every 24 hours  aspirin enteric coated 81 milliGRAM(s) Oral daily  atorvastatin 20 milliGRAM(s) Oral at bedtime  bisacodyl Suppository 10 milliGRAM(s) Rectal daily  calcium acetate 1334 milliGRAM(s) Oral four times a day with meals  chlorhexidine 4% Liquid 1 Application(s) Topical <User Schedule>  DAPTOmycin IVPB 1000 milliGRAM(s) IV Intermittent once  DAPTOmycin IVPB 1000 milliGRAM(s) IV Intermittent <User Schedule>  darbepoetin Injectable Syringe 40 MICROGram(s) IV Push <User Schedule>  docusate sodium 100 milliGRAM(s) Oral two times a day  doxercalciferol Injectable 2 MICROGram(s) IV Push <User Schedule>  ergocalciferol 22025 Unit(s) Oral every week  folic acid 1 milliGRAM(s) Oral daily  heparin  Injectable 5000 Unit(s) SubCutaneous every 8 hours  lactulose Syrup 20 Gram(s) Oral daily  pantoprazole    Tablet 40 milliGRAM(s) Oral before breakfast  polyethylene glycol 3350 17 Gram(s) Oral every 12 hours  tamsulosin 0.4 milliGRAM(s) Oral at bedtime        VITALS:Vital Signs Last 24 Hrs  T(C): 36.2 (19 Nov 2018 05:48), Max: 36.8 (18 Nov 2018 21:00)  T(F): 97.1 (19 Nov 2018 05:48), Max: 98.2 (18 Nov 2018 21:00)  HR: 66 (19 Nov 2018 05:48) (66 - 69)  BP: 144/64 (19 Nov 2018 05:48) (134/63 - 151/73)  BP(mean): --  RR: 18 (19 Nov 2018 05:48) (18 - 18)  SpO2: --      PHYSICAL EXAM:  	Gen: NAD; coarse facial features; A, Ox4  	Pulm: decrease BS  B/L  	CV:  S1S2; no rub  	Abd: soft, nontender/nondistended  	LE: dressing  	Vascular access: av fistula, tesio     LABS:  11-17    139  |  99  |  32<H>  ----------------------------<  129<H>  5.1<H>   |  29  |  4.4<HH>    Ca    8.9      17 Nov 2018 08:16  Phos  5.5     11-18                            8.1    5.87  )-----------( 213      ( 18 Nov 2018 07:25 )             26.5       Urine Studies:      RADIOLOGY & ADDITIONAL STUDIES: Chart reviewed, patient examined. Pertinent results reviewed.  Case discussed with HO  HD#29   ; POD#3-graft;  specialist f/u reviewed-kin ID & Surgery    Medicine progress note    Patient was seen and examined, events over the last 24 h noted .  HD friday ; surgery Friday    Allergies:  bacitracin (Unknown)  Neosporin (Hypotension)    Hospital Medications:   MEDICATIONS  (STANDING):  ampicillin/sulbactam  IVPB 3 Gram(s) IV Intermittent every 24 hours  aspirin enteric coated 81 milliGRAM(s) Oral daily  atorvastatin 20 milliGRAM(s) Oral at bedtime  bisacodyl Suppository 10 milliGRAM(s) Rectal daily  calcium acetate 1334 milliGRAM(s) Oral four times a day with meals  chlorhexidine 4% Liquid 1 Application(s) Topical <User Schedule>  DAPTOmycin IVPB 1000 milliGRAM(s) IV Intermittent once  DAPTOmycin IVPB 1000 milliGRAM(s) IV Intermittent <User Schedule>  darbepoetin Injectable Syringe 40 MICROGram(s) IV Push <User Schedule>  docusate sodium 100 milliGRAM(s) Oral two times a day  doxercalciferol Injectable 2 MICROGram(s) IV Push <User Schedule>  ergocalciferol 70605 Unit(s) Oral every week  folic acid 1 milliGRAM(s) Oral daily  heparin  Injectable 5000 Unit(s) SubCutaneous every 8 hours  lactulose Syrup 20 Gram(s) Oral daily  pantoprazole    Tablet 40 milliGRAM(s) Oral before breakfast  polyethylene glycol 3350 17 Gram(s) Oral every 12 hours  tamsulosin 0.4 milliGRAM(s) Oral at bedtime        VITALS:Vital Signs Last 24 Hrs  T(C): 36.2 (19 Nov 2018 05:48), Max: 36.8 (18 Nov 2018 21:00)  T(F): 97.1 (19 Nov 2018 05:48), Max: 98.2 (18 Nov 2018 21:00)  HR: 66 (19 Nov 2018 05:48) (66 - 69)  BP: 144/64 (19 Nov 2018 05:48) (134/63 - 151/73)  BP(mean): --  RR: 18 (19 Nov 2018 05:48) (18 - 18)  SpO2: --      PHYSICAL EXAM:  	Gen: NAD; coarse facial features; A, Ox4  	Pulm: decrease BS  B/L  	CV:  S1S2; no rub  	Abd: soft, nontender/nondistended  	LE: dressing  	Vascular access: av fistula, tesio     LABS:  11-17    139  |  99  |  32<H>  ----------------------------<  129<H>  5.1<H>   |  29  |  4.4<HH>    Ca    8.9      17 Nov 2018 08:16  Phos  5.5     11-18                            8.1    5.87  )-----------( 213      ( 18 Nov 2018 07:25 )             26.5       Urine Studies:      RADIOLOGY & ADDITIONAL STUDIES:

## 2018-11-19 NOTE — PROCEDURE NOTE - PROCEDURE
<<-----Click on this checkbox to enter Procedure Midline catheter insertion  11/19/2018    Active  [id number]

## 2018-11-19 NOTE — PROGRESS NOTE ADULT - REASON FOR ADMISSION
foot ulcer

## 2018-11-19 NOTE — PROGRESS NOTE ADULT - PROVIDER SPECIALTY LIST ADULT
Family Medicine
Infectious Disease
Internal Medicine
Nephrology
Plastic Surgery
Surgery
Thoracic Surgery
Vascular Surgery
Vascular Surgery
Internal Medicine
Nephrology
Endocrinology
Internal Medicine
Surgery
Surgery
Infectious Disease

## 2018-11-19 NOTE — PROGRESS NOTE ADULT - SUBJECTIVE AND OBJECTIVE BOX
OVERNIGHT EVENTS: None  Subjective: pt s/e at bedside, no active complaints, pt ready for d/c today  - restarted plavix today    HISTORY OF PRESENTING ILLNESS:   58y/o M w/ hx of brain tumor--acromegaly, diabetes, htn, ckd stage on dialysis MWF, makes urine, htn, 2 blood transfusion recently being worked up--  pt on rehab after recent admission for infection to feet presents for hg under 7 at rehab-[facility]; and for worsening of left leg ulcer.  pt had dialysis today and was given iv antibiotics after.  pt has been feeling chills; no fever. no abdominal pain, cp or sob. +generalized weakness.  no blood in stool    Past Medical History:  Acromegaly    CAD (coronary atherosclerotic disease)    CKD (chronic kidney disease), stage V    DM (diabetes mellitus), type 2    Dyslipidemia    HTN (hypertension)    Neuropathy.    Past Surgical History:  H/O eye surgery    H/O: pituitary tumor  s/p removal.    MEDICATIONS:  STANDING MEDICATIONS  ampicillin/sulbactam  IVPB 3 Gram(s) IV Intermittent every 24 hours  aspirin enteric coated 81 milliGRAM(s) Oral daily  atorvastatin 20 milliGRAM(s) Oral at bedtime  bisacodyl Suppository 10 milliGRAM(s) Rectal daily  calcium acetate 1334 milliGRAM(s) Oral four times a day with meals  chlorhexidine 4% Liquid 1 Application(s) Topical <User Schedule>  clopidogrel Tablet 75 milliGRAM(s) Oral daily  DAPTOmycin IVPB 1000 milliGRAM(s) IV Intermittent once  DAPTOmycin IVPB 1000 milliGRAM(s) IV Intermittent <User Schedule>  darbepoetin Injectable Syringe 40 MICROGram(s) IV Push <User Schedule>  docusate sodium 100 milliGRAM(s) Oral two times a day  doxercalciferol Injectable 2 MICROGram(s) IV Push <User Schedule>  ergocalciferol 04401 Unit(s) Oral every week  folic acid 1 milliGRAM(s) Oral daily  heparin  Injectable 5000 Unit(s) SubCutaneous every 8 hours  lactulose Syrup 20 Gram(s) Oral daily  pantoprazole    Tablet 40 milliGRAM(s) Oral before breakfast  polyethylene glycol 3350 17 Gram(s) Oral every 12 hours  tamsulosin 0.4 milliGRAM(s) Oral at bedtime    PRN MEDICATIONS  acetaminophen   Tablet .. 650 milliGRAM(s) Oral every 6 hours PRN    VITALS:   T(F): 97.1  HR: 74  BP: 102/59  RR: 17  SpO2: --    LABS:                        8.2    6.35  )-----------( 225      ( 19 Nov 2018 06:19 )             26.1     11-18    139  |  99  |  48<H>  ----------------------------<  110<H>  5.5<H>   |  26  |  6.0<HH>    Ca    8.9      18 Nov 2018 21:40  Phos  5.8     11-19      RADIOLOGY:  < from: Xray Foot AP + Lateral, Left (10.22.18 @ 19:06) >  Impression:    Diffuse dorsal soft tissue swelling, without any evidence of soft tissue   gas.     Diffuse vascular calcifications. Degenerative changes of 1st MTP joint   and tarsal joints.     PHYSICAL EXAM:  GENERAL: No acute distress, well-developed  HEAD:  Atraumatic, Normocephalic, large. Protrutding jaw  EYES: EOMI, PERRLA, conjunctiva and sclera clear  NECK: Supple, no lymphadenopathy, no JVD  CHEST/LUNG: CTAB; No wheezes, rales, or rhonchi  HEART: Regular rate and rhythm; No murmurs, rubs, or gallops  ABDOMEN: Soft, non-tender, non-distended; normal bowel sounds, no organomegaly  EXTREMITIES:  2+ peripheral pulses b/l, No clubbing, cyanosis, or edema, large hands and feet  NEUROLOGY: A&O x 3, no focal deficits  SKIN: No rashes or lesions

## 2018-11-19 NOTE — PROGRESS NOTE ADULT - ASSESSMENT
1)  ESRD: hd today  might be able to use av fistula   2)  DFU right foot s/p debridement ,followed by ID sp angioplasty , on ATB    3)  Severe secondary HPT  on   hectorol 2 with HD  4) Anemia Hb noted   resistant to EDWIN in view of infection, will not increase dose , no venofer in view of infection transfuse if Hb<7  5) BP  well controlled  6) phos noted , increase phoslo to 3 tablets q 8   7) ? d/c plan

## 2018-11-21 ENCOUNTER — OUTPATIENT (OUTPATIENT)
Dept: OUTPATIENT SERVICES | Facility: HOSPITAL | Age: 58
LOS: 1 days | Discharge: HOME | End: 2018-11-21

## 2018-11-21 DIAGNOSIS — E22.0 ACROMEGALY AND PITUITARY GIGANTISM: ICD-10-CM

## 2018-11-21 DIAGNOSIS — E11.22 TYPE 2 DIABETES MELLITUS WITH DIABETIC CHRONIC KIDNEY DISEASE: ICD-10-CM

## 2018-11-21 DIAGNOSIS — E55.9 VITAMIN D DEFICIENCY, UNSPECIFIED: ICD-10-CM

## 2018-11-21 DIAGNOSIS — I70.291 OTHER ATHEROSCLEROSIS OF NATIVE ARTERIES OF EXTREMITIES, RIGHT LEG: ICD-10-CM

## 2018-11-21 DIAGNOSIS — N25.81 SECONDARY HYPERPARATHYROIDISM OF RENAL ORIGIN: ICD-10-CM

## 2018-11-21 DIAGNOSIS — L02.612 CUTANEOUS ABSCESS OF LEFT FOOT: ICD-10-CM

## 2018-11-21 DIAGNOSIS — Z87.898 PERSONAL HISTORY OF OTHER SPECIFIED CONDITIONS: Chronic | ICD-10-CM

## 2018-11-21 DIAGNOSIS — E11.621 TYPE 2 DIABETES MELLITUS WITH FOOT ULCER: ICD-10-CM

## 2018-11-21 DIAGNOSIS — I12.0 HYPERTENSIVE CHRONIC KIDNEY DISEASE WITH STAGE 5 CHRONIC KIDNEY DISEASE OR END STAGE RENAL DISEASE: ICD-10-CM

## 2018-11-21 DIAGNOSIS — B67.99: ICD-10-CM

## 2018-11-21 DIAGNOSIS — K59.00 CONSTIPATION, UNSPECIFIED: ICD-10-CM

## 2018-11-21 DIAGNOSIS — M86.672 OTHER CHRONIC OSTEOMYELITIS, LEFT ANKLE AND FOOT: ICD-10-CM

## 2018-11-21 DIAGNOSIS — I25.10 ATHEROSCLEROTIC HEART DISEASE OF NATIVE CORONARY ARTERY WITHOUT ANGINA PECTORIS: ICD-10-CM

## 2018-11-21 DIAGNOSIS — E11.69 TYPE 2 DIABETES MELLITUS WITH OTHER SPECIFIED COMPLICATION: ICD-10-CM

## 2018-11-21 DIAGNOSIS — D63.1 ANEMIA IN CHRONIC KIDNEY DISEASE: ICD-10-CM

## 2018-11-21 DIAGNOSIS — I82.5Z1 CHRONIC EMBOLISM AND THROMBOSIS OF UNSPECIFIED DEEP VEINS OF RIGHT DISTAL LOWER EXTREMITY: ICD-10-CM

## 2018-11-21 DIAGNOSIS — I77.1 STRICTURE OF ARTERY: ICD-10-CM

## 2018-11-21 DIAGNOSIS — E87.0 HYPEROSMOLALITY AND HYPERNATREMIA: ICD-10-CM

## 2018-11-21 DIAGNOSIS — L97.424 NON-PRESSURE CHRONIC ULCER OF LEFT HEEL AND MIDFOOT WITH NECROSIS OF BONE: ICD-10-CM

## 2018-11-21 DIAGNOSIS — R30.0 DYSURIA: ICD-10-CM

## 2018-11-21 DIAGNOSIS — E11.51 TYPE 2 DIABETES MELLITUS WITH DIABETIC PERIPHERAL ANGIOPATHY WITHOUT GANGRENE: ICD-10-CM

## 2018-11-21 DIAGNOSIS — E11.21 TYPE 2 DIABETES MELLITUS WITH DIABETIC NEPHROPATHY: ICD-10-CM

## 2018-11-21 DIAGNOSIS — R06.00 DYSPNEA, UNSPECIFIED: ICD-10-CM

## 2018-11-21 DIAGNOSIS — Z79.84 LONG TERM (CURRENT) USE OF ORAL HYPOGLYCEMIC DRUGS: ICD-10-CM

## 2018-11-21 DIAGNOSIS — L97.529 NON-PRESSURE CHRONIC ULCER OF OTHER PART OF LEFT FOOT WITH UNSPECIFIED SEVERITY: ICD-10-CM

## 2018-11-21 DIAGNOSIS — E78.5 HYPERLIPIDEMIA, UNSPECIFIED: ICD-10-CM

## 2018-11-21 DIAGNOSIS — N18.6 END STAGE RENAL DISEASE: ICD-10-CM

## 2018-11-21 DIAGNOSIS — Z98.890 OTHER SPECIFIED POSTPROCEDURAL STATES: Chronic | ICD-10-CM

## 2018-11-23 RX ORDER — DARBEPOETIN ALFA IN POLYSORBAT 200MCG/0.4
40 PEN INJECTOR (ML) SUBCUTANEOUS
Qty: 0 | Refills: 0 | DISCHARGE
Start: 2018-11-23

## 2018-12-06 ENCOUNTER — APPOINTMENT (OUTPATIENT)
Dept: VASCULAR SURGERY | Facility: CLINIC | Age: 58
End: 2018-12-06
Payer: COMMERCIAL

## 2018-12-06 ENCOUNTER — RECORD ABSTRACTING (OUTPATIENT)
Age: 58
End: 2018-12-06

## 2018-12-06 VITALS
BODY MASS INDEX: 31.08 KG/M2 | WEIGHT: 250 LBS | HEIGHT: 75 IN | SYSTOLIC BLOOD PRESSURE: 124 MMHG | DIASTOLIC BLOOD PRESSURE: 78 MMHG

## 2018-12-06 DIAGNOSIS — N18.9 CHRONIC KIDNEY DISEASE, UNSPECIFIED: ICD-10-CM

## 2018-12-06 DIAGNOSIS — E11.9 TYPE 2 DIABETES MELLITUS W/OUT COMPLICATIONS: ICD-10-CM

## 2018-12-06 DIAGNOSIS — E23.7 DISORDER OF PITUITARY GLAND, UNSPECIFIED: ICD-10-CM

## 2018-12-06 DIAGNOSIS — N40.0 BENIGN PROSTATIC HYPERPLASIA WITHOUT LOWER URINARY TRACT SYMPMS: ICD-10-CM

## 2018-12-06 PROCEDURE — 99213 OFFICE O/P EST LOW 20 MIN: CPT

## 2018-12-13 ENCOUNTER — OUTPATIENT (OUTPATIENT)
Dept: OUTPATIENT SERVICES | Facility: HOSPITAL | Age: 58
LOS: 1 days | Discharge: HOME | End: 2018-12-13

## 2018-12-13 DIAGNOSIS — Z87.898 PERSONAL HISTORY OF OTHER SPECIFIED CONDITIONS: Chronic | ICD-10-CM

## 2018-12-13 DIAGNOSIS — Z98.890 OTHER SPECIFIED POSTPROCEDURAL STATES: Chronic | ICD-10-CM

## 2018-12-18 DIAGNOSIS — E11.9 TYPE 2 DIABETES MELLITUS WITHOUT COMPLICATIONS: ICD-10-CM

## 2018-12-18 DIAGNOSIS — I10 ESSENTIAL (PRIMARY) HYPERTENSION: ICD-10-CM

## 2018-12-18 DIAGNOSIS — I48.91 UNSPECIFIED ATRIAL FIBRILLATION: ICD-10-CM

## 2018-12-22 LAB
CULTURE RESULTS: SIGNIFICANT CHANGE UP
CULTURE RESULTS: SIGNIFICANT CHANGE UP
SPECIMEN SOURCE: SIGNIFICANT CHANGE UP
SPECIMEN SOURCE: SIGNIFICANT CHANGE UP

## 2018-12-27 ENCOUNTER — OUTPATIENT (OUTPATIENT)
Dept: OUTPATIENT SERVICES | Facility: HOSPITAL | Age: 58
LOS: 1 days | Discharge: HOME | End: 2018-12-27

## 2018-12-27 VITALS — SYSTOLIC BLOOD PRESSURE: 130 MMHG | HEART RATE: 78 BPM | RESPIRATION RATE: 18 BRPM | DIASTOLIC BLOOD PRESSURE: 67 MMHG

## 2018-12-27 VITALS
DIASTOLIC BLOOD PRESSURE: 53 MMHG | HEIGHT: 76 IN | RESPIRATION RATE: 17 BRPM | OXYGEN SATURATION: 100 % | TEMPERATURE: 98 F | HEART RATE: 69 BPM | SYSTOLIC BLOOD PRESSURE: 115 MMHG | WEIGHT: 244.93 LBS

## 2018-12-27 DIAGNOSIS — Z87.898 PERSONAL HISTORY OF OTHER SPECIFIED CONDITIONS: Chronic | ICD-10-CM

## 2018-12-27 DIAGNOSIS — T82.510A BREAKDOWN (MECHANICAL) OF SURGICALLY CREATED ARTERIOVENOUS FISTULA, INITIAL ENCOUNTER: Chronic | ICD-10-CM

## 2018-12-27 DIAGNOSIS — Z98.890 OTHER SPECIFIED POSTPROCEDURAL STATES: Chronic | ICD-10-CM

## 2018-12-27 LAB — GLUCOSE BLDC GLUCOMTR-MCNC: 132 MG/DL — HIGH (ref 70–99)

## 2018-12-27 RX ORDER — ONDANSETRON 8 MG/1
4 TABLET, FILM COATED ORAL ONCE
Qty: 0 | Refills: 0 | Status: DISCONTINUED | OUTPATIENT
Start: 2018-12-27 | End: 2019-01-11

## 2018-12-27 RX ORDER — MORPHINE SULFATE 50 MG/1
4 CAPSULE, EXTENDED RELEASE ORAL
Qty: 0 | Refills: 0 | Status: DISCONTINUED | OUTPATIENT
Start: 2018-12-27 | End: 2018-12-27

## 2018-12-27 RX ORDER — SODIUM CHLORIDE 9 MG/ML
1000 INJECTION INTRAMUSCULAR; INTRAVENOUS; SUBCUTANEOUS
Qty: 0 | Refills: 0 | Status: DISCONTINUED | OUTPATIENT
Start: 2018-12-27 | End: 2019-01-11

## 2018-12-27 RX ORDER — OXYCODONE AND ACETAMINOPHEN 5; 325 MG/1; MG/1
2 TABLET ORAL EVERY 6 HOURS
Qty: 0 | Refills: 0 | Status: DISCONTINUED | OUTPATIENT
Start: 2018-12-27 | End: 2018-12-27

## 2018-12-27 RX ORDER — OXYCODONE AND ACETAMINOPHEN 5; 325 MG/1; MG/1
1 TABLET ORAL EVERY 4 HOURS
Qty: 0 | Refills: 0 | Status: DISCONTINUED | OUTPATIENT
Start: 2018-12-27 | End: 2018-12-27

## 2018-12-27 RX ORDER — LACTULOSE 10 G/15ML
0 SOLUTION ORAL
Qty: 0 | Refills: 0 | COMMUNITY

## 2018-12-27 RX ORDER — SENNA PLUS 8.6 MG/1
1 TABLET ORAL
Qty: 0 | Refills: 0 | COMMUNITY

## 2018-12-27 RX ADMIN — SODIUM CHLORIDE 30 MILLILITER(S): 9 INJECTION INTRAMUSCULAR; INTRAVENOUS; SUBCUTANEOUS at 12:23

## 2018-12-27 NOTE — ASU DISCHARGE PLAN (ADULT/PEDIATRIC). - DISCHARGE TO
Nursing Home/Extended Care Facility/[facility] / [facility] ([facility])

## 2018-12-27 NOTE — ASU DISCHARGE PLAN (ADULT/PEDIATRIC). - SPECIAL INSTRUCTIONS
Patient is non weight bearing to the Left foot.  Please maintain dressing to the Left foot and Left thigh as clean, dry and intact.   Patient is to follow up with Dr. Sriram Yee.  Patient to return to [facility] ([facility])

## 2018-12-27 NOTE — BRIEF OPERATIVE NOTE - PROCEDURE
<<-----Click on this checkbox to enter Procedure Split thickness skingraft  12/27/2018  Left heel  Active  [email or web address]

## 2018-12-27 NOTE — ASU PATIENT PROFILE, ADULT - PSH
Breakdown of surgically created AV fistula, init  RIGHT UPPER ARM  H/O eye surgery    H/O: pituitary tumor  s/p removal

## 2018-12-27 NOTE — BRIEF OPERATIVE NOTE - OPERATION/FINDINGS
no clinical signs of infection of the left heel. Heel ulceration limited to the breakdown of the skin

## 2018-12-27 NOTE — ASU DISCHARGE PLAN (ADULT/PEDIATRIC). - NOTIFY
Swelling that continues/Numbness, color, or temperature change to extremity/Unable to Urinate/Inability to Tolerate Liquids or Foods/Numbness, tingling/Increased Irritability or Sluggishness/Pain not relieved by Medications/Bleeding that does not stop/Persistent Nausea and Vomiting/Fever greater than 101/Excessive Diarrhea
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
## 2019-01-03 DIAGNOSIS — M06.9 RHEUMATOID ARTHRITIS, UNSPECIFIED: ICD-10-CM

## 2019-01-03 DIAGNOSIS — Z95.5 PRESENCE OF CORONARY ANGIOPLASTY IMPLANT AND GRAFT: ICD-10-CM

## 2019-01-03 DIAGNOSIS — Z99.2 DEPENDENCE ON RENAL DIALYSIS: ICD-10-CM

## 2019-01-03 DIAGNOSIS — Z79.82 LONG TERM (CURRENT) USE OF ASPIRIN: ICD-10-CM

## 2019-01-03 DIAGNOSIS — E78.5 HYPERLIPIDEMIA, UNSPECIFIED: ICD-10-CM
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
## 2019-01-03 DIAGNOSIS — D64.9 ANEMIA, UNSPECIFIED: ICD-10-CM

## 2019-01-03 DIAGNOSIS — E11.22 TYPE 2 DIABETES MELLITUS WITH DIABETIC CHRONIC KIDNEY DISEASE: ICD-10-CM

## 2019-01-03 DIAGNOSIS — N18.6 END STAGE RENAL DISEASE: ICD-10-CM

## 2019-01-03 DIAGNOSIS — Z79.02 LONG TERM (CURRENT) USE OF ANTITHROMBOTICS/ANTIPLATELETS: ICD-10-CM

## 2019-01-03 DIAGNOSIS — S91.302D UNSPECIFIED OPEN WOUND, LEFT FOOT, SUBSEQUENT ENCOUNTER: ICD-10-CM

## 2019-01-03 DIAGNOSIS — I13.11 HYPERTENSIVE HEART AND CHRONIC KIDNEY DISEASE WITHOUT HEART FAILURE, WITH STAGE 5 CHRONIC KIDNEY DISEASE, OR END STAGE RENAL DISEASE: ICD-10-CM

## 2019-01-05 ENCOUNTER — OUTPATIENT (OUTPATIENT)
Dept: OUTPATIENT SERVICES | Facility: HOSPITAL | Age: 59
LOS: 1 days | Discharge: HOME | End: 2019-01-05

## 2019-01-05 DIAGNOSIS — J11.1 INFLUENZA DUE TO UNIDENTIFIED INFLUENZA VIRUS WITH OTHER RESPIRATORY MANIFESTATIONS: ICD-10-CM

## 2019-01-05 DIAGNOSIS — T82.510A BREAKDOWN (MECHANICAL) OF SURGICALLY CREATED ARTERIOVENOUS FISTULA, INITIAL ENCOUNTER: Chronic | ICD-10-CM

## 2019-01-05 DIAGNOSIS — Z87.898 PERSONAL HISTORY OF OTHER SPECIFIED CONDITIONS: Chronic | ICD-10-CM

## 2019-01-05 DIAGNOSIS — Z98.890 OTHER SPECIFIED POSTPROCEDURAL STATES: Chronic | ICD-10-CM

## 2019-01-14 ENCOUNTER — OUTPATIENT (OUTPATIENT)
Dept: OUTPATIENT SERVICES | Facility: HOSPITAL | Age: 59
LOS: 1 days | Discharge: HOME | End: 2019-01-14

## 2019-01-14 DIAGNOSIS — Z98.890 OTHER SPECIFIED POSTPROCEDURAL STATES: Chronic | ICD-10-CM

## 2019-01-14 DIAGNOSIS — T82.510A BREAKDOWN (MECHANICAL) OF SURGICALLY CREATED ARTERIOVENOUS FISTULA, INITIAL ENCOUNTER: Chronic | ICD-10-CM

## 2019-01-14 DIAGNOSIS — Z87.898 PERSONAL HISTORY OF OTHER SPECIFIED CONDITIONS: Chronic | ICD-10-CM

## 2019-01-21 ENCOUNTER — OUTPATIENT (OUTPATIENT)
Dept: OUTPATIENT SERVICES | Facility: HOSPITAL | Age: 59
LOS: 1 days | Discharge: HOME | End: 2019-01-21

## 2019-01-21 DIAGNOSIS — Z87.898 PERSONAL HISTORY OF OTHER SPECIFIED CONDITIONS: Chronic | ICD-10-CM

## 2019-01-21 DIAGNOSIS — Z98.890 OTHER SPECIFIED POSTPROCEDURAL STATES: Chronic | ICD-10-CM

## 2019-01-21 DIAGNOSIS — T82.510A BREAKDOWN (MECHANICAL) OF SURGICALLY CREATED ARTERIOVENOUS FISTULA, INITIAL ENCOUNTER: Chronic | ICD-10-CM

## 2019-01-21 DIAGNOSIS — R06.00 DYSPNEA, UNSPECIFIED: ICD-10-CM

## 2019-01-21 DIAGNOSIS — I50.9 HEART FAILURE, UNSPECIFIED: ICD-10-CM

## 2019-01-22 ENCOUNTER — OUTPATIENT (OUTPATIENT)
Dept: OUTPATIENT SERVICES | Facility: HOSPITAL | Age: 59
LOS: 1 days | Discharge: HOME | End: 2019-01-22

## 2019-01-22 VITALS
TEMPERATURE: 97 F | HEIGHT: 75 IN | SYSTOLIC BLOOD PRESSURE: 124 MMHG | WEIGHT: 240.08 LBS | OXYGEN SATURATION: 98 % | DIASTOLIC BLOOD PRESSURE: 54 MMHG | RESPIRATION RATE: 17 BRPM | HEART RATE: 79 BPM

## 2019-01-22 VITALS — DIASTOLIC BLOOD PRESSURE: 80 MMHG | RESPIRATION RATE: 18 BRPM | SYSTOLIC BLOOD PRESSURE: 126 MMHG | HEART RATE: 78 BPM

## 2019-01-22 DIAGNOSIS — Z98.890 OTHER SPECIFIED POSTPROCEDURAL STATES: Chronic | ICD-10-CM

## 2019-01-22 DIAGNOSIS — Z87.898 PERSONAL HISTORY OF OTHER SPECIFIED CONDITIONS: Chronic | ICD-10-CM

## 2019-01-22 DIAGNOSIS — T82.510A BREAKDOWN (MECHANICAL) OF SURGICALLY CREATED ARTERIOVENOUS FISTULA, INITIAL ENCOUNTER: Chronic | ICD-10-CM

## 2019-01-22 LAB — GLUCOSE BLDC GLUCOMTR-MCNC: 117 MG/DL — HIGH (ref 70–99)

## 2019-01-22 RX ORDER — ONDANSETRON 8 MG/1
4 TABLET, FILM COATED ORAL ONCE
Qty: 0 | Refills: 0 | Status: DISCONTINUED | OUTPATIENT
Start: 2019-01-22 | End: 2019-02-06

## 2019-01-22 RX ORDER — SODIUM CHLORIDE 9 MG/ML
1000 INJECTION, SOLUTION INTRAVENOUS
Qty: 0 | Refills: 0 | Status: DISCONTINUED | OUTPATIENT
Start: 2019-01-22 | End: 2019-02-06

## 2019-01-22 RX ORDER — OXYCODONE AND ACETAMINOPHEN 5; 325 MG/1; MG/1
2 TABLET ORAL EVERY 6 HOURS
Qty: 0 | Refills: 0 | Status: DISCONTINUED | OUTPATIENT
Start: 2019-01-22 | End: 2019-01-22

## 2019-01-22 RX ORDER — MORPHINE SULFATE 50 MG/1
4 CAPSULE, EXTENDED RELEASE ORAL ONCE
Qty: 0 | Refills: 0 | Status: DISCONTINUED | OUTPATIENT
Start: 2019-01-22 | End: 2019-02-06

## 2019-01-22 RX ORDER — OXYCODONE AND ACETAMINOPHEN 5; 325 MG/1; MG/1
1 TABLET ORAL EVERY 4 HOURS
Qty: 0 | Refills: 0 | Status: DISCONTINUED | OUTPATIENT
Start: 2019-01-22 | End: 2019-01-22

## 2019-01-22 NOTE — BRIEF OPERATIVE NOTE - PROCEDURE
<<-----Click on this checkbox to enter Procedure Removal, catheter  01/22/2019  removal of tesio catheter  Active  [id number]

## 2019-01-22 NOTE — ASU PREOP CHECKLIST - INTERNAL PROSTHESES
RIGHT ACW TESSION, LEFT ASU VIA W/C MIDLINE CATH, CARDIAC STENTS X 3, LEFT ASU VIA W/C FEMORAL STENT/yes(specify)

## 2019-01-25 DIAGNOSIS — N18.6 END STAGE RENAL DISEASE: ICD-10-CM

## 2019-01-25 DIAGNOSIS — Z45.2 ENCOUNTER FOR ADJUSTMENT AND MANAGEMENT OF VASCULAR ACCESS DEVICE: ICD-10-CM

## 2019-01-25 DIAGNOSIS — Z99.2 DEPENDENCE ON RENAL DIALYSIS: ICD-10-CM

## 2019-01-25 DIAGNOSIS — Z88.8 ALLERGY STATUS TO OTHER DRUGS, MEDICAMENTS AND BIOLOGICAL SUBSTANCES STATUS: ICD-10-CM

## 2019-01-25 DIAGNOSIS — D64.9 ANEMIA, UNSPECIFIED: ICD-10-CM

## 2019-01-25 DIAGNOSIS — I12.0 HYPERTENSIVE CHRONIC KIDNEY DISEASE WITH STAGE 5 CHRONIC KIDNEY DISEASE OR END STAGE RENAL DISEASE: ICD-10-CM

## 2019-03-14 ENCOUNTER — OTHER (OUTPATIENT)
Age: 59
End: 2019-03-14

## 2019-03-14 ENCOUNTER — APPOINTMENT (OUTPATIENT)
Dept: VASCULAR SURGERY | Facility: CLINIC | Age: 59
End: 2019-03-14

## 2019-03-25 ENCOUNTER — OUTPATIENT (OUTPATIENT)
Dept: OUTPATIENT SERVICES | Facility: HOSPITAL | Age: 59
LOS: 1 days | Discharge: HOME | End: 2019-03-25

## 2019-03-25 DIAGNOSIS — Z87.898 PERSONAL HISTORY OF OTHER SPECIFIED CONDITIONS: Chronic | ICD-10-CM

## 2019-03-25 DIAGNOSIS — Z98.890 OTHER SPECIFIED POSTPROCEDURAL STATES: Chronic | ICD-10-CM

## 2019-03-25 DIAGNOSIS — T82.510A BREAKDOWN (MECHANICAL) OF SURGICALLY CREATED ARTERIOVENOUS FISTULA, INITIAL ENCOUNTER: Chronic | ICD-10-CM

## 2019-03-27 DIAGNOSIS — R79.9 ABNORMAL FINDING OF BLOOD CHEMISTRY, UNSPECIFIED: ICD-10-CM

## 2019-04-09 NOTE — PATIENT PROFILE ADULT. - ARE SIGNIFICANT INDICATORS COMPLETE.
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 He was found to have a large pneumothorax. He was initially taken to [facility] ER where Chest tube was placed  with 40 of serosanguinous fluid drained then he was transferred to [place].  Patient underwent talc pleurodesis yesterday ( 4/5/19). Yesterday, a second chest tube was placed at same side.  Patient seen today, not complaining of pain or SOB currently.     OBJECTIVE  PAST MEDICAL & SURGICAL HISTORY  Cancer: in the face, s/p surgery  Atrial fibrillation  Hypothyroid  High cholesterol  Hypertension  History of facial surgery    ALLERGIES:  penicillins (Unknown)    MEDICATIONS:  STANDING MEDICATIONS  amiodarone    Tablet 200 milliGRAM(s) Oral daily  atorvastatin 80 milliGRAM(s) Oral at bedtime  dextrose 5% + sodium chloride 0.45%. 1000 milliLiter(s) IV Continuous <Continuous>  dextrose 5%. 1000 milliLiter(s) IV Continuous <Continuous>  dextrose 50% Injectable 12.5 Gram(s) IV Push once  dextrose 50% Injectable 25 Gram(s) IV Push once  dextrose 50% Injectable 25 Gram(s) IV Push once  diltiazem    milliGRAM(s) Oral daily  docusate sodium 100 milliGRAM(s) Oral three times a day  enoxaparin Injectable 70 milliGRAM(s) SubCutaneous every 12 hours  fenofibrate Tablet 145 milliGRAM(s) Oral daily  insulin glargine Injectable (LANTUS) 8 Unit(s) SubCutaneous every morning  insulin lispro (HumaLOG) corrective regimen sliding scale   SubCutaneous three times a day before meals  insulin lispro Injectable (HumaLOG) 3 Unit(s) SubCutaneous before breakfast  insulin lispro Injectable (HumaLOG) 3 Unit(s) SubCutaneous before lunch  insulin lispro Injectable (HumaLOG) 3 Unit(s) SubCutaneous before dinner  levothyroxine 25 MICROGram(s) Oral daily  lisinopril 40 milliGRAM(s) Oral daily  metoprolol succinate ER 25 milliGRAM(s) Oral daily  morphine  - Injectable 1 milliGRAM(s) IV Push once  senna 2 Tablet(s) Oral at bedtime    PRN MEDICATIONS  dextrose 40% Gel 15 Gram(s) Oral once PRN  glucagon  Injectable 1 milliGRAM(s) IntraMuscular once PRN  morphine  - Injectable 2 milliGRAM(s) IV Push every 6 hours PRN      VITAL SIGNS: Last 24 Hours  T(C): 35.6 (09 Apr 2019 07:48), Max: 36.8 (08 Apr 2019 23:37)  T(F): 96.1 (09 Apr 2019 07:48), Max: 98.3 (08 Apr 2019 23:37)  HR: 88 (09 Apr 2019 07:48) (81 - 88)  BP: 132/61 (09 Apr 2019 07:48) (131/62 - 175/68)  BP(mean): 104 (09 Apr 2019 07:48) (91 - 104)  RR: 18 (09 Apr 2019 07:48) (18 - 20)  SpO2: --    LABS:                        9.3    10.75 )-----------( 453      ( 09 Apr 2019 05:19 )             28.9     04-09    133<L>  |  99  |  39<H>  ----------------------------<  124<H>  4.8   |  23  |  1.1    Ca    9.0      09 Apr 2019 05:19    TPro  5.4<L>  /  Alb  2.5<L>  /  TBili  0.5  /  DBili  x   /  AST  31  /  ALT  19  /  AlkPhos  56  04-09                PHYSICAL EXAM:    GENERAL: NAD, well-developed, AAOx3  HEENT:  Atraumatic, Normocephalic. EOMI, PERRLA, conjunctiva and sclera clear, No JVD  PULMONARY: Clear to auscultation bilaterally; No wheeze, 2 chest tubes in place  CARDIOVASCULAR: Regular rate and rhythm; No murmurs, rubs, or gallops  GASTROINTESTINAL: Soft, Nontender, Nondistended; Bowel sounds present  MUSCULOSKELETAL:  2+ Peripheral Pulses, No clubbing, cyanosis, or edema  NEUROLOGY: non-focal  SKIN: No rashes or lesions      ASSESSMENT & PLAN    #Left Pneumothorax s/p chest tube placement  - Pt followed by CT surgery and pulmonary  - Patient post talc pleurodesis ( 4/5/19); second chest tube (pigtail inserted yesterday ( 4/8/19)  - pneumothorax resolved on CXR  - First CT still with air leak  - C/w CT to suction  - monitor CT output   - incentive spirometry  - maintain oxygen sat>92  - Pain control as needed    # H/o paroxysmal afib, presently in NR  - C/w amiodarone, diltiazem, metoprolol.  - Started on  therapeutic Lovenox    # H/o Hypertension  - Better controlled   -C/w metoprolol  - Cardizem increased to 240 mg daily  -Lisinopril increased to 40 mg     # H/o Hypothyroidism  - C/w synthroid    # H/o Dyslipidemia  - c/w statin, fenofibrate    #Pending -Pt still with Chest tube    # Disposition: from home Yes

## 2019-04-15 NOTE — PATIENT PROFILE ADULT. - MEDICATION ADMINISTRATION INFO, PROFILE
----- Message from Gladys Dorado sent at 4/15/2019 11:51 AM CDT -----  Contact: claritza ann [phone]   claritza ann [phone] requesting a call from the nurse concerning patient has sever's disease, will doctor he able to help the patient?  Please call   
no concerns

## 2019-05-08 ENCOUNTER — INPATIENT (INPATIENT)
Facility: HOSPITAL | Age: 59
LOS: 2 days | Discharge: HOME HEALTH | End: 2019-05-11
Attending: HOSPITALIST | Admitting: HOSPITALIST
Payer: MEDICARE

## 2019-05-08 VITALS
OXYGEN SATURATION: 98 % | DIASTOLIC BLOOD PRESSURE: 77 MMHG | HEART RATE: 82 BPM | SYSTOLIC BLOOD PRESSURE: 158 MMHG | RESPIRATION RATE: 18 BRPM

## 2019-05-08 DIAGNOSIS — Z87.898 PERSONAL HISTORY OF OTHER SPECIFIED CONDITIONS: Chronic | ICD-10-CM

## 2019-05-08 DIAGNOSIS — T82.510A BREAKDOWN (MECHANICAL) OF SURGICALLY CREATED ARTERIOVENOUS FISTULA, INITIAL ENCOUNTER: Chronic | ICD-10-CM

## 2019-05-08 DIAGNOSIS — Z98.890 OTHER SPECIFIED POSTPROCEDURAL STATES: Chronic | ICD-10-CM

## 2019-05-08 LAB
ALBUMIN SERPL ELPH-MCNC: 3.4 G/DL — LOW (ref 3.5–5.2)
ALP SERPL-CCNC: 97 U/L — SIGNIFICANT CHANGE UP (ref 30–115)
ALT FLD-CCNC: 6 U/L — SIGNIFICANT CHANGE UP (ref 0–41)
ANION GAP SERPL CALC-SCNC: 13 MMOL/L — SIGNIFICANT CHANGE UP (ref 7–14)
APTT BLD: 32.6 SEC — SIGNIFICANT CHANGE UP (ref 27–39.2)
AST SERPL-CCNC: 16 U/L — SIGNIFICANT CHANGE UP (ref 0–41)
BASE EXCESS BLDV CALC-SCNC: 0.6 MMOL/L — SIGNIFICANT CHANGE UP (ref -2–2)
BASOPHILS # BLD AUTO: 0.06 K/UL — SIGNIFICANT CHANGE UP (ref 0–0.2)
BASOPHILS NFR BLD AUTO: 0.8 % — SIGNIFICANT CHANGE UP (ref 0–1)
BILIRUB SERPL-MCNC: 0.2 MG/DL — SIGNIFICANT CHANGE UP (ref 0.2–1.2)
BUN SERPL-MCNC: 47 MG/DL — HIGH (ref 10–20)
CA-I SERPL-SCNC: 1.18 MMOL/L — SIGNIFICANT CHANGE UP (ref 1.12–1.3)
CALCIUM SERPL-MCNC: 8.7 MG/DL — SIGNIFICANT CHANGE UP (ref 8.5–10.1)
CHLORIDE SERPL-SCNC: 101 MMOL/L — SIGNIFICANT CHANGE UP (ref 98–110)
CO2 SERPL-SCNC: 24 MMOL/L — SIGNIFICANT CHANGE UP (ref 17–32)
CREAT SERPL-MCNC: 4.9 MG/DL — CRITICAL HIGH (ref 0.7–1.5)
EOSINOPHIL # BLD AUTO: 0.29 K/UL — SIGNIFICANT CHANGE UP (ref 0–0.7)
EOSINOPHIL NFR BLD AUTO: 3.9 % — SIGNIFICANT CHANGE UP (ref 0–8)
GAS PNL BLDV: 136 MMOL/L — SIGNIFICANT CHANGE UP (ref 136–145)
GAS PNL BLDV: SIGNIFICANT CHANGE UP
GLUCOSE SERPL-MCNC: 184 MG/DL — HIGH (ref 70–99)
HCO3 BLDV-SCNC: 27 MMOL/L — SIGNIFICANT CHANGE UP (ref 22–29)
HCT VFR BLD CALC: 30.8 % — LOW (ref 42–52)
HCT VFR BLDA CALC: 50.1 % — HIGH (ref 34–44)
HGB BLD CALC-MCNC: 16.3 G/DL — SIGNIFICANT CHANGE UP (ref 14–18)
HGB BLD-MCNC: 10 G/DL — LOW (ref 14–18)
IMM GRANULOCYTES NFR BLD AUTO: 0.4 % — HIGH (ref 0.1–0.3)
INR BLD: 1.13 RATIO — SIGNIFICANT CHANGE UP (ref 0.65–1.3)
LACTATE BLDV-MCNC: 0.8 MMOL/L — SIGNIFICANT CHANGE UP (ref 0.5–1.6)
LYMPHOCYTES # BLD AUTO: 1.27 K/UL — SIGNIFICANT CHANGE UP (ref 1.2–3.4)
LYMPHOCYTES # BLD AUTO: 17 % — LOW (ref 20.5–51.1)
MCHC RBC-ENTMCNC: 31.4 PG — HIGH (ref 27–31)
MCHC RBC-ENTMCNC: 32.5 G/DL — SIGNIFICANT CHANGE UP (ref 32–37)
MCV RBC AUTO: 96.9 FL — HIGH (ref 80–94)
MONOCYTES # BLD AUTO: 1.18 K/UL — HIGH (ref 0.1–0.6)
MONOCYTES NFR BLD AUTO: 15.8 % — HIGH (ref 1.7–9.3)
NEUTROPHILS # BLD AUTO: 4.66 K/UL — SIGNIFICANT CHANGE UP (ref 1.4–6.5)
NEUTROPHILS NFR BLD AUTO: 62.1 % — SIGNIFICANT CHANGE UP (ref 42.2–75.2)
NRBC # BLD: 0 /100 WBCS — SIGNIFICANT CHANGE UP (ref 0–0)
PCO2 BLDV: 51 MMHG — SIGNIFICANT CHANGE UP (ref 41–51)
PH BLDV: 7.34 — SIGNIFICANT CHANGE UP (ref 7.26–7.43)
PLATELET # BLD AUTO: 174 K/UL — SIGNIFICANT CHANGE UP (ref 130–400)
PO2 BLDV: 23 MMHG — SIGNIFICANT CHANGE UP (ref 20–40)
POTASSIUM BLDV-SCNC: 3.7 MMOL/L — SIGNIFICANT CHANGE UP (ref 3.3–5.6)
POTASSIUM SERPL-MCNC: 5.1 MMOL/L — HIGH (ref 3.5–5)
POTASSIUM SERPL-SCNC: 5.1 MMOL/L — HIGH (ref 3.5–5)
PROT SERPL-MCNC: 5.7 G/DL — LOW (ref 6–8)
PROTHROM AB SERPL-ACNC: 13 SEC — HIGH (ref 9.95–12.87)
RBC # BLD: 3.18 M/UL — LOW (ref 4.7–6.1)
RBC # FLD: 14.6 % — HIGH (ref 11.5–14.5)
SAO2 % BLDV: 33 % — SIGNIFICANT CHANGE UP
SODIUM SERPL-SCNC: 138 MMOL/L — SIGNIFICANT CHANGE UP (ref 135–146)
TROPONIN T SERPL-MCNC: 0.12 NG/ML — CRITICAL HIGH
WBC # BLD: 7.49 K/UL — SIGNIFICANT CHANGE UP (ref 4.8–10.8)
WBC # FLD AUTO: 7.49 K/UL — SIGNIFICANT CHANGE UP (ref 4.8–10.8)

## 2019-05-08 PROCEDURE — 73630 X-RAY EXAM OF FOOT: CPT | Mod: 26,RT

## 2019-05-08 PROCEDURE — 99285 EMERGENCY DEPT VISIT HI MDM: CPT

## 2019-05-08 PROCEDURE — 93010 ELECTROCARDIOGRAM REPORT: CPT

## 2019-05-08 PROCEDURE — 71045 X-RAY EXAM CHEST 1 VIEW: CPT | Mod: 26

## 2019-05-08 PROCEDURE — 93970 EXTREMITY STUDY: CPT | Mod: 26

## 2019-05-08 RX ORDER — PIPERACILLIN AND TAZOBACTAM 4; .5 G/20ML; G/20ML
4.5 INJECTION, POWDER, LYOPHILIZED, FOR SOLUTION INTRAVENOUS ONCE
Qty: 0 | Refills: 0 | Status: COMPLETED | OUTPATIENT
Start: 2019-05-08 | End: 2019-05-08

## 2019-05-08 RX ORDER — ASPIRIN/CALCIUM CARB/MAGNESIUM 324 MG
325 TABLET ORAL ONCE
Qty: 0 | Refills: 0 | Status: COMPLETED | OUTPATIENT
Start: 2019-05-08 | End: 2019-05-08

## 2019-05-08 RX ADMIN — PIPERACILLIN AND TAZOBACTAM 200 GRAM(S): 4; .5 INJECTION, POWDER, LYOPHILIZED, FOR SOLUTION INTRAVENOUS at 17:26

## 2019-05-08 RX ADMIN — Medication 325 MILLIGRAM(S): at 22:02

## 2019-05-08 NOTE — ED PROVIDER NOTE - OBJECTIVE STATEMENT
57 y/o male with h/o esrd on HD m/w/f, CAD, DM, acromegaly/pituitary tumor, sent to ER for R foot ulceration/infection and bradycardia.  Pt was at HD today, had pre-procedure vitals checked and HR found to be in 40's - normal blood pressure.  Rechecked a few minutes later and was the same.  Pt denies palpitations, no cp/sob.  no lightheadedness, no syncope/near syncope.  After ~ 30  minutes HR was back up to 80's.  Pt sent to ER for eval, did not receive HD.  Pt also c/o recent ulceration to R heel - states he thinks he scraped it ~ 10 days ago, unsure due to neuropathy, but noticed ulceration.  Podiatrist came to house 6 days ago and did local debridement.  Pt states he's continuing to have drainage from wound, and has become foul smelling.  No f/c.  mild swelling to b/l le's.  no calf pain.  no n/v/d.  Pt started taking amox last night, called in my podiatry.  Pt had skin grafting to L heel ulcer ~ 4 months ago by Dr. Tripp - that area has healed.

## 2019-05-08 NOTE — ED PROVIDER NOTE - NS ED ROS FT
Constitutional:  no fevers, no chills, no malaise  Eyes:  No visual changes  ENMT: No neck pain or stiffness, no nasal congestion, no ear pain, no throat pain  Cardiac:  No chest pain  Respiratory:  No cough or sob  GI:  No nausea, vomiting, diarrhea or abdominal pain.  :  No dysuria, frequency or burning.  MS:  see hpi  Neuro:  No headache, no dizziness, no change in mental status  Skin:  see hpi  Except as documented in the HPI,  all other systems are negative

## 2019-05-08 NOTE — ED ADULT NURSE NOTE - OBJECTIVE STATEMENT
Patient reports he was going in today for routine dialysis and was found to be bradycardic (40s). Patient also repots he has a right leg redness that has progressively worsened over the last few days. Denies any fevers and chest pain. Patient started amoxicillin yesterday for the right leg redness.

## 2019-05-08 NOTE — ED PROVIDER NOTE - CARE PLAN
Principal Discharge DX:	Diabetic foot ulcer  Secondary Diagnosis:	Bradycardia  Secondary Diagnosis:	Elevated troponin

## 2019-05-08 NOTE — ED PROVIDER NOTE - PHYSICAL EXAMINATION
CONSTITUTIONAL: Well-developed; well-nourished; in no acute distress, nontoxic appearing  SKIN: skin exam is warm and dry,  HEAD: Normocephalic; atraumatic.  EYES: PERRL, 3 mm bilateral, no nystagmus, EOM intact; conjunctiva and sclera clear.  ENT: MMM, no nasal congestion  NECK: Supple; non tender.+ full passive ROM in all directions. No JVD  CARD: S1, S2 normal, no murmur  RESP: No wheezes, rales or rhonchi. Good air movement bilaterally  ABD: soft; non-distended; non-tender. No Rebound, No guarding  EXT: Normal ROM. + b/l le edema.  b/l R>L shins with erythema, some scabs (per pt, chronic).  R heel with 5 x 6 ulceration, no drainage, mild erythema, L heel with healed skin graft. feet warm, + DP pulses by doppler.    NEURO: awake, alert, following commands, oriented, grossly unremarkable. No Focal deficits. GCS 15.   PSYCH: Cooperative, appropriate. CONSTITUTIONAL: Well-developed; well-nourished; in no acute distress, nontoxic appearing  SKIN: skin exam is warm and dry,  HEAD: Normocephalic; atraumatic.  EYES: PERRL, 3 mm bilateral, no nystagmus, EOM intact; conjunctiva and sclera clear.  ENT: MMM, no nasal congestion  NECK: Supple; non tender.+ full passive ROM in all directions. No JVD  CARD: S1, S2 normal, no murmur  RESP: No wheezes, rales or rhonchi. Good air movement bilaterally  ABD: soft; non-distended; non-tender. No Rebound, No guarding  EXT: Normal ROM.  RUE - + AVF with thrill.  + b/l le edema.  b/l R>L shins with erythema, some scabs (per pt, chronic).  R heel with 5 x 6 ulceration, no drainage, mild erythema, L heel with healed skin graft. feet warm, + DP pulses by doppler.    NEURO: awake, alert, following commands, oriented, grossly unremarkable. No Focal deficits. GCS 15.   PSYCH: Cooperative, appropriate.

## 2019-05-09 ENCOUNTER — TRANSCRIPTION ENCOUNTER (OUTPATIENT)
Age: 59
End: 2019-05-09

## 2019-05-09 DIAGNOSIS — R09.89 OTHER SPECIFIED SYMPTOMS AND SIGNS INVOLVING THE CIRCULATORY AND RESPIRATORY SYSTEMS: ICD-10-CM

## 2019-05-09 LAB
ALBUMIN SERPL ELPH-MCNC: 3.5 G/DL — SIGNIFICANT CHANGE UP (ref 3.5–5.2)
ALP SERPL-CCNC: 90 U/L — SIGNIFICANT CHANGE UP (ref 30–115)
ALT FLD-CCNC: 5 U/L — SIGNIFICANT CHANGE UP (ref 0–41)
ANION GAP SERPL CALC-SCNC: 17 MMOL/L — HIGH (ref 7–14)
APTT BLD: 36 SEC — SIGNIFICANT CHANGE UP (ref 27–39.2)
AST SERPL-CCNC: 7 U/L — SIGNIFICANT CHANGE UP (ref 0–41)
BASOPHILS # BLD AUTO: 0.06 K/UL — SIGNIFICANT CHANGE UP (ref 0–0.2)
BASOPHILS NFR BLD AUTO: 0.9 % — SIGNIFICANT CHANGE UP (ref 0–1)
BILIRUB DIRECT SERPL-MCNC: <0.2 MG/DL — SIGNIFICANT CHANGE UP (ref 0–0.2)
BILIRUB INDIRECT FLD-MCNC: >0.1 MG/DL — LOW (ref 0.2–1.2)
BILIRUB SERPL-MCNC: 0.3 MG/DL — SIGNIFICANT CHANGE UP (ref 0.2–1.2)
BUN SERPL-MCNC: 50 MG/DL — HIGH (ref 10–20)
CA-I BLD-SCNC: 1.17 MMOL/L — SIGNIFICANT CHANGE UP (ref 1.12–1.3)
CALCIUM SERPL-MCNC: 8.7 MG/DL — SIGNIFICANT CHANGE UP (ref 8.5–10.1)
CHLORIDE SERPL-SCNC: 103 MMOL/L — SIGNIFICANT CHANGE UP (ref 98–110)
CHOLEST SERPL-MCNC: 87 MG/DL — LOW (ref 100–200)
CK MB CFR SERPL CALC: 3.9 NG/ML — SIGNIFICANT CHANGE UP (ref 0.6–6.3)
CK SERPL-CCNC: 59 U/L — SIGNIFICANT CHANGE UP (ref 0–225)
CK SERPL-CCNC: 72 U/L — SIGNIFICANT CHANGE UP (ref 0–225)
CO2 SERPL-SCNC: 22 MMOL/L — SIGNIFICANT CHANGE UP (ref 17–32)
CREAT SERPL-MCNC: 5 MG/DL — CRITICAL HIGH (ref 0.7–1.5)
EOSINOPHIL # BLD AUTO: 0.38 K/UL — SIGNIFICANT CHANGE UP (ref 0–0.7)
EOSINOPHIL NFR BLD AUTO: 5.6 % — SIGNIFICANT CHANGE UP (ref 0–8)
ERYTHROCYTE [SEDIMENTATION RATE] IN BLOOD: 41 MM/HR — HIGH (ref 0–10)
ESTIMATED AVERAGE GLUCOSE: 131 MG/DL — HIGH (ref 68–114)
FERRITIN SERPL-MCNC: 500 NG/ML — HIGH (ref 30–400)
FOLATE SERPL-MCNC: >20 NG/ML — SIGNIFICANT CHANGE UP
GLUCOSE BLDC GLUCOMTR-MCNC: 103 MG/DL — HIGH (ref 70–99)
GLUCOSE BLDC GLUCOMTR-MCNC: 137 MG/DL — HIGH (ref 70–99)
GLUCOSE BLDC GLUCOMTR-MCNC: 90 MG/DL — SIGNIFICANT CHANGE UP (ref 70–99)
GLUCOSE BLDC GLUCOMTR-MCNC: 91 MG/DL — SIGNIFICANT CHANGE UP (ref 70–99)
GLUCOSE BLDC GLUCOMTR-MCNC: 98 MG/DL — SIGNIFICANT CHANGE UP (ref 70–99)
GLUCOSE SERPL-MCNC: 92 MG/DL — SIGNIFICANT CHANGE UP (ref 70–99)
HBA1C BLD-MCNC: 6.2 % — HIGH (ref 4–5.6)
HCT VFR BLD CALC: 31.4 % — LOW (ref 42–52)
HDLC SERPL-MCNC: 49 MG/DL — SIGNIFICANT CHANGE UP
HGB BLD-MCNC: 10 G/DL — LOW (ref 14–18)
IMM GRANULOCYTES NFR BLD AUTO: 0.1 % — SIGNIFICANT CHANGE UP (ref 0.1–0.3)
INR BLD: 1.14 RATIO — SIGNIFICANT CHANGE UP (ref 0.65–1.3)
IRON SATN MFR SERPL: 37 % — SIGNIFICANT CHANGE UP (ref 15–50)
IRON SATN MFR SERPL: 47 UG/DL — SIGNIFICANT CHANGE UP (ref 35–150)
LIPID PNL WITH DIRECT LDL SERPL: 26 MG/DL — SIGNIFICANT CHANGE UP (ref 4–129)
LYMPHOCYTES # BLD AUTO: 1.02 K/UL — LOW (ref 1.2–3.4)
LYMPHOCYTES # BLD AUTO: 15 % — LOW (ref 20.5–51.1)
MAGNESIUM SERPL-MCNC: 2.1 MG/DL — SIGNIFICANT CHANGE UP (ref 1.8–2.4)
MCHC RBC-ENTMCNC: 31.1 PG — HIGH (ref 27–31)
MCHC RBC-ENTMCNC: 31.8 G/DL — LOW (ref 32–37)
MCV RBC AUTO: 97.5 FL — HIGH (ref 80–94)
MONOCYTES # BLD AUTO: 0.99 K/UL — HIGH (ref 0.1–0.6)
MONOCYTES NFR BLD AUTO: 14.5 % — HIGH (ref 1.7–9.3)
MYOGLOBIN SERPL-MCNC: 212 NG/ML — HIGH (ref 0–70)
NEUTROPHILS # BLD AUTO: 4.35 K/UL — SIGNIFICANT CHANGE UP (ref 1.4–6.5)
NEUTROPHILS NFR BLD AUTO: 63.9 % — SIGNIFICANT CHANGE UP (ref 42.2–75.2)
NRBC # BLD: 0 /100 WBCS — SIGNIFICANT CHANGE UP (ref 0–0)
PHOSPHATE SERPL-MCNC: 5 MG/DL — HIGH (ref 2.1–4.9)
PLATELET # BLD AUTO: 166 K/UL — SIGNIFICANT CHANGE UP (ref 130–400)
POTASSIUM SERPL-MCNC: 3.9 MMOL/L — SIGNIFICANT CHANGE UP (ref 3.5–5)
POTASSIUM SERPL-SCNC: 3.9 MMOL/L — SIGNIFICANT CHANGE UP (ref 3.5–5)
PROT SERPL-MCNC: 5.5 G/DL — LOW (ref 6–8)
PROTHROM AB SERPL-ACNC: 13.1 SEC — HIGH (ref 9.95–12.87)
RBC # BLD: 3.22 M/UL — LOW (ref 4.7–6.1)
RBC # FLD: 14.6 % — HIGH (ref 11.5–14.5)
SODIUM SERPL-SCNC: 142 MMOL/L — SIGNIFICANT CHANGE UP (ref 135–146)
TIBC SERPL-MCNC: 126 UG/DL — LOW (ref 220–430)
TOTAL CHOLESTEROL/HDL RATIO MEASUREMENT: 1.8 RATIO — LOW (ref 4–5.5)
TRIGL SERPL-MCNC: 57 MG/DL — SIGNIFICANT CHANGE UP (ref 10–149)
TROPONIN T SERPL-MCNC: 0.1 NG/ML — CRITICAL HIGH
TROPONIN T SERPL-MCNC: 0.12 NG/ML — CRITICAL HIGH
TSH SERPL-MCNC: 0.75 UIU/ML — SIGNIFICANT CHANGE UP (ref 0.27–4.2)
UIBC SERPL-MCNC: 79 UG/DL — LOW (ref 110–370)
VIT B12 SERPL-MCNC: 510 PG/ML — SIGNIFICANT CHANGE UP (ref 232–1245)
WBC # BLD: 6.81 K/UL — SIGNIFICANT CHANGE UP (ref 4.8–10.8)
WBC # FLD AUTO: 6.81 K/UL — SIGNIFICANT CHANGE UP (ref 4.8–10.8)

## 2019-05-09 PROCEDURE — 93010 ELECTROCARDIOGRAM REPORT: CPT

## 2019-05-09 PROCEDURE — 99222 1ST HOSP IP/OBS MODERATE 55: CPT

## 2019-05-09 PROCEDURE — 93306 TTE W/DOPPLER COMPLETE: CPT | Mod: 26

## 2019-05-09 PROCEDURE — 93925 LOWER EXTREMITY STUDY: CPT | Mod: 26

## 2019-05-09 RX ORDER — FERROUS SULFATE 325(65) MG
325 TABLET ORAL DAILY
Refills: 0 | Status: DISCONTINUED | OUTPATIENT
Start: 2019-05-09 | End: 2019-05-11

## 2019-05-09 RX ORDER — COLLAGENASE CLOSTRIDIUM HIST. 250 UNIT/G
1 OINTMENT (GRAM) TOPICAL DAILY
Refills: 0 | Status: DISCONTINUED | OUTPATIENT
Start: 2019-05-09 | End: 2019-05-09

## 2019-05-09 RX ORDER — PANTOPRAZOLE SODIUM 20 MG/1
40 TABLET, DELAYED RELEASE ORAL
Refills: 0 | Status: DISCONTINUED | OUTPATIENT
Start: 2019-05-09 | End: 2019-05-11

## 2019-05-09 RX ORDER — COLLAGENASE CLOSTRIDIUM HIST. 250 UNIT/G
1 OINTMENT (GRAM) TOPICAL
Refills: 0 | Status: DISCONTINUED | OUTPATIENT
Start: 2019-05-09 | End: 2019-05-11

## 2019-05-09 RX ORDER — TAMSULOSIN HYDROCHLORIDE 0.4 MG/1
0.4 CAPSULE ORAL AT BEDTIME
Refills: 0 | Status: DISCONTINUED | OUTPATIENT
Start: 2019-05-09 | End: 2019-05-11

## 2019-05-09 RX ORDER — FOLIC ACID 0.8 MG
1 TABLET ORAL DAILY
Refills: 0 | Status: DISCONTINUED | OUTPATIENT
Start: 2019-05-09 | End: 2019-05-11

## 2019-05-09 RX ORDER — SEVELAMER CARBONATE 2400 MG/1
1600 POWDER, FOR SUSPENSION ORAL EVERY 8 HOURS
Refills: 0 | Status: DISCONTINUED | OUTPATIENT
Start: 2019-05-09 | End: 2019-05-11

## 2019-05-09 RX ORDER — HEPARIN SODIUM 5000 [USP'U]/ML
5000 INJECTION INTRAVENOUS; SUBCUTANEOUS EVERY 8 HOURS
Refills: 0 | Status: DISCONTINUED | OUTPATIENT
Start: 2019-05-09 | End: 2019-05-11

## 2019-05-09 RX ORDER — ATORVASTATIN CALCIUM 80 MG/1
80 TABLET, FILM COATED ORAL AT BEDTIME
Refills: 0 | Status: DISCONTINUED | OUTPATIENT
Start: 2019-05-09 | End: 2019-05-11

## 2019-05-09 RX ORDER — SODIUM HYPOCHLORITE 0.125 %
1 SOLUTION, NON-ORAL MISCELLANEOUS DAILY
Refills: 0 | Status: DISCONTINUED | OUTPATIENT
Start: 2019-05-09 | End: 2019-05-09

## 2019-05-09 RX ORDER — SODIUM BICARBONATE 1 MEQ/ML
1 SYRINGE (ML) INTRAVENOUS
Qty: 0 | Refills: 0 | DISCHARGE

## 2019-05-09 RX ORDER — ASPIRIN/CALCIUM CARB/MAGNESIUM 324 MG
81 TABLET ORAL DAILY
Refills: 0 | Status: DISCONTINUED | OUTPATIENT
Start: 2019-05-09 | End: 2019-05-11

## 2019-05-09 RX ORDER — DOCUSATE SODIUM 100 MG
100 CAPSULE ORAL
Refills: 0 | Status: DISCONTINUED | OUTPATIENT
Start: 2019-05-09 | End: 2019-05-11

## 2019-05-09 RX ORDER — SODIUM HYPOCHLORITE 0.125 %
1 SOLUTION, NON-ORAL MISCELLANEOUS
Refills: 0 | Status: DISCONTINUED | OUTPATIENT
Start: 2019-05-09 | End: 2019-05-11

## 2019-05-09 RX ORDER — CLOPIDOGREL BISULFATE 75 MG/1
75 TABLET, FILM COATED ORAL DAILY
Refills: 0 | Status: DISCONTINUED | OUTPATIENT
Start: 2019-05-09 | End: 2019-05-11

## 2019-05-09 RX ADMIN — TAMSULOSIN HYDROCHLORIDE 0.4 MILLIGRAM(S): 0.4 CAPSULE ORAL at 22:47

## 2019-05-09 RX ADMIN — SEVELAMER CARBONATE 1600 MILLIGRAM(S): 2400 POWDER, FOR SUSPENSION ORAL at 06:08

## 2019-05-09 RX ADMIN — Medication 1 MILLIGRAM(S): at 16:19

## 2019-05-09 RX ADMIN — Medication 81 MILLIGRAM(S): at 16:20

## 2019-05-09 RX ADMIN — Medication 325 MILLIGRAM(S): at 16:20

## 2019-05-09 RX ADMIN — HEPARIN SODIUM 5000 UNIT(S): 5000 INJECTION INTRAVENOUS; SUBCUTANEOUS at 22:47

## 2019-05-09 RX ADMIN — CLOPIDOGREL BISULFATE 75 MILLIGRAM(S): 75 TABLET, FILM COATED ORAL at 16:22

## 2019-05-09 RX ADMIN — ATORVASTATIN CALCIUM 80 MILLIGRAM(S): 80 TABLET, FILM COATED ORAL at 22:47

## 2019-05-09 NOTE — H&P ADULT - ATTENDING COMMENTS
58 year old male with pertinent medical history of End Stage Renal Diseases on HD m-w-f, Coronary Artery Disease s/p PCI 10 years ago, Acromegaly s/p resection of pituitary tumor (22 years ago) and Diabetes complicated by neuropathy resulting in foot ulcer comes to the ER for bradycardia. He was at HD yesterday morning when while he had a pre-procedure vitals checked, his HR was found to be in 40's, when rechecked a few minutes later it was the same. He complains of increasing tiredness over the last two weeks. He feels tired and an odd funny sensation in the chest as well. He feels his heart sinking at times but he denies palpitations, chest pain, shortness of breath, lightheadedness, vison changes and any episodes of syncope.  After ~ 30  minutes HR was back up to 80's.  He did not receive HD. He further complaints of recent ulceration to right heel which he thinks he scraped around 10 days ago. He has neuropathy till the mid shin so does not feel the pain, but has noticed ulceration. He says the podiatrist came to house 6 days ago and did some local debridement, however it has continued to have drainage from wound which has become foul smelling now. He denies any fever or chills, he has swelling to bilateral lower extremities with no calf pain and numbness till mid shin. He started taking amoxicillin one night before coming. He has had skin grafting for left heel ulcer around 4 months ago by Dr. Tripp.    REVIEW OF SYSTEMS: see cc/HPI  CONSTITUTIONAL: No weakness, fevers or chills  EYES/ENT: No visual changes;  No vertigo or throat pain   NECK: No pain or stiffness  RESPIRATORY: No cough, wheezing, hemoptysis; No shortness of breath  CARDIOVASCULAR: No chest pain or palpitations,(+)  bradycardia  GASTROINTESTINAL: No abdominal or epigastric pain. No nausea, vomiting, or hematemesis; No diarrhea or constipation. No melena or hematochezia.  GENITOURINARY: No dysuria, frequency or hematuria  NEUROLOGICAL: No numbness or weakness  SKIN: No itching, rashes, skin ulceration     Physical Exam: Acromegaly facies / appearance  General: WN/WD NAD  Neurology: A&Ox3, nonfocal, follows commands  Eyes: PERRLA/ EOMI  ENT/Neck: Neck supple, trachea midline, No JVD  Respiratory: CTA B/L, No wheezing, rales, rhonchi  CV: Normal rate regular rhythm, S1S2, no murmurs, rubs or gallops  Abdominal: Soft, NT, ND +BS,   Extremities: No edema, + peripheral pulses, deformities of the joints of his hands and feet  Skin: No Rashes, Hematoma, Ecchymosis, RLE heel ulceration  Incisions: n/a  Tubes:n/a    A/P  Bradycardia - currently stable   -admit to tele   -check TSH   -check 2D echo  -EP eval     DFU   -Podiarty eval and local wound care  -Arterial duplex and X-ray of the foot    ESRD/ HD  -Renal Eval   -c/w current outpatient Rx    CAD - stable   -c/w current Rx    DVT prophylaxis

## 2019-05-09 NOTE — CONSULT NOTE ADULT - SUBJECTIVE AND OBJECTIVE BOX
PODIATRY CONSULT   SU SAWYER is a pleasant well-nourished, well developed 58y Male in no acute distress, alert awake, and oriented to person, place and time.   Patient is a 58y old  Male who presents with a chief complaint of bradycardia (09 May 2019 03:20)    HPI:  58 year old male with pertinent medical history of End Stage Renal Diseases on HD m-w-f, Coronary Artery Disease s/p PCI 10 years ago, Acromegaly s/p resection of pituitary tumor (22 years ago) and Diabetes complicated by neuropathy resulting in foot ulcer comes to the ER for bradycardia. He was at HD yesterday morning when while he had a pre-procedure vitals checked, his HR was found to be in 40's, when rechecked a few minutes later it was the same. He complains of increasing tiredness over the last two weeks. He feels tired and an odd funny sensation in the chest as well. He feels his heart sinking at times but he denies palpitations, chest pain, shortness of breath, lightheadedness, vison changes and any episodes of syncope.  After ~ 30  minutes HR was back up to 80's.  He did not receive HD. He further complaints of recent ulceration to right heel which he thinks he scraped around 10 days ago. He has neuropathy till the mid shin so does not feel the pain, but has noticed ulceration. He says the podiatrist came to house 6 days ago and did some local debridement, however it has continued to have drainage from wound which has become foul smelling now. He denies any fever or chills, he has swelling to bilateral lower extremities with no calf pain and numbness till mid shin. He started taking amoxcillin one night before coming. He has had skin grafting for left heel ulcer around 4 months ago by Dr. Tripp. (09 May 2019 03:20)    pt seen by Dr. reed at nursing facility last thursday and debrided the necrotic tissue on the right lateral heel.  also Doctor prescribed po abx amoxicillin and started yesterday. he had hx of serial debridements of the right lateral heel which was done by Dr. Gomes and Dr. Yee and STSG done by Dr. Yee last year which now healed.       DIABETIC FOOT ULCER;BRADYCARDIA;ELEVATED TROPONIN  ESRD on dialysis  Dyslipidemia  DM (diabetes mellitus), type 2  Neuropathy  HTN (hypertension)  CAD (coronary atherosclerotic disease)  Acromegaly  CKD (chronic kidney disease), stage V      PMH: DIABETIC FOOT ULCER;BRADYCARDIA;ELEVATED TROPONIN  ESRD on dialysis  Dyslipidemia  DM (diabetes mellitus), type 2  Neuropathy  HTN (hypertension)  CAD (coronary atherosclerotic disease)  Acromegaly  CKD (chronic kidney disease), stage V    PSH: Breakdown of surgically created AV fistula, init  H/O eye surgery  H/O: pituitary tumor    Medication   Allergy: bacitracin (Unknown)  Neosporin (Hypotension)        Labs:                        10.0   7.49  )-----------( 174      ( 08 May 2019 16:20 )             30.8     PT/INR - ( 08 May 2019 16:20 )   PT: 13.00 sec;   INR: 1.13 ratio         PTT - ( 08 May 2019 16:20 )  PTT:32.6 sec  05-08    138  |  101  |  47<H>  ----------------------------<  184<H>  5.1<H>   |  24  |  4.9<HH>    Ca    8.7      08 May 2019 16:20    TPro  5.7<L>  /  Alb  3.4<L>  /  TBili  0.2  /  DBili  x   /  AST  16  /  ALT  6   /  AlkPhos  97  05-08    CARDIAC MARKERS ( 08 May 2019 20:31 )  x     / 0.12 ng/mL / x     / x     / x              O:   Derm: ulceration left lateral heel + hyperkeratotic border, wound base Fibrogranular with small necrotic patches center of the wound/ , wound size (4.9cm X4.2 cm) + edema foot/ankle, - jose-wound erythema, - purulence, - fluctuance, - tracking/tunneling, - probe to bone. - streaking erythema. - xerosis, + hemosiderin deposits LLE. - active sign of infection  discoloration 2nd/3rd digit right foot, no open lesion  no open lesion left lateral heel  Vascular: Dorsalis Pedis and Posterior Tibial pulses 0/4.  delayed Capillary re-fill time and cold to touch digits 1-5 right foot  Neuro: Protective sensation diminished to the level of the digits right foot  MSK: Muscle strength 5/5 all major muscle groups right foot.      Assessment and Plan:   pressure ulcer right lateral heel  +edema  possible ischemic changes digits 2/3 right foot, cold to touch  DM w neuropathy    Chart reviewed and Patient evaluated  Discussed diagnosis and treatment with patient  Wound flush with normal saline  Applied wet to dry sterile dressing  local wound care instruction: wash with soap and water, apply santyl w/ dakins soaked guaze/dsd/kerlix bid  Offloading to bilateral Heels at all times to prevent further ulceration  X-rays pending report  Arterial duplex ordered, if abnormal consult vascular  venous duplex pending report  cont local wound care  f/u with attending for further evaluation

## 2019-05-09 NOTE — H&P ADULT - NSHPLABSRESULTS_GEN_ALL_CORE
VITALS:   T(F): 97  HR: 64  BP: 148/73  RR: 18  SpO2: 96%    LABS:                        10.0   7.49  )-----------( 174      ( 08 May 2019 16:20 )             30.8     05-08    138  |  101  |  47<H>  ----------------------------<  184<H>  5.1<H>   |  24  |  4.9<HH>    Ca    8.7      08 May 2019 16:20    TPro  5.7<L>  /  Alb  3.4<L>  /  TBili  0.2  /  DBili  x   /  AST  16  /  ALT  6   /  AlkPhos  97  05-08    PT/INR - ( 08 May 2019 16:20 )   PT: 13.00 sec;   INR: 1.13 ratio         PTT - ( 08 May 2019 16:20 )  PTT:32.6 sec      Troponin T, Serum: 0.12 ng/mL <HH> (05-08-19 @ 20:31)      CARDIAC MARKERS ( 08 May 2019 20:31 )  x     / 0.12 ng/mL / x     / x     / x          RADIOLOGY:  Xray Chest 1 View AP/PA (05.08.19 @ 16:48) >    Focal right lung atelectasis and bilateral low lung volumes.

## 2019-05-09 NOTE — DISCHARGE NOTE PROVIDER - HOSPITAL COURSE
58 year old male with pertinent medical history of End Stage Renal Diseases on HD m-w-f, Coronary Artery Disease s/p PCI 10 years ago, Acromegaly s/p resection of pituitary tumor (22 years ago) and Diabetes complicated by neuropathy resulting in foot ulcer comes to the ER for bradycardia. He was at HD yesterday morning when while he had a pre-procedure vitals checked, his HR was found to be in 40's, when rechecked a few minutes later it was the same. He complains of increasing tiredness over the last two weeks. He feels tired and an odd funny sensation in the chest as well. He feels his heart sinking at times but he denies palpitations, chest pain, shortness of breath, lightheadedness, vison changes and any episodes of syncope.  After ~ 30  minutes HR was back up to 80's.  He did not receive HD. He further complaints of recent ulceration to right heel which he thinks he scraped around 10 days ago. He has neuropathy till the mid shin so does not feel the pain, but has noticed ulceration. He says the podiatrist came to house 6 days ago and did some local debridement, however it has continued to have drainage from wound which has become foul smelling now. He denied any fever or chills, he has swelling to bilateral lower extremities with no calf pain and numbness till mid shin. He started taking amoxcillin one night before coming. He has had skin grafting for left heel ulcer around 4 months ago by Dr. Tripp        In ED patients heart rate was ranging in 60s to 80s. Patient was seen by EP and cleared for discharge. Patient was also seen by podiatry and did the dressing and deemed cleared from podiatry point of view to be discharged. Patient also underwent dialysis and tolerated it well. [facility] arterial and venous duplex were done which were both reported negative for any pathology. Patient is stable and is deemed okay to be discharged. 58 year old male with pertinent medical history of End Stage Renal Diseases on HD m-w-f, Coronary Artery Disease s/p PCI 10 years ago, Acromegaly s/p resection of pituitary tumor (22 years ago) and Diabetes complicated by neuropathy resulting in foot ulcer comes to the ER for bradycardia. He was at HD yesterday morning when while he had a pre-procedure vitals checked, his HR was found to be in 40's, when rechecked a few minutes later it was the same. He complains of increasing tiredness over the last two weeks. He feels tired and an odd funny sensation in the chest as well. He feels his heart sinking at times but he denies palpitations, chest pain, shortness of breath, lightheadedness, vison changes and any episodes of syncope.  After ~ 30  minutes HR was back up to 80's.  He did not receive HD. He further complaints of recent ulceration to right heel which he thinks he scraped around 10 days ago. He has neuropathy till the mid shin so does not feel the pain, but has noticed ulceration. He says the podiatrist came to house 6 days ago and did some local debridement, however it has continued to have drainage from wound which has become foul smelling now. He denied any fever or chills, he has swelling to bilateral lower extremities with no calf pain and numbness till mid shin. He started taking amoxcillin one night before coming. He has had skin grafting for left heel ulcer around 4 months ago by Dr. Tripp        In ED patients heart rate was ranging in 60s to 80s. Patient was seen by EP and cleared for discharge. Patient was also seen by podiatry and did the dressing and deemed cleared from podiatry point of view to be discharged. Patient also underwent dialysis and tolerated it well. VA arterial and venous duplex were done which were both reported negative for any pathology.    Pt had Arterial duplex showed Moderate to severe disease. Vascular consulted and recommended outpt angio.             Patient is stable and is deemed okay to be discharged.

## 2019-05-09 NOTE — CONSULT NOTE ADULT - ASSESSMENT
58 year old male with pertinent medical history of End Stage Renal Diseases on HD m-w-f, Coronary Artery Disease s/p PCI 10 years ago, Acromegaly s/p resection of pituitary tumor (22 years ago) and Diabetes.  Pt was found to be bradycardic in the 40's at his outpt dialysis center.  There is no documentation of his HR being in the 40's.  Pt is having PVC's - it is likely that the BP machine was artificially reading the HR low in the setting of PVC's.    Assessment:  NSR  ESRD  CAD  HTN    Plan  - follow up 2d echo results  - check TSH  - no need for EP intervention at this time  - If pt has documented bradycardia, please reconsult EP as needed. 58 year old male with pertinent medical history of End Stage Renal Diseases on HD m-w-f, Coronary Artery Disease s/p PCI 10 years ago, Acromegaly s/p resection of pituitary tumor (22 years ago) and Diabetes.  Pt was found to be bradycardic in the 40's at his outpt dialysis center.  There is no documentation of his HR being in the 40's.  Pt is totally asymptomatic   no bradycardia  on telemetry no  documented      Assessment:  NSR  ESRD  CAD  HTN    Plan  - follow up 2d echo results  - check TSH  - no need for EP intervention at this time  - follow up as out patient  for EVENT monitor for remote  monitor 58 year old male with pertinent medical history of End Stage Renal Diseases on HD m-w-f, Coronary Artery Disease s/p PCI 10 years ago, Acromegaly s/p resection of pituitary tumor (22 years ago) and Diabetes.  Pt was reported to  have  bradycardia in the 40's at his outpt dialysis center.  There is no documentation of his HR being in the 40's.  Pt is totally asymptomatic   no bradycardia  on telemetry no  documented  arrhythmias    Assessment:  NSR  ESRD  CAD  HTN    Plan  - follow up 2d echo results  - check TSH  - no need for EP intervention at this time  - follow up as out patient  for EVENT monitor for remote  monitor

## 2019-05-09 NOTE — CONSULT NOTE ADULT - SUBJECTIVE AND OBJECTIVE BOX
NEPHROLOGY CONSULTATION NOTE    Patient is a 58y Male with pmh DM, HTN, ESRD - HD (MWF), CAD s/p PCI, Diabetic neuropathy, DFU,  Acromegaly s/p resection of pituitary tumor (22 years ago), comes to the ER for bradycardia. Yesterday in dialysis center his HR was in 40s. After ~ 30  minutes HR was back up to 80's.  He did not receive HD. He also complains of recent ulceration to right heel which he thinks he scraped around 10 days ago, and occasional right side chest pain during exertion. Pt denies any left side chest pain, SOB, palpitations, n/v/f, abdominal pain.     PAST MEDICAL & SURGICAL HISTORY:  ESRD on dialysis  Dyslipidemia  DM (diabetes mellitus), type 2  Neuropathy  HTN (hypertension)  CAD (coronary atherosclerotic disease)  Acromegaly  Breakdown of surgically created AV fistula, init: RIGHT UPPER ARM  H/O eye surgery  H/O: pituitary tumor: s/p removal    Allergies:  bacitracin (Unknown)  Neosporin (Hypotension)    Home Medications:  Aspir 81 oral delayed release tablet: 1 tab(s) orally once a day (09 May 2019 03:18)  Colace 100 mg oral capsule: 1 cap(s) orally 2 times a day (22 Jan 2019 07:29)  ferrous sulfate 325 mg (65 mg elemental iron) oral tablet:  (22 Jan 2019 07:29)  folic acid 1 mg oral tablet: 1 tab(s) orally once a day (22 Jan 2019 07:29)  Plavix 75 mg oral tablet: 1 tab(s) orally once a day (22 Jan 2019 07:29)  Protonix 40 mg oral delayed release tablet: 1 tab(s) orally once a day (before a meal) (22 Jan 2019 07:29)  SandoSTATIN LAR Depot: 1 application intramuscular every 4 weeks (09 May 2019 04:11)  sevelamer hydrochloride 800 mg oral tablet: 2 tab(s) orally 3 times a day (09 May 2019 04:02)  tamsulosin 0.4 mg oral capsule: 1 cap(s) orally once a day (22 Jan 2019 07:29)    Hospital Medications:   MEDICATIONS  (STANDING):  aspirin enteric coated 81 milliGRAM(s) Oral daily  atorvastatin 80 milliGRAM(s) Oral at bedtime  clopidogrel Tablet 75 milliGRAM(s) Oral daily  collagenase Ointment 1 Application(s) Topical daily  Dakins Solution - 1/2 Strength 1 Application(s) Topical daily  docusate sodium 100 milliGRAM(s) Oral two times a day  ferrous    sulfate 325 milliGRAM(s) Oral daily  folic acid 1 milliGRAM(s) Oral daily  heparin  Injectable 5000 Unit(s) SubCutaneous every 8 hours  pantoprazole    Tablet 40 milliGRAM(s) Oral before breakfast  sevelamer carbonate 1600 milliGRAM(s) Oral every 8 hours  tamsulosin 0.4 milliGRAM(s) Oral at bedtime      SOCIAL HISTORY:  Denies ETOH,Smoking,   FAMILY HISTORY:  Family history of myocardial infarction (Father)        REVIEW OF SYSTEMS:     All other review of systems is negative unless indicated above.    VITALS:  T(F): 97 (05-09-19 @ 00:10), Max: 97.7 (05-08-19 @ 16:58)  HR: 64 (05-09-19 @ 00:10)  BP: 148/73 (05-09-19 @ 00:10)  RR: 18 (05-09-19 @ 00:10)  SpO2: 96% (05-09-19 @ 00:10)        I&O's Detail        PHYSICAL EXAM:  Constitutional: NAD  Respiratory: CTAB, no wheezes, rales or rhonchi  Cardiovascular: S1, S2, RRR  Gastrointestinal: BS+, soft, NT/ND  Extremities: No peripheral edema, dressing on right foot.  Neurological: A/O x 3  : No ortiz.     Vascular Access:    LABS:  05-08    138  |  101  |  47<H>  ----------------------------<  184<H>  5.1<H>   |  24  |  4.9<HH>    Ca    8.7      08 May 2019 16:20    TPro  5.7<L>  /  Alb  3.4<L>  /  TBili  0.2  /  DBili      /  AST  16  /  ALT  6   /  AlkPhos  97  05-08    Creatinine Trend: 4.9 <--                        10.0   6.81  )-----------( 166      ( 09 May 2019 08:51 )             31.4     Blood Gas Profile - Venous (05.08.19 @ 16:49)    pH, Venous: 7.34    pCO2, Venous: 51 mmHg    pO2, Venous: 23 mmHg    HCO3, Venous: 27 mmoL/L    Base Excess, Venous: 0.6 mmoL/L    Oxygen Saturation, Venous: 33 %    Troponin T, Serum: 0.12 ng/mL (05.08.19 @ 20:31)    < from: 12 Lead ECG (05.08.19 @ 13:25) >    Diagnosis Line Sinus rhythm with occasional Premature ventricular complexes  Possible Left atrial enlargement  Borderline ECG    < end of copied text >      Urine Studies:              RADIOLOGY & ADDITIONAL STUDIES:  < from: Xray Chest 1 View AP/PA (05.08.19 @ 16:48) >    IMPRESSION:      Focal right lung atelectasis and bilateral low lung volumes.    < end of copied text >    < from: Xray Foot AP + Lateral + Oblique, Right (05.08.19 @ 16:48) >    Impression:  Diffuse soft tissue swelling with ulceration of the plantar forefoot. No   definite evidence for osteomyelitis.    < end of copied text >

## 2019-05-09 NOTE — DISCHARGE NOTE PROVIDER - NSDCCPCAREPLAN_GEN_ALL_CORE_FT
PRINCIPAL DISCHARGE DIAGNOSIS  Diagnosis: Diabetic foot ulcer  Assessment and Plan of Treatment: Please follow your podiatry on out patient basis      SECONDARY DISCHARGE DIAGNOSES  Diagnosis: Elevated troponin  Assessment and Plan of Treatment: Please follow your medications as mentioned    Diagnosis: Bradycardia  Assessment and Plan of Treatment: follow your doctor on out patient basis- PRINCIPAL DISCHARGE DIAGNOSIS  Diagnosis: Diabetic foot ulcer  Assessment and Plan of Treatment: Please follow your podiatry on out patient basis      SECONDARY DISCHARGE DIAGNOSES  Diagnosis: PAD (peripheral artery disease)  Assessment and Plan of Treatment: You were found to have moderate to severe periphearl arterial disease. You will be scheduled for an Angiogram as outpatient. Please call Dr. Comer to schedule the angiogram.  You were cleared by our Vascular team today to have the procefreddie arriaza outpt.    Diagnosis: Bradycardia  Assessment and Plan of Treatment: follow your doctor on out patient basis- Our Cardioalgist saw you and cleared you.

## 2019-05-09 NOTE — H&P ADULT - HISTORY OF PRESENT ILLNESS
58 year old male with pertinent medical history of End Stage Renal Diseases on HD m-w-f, Coronary Artery Disease s/p PCI 10 years ago, Acromegaly s/p resection of pituitary tumor (22 years ago) and Diabetes complicated by neuropathy resulting in foot ulcer comes to the ER for bradycardia. He was at HD yesterday morning when while he had a pre-procedure vitals checked, his HR was found to be in 40's, when rechecked a few minutes later it was the same. He complains of increasing tiredness over the last two weeks. He feels tired and an odd funny sensation in the chest as well. He feels his heart sinking at times but he denies palpitations, chest pain, shortness of breath, lightheadedness, vison changes and any episodes of syncope.  After ~ 30  minutes HR was back up to 80's.  He did not receive HD. He further complaints of recent ulceration to right heel which he thinks he scraped around 10 days ago. He has neuropathy till the mid shin so does not feel the pain, but has noticed ulceration. He says the podiatrist came to house 6 days ago and did some local debridement, however it has continued to have drainage from wound which has become foul smelling now. He denies any fever or chills, he has swelling to bilateral lower extremities with no calf pain and numbness till mid shin. He started taking amoxcillin one night before coming. He has had skin grafting for left heel ulcer around 4 months ago by Dr. Tripp.

## 2019-05-09 NOTE — DISCHARGE NOTE PROVIDER - CARE PROVIDERS DIRECT ADDRESSES
,DirectAddress_Unknown ,DirectAddress_Unknown,[email or web address]

## 2019-05-09 NOTE — CONSULT NOTE ADULT - ASSESSMENT
58M with extensive PMH and previous LLE angiogram, angioplasty, and SFA stent now with non healing right heal ulcer and arterial doppler findings of moderate to severe PAD with diminsihed flow     Plan:  May need angiogram during this admission   medically optimize patient for possible operative intervention   will discuss with vascular attending 58M with extensive PMH and previous LLE angiogram, angioplasty, and SFA stent now with non healing right heal ulcer and arterial doppler findings of moderate to severe PAD with diminsihed flow     Plan:  Plan for RLE angiogram  Will be scheduled outpatient

## 2019-05-09 NOTE — H&P ADULT - NSICDXPASTSURGICALHX_GEN_ALL_CORE_FT
PAST SURGICAL HISTORY:  Breakdown of surgically created AV fistula, init RIGHT UPPER ARM    H/O eye surgery     H/O: pituitary tumor s/p removal

## 2019-05-09 NOTE — CONSULT NOTE ADULT - ASSESSMENT
Patient with ESRD - HD (MW) came to ED for bradycardia  PMH DM, HTN, ESRD - HD ([facility]), CAD s/p PCI, Diabetic neuropathy, DFU,  Acromegaly s/p resection of pituitary tumor (22 years ago)    # ESRD - HD (MW)   - s/p HD on 5/7, missed HD yesterday.   - no urgent need for HD today, todays labs pending. will reevaluate after lab results.   - BP noted, no need for anti-HTN meds at the moment. monitor BP.   - Hb at goal, no need for EDWIN, check Fe studies.   - Ca at goal, check Ph, iPTH   - HR noted, ~ 68 today, EKG noted for premature ventricular complexes. consider cardio eval   - Rigth heel ulcer: seen by Podiatry, notes appreciated   - will follow Patient with ESRD - HD (MW) came to ED for bradycardia  PMH DM, HTN, ESRD - HD ([facility]), CAD s/p PCI, Diabetic neuropathy, DFU,  Acromegaly s/p resection of pituitary tumor (22 years ago)    # ESRD - HD (MW)   - s/p HD on 5/7, missed HD yesterday. for HD today with 3 hr 3K bath UF 2L as tolerated.   - BP noted, no need for anti-HTN meds at the moment. monitor BP.   - Hb at goal, no need for EDWIN, check Fe studies.   - Ca at goal, check Ph, iPTH   - HR noted, ~ 68 today, EKG noted for premature ventricular complexes. consider cardio eval   - Rigth heel ulcer: seen by Podiatry, notes appreciated   - will follow

## 2019-05-09 NOTE — H&P ADULT - ASSESSMENT
58 year old male being evaluated for    Symptomatic Bradycardia  -Continue telemetry monitoring for now  -Currently around 60's, yet due to symptoms he is persistent on someone from cardiology seeing him hence will request cardiac electrophysiologist consult   -Follow up on TSH levels, not hypothermic and not on any medications causing bradycardia    Diabetic Foot Ulcer  -Will request podiatry to evaluate him  -Will order foot X-ray and follow up on ESR  -Hold off on antibiotics until podiatry evaluation    ESRD  -On hemodialysis monday, wednesday and friday  -Continue Sevalmer 1600 mg three times daily for now  -Will request nephrology to evaluate for dialysis needs    Coronary Artery Disease  -s/p PCI 10 years ago  -Continue aspirin and plavix for now  -Continue statin    Full Code  Comes from Home  Kindly call the [facility] pharmacy on [address] tomorrow morning when it opens to confirm the medications as the patient was sent directly from dialysis to the ER and does not remember all of his medications.  On DVT prophylaxis

## 2019-05-09 NOTE — H&P ADULT - NSHPPHYSICALEXAM_GEN_ALL_CORE
GEN: No distress  HEENT: course facial features suggestive of acromegaly, enlarged tongue  LUNGS: decreased breath sounds on right base, enlarged ribcage  HEART: S1/S2 present. RRR.   ABD: Soft, non-tender, non-distended.   EXT: bilateral lower extremity pitting edema (right>left) with some erythema and warmth, enlarged extremities, Right upper extremity with AVF positive for thrill, right heel with ulceration, no drainage, mild erythema, left heel with healed skin graft, warm feet  NEURO: AAOX3

## 2019-05-09 NOTE — CONSULT NOTE ADULT - SUBJECTIVE AND OBJECTIVE BOX
VASCULAR SURGERY CONSULT NOTE      HPI:  58 year old male with pertinent medical history of End Stage Renal Diseases on HD m-w-f, Coronary Artery Disease s/p PCI 10 years ago, Acromegaly s/p resection of pituitary tumor (22 years ago) and Diabetes complicated by neuropathy resulting in foot ulcer comes to the ER for bradycardia. He was at HD yesterday morning when while he had a pre-procedure vitals checked, his HR was found to be in 40's, when rechecked a few minutes later it was the same. He complains of increasing tiredness over the last two weeks. He feels tired and an odd funny sensation in the chest as well. He feels his heart sinking at times but he denies palpitations, chest pain, shortness of breath, lightheadedness, vison changes and any episodes of syncope.  After ~ 30  minutes HR was back up to 80's.  He did not receive HD. He further complaints of recent ulceration to right heel which he thinks he scraped around 10 days ago. He has neuropathy till the mid shin so does not feel the pain, but has noticed ulceration. He says the podiatrist came to house 6 days ago and did some local debridement, however it has continued to have drainage from wound which has become foul smelling now. He denies any fever or chills, he has swelling to bilateral lower extremities with no calf pain and numbness till mid shin. He started taking amoxcillin one night before coming. He has had skin grafting for left heel ulcer around 4 months ago by Dr. Tripp. (09 May 2019 03:20)    Vascular surgery has seen this patient previously in October 2018 for a LLE angio, left SFA angioplasty and stent placement. He comes in today with a right heal non-healing ulcer and arterial doppler findings of moderate to severe occlusive PAD on the right with diminished flow in the popliteal artery.     PAST MEDICAL & SURGICAL HISTORY:  ESRD on dialysis  Dyslipidemia  DM (diabetes mellitus), type 2  Neuropathy  HTN (hypertension)  CAD (coronary atherosclerotic disease)  Acromegaly  Breakdown of surgically created AV fistula, init: RIGHT UPPER ARM  H/O eye surgery  H/O: pituitary tumor: s/p removal    bacitracin (Unknown)  Neosporin (Hypotension)    Home Medications:  Aspir 81 oral delayed release tablet: 1 tab(s) orally once a day (09 May 2019 03:18)  Colace 100 mg oral capsule: 1 cap(s) orally 2 times a day (22 Jan 2019 07:29)  ferrous sulfate 325 mg (65 mg elemental iron) oral tablet:  (22 Jan 2019 07:29)  folic acid 1 mg oral tablet: 1 tab(s) orally once a day (22 Jan 2019 07:29)  Plavix 75 mg oral tablet: 1 tab(s) orally once a day (22 Jan 2019 07:29)  Protonix 40 mg oral delayed release tablet: 1 tab(s) orally once a day (before a meal) (22 Jan 2019 07:29)  SandoSTATIN LAR Depot: 1 application intramuscular every 4 weeks (09 May 2019 04:11)  sevelamer hydrochloride 800 mg oral tablet: 2 tab(s) orally 3 times a day (09 May 2019 04:02)  tamsulosin 0.4 mg oral capsule: 1 cap(s) orally once a day (22 Jan 2019 07:29)    PHYSICAL EXAM  Vital Signs Last 24 Hrs  T(C): 36.4 (09 May 2019 10:29), Max: 36.5 (08 May 2019 16:58)  T(F): 97.6 (09 May 2019 10:29), Max: 97.7 (08 May 2019 16:58)  HR: 63 (09 May 2019 15:45) (63 - 66)  BP: 142/86 (09 May 2019 15:45) (142/86 - 177/81)  BP(mean): --  RR: 18 (09 May 2019 15:45) (18 - 18)  SpO2: 99% (09 May 2019 15:45) (96% - 100%)    Appearance: Normal	  HEENT:   Normal oral mucosa, PERRL, EOMI	  Neck: Supple, - JVD; Carotid Bruit   Cardiovascular: Normal S1 S2, No JVD, No murmurs,   Respiratory: CTAB	  Gastrointestinal:  Soft, Non-tender, positive BS	  Extremities: Left lateral heal ulcer with small necrotic patches 5cm x 4cm  Vascular: DP/PT pulses non palpable bialterally     MEDICATIONS:   MEDICATIONS  (STANDING):  aspirin enteric coated 81 milliGRAM(s) Oral daily  atorvastatin 80 milliGRAM(s) Oral at bedtime  clopidogrel Tablet 75 milliGRAM(s) Oral daily  collagenase Ointment 1 Application(s) Topical daily  Dakins Solution - 1/2 Strength 1 Application(s) Topical daily  docusate sodium 100 milliGRAM(s) Oral two times a day  ferrous    sulfate 325 milliGRAM(s) Oral daily  folic acid 1 milliGRAM(s) Oral daily  heparin  Injectable 5000 Unit(s) SubCutaneous every 8 hours  pantoprazole    Tablet 40 milliGRAM(s) Oral before breakfast  sevelamer carbonate 1600 milliGRAM(s) Oral every 8 hours  tamsulosin 0.4 milliGRAM(s) Oral at bedtime    MEDICATIONS  (PRN):      LAB/STUDIES:                        10.0   6.81  )-----------( 166      ( 09 May 2019 08:51 )             31.4     05-09    142  |  103  |  50<H>  ----------------------------<  92  3.9   |  22  |  5.0<HH>    Ca    8.7      09 May 2019 08:51  Phos  5.0     05-09  Mg     2.1     05-09    TPro  5.5<L>  /  Alb  3.5  /  TBili  0.3  /  DBili  <0.2  /  AST  7   /  ALT  5   /  AlkPhos  90  05-09    PT/INR - ( 09 May 2019 08:51 )   PT: 13.10 sec;   INR: 1.14 ratio         PTT - ( 09 May 2019 08:51 )  PTT:36.0 sec  LIVER FUNCTIONS - ( 09 May 2019 08:51 )  Alb: 3.5 g/dL / Pro: 5.5 g/dL / ALK PHOS: 90 U/L / ALT: 5 U/L / AST: 7 U/L / GGT: x           CARDIAC MARKERS ( 09 May 2019 08:51 )  x     / 0.10 ng/mL / 59 U/L / x     / 3.9 ng/mL  CARDIAC MARKERS ( 08 May 2019 20:31 )  x     / 0.12 ng/mL / x     / x     / x          IMAGING:    < from: VA Duplex Lower Extrem Arterial, Bilat (05.09.19 @ 12:19) >  Impression:    Moderate to severe right peripheral arterial occlusive disease with   diminished flow in the popliteal artery.    Patent left superficial femoral artery stent.    < end of copied text >

## 2019-05-09 NOTE — DISCHARGE NOTE PROVIDER - CARE PROVIDER_API CALL
Jabari Bliss)  Cardiology; Internal Medicine  [address] [place] [address]  Phone: [phone]  Fax: [phone]  Follow Up Time: Jabari Bliss)  Cardiology; Internal Medicine  [address] [place] [address], [place] [address]  Phone: [phone]  Fax: [phone]  Follow Up Time: Adan Guardado)  Surgery; Vascular Surgery  [address] [place] [address], [place] [address]  Phone: [phone]  Fax: [phone]  Follow Up Time: 1-3 days

## 2019-05-09 NOTE — DISCHARGE NOTE PROVIDER - PROVIDER TOKENS
PROVIDER:[TOKEN:[[id number]]] PROVIDER:[TOKEN:[[id number]],FOLLOWUP:[Routine]],PROVIDER:[TOKEN:[[id number]],FOLLOWUP:[1-3 days]]

## 2019-05-09 NOTE — CONSULT NOTE ADULT - SUBJECTIVE AND OBJECTIVE BOX
Patient is a 58y old  Male who presents with a chief complaint of bradycardia (09 May 2019 10:00)    HPI:  Pt is a 58 year old male with pertinent medical history of End Stage Renal Diseases on HD m-w-f, Coronary Artery Disease s/p PCI 10 years ago, Acromegaly s/p resection of pituitary tumor (22 years ago) and Diabetes complicated by neuropathy resulting in foot ulcer comes to the ER for bradycardia. He was at HD yesterday morning.  Before starting HD, they checked his VS.  His HR was reading in the 40's on the BP machine.  He said they manually checked his pulse at confirmed it was 46. He denies palpitations, chest pain, shortness of breath, lightheadedness, vison changes.  He denies ever having syncope.  After ~ 30  minutes HR was back up to 80's.  He did not receive HD.  He denies history of cardiac arrythmias.  He was on Atenolol in the past but it was stopped 1 year ago.  He is not on any AV taabtha blocking agents.  We do not have any documentation of his HR in the 40's.    In the ED, EKG showed NSR at 82 with PVC's.  He is not on tele.  The lowest documented HR in the ED is 62. Pt denies any complaints      PAST MEDICAL & SURGICAL HISTORY:  ESRD on dialysis  Dyslipidemia  DM (diabetes mellitus), type 2  Neuropathy  HTN (hypertension)  CAD (coronary atherosclerotic disease)  Acromegaly  Breakdown of surgically created AV fistula, init: RIGHT UPPER ARM  H/O eye surgery  H/O: pituitary tumor: s/p removal      PREVIOUS DIAGNOSTIC TESTING:      ECHO  FINDINGS:  pending      MEDICATIONS  (STANDING):  aspirin enteric coated 81 milliGRAM(s) Oral daily  atorvastatin 80 milliGRAM(s) Oral at bedtime  clopidogrel Tablet 75 milliGRAM(s) Oral daily  collagenase Ointment 1 Application(s) Topical daily  Dakins Solution - 1/2 Strength 1 Application(s) Topical daily  docusate sodium 100 milliGRAM(s) Oral two times a day  ferrous    sulfate 325 milliGRAM(s) Oral daily  folic acid 1 milliGRAM(s) Oral daily  heparin  Injectable 5000 Unit(s) SubCutaneous every 8 hours  pantoprazole    Tablet 40 milliGRAM(s) Oral before breakfast  sevelamer carbonate 1600 milliGRAM(s) Oral every 8 hours  tamsulosin 0.4 milliGRAM(s) Oral at bedtime    MEDICATIONS  (PRN):      FAMILY HISTORY:  Family history of myocardial infarction (Father)      SOCIAL HISTORY:    CIGARETTES:    ALCOHOL:    Past Surgical History:    Allergies:    bacitracin (Unknown)  Neosporin (Hypotension)      REVIEW OF SYSTEMS:    10 point ROS is negative except for fatigue after HD      Vital Signs Last 24 Hrs  T(C): 36.4 (09 May 2019 10:29), Max: 36.5 (08 May 2019 16:58)  T(F): 97.6 (09 May 2019 10:29), Max: 97.7 (08 May 2019 16:58)  HR: 66 (09 May 2019 10:29) (64 - 82)  BP: 177/81 (09 May 2019 10:29) (146/67 - 177/81)  BP(mean): --  RR: 18 (09 May 2019 10:29) (18 - 18)  SpO2: 98% (09 May 2019 10:29) (96% - 100%)    PHYSICAL EXAM:    GENERAL: In no apparent distress, well nourished, and hydrated.  NECK: Supple  HEART: Regular rate and rhythm; No murmurs, rubs, or gallops.  PULMONARY: Clear to auscultation and perfusion.  No rales, wheezing, or rhonchi bilaterally.  ABDOMEN: Soft, Nontender, Nondistended; Bowel sounds present  EXTREMITIES:  2+ Peripheral Pulses, + edema, + acromegaly  LYMPH: No lymphadenopathy noted  NEUROLOGICAL: Grossly nonfocal      INTERPRETATION OF TELEMETRY:    ECG:  < from: 12 Lead ECG (05.09.19 @ 12:18) >  Ventricular Rate 65 BPM    Atrial Rate 65 BPM    P-R Interval 188 ms    QRS Duration 96 ms    Q-T Interval 450 ms    QTC Calculation(Bezet) 468 ms    P Axis 51 degrees    R Axis 32 degrees    T Axis 52 degrees    Diagnosis Line Normal sinus rhythm  Normal ECG    < from: 12 Lead ECG (05.08.19 @ 13:25) >  Ventricular Rate 82 BPM    Atrial Rate 82 BPM    P-R Interval 170 ms    QRS Duration 94 ms    Q-T Interval 398 ms    QTC Calculation(Bezet) 464 ms    P Axis 39 degrees    R Axis 32 degrees    T Axis 57 degrees    Diagnosis Line Sinus rhythm with occasional Premature ventricular complexes  Possible Left atrial enlargement  Borderline ECG              I&O's Detail      LABS:                        10.0   6.81  )-----------( 166      ( 09 May 2019 08:51 )             31.4     05-09    142  |  103  |  50<H>  ----------------------------<  92  3.9   |  22  |  5.0<HH>    Ca    8.7      09 May 2019 08:51  Phos  5.0     05-09  Mg     2.1     05-09    TPro  5.5<L>  /  Alb  3.5  /  TBili  0.3  /  DBili  <0.2  /  AST  7   /  ALT  5   /  AlkPhos  90  05-09    CARDIAC MARKERS ( 09 May 2019 08:51 )  x     / 0.10 ng/mL / 59 U/L / x     / 3.9 ng/mL  CARDIAC MARKERS ( 08 May 2019 20:31 )  x     / 0.12 ng/mL / x     / x     / x          PT/INR - ( 09 May 2019 08:51 )   PT: 13.10 sec;   INR: 1.14 ratio         PTT - ( 09 May 2019 08:51 )  PTT:36.0 sec    BNP  I&O's Detail    Daily     Daily     RADIOLOGY & ADDITIONAL STUDIES: Patient is a 58y old  Male who was referred  for bradycardia  pt is totally asymptomatic  (09 May 2019 10:00)    HPI:  Pt is a 58 year old male with pertinent medical history of End Stage Renal Diseases on HD m-w-f, Coronary Artery Disease s/p PCI 10 years ago, Acromegaly s/p resection of pituitary tumor (22 years ago) and Diabetes complicated by neuropathy resulting in foot ulcer comes to the ER for bradycardia. He was at HD yesterday morning.  Before starting HD, they checked his VS.  His HR was reading in the 40's on the BP machine.  He said they manually checked his pulse at confirmed it was 46. He denies palpitations, chest pain, shortness of breath, lightheadedness, vison changes.  He denies ever having syncope.  After ~ 30  minutes HR was back up to 80's.  He did not receive HD.  He denies history of cardiac arrythmias.  He was on Atenolol in the past but it was stopped 1 year ago.  He is not on any AV tabatha blocking agents.  We do not have any documentation of his HR in the 40's.    In the ED, EKG showed NSR at 82 with PVC's.  He is not on tele.  The lowest documented HR in the ED is 62. Pt denies any complaints      PAST MEDICAL & SURGICAL HISTORY:  ESRD on dialysis  Dyslipidemia  DM (diabetes mellitus), type 2  Neuropathy  HTN (hypertension)  CAD (coronary atherosclerotic disease)  Acromegaly  Breakdown of surgically created AV fistula, init: RIGHT UPPER ARM  H/O eye surgery  H/O: pituitary tumor: s/p removal      PREVIOUS DIAGNOSTIC TESTING:      ECHO  FINDINGS:  pending      MEDICATIONS  (STANDING):  aspirin enteric coated 81 milliGRAM(s) Oral daily  atorvastatin 80 milliGRAM(s) Oral at bedtime  clopidogrel Tablet 75 milliGRAM(s) Oral daily  collagenase Ointment 1 Application(s) Topical daily  Dakins Solution - 1/2 Strength 1 Application(s) Topical daily  docusate sodium 100 milliGRAM(s) Oral two times a day  ferrous    sulfate 325 milliGRAM(s) Oral daily  folic acid 1 milliGRAM(s) Oral daily  heparin  Injectable 5000 Unit(s) SubCutaneous every 8 hours  pantoprazole    Tablet 40 milliGRAM(s) Oral before breakfast  sevelamer carbonate 1600 milliGRAM(s) Oral every 8 hours  tamsulosin 0.4 milliGRAM(s) Oral at bedtime    MEDICATIONS  (PRN):      FAMILY HISTORY:  Family history of myocardial infarction (Father)      SOCIAL HISTORY:    CIGARETTES:    ALCOHOL:    Past Surgical History:    Allergies:    bacitracin (Unknown)  Neosporin (Hypotension)      REVIEW OF SYSTEMS:    10 point ROS is negative except for fatigue after HD      Vital Signs Last 24 Hrs  T(C): 36.4 (09 May 2019 10:29), Max: 36.5 (08 May 2019 16:58)  T(F): 97.6 (09 May 2019 10:29), Max: 97.7 (08 May 2019 16:58)  HR: 66 (09 May 2019 10:29) (64 - 82)  BP: 177/81 (09 May 2019 10:29) (146/67 - 177/81)  BP(mean): --  RR: 18 (09 May 2019 10:29) (18 - 18)  SpO2: 98% (09 May 2019 10:29) (96% - 100%)    PHYSICAL EXAM:    GENERAL: In no apparent distress, well nourished, and hydrated.  NECK: Supple  HEART: Regular rate and rhythm; No murmurs, rubs, or gallops.  PULMONARY: Clear to auscultation and perfusion.  No rales, wheezing, or rhonchi bilaterally.  ABDOMEN: Soft, Nontender, Nondistended; Bowel sounds present  EXTREMITIES:  2+ Peripheral Pulses, + edema, + acromegaly  LYMPH: No lymphadenopathy noted  NEUROLOGICAL: Grossly nonfocal      INTERPRETATION OF TELEMETRY:    ECG:  < from: 12 Lead ECG (05.09.19 @ 12:18) >  Ventricular Rate 65 BPM    Atrial Rate 65 BPM    P-R Interval 188 ms    QRS Duration 96 ms    Q-T Interval 450 ms    QTC Calculation(Bezet) 468 ms    P Axis 51 degrees    R Axis 32 degrees    T Axis 52 degrees    Diagnosis Line Normal sinus rhythm  Normal ECG    < from: 12 Lead ECG (05.08.19 @ 13:25) >  Ventricular Rate 82 BPM    Atrial Rate 82 BPM    P-R Interval 170 ms    QRS Duration 94 ms    Q-T Interval 398 ms    QTC Calculation(Bezet) 464 ms    P Axis 39 degrees    R Axis 32 degrees    T Axis 57 degrees    Diagnosis Line Sinus rhythm with occasional Premature ventricular complexes  Possible Left atrial enlargement  Borderline ECG              I&O's Detail      LABS:                        10.0   6.81  )-----------( 166      ( 09 May 2019 08:51 )             31.4     05-09    142  |  103  |  50<H>  ----------------------------<  92  3.9   |  22  |  5.0<HH>    Ca    8.7      09 May 2019 08:51  Phos  5.0     05-09  Mg     2.1     05-09    TPro  5.5<L>  /  Alb  3.5  /  TBili  0.3  /  DBili  <0.2  /  AST  7   /  ALT  5   /  AlkPhos  90  05-09    CARDIAC MARKERS ( 09 May 2019 08:51 )  x     / 0.10 ng/mL / 59 U/L / x     / 3.9 ng/mL  CARDIAC MARKERS ( 08 May 2019 20:31 )  x     / 0.12 ng/mL / x     / x     / x          PT/INR - ( 09 May 2019 08:51 )   PT: 13.10 sec;   INR: 1.14 ratio         PTT - ( 09 May 2019 08:51 )  PTT:36.0 sec    BNP  I&O's Detail    Daily     Daily     RADIOLOGY & ADDITIONAL STUDIES:

## 2019-05-09 NOTE — H&P ADULT - NSICDXFAMILYHX_GEN_ALL_CORE_FT
FAMILY HISTORY:  Father  Still living? No  Family history of myocardial infarction, Age at diagnosis: Age Unknown

## 2019-05-10 LAB
ANION GAP SERPL CALC-SCNC: 12 MMOL/L — SIGNIFICANT CHANGE UP (ref 7–14)
BASOPHILS # BLD AUTO: 0.07 K/UL — SIGNIFICANT CHANGE UP (ref 0–0.2)
BASOPHILS NFR BLD AUTO: 1.2 % — HIGH (ref 0–1)
BUN SERPL-MCNC: 34 MG/DL — HIGH (ref 10–20)
CALCIUM SERPL-MCNC: 8.3 MG/DL — LOW (ref 8.5–10.1)
CHLORIDE SERPL-SCNC: 101 MMOL/L — SIGNIFICANT CHANGE UP (ref 98–110)
CO2 SERPL-SCNC: 25 MMOL/L — SIGNIFICANT CHANGE UP (ref 17–32)
CREAT SERPL-MCNC: 4.2 MG/DL — CRITICAL HIGH (ref 0.7–1.5)
EOSINOPHIL # BLD AUTO: 0.39 K/UL — SIGNIFICANT CHANGE UP (ref 0–0.7)
EOSINOPHIL NFR BLD AUTO: 6.7 % — SIGNIFICANT CHANGE UP (ref 0–8)
GLUCOSE BLDC GLUCOMTR-MCNC: 102 MG/DL — HIGH (ref 70–99)
GLUCOSE BLDC GLUCOMTR-MCNC: 121 MG/DL — HIGH (ref 70–99)
GLUCOSE SERPL-MCNC: 153 MG/DL — HIGH (ref 70–99)
HCT VFR BLD CALC: 31.9 % — LOW (ref 42–52)
HGB BLD-MCNC: 10.3 G/DL — LOW (ref 14–18)
IMM GRANULOCYTES NFR BLD AUTO: 0.3 % — SIGNIFICANT CHANGE UP (ref 0.1–0.3)
LYMPHOCYTES # BLD AUTO: 1.2 K/UL — SIGNIFICANT CHANGE UP (ref 1.2–3.4)
LYMPHOCYTES # BLD AUTO: 20.8 % — SIGNIFICANT CHANGE UP (ref 20.5–51.1)
MAGNESIUM SERPL-MCNC: 2 MG/DL — SIGNIFICANT CHANGE UP (ref 1.8–2.4)
MCHC RBC-ENTMCNC: 31 PG — SIGNIFICANT CHANGE UP (ref 27–31)
MCHC RBC-ENTMCNC: 32.3 G/DL — SIGNIFICANT CHANGE UP (ref 32–37)
MCV RBC AUTO: 96.1 FL — HIGH (ref 80–94)
MONOCYTES # BLD AUTO: 0.91 K/UL — HIGH (ref 0.1–0.6)
MONOCYTES NFR BLD AUTO: 15.7 % — HIGH (ref 1.7–9.3)
NEUTROPHILS # BLD AUTO: 3.19 K/UL — SIGNIFICANT CHANGE UP (ref 1.4–6.5)
NEUTROPHILS NFR BLD AUTO: 55.3 % — SIGNIFICANT CHANGE UP (ref 42.2–75.2)
NRBC # BLD: 0 /100 WBCS — SIGNIFICANT CHANGE UP (ref 0–0)
PLATELET # BLD AUTO: 186 K/UL — SIGNIFICANT CHANGE UP (ref 130–400)
POTASSIUM SERPL-MCNC: 3.6 MMOL/L — SIGNIFICANT CHANGE UP (ref 3.5–5)
POTASSIUM SERPL-SCNC: 3.6 MMOL/L — SIGNIFICANT CHANGE UP (ref 3.5–5)
RBC # BLD: 3.32 M/UL — LOW (ref 4.7–6.1)
RBC # FLD: 14.6 % — HIGH (ref 11.5–14.5)
SODIUM SERPL-SCNC: 138 MMOL/L — SIGNIFICANT CHANGE UP (ref 135–146)
TROPONIN T SERPL-MCNC: 0.11 NG/ML — CRITICAL HIGH
TROPONIN T SERPL-MCNC: 0.11 NG/ML — CRITICAL HIGH
WBC # BLD: 5.78 K/UL — SIGNIFICANT CHANGE UP (ref 4.8–10.8)
WBC # FLD AUTO: 5.78 K/UL — SIGNIFICANT CHANGE UP (ref 4.8–10.8)

## 2019-05-10 RX ADMIN — HEPARIN SODIUM 5000 UNIT(S): 5000 INJECTION INTRAVENOUS; SUBCUTANEOUS at 21:37

## 2019-05-10 RX ADMIN — SEVELAMER CARBONATE 1600 MILLIGRAM(S): 2400 POWDER, FOR SUSPENSION ORAL at 13:30

## 2019-05-10 RX ADMIN — CLOPIDOGREL BISULFATE 75 MILLIGRAM(S): 75 TABLET, FILM COATED ORAL at 11:18

## 2019-05-10 RX ADMIN — Medication 1 APPLICATION(S): at 06:08

## 2019-05-10 RX ADMIN — HEPARIN SODIUM 5000 UNIT(S): 5000 INJECTION INTRAVENOUS; SUBCUTANEOUS at 06:07

## 2019-05-10 RX ADMIN — Medication 325 MILLIGRAM(S): at 11:18

## 2019-05-10 RX ADMIN — SEVELAMER CARBONATE 1600 MILLIGRAM(S): 2400 POWDER, FOR SUSPENSION ORAL at 19:28

## 2019-05-10 RX ADMIN — Medication 1 APPLICATION(S): at 17:21

## 2019-05-10 RX ADMIN — Medication 1 MILLIGRAM(S): at 11:18

## 2019-05-10 RX ADMIN — SEVELAMER CARBONATE 1600 MILLIGRAM(S): 2400 POWDER, FOR SUSPENSION ORAL at 08:13

## 2019-05-10 RX ADMIN — ATORVASTATIN CALCIUM 80 MILLIGRAM(S): 80 TABLET, FILM COATED ORAL at 21:37

## 2019-05-10 RX ADMIN — Medication 100 MILLIGRAM(S): at 06:07

## 2019-05-10 RX ADMIN — TAMSULOSIN HYDROCHLORIDE 0.4 MILLIGRAM(S): 0.4 CAPSULE ORAL at 21:37

## 2019-05-10 RX ADMIN — Medication 81 MILLIGRAM(S): at 11:18

## 2019-05-10 RX ADMIN — HEPARIN SODIUM 5000 UNIT(S): 5000 INJECTION INTRAVENOUS; SUBCUTANEOUS at 13:30

## 2019-05-10 RX ADMIN — PANTOPRAZOLE SODIUM 40 MILLIGRAM(S): 20 TABLET, DELAYED RELEASE ORAL at 06:07

## 2019-05-10 NOTE — PROGRESS NOTE ADULT - ASSESSMENT
Patient with ESRD - HD (MWF) came to ED for bradycardia  PMH DM, HTN, ESRD - HD (MWF), CAD s/p PCI, Diabetic neuropathy, DFU,  Acromegaly s/p resection of pituitary tumor (22 years ago)    # ESRD - HD (MWF) in the hospital it will be TTS (next tx tomorrow)    Phos noted, cont sevelamer, check iPTH  # HTN - may start Amlodipine 5 mg qd  #Bradycardia - on tle, f/u cardio rx  # Severe RT  PVD -   Right heel ulcer: needs vasc sx and podiatry eval     - will follow

## 2019-05-10 NOTE — PROGRESS NOTE ADULT - SUBJECTIVE AND OBJECTIVE BOX
Nephrology progress note    Patient is seen and examined, events over the last 24 h noted .  Admitted for bradycardia. HAd HD eysterday  Allergies:  bacitracin (Unknown)  Neosporin (Hypotension)    Hospital Medications:   MEDICATIONS  (STANDING):  aspirin enteric coated 81 milliGRAM(s) Oral daily  atorvastatin 80 milliGRAM(s) Oral at bedtime  clopidogrel Tablet 75 milliGRAM(s) Oral daily  collagenase Ointment 1 Application(s) Topical two times a day  Dakins Solution - 1/2 Strength 1 Application(s) Topical two times a day  docusate sodium 100 milliGRAM(s) Oral two times a day  ferrous    sulfate 325 milliGRAM(s) Oral daily  folic acid 1 milliGRAM(s) Oral daily  heparin  Injectable 5000 Unit(s) SubCutaneous every 8 hours  pantoprazole    Tablet 40 milliGRAM(s) Oral before breakfast  sevelamer carbonate 1600 milliGRAM(s) Oral every 8 hours  tamsulosin 0.4 milliGRAM(s) Oral at bedtime        VITALS:  T(F): 97 (05-10-19 @ 04:44), Max: 98.2 (05-09-19 @ 20:00)  HR: 68 (05-09-19 @ 20:00)  BP: 165/79 (05-09-19 @ 20:00)  RR: 18 (05-09-19 @ 20:00)  SpO2: 97% (05-09-19 @ 20:02)  Wt(kg): --    05-09 @ 07:01  -  05-10 @ 07:00  --------------------------------------------------------  IN: 0 mL / OUT: 2000 mL / NET: -2000 mL      Height (cm): 193.04 (05-09 @ 20:00)  Weight (kg): 110.5 (05-09 @ 20:00)  BMI (kg/m2): 29.7 (05-09 @ 20:00)  BSA (m2): 2.41 (05-09 @ 20:00)    PHYSICAL EXAM:  Constitutional: NAD  HEENT: anicteric sclera, oropharynx clear, MMM  Neck: No JVD  Respiratory: CTAB, no wheezes, rales or rhonchi  Cardiovascular: S1, S2, RRR  Gastrointestinal: BS+, soft, NT/ND  Extremities: mild peripheral edema  Neurological: A/O x 3, no focal deficits  : No CVA tenderness. No ortiz.   Skin: No rashes  Vascular Access: Rt AVF    LABS:  05-10    138  |  101  |  34<H>  ----------------------------<  153<H>  3.6   |  25  |  4.2<HH>    Ca    8.3<L>      10 May 2019 08:48  Phos  5.0     05-09  Mg     2.0     05-10    TPro  5.5<L>  /  Alb  3.5  /  TBili  0.3  /  DBili  <0.2  /  AST  7   /  ALT  5   /  AlkPhos  90  05-09                          10.3   5.78  )-----------( 186      ( 10 May 2019 08:48 )             31.9       Urine Studies:      RADIOLOGY & ADDITIONAL STUDIES:  < from: Xray Chest 1 View AP/PA (05.08.19 @ 16:48) >    Focal right lung atelectasis and bilateral low lung volumes.    < end of copied text >

## 2019-05-10 NOTE — PROGRESS NOTE ADULT - ASSESSMENT
58 year old male being evaluated for symptomatic bradycardia pre-HD; sent to ED; admitted for symptomatic bradycardia, s/p HD Thurs.    Symptomatic Bradycardia  -Continue telemetry monitoring for now  -F/u EP  -TSH 0.75  -Vitals per routine    Diabetic Foot Ulcer  -No intervention per Podiatry  -Xray right foot showed soft tissue swelling w/o ulceration and w/o evidence of osteomyelitis  -Hold off on antibiotics until podiatry evaluation    ESRD  -On hemodialysis monday, wednesday and friday  -Continue Sevalmer 1600 mg three times daily for now  -Will request nephrology to evaluate for dialysis needs    Coronary Artery Disease  -s/p PCI 10 years ago  -Continue aspirin and plavix for now  -Continue statin    Full Code  Comes from Home  Kindly call the [facility] pharmacy on [facility] tomorrow morning when it opens to confirm the medications as the patient was sent directly from dialysis to the ER and does not remember all of his medications.  On DVT prophylaxis 58 year old male being evaluated for symptomatic bradycardia pre-HD; sent to ED; admitted for symptomatic bradycardia, s/p HD Thurs.    Symptomatic Bradycardia  -Continue telemetry monitoring for now  -F/u EP  -TSH 0.75  -Vitals per routine    Diabetic Foot Ulcer  -No surgical intervention or abx per Podiatry  -Xray right foot showed soft tissue swelling w/o ulceration and w/o evidence of osteomyelitis  -VA duplex on admission showed diminished flow in right popliteal artery  -F/u w/ Vascular sx for possible angiogram Mon  -Nephro f/u for clearance for angiogram    ESRD  -On hemodialysis monday, wednesday and friday  -Continue Sevalmer 1600 mg three times daily for now  -F/u w/ Nephro  -F/u AM BMP    Coronary Artery Disease  -s/p PCI 10 years ago  -Continue aspirin and plavix for now  -Continue statin    Full Code  Comes from Home  Verify meds w/ [facility] on [address] [facility]  On DVT prophylaxis 58 year old male being evaluated for symptomatic bradycardia pre-HD; sent to ED; admitted for symptomatic bradycardia, s/p HD Thurs.    Symptomatic Bradycardia  -Continue telemetry monitoring for now  -F/u EP  -TSH 0.75  -Vitals per routine    Diabetic Foot Ulcer  -No surgical intervention or abx per Podiatry  -Xray right foot showed soft tissue swelling w/o ulceration and w/o evidence of osteomyelitis  -VA duplex on admission showed diminished flow in right popliteal artery  -F/u w/ Vascular sx for possible angiogram Mon  -Nephro f/u for clearance for angiogram    ESRD  -On hemodialysis monday, wednesday and friday  -Continue Sevalmer 1600 mg three times daily for now  -F/u w/ Nephro  -F/u AM BMP    Coronary Artery Disease  -s/p PCI 10 years ago  -Continue aspirin and plavix for now  -Continue statin    Possible Folic Acid deficiency  Supplement w/ folic acid 1 mg qD    Full Code  Comes from Home  Verify meds w/ [facility] on [address]  On DVT prophylaxis 58 year old male being evaluated for symptomatic bradycardia pre-HD; sent to ED; admitted for symptomatic bradycardia, s/p HD Thurs.    Symptomatic Bradycardia  -Continue telemetry monitoring for now  -F/u EP  -TSH 0.75  -Vitals per routine    Diabetic Foot Ulcer  -No surgical intervention or abx per Podiatry  -Xray right foot showed soft tissue swelling w/o ulceration and w/o evidence of osteomyelitis    PAD  -VA duplex on admission showed diminished flow in right popliteal artery  -F/u w/ Vascular sx for possible angiogram Mon  -Nephro f/u for clearance for angiogram    ESRD  -On hemodialysis monday, wednesday and friday  -Continue Sevalmer 1600 mg three times daily for now  -F/u w/ Nephro  -F/u AM BMP    Coronary Artery Disease  -s/p PCI 10 years ago  -Continue aspirin and plavix for now  -Continue statin    Possible Folic Acid deficiency  Supplement w/ folic acid 1 mg qD    Full Code  Comes from Home  Verify meds w/ [facility] on [address]  On DVT prophylaxis 58 year old male being evaluated for symptomatic bradycardia pre-HD; sent to ED; admitted for symptomatic bradycardia, s/p HD Thurs.    Symptomatic Bradycardia  -Continue telemetry monitoring for now  -F/u EP  -TSH 0.75  -Vitals per routine    Diabetic Foot Ulcer possibly secondary to PAD  -VA duplex on admission showed diminished flow in right popliteal artery  -No surgical intervention or abx per Podiatry  -Xray right foot showed soft tissue swelling w/o ulceration and w/o evidence of osteomyelitis  -Per Vascular sx, will do angiogram outpatient either this Tues or following week    ESRD  -On hemodialysis monday, wednesday and friday  -Continue Sevalmer 1600 mg three times daily for now  -F/u w/ Nephro  -F/u AM BMP    Coronary Artery Disease  -s/p PCI 10 years ago  -Continue aspirin and plavix for now  -Continue statin    Possible Folic Acid deficiency  Supplement w/ folic acid 1 mg qD    Full Code  Comes from Home  Verify meds w/ [facility] on [address]  On DVT prophylaxis

## 2019-05-10 NOTE — PROGRESS NOTE ADULT - SUBJECTIVE AND OBJECTIVE BOX
DIAGNOSIS:   HOSPITAL DAY #:    STATUS POST:    POST OPERATIVE DAY #:     Vital Signs Last 24 Hrs  T(C): 36.8 (10 May 2019 20:00), Max: 36.8 (10 May 2019 20:00)  T(F): 98.3 (10 May 2019 20:00), Max: 98.3 (10 May 2019 20:00)  HR: 67 (10 May 2019 20:00) (65 - 67)  BP: 167/79 (10 May 2019 20:00) (127/67 - 167/79)  BP(mean): --  RR: 18 (10 May 2019 20:00) (18 - 18)  SpO2: --    SUBJECTIVE: Pt seen    Pain: YES  [ ]   NO [ ]   Nausea: [ ] YES [ ] NO           Vomiting: [ ] YES [ ] NO  Flatus: [ ] YES [ ] NO             Bowel Movement: [ ] YES [ ] NO     Void: [ ]YES [ ]No      DARNELL DRAINAGE: SIGNIFICANT [ ]   NOT SIGNIFICANT [ ]   NOT APPLICABLE [ ]  YES [ ] NO    General Appearance: Appears well, NAD  Neck: Supple  Chest: Equal expansion bilaterally, equal breath sounds  CV: Pulse regular presently  Abdomen: Soft x[ ] YES [ ]NO  DISTENDED [ ] YES [x ] NO TENDERNESS [ ]YES [x ]NO  INCISIONS: HEALING WELL [ ] YES  [ ] NO ERYTHEMA [ ] YES [ ] NO DRAINAGE [ ] YES  [ ] NO  Extremities: Grossly symmetric, CALF TENDERNESS [ ] YES  [ ] NO  RT arm fistula patent no infection no aneurysms    LABS:                        10.3   5.78  )-----------( 186      ( 10 May 2019 08:48 )             31.9     05-10    138  |  101  |  34<H>  ----------------------------<  153<H>  3.6   |  25  |  4.2<HH>    Ca    8.3<L>      10 May 2019 08:48  Phos  5.0     05-09  Mg     2.0     05-10    TPro  5.5<L>  /  Alb  3.5  /  TBili  0.3  /  DBili  <0.2  /  AST  7   /  ALT  5   /  AlkPhos  90  05-09    PT/INR - ( 09 May 2019 08:51 )   PT: 13.10 sec;   INR: 1.14 ratio         PTT - ( 09 May 2019 08:51 )  PTT:36.0 sec        ASSESSMENT:     GOOD POST OP COURSE [ ]  YES  [ ] NO  CONDITION IMPROVING  []  YES  [ ]  NO          PLAN:    CONTINUE PRESENT MANAGEMENT  [ ] YES  [ ] NO

## 2019-05-10 NOTE — PROGRESS NOTE ADULT - SUBJECTIVE AND OBJECTIVE BOX
SU SAWYER 58y Male  MRN#: [id number]       SUBJECTIVE  Patient is a 58y old Male who presents with a chief complaint of bradycardia (09 May 2019 15:56)  Currently admitted to medicine with the primary diagnosis of bradycardia. In the AM the patient was resting comfortably. No events overnight.    OBJECTIVE  PAST MEDICAL & SURGICAL HISTORY  ESRD on dialysis  Dyslipidemia  DM (diabetes mellitus), type 2  Neuropathy  HTN (hypertension)  CAD (coronary atherosclerotic disease)  Acromegaly  Breakdown of surgically created AV fistula, init: RIGHT UPPER ARM  H/O eye surgery  H/O: pituitary tumor: s/p removal    ALLERGIES:  bacitracin (Unknown)  Neosporin (Hypotension)    MEDICATIONS:  STANDING MEDICATIONS  aspirin enteric coated 81 milliGRAM(s) Oral daily  atorvastatin 80 milliGRAM(s) Oral at bedtime  clopidogrel Tablet 75 milliGRAM(s) Oral daily  collagenase Ointment 1 Application(s) Topical two times a day  Dakins Solution - 1/2 Strength 1 Application(s) Topical two times a day  docusate sodium 100 milliGRAM(s) Oral two times a day  ferrous    sulfate 325 milliGRAM(s) Oral daily  folic acid 1 milliGRAM(s) Oral daily  heparin  Injectable 5000 Unit(s) SubCutaneous every 8 hours  pantoprazole    Tablet 40 milliGRAM(s) Oral before breakfast  sevelamer carbonate 1600 milliGRAM(s) Oral every 8 hours  tamsulosin 0.4 milliGRAM(s) Oral at bedtime    PRN MEDICATIONS      VITAL SIGNS: Last 24 Hours  T(C): 36.1 (10 May 2019 04:44), Max: 36.8 (09 May 2019 20:00)  T(F): 97 (10 May 2019 04:44), Max: 98.2 (09 May 2019 20:00)  HR: 68 (09 May 2019 20:00) (63 - 68)  BP: 165/79 (09 May 2019 20:00) (142/86 - 177/81)  BP(mean): --  RR: 18 (09 May 2019 20:00) (18 - 18)  SpO2: 97% (09 May 2019 20:02) (97% - 99%)    LABS:                        10.3   5.78  )-----------( 186      ( 10 May 2019 08:48 )             31.9     05-10    138  |  101  |  34<H>  ----------------------------<  153<H>  3.6   |  25  |  4.2<HH>    Ca    8.3<L>      10 May 2019 08:48  Phos  5.0     05-09  Mg     2.0     05-10    TPro  5.5<L>  /  Alb  3.5  /  TBili  0.3  /  DBili  <0.2  /  AST  7   /  ALT  5   /  AlkPhos  90  05-09    PT/INR - ( 09 May 2019 08:51 )   PT: 13.10 sec;   INR: 1.14 ratio         PTT - ( 09 May 2019 08:51 )  PTT:36.0 sec      Troponin T, Serum: 0.11 ng/mL <HH> (05-10-19 @ 08:48)  Troponin T, Serum: 0.11 ng/mL <HH> (05-09-19 @ 21:34)  Troponin T, Serum: 0.12 ng/mL <HH> (05-09-19 @ 17:13)  Creatine Kinase, Serum: 72 U/L (05-09-19 @ 17:13)      Culture - Blood (collected 08 May 2019 16:08)  Source: .Blood Blood  Preliminary Report (09 May 2019 23:01):    No growth to date.    Culture - Blood (collected 08 May 2019 16:02)  Source: .Blood Blood  Preliminary Report (09 May 2019 23:01):    No growth to date.      CARDIAC MARKERS ( 10 May 2019 08:48 )  x     / 0.11 ng/mL / x     / x     / x      CARDIAC MARKERS ( 09 May 2019 21:34 )  x     / 0.11 ng/mL / x     / x     / x      CARDIAC MARKERS ( 09 May 2019 17:13 )  x     / 0.12 ng/mL / 72 U/L / x     / x      CARDIAC MARKERS ( 09 May 2019 08:51 )  x     / 0.10 ng/mL / 59 U/L / x     / 3.9 ng/mL  CARDIAC MARKERS ( 08 May 2019 20:31 )  x     / 0.12 ng/mL / x     / x     / x          RADIOLOGY:    < from: VA Duplex Lower Extrem Arterial, Bilat (05.09.19 @ 12:19) >  Impression:    Moderate to severe right peripheral arterial occlusive disease with   diminished flow in the popliteal artery.    Patent left superficial femoral artery stent.    ---------------    < from: Xray Chest 1 View AP/PA (05.08.19 @ 16:48) >    IMPRESSION:      Focal right lung atelectasis and bilateral low lung volumes.    < end of copied text >        PHYSICAL EXAM:    GENERAL: NAD, well-developed, AAOx3  HEENT:  Atraumatic, Normocephalic. EOMI, PERRLA, conjunctiva and sclera clear, No JVD  PULMONARY: Clear to auscultation bilaterally; No wheeze  CARDIOVASCULAR: Regular rate and rhythm; No murmurs, rubs, or gallops  GASTROINTESTINAL: Soft, Nontender, Nondistended; Bowel sounds present  MUSCULOSKELETAL:  2+ Peripheral Pulses, No clubbing, cyanosis, or edema  NEUROLOGY: non-focal  SKIN: No rashes or lesions SU SAWYER 58y Male  MRN#: [id number]       SUBJECTIVE  Patient is a 58y old Male who presents with a chief complaint of bradycardia (09 May 2019 15:56)  Currently admitted to medicine with the primary diagnosis of bradycardia. In the AM the patient was resting comfortably. No events overnight.    OBJECTIVE  PAST MEDICAL & SURGICAL HISTORY  ESRD on dialysis  Dyslipidemia  DM (diabetes mellitus), type 2  Neuropathy  HTN (hypertension)  CAD (coronary atherosclerotic disease)  Acromegaly  Breakdown of surgically created AV fistula, init: RIGHT UPPER ARM  H/O eye surgery  H/O: pituitary tumor: s/p removal    ALLERGIES:  bacitracin (Unknown)  Neosporin (Hypotension)    MEDICATIONS:  STANDING MEDICATIONS  aspirin enteric coated 81 milliGRAM(s) Oral daily  atorvastatin 80 milliGRAM(s) Oral at bedtime  clopidogrel Tablet 75 milliGRAM(s) Oral daily  collagenase Ointment 1 Application(s) Topical two times a day  Dakins Solution - 1/2 Strength 1 Application(s) Topical two times a day  docusate sodium 100 milliGRAM(s) Oral two times a day  ferrous    sulfate 325 milliGRAM(s) Oral daily  folic acid 1 milliGRAM(s) Oral daily  heparin  Injectable 5000 Unit(s) SubCutaneous every 8 hours  pantoprazole    Tablet 40 milliGRAM(s) Oral before breakfast  sevelamer carbonate 1600 milliGRAM(s) Oral every 8 hours  tamsulosin 0.4 milliGRAM(s) Oral at bedtime    PRN MEDICATIONS      VITAL SIGNS: Last 24 Hours  T(C): 36.1 (10 May 2019 04:44), Max: 36.8 (09 May 2019 20:00)  T(F): 97 (10 May 2019 04:44), Max: 98.2 (09 May 2019 20:00)  HR: 68 (09 May 2019 20:00) (63 - 68)  BP: 165/79 (09 May 2019 20:00) (142/86 - 177/81)  BP(mean): --  RR: 18 (09 May 2019 20:00) (18 - 18)  SpO2: 97% (09 May 2019 20:02) (97% - 99%)    LABS:                        10.3   5.78  )-----------( 186      ( 10 May 2019 08:48 )             31.9     05-10    138  |  101  |  34<H>  ----------------------------<  153<H>  3.6   |  25  |  4.2<HH>    Ca    8.3<L>      10 May 2019 08:48  Phos  5.0     05-09  Mg     2.0     05-10    TPro  5.5<L>  /  Alb  3.5  /  TBili  0.3  /  DBili  <0.2  /  AST  7   /  ALT  5   /  AlkPhos  90  05-09    PT/INR - ( 09 May 2019 08:51 )   PT: 13.10 sec;   INR: 1.14 ratio         PTT - ( 09 May 2019 08:51 )  PTT:36.0 sec      Troponin T, Serum: 0.11 ng/mL <HH> (05-10-19 @ 08:48)  Troponin T, Serum: 0.11 ng/mL <HH> (05-09-19 @ 21:34)  Troponin T, Serum: 0.12 ng/mL <HH> (05-09-19 @ 17:13)  Creatine Kinase, Serum: 72 U/L (05-09-19 @ 17:13)      Culture - Blood (collected 08 May 2019 16:08)  Source: .Blood Blood  Preliminary Report (09 May 2019 23:01):    No growth to date.    Culture - Blood (collected 08 May 2019 16:02)  Source: .Blood Blood  Preliminary Report (09 May 2019 23:01):    No growth to date.      CARDIAC MARKERS ( 10 May 2019 08:48 )  x     / 0.11 ng/mL / x     / x     / x      CARDIAC MARKERS ( 09 May 2019 21:34 )  x     / 0.11 ng/mL / x     / x     / x      CARDIAC MARKERS ( 09 May 2019 17:13 )  x     / 0.12 ng/mL / 72 U/L / x     / x      CARDIAC MARKERS ( 09 May 2019 08:51 )  x     / 0.10 ng/mL / 59 U/L / x     / 3.9 ng/mL  CARDIAC MARKERS ( 08 May 2019 20:31 )  x     / 0.12 ng/mL / x     / x     / x          RADIOLOGY:    < from: VA Duplex Lower Extrem Arterial, Bilat (05.09.19 @ 12:19) >  Impression:    Moderate to severe right peripheral arterial occlusive disease with   diminished flow in the popliteal artery.    Patent left superficial femoral artery stent.    ---------------    < from: Xray Chest 1 View AP/PA (05.08.19 @ 16:48) >    IMPRESSION:      Focal right lung atelectasis and bilateral low lung volumes.    --------------    < from: Xray Foot AP + Lateral + Oblique, Right (05.08.19 @ 16:48) >  Impression:  Diffuse soft tissue swelling with ulceration of the plantar forefoot. No   definite evidence for osteomyelitis.    PHYSICAL EXAM:    GENERAL: NAD, well-developed, AAOx4  HEENT:  Atraumatic, Normocephalic w/ coarse facial features  PULMONARY: Clear to auscultation bilaterally  CARDIOVASCULAR: Regular rate and rhythm; No murmurs, rubs, or gallops  GASTROINTESTINAL: Soft, Nontender, Nondistended; Bowel sounds present  MUSCULOSKELETAL: No clubbing, cyanosis, or edema  NEUROLOGY: non-focal  SKIN: Right heel ulcer wound clean and dry

## 2019-05-11 ENCOUNTER — TRANSCRIPTION ENCOUNTER (OUTPATIENT)
Age: 59
End: 2019-05-11

## 2019-05-11 VITALS
RESPIRATION RATE: 18 BRPM | DIASTOLIC BLOOD PRESSURE: 74 MMHG | TEMPERATURE: 98 F | HEART RATE: 68 BPM | SYSTOLIC BLOOD PRESSURE: 145 MMHG

## 2019-05-11 LAB
ANION GAP SERPL CALC-SCNC: 16 MMOL/L — HIGH (ref 7–14)
BASOPHILS # BLD AUTO: 0.07 K/UL — SIGNIFICANT CHANGE UP (ref 0–0.2)
BASOPHILS NFR BLD AUTO: 1.3 % — HIGH (ref 0–1)
BUN SERPL-MCNC: 45 MG/DL — HIGH (ref 10–20)
CALCIUM SERPL-MCNC: 8.4 MG/DL — LOW (ref 8.5–10.1)
CHLORIDE SERPL-SCNC: 102 MMOL/L — SIGNIFICANT CHANGE UP (ref 98–110)
CO2 SERPL-SCNC: 22 MMOL/L — SIGNIFICANT CHANGE UP (ref 17–32)
CREAT SERPL-MCNC: 5.1 MG/DL — CRITICAL HIGH (ref 0.7–1.5)
EOSINOPHIL # BLD AUTO: 0.38 K/UL — SIGNIFICANT CHANGE UP (ref 0–0.7)
EOSINOPHIL NFR BLD AUTO: 6.8 % — SIGNIFICANT CHANGE UP (ref 0–8)
GLUCOSE BLDC GLUCOMTR-MCNC: 143 MG/DL — HIGH (ref 70–99)
GLUCOSE BLDC GLUCOMTR-MCNC: 160 MG/DL — HIGH (ref 70–99)
GLUCOSE BLDC GLUCOMTR-MCNC: 91 MG/DL — SIGNIFICANT CHANGE UP (ref 70–99)
GLUCOSE SERPL-MCNC: 192 MG/DL — HIGH (ref 70–99)
HCT VFR BLD CALC: 30.5 % — LOW (ref 42–52)
HGB BLD-MCNC: 9.9 G/DL — LOW (ref 14–18)
IMM GRANULOCYTES NFR BLD AUTO: 0.2 % — SIGNIFICANT CHANGE UP (ref 0.1–0.3)
LYMPHOCYTES # BLD AUTO: 1.16 K/UL — LOW (ref 1.2–3.4)
LYMPHOCYTES # BLD AUTO: 20.8 % — SIGNIFICANT CHANGE UP (ref 20.5–51.1)
MAGNESIUM SERPL-MCNC: 2 MG/DL — SIGNIFICANT CHANGE UP (ref 1.8–2.4)
MCHC RBC-ENTMCNC: 31.3 PG — HIGH (ref 27–31)
MCHC RBC-ENTMCNC: 32.5 G/DL — SIGNIFICANT CHANGE UP (ref 32–37)
MCV RBC AUTO: 96.5 FL — HIGH (ref 80–94)
MONOCYTES # BLD AUTO: 0.86 K/UL — HIGH (ref 0.1–0.6)
MONOCYTES NFR BLD AUTO: 15.4 % — HIGH (ref 1.7–9.3)
NEUTROPHILS # BLD AUTO: 3.1 K/UL — SIGNIFICANT CHANGE UP (ref 1.4–6.5)
NEUTROPHILS NFR BLD AUTO: 55.5 % — SIGNIFICANT CHANGE UP (ref 42.2–75.2)
NRBC # BLD: 0 /100 WBCS — SIGNIFICANT CHANGE UP (ref 0–0)
PLATELET # BLD AUTO: 185 K/UL — SIGNIFICANT CHANGE UP (ref 130–400)
POTASSIUM SERPL-MCNC: 4.1 MMOL/L — SIGNIFICANT CHANGE UP (ref 3.5–5)
POTASSIUM SERPL-SCNC: 4.1 MMOL/L — SIGNIFICANT CHANGE UP (ref 3.5–5)
RBC # BLD: 3.16 M/UL — LOW (ref 4.7–6.1)
RBC # FLD: 14.5 % — SIGNIFICANT CHANGE UP (ref 11.5–14.5)
SODIUM SERPL-SCNC: 140 MMOL/L — SIGNIFICANT CHANGE UP (ref 135–146)
WBC # BLD: 5.58 K/UL — SIGNIFICANT CHANGE UP (ref 4.8–10.8)
WBC # FLD AUTO: 5.58 K/UL — SIGNIFICANT CHANGE UP (ref 4.8–10.8)

## 2019-05-11 RX ADMIN — Medication 100 MILLIGRAM(S): at 06:28

## 2019-05-11 RX ADMIN — CLOPIDOGREL BISULFATE 75 MILLIGRAM(S): 75 TABLET, FILM COATED ORAL at 13:24

## 2019-05-11 RX ADMIN — Medication 81 MILLIGRAM(S): at 13:23

## 2019-05-11 RX ADMIN — PANTOPRAZOLE SODIUM 40 MILLIGRAM(S): 20 TABLET, DELAYED RELEASE ORAL at 06:28

## 2019-05-11 RX ADMIN — SEVELAMER CARBONATE 1600 MILLIGRAM(S): 2400 POWDER, FOR SUSPENSION ORAL at 06:28

## 2019-05-11 RX ADMIN — HEPARIN SODIUM 5000 UNIT(S): 5000 INJECTION INTRAVENOUS; SUBCUTANEOUS at 06:28

## 2019-05-11 RX ADMIN — HEPARIN SODIUM 5000 UNIT(S): 5000 INJECTION INTRAVENOUS; SUBCUTANEOUS at 13:24

## 2019-05-11 RX ADMIN — SEVELAMER CARBONATE 1600 MILLIGRAM(S): 2400 POWDER, FOR SUSPENSION ORAL at 13:23

## 2019-05-11 RX ADMIN — Medication 1 APPLICATION(S): at 06:28

## 2019-05-11 RX ADMIN — Medication 325 MILLIGRAM(S): at 13:23

## 2019-05-11 RX ADMIN — Medication 1 MILLIGRAM(S): at 13:24

## 2019-05-11 NOTE — PROGRESS NOTE ADULT - SUBJECTIVE AND OBJECTIVE BOX
seen and examined  no distress  no new complaints       Standing Inpatient Medications  aspirin enteric coated 81 milliGRAM(s) Oral daily  atorvastatin 80 milliGRAM(s) Oral at bedtime  clopidogrel Tablet 75 milliGRAM(s) Oral daily  collagenase Ointment 1 Application(s) Topical two times a day  Dakins Solution - 1/2 Strength 1 Application(s) Topical two times a day  docusate sodium 100 milliGRAM(s) Oral two times a day  ferrous    sulfate 325 milliGRAM(s) Oral daily  folic acid 1 milliGRAM(s) Oral daily  heparin  Injectable 5000 Unit(s) SubCutaneous every 8 hours  pantoprazole    Tablet 40 milliGRAM(s) Oral before breakfast  sevelamer carbonate 1600 milliGRAM(s) Oral every 8 hours  tamsulosin 0.4 milliGRAM(s) Oral at bedtime      VITALS/PHYSICAL EXAM  --------------------------------------------------------------------------------  T(C): 36.2 (05-11-19 @ 04:47), Max: 36.8 (05-10-19 @ 20:00)  HR: 66 (05-11-19 @ 04:47) (65 - 67)  BP: 154/77 (05-11-19 @ 04:47) (127/67 - 167/79)  RR: 18 (05-11-19 @ 04:47) (18 - 18)  SpO2: --  Wt(kg): --  Height (cm): 193.04 (05-09-19 @ 20:00)  Weight (kg): 110.5 (05-09-19 @ 20:00)  BMI (kg/m2): 29.7 (05-09-19 @ 20:00)  BSA (m2): 2.41 (05-09-19 @ 20:00)      Physical Exam:  	Gen: NAD  	Pulm: decrease BS  B/L  	CV:  S1S2; no rub  	Abd:  soft, nontender/nondistended  	LE: edema, dressing   	Vascular access: av fistula   LABS/STUDIES  --------------------------------------------------------------------------------              10.3   5.78  >-----------<  186      [05-10-19 @ 08:48]              31.9     138  |  101  |  34  ----------------------------<  153      [05-10-19 @ 08:48]  3.6   |  25  |  4.2        Ca     8.3     [05-10-19 @ 08:48]      iCa    1.17     [05-09 @ 08:51]      Mg     2.0     [05-10-19 @ 08:48]      Phos  5.0     [05-09-19 @ 08:51]    TPro  5.5  /  Alb  3.5  /  TBili  0.3  /  DBili  <0.2  /  AST  7   /  ALT  5   /  AlkPhos  90  [05-09-19 @ 08:51]    PT/INR: PT 13.10, INR 1.14       [05-09-19 @ 08:51]  PTT: 36.0       [05-09-19 @ 08:51]    Troponin 0.11      [05-10-19 @ 08:48]  CK 72      [05-09-19 @ 17:13]    Creatinine Trend:  SCr 4.2 [05-10 @ 08:48]  SCr 5.0 [05-09 @ 08:51]  SCr 4.9 [05-08 @ 16:20]        Iron 47, TIBC 126, %sat 37      [05-09-19 @ 08:51]  Ferritin 500      [05-09-19 @ 08:51]  PTH -- (Ca 8.2)      [09-22-18 @ 08:40]   2071  PTH -- (Ca 8.0)      [09-19-18 @ 04:30]   1519  HbA1c 6.2      [05-09-19 @ 08:51]  TSH 0.75      [05-09-19 @ 08:51]  Lipid: chol 87, TG 57, HDL 49, LDL 26      [05-09-19 @ 08:51]

## 2019-05-11 NOTE — PROGRESS NOTE ADULT - ASSESSMENT
58 year old male being evaluated for symptomatic bradycardia pre-HD; sent to ED; admitted for symptomatic bradycardia, s/p HD Thurs.    Bradycardia, resolved  -Asymptomatic at this time  -Continue telemetry monitoring for now  -F/u EP  -TSH 0.75  -Vitals per routine    Diabetic Foot Ulcer possibly secondary to PAD  -VA duplex on admission showed diminished flow in right popliteal artery  -No surgical intervention or abx per Podiatry  -Xray right foot showed soft tissue swelling w/o ulceration and w/o evidence of osteomyelitis  -Per Vascular sx, will do angiogram outpatient either this Tues or following week    ESRD  -On hemodialysis monday, wednesday and friday  -Continue Sevalmer 1600 mg three times daily for now  -F/u w/ Nephro  -F/u AM BMP    Coronary Artery Disease  -s/p PCI 10 years ago  -Continue aspirin and plavix for now  -Continue statin    Possible Folic Acid deficiency  Supplement w/ folic acid 1 mg qD    Full Code  Comes from Home  Verify meds w/ [facility] on [address]  On DVT prophylaxis

## 2019-05-11 NOTE — PROGRESS NOTE ADULT - ASSESSMENT
Patient with ESRD - HD (MWF) came to ED for bradycardia  # hd today, 3 k bath, uf 2 liters as tolerated  # ph noted, continue renagel  # d/c feso4 po  # h/h noted no need for EDWIN  # HR stable,improved  # will follow

## 2019-05-11 NOTE — PROGRESS NOTE ADULT - SUBJECTIVE AND OBJECTIVE BOX
SU SAWYER 58y Male  MRN#: [id number]       SUBJECTIVE  Patient is a 58y old Male who presents with a chief complaint of bradycardia (11 May 2019 07:30)  Currently admitted to medicine with the primary diagnosis of bradycardia pre-HD. In the early afternoon the patient was resting comfortably; denied dizziness/lightheadedness during dialysis or afterward; denies CP/pressure/SOB; agreeable for discharge when medically stable.    OBJECTIVE  PAST MEDICAL & SURGICAL HISTORY  ESRD on dialysis  Dyslipidemia  DM (diabetes mellitus), type 2  Neuropathy  HTN (hypertension)  CAD (coronary atherosclerotic disease)  Acromegaly  Breakdown of surgically created AV fistula, init: RIGHT UPPER ARM  H/O eye surgery  H/O: pituitary tumor: s/p removal    ALLERGIES:  bacitracin (Unknown)  Neosporin (Hypotension)    MEDICATIONS:  STANDING MEDICATIONS  aspirin enteric coated 81 milliGRAM(s) Oral daily  atorvastatin 80 milliGRAM(s) Oral at bedtime  clopidogrel Tablet 75 milliGRAM(s) Oral daily  collagenase Ointment 1 Application(s) Topical two times a day  Dakins Solution - 1/2 Strength 1 Application(s) Topical two times a day  docusate sodium 100 milliGRAM(s) Oral two times a day  ferrous    sulfate 325 milliGRAM(s) Oral daily  folic acid 1 milliGRAM(s) Oral daily  heparin  Injectable 5000 Unit(s) SubCutaneous every 8 hours  pantoprazole    Tablet 40 milliGRAM(s) Oral before breakfast  sevelamer carbonate 1600 milliGRAM(s) Oral every 8 hours  tamsulosin 0.4 milliGRAM(s) Oral at bedtime    PRN MEDICATIONS      VITAL SIGNS: Last 24 Hours  T(C): 36.2 (11 May 2019 04:47), Max: 36.8 (10 May 2019 20:00)  T(F): 97.1 (11 May 2019 04:47), Max: 98.3 (10 May 2019 20:00)  HR: 64 (11 May 2019 11:20) (64 - 70)  BP: 128/76 (11 May 2019 11:20) (127/67 - 167/79)  BP(mean): --  RR: 17 (11 May 2019 11:20) (17 - 18)  SpO2: --    LABS:                        9.9    5.58  )-----------( 185      ( 11 May 2019 10:45 )             30.5     05-10    138  |  101  |  34<H>  ----------------------------<  153<H>  3.6   |  25  |  4.2<HH>    Ca    8.3<L>      10 May 2019 08:48  Mg     2.0     05-10        Culture - Blood (collected 08 May 2019 16:08)  Source: .Blood Blood  Preliminary Report (09 May 2019 23:01):    No growth to date.    Culture - Blood (collected 08 May 2019 16:02)  Source: .Blood Blood  Preliminary Report (09 May 2019 23:01):    No growth to date.      CARDIAC MARKERS ( 10 May 2019 08:48 )  x     / 0.11 ng/mL / x     / x     / x      CARDIAC MARKERS ( 09 May 2019 21:34 )  x     / 0.11 ng/mL / x     / x     / x      CARDIAC MARKERS ( 09 May 2019 17:13 )  x     / 0.12 ng/mL / 72 U/L / x     / x          PHYSICAL EXAM:    GENERAL: NAD, well-developed, AAOx4  HEENT:  Atraumatic, Normocephalic; coarse facial features.   PULMONARY: Clear to auscultation bilaterally; No wheeze  CARDIOVASCULAR: Regular rate and rhythm; No murmurs, rubs, or gallops  GASTROINTESTINAL: Soft, Nontender, Nondistended  MUSCULOSKELETAL: No clubbing, cyanosis, or edema  NEUROLOGY: non-focal  SKIN: B/L diffuse patchy erythematous lesions; wound dressing on right lower extremity clean and dry

## 2019-05-11 NOTE — DISCHARGE NOTE NURSING/CASE MANAGEMENT/SOCIAL WORK - NSDCDPATPORTLINK_GEN_ALL_CORE
You can access the [facility] Patient Portal, offered by [facility], by registering with the following website: [email or web address]

## 2019-05-11 NOTE — CHART NOTE - NSCHARTNOTEFT_GEN_A_CORE
Pt seen and examined at bedside. No cp or sob. Had HD today  vitals, labs and exam stable    58 year old male with pertinent medical history of End Stage Renal Diseases on HD m-w-f, Coronary Artery Disease s/p PCI 10 years ago, Acromegaly s/p resection of pituitary tumor (22 years ago) and Diabetes complicated by neuropathy resulting in foot ulcer comes to the ER for bradycardia. He was at HD yesterday morning when while he had a pre-procedure vitals checked, his HR was found to be in 40's, when rechecked a few minutes later it was the same. He complains of increasing tiredness over the last two weeks. He feels tired and an odd funny sensation in the chest as well. He feels his heart sinking at times but he denies palpitations, chest pain, shortness of breath, lightheadedness, vison changes and any episodes of syncope.  After ~ 30  minutes HR was back up to 80's.  He did not receive HD. He further complaints of recent ulceration to right heel which he thinks he scraped around 10 days ago. He has neuropathy till the mid shin so does not feel the pain, but has noticed ulceration. He says the podiatrist came to house 6 days ago and did some local debridement, however it has continued to have drainage from wound which has become foul smelling now. He denied any fever or chills, he has swelling to bilateral lower extremities with no calf pain and numbness till mid shin. He started taking amoxcillin one night before coming. He has had skin grafting for left heel ulcer around 4 months ago by Dr. Tripp    In ED patients heart rate was ranging in 60s to 80s. Patient was seen by EP and cleared for discharge. Patient was also seen by podiatry and did the dressing and deemed cleared from podiatry point of view to be discharged. Patient also underwent dialysis and tolerated it well. VA arterial and venous duplex were done which were both reported negative for any pathology.    Pt had Arterial duplex showed Moderate to severe disease. Vascular consulted and recommended outpt angio.       Patient is stable and is deemed okay to be discharged.

## 2019-05-12 LAB
CALCIUM SERPL-MCNC: 8.1 MG/DL — LOW (ref 8.4–10.5)
PTH-INTACT FLD-MCNC: 592 PG/ML — HIGH (ref 15–65)

## 2019-05-13 ENCOUNTER — INBOUND DOCUMENT (OUTPATIENT)
Age: 59
End: 2019-05-13

## 2019-05-15 DIAGNOSIS — R00.1 BRADYCARDIA, UNSPECIFIED: ICD-10-CM

## 2019-05-15 DIAGNOSIS — Z99.2 DEPENDENCE ON RENAL DIALYSIS: ICD-10-CM

## 2019-05-15 DIAGNOSIS — Z79.82 LONG TERM (CURRENT) USE OF ASPIRIN: ICD-10-CM

## 2019-05-15 DIAGNOSIS — I12.0 HYPERTENSIVE CHRONIC KIDNEY DISEASE WITH STAGE 5 CHRONIC KIDNEY DISEASE OR END STAGE RENAL DISEASE: ICD-10-CM

## 2019-05-15 DIAGNOSIS — N18.6 END STAGE RENAL DISEASE: ICD-10-CM

## 2019-05-15 DIAGNOSIS — Z79.02 LONG TERM (CURRENT) USE OF ANTITHROMBOTICS/ANTIPLATELETS: ICD-10-CM

## 2019-05-15 DIAGNOSIS — L97.419 NON-PRESSURE CHRONIC ULCER OF RIGHT HEEL AND MIDFOOT WITH UNSPECIFIED SEVERITY: ICD-10-CM

## 2019-05-15 DIAGNOSIS — E11.51 TYPE 2 DIABETES MELLITUS WITH DIABETIC PERIPHERAL ANGIOPATHY WITHOUT GANGRENE: ICD-10-CM

## 2019-05-15 DIAGNOSIS — E11.40 TYPE 2 DIABETES MELLITUS WITH DIABETIC NEUROPATHY, UNSPECIFIED: ICD-10-CM

## 2019-05-15 DIAGNOSIS — E22.0 ACROMEGALY AND PITUITARY GIGANTISM: ICD-10-CM

## 2019-05-15 DIAGNOSIS — Z82.49 FAMILY HISTORY OF ISCHEMIC HEART DISEASE AND OTHER DISEASES OF THE CIRCULATORY SYSTEM: ICD-10-CM

## 2019-05-15 DIAGNOSIS — I25.10 ATHEROSCLEROTIC HEART DISEASE OF NATIVE CORONARY ARTERY WITHOUT ANGINA PECTORIS: ICD-10-CM

## 2019-05-15 DIAGNOSIS — E11.621 TYPE 2 DIABETES MELLITUS WITH FOOT ULCER: ICD-10-CM

## 2019-05-15 DIAGNOSIS — E11.22 TYPE 2 DIABETES MELLITUS WITH DIABETIC CHRONIC KIDNEY DISEASE: ICD-10-CM

## 2019-05-20 PROBLEM — N18.6 END STAGE RENAL DISEASE: Chronic | Status: ACTIVE | Noted: 2019-05-09

## 2019-05-21 ENCOUNTER — APPOINTMENT (OUTPATIENT)
Dept: VASCULAR SURGERY | Facility: HOSPITAL | Age: 59
End: 2019-05-21
Payer: COMMERCIAL

## 2019-05-21 ENCOUNTER — OUTPATIENT (OUTPATIENT)
Dept: OUTPATIENT SERVICES | Facility: HOSPITAL | Age: 59
LOS: 1 days | Discharge: HOME | End: 2019-05-21

## 2019-05-21 VITALS
SYSTOLIC BLOOD PRESSURE: 139 MMHG | WEIGHT: 240.08 LBS | HEART RATE: 72 BPM | HEIGHT: 76 IN | TEMPERATURE: 99 F | RESPIRATION RATE: 18 BRPM | DIASTOLIC BLOOD PRESSURE: 78 MMHG

## 2019-05-21 VITALS — DIASTOLIC BLOOD PRESSURE: 88 MMHG | HEART RATE: 66 BPM | SYSTOLIC BLOOD PRESSURE: 148 MMHG | RESPIRATION RATE: 20 BRPM

## 2019-05-21 DIAGNOSIS — Z98.890 OTHER SPECIFIED POSTPROCEDURAL STATES: Chronic | ICD-10-CM

## 2019-05-21 DIAGNOSIS — Z87.898 PERSONAL HISTORY OF OTHER SPECIFIED CONDITIONS: Chronic | ICD-10-CM

## 2019-05-21 DIAGNOSIS — T82.510A BREAKDOWN (MECHANICAL) OF SURGICALLY CREATED ARTERIOVENOUS FISTULA, INITIAL ENCOUNTER: Chronic | ICD-10-CM

## 2019-05-21 LAB
GLUCOSE BLDC GLUCOMTR-MCNC: 115 MG/DL — HIGH (ref 70–99)
GLUCOSE BLDC GLUCOMTR-MCNC: 133 MG/DL — HIGH (ref 70–99)
GLUCOSE BLDC GLUCOMTR-MCNC: 87 MG/DL — SIGNIFICANT CHANGE UP (ref 70–99)
HCV AB S/CO SERPL IA: 0.12 S/CO — SIGNIFICANT CHANGE UP (ref 0–0.99)
HCV AB SERPL-IMP: SIGNIFICANT CHANGE UP

## 2019-05-21 PROCEDURE — 75710 ARTERY X-RAYS ARM/LEG: CPT | Mod: 26

## 2019-05-21 PROCEDURE — 36247 INS CATH ABD/L-EXT ART 3RD: CPT

## 2019-05-21 PROCEDURE — 76937 US GUIDE VASCULAR ACCESS: CPT | Mod: 26,59

## 2019-05-21 RX ORDER — HYDROMORPHONE HYDROCHLORIDE 2 MG/ML
0.5 INJECTION INTRAMUSCULAR; INTRAVENOUS; SUBCUTANEOUS
Refills: 0 | Status: DISCONTINUED | OUTPATIENT
Start: 2019-05-21 | End: 2019-05-21

## 2019-05-21 RX ORDER — SODIUM CHLORIDE 9 MG/ML
1000 INJECTION, SOLUTION INTRAVENOUS
Refills: 0 | Status: DISCONTINUED | OUTPATIENT
Start: 2019-05-21 | End: 2019-05-21

## 2019-05-21 RX ADMIN — HYDROMORPHONE HYDROCHLORIDE 0.5 MILLIGRAM(S): 2 INJECTION INTRAMUSCULAR; INTRAVENOUS; SUBCUTANEOUS at 14:33

## 2019-05-21 RX ADMIN — HYDROMORPHONE HYDROCHLORIDE 0.5 MILLIGRAM(S): 2 INJECTION INTRAMUSCULAR; INTRAVENOUS; SUBCUTANEOUS at 15:29

## 2019-05-21 NOTE — H&P PST ADULT - VASCULAR DETAILS
discoloration 2nd/3rd digit right foot, no open lesion  left lateral heel ulcer + hyperkeratotic border, wound base Fibrogranular with small necrotic patches center of the wound/ , wound size (4.9cm X4.2 cm)

## 2019-05-21 NOTE — H&P PST ADULT - ASSESSMENT
58 year old male with pertinent medical history of End Stage Renal Diseases on HD m-w-f, Coronary Artery Disease s/p PCI 10 years ago, Acromegaly s/p resection of pituitary tumor (22 years ago) and Diabetes, diabetic neuropathy, PVD, in October 2018 he underwent LLE angio, left SFA angioplasty and stent placement. He was evaluated by Dr. Comer on 5/9/19 when he came into ED with a right heal non-healing ulcer. At that time an arterial doppler findings of moderate to severe occlusive PAD on the right with diminished flow in the popliteal artery.     Pt presents for right lower extremity angiogram

## 2019-05-21 NOTE — ASU PATIENT PROFILE, ADULT - PMH
Acromegaly    CAD (coronary atherosclerotic disease)    DM (diabetes mellitus), type 2    Dyslipidemia    ESRD on dialysis    HTN (hypertension)    Neuropathy

## 2019-05-21 NOTE — ASU DISCHARGE PLAN (ADULT/PEDIATRIC) - CARE PROVIDER_API CALL
Adan Comer)  Surgery; Vascular Surgery  [address] [place] [address]  Phone: [phone]  Fax: [phone]  Follow Up Time:

## 2019-05-21 NOTE — H&P PST ADULT - NSICDXPASTMEDICALHX_GEN_ALL_CORE_FT
PAST MEDICAL HISTORY:  Acromegaly     CAD (coronary atherosclerotic disease)     DM (diabetes mellitus), type 2     Dyslipidemia     ESRD on dialysis     HTN (hypertension)     Neuropathy

## 2019-05-21 NOTE — ASU DISCHARGE PLAN (ADULT/PEDIATRIC) - CALL YOUR DOCTOR IF YOU HAVE ANY OF THE FOLLOWING:
Pain not relieved by Medications/Wound/Surgical Site with redness, or foul smelling discharge or pus/Nausea and vomiting that does not stop/Numbness, tingling, color or temperature change to extremity/Bleeding that does not stop

## 2019-05-21 NOTE — BRIEF OPERATIVE NOTE - OPERATION/FINDINGS
Multiple proximal SFA stenoses (close to origin). Two short-distance occlusions of distal popliteal artery and tibioperoneal trunk. AT, peroneal, PT patent without significant stenoses. Unable to cross occlusions.

## 2019-05-21 NOTE — CHART NOTE - NSCHARTNOTEFT_GEN_A_CORE
PACU ANESTHESIA ADMISSION NOTE      Procedure:   Post op diagnosis:      ____  Intubated  TV:______       Rate: ______      FiO2: ______    _x___  Patent Airway    _x___  Full return of protective reflexes    _x___  Full recovery from anesthesia / back to baseline     Vitals:   T: 97.5          R:     14             BP:    119/68              Sat:  97                 P: 65      Mental Status:  _x___ Awake   x_____ Alert   _____ Drowsy   _____ Sedated    Nausea/Vomiting:  _x___ NO  ______Yes,   See Post - Op Orders          Pain Scale (0-10):  _0____    Treatment: ____ None    _x___ See Post - Op/PCA Orders    Post - Operative Fluids:   ____ Oral   x____ See Post - Op Orders    Plan: Discharge:   ____Home       x_____Floor     _____Critical Care    _____  Other:_________________    Comments: tolerated procedure well

## 2019-05-21 NOTE — H&P PST ADULT - NS MD HP PULSE DORSALIS
PRN Medication Documentation    Specific patient behavior that led to need for PRN medication: Patient complained of seasonal allergies  Staff interventions attempted prior to PRN being given: Offered PRN meds  PRN medication given: Claritin 10 mg PO  Patient response/effectiveness of PRN medication: dopplerable

## 2019-05-21 NOTE — H&P PST ADULT - HISTORY OF PRESENT ILLNESS
58 year old male with pertinent medical history of End Stage Renal Diseases on HD m-w-f, Coronary Artery Disease s/p PCI 10 years ago, Acromegaly s/p resection of pituitary tumor (22 years ago) and Diabetes, diabetic neuropathy, PVD, in October 2018 he underwent LLE angio, left SFA angioplasty and stent placement. He was evaluated by Dr. Comer on 5/9/19 when he came into ED with a right heal non-healing ulcer. At that time an arterial doppler findings of moderate to severe occlusive PAD on the right with diminished flow in the popliteal artery.   Pt was recommended to undergo peripheral angiogram on the right lower extremity for suspected underlying PAD

## 2019-05-25 NOTE — H&P PST ADULT - NSICDXFAMILYHX_GEN_ALL_CORE_FT
FAMILY HISTORY:  Father  Still living? No  Family history of myocardial infarction, Age at diagnosis: Age Unknown No

## 2019-05-31 DIAGNOSIS — N18.6 END STAGE RENAL DISEASE: ICD-10-CM

## 2019-05-31 DIAGNOSIS — I12.0 HYPERTENSIVE CHRONIC KIDNEY DISEASE WITH STAGE 5 CHRONIC KIDNEY DISEASE OR END STAGE RENAL DISEASE: ICD-10-CM

## 2019-05-31 DIAGNOSIS — Z88.8 ALLERGY STATUS TO OTHER DRUGS, MEDICAMENTS AND BIOLOGICAL SUBSTANCES: ICD-10-CM

## 2019-05-31 DIAGNOSIS — I25.10 ATHEROSCLEROTIC HEART DISEASE OF NATIVE CORONARY ARTERY WITHOUT ANGINA PECTORIS: ICD-10-CM

## 2019-05-31 DIAGNOSIS — Z99.2 DEPENDENCE ON RENAL DIALYSIS: ICD-10-CM

## 2019-05-31 DIAGNOSIS — I70.235 ATHEROSCLEROSIS OF NATIVE ARTERIES OF RIGHT LEG WITH ULCERATION OF OTHER PART OF FOOT: ICD-10-CM

## 2019-05-31 DIAGNOSIS — Z79.02 LONG TERM (CURRENT) USE OF ANTITHROMBOTICS/ANTIPLATELETS: ICD-10-CM

## 2019-05-31 DIAGNOSIS — Z95.1 PRESENCE OF AORTOCORONARY BYPASS GRAFT: ICD-10-CM

## 2019-05-31 DIAGNOSIS — L97.519 NON-PRESSURE CHRONIC ULCER OF OTHER PART OF RIGHT FOOT WITH UNSPECIFIED SEVERITY: ICD-10-CM

## 2019-05-31 DIAGNOSIS — E11.40 TYPE 2 DIABETES MELLITUS WITH DIABETIC NEUROPATHY, UNSPECIFIED: ICD-10-CM

## 2019-06-09 ENCOUNTER — EMERGENCY (EMERGENCY)
Facility: HOSPITAL | Age: 59
LOS: 0 days | Discharge: HOME | End: 2019-06-09
Attending: EMERGENCY MEDICINE
Payer: MEDICARE

## 2019-06-09 VITALS
OXYGEN SATURATION: 98 % | HEART RATE: 89 BPM | RESPIRATION RATE: 16 BRPM | SYSTOLIC BLOOD PRESSURE: 157 MMHG | TEMPERATURE: 97 F | DIASTOLIC BLOOD PRESSURE: 79 MMHG

## 2019-06-09 VITALS — WEIGHT: 240.08 LBS

## 2019-06-09 DIAGNOSIS — Z87.898 PERSONAL HISTORY OF OTHER SPECIFIED CONDITIONS: Chronic | ICD-10-CM

## 2019-06-09 DIAGNOSIS — Z02.9 ENCOUNTER FOR ADMINISTRATIVE EXAMINATIONS, UNSPECIFIED: ICD-10-CM

## 2019-06-09 DIAGNOSIS — T82.510A BREAKDOWN (MECHANICAL) OF SURGICALLY CREATED ARTERIOVENOUS FISTULA, INITIAL ENCOUNTER: Chronic | ICD-10-CM

## 2019-06-09 DIAGNOSIS — Z98.890 OTHER SPECIFIED POSTPROCEDURAL STATES: Chronic | ICD-10-CM

## 2019-06-09 LAB
ANION GAP SERPL CALC-SCNC: 12 MMOL/L — SIGNIFICANT CHANGE UP (ref 7–14)
BLD GP AB SCN SERPL QL: SIGNIFICANT CHANGE UP
BUN SERPL-MCNC: 34 MG/DL — HIGH (ref 10–20)
CALCIUM SERPL-MCNC: 9 MG/DL — SIGNIFICANT CHANGE UP (ref 8.5–10.1)
CHLORIDE SERPL-SCNC: 98 MMOL/L — SIGNIFICANT CHANGE UP (ref 98–110)
CO2 SERPL-SCNC: 28 MMOL/L — SIGNIFICANT CHANGE UP (ref 17–32)
CREAT SERPL-MCNC: 3.3 MG/DL — HIGH (ref 0.7–1.5)
GLUCOSE SERPL-MCNC: 128 MG/DL — HIGH (ref 70–99)
HCT VFR BLD CALC: 32.1 % — LOW (ref 42–52)
HGB BLD-MCNC: 10.2 G/DL — LOW (ref 14–18)
MCHC RBC-ENTMCNC: 30.7 PG — SIGNIFICANT CHANGE UP (ref 27–31)
MCHC RBC-ENTMCNC: 31.8 G/DL — LOW (ref 32–37)
MCV RBC AUTO: 96.7 FL — HIGH (ref 80–94)
NRBC # BLD: 0 /100 WBCS — SIGNIFICANT CHANGE UP (ref 0–0)
PLATELET # BLD AUTO: 166 K/UL — SIGNIFICANT CHANGE UP (ref 130–400)
POTASSIUM SERPL-MCNC: 4 MMOL/L — SIGNIFICANT CHANGE UP (ref 3.5–5)
POTASSIUM SERPL-SCNC: 4 MMOL/L — SIGNIFICANT CHANGE UP (ref 3.5–5)
RBC # BLD: 3.32 M/UL — LOW (ref 4.7–6.1)
RBC # FLD: 13.9 % — SIGNIFICANT CHANGE UP (ref 11.5–14.5)
SODIUM SERPL-SCNC: 138 MMOL/L — SIGNIFICANT CHANGE UP (ref 135–146)
WBC # BLD: 6.02 K/UL — SIGNIFICANT CHANGE UP (ref 4.8–10.8)
WBC # FLD AUTO: 6.02 K/UL — SIGNIFICANT CHANGE UP (ref 4.8–10.8)

## 2019-06-09 PROCEDURE — 99284 EMERGENCY DEPT VISIT MOD MDM: CPT

## 2019-06-09 RX ORDER — TRANEXAMIC ACID 100 MG/ML
5 INJECTION, SOLUTION INTRAVENOUS ONCE
Refills: 0 | Status: COMPLETED | OUTPATIENT
Start: 2019-06-09 | End: 2019-06-09

## 2019-06-09 RX ORDER — SODIUM CHLORIDE 9 MG/ML
500 INJECTION INTRAMUSCULAR; INTRAVENOUS; SUBCUTANEOUS ONCE
Refills: 0 | Status: COMPLETED | OUTPATIENT
Start: 2019-06-09 | End: 2019-06-09

## 2019-06-09 RX ADMIN — SODIUM CHLORIDE 500 MILLILITER(S): 9 INJECTION INTRAMUSCULAR; INTRAVENOUS; SUBCUTANEOUS at 10:27

## 2019-06-09 RX ADMIN — TRANEXAMIC ACID 5 MILLILITER(S): 100 INJECTION, SOLUTION INTRAVENOUS at 10:00

## 2019-06-09 NOTE — ED PROVIDER NOTE - PROGRESS NOTE DETAILS
Patient was found to have punctate site of bleeding, placed TXA soaked cotton on bleeding site with pressure dressing in place. Patient was found to have bleeding that soaked through the initial pressure dressing, will apply Surgicel and apply another pressure dressing. Patient's dressing is stable, no breakthrough bleeding noted. Patient understands to have visiting nurse remove the dressing tomorrow.

## 2019-06-09 NOTE — ED PROCEDURE NOTE - GENERAL PROCEDURE DETAILS
Initial dressing with TXA was not successful in hemostasis; second pressure dressing was applied with surgicel - which was successful in achieving hemostasis

## 2019-06-09 NOTE — ED PROVIDER NOTE - ATTENDING CONTRIBUTION TO CARE
58 M to ED for eval of bleeding fistula site.  HD last pm and this am woke up with blood soaked clothing and active extravasation so to ED for eval.  avss exam as noted.

## 2019-06-09 NOTE — ED PROVIDER NOTE - NS ED ROS FT
Constitutional: See HPI.  Cardiac: No chest pain, SOB or edema. No chest pain with exertion.  Respiratory: No cough or respiratory distress.   GI: No nausea, vomiting, diarrhea or abdominal pain.  MS: No myalgia, muscle weakness, joint pain or back pain.  Neuro: +lightheaded; No headache or weakness. No LOC.  Skin: +bleeding fistual; No skin rash.  Except as documented in HPI, all other review of systems is negative

## 2019-06-09 NOTE — ED PROVIDER NOTE - OBJECTIVE STATEMENT
57 y/o male with pmhx of ESRD on HD TuThSat presents with bleeding fistula. Patient states he was dialyzed last night, went to bed and when he woke up this morning soaked in blood from the fistula. Patient states he was punctured in two different areas of the fistula site. Patient admits to feeling lightheaded. Denies sob, chest pain, headache.

## 2019-06-09 NOTE — ED ADULT NURSE NOTE - OBJECTIVE STATEMENT
Pt presents to ED reporting bleeding from right AVF. Pt had hemodialysis last night w/ no complications; pt awoke this morning to blood in his bed and his AVF profusely bleeding. Pt states he is just beginning to feel lightheaded. Bleeding is currently controlled.

## 2019-06-09 NOTE — ED ADULT NURSE NOTE - NSIMPLEMENTINTERV_GEN_ALL_ED
Implemented All Fall with Harm Risk Interventions:  [place] to call system. Call bell, personal items and telephone within reach. Instruct patient to call for assistance. Room bathroom lighting operational. Non-slip footwear when patient is off stretcher. Physically safe environment: no spills, clutter or unnecessary equipment. Stretcher in lowest position, wheels locked, appropriate side rails in place. Provide visual cue, wrist band, yellow gown, etc. Monitor gait and stability. Monitor for mental status changes and reorient to person, place, and time. Review medications for side effects contributing to fall risk. Reinforce activity limits and safety measures with patient and family. Provide visual clues: red socks.

## 2019-06-12 ENCOUNTER — INPATIENT (INPATIENT)
Facility: HOSPITAL | Age: 59
LOS: 1 days | Discharge: HOME HEALTH | End: 2019-06-14
Attending: INTERNAL MEDICINE | Admitting: INTERNAL MEDICINE
Payer: MEDICARE

## 2019-06-12 VITALS
OXYGEN SATURATION: 100 % | SYSTOLIC BLOOD PRESSURE: 144 MMHG | RESPIRATION RATE: 18 BRPM | HEART RATE: 71 BPM | DIASTOLIC BLOOD PRESSURE: 75 MMHG | WEIGHT: 240.08 LBS | HEIGHT: 75 IN | TEMPERATURE: 98 F

## 2019-06-12 DIAGNOSIS — Z87.898 PERSONAL HISTORY OF OTHER SPECIFIED CONDITIONS: Chronic | ICD-10-CM

## 2019-06-12 DIAGNOSIS — Z98.890 OTHER SPECIFIED POSTPROCEDURAL STATES: Chronic | ICD-10-CM

## 2019-06-12 DIAGNOSIS — T82.510A BREAKDOWN (MECHANICAL) OF SURGICALLY CREATED ARTERIOVENOUS FISTULA, INITIAL ENCOUNTER: Chronic | ICD-10-CM

## 2019-06-12 LAB
ALBUMIN SERPL ELPH-MCNC: 3.8 G/DL — SIGNIFICANT CHANGE UP (ref 3.5–5.2)
ALP SERPL-CCNC: 105 U/L — SIGNIFICANT CHANGE UP (ref 30–115)
ALT FLD-CCNC: 7 U/L — SIGNIFICANT CHANGE UP (ref 0–41)
ANION GAP SERPL CALC-SCNC: 13 MMOL/L — SIGNIFICANT CHANGE UP (ref 7–14)
AST SERPL-CCNC: 10 U/L — SIGNIFICANT CHANGE UP (ref 0–41)
BASOPHILS # BLD AUTO: 0.05 K/UL — SIGNIFICANT CHANGE UP (ref 0–0.2)
BASOPHILS NFR BLD AUTO: 0.8 % — SIGNIFICANT CHANGE UP (ref 0–1)
BILIRUB SERPL-MCNC: 0.4 MG/DL — SIGNIFICANT CHANGE UP (ref 0.2–1.2)
BUN SERPL-MCNC: 34 MG/DL — HIGH (ref 10–20)
CALCIUM SERPL-MCNC: 8.9 MG/DL — SIGNIFICANT CHANGE UP (ref 8.5–10.1)
CHLORIDE SERPL-SCNC: 101 MMOL/L — SIGNIFICANT CHANGE UP (ref 98–110)
CO2 SERPL-SCNC: 28 MMOL/L — SIGNIFICANT CHANGE UP (ref 17–32)
CREAT SERPL-MCNC: 2.7 MG/DL — HIGH (ref 0.7–1.5)
EOSINOPHIL # BLD AUTO: 0.26 K/UL — SIGNIFICANT CHANGE UP (ref 0–0.7)
EOSINOPHIL NFR BLD AUTO: 4.2 % — SIGNIFICANT CHANGE UP (ref 0–8)
GLUCOSE BLDC GLUCOMTR-MCNC: 95 MG/DL — SIGNIFICANT CHANGE UP (ref 70–99)
GLUCOSE SERPL-MCNC: 106 MG/DL — HIGH (ref 70–99)
HCT VFR BLD CALC: 31.3 % — LOW (ref 42–52)
HGB BLD-MCNC: 10.4 G/DL — LOW (ref 14–18)
IMM GRANULOCYTES NFR BLD AUTO: 0.3 % — SIGNIFICANT CHANGE UP (ref 0.1–0.3)
LYMPHOCYTES # BLD AUTO: 1.34 K/UL — SIGNIFICANT CHANGE UP (ref 1.2–3.4)
LYMPHOCYTES # BLD AUTO: 21.4 % — SIGNIFICANT CHANGE UP (ref 20.5–51.1)
MCHC RBC-ENTMCNC: 31.8 PG — HIGH (ref 27–31)
MCHC RBC-ENTMCNC: 33.2 G/DL — SIGNIFICANT CHANGE UP (ref 32–37)
MCV RBC AUTO: 95.7 FL — HIGH (ref 80–94)
MONOCYTES # BLD AUTO: 0.73 K/UL — HIGH (ref 0.1–0.6)
MONOCYTES NFR BLD AUTO: 11.7 % — HIGH (ref 1.7–9.3)
NEUTROPHILS # BLD AUTO: 3.85 K/UL — SIGNIFICANT CHANGE UP (ref 1.4–6.5)
NEUTROPHILS NFR BLD AUTO: 61.6 % — SIGNIFICANT CHANGE UP (ref 42.2–75.2)
NRBC # BLD: 0 /100 WBCS — SIGNIFICANT CHANGE UP (ref 0–0)
PLATELET # BLD AUTO: 181 K/UL — SIGNIFICANT CHANGE UP (ref 130–400)
POTASSIUM SERPL-MCNC: 4.1 MMOL/L — SIGNIFICANT CHANGE UP (ref 3.5–5)
POTASSIUM SERPL-SCNC: 4.1 MMOL/L — SIGNIFICANT CHANGE UP (ref 3.5–5)
PROT SERPL-MCNC: 6.2 G/DL — SIGNIFICANT CHANGE UP (ref 6–8)
RBC # BLD: 3.27 M/UL — LOW (ref 4.7–6.1)
RBC # FLD: 13.6 % — SIGNIFICANT CHANGE UP (ref 11.5–14.5)
SODIUM SERPL-SCNC: 142 MMOL/L — SIGNIFICANT CHANGE UP (ref 135–146)
TROPONIN T SERPL-MCNC: 0.09 NG/ML — CRITICAL HIGH
WBC # BLD: 6.25 K/UL — SIGNIFICANT CHANGE UP (ref 4.8–10.8)
WBC # FLD AUTO: 6.25 K/UL — SIGNIFICANT CHANGE UP (ref 4.8–10.8)

## 2019-06-12 PROCEDURE — 99285 EMERGENCY DEPT VISIT HI MDM: CPT

## 2019-06-12 PROCEDURE — 71046 X-RAY EXAM CHEST 2 VIEWS: CPT | Mod: 26

## 2019-06-12 PROCEDURE — 93010 ELECTROCARDIOGRAM REPORT: CPT

## 2019-06-12 NOTE — ED ADULT TRIAGE NOTE - CHIEF COMPLAINT QUOTE
as per patient and EMS patient become bradycardic while undergoing dialysis. patient reports completing full treatment of 4 hours and removed 1.1L. VS WNL in triage

## 2019-06-12 NOTE — ED PROVIDER NOTE - CARE PLAN
Principal Discharge DX:	Bradycardia  Secondary Diagnosis:	CAD (coronary atherosclerotic disease)  Secondary Diagnosis:	ESRD on dialysis

## 2019-06-12 NOTE — ED PROVIDER NOTE - ATTENDING CONTRIBUTION TO CARE
58yM ESRD on HD presents with symptomatic bradycardia during HD session - HR dropped to 30s-40s, w/ associated generalized weakness and epigastric pain/pressure.  Reports one prior similar episode after HD, but today's episode lasted for 2hrs.  Did receive full session today and denies any current discomfort.    EKG  labs  anticipate admission for cardiac monitoring, cardiology consult for symptomatic bradycardia

## 2019-06-12 NOTE — ED PROVIDER NOTE - OBJECTIVE STATEMENT
58 year old M with hx of CAD s/p stents on aspirin/plavix, HTN, DM, DLD, ESRD on HD M/W/F sent in for bradycardia. Pt sts while he was receiving dialysis his HR was in the 30's-40s and had episode of chest discomfort/lightheadedness. Sts symptoms lasted intermittently x 2 hours until his HR went back to the 70's. Pt sts he feels back to baseline and denies any complaints. Currently no chest pain, sob, dizziness, abdominal pain, n/v, fever, chills, headache.

## 2019-06-13 LAB
GLUCOSE BLDC GLUCOMTR-MCNC: 118 MG/DL — HIGH (ref 70–99)
GLUCOSE BLDC GLUCOMTR-MCNC: 139 MG/DL — HIGH (ref 70–99)
GLUCOSE BLDC GLUCOMTR-MCNC: 146 MG/DL — HIGH (ref 70–99)
GLUCOSE BLDC GLUCOMTR-MCNC: 79 MG/DL — SIGNIFICANT CHANGE UP (ref 70–99)

## 2019-06-13 PROCEDURE — 99222 1ST HOSP IP/OBS MODERATE 55: CPT

## 2019-06-13 PROCEDURE — 99223 1ST HOSP IP/OBS HIGH 75: CPT | Mod: AI

## 2019-06-13 RX ORDER — TAMSULOSIN HYDROCHLORIDE 0.4 MG/1
0.4 CAPSULE ORAL AT BEDTIME
Refills: 0 | Status: DISCONTINUED | OUTPATIENT
Start: 2019-06-13 | End: 2019-06-14

## 2019-06-13 RX ORDER — HEPARIN SODIUM 5000 [USP'U]/ML
5000 INJECTION INTRAVENOUS; SUBCUTANEOUS EVERY 8 HOURS
Refills: 0 | Status: DISCONTINUED | OUTPATIENT
Start: 2019-06-13 | End: 2019-06-14

## 2019-06-13 RX ORDER — SEVELAMER CARBONATE 2400 MG/1
1600 POWDER, FOR SUSPENSION ORAL
Refills: 0 | Status: DISCONTINUED | OUTPATIENT
Start: 2019-06-13 | End: 2019-06-14

## 2019-06-13 RX ORDER — FOLIC ACID 0.8 MG
1 TABLET ORAL DAILY
Refills: 0 | Status: DISCONTINUED | OUTPATIENT
Start: 2019-06-13 | End: 2019-06-14

## 2019-06-13 RX ORDER — ATORVASTATIN CALCIUM 80 MG/1
40 TABLET, FILM COATED ORAL AT BEDTIME
Refills: 0 | Status: DISCONTINUED | OUTPATIENT
Start: 2019-06-13 | End: 2019-06-14

## 2019-06-13 RX ORDER — ASPIRIN/CALCIUM CARB/MAGNESIUM 324 MG
325 TABLET ORAL DAILY
Refills: 0 | Status: DISCONTINUED | OUTPATIENT
Start: 2019-06-13 | End: 2019-06-13

## 2019-06-13 RX ORDER — FERROUS SULFATE 325(65) MG
325 TABLET ORAL DAILY
Refills: 0 | Status: DISCONTINUED | OUTPATIENT
Start: 2019-06-13 | End: 2019-06-14

## 2019-06-13 RX ORDER — DOCUSATE SODIUM 100 MG
100 CAPSULE ORAL
Refills: 0 | Status: DISCONTINUED | OUTPATIENT
Start: 2019-06-13 | End: 2019-06-14

## 2019-06-13 RX ORDER — NIFEDIPINE 30 MG
30 TABLET, EXTENDED RELEASE 24 HR ORAL ONCE
Refills: 0 | Status: COMPLETED | OUTPATIENT
Start: 2019-06-13 | End: 2019-06-13

## 2019-06-13 RX ORDER — ASPIRIN/CALCIUM CARB/MAGNESIUM 324 MG
81 TABLET ORAL DAILY
Refills: 0 | Status: DISCONTINUED | OUTPATIENT
Start: 2019-06-14 | End: 2019-06-14

## 2019-06-13 RX ORDER — CLOPIDOGREL BISULFATE 75 MG/1
75 TABLET, FILM COATED ORAL DAILY
Refills: 0 | Status: DISCONTINUED | OUTPATIENT
Start: 2019-06-13 | End: 2019-06-14

## 2019-06-13 RX ADMIN — Medication 1 MILLIGRAM(S): at 12:57

## 2019-06-13 RX ADMIN — HEPARIN SODIUM 5000 UNIT(S): 5000 INJECTION INTRAVENOUS; SUBCUTANEOUS at 21:48

## 2019-06-13 RX ADMIN — SEVELAMER CARBONATE 1600 MILLIGRAM(S): 2400 POWDER, FOR SUSPENSION ORAL at 18:05

## 2019-06-13 RX ADMIN — CLOPIDOGREL BISULFATE 75 MILLIGRAM(S): 75 TABLET, FILM COATED ORAL at 12:57

## 2019-06-13 RX ADMIN — TAMSULOSIN HYDROCHLORIDE 0.4 MILLIGRAM(S): 0.4 CAPSULE ORAL at 21:48

## 2019-06-13 RX ADMIN — Medication 100 MILLIGRAM(S): at 13:14

## 2019-06-13 RX ADMIN — Medication 325 MILLIGRAM(S): at 12:57

## 2019-06-13 RX ADMIN — ATORVASTATIN CALCIUM 40 MILLIGRAM(S): 80 TABLET, FILM COATED ORAL at 21:48

## 2019-06-13 NOTE — H&P ADULT - ATTENDING COMMENTS
Patient seen and examined independently. Agree with resident note/ history / physical exam and plan of care with following exceptions/additions/updates. Case discussed with house-staff, nursing and patient/pt decision maker.     pt is feeling well, no dizziness at this time( but he is not solomon anymore)   pt was lightheaded at the time the solomon was notice in HD>     Vital Signs Last 24 Hrs  T(C): 36.1 (13 Jun 2019 07:30), Max: 37.1 (12 Jun 2019 22:06)  T(F): 97 (13 Jun 2019 07:30), Max: 98.8 (12 Jun 2019 22:06)  HR: 66 (13 Jun 2019 07:30) (64 - 71)  BP: 149/77 (13 Jun 2019 07:30) (144/75 - 156/76)  BP(mean): --  RR: 18 (13 Jun 2019 07:30) (18 - 19)  SpO2: 98% (13 Jun 2019 07:30) (98% - 100%)  Physical exam:   constitutional NAD, AAOX3, Respiratory  lungs CTA, CVS heart RRR, GI: abdomen Soft NT, ND, BS+, skin: intact  neuro exam non focal. acromegaly                           10.4   6.25  )-----------( 181      ( 12 Jun 2019 19:20 )             31.3   06-12    142  |  101  |  34<H>  ----------------------------<  106<H>  4.1   |  28  |  2.7<H>    Ca    8.9      12 Jun 2019 19:20    TPro  6.2  /  Alb  3.8  /  TBili  0.4  /  DBili  x   /  AST  10  /  ALT  7   /  AlkPhos  105  06-12    < from: 12 Lead ECG (06.12.19 @ 19:27) >    Normal sinus rhythm  Prolonged QT  Abnormal ECG    QTC Calculation(Bezet) 483 ms  P-R Interval 158 ms    < end of copied text >    a/p  1- bradycardia, prolonged QT symptomatic, EP consult, avoid calcium melita blockers, beta blockers, check tsh  cont tele.   2- ESRD cont HD  3-anemia, due to esrd, stable  4- acromegaly cont meds/   5- Dyslipidemia  6- DM (diabetes mellitus), type 2 stable cont meds,   7- HTN  cont meds  8- Cad cont meds    #Progress Note Handoff  Pending (specify):  Consults  Family discussion: roya pt, he understands the plan awaiting for EP   Disposition: Home

## 2019-06-13 NOTE — H&P ADULT - ASSESSMENT
59 Y/O M with PMH of Acromegaly, ESRD on Hemodialysis (M/W/F), HTN, DM, CAD with PCI 10 years ago presented to the ED with Bradycardia.    Bradycardia  -Normal sinus rhythm on EKG in the ED  -EPS consult.  -Asymptomatic now    CAD s/p PCI  -3 stents placed 10 years ago. 59 Y/O M with PMH of Acromegaly, ESRD on Hemodialysis (M/W/F), HTN, DM, CAD with PCI 10 years ago presented to the ED with Bradycardia.    Bradycardia  -Normal sinus rhythm on EKG in the ED  -EPS consult.  -Asymptomatic now    CAD s/p PCI  -3 stents placed 10 years ago.	  -On aspirin and plavix, might not need it.  -Cardiology consult    ESRD on Hemodialysis  -M/W/F  -Nephrology consult    Acromegaly  -Coarse facial features, tongue enlargement  -He is due for SanDOSTATIN dose.  -Endocrinology consult    HTN  -Not on any medication    DM2  -Not on any medication    Activity - increase as tolerated  DVT - heparin  Diet - DASH/Renal  FULL CODE 57 Y/O M with PMH of Acromegaly, ESRD on Hemodialysis (M/W/F), HTN, DM, CAD with PCI 10 years ago presented to the ED with Bradycardia.    Bradycardia  -Normal sinus rhythm on EKG in the ED  -EPS consult.  -Asymptomatic now    CAD s/p PCI  -3 stents placed 10 years ago.	  -On aspirin and plavix, might not need it.  -ECHO from may/2019,  normal systolic function with grade 2 diastolic dysfunction.  Mild pulmonary hypertension.  -Cardiology consult    ESRD on Hemodialysis  -M/W/F  -Nephrology consult    Acromegaly  -Coarse facial features, tongue enlargement  -He is due for SanDOSTATIN dose.  -Endocrinology consult    HTN  -Not on any medication    DM2  -Not on any medication    Activity - increase as tolerated  DVT - heparin  Diet - DASH/Renal  FULL CODE 57 Y/O M with PMH of Acromegaly, ESRD on Hemodialysis (M/W/F), HTN, DM, CAD with PCI 10 years ago presented to the ED with Bradycardia.    Bradycardia  -Normal sinus rhythm on EKG in the ED  -EPS consult.  -Asymptomatic now    CAD s/p PCI  -3 stents placed 10 years ago.	  -On aspirin and plavix, might not need it.  -ECHO from may/2019,  normal systolic function with grade 2 diastolic dysfunction.  Mild pulmonary hypertension.  -Cardiology consult    ESRD on Hemodialysis  -M/W/F  -Nephrology consult    Acromegaly  -Coarse facial features, tongue enlargement  -He is due for SanDOSTATIN dose.  -Endocrinology consult    HTN  -Not on any medication  -BP in 150s.  -Will start CCB    DM2  -Not on any medication    PLEASE COMPLETE THE MED REC BY CALLING [facility] AT [address].    Activity - increase as tolerated  DVT - heparin  Diet - DASH/Renal  FULL CODE 59 Y/O M with PMH of Acromegaly, ESRD on Hemodialysis (M/W/F), HTN, DM, CAD with PCI 10 years ago presented to the ED with Bradycardia.    Bradycardia  -Normal sinus rhythm on EKG in the ED  -EPS consult.  -Asymptomatic now    CAD s/p PCI  -3 stents placed 10 years ago.	  -On aspirin and plavix, might not need it.  -ECHO from may/2019,  normal systolic function with grade 2 diastolic dysfunction.  Mild pulmonary hypertension.  -Cardiology consult    ESRD on Hemodialysis  -M/W/F  -Nephrology consult    Acromegaly  -Coarse facial features, tongue enlargement  -He is due for SanDOSTATIN dose.  -Endocrinology consult    HTN  -Not on any medication  -BP in 150s.  -Will start CCB    DM2  -Not on any medication  -Start insulin if sugar stays high    PLEASE COMPLETE THE MED REC BY CALLING [facility] AT [address].    Activity - increase as tolerated  DVT - heparin  Diet - DASH/Renal  FULL CODE

## 2019-06-13 NOTE — CONSULT NOTE ADULT - ASSESSMENT
57 Y/O M with PMH of Acromegaly, ESRD on Hemodialysis (M/W/F), HTN, DM, CAD with PCI 10 years ago presented to the ED with Bradycardia to 30s during hemodialysis at [facility].    Reported bradycardia  Hx CAD s/p PCI, HTN  Hx ESRD HD MWF    - No intervention from EP standpoint at this time  - TTE in May with normal EF  - If stays overnight, would place on continuous O2 monitoring to assess HR while sleeping  - Please f/u as outpatient for event monitor in 2 weeks with Dr. Calvo  - Cont current meds    Plan discussed with EP attending 57 Y/O M with PMH of Acromegaly, ESRD on Hemodialysis (M/W/F), HTN, DM, CAD with PCI 10 years ago presented to the ED with Bradycardia to 37 during hemodialysis at [facility].  episode   occurred while  sleeping   as per pt  most likely due to JABARI HR increased  appropriately from base line o f64/min  to 80/min on minimal excercise  PT Denies any history of syncope or pre syncope   Plan  o2 Trending while sleep  work up for JABARI  NO Need for PPM at present  Event monitor as OP       -

## 2019-06-13 NOTE — H&P ADULT - HISTORY OF PRESENT ILLNESS
57 Y/O M with PMH of Acromegaly, ESRD on Hemodialysis (M/W/F), HTN, DM, CAD with PCI 10 years ago presented to the ED with Bradycardia. He was getting hemodialysis at [facility] and while he was on Monitor his HR dropped to 30s. He had mild headache abd chest discomfort associated with it.  Bradycardic episode has happened once before too while he was getting dialysis. In ED, EKG shows, normal sinus rhythm with slightly prolonged QT interval of 483ms,He is lying in the bed comfortably with no active complaints.

## 2019-06-13 NOTE — CONSULT NOTE ADULT - SUBJECTIVE AND OBJECTIVE BOX
Reason for Endocrinology Consult: Diabetes    HPI: 58y Male    Diabetes Type:  Duration of Diabetes:  Diabetes Complications:  History of DKA?  Eye doctor within past year?  Current Therapy:      Home FSG:  Fasting:  Lunch:  Dinner:  Bedtime:    Hypoglycemia?    Neuroglycopenia within past year?    Outpatient Endocrinologist?    PAST MEDICAL & SURGICAL HISTORY:  ESRD on dialysis  Dyslipidemia  DM (diabetes mellitus), type 2  HTN (hypertension)  CAD (coronary atherosclerotic disease)  Acromegaly  Breakdown of surgically created AV fistula, init: RIGHT UPPER ARM  H/O eye surgery  H/O: pituitary tumor: s/p removal    FAMILY HISTORY:  Family history of myocardial infarction (Father)      SH:  Smoking  Etoh:  Recreational Drugs:    Home Medications:  Aspir 81 oral delayed release tablet: 1 tab(s) orally once a day (21 May 2019 08:55)  Colace 100 mg oral capsule: 1 cap(s) orally 2 times a day (21 May 2019 08:55)  Crestor 40 mg oral tablet: 1 tab(s) orally once a day (21 May 2019 08:55)  ferrous sulfate 325 mg (65 mg elemental iron) oral tablet:  (21 May 2019 08:55)  folic acid 1 mg oral tablet: 1 tab(s) orally once a day (21 May 2019 08:55)  Plavix 75 mg oral tablet: 1 tab(s) orally once a day (21 May 2019 08:55)  SandoSTATIN [place]: 1 application intramuscular every 4 weeks (21 May 2019 08:55)  sevelamer hydrochloride 800 mg oral tablet: 2 tab(s) orally 3 times a day (21 May 2019 08:55)  tamsulosin 0.4 mg oral capsule: 1 cap(s) orally once a day (21 May 2019 08:55)      Current (Non-Endocrine) Meds:  clopidogrel Tablet 75 milliGRAM(s) Oral daily  docusate sodium 100 milliGRAM(s) Oral two times a day  ferrous    sulfate 325 milliGRAM(s) Oral daily  folic acid 1 milliGRAM(s) Oral daily  heparin  Injectable 5000 Unit(s) SubCutaneous every 8 hours  sevelamer carbonate 1600 milliGRAM(s) Oral three times a day with meals  tamsulosin 0.4 milliGRAM(s) Oral at bedtime      Current Endocrine Meds:   atorvastatin 40 milliGRAM(s) Oral at bedtime      Allergies:  bacitracin (Unknown)  Neosporin (Hypotension)      ROS:  Denies the following except as indicated.    General: weight loss/weight gain, decreased appetite, fatigue, fever  Eyes: blurry vision, double vision  ENT: neck swelling, dysphagia, voice changes   CV: palpitations, SOB, chest pain, cough  GI: nausea, vomiting, diarrhea, constipation, abdominal pain  : nocturia,  polyuria, dysuria  Endo: decreased libido, heat/cold intolerance, jitteriness  MSK: arthralgias, myalgias  Skin: rash, dryness, diaphoresis  Neuro: pedal numbness,pedal paresthesias, pedal pain    Height (cm): 193.04 (06-13 @ 19:19)  Weight (kg): 108.9 (06-13 @ 19:19), 108.9 (06-09 @ 09:42)  BMI (kg/m2): 29.2 (06-13 @ 19:19)    Vital Signs Last 24 Hrs  T(C): 36.2 (13 Jun 2019 19:19), Max: 37.1 (12 Jun 2019 22:06)  T(F): 97.1 (13 Jun 2019 19:19), Max: 98.8 (12 Jun 2019 22:06)  HR: 73 (13 Jun 2019 19:19) (64 - 73)  BP: 171/- (13 Jun 2019 19:19) (149/77 - 171/-)  BP(mean): --  RR: 18 (13 Jun 2019 19:19) (18 - 19)  SpO2: 98% (13 Jun 2019 15:10) (98% - 98%)  Constitutional: WN/WD in NAD.   Neck: no thyromegaly or palpable thyroid nodules   Respiratory: lungs CTAB.  Cardiovascular: regular rate and rhythm, normal S1 and S2, no audible murmurs  GI: soft, NT/ND, no masses/HSM appreciated.  Ext: no edema, no ulcers, pedal pulses palpable bilaterally  Neurology: no tremor, monofilament sensation intact in feet  Psychiatric: A&O x 3, normal affect/mood.        LABS:                        10.4   6.25  )-----------( 181      ( 12 Jun 2019 19:20 )             31.3     06-12    142  |  101  |  34<H>  ----------------------------<  106<H>  4.1   |  28  |  2.7<H>    Ca    8.9      12 Jun 2019 19:20    TPro  6.2  /  Alb  3.8  /  TBili  0.4  /  DBili  x   /  AST  10  /  ALT  7   /  AlkPhos  105  06-12        Hemoglobin A1C, Whole Blood: 6.2 (05-09 @ 08:51)                         Thyroid Stimulating Hormone, Serum: 0.75 (05-09 @ 08:51)  Thyroid Stimulating Hormone, Serum: 1.69 (11-13 @ 10:10)      Triiodothyronine, Total (T3 Total): 71 ng/dL (11-13-18 @ 10:10)      RADIOLOGY & ADDITIONAL STUDIES:    A/P:58yMale    1.  DM  For now:  Glargine-    units at bedtime  Lispro-    units three times a day before meals   Will continue to monitor     At discharge, recommend:      Pt can follow up at discharge with:    Above discussed with resident.

## 2019-06-13 NOTE — CONSULT NOTE ADULT - SUBJECTIVE AND OBJECTIVE BOX
Patient is a 58y old  Male who presents with a chief complaint of Bradycardia (13 Jun 2019 05:53)        HPI:  57 Y/O M with PMH of Acromegaly, ESRD on Hemodialysis (M/W/F), HTN, DM, CAD with PCI 10 years ago presented to the ED with Bradycardia. He was getting hemodialysis at [facility] and while he was on Monitor his HR dropped to 30s. He had mild headache abd chest discomfort associated with it.  Bradycardic episode has happened once before too while he was getting dialysis. In ED, EKG shows, normal sinus rhythm with slightly prolonged QT interval of 483ms,He is lying in the bed comfortably with no active complaints. (13 Jun 2019 05:53)      Electrophysiology:  58y Male    REVIEW OF SYSTEMS    [ ] A ten-point review of systems was otherwise negative except as noted.  [ ] Due to altered mental status/intubation, subjective information were not able to be obtained from the patient. History was obtained, to the extent possible, from review of the chart and collateral sources of information.      PAST MEDICAL & SURGICAL HISTORY:  ESRD on dialysis  Dyslipidemia  DM (diabetes mellitus), type 2  HTN (hypertension)  CAD (coronary atherosclerotic disease)  Acromegaly  Breakdown of surgically created AV fistula, init: RIGHT UPPER ARM  H/O eye surgery  H/O: pituitary tumor: s/p removal      Home Medications:  Aspir 81 oral delayed release tablet: 1 tab(s) orally once a day (21 May 2019 08:55)  Colace 100 mg oral capsule: 1 cap(s) orally 2 times a day (21 May 2019 08:55)  Crestor 40 mg oral tablet: 1 tab(s) orally once a day (21 May 2019 08:55)  ferrous sulfate 325 mg (65 mg elemental iron) oral tablet:  (21 May 2019 08:55)  folic acid 1 mg oral tablet: 1 tab(s) orally once a day (21 May 2019 08:55)  Plavix 75 mg oral tablet: 1 tab(s) orally once a day (21 May 2019 08:55)  SandoSTATIN [place]AR [place]: 1 application intramuscular every 4 weeks (21 May 2019 08:55)  sevelamer hydrochloride 800 mg oral tablet: 2 tab(s) orally 3 times a day (21 May 2019 08:55)  tamsulosin 0.4 mg oral capsule: 1 cap(s) orally once a day (21 May 2019 08:55)      Allergies:    bacitracin (Unknown)  Neosporin (Hypotension)      PREVIOUS DIAGNOSTIC TESTING:      ECHO  FINDINGS:    STRESS  FINDINGS:    CATHETERIZATION  FINDINGS:    ELECTROPHYSIOLOGY STUDY  FINDINGS:    CAROTID ULTRASOUND:  FINDINGS    VENOUS DUPLEX SCAN:  FINDINGS:    CHEST CT PULMONARY ANGIO with IV Contrast:  FINDINGS:    FAMILY HISTORY:  Family history of myocardial infarction (Father)      SOCIAL HISTORY:    CIGARETTES:    ALCOHOL:      MEDICATIONS  (STANDING):  atorvastatin 40 milliGRAM(s) Oral at bedtime  clopidogrel Tablet 75 milliGRAM(s) Oral daily  docusate sodium 100 milliGRAM(s) Oral two times a day  ferrous    sulfate 325 milliGRAM(s) Oral daily  folic acid 1 milliGRAM(s) Oral daily  heparin  Injectable 5000 Unit(s) SubCutaneous every 8 hours  sevelamer carbonate 1600 milliGRAM(s) Oral three times a day with meals  tamsulosin 0.4 milliGRAM(s) Oral at bedtime    MEDICATIONS  (PRN):      Vital Signs Last 24 Hrs  T(C): 36.7 (13 Jun 2019 15:10), Max: 37.1 (12 Jun 2019 22:06)  T(F): 98 (13 Jun 2019 15:10), Max: 98.8 (12 Jun 2019 22:06)  HR: 68 (13 Jun 2019 15:10) (64 - 71)  BP: 151/81 (13 Jun 2019 15:10) (144/75 - 156/76)  BP(mean): --  RR: 18 (13 Jun 2019 15:10) (18 - 19)  SpO2: 98% (13 Jun 2019 15:10) (98% - 100%)    PHYSICAL EXAM:    GENERAL: In no apparent distress, well nourished, and hydrated.  HEAD:  Atraumatic, Normocephalic  EYES: EOMI, PERRLA, conjunctiva and sclera clear  NECK: Supple and normal thyroid.  No JVD or carotid bruit.  Carotid pulse is 2+ bilaterally.  HEART: Regular rate and rhythm; No murmurs, rubs, or gallops.  PULMONARY: Clear to auscultation and perfusion.  No rales, wheezing, or rhonchi bilaterally.  ABDOMEN: Soft, Nontender, Nondistended; Bowel sounds present  EXTREMITIES:  2+ Peripheral Pulses, No clubbing, cyanosis, or edema  NEUROLOGICAL: Grossly nonfocal      INTERPRETATION OF TELEMETRY:    ECG:    I&O's Detail      LABS:                        10.4   6.25  )-----------( 181      ( 12 Jun 2019 19:20 )             31.3     06-12    142  |  101  |  34<H>  ----------------------------<  106<H>  4.1   |  28  |  2.7<H>    Ca    8.9      12 Jun 2019 19:20    TPro  6.2  /  Alb  3.8  /  TBili  0.4  /  DBili  x   /  AST  10  /  ALT  7   /  AlkPhos  105  06-12    CARDIAC MARKERS ( 12 Jun 2019 19:20 )  x     / 0.09 ng/mL / x     / x     / x              BNP      I&O's Detail    Daily Height in cm: 190.5 (12 Jun 2019 17:50)    Daily     RADIOLOGY & ADDITIONAL STUDIES: Patient is a 58y old  Male who presents with a chief complaint of Bradycardia (13 Jun 2019 05:53)    HPI:  59 Y/O M with PMH of Acromegaly, ESRD on Hemodialysis (M/W/F), HTN, DM, CAD with PCI 10 years ago presented to the ED with Bradycardia. He was getting hemodialysis at [facility] and while he was on Monitor his HR dropped to 30s. He had mild headache abd chest discomfort associated with it.   Bradycardic episode has happened once before, again while he was getting dialysis. In ED, EKG shows, normal sinus rhythm.   Currently denies SOB, palpitations, dizziness, syncope, n/v/d/c, fever or chills.    REVIEW OF SYSTEMS  A ten-point review of systems was otherwise negative except as noted.      PAST MEDICAL & SURGICAL HISTORY:  ESRD on dialysis  Dyslipidemia  DM (diabetes mellitus), type 2  HTN (hypertension)  CAD (coronary atherosclerotic disease)  Acromegaly  Breakdown of surgically created AV fistula, init: RIGHT UPPER ARM  H/O eye surgery  H/O: pituitary tumor: s/p removal      Home Medications:  Aspir 81 oral delayed release tablet: 1 tab(s) orally once a day (21 May 2019 08:55)  Colace 100 mg oral capsule: 1 cap(s) orally 2 times a day (21 May 2019 08:55)  Crestor 40 mg oral tablet: 1 tab(s) orally once a day (21 May 2019 08:55)  ferrous sulfate 325 mg (65 mg elemental iron) oral tablet:  (21 May 2019 08:55)  folic acid 1 mg oral tablet: 1 tab(s) orally once a day (21 May 2019 08:55)  Plavix 75 mg oral tablet: 1 tab(s) orally once a day (21 May 2019 08:55)  SandoSTATIN [place]AR [place]: 1 application intramuscular every 4 weeks (21 May 2019 08:55)  sevelamer hydrochloride 800 mg oral tablet: 2 tab(s) orally 3 times a day (21 May 2019 08:55)  tamsulosin 0.4 mg oral capsule: 1 cap(s) orally once a day (21 May 2019 08:55)      Allergies:  bacitracin (Unknown)  Neosporin (Hypotension)      PREVIOUS DIAGNOSTIC TESTING:      ECHO  FINDINGS:  < from: Transthoracic Echocardiogram (05.09.19 @ 10:54) >  Summary:   1. LV Ejection Fraction by Mireles's Method with a biplane EF of 59 %.   2. Mildly increased LV wall thickness.   3. Spectral Doppler shows pseudonormal pattern of left ventricular   myocardial filling (Grade II diastolic dysfunction).   4. Mild mitral valve regurgitation.   5. Mild aortic regurgitation.   6. Estimated pulmonary artery systolic pressure is 41.2 mmHg assuming a   right atrial pressure of 10 mmHg, which is consistent with mild pulmonary   hypertension.    PHYSICIAN INTERPRETATION:  Left Ventricle: The left ventricular internal cavity size is normal. Left   ventricular wall thickness is mildly increased. Spectral Doppler shows   pseudonormalpattern of left ventricular myocardial filling (Grade II   diastolic dysfunction).  Right Ventricle: Normal right ventricular size and function.  Left Atrium: Left atrial enlargement.  Right Atrium: Normal right atrial size.  Pericardium: There is noevidence of pericardial effusion.  Mitral Valve: The mitral valve is normal in structure. No evidence of   mitral stenosis. Mild mitral valve regurgitation is seen.  Tricuspid Valve: The tricuspid valve is normal in structure. Mild   tricuspid regurgitation is visualized. Estimated pulmonary artery   systolic pressure is 41.2 mmHg assuming a right atrial pressure of 10   mmHg, which is consistent with mild pulmonary hypertension.  Aortic Valve: The aortic valve is normal. No evidence of aortic stenosis.   Mild aortic valve regurgitation is seen.  Pulmonic Valve: The pulmonic valve is normal. Mild pulmonic valve   regurgitation.  Aorta: The aortic root and ascending aorta are structurally normal, with   no evidence of dilitation.  Pulmonary Artery: The main pulmonary artery is normal in size.    STRESS  FINDINGS:      CATHETERIZATION  FINDINGS:    ELECTROPHYSIOLOGY STUDY  FINDINGS:    CAROTID ULTRASOUND:  FINDINGS    VENOUS DUPLEX SCAN:  FINDINGS:  < from: VA Duplex Lower Ext Vein Scan, Bilat (05.08.19 @ 18:50) >  INTERPRETATION:  Clinical History / Reason for exam: The patient is a 58   years old male with leg swelling. A venous duplex examination was   performed to evaluate the patient for deep venous thrombosis of the lower   extremities.    Deep venous thrombosis was visualized in left popliteal vein.    Bilateral common femoral, greater saphenous, bilateral superficial   femoral and right popliteal veins were visualized with no evidence of   deep venous thrombosis. All these veins were fully compressible.  There   was presence of spontaneous flow, augmentation with distal compression   and phasicity.    The right anterior tibial veins were  patent  right posterior tibial   veins were  patent  and  right peroneal veins were patent.    The left anterior tibial veins were  patent  left posterior tibial veins   were  patent  and  left peroneal veins were patent. Venous thrombosis of   left gastrocnemius vein.    Impression:    Deep venous thrombosis left popliteal and gastrocnemius veins.    No evidence of deep venous thrombosis or superficial thrombophlebitis in   right lower extremity.      CHEST CT PULMONARY ANGIO with IV Contrast:  FINDINGS:    FAMILY HISTORY:  Family history of myocardial infarction (Father)      SOCIAL HISTORY:  Non smoker, Non alcoholic.  Never used drugs.      MEDICATIONS  (STANDING):  atorvastatin 40 milliGRAM(s) Oral at bedtime  clopidogrel Tablet 75 milliGRAM(s) Oral daily  docusate sodium 100 milliGRAM(s) Oral two times a day  ferrous    sulfate 325 milliGRAM(s) Oral daily  folic acid 1 milliGRAM(s) Oral daily  heparin  Injectable 5000 Unit(s) SubCutaneous every 8 hours  sevelamer carbonate 1600 milliGRAM(s) Oral three times a day with meals  tamsulosin 0.4 milliGRAM(s) Oral at bedtime    Vital Signs Last 24 Hrs  T(C): 36.7 (13 Jun 2019 15:10), Max: 37.1 (12 Jun 2019 22:06)  T(F): 98 (13 Jun 2019 15:10), Max: 98.8 (12 Jun 2019 22:06)  HR: 68 (13 Jun 2019 15:10) (64 - 71)  BP: 151/81 (13 Jun 2019 15:10) (144/75 - 156/76)  BP(mean): --  RR: 18 (13 Jun 2019 15:10) (18 - 19)  SpO2: 98% (13 Jun 2019 15:10) (98% - 100%)    PHYSICAL EXAM:    GENERAL: In no apparent distress, well nourished, and hydrated.  HEART: Regular rate and rhythm; No murmurs, rubs, or gallops.  PULMONARY: Clear to auscultation and perfusion.  No rales, wheezing, or rhonchi bilaterally.  ABDOMEN: Soft, Nontender, Nondistended; Bowel sounds present  EXTREMITIES:  +2 b/l LE edema, peripheral Pulses, No clubbing, cyanosis  NEUROLOGICAL: AO x4, [place]      INTERPRETATION OF TELEMETRY: SR 67    ECG:  < from: 12 Lead ECG (06.12.19 @ 19:27) >  Ventricular Rate 67 BPM    Atrial Rate 67 BPM    P-R Interval 158 ms    QRS Duration 96 ms    Q-T Interval 458 ms    QTC Calculation(Bezet) 483 ms    P Axis 15 degrees    R Axis 17 degrees    T Axis 56 degrees    Diagnosis Line Normal sinus rhythm  Prolonged QT  Abnormal ECG    I&O's Detail      LABS:                        10.4   6.25  )-----------( 181      ( 12 Jun 2019 19:20 )             31.3     06-12    142  |  101  |  34<H>  ----------------------------<  106<H>  4.1   |  28  |  2.7<H>    Ca    8.9      12 Jun 2019 19:20    TPro  6.2  /  Alb  3.8  /  TBili  0.4  /  DBili  x   /  AST  10  /  ALT  7   /  AlkPhos  105  06-12    CARDIAC MARKERS ( 12 Jun 2019 19:20 )  x     / 0.09 ng/mL / x     / x     / x        BNP  I&O's Detail    Daily Height in cm: 190.5 (12 Jun 2019 17:50)    Daily     RADIOLOGY & ADDITIONAL STUDIES:  < from: Xray Chest 2 Views PA/Lat (06.12.19 @ 21:02) >  INTERPRETATION:  Clinical History / Reason for exam: Chest pain.    Comparison : Chest radiograph May 8, 2019.    Technique/Positioning: Frontal and lateral, low lung volumes.    Findings:    Support devices: None.    Cardiac/mediastinum/hilum: Unremarkable.    Lung parenchyma/Pleura: Scarring is seen at the right lung base with   elevation of the right hemidiaphragm.    Skeleton/soft tissues: Unremarkable.    Impression:    Scarring at the right lung base. Unchanged.

## 2019-06-13 NOTE — CONSULT NOTE ADULT - ASSESSMENT
patient is currently seeing an endocrinologist in [place] for acromegaly HILTONclaribeljulia has clear physical signs of acromegaly

## 2019-06-13 NOTE — H&P ADULT - NSICDXPASTMEDICALHX_GEN_ALL_CORE_FT
PAST MEDICAL HISTORY:  Acromegaly     CAD (coronary atherosclerotic disease)     DM (diabetes mellitus), type 2     Dyslipidemia     ESRD on dialysis     HTN (hypertension)

## 2019-06-13 NOTE — H&P ADULT - NSHPPHYSICALEXAM_GEN_ALL_CORE
GEN: No distress  HEENT: course facial features suggestive of acromegaly, enlarged tongue  LUNGS: decreased breath sounds on right base, enlarged ribcage  HEART: S1/S2 present. RRR.   ABD: Soft, non-tender, non-distended.   EXT: enlarged extremities, right heel with ulceration, left heel with healed skin graft,   AAOX3

## 2019-06-14 ENCOUNTER — TRANSCRIPTION ENCOUNTER (OUTPATIENT)
Age: 59
End: 2019-06-14

## 2019-06-14 VITALS
DIASTOLIC BLOOD PRESSURE: 72 MMHG | TEMPERATURE: 97 F | HEART RATE: 69 BPM | SYSTOLIC BLOOD PRESSURE: 152 MMHG | RESPIRATION RATE: 17 BRPM

## 2019-06-14 LAB
ALBUMIN SERPL ELPH-MCNC: 3.5 G/DL — SIGNIFICANT CHANGE UP (ref 3.5–5.2)
ALP SERPL-CCNC: 104 U/L — SIGNIFICANT CHANGE UP (ref 30–115)
ALT FLD-CCNC: 6 U/L — SIGNIFICANT CHANGE UP (ref 0–41)
ANION GAP SERPL CALC-SCNC: 14 MMOL/L — SIGNIFICANT CHANGE UP (ref 7–14)
AST SERPL-CCNC: 8 U/L — SIGNIFICANT CHANGE UP (ref 0–41)
BASOPHILS # BLD AUTO: 0.06 K/UL — SIGNIFICANT CHANGE UP (ref 0–0.2)
BASOPHILS NFR BLD AUTO: 1 % — SIGNIFICANT CHANGE UP (ref 0–1)
BILIRUB SERPL-MCNC: 0.3 MG/DL — SIGNIFICANT CHANGE UP (ref 0.2–1.2)
BUN SERPL-MCNC: 53 MG/DL — HIGH (ref 10–20)
CALCIUM SERPL-MCNC: 8.9 MG/DL — SIGNIFICANT CHANGE UP (ref 8.5–10.1)
CHLORIDE SERPL-SCNC: 104 MMOL/L — SIGNIFICANT CHANGE UP (ref 98–110)
CO2 SERPL-SCNC: 24 MMOL/L — SIGNIFICANT CHANGE UP (ref 17–32)
CREAT SERPL-MCNC: 4.4 MG/DL — CRITICAL HIGH (ref 0.7–1.5)
EOSINOPHIL # BLD AUTO: 0.34 K/UL — SIGNIFICANT CHANGE UP (ref 0–0.7)
EOSINOPHIL NFR BLD AUTO: 5.9 % — SIGNIFICANT CHANGE UP (ref 0–8)
FOLATE SERPL-MCNC: 18.9 NG/ML — SIGNIFICANT CHANGE UP
GLUCOSE BLDC GLUCOMTR-MCNC: 194 MG/DL — HIGH (ref 70–99)
GLUCOSE BLDC GLUCOMTR-MCNC: 98 MG/DL — SIGNIFICANT CHANGE UP (ref 70–99)
GLUCOSE SERPL-MCNC: 105 MG/DL — HIGH (ref 70–99)
HCT VFR BLD CALC: 32.8 % — LOW (ref 42–52)
HGB BLD-MCNC: 10.5 G/DL — LOW (ref 14–18)
IMM GRANULOCYTES NFR BLD AUTO: 0.2 % — SIGNIFICANT CHANGE UP (ref 0.1–0.3)
INR BLD: 1.13 RATIO — SIGNIFICANT CHANGE UP (ref 0.65–1.3)
LYMPHOCYTES # BLD AUTO: 1.12 K/UL — LOW (ref 1.2–3.4)
LYMPHOCYTES # BLD AUTO: 19.4 % — LOW (ref 20.5–51.1)
MAGNESIUM SERPL-MCNC: 2.5 MG/DL — HIGH (ref 1.8–2.4)
MCHC RBC-ENTMCNC: 31.3 PG — HIGH (ref 27–31)
MCHC RBC-ENTMCNC: 32 G/DL — SIGNIFICANT CHANGE UP (ref 32–37)
MCV RBC AUTO: 97.6 FL — HIGH (ref 80–94)
MONOCYTES # BLD AUTO: 0.66 K/UL — HIGH (ref 0.1–0.6)
MONOCYTES NFR BLD AUTO: 11.4 % — HIGH (ref 1.7–9.3)
NEUTROPHILS # BLD AUTO: 3.59 K/UL — SIGNIFICANT CHANGE UP (ref 1.4–6.5)
NEUTROPHILS NFR BLD AUTO: 62.1 % — SIGNIFICANT CHANGE UP (ref 42.2–75.2)
NRBC # BLD: 0 /100 WBCS — SIGNIFICANT CHANGE UP (ref 0–0)
PLATELET # BLD AUTO: 168 K/UL — SIGNIFICANT CHANGE UP (ref 130–400)
POTASSIUM SERPL-MCNC: 4.1 MMOL/L — SIGNIFICANT CHANGE UP (ref 3.5–5)
POTASSIUM SERPL-SCNC: 4.1 MMOL/L — SIGNIFICANT CHANGE UP (ref 3.5–5)
PROT SERPL-MCNC: 5.9 G/DL — LOW (ref 6–8)
PROTHROM AB SERPL-ACNC: 13 SEC — HIGH (ref 9.95–12.87)
RBC # BLD: 3.36 M/UL — LOW (ref 4.7–6.1)
RBC # FLD: 13.6 % — SIGNIFICANT CHANGE UP (ref 11.5–14.5)
SODIUM SERPL-SCNC: 142 MMOL/L — SIGNIFICANT CHANGE UP (ref 135–146)
TSH SERPL-MCNC: 0.81 UIU/ML — SIGNIFICANT CHANGE UP (ref 0.27–4.2)
VIT B12 SERPL-MCNC: 462 PG/ML — SIGNIFICANT CHANGE UP (ref 232–1245)
WBC # BLD: 5.78 K/UL — SIGNIFICANT CHANGE UP (ref 4.8–10.8)
WBC # FLD AUTO: 5.78 K/UL — SIGNIFICANT CHANGE UP (ref 4.8–10.8)

## 2019-06-14 PROCEDURE — 99239 HOSP IP/OBS DSCHRG MGMT >30: CPT

## 2019-06-14 RX ORDER — CEFAZOLIN SODIUM 1 G
2000 VIAL (EA) INJECTION ONCE
Refills: 0 | Status: COMPLETED | OUTPATIENT
Start: 2019-06-14 | End: 2019-06-14

## 2019-06-14 RX ORDER — COLLAGENASE CLOSTRIDIUM HIST. 250 UNIT/G
1 OINTMENT (GRAM) TOPICAL DAILY
Refills: 0 | Status: DISCONTINUED | OUTPATIENT
Start: 2019-06-14 | End: 2019-06-14

## 2019-06-14 RX ORDER — CEFAZOLIN SODIUM 1 G
2 VIAL (EA) INJECTION
Qty: 0 | Refills: 0 | DISCHARGE
Start: 2019-06-14

## 2019-06-14 RX ORDER — ASPIRIN/CALCIUM CARB/MAGNESIUM 324 MG
1 TABLET ORAL
Qty: 0 | Refills: 0 | DISCHARGE

## 2019-06-14 RX ORDER — POVIDONE-IODINE 5 %
1 AEROSOL (ML) TOPICAL
Refills: 0 | Status: DISCONTINUED | OUTPATIENT
Start: 2019-06-14 | End: 2019-06-14

## 2019-06-14 RX ORDER — COLLAGENASE CLOSTRIDIUM HIST. 250 UNIT/G
1 OINTMENT (GRAM) TOPICAL
Qty: 30 | Refills: 0
Start: 2019-06-14 | End: 2019-07-13

## 2019-06-14 RX ADMIN — Medication 1 APPLICATION(S): at 17:28

## 2019-06-14 RX ADMIN — SEVELAMER CARBONATE 1600 MILLIGRAM(S): 2400 POWDER, FOR SUSPENSION ORAL at 14:43

## 2019-06-14 RX ADMIN — CLOPIDOGREL BISULFATE 75 MILLIGRAM(S): 75 TABLET, FILM COATED ORAL at 14:43

## 2019-06-14 RX ADMIN — Medication 100 MILLIGRAM(S): at 17:32

## 2019-06-14 RX ADMIN — Medication 100 MILLIGRAM(S): at 05:39

## 2019-06-14 RX ADMIN — HEPARIN SODIUM 5000 UNIT(S): 5000 INJECTION INTRAVENOUS; SUBCUTANEOUS at 14:44

## 2019-06-14 RX ADMIN — Medication 81 MILLIGRAM(S): at 14:43

## 2019-06-14 RX ADMIN — Medication 100 MILLIGRAM(S): at 17:27

## 2019-06-14 RX ADMIN — SEVELAMER CARBONATE 1600 MILLIGRAM(S): 2400 POWDER, FOR SUSPENSION ORAL at 08:16

## 2019-06-14 RX ADMIN — SEVELAMER CARBONATE 1600 MILLIGRAM(S): 2400 POWDER, FOR SUSPENSION ORAL at 17:30

## 2019-06-14 RX ADMIN — Medication 325 MILLIGRAM(S): at 14:43

## 2019-06-14 RX ADMIN — Medication 1 MILLIGRAM(S): at 14:44

## 2019-06-14 NOTE — DISCHARGE NOTE PROVIDER - CARE PROVIDER_API CALL
Dong Medeiros (DPM)  Podiatric Medicine and Surgery  [address] [facility] [place] [address]  Phone: [phone]  Fax: [phone]  Follow Up Time:     Jabari Bliss)  Cardiology; Internal Medicine  [address] [place] [address]  Phone: [phone]  Fax: [phone]  Follow Up Time: Dong Medeiros (DPM)  Podiatric Medicine and Surgery  [address] [place] [address]  Phone: [phone]  Fax: [phone]  Follow Up Time:     Jabari Bliss)  Cardiology; Internal Medicine  [address] [place] [address]  Phone: [phone]  Fax: [phone]  Follow Up Time:     Mauro Szymanski)  Critical Care Medicine; Internal Medicine; Pulmonary Disease; Sleep Medicine  [address] [place] [address]  Phone: [phone]  Fax: [phone]  Follow Up Time:

## 2019-06-14 NOTE — CONSULT NOTE ADULT - SUBJECTIVE AND OBJECTIVE BOX
NEPHROLOGY CONSULTATION NOTE    57 Y/O M with PMH of Acromegaly, ESRD on Hemodialysis (M/W/F), HTN, DM, CAD with PCI 10 years ago presented to the ED with Bradycardia. He was getting hemodialysis at [facility] and while he was on Monitor his HR dropped to 30s. He had mild headache abd chest discomfort associated with it.  Bradycardic episode has happened once before too while he was getting dialysis. In ED, EKG shows, normal sinus rhythm with slightly prolonged QT interval of 483ms,He is lying in the bed comfortably with no active complaints. (6/13/19)    Today: pt denies any chest pain, SOB, fever, nausea, vomiting, abdominal pain.     PAST MEDICAL & SURGICAL HISTORY:  ESRD on dialysis  Dyslipidemia  DM (diabetes mellitus), type 2  HTN (hypertension)  CAD (coronary atherosclerotic disease)  Acromegaly  Breakdown of surgically created AV fistula, init: RIGHT UPPER ARM  H/O eye surgery  H/O: pituitary tumor: s/p removal    Allergies:  bacitracin (Unknown)  Neosporin (Hypotension)    Home Medications:  Aspir 81 oral delayed release tablet: 1 tab(s) orally once a day (21 May 2019 08:55)  Colace 100 mg oral capsule: 1 cap(s) orally 2 times a day (21 May 2019 08:55)  Crestor 40 mg oral tablet: 1 tab(s) orally once a day (21 May 2019 08:55)  ferrous sulfate 325 mg (65 mg elemental iron) oral tablet:  (21 May 2019 08:55)  folic acid 1 mg oral tablet: 1 tab(s) orally once a day (21 May 2019 08:55)  Plavix 75 mg oral tablet: 1 tab(s) orally once a day (21 May 2019 08:55)  SandoSTATIN [place]AR [place]: 1 application intramuscular every 4 weeks (21 May 2019 08:55)  sevelamer hydrochloride 800 mg oral tablet: 2 tab(s) orally 3 times a day (21 May 2019 08:55)  tamsulosin 0.4 mg oral capsule: 1 cap(s) orally once a day (21 May 2019 08:55)    Hospital Medications:   MEDICATIONS  (STANDING):  aspirin enteric coated 81 milliGRAM(s) Oral daily  atorvastatin 40 milliGRAM(s) Oral at bedtime  clopidogrel Tablet 75 milliGRAM(s) Oral daily  collagenase Ointment 1 Application(s) Topical daily  docusate sodium 100 milliGRAM(s) Oral two times a day  ferrous    sulfate 325 milliGRAM(s) Oral daily  folic acid 1 milliGRAM(s) Oral daily  heparin  Injectable 5000 Unit(s) SubCutaneous every 8 hours  sevelamer carbonate 1600 milliGRAM(s) Oral three times a day with meals  tamsulosin 0.4 milliGRAM(s) Oral at bedtime      SOCIAL HISTORY:  Denies ETOH,Smoking,   FAMILY HISTORY:  Family history of myocardial infarction (Father)        REVIEW OF SYSTEMS:     All other review of systems is negative unless indicated above.    VITALS:  T(F): 97.8 (06-14-19 @ 04:38), Max: 98 (06-13-19 @ 15:10)  HR: 71 (06-14-19 @ 04:38)  BP: 156/79 (06-14-19 @ 04:38)  RR: 18 (06-14-19 @ 04:38)  SpO2: 98% (06-14-19 @ 08:25)    Height (cm): 193.04 (06-14 @ 04:38)  Weight (kg): 108.9 (06-13 @ 19:19)  BMI (kg/m2): 29.2 (06-14 @ 04:38)  BSA (m2): 2.39 (06-14 @ 04:38)    I&O's Detail        PHYSICAL EXAM:  Constitutional: NAD  Respiratory: CTAB, no wheezes, rales or rhonchi  Cardiovascular: S1, S2, RRR  Gastrointestinal: BS+, soft, NT/ND  Extremities: No peripheral edema  Neurological: A/O x 3  : No ortiz.     Vascular Access: right arm AVF.    LABS:  06-14    142  |  104  |  53<H>  ----------------------------<  105<H>  4.1   |  24  |  4.4<HH>    Ca    8.9      14 Jun 2019 05:50  Mg     2.5     06-14    TPro  5.9<L>  /  Alb  3.5  /  TBili  0.3  /  DBili      /  AST  8   /  ALT  6   /  AlkPhos  104  06-14    Creatinine Trend: 4.4 <--, 2.7 <--, 3.3 <--                        10.5   5.78  )-----------( 168      ( 14 Jun 2019 05:50 )             32.8       Urine Studies:              RADIOLOGY & ADDITIONAL STUDIES:  < from: Xray Chest 2 Views PA/Lat (06.12.19 @ 21:02) >  Impression:      Scarring at the right lung base. Unchanged.    < end of copied text >

## 2019-06-14 NOTE — DIETITIAN INITIAL EVALUATION ADULT. - PERTINENT LABORATORY DATA
6/12: RBC-3.27, H/H-10.4/31.3, BUN-34, creat-2.7, gluc-106; per previous admit records: 5/9/19 IksX5C-1.2%, cholesterol-87

## 2019-06-14 NOTE — CONSULT NOTE ADULT - SUBJECTIVE AND OBJECTIVE BOX
PODIATRY CONSULT   SU SAWYER is a pleasant well-nourished, well developed 58y Male in no acute distress, alert awake, and oriented to person, place and time.   Patient is a 58y old  Male who presents with a chief complaint of Bradycardia (13 Jun 2019 20:39)    HPI:  57 Y/O M with PMH of Acromegaly, ESRD on Hemodialysis (M/W/F), HTN, DM, CAD with PCI 10 years ago presented to the ED with Bradycardia. He was getting hemodialysis at [facility] and while he was on Monitor his HR dropped to 30s. He had mild headache abd chest discomfort associated with it.  Bradycardic episode has happened once before too while he was getting dialysis. In ED, EKG shows, normal sinus rhythm with slightly prolonged QT interval of 483ms,He is lying in the bed comfortably with no active complaints. (13 Jun 2019 05:53)    pt was seen by dr reed this tuesday for the heel wound and told him continue local wound care.  pt has anaphylactic reaction to abx ointment.  s/p angio 5/21/19 (dr. hampton)    BRADYCARDIA CAD CORONARY ATHEROSCLEROTIC DISEASE ESRD ON DIALYSIS  ESRD on dialysis  Dyslipidemia  DM (diabetes mellitus), type 2  Neuropathy  HTN (hypertension)  CAD (coronary atherosclerotic disease)  Acromegaly  CKD (chronic kidney disease), stage V      PMH: BRADYCARDIA CAD CORONARY ATHEROSCLEROTIC DISEASE ESRD ON DIALYSIS  ESRD on dialysis  Dyslipidemia  DM (diabetes mellitus), type 2  Neuropathy  HTN (hypertension)  CAD (coronary atherosclerotic disease)  Acromegaly  CKD (chronic kidney disease), stage V    PSH: Breakdown of surgically created AV fistula, init  H/O eye surgery  H/O: pituitary tumor    Medication   Allergy: bacitracin (Unknown)  Neosporin (Hypotension)        Labs:                        10.5   5.78  )-----------( 168      ( 14 Jun 2019 05:50 )             32.8     PT/INR - ( 14 Jun 2019 05:50 )   PT: 13.00 sec;   INR: 1.13 ratio           06-14    142  |  104  |  53<H>  ----------------------------<  105<H>  4.1   |  24  |  4.4<HH>    Ca    8.9      14 Jun 2019 05:50  Mg     2.5     06-14    TPro  5.9<L>  /  Alb  3.5  /  TBili  0.3  /  DBili  x   /  AST  8   /  ALT  6   /  AlkPhos  104  06-14    CARDIAC MARKERS ( 12 Jun 2019 19:20 )  x     / 0.09 ng/mL / x     / x     / x        O:   Derm: ulceration left lateral heel + hyperkeratotic border, wound base Fibrogranular with small necrotic patches center of the wound/ , wound size (3.0 x 1.5 cm) + edema foot/ankle, - jose-wound erythema, - purulence, - fluctuance, - tracking/tunneling, - probe to bone. - streaking erythema. - xerosis, + hemosiderin deposits LLE. - active sign of infection  open wound right 2nd dorsal PIPJ with erythema/edema noted.  no open lesion left lateral heel  Vascular: Dorsalis Pedis and Posterior Tibial pulses 0/4.  delayed Capillary re-fill time and cold to touch digits 1-5 right foot  Neuro: Protective sensation diminished to the level of the digits right foot  MSK: Muscle strength 5/5 all major muscle groups right foot.      Assessment and Plan:   pressure ulcer right lateral heel  +edema  right 2nd dorsal PIPJ wound, cellulitis  DM w neuropathy    Chart reviewed and Patient evaluated  Discussed diagnosis and treatment with patient  Wound flush with normal saline  Applied wet to dry sterile dressing  local wound care instruction: wash with soap and water, apply santyl wet to dry guaze/dsd/kerlix bid and betadine paint right 2nd digit.  Offloading to bilateral Heels at all times to prevent further ulceration  cont local wound care  f/u with attending for further evaluation PODIATRY CONSULT   SU SAWYER is a pleasant well-nourished, well developed 58y Male in no acute distress, alert awake, and oriented to person, place and time.   Patient is a 58y old  Male who presents with a chief complaint of Bradycardia (13 Jun 2019 20:39)    HPI:  57 Y/O M with PMH of Acromegaly, ESRD on Hemodialysis (M/W/F), HTN, DM, CAD with PCI 10 years ago presented to the ED with Bradycardia. He was getting hemodialysis at [facility] and while he was on Monitor his HR dropped to 30s. He had mild headache abd chest discomfort associated with it.  Bradycardic episode has happened once before too while he was getting dialysis. In ED, EKG shows, normal sinus rhythm with slightly prolonged QT interval of 483ms,He is lying in the bed comfortably with no active complaints. (13 Jun 2019 05:53)    pt was seen by dr reed this tuesday for the heel wound and told him continue local wound care.  pt has anaphylactic reaction to abx ointment.  s/p angio 5/21/19 (dr. hampton)    BRADYCARDIA CAD CORONARY ATHEROSCLEROTIC DISEASE ESRD ON DIALYSIS  ESRD on dialysis  Dyslipidemia  DM (diabetes mellitus), type 2  Neuropathy  HTN (hypertension)  CAD (coronary atherosclerotic disease)  Acromegaly  CKD (chronic kidney disease), stage V      PMH: BRADYCARDIA CAD CORONARY ATHEROSCLEROTIC DISEASE ESRD ON DIALYSIS  ESRD on dialysis  Dyslipidemia  DM (diabetes mellitus), type 2  Neuropathy  HTN (hypertension)  CAD (coronary atherosclerotic disease)  Acromegaly  CKD (chronic kidney disease), stage V    PSH: Breakdown of surgically created AV fistula, init  H/O eye surgery  H/O: pituitary tumor    Medication   Allergy: bacitracin (Unknown)  Neosporin (Hypotension)        Labs:                        10.5   5.78  )-----------( 168      ( 14 Jun 2019 05:50 )             32.8     PT/INR - ( 14 Jun 2019 05:50 )   PT: 13.00 sec;   INR: 1.13 ratio           06-14    142  |  104  |  53<H>  ----------------------------<  105<H>  4.1   |  24  |  4.4<HH>    Ca    8.9      14 Jun 2019 05:50  Mg     2.5     06-14    TPro  5.9<L>  /  Alb  3.5  /  TBili  0.3  /  DBili  x   /  AST  8   /  ALT  6   /  AlkPhos  104  06-14    CARDIAC MARKERS ( 12 Jun 2019 19:20 )  x     / 0.09 ng/mL / x     / x     / x        O:   Derm: ulceration left lateral heel + hyperkeratotic border, wound base Fibrogranular with small necrotic patches center of the wound/ , wound size (3.0 x 1.5 cm) + edema foot/ankle, - jose-wound erythema, - purulence, - fluctuance, - tracking/tunneling, - probe to bone. - streaking erythema. - xerosis, + hemosiderin deposits LLE. - active sign of infection  open wound right 2nd dorsal PIPJ with erythema/edema noted.  no open lesion left lateral heel  Vascular: Dorsalis Pedis and Posterior Tibial pulses 0/4.  delayed Capillary re-fill time and cold to touch digits 1-5 right foot  Neuro: Protective sensation diminished to the level of the digits right foot  MSK: Muscle strength 5/5 all major muscle groups right foot.      Assessment and Plan:   pressure ulcer right lateral heel  +edema  right 2nd dorsal PIPJ wound, cellulitis  DM w neuropathy    Chart reviewed and Patient evaluated  Discussed diagnosis and treatment with patient  Wound flush with normal saline  Applied wet to dry sterile dressing  local wound care instruction: wash with soap and water, apply santyl wet to dry guaze/dsd/kerlix bid and betadine paint right 2nd digit.  Offloading to bilateral Heels at all times to prevent further ulceration  cont local wound care  recommend ID consult for R 2nd digit cellulitis  f/u with attending for further evaluation

## 2019-06-14 NOTE — DISCHARGE NOTE NURSING/CASE MANAGEMENT/SOCIAL WORK - [email or web address]_GEN_ALL_CORE
You can access the [facility] Patient Portal, offered by [facility], by registering with the following website: [email or web address]

## 2019-06-14 NOTE — DIETITIAN INITIAL EVALUATION ADULT. - MD RECOMMEND
Recommendation: (1) Order Prosource Gelatein (sugar free) q12hrs (recommending despite adequate po intake given current wt/ht & pt's increased needs from HD & pressure ulcer). (2) Continue providing Consistent Carbohydrate + Renal Replacement (no protein restriction) + DASH/TLC diet as tolerated.

## 2019-06-14 NOTE — PROGRESS NOTE ADULT - ASSESSMENT
57 Y/O M with PMH of Acromegaly, ESRD on Hemodialysis (M/W/F), HTN, DM, CAD with PCI 10 years ago presented to the ED with Bradycardia. He was getting hemodialysis at [facility] and while he was on Monitor his HR dropped to 30s. He had mild headache abd chest discomfort associated with it.      1.	Bradycardia  2.	superficial ulcer right foot 2nd toe distal phalanx with minimal surrounding cellulitis  3.	Acromegaly  4.	ESRD on HD  5.	CAD / HTN  6.	DM-2         PLAN:    ·	Pt's HR has good response on walking. HR went above 80's  ·	EP eval noted. Recommended out pt sleep study for JABARI and loop recorder. Pt is advised to F/U with Dr. Calvo.  ·	ID & Pod eval noted. Recommended local wound care and IV ance one dose today and two doses post HD  ·	Endocrine recommended out pt F/U  ·	Cont his home meds and D/C him home    * Med rec reviewed. Plan of care D/W the pt. Time spent 44 minutes.

## 2019-06-14 NOTE — PROGRESS NOTE ADULT - SUBJECTIVE AND OBJECTIVE BOX
SU SAWYER  58y Male    CHIEF COMPLAINT:    Patient is a 58y old  Male who presents with a chief complaint of Bradycardia (2019 15:01)      INTERVAL HPI/OVERNIGHT EVENTS:    Patient seen and examined. Feels good. No palpitations. No dizziness. No sob. HR went up to 90's on walking.    ROS: All other systems are negative.    Vital Signs:    T(F): 97.1 (19 @ 14:58), Max: 97.8 (19 @ 04:38)  HR: 69 (19 @ 14:58) (69 - 80)  BP: 152/72 (19 @ 14:58) (130/76 - 171/-)  RR: 17 (19 @ 14:58) (16 - 18)  SpO2: 98% (19 @ 08:25) (98% - 98%)  I&O's Summary    2019 07:01  -  2019 15:29  --------------------------------------------------------  IN: 0 mL / OUT: 1700 mL / NET: -1700 mL      Daily Height in cm: 193.04 (2019 04:38)    Daily Weight in k.8 (2019 04:38)  CAPILLARY BLOOD GLUCOSE      POCT Blood Glucose.: 98 mg/dL (2019 07:32)  POCT Blood Glucose.: 146 mg/dL (2019 21:52)  POCT Blood Glucose.: 118 mg/dL (2019 19:13)      PHYSICAL EXAM:    GENERAL:  NAD  SKIN: No rashes or lesions  HENT: Atraumatic Normocephalic. PERRL. Moist membranes. Acromegaly   NECK: Supple, No JVD. No lymphadenopathy.  PULMONARY: CTA B/L. No wheezing. No rales  CVS: Normal S1, S2. Rate and Rhythm are regular. No murmurs.  ABDOMEN/GI: Soft, Nontender, Nondistended; BS present  EXTREMITIES: Peripheral pulses intact. No edema B/L LE.  NEUROLOGIC:  No motor or sensory deficit.  PSYCH: Alert & oriented x 3    Consultant(s) Notes Reviewed:  [x ] YES  [ ] NO  Care Discussed with Consultants/Other Providers [ x] YES  [ ] NO    EKG reviewed  Telemetry reviewed    LABS:                        10.5   5.78  )-----------( 168      ( 2019 05:50 )             32.8         142  |  104  |  53<H>  ----------------------------<  105<H>  4.1   |  24  |  4.4<HH>    Ca    8.9      2019 05:50  Mg     2.5         TPro  5.9<L>  /  Alb  3.5  /  TBili  0.3  /  DBili  x   /  AST  8   /  ALT  6   /  AlkPhos  104      PT/INR - ( 2019 05:50 )   PT: 13.00 sec;   INR: 1.13 ratio             Trop 0.09, CKMB --, CK --, 19 @ 19:20        RADIOLOGY & ADDITIONAL TESTS:      Imaging or report Personally Reviewed:  [ ] YES  [ ] NO    Medications:  Standing  aspirin enteric coated 81 milliGRAM(s) Oral daily  atorvastatin 40 milliGRAM(s) Oral at bedtime  ceFAZolin   IVPB 2000 milliGRAM(s) IV Intermittent once  clopidogrel Tablet 75 milliGRAM(s) Oral daily  collagenase Ointment 1 Application(s) Topical daily  docusate sodium 100 milliGRAM(s) Oral two times a day  ferrous    sulfate 325 milliGRAM(s) Oral daily  folic acid 1 milliGRAM(s) Oral daily  heparin  Injectable 5000 Unit(s) SubCutaneous every 8 hours  povidone iodine 10% Solution 1 Application(s) Topical two times a day  sevelamer carbonate 1600 milliGRAM(s) Oral three times a day with meals  tamsulosin 0.4 milliGRAM(s) Oral at bedtime    PRN Meds      Case discussed with resident    Care discussed with pt/family

## 2019-06-14 NOTE — DISCHARGE NOTE PROVIDER - PROVIDER TOKENS
PROVIDER:[TOKEN:[[id number]]],PROVIDER:[TOKEN:[[id number]]] PROVIDER:[TOKEN:[[id number]]],PROVIDER:[TOKEN:[[id number]]],PROVIDER:[TOKEN:[[id number]]]

## 2019-06-14 NOTE — DIETITIAN INITIAL EVALUATION ADULT. - DIET TYPE
DASH/TLC (sodium and cholesterol restricted diet)/renal replacement pts:no protein restr,no conc K & phos, low sodium/consistent carbohydrate (evening snack)/fair appetite reported at this time; consuming 100% of meals at this time

## 2019-06-14 NOTE — CONSULT NOTE ADULT - EXTREMITIES COMMENTS
right foot 2nd toe distal phalanx a small superficial ulcer with no drainage/ minimal erythema/ no necrosis. No pulses

## 2019-06-14 NOTE — DISCHARGE NOTE PROVIDER - HOSPITAL COURSE
57 Y/O M with PMH of Acromegaly, ESRD on Hemodialysis (M/W/F), HTN, DM, CAD with PCI 10 years ago presented to the ED with Bradycardia. He was getting hemodialysis at [facility] and while he was on Monitor his HR dropped to 30s. He was seen by EPS who advised outpatient event monitor as well as sleep study. The patient was evaluated by Endocrinology who advised outpatient follow up. The patient was found to have an ulcer of his right heel and cellulitsi of right second toe. Podiatry evaluated the heel with wound care regimen and outpatient follow up and ID advised patient to get Ancef 2gm post HD.

## 2019-06-14 NOTE — DIETITIAN INITIAL EVALUATION ADULT. - PHYSICAL APPEARANCE
BMI: 29.2. Alert. Last BM reportedly 3 days ago, PTP. No chewing/swallowing difficulty reported. Stage II pressure ulcer to R heel.

## 2019-06-14 NOTE — CONSULT NOTE ADULT - ASSESSMENT
Patient with ESRD - HD (MW) in hospital for episode of bradycardia while on dialysis.  PMH DM, HTN, ESRD - HD (MW), CAD, Acromegaly, pituitary tumor s/p removal    # ESRD - HD (MW)   - HD today with 3hr 3K bath UF 2L as tolerated   - BP noted, keep current, monitor BP post HD.   - Hb at goal, no need for EDWIN at the moment.   - Ca at goal, check Ph, iPTH, on binders.   - Bradycardia: seen by EPS, as per note "Bradycardia episode likely due to JABARI. O2 Trending while sleep, work up for JABARI"   - seen by Endo, notes appreciated.   - will follow

## 2019-06-14 NOTE — CONSULT NOTE ADULT - ASSESSMENT
58 year old male with PMHx of Acromegaly, ESRD on HD (m/w/f), Hypertension, Type 2 DM,     IMPRESSION:  superficial ulcer right foot 2nd toe distal phalanx with minimal surrounding cellulitis  No abscess/ OM    RECOMMENDATIONS:  Ancef 2 gm iv x once now then 2 gm iv post HD for 2 more dosis

## 2019-06-14 NOTE — CONSULT NOTE ADULT - ATTENDING COMMENTS
patient is being treated with octreotide LAR monthly injections supervised by endocrinologist in [place], and injections administered by his family doctor. he may want to change to local endocrinologist, which is his decision, and I advised that lanreotide monthly would be a much easier injection with somewhat better efficacy, but need an outpatient visit so paperwork can be done.
I saw the patient 6/14/19 and agree with the above.
Pt seen and examined  HD today as above  EP note noted    d/c planning?

## 2019-06-14 NOTE — PROGRESS NOTE ADULT - SUBJECTIVE AND OBJECTIVE BOX
Hospital Day:  2d    Subjective:    Patient is a 58y old  Male who presents with a chief complaint of Bradycardia (14 Jun 2019 09:41)      Past Medical Hx:   ESRD on dialysis  Dyslipidemia  DM (diabetes mellitus), type 2  Neuropathy  HTN (hypertension)  CAD (coronary atherosclerotic disease)  Acromegaly  CKD (chronic kidney disease), stage V    Past Sx:  Breakdown of surgically created AV fistula, init  H/O eye surgery  H/O: pituitary tumor    Allergies:  bacitracin (Unknown)  Neosporin (Hypotension)    Current Meds:   [facility] Meds:  aspirin enteric coated 81 milliGRAM(s) Oral daily  atorvastatin 40 milliGRAM(s) Oral at bedtime  clopidogrel Tablet 75 milliGRAM(s) Oral daily  collagenase Ointment 1 Application(s) Topical daily  docusate sodium 100 milliGRAM(s) Oral two times a day  ferrous    sulfate 325 milliGRAM(s) Oral daily  folic acid 1 milliGRAM(s) Oral daily  heparin  Injectable 5000 Unit(s) SubCutaneous every 8 hours  povidone iodine 10% Solution 1 Application(s) Topical two times a day  sevelamer carbonate 1600 milliGRAM(s) Oral three times a day with meals  tamsulosin 0.4 milliGRAM(s) Oral at bedtime    PRN Meds:    HOME MEDICATIONS:  Aspir 81 oral delayed release tablet: 1 tab(s) orally once a day  Colace 100 mg oral capsule: 1 cap(s) orally 2 times a day  Crestor 40 mg oral tablet: 1 tab(s) orally once a day  ferrous sulfate 325 mg (65 mg elemental iron) oral tablet:   folic acid 1 mg oral tablet: 1 tab(s) orally once a day  Plavix 75 mg oral tablet: 1 tab(s) orally once a day  SandoSTATIN [place]AR [place]: 1 application intramuscular every 4 weeks  sevelamer hydrochloride 800 mg oral tablet: 2 tab(s) orally 3 times a day  tamsulosin 0.4 mg oral capsule: 1 cap(s) orally once a day      Vital Signs:   T(F): 97.8 (06-14-19 @ 04:38), Max: 98 (06-13-19 @ 15:10)  HR: 70 (06-14-19 @ 11:10) (68 - 73)  BP: 149/85 (06-14-19 @ 11:10) (149/85 - 171/-)  RR: 18 (06-14-19 @ 11:10) (18 - 18)  SpO2: 98% (06-14-19 @ 08:25) (98% - 98%)        Physical Exam:   GENERAL: NAD  HEENT: NCAT  CHEST/LUNG: CTAB  HEART: Regular rate and rhythm; s1 s2 appreciated, No murmurs, rubs, or gallops  ABDOMEN: Soft, Nontender, Nondistended; Bowel sounds present  EXTREMITIES: No LE edema b/l  NERVOUS SYSTEM:  Alert & Oriented X3        Labs:                         10.5   5.78  )-----------( 168      ( 14 Jun 2019 05:50 )             32.8     Neutophil% 62.1, Lymphocyte% 19.4, Monocyte% 11.4, Bands% 0.2 06-14-19 @ 05:50    14 Jun 2019 05:50    142    |  104    |  53     ----------------------------<  105    4.1     |  24     |  4.4      Ca    8.9        14 Jun 2019 05:50  Mg     2.5       14 Jun 2019 05:50    TPro  5.9    /  Alb  3.5    /  TBili  0.3    /  DBili  x      /  AST  8      /  ALT  6      /  AlkPhos  104    14 Jun 2019 05:50       pTT    --             ----< 1.13 INR  (06-14-19 @ 05:50)    13.00        PT      Hemoglobin A1C, Whole Blood: 6.2 % (05-09-19 @ 08:51)      Troponin 0.09, CKMB --, CK -- 06-12-19 @ 19:20    Radiology:   None Today     Assessment and Plan:   This is a 58 year old male with PMHx of Acromegaly, ESRD on HD (m/w/f), Hypertension, Type 2 DM, CAD with history of PCI ten years prior who presented to the ED with Bradycardia from HD     #Bradycardia  - EKG with NSR, Telemetry NSR 70s  - EPS consulted: Outpatient event recorder   - Currently asymptomatic     #CAD s/p PCI   - Noted history of 3 stents 10 years prior   - Continue on Aspirin 325 and Plavix   - TTE 5/2019: EF59%, Mildly increased LV wall thickness, G2DD, Mild Pulmonary HTN    #Acromegaly  - Was due for Sandostatin dose; spoke with endocrine here and advised nothing to do.   - Endocrine advised can switch to easier injection. Patient can follow up here with Dr. Allison or proceed with outpatient doctor in [place]    #ESRD on HD  - HD M/W/F  - Makes some urine     #Pressure ulcer on Right Lateral Heel   -   #Right Second Toe Cellulitis  - ID consulted     #Hypertension:   - Continue to monitor BP as per nephro    #HLD:   - Continue on Statin     #Type 2 DM  - Continue to monitor blood glucose; start on insulin regimen if over 180  POCT Blood Glucose.: 98 mg/dL (14 Jun 2019 07:32)  POCT Blood Glucose.: 146 mg/dL (13 Jun 2019 21:52)  POCT Blood Glucose.: 118 mg/dL (13 Jun 2019 19:13)    Pharmacy contacted and medications confirmed.     Activity: As tolerated  Diet: DASH. Renal restrictions and Carb Consistent  DVT ppx: Heparin SubQ  GI ppx: Not indicated  Code Status: Full Code  DISPO: From home; pending resolution of workup for discharge Hospital Day:  2d    Subjective:    Patient is a 58y old  Male who presents with a chief complaint of Bradycardia (14 Jun 2019 09:41)    No overnight events. Seen and examined at bedside. Reported no acute complaints. Stated feeling well.     Telemetry reviewed: sinus 70s with no events  Past Medical Hx:   ESRD on dialysis  Dyslipidemia  DM (diabetes mellitus), type 2  Neuropathy  HTN (hypertension)  CAD (coronary atherosclerotic disease)  Acromegaly  CKD (chronic kidney disease), stage V    Past Sx:  Breakdown of surgically created AV fistula, init  H/O eye surgery  H/O: pituitary tumor    Allergies:  bacitracin (Unknown)  Neosporin (Hypotension)    Current Meds:   Stand Meds:  aspirin enteric coated 81 milliGRAM(s) Oral daily  atorvastatin 40 milliGRAM(s) Oral at bedtime  clopidogrel Tablet 75 milliGRAM(s) Oral daily  collagenase Ointment 1 Application(s) Topical daily  docusate sodium 100 milliGRAM(s) Oral two times a day  ferrous    sulfate 325 milliGRAM(s) Oral daily  folic acid 1 milliGRAM(s) Oral daily  heparin  Injectable 5000 Unit(s) SubCutaneous every 8 hours  povidone iodine 10% Solution 1 Application(s) Topical two times a day  sevelamer carbonate 1600 milliGRAM(s) Oral three times a day with meals  tamsulosin 0.4 milliGRAM(s) Oral at bedtime    PRN Meds:    HOME MEDICATIONS:  Aspir 81 oral delayed release tablet: 1 tab(s) orally once a day  Colace 100 mg oral capsule: 1 cap(s) orally 2 times a day  Crestor 40 mg oral tablet: 1 tab(s) orally once a day  ferrous sulfate 325 mg (65 mg elemental iron) oral tablet:   folic acid 1 mg oral tablet: 1 tab(s) orally once a day  Plavix 75 mg oral tablet: 1 tab(s) orally once a day  SandoSTATIN [place]AR [place]: 1 application intramuscular every 4 weeks  sevelamer hydrochloride 800 mg oral tablet: 2 tab(s) orally 3 times a day  tamsulosin 0.4 mg oral capsule: 1 cap(s) orally once a day      Vital Signs:   T(F): 97.8 (06-14-19 @ 04:38), Max: 98 (06-13-19 @ 15:10)  HR: 70 (06-14-19 @ 11:10) (68 - 73)  BP: 149/85 (06-14-19 @ 11:10) (149/85 - 171/-)  RR: 18 (06-14-19 @ 11:10) (18 - 18)  SpO2: 98% (06-14-19 @ 08:25) (98% - 98%)        Physical Exam:   GENERAL: NAD  HEENT: NCAT  CHEST/LUNG: CTAB  HEART: Regular rate and rhythm; s1 s2 appreciated, No murmurs, rubs, or gallops  ABDOMEN: Soft, Nontender, Nondistended; Bowel sounds present  EXTREMITIES: No LE edema b/l  NERVOUS SYSTEM:  Alert & Oriented X3        Labs:                         10.5   5.78  )-----------( 168      ( 14 Jun 2019 05:50 )             32.8     Neutophil% 62.1, Lymphocyte% 19.4, Monocyte% 11.4, Bands% 0.2 06-14-19 @ 05:50    14 Jun 2019 05:50    142    |  104    |  53     ----------------------------<  105    4.1     |  24     |  4.4      Ca    8.9        14 Jun 2019 05:50  Mg     2.5       14 Jun 2019 05:50    TPro  5.9    /  Alb  3.5    /  TBili  0.3    /  DBili  x      /  AST  8      /  ALT  6      /  AlkPhos  104    14 Jun 2019 05:50       pTT    --             ----< 1.13 INR  (06-14-19 @ 05:50)    13.00        PT      Hemoglobin A1C, Whole Blood: 6.2 % (05-09-19 @ 08:51)      Troponin 0.09, CKMB --, CK -- 06-12-19 @ 19:20    Radiology:   None Today     Assessment and Plan:   This is a 58 year old male with PMHx of Acromegaly, ESRD on HD (m/w/f), Hypertension, Type 2 DM, CAD with history of PCI ten years prior who presented to the ED with Bradycardia from HD     #Bradycardia  - EKG with NSR, Telemetry NSR 70s  - EPS consulted: Outpatient event recorder   - Currently asymptomatic     #CAD s/p PCI   - Noted history of 3 stents 10 years prior   - Continue on Aspirin 325 and Plavix   - TTE 5/2019: EF59%, Mildly increased LV wall thickness, G2DD, Mild Pulmonary HTN    #Acromegaly  - Was due for Sandostatin dose; spoke with endocrine here and advised nothing to do.   - Endocrine advised can switch to easier injection. Patient can follow up here with Dr. Allison or proceed with outpatient doctor in [place]    #ESRD on HD  - HD M/W/F  - Makes some urine     #Pressure ulcer on Right Lateral Heel   -   #Right Second Toe Cellulitis  - ID consulted     #Hypertension:   - Continue to monitor BP as per nephro    #HLD:   - Continue on Statin     #Type 2 DM  - Continue to monitor blood glucose; start on insulin regimen if over 180  POCT Blood Glucose.: 98 mg/dL (14 Jun 2019 07:32)  POCT Blood Glucose.: 146 mg/dL (13 Jun 2019 21:52)  POCT Blood Glucose.: 118 mg/dL (13 Jun 2019 19:13)    Pharmacy contacted and medications confirmed.     Activity: As tolerated  Diet: DASH. Renal restrictions and Carb Consistent  DVT ppx: Heparin SubQ  GI ppx: Not indicated  Code Status: Full Code  DISPO: From home; pending resolution of workup for discharge Hospital Day:  2d    Subjective:    Patient is a 58y old  Male who presents with a chief complaint of Bradycardia (14 Jun 2019 09:41)    No overnight events. Seen and examined at bedside. Reported no acute complaints. Stated feeling well.     Telemetry reviewed: sinus 70s with no events; HR to 90s when ambulating   Past Medical Hx:   ESRD on dialysis  Dyslipidemia  DM (diabetes mellitus), type 2  Neuropathy  HTN (hypertension)  CAD (coronary atherosclerotic disease)  Acromegaly  CKD (chronic kidney disease), stage V    Past Sx:  Breakdown of surgically created AV fistula, init  H/O eye surgery  H/O: pituitary tumor    Allergies:  bacitracin (Unknown)  Neosporin (Hypotension)    Current Meds:   Stand Meds:  aspirin enteric coated 81 milliGRAM(s) Oral daily  atorvastatin 40 milliGRAM(s) Oral at bedtime  clopidogrel Tablet 75 milliGRAM(s) Oral daily  collagenase Ointment 1 Application(s) Topical daily  docusate sodium 100 milliGRAM(s) Oral two times a day  ferrous    sulfate 325 milliGRAM(s) Oral daily  folic acid 1 milliGRAM(s) Oral daily  heparin  Injectable 5000 Unit(s) SubCutaneous every 8 hours  povidone iodine 10% Solution 1 Application(s) Topical two times a day  sevelamer carbonate 1600 milliGRAM(s) Oral three times a day with meals  tamsulosin 0.4 milliGRAM(s) Oral at bedtime    PRN Meds:    HOME MEDICATIONS:  Aspir 81 oral delayed release tablet: 1 tab(s) orally once a day  Colace 100 mg oral capsule: 1 cap(s) orally 2 times a day  Crestor 40 mg oral tablet: 1 tab(s) orally once a day  ferrous sulfate 325 mg (65 mg elemental iron) oral tablet:   folic acid 1 mg oral tablet: 1 tab(s) orally once a day  Plavix 75 mg oral tablet: 1 tab(s) orally once a day  SandoSTATIN [place]AR [place]: 1 application intramuscular every 4 weeks  sevelamer hydrochloride 800 mg oral tablet: 2 tab(s) orally 3 times a day  tamsulosin 0.4 mg oral capsule: 1 cap(s) orally once a day      Vital Signs:   T(F): 97.8 (06-14-19 @ 04:38), Max: 98 (06-13-19 @ 15:10)  HR: 70 (06-14-19 @ 11:10) (68 - 73)  BP: 149/85 (06-14-19 @ 11:10) (149/85 - 171/-)  RR: 18 (06-14-19 @ 11:10) (18 - 18)  SpO2: 98% (06-14-19 @ 08:25) (98% - 98%)    Physical Exam:   GENERAL: NAD, Pleasant and conversive, noted enlarged face with large forehead and jaw  HEENT: NCAT, PERRLA, EOMI,   CHEST/LUNG: CTA, No wheezing, Rhonchi, Rales  HEART: Regular rate and rhythm; s1 s2 appreciated, No murmurs, rubs, or gallops  ABDOMEN: Soft, Nontender, Nondistended; Bowel sounds present  EXTREMITIES: No LE edema b/l, Noted enlarged feet with ulcer on right heel  NERVOUS SYSTEM:  Alert & Oriented X3, non focal    Labs:                         10.5   5.78  )-----------( 168      ( 14 Jun 2019 05:50 )             32.8     Neutophil% 62.1, Lymphocyte% 19.4, Monocyte% 11.4, Bands% 0.2 06-14-19 @ 05:50    14 Jun 2019 05:50    142    |  104    |  53     ----------------------------<  105    4.1     |  24     |  4.4      Ca    8.9        14 Jun 2019 05:50  Mg     2.5       14 Jun 2019 05:50    TPro  5.9    /  Alb  3.5    /  TBili  0.3    /  DBili  x      /  AST  8      /  ALT  6      /  AlkPhos  104    14 Jun 2019 05:50       pTT    --             ----< 1.13 INR  (06-14-19 @ 05:50)    13.00        PT    Hemoglobin A1C, Whole Blood: 6.2 % (05-09-19 @ 08:51)    Troponin 0.09, CKMB --, CK -- 06-12-19 @ 19:20    Radiology:   None Today     Assessment and Plan:   This is a 58 year old male with PMHx of Acromegaly, ESRD on HD (m/w/f), Hypertension, Type 2 DM, CAD with history of PCI ten years prior who presented to the ED with Bradycardia from HD     #Bradycardia  - EKG with NSR, Telemetry NSR 70s  - EPS consulted: Outpatient event recorder   - Currently asymptomatic     #CAD s/p PCI   - Noted history of 3 stents 10 years prior   - Continue on Aspirin 325 and Plavix   - TTE 5/2019: EF59%, Mildly increased LV wall thickness, G2DD, Mild Pulmonary HTN    #Acromegaly  - Was due for Sandostatin dose; spoke with endocrine here and advised nothing to do.   - Endocrine advised can switch to easier injection. Patient can follow up here with Dr. Allison or proceed with outpatient doctor in [place]    #ESRD on HD  - HD M/W/F  - Makes some urine     #Pressure ulcer on Right Lateral Heel   -   #Right Second Toe Cellulitis  - ID consulted; Unasyn 2gm now and then after HD on Monday and Wednesday    #Hypertension:   - Continue to monitor BP as per nephro    #HLD:   - Continue on Statin     #Type 2 DM  - Continue to monitor blood glucose; start on insulin regimen if over 180  POCT Blood Glucose.: 98 mg/dL (14 Jun 2019 07:32)  POCT Blood Glucose.: 146 mg/dL (13 Jun 2019 21:52)  POCT Blood Glucose.: 118 mg/dL (13 Jun 2019 19:13)    Pharmacy contacted and medications confirmed.     Activity: As tolerated  Diet: DASH. Renal restrictions and Carb Consistent  DVT ppx: Heparin SubQ  GI ppx: Not indicated  Code Status: Full Code  DISPO: From home; pending resolution of workup for discharge

## 2019-06-14 NOTE — DIETITIAN INITIAL EVALUATION ADULT. - SOURCE
Fair appetite & po intake PTP. NKFA. Occasionally takes MVI & iron supplement. No oral supplements. Low sugar, low fat diet reported. Basic understanding of DM nutrition therapy demonstrated. No known history of unintentional wt loss.

## 2019-06-14 NOTE — DIETITIAN INITIAL EVALUATION ADULT. - OTHER INFO
Reason for RD assessment: stage II pressure ulcer or greater. Pertinent Medical Information: p/w bradycardia. Asymptomatic now per progress notes. ESRD on HD. Acromegaly noted.

## 2019-06-14 NOTE — CONSULT NOTE ADULT - SUBJECTIVE AND OBJECTIVE BOX
SU SAWYER  58y, Male  Allergy: bacitracin (Unknown)  Neosporin (Hypotension)      HPI:  59 Y/O M with PMH of Acromegaly, ESRD on Hemodialysis (M/W/F), HTN, DM, CAD with PCI 10 years ago presented to the ED with Bradycardia. He was getting hemodialysis at [facility] and while he was on Monitor his HR dropped to 30s. He had mild headache abd chest discomfort associated with it.  Bradycardic episode has happened once before too while he was getting dialysis. In ED, EKG shows, normal sinus rhythm with slightly prolonged QT interval of 483ms,He is lying in the bed comfortably with no active complaints. (13 Jun 2019 05:53)    FAMILY HISTORY:  Family history of myocardial infarction (Father)    PAST MEDICAL & SURGICAL HISTORY:  ESRD on dialysis  Dyslipidemia  DM (diabetes mellitus), type 2  HTN (hypertension)  CAD (coronary atherosclerotic disease)  Acromegaly  Breakdown of surgically created AV fistula, init: RIGHT UPPER ARM  H/O eye surgery  H/O: pituitary tumor: s/p removal        VITALS:  T(F): 97.1, Max: 98 (06-13-19 @ 15:10)  HR: 69  BP: 152/72  RR: 17Vital Signs Last 24 Hrs  T(C): 36.2 (14 Jun 2019 14:58), Max: 36.7 (13 Jun 2019 15:10)  T(F): 97.1 (14 Jun 2019 14:58), Max: 98 (13 Jun 2019 15:10)  HR: 69 (14 Jun 2019 14:58) (68 - 80)  BP: 152/72 (14 Jun 2019 14:58) (130/76 - 171/-)  BP(mean): --  RR: 17 (14 Jun 2019 14:58) (16 - 18)  SpO2: 98% (14 Jun 2019 08:25) (98% - 98%)    TESTS & MEASUREMENTS:                        10.5   5.78  )-----------( 168      ( 14 Jun 2019 05:50 )             32.8     06-14    142  |  104  |  53<H>  ----------------------------<  105<H>  4.1   |  24  |  4.4<HH>    Ca    8.9      14 Jun 2019 05:50  Mg     2.5     06-14    TPro  5.9<L>  /  Alb  3.5  /  TBili  0.3  /  DBili  x   /  AST  8   /  ALT  6   /  AlkPhos  104  06-14    LIVER FUNCTIONS - ( 14 Jun 2019 05:50 )  Alb: 3.5 g/dL / Pro: 5.9 g/dL / ALK PHOS: 104 U/L / ALT: 6 U/L / AST: 8 U/L / GGT: x                   RADIOLOGY & ADDITIONAL TESTS:    ANTIBIOTICS:

## 2019-06-14 NOTE — DISCHARGE NOTE PROVIDER - NSDCFUADDINST_GEN_ALL_CORE_FT
Please contact Dr. Esquivel to schedule an appointment with him or one of his colleagues for an outpatient sleep study. Please follow up with Dr. Calvo in 2 weeks for event recorder

## 2019-06-14 NOTE — DISCHARGE NOTE PROVIDER - NSDCCPCAREPLAN_GEN_ALL_CORE_FT
02-Nov-2017 10:20
PRINCIPAL DISCHARGE DIAGNOSIS  Diagnosis: Bradycardia  Assessment and Plan of Treatment: You were found to have bradycardia during hemodialysis.      SECONDARY DISCHARGE DIAGNOSES  Diagnosis: Ulcer of heel due to diabetes  Assessment and Plan of Treatment: You had an ulcer of your heel. Your wound care regimen is: wash with soap and water, apply santyl wet to dry guaze/ dry sterile dressing / kerlix. Wound care at home is set up for you.    Diagnosis: ESRD on dialysis  Assessment and Plan of Treatment: You will be receiving antibiotics during HD on Monday 6/17 and 6/19

## 2019-06-14 NOTE — DISCHARGE NOTE PROVIDER - NSDCFUSCHEDAPPT_GEN_ALL_CORE_FT
SU SAWYER ; 06/18/2019 ; [facility] SU SAWYER ; 06/18/2019 ; [facility]

## 2019-06-14 NOTE — DIETITIAN INITIAL EVALUATION ADULT. - ENERGY NEEDS
Energy: 4760-0840 kcal/day (MSJx1.2-1.4 AF) - used this equation rather than typical 30-35 kcal/kg ABW range for pressure ulcer & ESRD on HD d/t wt + borderline obese BMI which may result in an excess kcal calculation.    Protein: 109-130 g/day (1-1.2 g/kg ABW) - as above    Fluids: 1 L + urine output or per LIP

## 2019-06-18 ENCOUNTER — APPOINTMENT (OUTPATIENT)
Dept: VASCULAR SURGERY | Facility: HOSPITAL | Age: 59
End: 2019-06-18

## 2019-06-19 DIAGNOSIS — Z82.49 FAMILY HISTORY OF ISCHEMIC HEART DISEASE AND OTHER DISEASES OF THE CIRCULATORY SYSTEM: ICD-10-CM

## 2019-06-19 DIAGNOSIS — Z95.5 PRESENCE OF CORONARY ANGIOPLASTY IMPLANT AND GRAFT: ICD-10-CM

## 2019-06-19 DIAGNOSIS — L97.419 NON-PRESSURE CHRONIC ULCER OF RIGHT HEEL AND MIDFOOT WITH UNSPECIFIED SEVERITY: ICD-10-CM

## 2019-06-19 DIAGNOSIS — R00.1 BRADYCARDIA, UNSPECIFIED: ICD-10-CM

## 2019-06-19 DIAGNOSIS — E22.0 ACROMEGALY AND PITUITARY GIGANTISM: ICD-10-CM

## 2019-06-19 DIAGNOSIS — L03.031 CELLULITIS OF RIGHT TOE: ICD-10-CM

## 2019-06-19 DIAGNOSIS — E11.22 TYPE 2 DIABETES MELLITUS WITH DIABETIC CHRONIC KIDNEY DISEASE: ICD-10-CM

## 2019-06-19 DIAGNOSIS — I25.10 ATHEROSCLEROTIC HEART DISEASE OF NATIVE CORONARY ARTERY WITHOUT ANGINA PECTORIS: ICD-10-CM

## 2019-06-19 DIAGNOSIS — E78.5 HYPERLIPIDEMIA, UNSPECIFIED: ICD-10-CM

## 2019-06-19 DIAGNOSIS — G47.33 OBSTRUCTIVE SLEEP APNEA (ADULT) (PEDIATRIC): ICD-10-CM

## 2019-06-19 DIAGNOSIS — Z99.2 DEPENDENCE ON RENAL DIALYSIS: ICD-10-CM

## 2019-06-19 DIAGNOSIS — I12.0 HYPERTENSIVE CHRONIC KIDNEY DISEASE WITH STAGE 5 CHRONIC KIDNEY DISEASE OR END STAGE RENAL DISEASE: ICD-10-CM

## 2019-06-19 DIAGNOSIS — Z79.82 LONG TERM (CURRENT) USE OF ASPIRIN: ICD-10-CM

## 2019-06-19 DIAGNOSIS — Z79.4 LONG TERM (CURRENT) USE OF INSULIN: ICD-10-CM

## 2019-06-19 DIAGNOSIS — N18.6 END STAGE RENAL DISEASE: ICD-10-CM

## 2019-06-19 DIAGNOSIS — D63.1 ANEMIA IN CHRONIC KIDNEY DISEASE: ICD-10-CM

## 2019-06-19 DIAGNOSIS — Z79.02 LONG TERM (CURRENT) USE OF ANTITHROMBOTICS/ANTIPLATELETS: ICD-10-CM

## 2019-06-19 DIAGNOSIS — E11.621 TYPE 2 DIABETES MELLITUS WITH FOOT ULCER: ICD-10-CM

## 2019-07-02 ENCOUNTER — APPOINTMENT (OUTPATIENT)
Dept: CARDIOLOGY | Facility: CLINIC | Age: 59
End: 2019-07-02
Payer: COMMERCIAL

## 2019-07-02 VITALS
SYSTOLIC BLOOD PRESSURE: 138 MMHG | DIASTOLIC BLOOD PRESSURE: 80 MMHG | WEIGHT: 240 LBS | BODY MASS INDEX: 29.84 KG/M2 | HEART RATE: 84 BPM | HEIGHT: 75 IN

## 2019-07-02 DIAGNOSIS — E78.00 PURE HYPERCHOLESTEROLEMIA, UNSPECIFIED: ICD-10-CM

## 2019-07-02 DIAGNOSIS — I25.10 ATHEROSCLEROTIC HEART DISEASE OF NATIVE CORONARY ARTERY W/OUT ANGINA PECTORIS: ICD-10-CM

## 2019-07-02 DIAGNOSIS — Z78.9 OTHER SPECIFIED HEALTH STATUS: ICD-10-CM

## 2019-07-02 DIAGNOSIS — I49.9 CARDIAC ARRHYTHMIA, UNSPECIFIED: ICD-10-CM

## 2019-07-02 PROCEDURE — 93000 ELECTROCARDIOGRAM COMPLETE: CPT

## 2019-07-02 PROCEDURE — 99214 OFFICE O/P EST MOD 30 MIN: CPT

## 2019-07-02 RX ORDER — ASPIRIN 81 MG/1
81 TABLET, COATED ORAL
Refills: 0 | Status: ACTIVE | COMMUNITY

## 2019-07-02 NOTE — HISTORY OF PRESENT ILLNESS
[FreeTextEntry1] :  pt is on hemodialysis for lst 9mmonths\par  3 weeks ago he was hospitalised for slow heartbbeat but no pacer was necessry\par  past hx cad and pci 10 yrs ago\par  meds reviewed

## 2019-07-02 NOTE — REASON FOR VISIT
[Follow-Up - Clinic] : a clinic follow-up of [Abnormal ECG] : an abnormal ECG [Coronary Artery Disease] : coronary artery disease [Hypertension] : hypertension

## 2019-07-02 NOTE — PHYSICAL EXAM
[General Appearance - Well Developed] : well developed [Normal Appearance] : normal appearance [Well Groomed] : well groomed [General Appearance - Well Nourished] : well nourished [No Deformities] : no deformities [General Appearance - In No Acute Distress] : no acute distress [Heart Rate And Rhythm] : heart rate and rhythm were normal [Murmurs] : no murmurs present [Arterial Pulses Normal] : the arterial pulses were normal [Edema] : no peripheral edema present [FreeTextEntry1] : occ extrasystoles

## 2019-07-02 NOTE — ASSESSMENT
[FreeTextEntry1] :  latha moore g shows nsr occ pvcs\par  heart rate 78 mnt\par  med srenewed \par  stop 325 asa and replace with 81 mg po daily

## 2019-07-09 ENCOUNTER — APPOINTMENT (OUTPATIENT)
Dept: CARDIOLOGY | Facility: CLINIC | Age: 59
End: 2019-07-09
Payer: COMMERCIAL

## 2019-07-09 PROCEDURE — 93000 ELECTROCARDIOGRAM COMPLETE: CPT

## 2019-07-09 PROCEDURE — 99215 OFFICE O/P EST HI 40 MIN: CPT

## 2019-07-18 ENCOUNTER — APPOINTMENT (OUTPATIENT)
Dept: CARDIOLOGY | Facility: CLINIC | Age: 59
End: 2019-07-18

## 2019-08-13 ENCOUNTER — APPOINTMENT (OUTPATIENT)
Dept: CARDIOLOGY | Facility: CLINIC | Age: 59
End: 2019-08-13
Payer: COMMERCIAL

## 2019-08-13 PROCEDURE — 93306 TTE W/DOPPLER COMPLETE: CPT

## 2019-08-22 NOTE — ED ADULT NURSE NOTE - NS ED NOTE ABUSE RESPONSE YN
Outpatient Physical Therapy Ortho Treatment Note  [facility]     Patient Name: Jeramy South  : 1951  MRN: [id number]  Today's Date: 2019      Visit Date: 2019      Subjective Improvement: 25%  Attendance:   (medicare)  Next MD Visit : 19  Recert Date:  19      Therapy Diagnosis:  S/P R RTC Repair 19      Visit Dx:    ICD-10-CM ICD-9-CM   1. Nontraumatic incomplete tear of right rotator cuff M75.111 726.13   2. Status post right rotator cuff repair Z98.890 V45.89       There is no problem list on file for this patient.       No past medical history on file.     No past surgical history on file.    PT Ortho     Row Name 19 0927       Precautions and Contraindications    Precautions/Limitations  fall precautions L ankle ORIF 2018; WBAT  -KH    Precautions  s/P R RTC Repair 19  -KH    Contraindications  5 weeks 2 days post-op  -       Posture/Observations    Posture/Observations Comments  sling without abd pillow  -    Row Name 19 1045       Precautions and Contraindications    Precautions/Limitations  fall precautions Watch pain on L ankle (ORIF 2018)   -    Precautions  s/P R RTC Repair 19  -KH    Contraindications  5 weeks post-op this date  -       Subjective Pain    Post-Treatment Pain Level  2  -       Posture/Observations    Posture/Observations Comments  sling without abd pillow  -KH      User Key  (r) = Recorded By, (t) = Taken By, (c) = Cosigned By    Initials Name Provider Type    Dariana Cedeño PTA Physical Therapy Assistant                      PT Assessment/Plan     Row Name 19 0927          PT Assessment    Assessment Comments  continues with good tolerance to treatment and at this time he has full PROM; No new changes to HEP this date  -        PT Plan    PT Frequency  2x/week  -BRISSA     Predicted Duration of Therapy Intervention (Therapy Eval)  8 weeks  -     PT Plan Comments  IR stretch next; Can begin AROM  next week and d/c sling  -KH       User Key  (r) = Recorded By, (t) = Taken By, (c) = Cosigned By    Initials Name Provider Type    Dariana Cedeño PTA Physical Therapy Assistant          Modalities     Row Name 08/22/19 0927             Subjective Pain    Post-Treatment Pain Level  2  -KH        User Key  (r) = Recorded By, (t) = Taken By, (c) = Cosigned By    Initials Name Provider Type    BRISSA Shi DarianaJOSE ray Physical Therapy Assistant        Exercises     Row Name 08/22/19 0927             Subjective Comments    Subjective Comments  Shoulder a little achy today but pain mostly in his ankle and back;   -KH         Subjective Pain    Able to rate subjective pain?  yes  -KH      Pre-Treatment Pain Level  2  -KH      Post-Treatment Pain Level  2  -KH         Exercise 1    Exercise Name 1  pro ll UE strength   -KH      Cueing 1  Verbal  -KH      Time 1  10'  -KH      Additional Comments  level 2; seat 10  -KH         Exercise 2    Exercise Name 2  seated wand flex,abd, ER  -KH      Cueing 2  Verbal  -KH      Sets 2  2  -KH      Reps 2  10 each  -KH         Exercise 3    Exercise Name 3  seated High/Mid Rows  -KH      Cueing 3  Verbal  -KH      Sets 3  2  -KH      Reps 3  10  -KH      Additional Comments  Red Tband  -KH         Exercise 4    Exercise Name 4  supine place & hold with perturbatitions  -KH      Time 4  2'  -KH         Exercise 5    Exercise Name 5  supine place and hold with cw/ccw  -KH      Cueing 5  Verbal  -KH      Sets 5  2  -KH      Reps 5  10  -KH         Exercise 6    Exercise Name 6  supine serratus punches  -KH      Cueing 6  Verbal  -KH      Sets 6  2  -KH      Reps 6  10  -KH         Exercise 7    Exercise Name 7  seated tband ER/IR  -KH      Cueing 7  Verbal  -KH      Sets 7  2  -KH      Reps 7  10  -KH      Additional Comments  REd  -KH         Exercise 8    Exercise Name 8  PROM to R shoulder   -KH      Time 8  10'  -KH        User Key  (r) = Recorded By, (t) = Taken By, (c) = Cosigned By     Initials Name Provider Type    Dariana Cedeño PTA Physical Therapy Assistant                       PT OP Goals     Row Name 08/22/19 0927          PT Short Term Goals    STG Date to Achieve  08/14/19  -     STG 1  Patient independent in home exercise program  -     STG 1 Progress  Ongoing;Progressing  -     STG 2   Patient have passive shoulder flexion 165 degrees  -     STG 2 Progress  Met  -     STG 3  Patient have shoulder passive range of motion abduction 160 degrees  -     STG 3 Progress  Met  -     STG 4  Patient have external rotation at 45 degrees abduction to 55 degrees or better  -     STG 4 Progress  Met  -     STG 5  Patient able to progress to active assistive program at 4 weeks  -     STG 5 Progress  Met  -     STG 6  Patient had a quick-score of 70% or less  -     STG 6 Progress  Met  -     STG 6 Progress Comments  25%  -        Long Term Goals    LTG 1  Patient have a quick-score of less than 30%  -     LTG 1 Progress  Met  -     LTG 2  Patient had manual muscle testing 5/5 shoulder flexion  -     LTG 2 Progress  Progressing  -     LTG 3  Patient have shoulder abduction 4+ MMT  -     LTG 3 Progress  Progressing  -     LTG 4  Patient have active range of motion 160 degrees flexion  -     LTG 4 Progress  Progressing  -     LTG 5  Patient have active shoulder flexion 160 degrees  -     LTG 5 Progress  Progressing  -     LTG 6  Patient have active internal rotation reach equal to the left  -     LTG 6 Progress  Progressing  -     LTG 7  Patient have active external rotation at 90 degrees abduction to 80 degrees or better  -     LTG 7 Progress  Progressing  -        Time Calculation    PT Goal Re-Cert Due Date  09/04/19  -       User Key  (r) = Recorded By, (t) = Taken By, (c) = Cosigned By    Initials Name Provider Type    Dariana Cedeño PTA Physical Therapy Assistant                         Time Calculation:   Start Time: 0927  Stop Time:  1005  Time Calculation (min): 38 min  Total Timed Code Minutes- PT: 38 minute(s)  Therapy Charges for Today     Code Description Service Date Service Provider Modifiers Qty    [id number] HC PT THER PROC EA 15 MIN 8/22/2019 Dariana Shi, PTA GP 3                    Dariana Shi, JOSE  8/22/2019      Yes

## 2019-10-08 ENCOUNTER — APPOINTMENT (OUTPATIENT)
Dept: CARDIOLOGY | Facility: CLINIC | Age: 59
End: 2019-10-08

## 2019-12-27 NOTE — ED PROVIDER NOTE - PROGRESS NOTE DETAILS
case d/w podiatry, pt well known to their service, they will see on consult. case d/w renal, Dr. Alejandra - aware pt being admitted and didn't have HD today. prelim duplex - chronic L popliteal and gastrocnemius DVT on L. no acute dvt. trop 0.12.  pt with no cp/sob.  during prior admission trop was 0.2-0.3.  cardiology  paged. 4 trop 0.12.  pt with no cp/sob.  during prior admission trop was 0.2-0.3.  cardiology  paged. pt denies any cp or sob.  NSR in 60's on monitor.

## 2020-01-07 ENCOUNTER — APPOINTMENT (OUTPATIENT)
Dept: CARDIOLOGY | Facility: CLINIC | Age: 60
End: 2020-01-07

## 2020-01-29 NOTE — DIETITIAN INITIAL EVALUATION ADULT. - DOB: +DATEOFBIRTH
Medication teaching and assessment/Wound care and assessment Statement Selected Medication teaching and assessment/Rehabilitation services/Teaching and training/Wound care and assessment

## 2020-01-30 ENCOUNTER — APPOINTMENT (OUTPATIENT)
Dept: GASTROENTEROLOGY | Facility: CLINIC | Age: 60
End: 2020-01-30
Payer: COMMERCIAL

## 2020-01-30 DIAGNOSIS — Z12.11 ENCOUNTER FOR SCREENING FOR MALIGNANT NEOPLASM OF COLON: ICD-10-CM

## 2020-01-30 DIAGNOSIS — Z78.9 OTHER SPECIFIED HEALTH STATUS: ICD-10-CM

## 2020-01-30 DIAGNOSIS — N20.0 CALCULUS OF KIDNEY: ICD-10-CM

## 2020-01-30 DIAGNOSIS — Z86.2 PERSONAL HISTORY OF DISEASES OF THE BLOOD AND BLOOD-FORMING ORGANS AND CERTAIN DISORDERS INVOLVING THE IMMUNE MECHANISM: ICD-10-CM

## 2020-01-30 DIAGNOSIS — Z80.0 FAMILY HISTORY OF MALIGNANT NEOPLASM OF DIGESTIVE ORGANS: ICD-10-CM

## 2020-01-30 DIAGNOSIS — Z92.89 PERSONAL HISTORY OF OTHER MEDICAL TREATMENT: ICD-10-CM

## 2020-01-30 DIAGNOSIS — Z87.448 PERSONAL HISTORY OF OTHER DISEASES OF URINARY SYSTEM: ICD-10-CM

## 2020-01-30 DIAGNOSIS — Z87.19 PERSONAL HISTORY OF OTHER DISEASES OF THE DIGESTIVE SYSTEM: ICD-10-CM

## 2020-01-30 DIAGNOSIS — Z86.69 PERSONAL HISTORY OF OTHER DISEASES OF THE NERVOUS SYSTEM AND SENSE ORGANS: ICD-10-CM

## 2020-01-30 DIAGNOSIS — R19.8 OTHER SPECIFIED SYMPTOMS AND SIGNS INVOLVING THE DIGESTIVE SYSTEM AND ABDOMEN: ICD-10-CM

## 2020-01-30 DIAGNOSIS — Z87.440 PERSONAL HISTORY OF URINARY (TRACT) INFECTIONS: ICD-10-CM

## 2020-01-30 DIAGNOSIS — Z87.442 PERSONAL HISTORY OF URINARY CALCULI: ICD-10-CM

## 2020-01-30 PROCEDURE — 99204 OFFICE O/P NEW MOD 45 MIN: CPT

## 2020-01-30 RX ORDER — FUROSEMIDE 40 MG/1
40 TABLET ORAL
Refills: 0 | Status: ACTIVE | COMMUNITY

## 2020-01-30 RX ORDER — ROSUVASTATIN CALCIUM 40 MG/1
40 TABLET, FILM COATED ORAL
Refills: 0 | Status: ACTIVE | COMMUNITY

## 2020-01-30 RX ORDER — FOLIC ACID 1 MG/1
1 TABLET ORAL
Refills: 0 | Status: ACTIVE | COMMUNITY

## 2020-01-30 RX ORDER — METOPROLOL TARTRATE 25 MG/1
25 TABLET, FILM COATED ORAL
Refills: 0 | Status: ACTIVE | COMMUNITY

## 2020-01-30 RX ORDER — POLYETHYLENE GLYCOL 3350, SODIUM SULFATE, SODIUM CHLORIDE, POTASSIUM CHLORIDE, ASCORBIC ACID, SODIUM ASCORBATE 7.5-2.691G
100 KIT ORAL
Qty: 1 | Refills: 0 | Status: ACTIVE | COMMUNITY
Start: 2020-01-30 | End: 1900-01-01

## 2020-01-30 RX ORDER — SEVELAMER CARBONATE 800 MG/1
800 TABLET, FILM COATED ORAL
Refills: 0 | Status: ACTIVE | COMMUNITY

## 2020-01-30 RX ORDER — OCTREOTIDE ACETATE 20 MG
20 KIT INTRAMUSCULAR
Refills: 0 | Status: ACTIVE | COMMUNITY

## 2020-01-30 RX ORDER — TAMSULOSIN HYDROCHLORIDE 0.4 MG/1
0.4 CAPSULE ORAL
Refills: 0 | Status: ACTIVE | COMMUNITY

## 2020-01-30 RX ORDER — ASPIRIN 81 MG
81 TABLET, DELAYED RELEASE (ENTERIC COATED) ORAL
Refills: 0 | Status: ACTIVE | COMMUNITY

## 2020-01-30 RX ORDER — CLOPIDOGREL BISULFATE 75 MG/1
75 TABLET, FILM COATED ORAL
Refills: 0 | Status: ACTIVE | COMMUNITY

## 2020-01-30 NOTE — ASSESSMENT
[FreeTextEntry1] : 59 year old male pt average risk for CRC, CAD s/p coronary stent  10 years ago, on ASA and plavix, acromegaly s/p pituitary adenoma resection currently on sandostatin, CKD on HD M/W/F comes in with 2 years hx of abdominal pain resolving after defecation associated with alternating diarrhea constipation, no blood in stools or weight loss.\par last colonoscopy over 5 years ago and no polyps were detected. \par Occasional acid reflux. \par \par Likely IBS/MC/CD\par Rec:\par Pt needs EGD/Colonoscopy with biopsies. \par Risk and benefits were discussed \par Imodium PRN for diarrhea\par Hold plavix 5 days PTP

## 2020-01-30 NOTE — HISTORY OF PRESENT ILLNESS
[de-identified] : 59 year old male pt average risk for CRC, CAD s/p coronary stent  10 years ago, on ASA and plavix, acromegaly s/p pituitary adenoma resection currently on sandostatin, CKD on HD M/W/F comes in with 2 years hx of abdominal pain resolving after defecation associated with alternating diarrhea constipation, no blood in stools or weight loss.\par last colonoscopy over 5 years ago and no polyps were detected. \par Occasional acid reflux.

## 2020-01-30 NOTE — PHYSICAL EXAM
[Sclera] : the sclera and conjunctiva were normal [General Appearance - Alert] : alert [Neck Appearance] : the appearance of the neck was normal [Heart Sounds] : normal S1 and S2 [Thyroid Diffuse Enlargement] : the thyroid was not enlarged [Abdomen Soft] : soft [Bowel Sounds] : normal bowel sounds [Abdomen Mass (___ Cm)] : no abdominal mass palpated [Abdomen Tenderness] : non-tender [] : no hepato-splenomegaly [No CVA Tenderness] : no ~M costovertebral angle tenderness [Skin Color & Pigmentation] : normal skin color and pigmentation [Sensation] : the sensory exam was normal to light touch and pinprick [Oriented To Time, Place, And Person] : oriented to person, place, and time

## 2020-02-07 NOTE — ED PROVIDER NOTE - NS ED ATTENDING STATEMENT MOD
----- Message from Alondra Perez MD sent at 2/6/2020  8:32 PM EST -----  My medical assistant will call patient with results      Her gastric emptying study showed very mildly delayed emptying of the stomach  She should continue to eat small and frequent meals as discussed during the office visit    She is scheduled for EGD/colonoscopy with me on 2/11 I have personally performed a face to face diagnostic evaluation on this patient. I have reviewed the ACP note and agree with the history, exam and plan of care, except as noted.

## 2020-03-13 NOTE — ASU PATIENT PROFILE, ADULT - FALLEN IN THE PAST
HPI:    67 year old male with BPE and dysuria treated with tamsulosin here for annual FU.  He continues to do well and has no bothersome urinary symptoms other than mild postvoid dribbling.  He has not had any recurrent dysuria.  No adverse effects from tamsulosin    EXAM:  Blood pressure (!) 152/88, pulse 60, weight 90.7 kg.  NAD  Non-labored breathing    UROLOGIC STUDIES:   AUASS  BPH SX's IN PAST MONTH 2/13/2019 3/13/2019   Incomplete Emptying (1-5) 2 1   Frequency (1-5) 4 3   Intermittency (1-5) 1 1   Urgency (1-5) 4 1   Stream (1-5) 2 1   Straining (1-5) 1 1   Nocturia (1-5) 2 1   Total Score 16 9   Quality of Life 4=MOSTLY DISSATISFIED 2=MOSTLY SATISFIED     BPH SX's IN PAST MONTH 3/13/2020   Incomplete Emptying (1-5) 0   Frequency (1-5) 3   Intermittency (1-5) 0   Urgency (1-5) 1   Stream (1-5) 1   Straining (1-5) 0   Nocturia (1-5) 1   Total Score 6   Quality of Life 2=MOSTLY SATISFIED     PSA, Total (ng/mL)   Date Value   10/04/2019 1.30   10/17/2018 1.77   10/20/2017 1.74   10/14/2016 1.19   10/01/2015 1.85          IMPRESSION:  BPE with LUTS    PLAN:  Excellent symptom control on alpha blockade.  Tamsulosin refill for 1 year.  He is welcome to follow up with his primary care physician for long-term maintenance of his medication.    10 minutes spent with the patient, over half in counseling.    Cj Lees MD  Urologic Surgery   no

## 2020-05-08 NOTE — PATIENT PROFILE ADULT - NSPROHMDIABETMGMTSTRAT_GEN_A_NUR
Patient very deconditioned/weak with +covid-19. Unable to assist with his transfers. CN, SW  informed of session/status.
blood glucose testing/insulin therapy

## 2020-07-17 NOTE — ANESTHESIA FOLLOW-UP NOTE - NSCONDITION_GEN_ALL_CORE
Occupational Therapy   Treatment    Name: Tomasz Chavez  MRN: [id number]  Admitting Diagnosis:  Symptomatic bradycardia       Recommendations:     Discharge Recommendations: nursing facility, skilled  Discharge Equipment Recommendations:  (TBD next level of care)  Barriers to discharge:  None    Assessment:     Tomasz Chavez is a 83 y.o. male with a medical diagnosis of Symptomatic bradycardia.  Pt agreeable to OT therapy session this AM. Performance deficits affecting function are weakness, impaired endurance, impaired self care skills, impaired functional mobilty, gait instability, impaired balance, impaired cognition, decreased safety awareness.     Rehab Prognosis:  Fair; patient would benefit from acute skilled OT services to address these deficits and reach maximum level of function.       Plan:     Patient to be seen 5 x/week to address the above listed problems via self-care/home management, therapeutic activities, therapeutic exercises  · Plan of Care Expires: 08/15/20  · Plan of Care Reviewed with: patient    Subjective     Pain/Comfort:  · Pain Rating 1: 0/10    Objective:     Communicated with: nursing prior to session.  Patient found HOB elevated with telemetry, bed alarm upon OT entry to room.    General Precautions: Standard, fall   Orthopedic Precautions:N/A   Braces: N/A     Occupational Performance:     Bed Mobility:    · Patient completed Scooting/Bridging with stand by assistance  · Patient completed Supine to Sit with stand by assistance  · Patient completed Sit to Supine with stand by assistance     Functional Mobility/Transfers:  · Patient completed Sit <> Stand Transfer with contact guard assistance  with  no assistive device   · Functional Mobility: pt amb around room with CGA/HHA with no LOB/SOB    Activities of Daily Living:  · Grooming: contact guard assistance standing at sink to wash face and hands; Pt required cues during task for each step of task  · Toileting: contact guard  Stable assistance standing at toilet to urinate and for clothing management     Treatment & Education:  Pt educated on use of call bell and safety during ADLs and transfers     Patient left HOB elevated with all lines intact, call button in reach and bed alarm onEducation:      GOALS:   Multidisciplinary Problems     Occupational Therapy Goals        Problem: Occupational Therapy Goal    Goal Priority Disciplines Outcome Interventions   Occupational Therapy Goal     OT, PT/OT Ongoing, Progressing    Description: Goals to be met by: discharge    Patient will increase functional independence with ADLs by performing:    LE Dressing with Stand-by Assistance.  Grooming while standing at sink with Stand-by Assistance.  Toileting from toilet with Stand-by Assistance for hygiene and clothing management.   Toilet transfer to toilet with Stand-by Assistance.                     Time Tracking:     OT Date of Treatment: 07/17/20  OT Start Time: 1055  OT Stop Time: 1118  OT Total Time (min): 23 min    Billable Minutes:Self Care/Home Management 13  Therapeutic Activity 10    Rashmi Huff OT  7/17/2020

## 2020-08-23 NOTE — ED PROVIDER NOTE - CLINICAL SUMMARY MEDICAL DECISION MAKING FREE TEXT BOX
58yM ESRD on HD p/w symptomatic bradycardia at HD today.  EKG now NSR w/o arrhythmia or ischemia.  Labs w/ CKD, troponinemia similar compared to prior.  Will adm for tele monitoring, cardiology consult.
23-Aug-2020 15:55

## 2020-09-18 NOTE — ASU PREOP CHECKLIST - BP NONINVASIVE DIASTOLIC (MM HG)
"SUBJECTIVE:   Lauro Pritchard is a 77 year old male who presents for Preventive Visit.      Patient has been advised of split billing requirements and indicates understanding: Yes   Are you in the first 12 months of your Medicare coverage?  No    Healthy Habits:     In general, how would you rate your overall health?  Good    Frequency of exercise:  2-3 days/week    Duration of exercise:  N/A    Do you usually eat at least 4 servings of fruit and vegetables a day, include whole grains    & fiber and avoid regularly eating high fat or \"junk\" foods?  No    Taking medications regularly:  Yes    Barriers to taking medications:  None    Medication side effects:  None    Ability to successfully perform activities of daily living:  No assistance needed    Home Safety:  No safety concerns identified    Hearing Impairment:  No hearing concerns    In the past 6 months, have you been bothered by leaking of urine?  No    In general, how would you rate your overall mental or emotional health?  Good      PHQ-2 Total Score: 0    Do you feel safe in your environment? Yes    Have you ever done Advance Care Planning? (For example, a Health Directive, POLST, or a discussion with a medical provider or your loved ones about your wishes): Yes, advance care planning is on file.      Fall risk  Fallen 2 or more times in the past year?: No  Any fall with injury in the past year?: No    Cognitive Screening   1) Repeat 3 items (Leader, Season, Table)    2) Clock draw: NORMAL  3) 3 item recall: Recalls 3 objects  Results: 3 items recalled: COGNITIVE IMPAIRMENT LESS LIKELY    Mini-CogTM Copyright AMIRAH Mina. Licensed by the author for use in [facility]; reprinted with permission (juan alberto@.[facility]). All rights reserved.      Do you have sleep apnea, excessive snoring or daytime drowsiness?: no    Reviewed and updated as needed this visit by clinical staff  Tobacco  Allergies  Meds  Problems  Med Hx  Surg Hx  Fam Hx         Reviewed " and updated as needed this visit by Provider  Tobacco  Allergies  Meds  Problems  Med Hx  Surg Hx  Fam Hx        Social History     Tobacco Use     Smoking status: Former Smoker     Packs/day: 0.50     Years: 20.00     Pack years: 10.00     Types: Cigarettes     Last attempt to quit: 7/10/1985     Years since quittin.2     Smokeless tobacco: Never Used   Substance Use Topics     Alcohol use: No     Alcohol/week: 11.7 standard drinks     Frequency: Never     If you drink alcohol do you typically have >3 drinks per day or >7 drinks per week? No    Alcohol Use 2020   Prescreen: >3 drinks/day or >7 drinks/week? -   Prescreen: >3 drinks/day or >7 drinks/week? No           Would like lesions on back looked at.     Current providers sharing in care for this patient include:   Patient Care Team:  Marco A Hampton MD as PCP - General (Internal Medicine)  Marco A Hampton MD as Assigned PCP    The following health maintenance items are reviewed in Epic and correct as of today:  Health Maintenance   Topic Date Due     ZOSTER IMMUNIZATION (2 of 3) 2016     FALL RISK ASSESSMENT  2020     LIPID  2020     MEDICARE ANNUAL WELLNESS VISIT  2021     ADVANCE CARE PLANNING  04/15/2024     DTAP/TDAP/TD IMMUNIZATION (2 - Td) 2024     COLORECTAL CANCER SCREENING  2028     PHQ-2  Completed     PNEUMOCOCCAL IMMUNIZATION 65+ LOW/MEDIUM RISK  Completed     AORTIC ANEURYSM SCREENING (SYSTEM ASSIGNED)  Completed     HEPATITIS C SCREENING  Addressed     IPV IMMUNIZATION  Aged Out     MENINGITIS IMMUNIZATION  Aged Out     HEPATITIS B IMMUNIZATION  Aged Out     INFLUENZA VACCINE  Discontinued     Lab work is in process      Review of Systems   Constitutional: Negative for chills and fever.   HENT: Negative for congestion, ear pain, hearing loss and sore throat.    Eyes: Negative for pain and visual disturbance.   Respiratory: Negative for cough and shortness of breath.    Cardiovascular: Negative  for chest pain, palpitations and peripheral edema.   Gastrointestinal: Negative for abdominal pain, constipation, diarrhea, heartburn, hematochezia and nausea.   Genitourinary: Negative for discharge, dysuria, frequency, genital sores, hematuria, impotence and urgency.   Musculoskeletal: Positive for arthralgias and myalgias. Negative for joint swelling.   Skin: Negative for rash.   Neurological: Negative for dizziness, weakness, headaches and paresthesias.   Psychiatric/Behavioral: Negative for mood changes. The patient is not nervous/anxious.          OBJECTIVE:   /70   Pulse 64   Temp 97  F (36.1  C)   Resp 12   Ht 1.829 m (6')   Wt 85.3 kg (188 lb)   SpO2 97%   BMI 25.50 kg/m   Estimated body mass index is 25.5 kg/m  as calculated from the following:    Height as of this encounter: 1.829 m (6').    Weight as of this encounter: 85.3 kg (188 lb).  Physical Exam  GENERAL: healthy, alert and no distress  EYES: Eyes grossly normal to inspection, PERRL and conjunctivae and sclerae normal  HENT: ear canals and TM's normal, nose and mouth without ulcers or lesions  NECK: no adenopathy, no asymmetry, masses, or scars and thyroid normal to palpation  RESP: lungs clear to auscultation - no rales, rhonchi or wheezes  CV: regular rate and rhythm, normal S1 S2, no S3 or S4, no murmur, click or rub, no peripheral edema and peripheral pulses strong  ABDOMEN: soft, nontender, no hepatosplenomegaly, no masses and bowel sounds normal  MS: no gross musculoskeletal defects noted, no edema  SKIN: no suspicious lesions or rashes  NEURO: Normal strength and tone, mentation intact and speech normal  PSYCH: mentation appears normal, affect normal/bright        ASSESSMENT / PLAN:       ICD-10-CM    1. Routine general medical examination at a health care facility  Z00.00 PSA, screen     **Comprehensive metabolic panel FUTURE anytime   2. Anxiety  F41.9    3. History of prediabetes  Z87.898 **Comprehensive metabolic panel  FUTURE anytime   4. Mixed hyperlipidemia  E78.2 Lipid panel reflex to direct LDL Fasting   5. Encounter for Medicare annual wellness exam  Z00.00    6. Encounter for screening for malignant neoplasm of prostate   Z12.5 PSA, screen   7. Seborrheic keratosis  L82.1      Discouraged PSA screening based on age and normal history and symptoms. He will read up on, but would like to continue this year. Has h/o abnormal blood sugar and is fasting will complete this and lipid today for update. Has questions on seborrheic keratoses, reassured.  Mood doing well, though wife dealing with medical issues so can get stressful at times. Moved here to be closer to family.    Patient has been advised of split billing requirements and indicates understanding: Yes  COUNSELING:  Reviewed preventive health counseling, as reflected in patient instructions       Regular exercise       Healthy diet/nutrition       Prostate cancer screening    Estimated body mass index is 25.5 kg/m  as calculated from the following:    Height as of this encounter: 1.829 m (6').    Weight as of this encounter: 85.3 kg (188 lb).    Weight management plan: Discussed healthy diet and exercise guidelines    He reports that he quit smoking about 35 years ago. His smoking use included cigarettes. He has a 10.00 pack-year smoking history. He has never used smokeless tobacco.      Appropriate preventive services were discussed with this patient, including applicable screening as appropriate for cardiovascular disease, diabetes, osteopenia/osteoporosis, and glaucoma.  As appropriate for age/gender, discussed screening for colorectal cancer, prostate cancer, breast cancer, and cervical cancer. Checklist reviewing preventive services available has been given to the patient.    Reviewed patients plan of care and provided an AVS. The Basic Care Plan (routine screening as documented in Health Maintenance) for Lauro meets the Care Plan requirement. This Care Plan has been  established and reviewed with the Patient.    Counseling Resources:  ATP IV Guidelines  Pooled Cohorts Equation Calculator  Breast Cancer Risk Calculator  Breast Cancer: Medication to Reduce Risk  FRAX Risk Assessment  ICSI Preventive Guidelines  Dietary Guidelines for Americans, 2010  USDA's MyPlate  ASA Prophylaxis  Lung CA Screening    Emmanuelle Sylvester MD, MD  [facility]    Identified Health Risks:  Answers for HPI/ROS submitted by the patient on 9/22/2020   Annual Exam:  JOSE LUIS 7 TOTAL SCORE: 0     53

## 2020-09-29 NOTE — ED PROVIDER NOTE - PHYSICAL EXAMINATION
Digital Medicine: Health  Follow-Up    The history is provided by the patient.             Reason for review: Blood glucose at goal and Blood pressure not at goal        Topics Covered on Call: physical activity and Diet    Additional Follow-up details: Brief follow-up. Patient was on her way to get lab work done. Believes her BP readings have been a little elevated due to stress. Stated she is starting to get back to her regular routine and will take a reading tonight. She is happy with her BG readings.          Diet-no change to diet    No change to diet.  Patient reports eating or drinking the following: Patient stated she has continued to make small cutbacks and changes.      Physical Activity-no change to routine  No change to exercise routine.     Medication Adherence-Medication Adherence not addressed.      Substance, Sleep, Stress-Not assessed    Plan      Topic    Urine Protein Check     Hemoglobin A1C        Last 5 Patient Entered Readings                                      Current 30 Day Average: 157/71     Recent Readings 9/25/2020 9/22/2020 9/22/2020 9/20/2020 9/20/2020    SBP (mmHg) 151 153 126 168 162    DBP (mmHg) 75 67 52 81 79    Pulse 69 63 59 65 63        Last 6 Patient Entered Readings                                          Most Recent A1c: 6.9% on 3/9/2020  (Goal: 7%)     Recent Readings 9/27/2020 9/25/2020 9/22/2020 9/19/2020 9/19/2020    Blood Glucose (mg/dL) 163 156 141 150 149              Physical Examination:   VITAL SIGNS: I have reviewed vital signs.  CONSTITUTIONAL: well appearing, NAD.   SKIN: Skin exam is warm and dry.  + skin avulsions small on b/l dorsal aspect of 2nd and 3rd toe.  No surrounding erythema of evidence of infection.  + skin avulsion on L heel - also no evidence of erythema or infection at this time.   HEAD: Normocephalic; atraumatic.  EYES: PERRL, EOM intact; conjunctiva and sclera clear.  ENT: No nasal discharge; airway clear.  CARD: S1, S2 reg  RESP: CTAB. No wheezes, rales or rhonchi.  ABD: soft; non-distended; non-tender  EXT: Normal ROM. b/l LE edema, 2+ pitting.   NEURO: Alert, oriented. Grossly unremarkable. No focal deficits.  PSYCH: Cooperative, appropriate.

## 2020-10-27 NOTE — PRE-ANESTHESIA EVALUATION ADULT - LAST ECHOCARDIOGRAM
9/19/18 Cyclophosphamide Counseling:  I discussed with the patient the risks of cyclophosphamide including but not limited to hair loss, hormonal abnormalities, decreased fertility, abdominal pain, diarrhea, nausea and vomiting, bone marrow suppression and infection. The patient understands that monitoring is required while taking this medication.

## 2021-02-03 ENCOUNTER — EMERGENCY (EMERGENCY)
Facility: HOSPITAL | Age: 61
LOS: 0 days | Discharge: HOME | End: 2021-02-04
Attending: EMERGENCY MEDICINE | Admitting: EMERGENCY MEDICINE
Payer: MEDICARE

## 2021-02-03 VITALS
DIASTOLIC BLOOD PRESSURE: 67 MMHG | HEART RATE: 76 BPM | TEMPERATURE: 98 F | SYSTOLIC BLOOD PRESSURE: 195 MMHG | RESPIRATION RATE: 18 BRPM | OXYGEN SATURATION: 99 % | HEIGHT: 76 IN

## 2021-02-03 DIAGNOSIS — R04.0 EPISTAXIS: ICD-10-CM

## 2021-02-03 DIAGNOSIS — Z79.899 OTHER LONG TERM (CURRENT) DRUG THERAPY: ICD-10-CM

## 2021-02-03 DIAGNOSIS — Z98.890 OTHER SPECIFIED POSTPROCEDURAL STATES: Chronic | ICD-10-CM

## 2021-02-03 DIAGNOSIS — Z87.898 PERSONAL HISTORY OF OTHER SPECIFIED CONDITIONS: Chronic | ICD-10-CM

## 2021-02-03 DIAGNOSIS — T82.510A BREAKDOWN (MECHANICAL) OF SURGICALLY CREATED ARTERIOVENOUS FISTULA, INITIAL ENCOUNTER: Chronic | ICD-10-CM

## 2021-02-03 DIAGNOSIS — I10 ESSENTIAL (PRIMARY) HYPERTENSION: ICD-10-CM

## 2021-02-03 DIAGNOSIS — Z88.1 ALLERGY STATUS TO OTHER ANTIBIOTIC AGENTS STATUS: ICD-10-CM

## 2021-02-03 PROCEDURE — 99284 EMERGENCY DEPT VISIT MOD MDM: CPT | Mod: 25,GC

## 2021-02-03 PROCEDURE — 30901 CONTROL OF NOSEBLEED: CPT | Mod: GC

## 2021-02-03 RX ADMIN — Medication 1 APPLICATION(S): at 21:50

## 2021-02-03 NOTE — ED PROVIDER NOTE - PHYSICAL EXAMINATION
CONSTITUTIONAL: Well-developed; well-nourished; in no acute distress.   SKIN: warm, dry  HEAD: Normocephalic; atraumatic.  EYES: PERRL, EOMI, no conjunctival erythema  ENT: Clots expelled from left nostril after blowing nose. No active bleeding vessels visualized in the anterior nose.   NECK: Supple; non tender.  CARD: S1, S2 normal; no murmurs, gallops, or rubs. Regular rate and rhythm.   RESP: No wheezes, rales or rhonchi.  ABD: soft ntnd  EXT: Normal ROM.  No clubbing, cyanosis or edema.   LYMPH: No acute cervical adenopathy.  NEURO: Alert, oriented, grossly unremarkable  PSYCH: Cooperative, appropriate.

## 2021-02-03 NOTE — ED PROVIDER NOTE - CLINICAL SUMMARY MEDICAL DECISION MAKING FREE TEXT BOX
Pt on antiplatelets, presented for management of left nare epistaxis. Required chemical cautery with silver nitrate. Bleeding stopped. Pt d/donna with outpt management.   ED evaluation and management discussed with the patient and family (if available) in detail.  Close PMD follow up encouraged.  Strict ED return instructions discussed in detail and patient given the opportunity to ask any questions about their discharge diagnosis and instructions. Patient verbalized understanding.

## 2021-02-03 NOTE — ED PROVIDER NOTE - OBJECTIVE STATEMENT
60 y m, pmh of htn pw nosebleed. Bleeding started this morning, coming out of his left nostril. Pt. endorsed copious bleeding soaking through 30 paper towels. Pt. has an undiagnosed ENT problem from [place] and endorses blowing his nose a lot. However, denies coagulopathy/bleeding disorder, picking nose, cocaine use. Denies f/c, cp, sob, n/v/d.

## 2021-02-03 NOTE — ED PROVIDER NOTE - PROGRESS NOTE DETAILS
Pt. reassessed. Bleeding has stopped. No active bleeding from both nostrils. No blood seen in the oropharynx. Pt. still endorsing feeling he is swallowing small amount of blood. [place]: Pt reports that he is still feeling blood trickling fown his throat. On evaluation, no blood in seen in the oropharynx. He is aggressively blowing his nose. Rhino rocket attempted and pt did not tolerate it due to discomfort. His nose was packed with a TXA soaked gauze and pt removed it. Plan is to check H/H and dispo accordingly.

## 2021-02-03 NOTE — ED ADULT NURSE NOTE - NSIMPLEMENTINTERV_GEN_ALL_ED
Implemented All Universal Safety Interventions:  Portal to call system. Call bell, personal items and telephone within reach. Instruct patient to call for assistance. Room bathroom lighting operational. Non-slip footwear when patient is off stretcher. Physically safe environment: no spills, clutter or unnecessary equipment. Stretcher in lowest position, wheels locked, appropriate side rails in place.

## 2021-02-03 NOTE — ED PROVIDER NOTE - PATIENT PORTAL LINK FT
You can access the FollowMyHealth Patient Portal offered by [facility] by registering at the following website: [email or web address]. By joining [employer]’s FollowMyHealth portal, you will also be able to view your health information using other applications (apps) compatible with our system.

## 2021-02-03 NOTE — ED ADULT NURSE NOTE - OBJECTIVE STATEMENT
pt states he had nosebleed earlier today and a second one about an hour ago.  States he also sees blood clots when he blows his nose.  Denies trauma

## 2021-02-03 NOTE — ED PROVIDER NOTE - NS ED ROS FT
Eyes:  No visual changes, eye pain or discharge.  ENMT:  Endorses epistaxis from the left nostril.   Cardiac:  No chest pain, SOB or edema. No chest pain with exertion.  Respiratory:  No cough or respiratory distress. No hemoptysis. No history of asthma or RAD.  GI:  No nausea, vomiting, diarrhea or abdominal pain.  :  No dysuria, frequency or burning.  MS:  No myalgia, muscle weakness, joint pain or back pain.  Neuro:  No headache or weakness.  No LOC.  Skin:  No skin rash.   Endocrine: No history of thyroid disease or diabetes.

## 2021-02-03 NOTE — ED PROVIDER NOTE - ATTENDING CONTRIBUTION TO CARE
I personally evaluated the patient. I reviewed the Resident’s or Physician Assistant’s note (as assigned above), and agree with the findings and plan except as documented in my note.  60 M with hx of chronic sinusitis, ESRD on HD Mon and Fri, last one was 5 days ago, CAD with stents on Plavix with complaints of 1 day of left nare epistaxis. No known inciting factors. He has a hx of occasional nose bleeds. Denies CP, SOB, lightheadedness or LOC. VS reviewed, pt non-toxic appearing, NAD. Head ncat, left nare with some dried blood, no active bleeding, MMM, pharyngeal exam w/o active bleeding, erythema, edema or exudates, B/l TM wnl. neck supple, normal ROM, normal s1s2 without any murmurs, Lungs CTAB with normal work of breathing. abd +BS, s/nd/nt, extremities wnl, neuro exam grossly normal. No acute skin rashes. Plan is cautery of vessels with silver nitrate and reassess.

## 2021-02-03 NOTE — ED PROVIDER NOTE - CARE PROVIDER_API CALL
Rasta Garcia)  Otolaryngology  [address] [place] [address], [address] [place] [address]  Phone: [phone]  Fax: [phone]  Follow Up Time:

## 2021-02-03 NOTE — ED ADULT NURSE NOTE - PMH
Acromegaly    CAD (coronary atherosclerotic disease)    DM (diabetes mellitus), type 2    Dyslipidemia    ESRD on dialysis    HTN (hypertension)

## 2021-02-04 VITALS
DIASTOLIC BLOOD PRESSURE: 66 MMHG | HEART RATE: 71 BPM | OXYGEN SATURATION: 99 % | RESPIRATION RATE: 18 BRPM | SYSTOLIC BLOOD PRESSURE: 142 MMHG | TEMPERATURE: 98 F

## 2021-02-04 LAB
APTT BLD: 36.2 SEC — SIGNIFICANT CHANGE UP (ref 27–39.2)
BASE EXCESS BLDV CALC-SCNC: -4.3 MMOL/L — LOW (ref -2–2)
CA-I SERPL-SCNC: 1.18 MMOL/L — SIGNIFICANT CHANGE UP (ref 1.12–1.3)
GAS PNL BLDV: 140 MMOL/L — SIGNIFICANT CHANGE UP (ref 136–145)
GAS PNL BLDV: SIGNIFICANT CHANGE UP
HCO3 BLDV-SCNC: 22 MMOL/L — SIGNIFICANT CHANGE UP (ref 22–29)
HCT VFR BLD CALC: 29.3 % — LOW (ref 42–52)
HCT VFR BLDA CALC: 27.9 % — LOW (ref 34–44)
HGB BLD CALC-MCNC: 9.1 G/DL — LOW (ref 14–18)
HGB BLD-MCNC: 9.3 G/DL — LOW (ref 14–18)
INR BLD: 1.04 RATIO — SIGNIFICANT CHANGE UP (ref 0.65–1.3)
LACTATE BLDV-MCNC: 0.6 MMOL/L — SIGNIFICANT CHANGE UP (ref 0.5–1.6)
MCHC RBC-ENTMCNC: 31.3 PG — HIGH (ref 27–31)
MCHC RBC-ENTMCNC: 31.7 G/DL — LOW (ref 32–37)
MCV RBC AUTO: 98.7 FL — HIGH (ref 80–94)
NRBC # BLD: 0 /100 WBCS — SIGNIFICANT CHANGE UP (ref 0–0)
PCO2 BLDV: 44 MMHG — SIGNIFICANT CHANGE UP (ref 41–51)
PH BLDV: 7.31 — SIGNIFICANT CHANGE UP (ref 7.26–7.43)
PLATELET # BLD AUTO: 197 K/UL — SIGNIFICANT CHANGE UP (ref 130–400)
PO2 BLDV: 41 MMHG — HIGH (ref 20–40)
POTASSIUM BLDV-SCNC: 5.1 MMOL/L — SIGNIFICANT CHANGE UP (ref 3.3–5.6)
PROTHROM AB SERPL-ACNC: 12 SEC — SIGNIFICANT CHANGE UP (ref 9.95–12.87)
RBC # BLD: 2.97 M/UL — LOW (ref 4.7–6.1)
RBC # FLD: 13.2 % — SIGNIFICANT CHANGE UP (ref 11.5–14.5)
SAO2 % BLDV: 70 % — SIGNIFICANT CHANGE UP
WBC # BLD: 6.69 K/UL — SIGNIFICANT CHANGE UP (ref 4.8–10.8)
WBC # FLD AUTO: 6.69 K/UL — SIGNIFICANT CHANGE UP (ref 4.8–10.8)

## 2021-05-21 NOTE — PATIENT PROFILE ADULT - HAVE YOU EXPERIENCED VIOLENCE OR A TRAUMATIC EVENT?
Ok to Rx albuterol inhaler 2 puff every 4 hours as needed for shortness of breath/wheezing.  Dx SOB.  I cannot see the notes yet from [facility].   no

## 2021-09-16 NOTE — ED ADULT NURSE NOTE - NS ED NOTE  TALK SOMEONE YN
Aneurysm was stable/mild on CTA  Ok to finish Levaquin Rx as prescribed
Danielle Garber MD 3 minutes ago (9:12 AM)     Dr Duran Carlson put me on Levofloxacin for pneumonia due to my allergy to penicillin.   Her NP Baldemar Durbin wanted me to let you know in case you want to monitor that medication with my aortic aneurysm
[place] message sent to pt
No

## 2021-10-04 NOTE — ED PROVIDER NOTE - NS_EDPROVIDERDISPOUSERTYPE_ED_A_ED
Impression: Dermatochalasis of left upper eyelid: H02.834. Plan: Refer to Dr Simon Owens. Attending Attestation (For Attendings USE Only)...

## 2021-11-30 ENCOUNTER — APPOINTMENT (OUTPATIENT)
Dept: VASCULAR SURGERY | Facility: CLINIC | Age: 61
End: 2021-11-30
Payer: MEDICARE

## 2021-11-30 DIAGNOSIS — T82.858S STENOSIS OF OTHER VASCULAR PROSTHETIC, SEQUELA: ICD-10-CM

## 2021-11-30 DIAGNOSIS — I70.245 ATHEROSCLEROSIS OF NATIVE ARTERIES OF LEFT LEG WITH ULCERATION OF OTHER PART OF FOOT: ICD-10-CM

## 2021-11-30 PROCEDURE — 93990 DOPPLER FLOW TESTING: CPT

## 2021-11-30 PROCEDURE — 99214 OFFICE O/P EST MOD 30 MIN: CPT

## 2021-12-02 ENCOUNTER — OUTPATIENT (OUTPATIENT)
Dept: OUTPATIENT SERVICES | Facility: HOSPITAL | Age: 61
LOS: 1 days | Discharge: HOME | End: 2021-12-02
Payer: MEDICARE

## 2021-12-02 VITALS
SYSTOLIC BLOOD PRESSURE: 140 MMHG | DIASTOLIC BLOOD PRESSURE: 63 MMHG | HEIGHT: 75 IN | WEIGHT: 240.08 LBS | RESPIRATION RATE: 20 BRPM | HEART RATE: 75 BPM | OXYGEN SATURATION: 97 % | TEMPERATURE: 99 F

## 2021-12-02 DIAGNOSIS — T82.510A BREAKDOWN (MECHANICAL) OF SURGICALLY CREATED ARTERIOVENOUS FISTULA, INITIAL ENCOUNTER: Chronic | ICD-10-CM

## 2021-12-02 DIAGNOSIS — Z98.890 OTHER SPECIFIED POSTPROCEDURAL STATES: Chronic | ICD-10-CM

## 2021-12-02 DIAGNOSIS — N18.6 END STAGE RENAL DISEASE: ICD-10-CM

## 2021-12-02 DIAGNOSIS — Z87.898 PERSONAL HISTORY OF OTHER SPECIFIED CONDITIONS: Chronic | ICD-10-CM

## 2021-12-02 DIAGNOSIS — Z01.818 ENCOUNTER FOR OTHER PREPROCEDURAL EXAMINATION: ICD-10-CM

## 2021-12-02 PROCEDURE — 93010 ELECTROCARDIOGRAM REPORT: CPT

## 2021-12-02 RX ORDER — OCTREOTIDE ACETATE 200 UG/ML
1 INJECTION, SOLUTION INTRAVENOUS; SUBCUTANEOUS
Qty: 0 | Refills: 0 | DISCHARGE

## 2021-12-02 RX ORDER — FERROUS SULFATE 325(65) MG
0 TABLET ORAL
Qty: 0 | Refills: 0 | DISCHARGE

## 2021-12-02 RX ORDER — DOCUSATE SODIUM 100 MG
1 CAPSULE ORAL
Qty: 0 | Refills: 0 | DISCHARGE

## 2021-12-02 NOTE — H&P PST ADULT - REASON FOR ADMISSION
Patient is a  61 year old  male presenting to PAST in preparation for  LEFT ARM FISTULAGRAM, POSSIBLE ENDOVASCULAR REVASCULARIZATION on  12/15/2021  under [facility]sb anesthesia by Dr. hampton Patient is a  61 year old  male presenting to PAST in preparation for right ARM FISTULAGRAM, POSSIBLE ENDOVASCULAR REVASCULARIZATION on  12/15/2021  under [facility] anesthesia by Dr. hampton

## 2021-12-02 NOTE — H&P PST ADULT - NSICDXPASTMEDICALHX_GEN_ALL_CORE_FT
PAST MEDICAL HISTORY:  Acromegaly     CAD (coronary atherosclerotic disease)     DM (diabetes mellitus), type 2     DVT (deep venous thrombosis) left leg w stent    Dyslipidemia     ESRD on dialysis     HTN (hypertension)

## 2021-12-02 NOTE — H&P PST ADULT - HISTORY OF PRESENT ILLNESS
right arm fistual with flow issues x 3 months now for planed procedure    PATIENT CURRENTLY DENIES CHEST PAIN  SHORTNESS OF BREATH  PALPITATIONS,  DYSURIA, OR UPPER RESPIRATORY INFECTION IN PAST 2 WEEKS  EXERCISE  TOLERANCE  1-2 FLIGHT OF STAIRS  WITHOUT SHORTNESS OF BREATH  denies travel outside the [place] in the past 30 days  Patient denies any signs or symptoms of COVID 19 and denies contact with known positive individuals.  They have an appointment for repeat COVID testing pre-procedure and acknowledge its time and place.  They were instructed to quarantine pre-procedure, practice exposure control measures, continue to self-monitor and report any concerns to their proceduralist.  pt advised self quarantine till day of procedure  Anesthesia Alert  Difficult Airway iv  NO--History of neck surgery or radiation  NO--Limited ROM of neck  NO--History of Malignant hyperthermia  NO--No personal or family history of Pseudocholinesterase deficiency.  NO--Prior Anesthesia Complication  NO--Latex Allergy  NO--Loose teeth  NO--History of Rheumatoid Arthritis  NO--Bleeding risk   JABARI pt is high risk- awaiting testing   right arm precautions     PT DENIES ANY RASHES, ABRASION, OR OPEN WOUNDS OR CUTS    AS PER THE PT, THIS IS HIS/HER COMPLETE MEDICAL AND SURGICAL HX, INCLUDING MEDICATIONS PRESCRIBED AND OVER THE COUNTER    Patient verbalized understanding of instructions and was given the opportunity to ask questions and have them answered.    pt denies any suicidal ideation or thoughts, pt states not a threat to self or others    N18.6 56820    End-stage renal disease    Encounter for other preprocedural examination    ^N18.6 81330    Family history of myocardial infarction (Father)    End-stage renal disease    Encounter for other preprocedural examination    CKD (chronic kidney disease), stage V    Acromegaly    CAD (coronary atherosclerotic disease)    HTN (hypertension)    Neuropathy    DM (diabetes mellitus), type 2    Dyslipidemia    ESRD on dialysis    H/O: pituitary tumor    H/O eye surgery    Breakdown of surgically created AV fistula, init    SysAdmin_VstLnk    dialysis monday friday [facility]      right arm fistual with flow issues x 3 months now for planed procedure    pt with right arm fistula- CN and or booking have left- advised surgery to make corrections     PATIENT CURRENTLY DENIES CHEST PAIN  SHORTNESS OF BREATH  PALPITATIONS,  DYSURIA, OR UPPER RESPIRATORY INFECTION IN PAST 2 WEEKS  EXERCISE  TOLERANCE  1-2 FLIGHT OF STAIRS  WITHOUT SHORTNESS OF BREATH  denies travel outside the [place] in the past 30 days  Patient denies any signs or symptoms of COVID 19 and denies contact with known positive individuals.  They have an appointment for repeat COVID testing pre-procedure and acknowledge its time and place.  They were instructed to quarantine pre-procedure, practice exposure control measures, continue to self-monitor and report any concerns to their proceduralist.  pt advised self quarantine till day of procedure  Anesthesia Alert  Difficult Airway iv  NO--History of neck surgery or radiation  NO--Limited ROM of neck  NO--History of Malignant hyperthermia  NO--No personal or family history of Pseudocholinesterase deficiency.  NO--Prior Anesthesia Complication  NO--Latex Allergy  NO--Loose teeth  NO--History of Rheumatoid Arthritis  NO--Bleeding risk   JABARI pt is high risk- awaiting testing   right arm precautions     PT DENIES ANY RASHES, ABRASION, OR OPEN WOUNDS OR CUTS    AS PER THE PT, THIS IS HIS/HER COMPLETE MEDICAL AND SURGICAL HX, INCLUDING MEDICATIONS PRESCRIBED AND OVER THE COUNTER    Patient verbalized understanding of instructions and was given the opportunity to ask questions and have them answered.    pt denies any suicidal ideation or thoughts, pt states not a threat to self or others    N18.6 42427    End-stage renal disease    Encounter for other preprocedural examination    ^N18.6 66980    Family history of myocardial infarction (Father)    End-stage renal disease    Encounter for other preprocedural examination    CKD (chronic kidney disease), stage V    Acromegaly    CAD (coronary atherosclerotic disease)    HTN (hypertension)    Neuropathy    DM (diabetes mellitus), type 2    Dyslipidemia    ESRD on dialysis    H/O: pituitary tumor    H/O eye surgery    Breakdown of surgically created AV fistula, init    SysAdmin_VstLnk    dialysis monday friday [facility]

## 2021-12-03 LAB
A1C WITH ESTIMATED AVERAGE GLUCOSE RESULT: 6.1 % — HIGH (ref 4–5.6)
ALBUMIN SERPL ELPH-MCNC: 4.4 G/DL — SIGNIFICANT CHANGE UP (ref 3.5–5.2)
ALP SERPL-CCNC: 111 U/L — SIGNIFICANT CHANGE UP (ref 30–115)
ALT FLD-CCNC: 11 U/L — SIGNIFICANT CHANGE UP (ref 0–41)
ANION GAP SERPL CALC-SCNC: 18 MMOL/L — HIGH (ref 7–14)
APTT BLD: 35 SEC — SIGNIFICANT CHANGE UP (ref 27–39.2)
AST SERPL-CCNC: 12 U/L — SIGNIFICANT CHANGE UP (ref 0–41)
BASOPHILS # BLD AUTO: 0.05 K/UL — SIGNIFICANT CHANGE UP (ref 0–0.2)
BASOPHILS NFR BLD AUTO: 0.8 % — SIGNIFICANT CHANGE UP (ref 0–1)
BILIRUB SERPL-MCNC: 0.2 MG/DL — SIGNIFICANT CHANGE UP (ref 0.2–1.2)
BUN SERPL-MCNC: 57 MG/DL — HIGH (ref 10–20)
CALCIUM SERPL-MCNC: 10.9 MG/DL — HIGH (ref 8.5–10.1)
CHLORIDE SERPL-SCNC: 95 MMOL/L — LOW (ref 98–110)
CO2 SERPL-SCNC: 23 MMOL/L — SIGNIFICANT CHANGE UP (ref 17–32)
CREAT SERPL-MCNC: 7.7 MG/DL — CRITICAL HIGH (ref 0.7–1.5)
EOSINOPHIL # BLD AUTO: 0.35 K/UL — SIGNIFICANT CHANGE UP (ref 0–0.7)
EOSINOPHIL NFR BLD AUTO: 5.6 % — SIGNIFICANT CHANGE UP (ref 0–8)
ESTIMATED AVERAGE GLUCOSE: 128 MG/DL — HIGH (ref 68–114)
GLUCOSE SERPL-MCNC: 135 MG/DL — HIGH (ref 70–99)
HCT VFR BLD CALC: 33.6 % — LOW (ref 42–52)
HGB BLD-MCNC: 10.6 G/DL — LOW (ref 14–18)
IMM GRANULOCYTES NFR BLD AUTO: 0.3 % — SIGNIFICANT CHANGE UP (ref 0.1–0.3)
INR BLD: 0.95 RATIO — SIGNIFICANT CHANGE UP (ref 0.65–1.3)
LYMPHOCYTES # BLD AUTO: 1.18 K/UL — LOW (ref 1.2–3.4)
LYMPHOCYTES # BLD AUTO: 18.9 % — LOW (ref 20.5–51.1)
MCHC RBC-ENTMCNC: 31.5 G/DL — LOW (ref 32–37)
MCHC RBC-ENTMCNC: 31.5 PG — HIGH (ref 27–31)
MCV RBC AUTO: 100 FL — HIGH (ref 80–94)
MONOCYTES # BLD AUTO: 0.75 K/UL — HIGH (ref 0.1–0.6)
MONOCYTES NFR BLD AUTO: 12 % — HIGH (ref 1.7–9.3)
NEUTROPHILS # BLD AUTO: 3.88 K/UL — SIGNIFICANT CHANGE UP (ref 1.4–6.5)
NEUTROPHILS NFR BLD AUTO: 62.4 % — SIGNIFICANT CHANGE UP (ref 42.2–75.2)
NRBC # BLD: 0 /100 WBCS — SIGNIFICANT CHANGE UP (ref 0–0)
PLATELET # BLD AUTO: 183 K/UL — SIGNIFICANT CHANGE UP (ref 130–400)
POTASSIUM SERPL-MCNC: 4.8 MMOL/L — SIGNIFICANT CHANGE UP (ref 3.5–5)
POTASSIUM SERPL-SCNC: 4.8 MMOL/L — SIGNIFICANT CHANGE UP (ref 3.5–5)
PROT SERPL-MCNC: 6.5 G/DL — SIGNIFICANT CHANGE UP (ref 6–8)
PROTHROM AB SERPL-ACNC: 10.9 SEC — SIGNIFICANT CHANGE UP (ref 9.95–12.87)
RBC # BLD: 3.36 M/UL — LOW (ref 4.7–6.1)
RBC # FLD: 13.4 % — SIGNIFICANT CHANGE UP (ref 11.5–14.5)
SODIUM SERPL-SCNC: 136 MMOL/L — SIGNIFICANT CHANGE UP (ref 135–146)
WBC # BLD: 6.23 K/UL — SIGNIFICANT CHANGE UP (ref 4.8–10.8)
WBC # FLD AUTO: 6.23 K/UL — SIGNIFICANT CHANGE UP (ref 4.8–10.8)

## 2021-12-03 NOTE — CONSULT LETTER
[Dear  ___] : Dear  [unfilled], [Courtesy Letter:] : I had the pleasure of seeing your patient, [unfilled], in my office today. [Please see my note below.] : Please see my note below. [FreeTextEntry2] : Dr. Eduarda Wan

## 2021-12-03 NOTE — HISTORY OF PRESENT ILLNESS
[FreeTextEntry1] : Mr. Romo is a 58 year old man with history of ESRD  he has a AVF done by Dr. Gomes. He presents for evaluation of his fistula as well as his lower extremities. In 2019  He had a angiogram with left SFA angioplasty and stent. Subsequently he had debridement of his heel and integra graft placement.

## 2021-12-03 NOTE — ASSESSMENT
[FreeTextEntry1] : This is a 61 year old man with PVD the patient has no open wounds present. The patient has a right arm AV fistula with a stenosis and multiple aneurysms visualized on duplex exam. I would like to schedule the patient for a right arm fistulogram possible endovascular repair. I've discussed the risk benefits the procedure with the patient and he is in agreement. The patient will be scheduled for December 15.2021.

## 2021-12-03 NOTE — DATA REVIEWED
[No studies available for review at this time.] : No studies available for review at this time. [FreeTextEntry1] : arterial duplex on the right diffuse atherosclerotic plaque noted throughout the femoral popliteal tibial arteries. There is some flow disturbance noted in the distal segment of the popliteal artery with a velocity of 180 cm second. The posterior tibial and anterior tibial arteries are patent with evidence of sealing diminished flow\par left superficial femoral artery stent is patent with evidence of in-stent restenosis at this time. Diffuse atherosclerotic plaque noted throughout the femoral popliteal and tibial arteries are no evidence hemodynamically significant disease\par Right there is a flow disturbance noted in the brachial cephalic AV fistula in with a velocity of 980 cm/s. There were multiple aneurysmal dilatations noted throughout the cephalic vein the largest diameter measuring 3 cm in the mid upper arm. The rest of the vein is patent with a range of 320 cm/s. There multiple branches that come off this cephalic vein largest diameter 2.8 mm

## 2021-12-12 ENCOUNTER — LABORATORY RESULT (OUTPATIENT)
Age: 61
End: 2021-12-12

## 2021-12-14 NOTE — ASU PATIENT PROFILE, ADULT - FALL HARM RISK - HARM RISK INTERVENTIONS

## 2021-12-15 ENCOUNTER — APPOINTMENT (OUTPATIENT)
Dept: VASCULAR SURGERY | Facility: HOSPITAL | Age: 61
End: 2021-12-15

## 2021-12-15 ENCOUNTER — OUTPATIENT (OUTPATIENT)
Dept: OUTPATIENT SERVICES | Facility: HOSPITAL | Age: 61
LOS: 1 days | Discharge: HOME | End: 2021-12-15
Payer: MEDICARE

## 2021-12-15 VITALS
TEMPERATURE: 99 F | DIASTOLIC BLOOD PRESSURE: 74 MMHG | HEART RATE: 69 BPM | SYSTOLIC BLOOD PRESSURE: 162 MMHG | OXYGEN SATURATION: 99 % | HEIGHT: 75 IN | WEIGHT: 240.08 LBS

## 2021-12-15 VITALS
HEART RATE: 65 BPM | OXYGEN SATURATION: 96 % | SYSTOLIC BLOOD PRESSURE: 147 MMHG | RESPIRATION RATE: 16 BRPM | DIASTOLIC BLOOD PRESSURE: 68 MMHG

## 2021-12-15 DIAGNOSIS — Z98.890 OTHER SPECIFIED POSTPROCEDURAL STATES: Chronic | ICD-10-CM

## 2021-12-15 DIAGNOSIS — Z87.898 PERSONAL HISTORY OF OTHER SPECIFIED CONDITIONS: Chronic | ICD-10-CM

## 2021-12-15 DIAGNOSIS — T82.510A BREAKDOWN (MECHANICAL) OF SURGICALLY CREATED ARTERIOVENOUS FISTULA, INITIAL ENCOUNTER: Chronic | ICD-10-CM

## 2021-12-15 LAB
BLD GP AB SCN SERPL QL: SIGNIFICANT CHANGE UP
GLUCOSE BLDC GLUCOMTR-MCNC: 132 MG/DL — HIGH (ref 70–99)

## 2021-12-15 PROCEDURE — 36905 THRMBC/NFS DIALYSIS CIRCUIT: CPT

## 2021-12-15 PROCEDURE — 76937 US GUIDE VASCULAR ACCESS: CPT | Mod: 26

## 2021-12-15 RX ORDER — FUROSEMIDE 40 MG
1 TABLET ORAL
Qty: 0 | Refills: 0 | DISCHARGE

## 2021-12-15 RX ORDER — ASPIRIN/CALCIUM CARB/MAGNESIUM 324 MG
1 TABLET ORAL
Qty: 0 | Refills: 0 | DISCHARGE

## 2021-12-15 RX ORDER — METOCLOPRAMIDE HCL 10 MG
10 TABLET ORAL ONCE
Refills: 0 | Status: DISCONTINUED | OUTPATIENT
Start: 2021-12-15 | End: 2021-12-29

## 2021-12-15 RX ORDER — ROSUVASTATIN CALCIUM 5 MG/1
1 TABLET ORAL
Qty: 0 | Refills: 0 | DISCHARGE

## 2021-12-15 RX ORDER — HYDROMORPHONE HYDROCHLORIDE 2 MG/ML
1 INJECTION INTRAMUSCULAR; INTRAVENOUS; SUBCUTANEOUS ONCE
Refills: 0 | Status: DISCONTINUED | OUTPATIENT
Start: 2021-12-15 | End: 2021-12-15

## 2021-12-15 RX ORDER — HYDROMORPHONE HYDROCHLORIDE 2 MG/ML
0.5 INJECTION INTRAMUSCULAR; INTRAVENOUS; SUBCUTANEOUS
Refills: 0 | Status: DISCONTINUED | OUTPATIENT
Start: 2021-12-15 | End: 2021-12-15

## 2021-12-15 RX ORDER — FOLIC ACID 0.8 MG
1 TABLET ORAL
Qty: 0 | Refills: 0 | DISCHARGE

## 2021-12-15 RX ORDER — SEVELAMER CARBONATE 2400 MG/1
2 POWDER, FOR SUSPENSION ORAL
Qty: 0 | Refills: 0 | DISCHARGE

## 2021-12-15 NOTE — BRIEF OPERATIVE NOTE - NSICDXBRIEFPOSTOP_GEN_ALL_CORE_FT
POST-OP DIAGNOSIS:  Malfunction of arteriovenous graft, initial encounter 15-Dec-2021 12:32:01  Edwin Millan

## 2021-12-15 NOTE — ASU DISCHARGE PLAN (ADULT/PEDIATRIC) - CARE PROVIDER_API CALL
Adan Comer)  Surgery; Vascular Surgery  [address] [place] [address]  Phone: [phone]  Fax: [phone]  Established Patient  Follow Up Time: 2 weeks

## 2021-12-15 NOTE — CHART NOTE - NSCHARTNOTEFT_GEN_A_CORE
PACU ANESTHESIA ADMISSION NOTE      Procedure: Right arm fistulogram      ____  Intubated  TV:______       Rate: ______      FiO2: ______    __x__  Patent Airway    __x__  Full return of protective reflexes    __x__  Full recovery from anesthesia / back to baseline status    Vitals:  T(C): 37.2 (12-15-21 @ 10:24), Max: 37.2 (12-15-21 @ 08:30)  HR: 69 (12-15-21 @ 10:24) (69 - 69)  BP: 162/74 (12-15-21 @ 10:24) (162/74 - 162/74)  RR: --  SpO2: 99% (12-15-21 @ 10:24) (99% - 99%)    Mental Status:  __x__ Awake   ___x__ Alert   _____ Drowsy   _____ Sedated    Nausea/Vomiting:  __x__ NO  ______Yes,   See Post - Op Orders          Pain Scale (0-10):  __0___    Treatment: ____ None    __x__ See Post - Op/PCA Orders    Post - Operative Fluids:   ____ Oral   __x__ See Post - Op Orders    Plan: Discharge:   __x__Home       _____Floor     _____Critical Care    _____  Other:_________________    Comments: Patient had smooth intraoperative event, no anesthesia complication.  PACU Vital signs: HR: 66           BP:     110   / 56         RR:  14           O2 Sat:    97   %     Temp 97.7F

## 2021-12-15 NOTE — BRIEF OPERATIVE NOTE - OPERATION/FINDINGS
4-tributary blood vessels along length of fistula  Right subclavian moderate to severe stenosis 4-tributary blood vessels along length of fistula  Right subclavian moderate to severe stenosis s/p [place] balloon angioplasty ([id number])

## 2021-12-15 NOTE — ASU DISCHARGE PLAN (ADULT/PEDIATRIC) - NS MD DC FALL RISK RISK
For information on Fall & Injury Prevention, visit: [email or web address] OR  [email or web address] OR  https://www.cdc.gov/steadi/patient.html

## 2021-12-15 NOTE — BRIEF OPERATIVE NOTE - NSICDXBRIEFPREOP_GEN_ALL_CORE_FT
PRE-OP DIAGNOSIS:  Malfunction of arteriovenous dialysis fistula 15-Dec-2021 12:31:49  Edwin Millan

## 2021-12-15 NOTE — PRE-ANESTHESIA EVALUATION ADULT - NSANTHPMHFT_GEN_ALL_CORE
62 y/o M with pmh of acromegaly, HTN, HLD, DM, CAD (stent), ESRD on HD, DVT who presents for right arm fistulogram. Last HD Monday (12/13)

## 2021-12-16 PROBLEM — I82.409 ACUTE EMBOLISM AND THROMBOSIS OF UNSPECIFIED DEEP VEINS OF UNSPECIFIED LOWER EXTREMITY: Chronic | Status: ACTIVE | Noted: 2021-12-02

## 2021-12-23 DIAGNOSIS — E11.22 TYPE 2 DIABETES MELLITUS WITH DIABETIC CHRONIC KIDNEY DISEASE: ICD-10-CM

## 2021-12-23 DIAGNOSIS — I12.0 HYPERTENSIVE CHRONIC KIDNEY DISEASE WITH STAGE 5 CHRONIC KIDNEY DISEASE OR END STAGE RENAL DISEASE: ICD-10-CM

## 2021-12-23 DIAGNOSIS — N18.6 END STAGE RENAL DISEASE: ICD-10-CM

## 2021-12-23 DIAGNOSIS — Z79.02 LONG TERM (CURRENT) USE OF ANTITHROMBOTICS/ANTIPLATELETS: ICD-10-CM

## 2021-12-23 DIAGNOSIS — E78.00 PURE HYPERCHOLESTEROLEMIA, UNSPECIFIED: ICD-10-CM

## 2021-12-28 ENCOUNTER — APPOINTMENT (OUTPATIENT)
Dept: VASCULAR SURGERY | Facility: CLINIC | Age: 61
End: 2021-12-28
Payer: MEDICARE

## 2021-12-28 VITALS
SYSTOLIC BLOOD PRESSURE: 135 MMHG | TEMPERATURE: 95.6 F | DIASTOLIC BLOOD PRESSURE: 70 MMHG | BODY MASS INDEX: 30.09 KG/M2 | WEIGHT: 242 LBS | HEIGHT: 75 IN

## 2021-12-28 DIAGNOSIS — N18.6 END STAGE RENAL DISEASE: ICD-10-CM

## 2021-12-28 DIAGNOSIS — Z99.2 END STAGE RENAL DISEASE: ICD-10-CM

## 2021-12-28 PROCEDURE — 99214 OFFICE O/P EST MOD 30 MIN: CPT

## 2021-12-28 PROCEDURE — 93990 DOPPLER FLOW TESTING: CPT

## 2021-12-28 NOTE — HISTORY OF PRESENT ILLNESS
[FreeTextEntry1] : Mr. Romo is a 58 year old man with history of ESRD  he has a AVF done by Dr. Gomes. He presents for evaluation of his fistula as well as his lower extremities. In 2019  He had a angiogram with left SFA angioplasty and stent. Subsequently he had debridement of his heel and integra graft placement.\par \par He presents today s/p fistulagram and angioplasty. The fistula is being use on HD with no issues.

## 2021-12-28 NOTE — DATA REVIEWED
[No studies available for review at this time.] : No studies available for review at this time. [FreeTextEntry1] : \par Right there is a flow disturbance noted in the brachial cephalic AV fistula in with a velocity of 980 cm/s. There were multiple aneurysmal dilatations noted throughout the cephalic vein the largest diameter measuring 3 cm in the mid upper arm. The rest of the vein is patent with a range of 320 cm/s. There multiple branches that come off this cephalic vein largest diameter 2.8 mm

## 2022-01-10 ENCOUNTER — EMERGENCY (EMERGENCY)
Facility: HOSPITAL | Age: 62
LOS: 0 days | Discharge: HOME | End: 2022-01-10
Attending: EMERGENCY MEDICINE | Admitting: EMERGENCY MEDICINE
Payer: MEDICARE

## 2022-01-10 VITALS
TEMPERATURE: 98 F | RESPIRATION RATE: 20 BRPM | WEIGHT: 240.08 LBS | HEART RATE: 65 BPM | SYSTOLIC BLOOD PRESSURE: 150 MMHG | DIASTOLIC BLOOD PRESSURE: 74 MMHG | OXYGEN SATURATION: 100 % | HEIGHT: 75 IN

## 2022-01-10 VITALS
HEART RATE: 74 BPM | OXYGEN SATURATION: 99 % | RESPIRATION RATE: 18 BRPM | TEMPERATURE: 98 F | DIASTOLIC BLOOD PRESSURE: 65 MMHG | SYSTOLIC BLOOD PRESSURE: 161 MMHG

## 2022-01-10 DIAGNOSIS — T82.510A BREAKDOWN (MECHANICAL) OF SURGICALLY CREATED ARTERIOVENOUS FISTULA, INITIAL ENCOUNTER: Chronic | ICD-10-CM

## 2022-01-10 DIAGNOSIS — Z87.898 PERSONAL HISTORY OF OTHER SPECIFIED CONDITIONS: Chronic | ICD-10-CM

## 2022-01-10 DIAGNOSIS — I12.0 HYPERTENSIVE CHRONIC KIDNEY DISEASE WITH STAGE 5 CHRONIC KIDNEY DISEASE OR END STAGE RENAL DISEASE: ICD-10-CM

## 2022-01-10 DIAGNOSIS — E78.5 HYPERLIPIDEMIA, UNSPECIFIED: ICD-10-CM

## 2022-01-10 DIAGNOSIS — R19.7 DIARRHEA, UNSPECIFIED: ICD-10-CM

## 2022-01-10 DIAGNOSIS — R53.1 WEAKNESS: ICD-10-CM

## 2022-01-10 DIAGNOSIS — N18.6 END STAGE RENAL DISEASE: ICD-10-CM

## 2022-01-10 DIAGNOSIS — R10.9 UNSPECIFIED ABDOMINAL PAIN: ICD-10-CM

## 2022-01-10 DIAGNOSIS — R11.10 VOMITING, UNSPECIFIED: ICD-10-CM

## 2022-01-10 DIAGNOSIS — Z79.02 LONG TERM (CURRENT) USE OF ANTITHROMBOTICS/ANTIPLATELETS: ICD-10-CM

## 2022-01-10 DIAGNOSIS — I25.10 ATHEROSCLEROTIC HEART DISEASE OF NATIVE CORONARY ARTERY WITHOUT ANGINA PECTORIS: ICD-10-CM

## 2022-01-10 DIAGNOSIS — Z99.2 DEPENDENCE ON RENAL DIALYSIS: ICD-10-CM

## 2022-01-10 DIAGNOSIS — Z88.1 ALLERGY STATUS TO OTHER ANTIBIOTIC AGENTS STATUS: ICD-10-CM

## 2022-01-10 DIAGNOSIS — E11.22 TYPE 2 DIABETES MELLITUS WITH DIABETIC CHRONIC KIDNEY DISEASE: ICD-10-CM

## 2022-01-10 DIAGNOSIS — Z79.82 LONG TERM (CURRENT) USE OF ASPIRIN: ICD-10-CM

## 2022-01-10 DIAGNOSIS — Z98.890 OTHER SPECIFIED POSTPROCEDURAL STATES: Chronic | ICD-10-CM

## 2022-01-10 LAB
ALBUMIN SERPL ELPH-MCNC: 3.8 G/DL — SIGNIFICANT CHANGE UP (ref 3.5–5.2)
ALP SERPL-CCNC: 96 U/L — SIGNIFICANT CHANGE UP (ref 30–115)
ALT FLD-CCNC: 6 U/L — SIGNIFICANT CHANGE UP (ref 0–41)
ANION GAP SERPL CALC-SCNC: 17 MMOL/L — HIGH (ref 7–14)
AST SERPL-CCNC: 8 U/L — SIGNIFICANT CHANGE UP (ref 0–41)
B-OH-BUTYR SERPL-SCNC: 0.5 MMOL/L — HIGH
BASOPHILS # BLD AUTO: 0.02 K/UL — SIGNIFICANT CHANGE UP (ref 0–0.2)
BASOPHILS NFR BLD AUTO: 0.5 % — SIGNIFICANT CHANGE UP (ref 0–1)
BILIRUB SERPL-MCNC: 0.4 MG/DL — SIGNIFICANT CHANGE UP (ref 0.2–1.2)
BUN SERPL-MCNC: 31 MG/DL — HIGH (ref 10–20)
CALCIUM SERPL-MCNC: 8.1 MG/DL — LOW (ref 8.5–10.1)
CHLORIDE SERPL-SCNC: 99 MMOL/L — SIGNIFICANT CHANGE UP (ref 98–110)
CO2 SERPL-SCNC: 22 MMOL/L — SIGNIFICANT CHANGE UP (ref 17–32)
CREAT SERPL-MCNC: 5.7 MG/DL — CRITICAL HIGH (ref 0.7–1.5)
EOSINOPHIL # BLD AUTO: 0.1 K/UL — SIGNIFICANT CHANGE UP (ref 0–0.7)
EOSINOPHIL NFR BLD AUTO: 2.5 % — SIGNIFICANT CHANGE UP (ref 0–8)
GLUCOSE SERPL-MCNC: 94 MG/DL — SIGNIFICANT CHANGE UP (ref 70–99)
HCT VFR BLD CALC: 30.6 % — LOW (ref 42–52)
HGB BLD-MCNC: 9.9 G/DL — LOW (ref 14–18)
IMM GRANULOCYTES NFR BLD AUTO: 0.2 % — SIGNIFICANT CHANGE UP (ref 0.1–0.3)
LACTATE SERPL-SCNC: 0.8 MMOL/L — SIGNIFICANT CHANGE UP (ref 0.7–2)
LIDOCAIN IGE QN: 31 U/L — SIGNIFICANT CHANGE UP (ref 7–60)
LYMPHOCYTES # BLD AUTO: 0.81 K/UL — LOW (ref 1.2–3.4)
LYMPHOCYTES # BLD AUTO: 19.9 % — LOW (ref 20.5–51.1)
MCHC RBC-ENTMCNC: 30.8 PG — SIGNIFICANT CHANGE UP (ref 27–31)
MCHC RBC-ENTMCNC: 32.4 G/DL — SIGNIFICANT CHANGE UP (ref 32–37)
MCV RBC AUTO: 95.3 FL — HIGH (ref 80–94)
MONOCYTES # BLD AUTO: 0.47 K/UL — SIGNIFICANT CHANGE UP (ref 0.1–0.6)
MONOCYTES NFR BLD AUTO: 11.5 % — HIGH (ref 1.7–9.3)
NEUTROPHILS # BLD AUTO: 2.66 K/UL — SIGNIFICANT CHANGE UP (ref 1.4–6.5)
NEUTROPHILS NFR BLD AUTO: 65.4 % — SIGNIFICANT CHANGE UP (ref 42.2–75.2)
NRBC # BLD: 0 /100 WBCS — SIGNIFICANT CHANGE UP (ref 0–0)
PLATELET # BLD AUTO: 193 K/UL — SIGNIFICANT CHANGE UP (ref 130–400)
POTASSIUM SERPL-MCNC: 3.9 MMOL/L — SIGNIFICANT CHANGE UP (ref 3.5–5)
POTASSIUM SERPL-SCNC: 3.9 MMOL/L — SIGNIFICANT CHANGE UP (ref 3.5–5)
PROT SERPL-MCNC: 6.4 G/DL — SIGNIFICANT CHANGE UP (ref 6–8)
RBC # BLD: 3.21 M/UL — LOW (ref 4.7–6.1)
RBC # FLD: 13.1 % — SIGNIFICANT CHANGE UP (ref 11.5–14.5)
SODIUM SERPL-SCNC: 138 MMOL/L — SIGNIFICANT CHANGE UP (ref 135–146)
WBC # BLD: 4.07 K/UL — LOW (ref 4.8–10.8)
WBC # FLD AUTO: 4.07 K/UL — LOW (ref 4.8–10.8)

## 2022-01-10 PROCEDURE — 93010 ELECTROCARDIOGRAM REPORT: CPT

## 2022-01-10 PROCEDURE — 71045 X-RAY EXAM CHEST 1 VIEW: CPT | Mod: 26

## 2022-01-10 PROCEDURE — 99284 EMERGENCY DEPT VISIT MOD MDM: CPT | Mod: FS

## 2022-01-10 NOTE — ED PROVIDER NOTE - NSFOLLOWUPINSTRUCTIONS_ED_ALL_ED_FT
**Follow up with your primary care doctor 1-3 days**    Acute Diarrhea    WHAT YOU NEED TO KNOW:    Acute diarrhea starts quickly and lasts a short time, usually 1 to 3 days. It can last up to 2 weeks. You may not be able to control your diarrhea. Acute diarrhea usually stops on its own.     DISCHARGE INSTRUCTIONS:    Return to the emergency department if:     You feel confused.       Your heartbeat is faster than usual.       Your eyes look deeply sunken, or you have no tears when you cry.       You urinate less than usual, or your urine is dark yellow.       You have blood or mucus in your bowel movements.      You have severe abdominal pain.       You are unable to drink any liquids.     Contact your healthcare provider if:     Your symptoms do not get better with treatment.       You have a fever higher than 101.3°F (38.5°C).       You have trouble eating and drinking because you are vomiting.       Your diarrhea does not get better in 7 days.       You have questions or concerns about your condition or care.     Follow up with your healthcare provider as directed: Write down your questions so you remember to ask them during your visits.     Medicines:    Diarrhea medicine is an over-the-counter medicine that helps slow or stop your diarrhea. Do not take this medicine unless your healthcare provider says it is okay.       Antibiotics may be given to help treat an infection caused by bacteria.       Antiparasitics may be given to treat an infection caused by parasites.       Take your medicine as directed. Contact your healthcare provider if you think your medicine is not helping or if you have side effects. Tell him of her if you are allergic to any medicine. Keep a list of the medicines, vitamins, and herbs you take. Include the amounts, and when and why you take them. Bring the list or the pill bottles to follow-up visits. Carry your medicine list with you in case of an emergency.    Self-care:     Drink liquids as directed. Liquids will help prevent dehydration caused by diarrhea. Ask your healthcare provider how much liquid to drink each day and which liquids are best for you. You may need to drink an oral rehydration solution (ORS). An ORS has the right amounts of water, salts, and sugar you need to replace body fluids. You can buy an ORS at most grocery stores and pharmacies.       Eat foods that are easy to digest. Examples include rice, lentils, cereal, bananas, potatoes, and bread. It also includes some fruits (bananas, melon), well-cooked vegetables, and lean meats. Do not eat foods high in fiber, fat, and sugar. Do not drink alcohol until your diarrhea is gone.     Prevent acute diarrhea:     Wash your hands often. Use soap and water. Wash your hands before you eat or prepare food. Also wash your hands after you use the bathroom. Use an alcohol-based hand gel when soap and water are not available. Handwashing           Keep bathroom surfaces clean. This helps prevent the spread of germs that cause acute diarrhea.       Wash fruits and vegetables well before you eat them. This can help remove germs that cause diarrhea. If possible, remove the skin from fruits and vegetables, or cook them well before you eat them.       Cook meat and poultry as directed. Meat includes beef and pork. Poultry includes chicken, turkey, and duck.  Cook ground meat to 160°F.       Cook ground poultry, whole poultry, or cuts of poultry to at least 165°F. Remove the poultry from heat. Let it stand for 3 minutes before you eat it.       Cook whole cuts of meat other than poultry to at least 145°F. Remove the meat from heat. Let it stand for 3 minutes before you eat it.       Wash dishes that have touched raw meat or poultry with hot water and soap. This includes cutting boards, utensils, dishes, and serving containers.       Place raw or cooked meat or poultry in the refrigerator as soon as possible. Bacteria can grow in meat or poultry that is left at room temperature too long.       Do not eat raw or undercooked oysters, clams, or mussels. These foods may be contaminated and cause infection.       Drink only filtered or treated water when you travel. Do not put ice in your drinks. Drink bottled water whenever possible.          © Copyright [employer] 2019 All illustrations and images included in CareNotes are the copyrighted property of [employer].A[employer]M., Inc. or [employer] **Follow up with your primary care doctor 1-3 days**    Acute Abdominal Pain    WHAT YOU NEED TO KNOW:    The cause of your abdominal pain may not be found. If a cause is found, treatment will depend on what the cause is.     DISCHARGE INSTRUCTIONS:    Return to the emergency department if:     You vomit blood or cannot stop vomiting.      You have blood in your bowel movement or it looks like tar.       You have bleeding from your rectum.       Your abdomen is larger than usual, more painful, and hard.       You have severe pain in your abdomen.       You stop passing gas and having bowel movements.       You feel weak, dizzy, or faint.    Contact your healthcare provider if:     You have a fever.      You have new signs and symptoms.      Your symptoms do not get better with treatment.       You have questions or concerns about your condition or care.    Medicines may be given to decrease pain, treat an infection, and manage your symptoms. Take your medicine as directed. Call your healthcare provider if you think your medicine is not helping or if you have side effects. Tell him if you are allergic to any medicine. Keep a list of the medicines, vitamins, and herbs you take. Include the amounts, and when and why you take them. Bring the list or the pill bottles to follow-up visits. Carry your medicine list with you in case of an emergency.    Manage your symptoms:     Apply heat on your abdomen for 20 to 30 minutes every 2 hours for as many days as directed. Heat helps decrease pain and muscle spasms.       Manage your stress. Stress may cause abdominal pain. Your healthcare provider may recommend relaxation techniques and deep breathing exercises to help decrease your stress. Your healthcare provider may recommend you talk to someone about your stress or anxiety, such as a counselor or a trusted friend. Get plenty of sleep and exercise regularly.       Limit or do not drink alcohol. Alcohol can make your abdominal pain worse. Ask your healthcare provider if it is safe for you to drink alcohol. Also ask how much is safe for you to drink.       Do not smoke. Nicotine and other chemicals in cigarettes can damage your esophagus and stomach. Ask your healthcare provider for information if you currently smoke and need help to quit. E-cigarettes or smokeless tobacco still contain nicotine. Talk to your healthcare provider before you use these products.     Make changes to the food you eat as directed: Do not eat foods that cause abdominal pain or other symptoms. Eat small meals more often.     Eat more high-fiber foods if you are constipated. High-fiber foods include fruits, vegetables, whole-grain foods, and legumes.       Do not eat foods that cause gas if you have bloating. Examples include broccoli, cabbage, and cauliflower. Do not drink soda or carbonated drinks, because these may also cause gas.       Do not eat foods or drinks that contain sorbitol or fructose if you have diarrhea and bloating. Some examples are fruit juices, candy, jelly, and sugar-free gum.       Do not eat high-fat foods, such as fried foods, cheeseburgers, hot dogs, and desserts.      Limit or do not drink caffeine. Caffeine may make symptoms, such as heart burn or nausea, worse.       Drink plenty of liquids to prevent dehydration from diarrhea or vomiting. Ask your healthcare provider how much liquid to drink each day and which liquids are best for you.     Follow up with your healthcare provider as directed: Write down your questions so you remember to ask them during your visits.       © Copyright [employer] 2019 All illustrations and images included in CareNotes are the copyrighted property of [employer] or [employer]     Acute Diarrhea    WHAT YOU NEED TO KNOW:    Acute diarrhea starts quickly and lasts a short time, usually 1 to 3 days. It can last up to 2 weeks. You may not be able to control your diarrhea. Acute diarrhea usually stops on its own.     DISCHARGE INSTRUCTIONS:    Return to the emergency department if:     You feel confused.       Your heartbeat is faster than usual.       Your eyes look deeply sunken, or you have no tears when you cry.       You urinate less than usual, or your urine is dark yellow.       You have blood or mucus in your bowel movements.      You have severe abdominal pain.       You are unable to drink any liquids.     Contact your healthcare provider if:     Your symptoms do not get better with treatment.       You have a fever higher than 101.3°F (38.5°C).       You have trouble eating and drinking because you are vomiting.       Your diarrhea does not get better in 7 days.       You have questions or concerns about your condition or care.     Follow up with your healthcare provider as directed: Write down your questions so you remember to ask them during your visits.     Medicines:    Diarrhea medicine is an over-the-counter medicine that helps slow or stop your diarrhea. Do not take this medicine unless your healthcare provider says it is okay.       Antibiotics may be given to help treat an infection caused by bacteria.       Antiparasitics may be given to treat an infection caused by parasites.       Take your medicine as directed. Contact your healthcare provider if you think your medicine is not helping or if you have side effects. Tell him of her if you are allergic to any medicine. Keep a list of the medicines, vitamins, and herbs you take. Include the amounts, and when and why you take them. Bring the list or the pill bottles to follow-up visits. Carry your medicine list with you in case of an emergency.    Self-care:     Drink liquids as directed. Liquids will help prevent dehydration caused by diarrhea. Ask your healthcare provider how much liquid to drink each day and which liquids are best for you. You may need to drink an oral rehydration solution (ORS). An ORS has the right amounts of water, salts, and sugar you need to replace body fluids. You can buy an ORS at most grocery stores and pharmacies.       Eat foods that are easy to digest. Examples include rice, lentils, cereal, bananas, potatoes, and bread. It also includes some fruits (bananas, melon), well-cooked vegetables, and lean meats. Do not eat foods high in fiber, fat, and sugar. Do not drink alcohol until your diarrhea is gone.     Prevent acute diarrhea:     Wash your hands often. Use soap and water. Wash your hands before you eat or prepare food. Also wash your hands after you use the bathroom. Use an alcohol-based hand gel when soap and water are not available. Handwashing           Keep bathroom surfaces clean. This helps prevent the spread of germs that cause acute diarrhea.       Wash fruits and vegetables well before you eat them. This can help remove germs that cause diarrhea. If possible, remove the skin from fruits and vegetables, or cook them well before you eat them.       Cook meat and poultry as directed. Meat includes beef and pork. Poultry includes chicken, turkey, and duck.  Cook ground meat to 160°F.       Cook ground poultry, whole poultry, or cuts of poultry to at least 165°F. Remove the poultry from heat. Let it stand for 3 minutes before you eat it.       Cook whole cuts of meat other than poultry to at least 145°F. Remove the meat from heat. Let it stand for 3 minutes before you eat it.       Wash dishes that have touched raw meat or poultry with hot water and soap. This includes cutting boards, utensils, dishes, and serving containers.       Place raw or cooked meat or poultry in the refrigerator as soon as possible. Bacteria can grow in meat or poultry that is left at room temperature too long.       Do not eat raw or undercooked oysters, clams, or mussels. These foods may be contaminated and cause infection.       Drink only filtered or treated water when you travel. Do not put ice in your drinks. Drink bottled water whenever possible.          © Copyright [employer] 2019 All illustrations and images included in CareNotes are the copyrighted property of [employer] or [employer].

## 2022-01-10 NOTE — ED PROVIDER NOTE - PATIENT PORTAL LINK FT
You can access the FollowMyHealth Patient Portal offered by [facility] by registering at the following website: [email or web address]. By joining [employer]’s FollowMyHealth portal, you will also be able to view your health information using other applications (apps) compatible with our system.

## 2022-01-10 NOTE — ED ADULT TRIAGE NOTE - CHIEF COMPLAINT QUOTE
patient sent in from dialysis - for weakness and hypertension- patient states hes feeling weak. also reports diarrhea x 1 week lost 10 lbs missed friday session

## 2022-01-10 NOTE — ED PROVIDER NOTE - CARE PLAN
1 Principal Discharge DX:	Diarrhea   Principal Discharge DX:	Abdominal pain  Secondary Diagnosis:	Diarrhea

## 2022-01-10 NOTE — ED PROVIDER NOTE - OBJECTIVE STATEMENT
60 yo M pmhx esrd (dialysis monday and friday, fully dialyzed today), htn, hld, dm presenting to the ED for evaluation of generalized weakness, generalized dull abd pain, and loose stools x 1 week, today pt had 1 episode of diarrhea. Pt reports while at dialysis today he experienced generalized weakness and was sent to the ED for evaluation. Denies nay fever, chills, nausea, chest pain, sob, dysuria, hematuria.

## 2022-01-10 NOTE — ED PROVIDER NOTE - NS ED ROS FT
Constitutional: (-) fever (-) malaise (-) diaphoresis (-) chills (-) wt. loss (-) body aches (-) night sweats  Eyes: (-) visual changes (-) eye pain (-) eye discharge (-) photophobia (-) FB sensation  Cardiac: (-) chest pain  (-) palpitations (-) syncope (-) edema  Respiratory: (-) cough (-) SOB (-) GUTIERREZ  GI: (-) nausea (+) vomiting (+) diarrhea (+) abdominal pain  : (-) dysuria (-) increased frequency  (-) hematuria (-) incontinence  MS: (-) back pain (-) myalgia (-) muscle weakness (-)  joint pain  Neuro: (-) headache (-) dizziness (-) numbness/tingling to extremities B/L (-) weakness   Skin: (-) rash (-) laceration    Except as documented in the HPI, all other systems are negative.

## 2022-01-10 NOTE — ED PROVIDER NOTE - PROGRESS NOTE DETAILS
NC: Pt unable to tolerate ct scan, very claustrophobic, would like to go home at this time, states he feels well. given return precautions. will dc at this time. BH: agree with note above, unable to tolerate CT, feeling better, abd soft, NT, labs WNL. The patient was given detailed return precautions and advised to return to the emergency department in 2-3 days if not improving or sooner if any new symptoms developed, symptoms worsened or for any concerns. The patient was offered the opportunity to ask questions and verbalized that they understand the diagnosis and discharge instructions. BH: Called in routine FU, feeling well.

## 2022-01-10 NOTE — ED ADULT NURSE NOTE - OBJECTIVE STATEMENT
Presents to ED with abdominal pain & weakness x1 week; loose stools x1 week & n/v today. States completed full course of dialysis today- fistula to RUE.

## 2022-01-10 NOTE — ED PROVIDER NOTE - PHYSICAL EXAMINATION
GENERAL: Well-nourished, Well-developed. NAD.  HEAD: No visible or palpable bumps or hematomas. No ecchymosis behind ears B/L.  Eyes: PERRLA, EOMI. No asymmetry. No nystagmus. No conjunctival injection. Non-icteric sclera.  ENMT: MMM.   Neck: Supple. FROM  CVS: RRR. Normal S1,S2. No murmurs appreciated on auscultation   RESP: No use of accessory muscles. Chest rise symmetrical with good expansion. Lungs clear to auscultation B/L. No wheezing, rales, or rhonchi auscultated.  GI: Normal auscultation of bowel sounds in all 4 quadrants. (+)mild diffuse abd ttp. Soft, Nondistended. No guarding or rebound tenderness. No CVAT B/L.  Skin: Warm, Dry. No rashes or lesions. Good cap refill < 2 sec B/L.  EXT: Radial and pedal pulses present B/L. No calf tenderness or swelling B/L. No palpable cords. No pedal edema B/L.

## 2022-01-10 NOTE — ED PROVIDER NOTE - ATTENDING CONTRIBUTION TO CARE
62 y/o male h/o HTN, HLD, CAD, DVT (no AC), ESRD (M-Fri, fully dialyzed today) p/w diffuse abdominal discomfort and gen weakness x 1 week, persistent, denies modifying factors + loose stools x 1 week and nbnb vomiting today, passing flatus, denies fever, cough, respiratory sx, urinary sx or other associated complaints at present.     Old chart reviewed.  I have reviewed and agree with the initial nursing note, except as documented in my note.    VSS, awake, alert, non-toxic appearing, oropharynx clear, mmm, no jaundice, skin rash or lesions, chest CTAB, non-labored breathing, no w/r/r, +S1/S2, RRR, no m/r/g, abdomen soft, mild diffuse ttp w/o peritoneal signs, ND, +BS, no hernias or distention, no pulsatile masses or bruits appreciated, no CVA tenderness, no peripheral edema or deformities, alert, clear speech and steady gait.

## 2022-01-24 DIAGNOSIS — E83.39 OTHER DISORDERS OF PHOSPHORUS METABOLISM: ICD-10-CM

## 2022-03-07 RX ORDER — CALCIUM ACETATE 667 MG/1
667 TABLET ORAL
Qty: 180 | Refills: 11 | Status: ACTIVE | COMMUNITY
Start: 2022-01-28 | End: 1900-01-01

## 2022-03-11 NOTE — ED PROVIDER NOTE - PHYSICAL EXAMINATION
CONSTITUTIONAL: Well-appearing; well-nourished; in no apparent distress.   NECK: Supple; non-tender; no cervical lymphadenopathy.   CARDIOVASCULAR: Normal S1, S2; no murmurs, rubs, or gallops.   RESPIRATORY: Normal chest excursion with respiration; breath sounds clear and equal bilaterally; no wheezes, rhonchi, or rales.  GI/: Normal bowel sounds; non-distended; non-tender; no palpable organomegaly.   MS: No evidence of trauma or deformity.  Normal ROM in all four extremities; non-tender to palpation; distal pulses are normal.   SKIN: Normal for age and race; warm; dry; good turgor; no apparent lesions or exudate.   NEURO/PSYCH: A & O x 4; grossly unremarkable. mood and manner are appropriate. Grooming and personal hygiene are appropriate.
9

## 2022-03-29 ENCOUNTER — APPOINTMENT (OUTPATIENT)
Dept: VASCULAR SURGERY | Facility: CLINIC | Age: 62
End: 2022-03-29

## 2022-06-03 NOTE — PHYSICAL THERAPY INITIAL EVALUATION ADULT - LEVEL OF CONSCIOUSNESS, REHAB EVAL
alert
[FreeTextEntry1] : Patient to have a transvaginal sonogram today and if the endometrium is thickened she will return for an endometrial biopsy.
alert

## 2022-06-17 NOTE — PRE-ANESTHESIA EVALUATION ADULT - NSATTENDATTESTRD_GEN_ALL_CORE
Please inform of normal biopsy results; ECC and all 3 cervical biopsies negative for dysplasia.  Would recommend repeat co-testing in 1 year
The patient has been re-examined and I agree with the above assessment or I updated with my findings.

## 2022-10-14 NOTE — ED PROVIDER NOTE - CCCP TRG CHIEF CMPLNT
medical evaluation Quality 431: Preventive Care And Screening: Unhealthy Alcohol Use - Screening: Patient not identified as an unhealthy alcohol user when screened for unhealthy alcohol use using a systematic screening method Quality 226: Preventive Care And Screening: Tobacco Use: Screening And Cessation Intervention: Patient screened for tobacco use and is an ex/non-smoker Quality 130: Documentation Of Current Medications In The Medical Record: Current Medications Documented Detail Level: Detailed Quality 110: Preventive Care And Screening: Influenza Immunization: Influenza Immunization previously received during influenza season

## 2022-11-28 NOTE — ASU DISCHARGE PLAN (ADULT/PEDIATRIC). - NS AS DC DISCHARGE ACCOMPANY
Pt. Voided 50 cc, PVR 0cc  Electronically signed by Nirali Washington RN on 11/28/2022 at 2:19 PM Family

## 2022-12-02 NOTE — ASU PATIENT PROFILE, ADULT - REASON FOR ADMISSION, PROFILE
[Auscultation Breath Sounds / Voice Sounds] : lungs were clear to auscultation bilaterally [Heart Rate And Rhythm] : heart rate was normal and rhythm regular [Heart Sounds] : normal S1 and S2 [Heart Sounds Gallop] : no gallops [Murmurs] : no murmurs [Heart Sounds Pericardial Friction Rub] : no pericardial rub [Bowel Sounds] : normal bowel sounds [Abdomen Soft] : soft [Abdomen Tenderness] : non-tender [] : no hepato-splenomegaly [Abdomen Mass (___ Cm)] : no abdominal mass palpated Fistulagram of R arm. Booked incorrectly. OR made aware and MD hampton made aware

## 2023-02-14 NOTE — ASU PREOP CHECKLIST - TEMPERATURE IN FAHRENHEIT (DEGREES F)
Call to pt -  appt rescheduled per patient request due to her panic attacks and PTSD.  Patient states her son was diagnosed \"officially with moderate to severe autism\" and wanted it recorded in chart.  No further questions or concerns voiced. Patient verbalizes understanding.    99.2

## 2023-02-20 NOTE — ED ADULT NURSE NOTE - NS ED NURSE REPORT GIVEN DT
Pt seen at bedside 2/21/23 in a.m. Primarily c/o pain & swelling in B/L ankles. Buttocks exam not significantly changed, multiple soft areas of fluctuance.    HS flare, uncertain etiology of ankle pain; denies prior trauma.  -Appreciate acute management by ACS team.  -Consider rheum or ortho eval for ankle complaints  -F/u as outpt to continue to plan for formal excision of B/L buttocks Pt seen at bedside 2/21/23 in a.m. Primarily c/o pain & swelling in B/L ankles. Buttocks exam not significantly changed, multiple soft areas of fluctuance.    BLE venous duplex negative; B/L ankle X-ray read as negative.    HS flare, uncertain etiology of ankle pain; denies prior trauma.  -Appreciate acute management by ACS team.  -Consider rheum or ortho eval for ankle complaints  -F/u as outpt to continue to plan for formal excision of B/L buttocks 08-Mar-2018 19:46

## 2023-03-16 NOTE — ED PROVIDER NOTE - PHYSICAL EXAMINATION
Type of Form Received: [facility]    Form Received (Date) 3/16/23   Form Filled out No   Placed in provider folder Yes     
CONSTITUTIONAL: Well-developed; well-nourished; in no acute distress.   SKIN: warm, dry  HEAD: Normocephalic; atraumatic.  CARD: S1, S2 normal; no murmurs, gallops, or rubs. Regular rate and rhythm.   RESP: No wheezes, rales or rhonchi.  ABD: soft ntnd  EXT: + palpable thrill noted on right upper arm with oozing blood noted from puncture wound from needle, no pulsatile bleeding noted  NEURO: Alert, oriented, grossly unremarkable  PSYCH: Cooperative, appropriate.

## 2023-04-08 NOTE — H&P ADULT - ENMT
detailed exam
Authored by Dr. Mcnamara: spoke to pt and son explained all results including tx of hyperkalemia, pt significantly improved w symptoms resolving including pallor

## 2023-05-23 NOTE — PHYSICAL THERAPY INITIAL EVALUATION ADULT - STANDING BALANCE: STATIC
Spinal Block    Patient location during procedure: OR  Start time: 5/23/2023 8:25 AM  Reason for block: primary anesthetic  Staffing  Performed: CRNA   Anesthesiologist: Bernadette Sorto DO  Resident/CRNA: Theresa Sagastume CRNA  Preanesthetic Checklist  Completed: patient identified, IV checked, site marked, risks and benefits discussed, surgical consent, monitors and equipment checked, pre-op evaluation and timeout performed  Spinal Block  Patient position: sitting  Prep: ChloraPrep  Patient monitoring: heart rate, cardiac monitor, continuous pulse ox and frequent blood pressure checks  Approach: midline  Location: L3-4  Injection technique: single-shot  Needle  Needle type: Pencan  Needle gauge: 25 G  Assessment  Sensory level: T4  Injection Assessment:  negative aspiration for heme, no paresthesia on injection and positive aspiration for clear CSF    Post-procedure:  site cleaned
poor minus

## 2023-05-31 NOTE — PRE-ANESTHESIA EVALUATION ADULT - NSANTHBMIRD_ENT_A_CORE
No
No
Cantharidin Counseling:  I discussed with the patient the risks of Cantharidin including but not limited to pain, redness, burning, itching, and blistering.

## 2023-07-13 NOTE — ED ADULT TRIAGE NOTE - MODE OF ARRIVAL
Walk in Rinvoq Pregnancy And Lactation Text: Based on animal studies, Rinvoq may cause embryo-fetal harm when administered to pregnant women.  The medication should not be used in pregnancy.  Breastfeeding is not recommended during treatment and for 6 days after the last dose.

## 2023-08-18 NOTE — PROGRESS NOTE ADULT - EXTREMITIES COMMENTS
l heel no change
vacc in place
Left achilles wound with necrotic changes.
Left heel at insertion of achilles tendon large, malodorous necrotic ulcer. No pulses
no change left heel
vacc in place
Left foot with no change in necrotic changes
Left foot with vacc dressing
Left heel with foul smell
left foot with no change in gangrenous changes
no change in left foot
no change in left foot
Cheiloplasty (Less Than 50%) Text: A decision was made to reconstruct the defect with a  cheiloplasty.  The defect was undermined extensively.  Additional obicularis oris muscle was excised with a 15 blade scalpel.  The defect was converted into a full thickness wedge, of less than 50% of the vertical height of the lip, to facilite a better cosmetic result.  Small vessels were then tied off with 5-0 monocyrl. The obicularis oris, superficial fascia, adipose and dermis were then reapproximated.  After the deeper layers were approximated the epidermis was reapproximated with particular care given to realign the vermilion border.

## 2023-10-03 NOTE — PRE-OP CHECKLIST - ISOLATION PRECAUTIONS
Detail Level: Detailed
Biopsy Photograph Reviewed: Yes
Number Of Curettages: 3
none
Size Of Lesion In Cm: 0.6
Add Intralesional Injection: No
Total Volume (Ccs): 1
Anesthesia Type: 1% lidocaine with epinephrine
Cautery Type: electrodesiccation
What Was Performed First?: Curettage
none
Final Size Statement: The size of the lesion after curettage was
Additional Information: (Optional): The wound was cleaned, and a pressure dressing was applied.  The patient received detailed post-op instructions.
Consent was obtained from the patient. The risks, benefits and alternatives to therapy were discussed in detail. Specifically, the risks of infection, scarring, bleeding, prolonged wound healing, nerve injury, incomplete removal, allergy to anesthesia and recurrence were addressed. Alternatives to ED&C, such as: surgical removal and XRT were also discussed.  Prior to the procedure, the treatment site was clearly identified and confirmed by the patient. All components of Universal Protocol/PAUSE Rule completed.
none
Post-Care Instructions: I reviewed with the patient in detail post-care instructions. Patient is to keep the area dry for 48 hours, and not to engage in any swimming until the area is healed. Should the patient develop any fevers, chills, bleeding, severe pain patient will contact the office immediately.
Bill As A Line Item Or As Units: Line Item
none

## 2023-10-31 ENCOUNTER — APPOINTMENT (OUTPATIENT)
Dept: CARDIOLOGY | Facility: CLINIC | Age: 63
End: 2023-10-31

## 2023-11-20 NOTE — ED ADULT NURSE NOTE - BREATHING, MLM
Pt sent from UC for perirectal abscess x 2 weeks. Currently draining. Pt denies fevers. Spontaneous, unlabored and symmetrical

## 2024-01-08 NOTE — H&P ADULT - HISTORY OF PRESENT ILLNESS
Detail Level: Detailed
56y/o M w/ hx of brain tumor--acromegaly, diabetes, htn, ckd stage on dialysis MWF, makes urine, htn, 2 blood transfusion recently being worked up--  pt on rehab after recent admission for infection to feet presents for hg under 7 at rehab-[facility]; and for worsening of left leg ulcer.  pt had dialysis today and was given iv antibiotics after.  pt has been feeling chills; no fever. no abdominal pain, cp or sob. +generalized weakness.  no blood in stool
Detail Level: Simple

## 2024-03-17 ENCOUNTER — EMERGENCY (EMERGENCY)
Facility: HOSPITAL | Age: 64
LOS: 0 days | Discharge: HOME | End: 2024-03-18
Attending: EMERGENCY MEDICINE
Payer: MEDICARE

## 2024-03-17 VITALS
WEIGHT: 240.08 LBS | OXYGEN SATURATION: 99 % | TEMPERATURE: 98 F | RESPIRATION RATE: 20 BRPM | DIASTOLIC BLOOD PRESSURE: 76 MMHG | SYSTOLIC BLOOD PRESSURE: 172 MMHG | HEART RATE: 67 BPM | HEIGHT: 75 IN

## 2024-03-17 DIAGNOSIS — E11.51 TYPE 2 DIABETES MELLITUS WITH DIABETIC PERIPHERAL ANGIOPATHY WITHOUT GANGRENE: ICD-10-CM

## 2024-03-17 DIAGNOSIS — Z88.1 ALLERGY STATUS TO OTHER ANTIBIOTIC AGENTS: ICD-10-CM

## 2024-03-17 DIAGNOSIS — Z79.01 LONG TERM (CURRENT) USE OF ANTICOAGULANTS: ICD-10-CM

## 2024-03-17 DIAGNOSIS — I82.432 ACUTE EMBOLISM AND THROMBOSIS OF LEFT POPLITEAL VEIN: ICD-10-CM

## 2024-03-17 DIAGNOSIS — N18.6 END STAGE RENAL DISEASE: ICD-10-CM

## 2024-03-17 DIAGNOSIS — I25.10 ATHEROSCLEROTIC HEART DISEASE OF NATIVE CORONARY ARTERY WITHOUT ANGINA PECTORIS: ICD-10-CM

## 2024-03-17 DIAGNOSIS — E11.22 TYPE 2 DIABETES MELLITUS WITH DIABETIC CHRONIC KIDNEY DISEASE: ICD-10-CM

## 2024-03-17 DIAGNOSIS — T82.510A BREAKDOWN (MECHANICAL) OF SURGICALLY CREATED ARTERIOVENOUS FISTULA, INITIAL ENCOUNTER: Chronic | ICD-10-CM

## 2024-03-17 DIAGNOSIS — Z79.82 LONG TERM (CURRENT) USE OF ASPIRIN: ICD-10-CM

## 2024-03-17 DIAGNOSIS — I12.0 HYPERTENSIVE CHRONIC KIDNEY DISEASE WITH STAGE 5 CHRONIC KIDNEY DISEASE OR END STAGE RENAL DISEASE: ICD-10-CM

## 2024-03-17 DIAGNOSIS — Z98.890 OTHER SPECIFIED POSTPROCEDURAL STATES: Chronic | ICD-10-CM

## 2024-03-17 DIAGNOSIS — Z79.02 LONG TERM (CURRENT) USE OF ANTITHROMBOTICS/ANTIPLATELETS: ICD-10-CM

## 2024-03-17 DIAGNOSIS — Z87.898 PERSONAL HISTORY OF OTHER SPECIFIED CONDITIONS: Chronic | ICD-10-CM

## 2024-03-17 DIAGNOSIS — Z99.2 DEPENDENCE ON RENAL DIALYSIS: ICD-10-CM

## 2024-03-17 DIAGNOSIS — M79.662 PAIN IN LEFT LOWER LEG: ICD-10-CM

## 2024-03-17 DIAGNOSIS — E78.5 HYPERLIPIDEMIA, UNSPECIFIED: ICD-10-CM

## 2024-03-17 DIAGNOSIS — I82.442 ACUTE EMBOLISM AND THROMBOSIS OF LEFT TIBIAL VEIN: ICD-10-CM

## 2024-03-17 DIAGNOSIS — M79.89 OTHER SPECIFIED SOFT TISSUE DISORDERS: ICD-10-CM

## 2024-03-17 DIAGNOSIS — E11.40 TYPE 2 DIABETES MELLITUS WITH DIABETIC NEUROPATHY, UNSPECIFIED: ICD-10-CM

## 2024-03-17 PROCEDURE — 99284 EMERGENCY DEPT VISIT MOD MDM: CPT | Mod: 25

## 2024-03-17 PROCEDURE — 93970 EXTREMITY STUDY: CPT | Mod: 26

## 2024-03-17 PROCEDURE — 93970 EXTREMITY STUDY: CPT

## 2024-03-17 PROCEDURE — 99284 EMERGENCY DEPT VISIT MOD MDM: CPT | Mod: FS

## 2024-03-17 RX ORDER — APIXABAN 2.5 MG/1
1 TABLET, FILM COATED ORAL
Qty: 60 | Refills: 0
Start: 2024-03-17 | End: 2024-04-15

## 2024-03-17 RX ORDER — APIXABAN 2.5 MG/1
5 TABLET, FILM COATED ORAL ONCE
Refills: 0 | Status: COMPLETED | OUTPATIENT
Start: 2024-03-17 | End: 2024-03-17

## 2024-03-17 RX ADMIN — APIXABAN 5 MILLIGRAM(S): 2.5 TABLET, FILM COATED ORAL at 22:51

## 2024-03-17 NOTE — ED PROVIDER NOTE - CLINICAL SUMMARY MEDICAL DECISION MAKING FREE TEXT BOX
63-year-old male, history of ESRD on HD, HTN, DM, CAD, neuropathy, here in ED for left leg pain and swelling.  No fever.  Venous duplex positive for left popliteal and left posterior tibial thrombus.  Discussed with renal who recommends Eliquis.  Will DC and refer to vascular surgery and nephrology

## 2024-03-17 NOTE — ED PROVIDER NOTE - PROVIDER TOKENS
PROVIDER:[TOKEN:[[id number]],FOLLOWUP:[1-3 Days]],PROVIDER:[TOKEN:[[id number]],FOLLOWUP:[1-3 Days]]

## 2024-03-17 NOTE — ED PROVIDER NOTE - PHYSICAL EXAMINATION
VITAL SIGNS: I have reviewed nursing notes and confirm.  CONSTITUTIONAL: Well-developed; well-nourished; in no acute distress.  SKIN: Skin exam is warm and dry, no acute rash.  HEAD: Normocephalic; atraumatic.  EYES: Conjunctiva and sclera clear.  ENT: No nasal discharge; airway clear.   CARD: S1, S2 normal; no murmurs, gallops, or rubs. Regular rate and rhythm.  RESP: No wheezes, rales or rhonchi. Speaking in full sentences.   ABD: Normal bowel sounds; soft; non-distended; non-tender; No rebound or guarding. No CVA tenderness.  EXT: Normal ROM. (+) TTP and swelling to L calf.   NEURO: Alert, oriented. Grossly unremarkable. No focal deficits.

## 2024-03-17 NOTE — ED PROVIDER NOTE - PROGRESS NOTE DETAILS
Discussed case with nephrology fellow, agrees with Eliquis 5 mg twice daily. Patient being initiated on Eliquis, discussed all risks and side effects at bedside, given return precautions.  Patient comfortable with DC and will follow-up with his vascular surgeon and his nephrologist.

## 2024-03-17 NOTE — ED PROVIDER NOTE - CARE PROVIDERS DIRECT ADDRESSES
[email or web address]

## 2024-03-17 NOTE — ED PROVIDER NOTE - OBJECTIVE STATEMENT
63-year-old male with past medical history of HTN, HLD, DM, CAD, ESRD on HD M/W/F, and PAD presents to the ED for evaluation of worsening left lower leg pain and swelling over the last few days.  Patient endorses he recently has been moving around last and has stopped elevating his legs when he sleeps.  Patient concerned about possible infection or blood clot prompting ED eval.  He is compliant with all his medications.  He denies other complaints. Pt denies fever, chills, nausea, vomiting, abdominal pain, diarrhea, headache, dizziness, weakness, chest pain, SOB, back pain, LOC, trauma, urinary symptoms, cough.

## 2024-03-17 NOTE — ED PROVIDER NOTE - PATIENT PORTAL LINK FT
You can access the FollowMyHealth Patient Portal offered by [facility] by registering at the following website: [email or web address]. By joining [employer]’s FollowMyHealth portal, you will also be able to view your health information using other applications (apps) compatible with our system.

## 2024-03-17 NOTE — ED PROVIDER NOTE - NSFOLLOWUPINSTRUCTIONS_ED_ALL_ED_FT
Deep Vein Thrombosis    WHAT YOU NEED TO KNOW:    Deep vein thrombosis (DVT) is a blood clot that forms in a deep vein of the body. The DVT can break into smaller pieces and travel to your lungs and cause a blockage called a pulmonary embolism. A PE can become life-threatening. It is important to go to all follow-up appointments and to take blood thinners as directed. This is especially important if you were discharged home from the emergency department.Thrombus and Embolus         DISCHARGE INSTRUCTIONS:    Call your local emergency number (911 in the [place]) if:     You feel lightheaded, short of breath, and have chest pain.      You cough up blood.    Call your doctor if:     Your arm or leg feels warm, tender, and painful. It may look swollen and red.      You have questions or concerns about your condition or care.    Medicines:     Blood thinners help prevent blood clots. Clots can cause strokes, heart attacks, and death. The following are general safety guidelines to follow while you are taking a blood thinner:  Watch for bleeding and bruising while you take blood thinners. Watch for bleeding from your gums or nose. Watch for blood in your urine and bowel movements. Use a soft washcloth on your skin, and a soft toothbrush to brush your teeth. This can keep your skin and gums from bleeding. If you shave, use an electric shaver. Do not play contact sports.       Tell your dentist and other healthcare providers that you take a blood thinner. Wear a bracelet or necklace that says you take this medicine.       Do not start or stop any other medicines unless your healthcare provider tells you to. Many medicines cannot be used with blood thinners.      Take your blood thinner exactly as prescribed by your healthcare provider. Do not skip does or take less than prescribed. Tell your provider right away if you forget to take your blood thinner, or if you take too much.      Warfarin is a blood thinner that you may need to take. The following are things you should be aware of if you take warfarin:   Foods and medicines can affect the amount of warfarin in your blood. Do not make major changes to your diet while you take warfarin. Warfarin works best when you eat about the same amount of vitamin K every day. Vitamin K is found in green leafy vegetables and certain other foods. Ask for more information about what to eat when you are taking warfarin.      You will need to see your healthcare provider for follow-up visits when you are on warfarin. You will need regular blood tests. These tests are used to decide how much medicine you need.       Take your medicine as directed. Contact your healthcare provider if you think your medicine is not helping or if you have side effects. Tell him or her if you are allergic to any medicine. Keep a list of the medicines, vitamins, and herbs you take. Include the amounts, and when and why you take them. Bring the list or the pill bottles to follow-up visits. Carry your medicine list with you in case of an emergency.    Manage a DVT:     Wear pressure stockings as directed. The stockings put pressure on your legs. This improves blood flow and helps prevent clots. Wear the stockings during the day. Do not wear them when you sleep.Pressure Stockings            Elevate your legs above the level of your heart. Elevate your legs when you sit or lie down, as often as you can. This will help decrease swelling and pain. Prop your legs on pillows or blankets to keep them elevated comfortably. Elevate Limb         Prevent a DVT:     Exercise regularly to help increase your blood flow. Walking is a good low-impact exercise. Talk to your healthcare provider about the best exercise plan for you. Walking for Exercise           Change your body position or move around often. Move and stretch in your seat several times each hour if you travel by car or work at a desk. In an airplane, get up and walk every hour. Move your legs by tightening and releasing your leg muscles while sitting. You can move your legs while sitting by raising and lowering your heels. Keep your toes on the floor while you do this. You can also raise and lower your toes while keeping your heels on the floor.DVT Prevention Heel Raise     DVT Prevention Toe Raise           Maintain a healthy weight. Ask your healthcare provider how much you should weigh. Ask him or her to help you create a weight loss plan if you are overweight.       Do not smoke. Nicotine and other chemicals in cigarettes and cigars can damage blood vessels and make it more difficult to manage your DVT. Ask your healthcare provider for information if you currently smoke and need help to quit. E-cigarettes or smokeless tobacco still contain nicotine. Talk to your healthcare provider before you use these products.      Ask about birth control if you are a woman who takes the pill. A birth control pill increases the risk for PE in certain women. The risk is higher if you are also older than 35, smoke cigarettes, or have a blood clotting disorder. Talk to your healthcare provider about other ways to prevent pregnancy, such as a cervical cap or intrauterine device (IUD).    Follow up with your doctor or specialist as directed: You may need to come in regularly for scans to check for blood clots. Your blood may checked to see how long it takes to clot. Your doctor or specialist will tell you if you need to have this test and how often to have it. Write down your questions so you remember to ask them during your visits.       © Copyright [employer] 2019 All illustrations and images included in CareNotes are the copyrighted property of [employer] or [employer].

## 2024-03-17 NOTE — ED PROVIDER NOTE - CARE PROVIDER_API CALL
Adan Comer  Vascular Surgery  [address]  [place] [address]  Phone: [phone]  Fax: [phone]  Follow Up Time: 1-3 Days    Velia Beverly  Nephrology  [address] [place] [address]  Phone: [phone]  Fax: [phone]  Follow Up Time: 1-3 Days

## 2024-03-17 NOTE — ED PROVIDER NOTE - ATTENDING APP SHARED VISIT CONTRIBUTION OF CARE
63-year-old male, history of ESRD on HD, HTN, DM, CAD, neuropathy, presents to the ED with left leg pain and swelling for the past few days.  No trauma or fever.  Exam shows alert patient in no distress, HEENT NCAT PERRL, neck supple, lungs clear, RR S1S2, abdomen soft NT +BS, swollen left calf, chronic venous stasis changes, 2+ pulses.

## 2024-03-19 ENCOUNTER — NON-APPOINTMENT (OUTPATIENT)
Age: 64
End: 2024-03-19

## 2024-04-02 ENCOUNTER — APPOINTMENT (OUTPATIENT)
Dept: VASCULAR SURGERY | Facility: CLINIC | Age: 64
End: 2024-04-02
Payer: MEDICARE

## 2024-04-02 VITALS
SYSTOLIC BLOOD PRESSURE: 139 MMHG | HEIGHT: 75 IN | WEIGHT: 245 LBS | BODY MASS INDEX: 30.46 KG/M2 | DIASTOLIC BLOOD PRESSURE: 74 MMHG

## 2024-04-02 PROCEDURE — 99213 OFFICE O/P EST LOW 20 MIN: CPT

## 2024-04-02 NOTE — PHYSICAL EXAM
[0] : right 0 [2+] : left 2+ [Ankle Swelling Bilaterally] : bilaterally  [Ankle Swelling (On Exam)] : present [Varicose Veins Of Lower Extremities] : bilaterally [Ankle Swelling On The Right] : mild [Ankle Swelling On The Left] : moderate [] : present

## 2024-04-03 NOTE — ED PROVIDER NOTE - IV ALTEPLASE INCLUSION HIDDEN
What Type Of Note Output Would You Prefer (Optional)?: Standard Output How Severe Is Your Skin Lesion?: mild Is This A New Presentation, Or A Follow-Up?: Skin Lesions show

## 2024-04-04 NOTE — ASSESSMENT
[FreeTextEntry1] : The patient is a 63-year-old male who presented to the emergency department with left leg swelling.  The patient swelling has resolved.  During his hospital visit the patient had a venous duplex scan that showed chronic DVT in his left lower extremity and right posterior tibial vein thrombosis.  The patient has a history of right lower extremity DVT from 2019.  The venous duplex performed in the hospital showed chronic DVT in the popliteal vein which is essentially unchanged from his study in 2019.    The patient has a right PT vein thrombus in which I am recommending treatment for the next 4 weeks.  I will see him back in my office in 4 weeks time he can most likely discontinue his anticoagulation at that time.  I see no contraindication for him remaining on the transplant list.  The anticoagulation is more precautionary and can be stopped if the patient obtains a kidney requiring transplant.    I, Dr. Adan Comer, personally performed the evaluation and management (E/M) services for this established patient who presents today with (a) new problem(s)/exacerbation of (an) existing condition(s). That E/M includes conducting the clinically appropriate interval history &/or exam, assessing all new/exacerbated conditions, and establishing a new plan of care. Today, my PATY, Tiffani Trammell PA-C, was here to observe my evaluation and management service for this new problem/exacerbated condition and follow the plan of care established by me going forward.

## 2024-04-04 NOTE — HISTORY OF PRESENT ILLNESS
[FreeTextEntry1] : Mr. Romo is a 63 year old man with history of ESRD  he has a AVF done by Dr. Gomes.. In 2019  He had a angiogram with left SFA angioplasty and stent. The patient presented to the ED with new left leg swelling.  The patient had a venous duplex scan performed in the hospital that showed DVT in right posterior tibial vein. Chronic DVT in left popliteal vein. The patient was started on Eliquis for his lower extremity DVT.    The fistula is being use on HD with no issues.

## 2024-04-17 ENCOUNTER — NON-APPOINTMENT (OUTPATIENT)
Age: 64
End: 2024-04-17

## 2024-04-18 DIAGNOSIS — M79.89 OTHER SPECIFIED SOFT TISSUE DISORDERS: ICD-10-CM

## 2024-04-30 ENCOUNTER — APPOINTMENT (OUTPATIENT)
Dept: VASCULAR SURGERY | Facility: CLINIC | Age: 64
End: 2024-04-30
Payer: MEDICARE

## 2024-04-30 VITALS — DIASTOLIC BLOOD PRESSURE: 74 MMHG | SYSTOLIC BLOOD PRESSURE: 131 MMHG

## 2024-04-30 VITALS — WEIGHT: 245 LBS | BODY MASS INDEX: 30.46 KG/M2 | HEIGHT: 75 IN

## 2024-04-30 DIAGNOSIS — I82.493 ACUTE EMBOLISM AND THROMBOSIS OF OTHER SPECIFIED DEEP VEIN OF LOWER EXTREMITY, BILATERAL: ICD-10-CM

## 2024-04-30 PROCEDURE — 93970 EXTREMITY STUDY: CPT

## 2024-04-30 PROCEDURE — 99213 OFFICE O/P EST LOW 20 MIN: CPT

## 2024-04-30 NOTE — ASSESSMENT
[FreeTextEntry1] : The patient is a 63-year-old male who presented to the emergency department with left leg swelling.  The patient swelling has resolved.  During his hospital visit the patient had a venous duplex scan that showed chronic DVT in his left lower extremity and right posterior tibial vein thrombosis.  The patient has a history of right lower extremity DVT from 2019.  The venous duplex performed in the hospital showed chronic DVT in the popliteal vein which is essentially unchanged from his study in 2019.    I performed a venous duplex today in my office that shows chronic changes in the right popliteal vein and chronic changes in the left femoral-popliteal vein.  From my standpoint he can can discontinue anticoagulation and be placed back on the transplant list.  I will see him back in my office in 3 months time for an arterial duplex of his lower extremities or sooner if any new symptoms develop.

## 2024-04-30 NOTE — HISTORY OF PRESENT ILLNESS
[FreeTextEntry1] : Mr. Romo is a 63 year old man with history of ESRD  he has a AVF done by Dr. Gomes.. In 2019  He had a angiogram with left SFA angioplasty and stent. The patient presented to the ED with new left leg swelling.  The patient had a venous duplex scan performed in the hospital that showed DVT in right posterior tibial vein. Chronic DVT in left popliteal vein. The patient was started on Eliquis for his lower extremity DVT. The patient was taken off the transplant list because he was placed on Eliquis.  Today he presents for a follow-up evaluation as he is also having allergic reaction to Eliquis.  The patient states he stopped his Eliquis 10 days ago.    The fistula is being use on HD with no issues.

## 2024-04-30 NOTE — PHYSICAL EXAM
[0] : right 0 [2+] : left 2+ [Ankle Swelling (On Exam)] : present [Ankle Swelling Bilaterally] : bilaterally  [Varicose Veins Of Lower Extremities] : bilaterally [Ankle Swelling On The Right] : mild [] : present [Ankle Swelling On The Left] : moderate

## 2024-04-30 NOTE — DATA REVIEWED
[FreeTextEntry1] : Venous duplex right there is no evidence of acute deep venous thrombosis however chronic occlusive deep vein thrombosis identified in the popliteal vein.  Left there is no evidence of acute deep venous thrombosis however chronic occlusive deep venous thrombosis identified in the femoral-popliteal veins.

## 2024-07-02 ENCOUNTER — APPOINTMENT (OUTPATIENT)
Dept: VASCULAR SURGERY | Facility: CLINIC | Age: 64
End: 2024-07-02

## 2024-08-08 NOTE — ED PROVIDER NOTE - DATA REVIEWED, MDM
PT PRESENTING WITH IRRITATED, SWOLLEN RIGHT EYE.  PT SENT FROM .  PT SLEPT IN OD CL- SHE THOUGHT SHE REMOVED IT.  VERY LIGHT SENSITIVE AND TEARING    Medications reviewed and updated.  Denies known Latex allergy or symptoms of Latex sensitivity.  Tobacco verified.      PCP:Victoria Thorpe MD  POH:SUPERFICIAL PUNCTATE KERATITIS OU.     vital signs

## 2024-08-22 ENCOUNTER — OUTPATIENT (OUTPATIENT)
Dept: OUTPATIENT SERVICES | Facility: HOSPITAL | Age: 64
LOS: 1 days | End: 2024-08-22
Payer: MEDICARE

## 2024-08-22 VITALS
TEMPERATURE: 98 F | DIASTOLIC BLOOD PRESSURE: 75 MMHG | SYSTOLIC BLOOD PRESSURE: 132 MMHG | RESPIRATION RATE: 16 BRPM | OXYGEN SATURATION: 99 % | WEIGHT: 260.15 LBS | HEIGHT: 75 IN | HEART RATE: 63 BPM

## 2024-08-22 DIAGNOSIS — Z98.890 OTHER SPECIFIED POSTPROCEDURAL STATES: Chronic | ICD-10-CM

## 2024-08-22 DIAGNOSIS — H33.23 SEROUS RETINAL DETACHMENT, BILATERAL: Chronic | ICD-10-CM

## 2024-08-22 DIAGNOSIS — Z01.818 ENCOUNTER FOR OTHER PREPROCEDURAL EXAMINATION: ICD-10-CM

## 2024-08-22 DIAGNOSIS — T82.510A BREAKDOWN (MECHANICAL) OF SURGICALLY CREATED ARTERIOVENOUS FISTULA, INITIAL ENCOUNTER: Chronic | ICD-10-CM

## 2024-08-22 DIAGNOSIS — Z95.5 PRESENCE OF CORONARY ANGIOPLASTY IMPLANT AND GRAFT: Chronic | ICD-10-CM

## 2024-08-22 DIAGNOSIS — Z98.49 CATARACT EXTRACTION STATUS, UNSPECIFIED EYE: Chronic | ICD-10-CM

## 2024-08-22 DIAGNOSIS — N18.6 END STAGE RENAL DISEASE: ICD-10-CM

## 2024-08-22 DIAGNOSIS — Z87.898 PERSONAL HISTORY OF OTHER SPECIFIED CONDITIONS: Chronic | ICD-10-CM

## 2024-08-22 LAB
A1C WITH ESTIMATED AVERAGE GLUCOSE RESULT: 6.3 % — HIGH (ref 4–5.6)
ALBUMIN SERPL ELPH-MCNC: 4 G/DL — SIGNIFICANT CHANGE UP (ref 3.5–5.2)
ALP SERPL-CCNC: 119 U/L — HIGH (ref 30–115)
ALT FLD-CCNC: 7 U/L — SIGNIFICANT CHANGE UP (ref 0–41)
ANION GAP SERPL CALC-SCNC: 12 MMOL/L — SIGNIFICANT CHANGE UP (ref 7–14)
APTT BLD: 32.8 SEC — SIGNIFICANT CHANGE UP (ref 27–39.2)
AST SERPL-CCNC: 9 U/L — SIGNIFICANT CHANGE UP (ref 0–41)
BASOPHILS # BLD AUTO: 0.05 K/UL — SIGNIFICANT CHANGE UP (ref 0–0.2)
BASOPHILS NFR BLD AUTO: 0.9 % — SIGNIFICANT CHANGE UP (ref 0–1)
BILIRUB SERPL-MCNC: 0.3 MG/DL — SIGNIFICANT CHANGE UP (ref 0.2–1.2)
BUN SERPL-MCNC: 15 MG/DL — SIGNIFICANT CHANGE UP (ref 10–20)
CALCIUM SERPL-MCNC: 9.3 MG/DL — SIGNIFICANT CHANGE UP (ref 8.4–10.5)
CHLORIDE SERPL-SCNC: 97 MMOL/L — LOW (ref 98–110)
CO2 SERPL-SCNC: 29 MMOL/L — SIGNIFICANT CHANGE UP (ref 17–32)
CREAT SERPL-MCNC: 4.3 MG/DL — CRITICAL HIGH (ref 0.7–1.5)
EGFR: 15 ML/MIN/1.73M2 — LOW
EOSINOPHIL # BLD AUTO: 0.3 K/UL — SIGNIFICANT CHANGE UP (ref 0–0.7)
EOSINOPHIL NFR BLD AUTO: 5.2 % — SIGNIFICANT CHANGE UP (ref 0–8)
ESTIMATED AVERAGE GLUCOSE: 134 MG/DL — HIGH (ref 68–114)
GLUCOSE SERPL-MCNC: 144 MG/DL — HIGH (ref 70–99)
HCT VFR BLD CALC: 33.7 % — LOW (ref 42–52)
HGB BLD-MCNC: 10.7 G/DL — LOW (ref 14–18)
IMM GRANULOCYTES NFR BLD AUTO: 0.3 % — SIGNIFICANT CHANGE UP (ref 0.1–0.3)
INR BLD: 0.97 RATIO — SIGNIFICANT CHANGE UP (ref 0.65–1.3)
LYMPHOCYTES # BLD AUTO: 1.01 K/UL — LOW (ref 1.2–3.4)
LYMPHOCYTES # BLD AUTO: 17.6 % — LOW (ref 20.5–51.1)
MCHC RBC-ENTMCNC: 31.8 G/DL — LOW (ref 32–37)
MCHC RBC-ENTMCNC: 32.4 PG — HIGH (ref 27–31)
MCV RBC AUTO: 102.1 FL — HIGH (ref 80–94)
MONOCYTES # BLD AUTO: 0.63 K/UL — HIGH (ref 0.1–0.6)
MONOCYTES NFR BLD AUTO: 11 % — HIGH (ref 1.7–9.3)
NEUTROPHILS # BLD AUTO: 3.74 K/UL — SIGNIFICANT CHANGE UP (ref 1.4–6.5)
NEUTROPHILS NFR BLD AUTO: 65 % — SIGNIFICANT CHANGE UP (ref 42.2–75.2)
NRBC # BLD: 0 /100 WBCS — SIGNIFICANT CHANGE UP (ref 0–0)
PLATELET # BLD AUTO: 169 K/UL — SIGNIFICANT CHANGE UP (ref 130–400)
PMV BLD: 10.4 FL — SIGNIFICANT CHANGE UP (ref 7.4–10.4)
POTASSIUM SERPL-MCNC: 3.8 MMOL/L — SIGNIFICANT CHANGE UP (ref 3.5–5)
POTASSIUM SERPL-SCNC: 3.8 MMOL/L — SIGNIFICANT CHANGE UP (ref 3.5–5)
PROT SERPL-MCNC: 6.3 G/DL — SIGNIFICANT CHANGE UP (ref 6–8)
PROTHROM AB SERPL-ACNC: 11.1 SEC — SIGNIFICANT CHANGE UP (ref 9.95–12.87)
RBC # BLD: 3.3 M/UL — LOW (ref 4.7–6.1)
RBC # FLD: 15.6 % — HIGH (ref 11.5–14.5)
SODIUM SERPL-SCNC: 138 MMOL/L — SIGNIFICANT CHANGE UP (ref 135–146)
WBC # BLD: 5.75 K/UL — SIGNIFICANT CHANGE UP (ref 4.8–10.8)
WBC # FLD AUTO: 5.75 K/UL — SIGNIFICANT CHANGE UP (ref 4.8–10.8)

## 2024-08-22 PROCEDURE — 99214 OFFICE O/P EST MOD 30 MIN: CPT | Mod: 25

## 2024-08-22 PROCEDURE — 85730 THROMBOPLASTIN TIME PARTIAL: CPT

## 2024-08-22 PROCEDURE — 85610 PROTHROMBIN TIME: CPT

## 2024-08-22 PROCEDURE — 93005 ELECTROCARDIOGRAM TRACING: CPT

## 2024-08-22 PROCEDURE — 93010 ELECTROCARDIOGRAM REPORT: CPT

## 2024-08-22 PROCEDURE — 83036 HEMOGLOBIN GLYCOSYLATED A1C: CPT

## 2024-08-22 PROCEDURE — 80053 COMPREHEN METABOLIC PANEL: CPT

## 2024-08-22 PROCEDURE — 36415 COLL VENOUS BLD VENIPUNCTURE: CPT

## 2024-08-22 PROCEDURE — 85025 COMPLETE CBC W/AUTO DIFF WBC: CPT

## 2024-08-22 RX ORDER — METOPROLOL TARTRATE 100 MG/1
1 TABLET ORAL
Refills: 0 | DISCHARGE

## 2024-08-22 NOTE — H&P PST ADULT - NSICDXPASTMEDICALHX_GEN_ALL_CORE_FT
PAST MEDICAL HISTORY:  Acid reflux     Acromegaly     CAD (coronary atherosclerotic disease)     DM (diabetes mellitus), type 2     DVT (deep venous thrombosis) left leg w stent    Dyslipidemia     ESRD on dialysis     Gastric ulcer     H/O neuropathy     History of blood transfusion     HTN (hypertension)

## 2024-08-22 NOTE — H&P PST ADULT - HISTORY OF PRESENT ILLNESS
63y Male presents today for presurgical testing for RIGHT ARM FISTULAGRAM, POSSIBLE ENDOVASCULAR REVASCULARIZATION. Patient states HMD is done M-W-F at [facility]. He endorses being on dialysis for 6 years and is currently on transplant list. He denies pain and offers no other complaints at this time, now for scheduled procedure.   Patient/guardian denies any CP, palpitations, SOB, cough, or dysuria. No recent URI or UTI.  Stated exercise tolerance is FOS unable, ambulates with walker  JABARI screen reviewed    Patient/guardian denies any recent personal exposure to COVID19. Denies any sick contacts. Patient/guardian denies travel within the past 30 days. Patient was instructed to quarantine until after procedure.    Anesthesia Alert  NO--Difficult Airway - Class II  NO--History of neck surgery or radiation  NO--Limited ROM of neck  NO--History of Malignant hyperthermia  NO--Personal or family history of Pseudocholinesterase deficiency.  NO--Prior Anesthesia Complication  NO--Latex Allergy  NO--Loose teeth  NO--History of Rheumatoid Arthritis  NO--JABARI  YES--Bleeding risk - DAILY ASA/PLAVIX  YES--Other - Rt arm AV fistula - +bruit, + thrill    Duke Activity Status Index (DASI) from [employer]  on 8/22/2024  ** All calculations should be rechecked by clinician prior to use **    RESULT SUMMARY:  10.7 points  The higher the score (maximum 58.2), the higher the functional status.    4.06 METs        INPUTS:  Take care of self —> 2.75 = Yes  Walk indoors —> 1.75 = Yes  Walk 1&ndash;2 blocks on level ground —> 0 = No  Climb a flight of stairs or walk up a hill —> 0 = No  Run a short distance —> 0 = No  Do light work around the house —> 2.7 = Yes  Do moderate work around the house —> 3.5 = Yes  Do heavy work around the house —> 0 = No  Do yardwork —> 0 = No  Have sexual relations —> 0 = No  Participate in moderate recreational activities —> 0 = No  Participate in strenuous sports —> 0 = No    Revised Cardiac Risk Index for Pre-Operative Risk from [employer]  on 8/22/2024  ** All calculations should be rechecked by clinician prior to use **    RESULT SUMMARY:  2 points  Class III Risk    10.1 %  30-day risk of death, MI, or cardiac arrest<br><br>From Duceppe 2017. These numbers are higher than those from the original study (Stephen 1999). See Evidence for details.      INPUTS:  Elevated-risk surgery —> 1 = Yes  History of ischemic heart disease —> 1 = Yes  History of congestive heart failure —> 0 = No  History of cerebrovascular disease —> 0 = No  Pre-operative treatment with insulin —> 0 = No  Pre-operative creatinine >2 mg/dL / 176.8 µmol/L —> 0 = No    Patient/guardian states that this is their complete medical history and list of medications.  Patient/guardian understands instructions given during this visit and was given the opportunity to ask questions and have them answered. They were instructed to follow up with their surgeon/surgeon's office with any questions regarding their procedure.

## 2024-08-22 NOTE — H&P PST ADULT - NSICDXPASTSURGICALHX_GEN_ALL_CORE_FT
PAST SURGICAL HISTORY:  Breakdown of surgically created AV fistula, init RIGHT UPPER ARM    Detached retina, bilateral     H/O cataract extraction     H/O eye surgery     H/O: pituitary tumor s/p removal    History of cardiac cath     S/P arteriovenous (AV) fistula creation     Stented coronary artery     
Detail Level: Detailed

## 2024-08-22 NOTE — H&P PST ADULT - NSICDXFAMILYHX_GEN_ALL_CORE_FT
FAMILY HISTORY:  FH: HTN (hypertension)    Father  Still living? No  Family history of myocardial infarction, Age at diagnosis: Age Unknown    Mother  Still living? No  FH: liver cancer, Age at diagnosis: Age Unknown    Sibling  Still living? Yes, Estimated age: Age Unknown  FHx: thyroid disease, Age at diagnosis: Age Unknown

## 2024-08-22 NOTE — H&P PST ADULT - REASON FOR ADMISSION
Case Type: OP Block TimeSuite: OR NorthProceduralist: Adan Comer  Confirmed Surgery DateTime: 09- - 0:00PAST DateTime: 08- - 14:15Procedure: RIGHT ARM FISTULAGRAM, POSSIBLE ENDOVASCULAR REVASCULARIZATION  ERP?: NoLaterality: RightLength of Procedure: 90 Minutes  Anesthesia Type: Local Standby

## 2024-08-23 DIAGNOSIS — Z01.818 ENCOUNTER FOR OTHER PREPROCEDURAL EXAMINATION: ICD-10-CM

## 2024-08-23 DIAGNOSIS — N18.6 END STAGE RENAL DISEASE: ICD-10-CM

## 2024-08-23 NOTE — ED PROVIDER NOTE - NS_EDPROVIDERDISPOUSERTYPE_ED_A_ED
Scribe Attestation (For Scribes USE Only)... negative... Scribe Attestation (For Scribes USE Only).../Attending Attestation (For Attendings USE Only)...

## 2024-09-06 ENCOUNTER — APPOINTMENT (OUTPATIENT)
Dept: VASCULAR SURGERY | Facility: HOSPITAL | Age: 64
End: 2024-09-06

## 2024-10-01 NOTE — BRIEF OPERATIVE NOTE - SPECIMENS
Medical screening examination initiated.  I have conducted a focused provider triage encounter, findings are as follows:    Brief history of present illness:  79 year old male presents to the ER for evaluation of shortness of breath x 2 days. Family reports that they have been having to increase his oxygen at home, typically on 4L. Sent here for possible thoracentesis. +CP, +SOB, +productive cough     Vitals:    10/01/24 1446   BP: 112/66   BP Location: Right arm   Pulse: 79   Resp: (!) 25   Temp: 97.8 °F (36.6 °C)   TempSrc: Oral   SpO2: (!) 92%   Weight: 73.9 kg (163 lb)       Pertinent physical exam:  alert, non-labored, 91% on 6L NC (placed on oxymask), in wheelchair    Brief workup plan:  labs, EKG, cxr    Preliminary workup initiated; this workup will be continued and followed by the physician or advanced practice provider that is assigned to the patient when roomed.  
none

## 2024-10-29 ENCOUNTER — APPOINTMENT (OUTPATIENT)
Dept: VASCULAR SURGERY | Facility: CLINIC | Age: 64
End: 2024-10-29

## 2024-12-02 PROBLEM — Z86.69 PERSONAL HISTORY OF OTHER DISEASES OF THE NERVOUS SYSTEM AND SENSE ORGANS: Chronic | Status: ACTIVE | Noted: 2024-08-22

## 2024-12-02 PROBLEM — K25.9 GASTRIC ULCER, UNSPECIFIED AS ACUTE OR CHRONIC, WITHOUT HEMORRHAGE OR PERFORATION: Chronic | Status: ACTIVE | Noted: 2024-08-22

## 2024-12-02 PROBLEM — K21.9 GASTRO-ESOPHAGEAL REFLUX DISEASE WITHOUT ESOPHAGITIS: Chronic | Status: ACTIVE | Noted: 2024-08-22

## 2024-12-02 PROBLEM — Z92.89 PERSONAL HISTORY OF OTHER MEDICAL TREATMENT: Chronic | Status: ACTIVE | Noted: 2024-08-22

## 2024-12-05 NOTE — ED ADULT NURSE NOTE - BREATHING, MLM
Detail Level: Detailed
Spontaneous, unlabored and symmetrical
Detail Level: Zone
Detail Level: Simple

## 2025-01-14 ENCOUNTER — APPOINTMENT (OUTPATIENT)
Dept: UROLOGY | Facility: CLINIC | Age: 65
End: 2025-01-14

## 2025-02-13 NOTE — ED PROCEDURE NOTE - ATTENDING CONTRIBUTION TO CARE
patient I was present for and supervised the key/critical aspects of the procedures performed during the care of the patient.

## 2025-05-28 NOTE — ASU PATIENT PROFILE, ADULT - PAIN SCALE PREFERRED, PROFILE
Question 1  General Status  Do you have any new or worsening symptoms since leaving the hospital? If yes please reply 1, if no reply 2, if you're not sure please reply 3, or if you're feeling better reply 4.  New Symptoms (Issue Panel: [place] Clinical Intervention, Issue Alert: [place] Clinical Alert)    Question 2  Follow Up Transportation  Do you need transportation help to get to any of your healthcare appointments? Please reply 1 for yes or reply 2 for no.  Transportation Help (Issue Panel: [place] Clinical Intervention, Issue Alert: [place] Clinical Alert)    ### Required Interventions and Feedback    Call Status: Attempt 1, Answered     Clinical Concerns - Issues List: Wrong Button States that she doesn't have new symptoms and thought she pressed no.    Follow Up Appointment - Issues List: Other (Add Details in Comments)   Comments: Will be calling providers office to make appointment    SDOH - Issues List: Transportation  Comments: Uses insurance provided transport    none

## 2025-06-20 NOTE — ASU DISCHARGE PLAN (ADULT/PEDIATRIC) - A. DRIVE A CAR, OPERATE POWER TOOLS OR MACHINERY
Statement Selected
Pt coming in for difficulty breathing starting last night. cough/congestion x1 week. Denies fevers. Lungs clear, increased WOB noted. Denies pmhx. nkda. vutd. RSS8

## 2025-07-23 NOTE — DIETITIAN INITIAL EVALUATION ADULT. - 37 TO
Transitional Care Management PAUSED Episode    Patient DC from [facility] to PAN/SNF on 7/21/25  Name of PAN/SNF: [facility], [place]  Phone Number: [phone]       Episode will remain paused at this time, and a follow-up call to the facility will be scheduled next week to check on discharge planning.     Can Close CT Program Episode on 8/22/25 - If patient does not D/C from SNF within 30 days of hospital discharge   [id number]

## 2025-08-28 ENCOUNTER — EMERGENCY (EMERGENCY)
Facility: HOSPITAL | Age: 65
LOS: 0 days | Discharge: HOME | End: 2025-08-29
Attending: STUDENT IN AN ORGANIZED HEALTH CARE EDUCATION/TRAINING PROGRAM
Payer: MEDICARE

## 2025-08-28 VITALS
SYSTOLIC BLOOD PRESSURE: 161 MMHG | WEIGHT: 240.08 LBS | TEMPERATURE: 98 F | HEIGHT: 76 IN | RESPIRATION RATE: 18 BRPM | HEART RATE: 87 BPM | OXYGEN SATURATION: 100 % | DIASTOLIC BLOOD PRESSURE: 78 MMHG

## 2025-08-28 DIAGNOSIS — Z98.890 OTHER SPECIFIED POSTPROCEDURAL STATES: Chronic | ICD-10-CM

## 2025-08-28 DIAGNOSIS — Z95.5 PRESENCE OF CORONARY ANGIOPLASTY IMPLANT AND GRAFT: Chronic | ICD-10-CM

## 2025-08-28 DIAGNOSIS — Z87.898 PERSONAL HISTORY OF OTHER SPECIFIED CONDITIONS: Chronic | ICD-10-CM

## 2025-08-28 DIAGNOSIS — Z94.0 KIDNEY TRANSPLANT STATUS: ICD-10-CM

## 2025-08-28 DIAGNOSIS — Z88.1 ALLERGY STATUS TO OTHER ANTIBIOTIC AGENTS: ICD-10-CM

## 2025-08-28 DIAGNOSIS — D64.9 ANEMIA, UNSPECIFIED: ICD-10-CM

## 2025-08-28 DIAGNOSIS — T82.510A BREAKDOWN (MECHANICAL) OF SURGICALLY CREATED ARTERIOVENOUS FISTULA, INITIAL ENCOUNTER: Chronic | ICD-10-CM

## 2025-08-28 DIAGNOSIS — Z98.49 CATARACT EXTRACTION STATUS, UNSPECIFIED EYE: Chronic | ICD-10-CM

## 2025-08-28 DIAGNOSIS — H33.23 SEROUS RETINAL DETACHMENT, BILATERAL: Chronic | ICD-10-CM

## 2025-08-28 LAB
ALBUMIN SERPL ELPH-MCNC: 3.3 G/DL — LOW (ref 3.5–5.2)
ALP SERPL-CCNC: 121 U/L — HIGH (ref 30–115)
ALT FLD-CCNC: <5 U/L — SIGNIFICANT CHANGE UP (ref 0–41)
ANION GAP SERPL CALC-SCNC: 10 MMOL/L — SIGNIFICANT CHANGE UP (ref 7–14)
AST SERPL-CCNC: 7 U/L — SIGNIFICANT CHANGE UP (ref 0–41)
BASOPHILS # BLD AUTO: 0.02 K/UL — SIGNIFICANT CHANGE UP (ref 0–0.2)
BASOPHILS NFR BLD AUTO: 0.4 % — SIGNIFICANT CHANGE UP (ref 0–1)
BILIRUB SERPL-MCNC: <0.2 MG/DL — SIGNIFICANT CHANGE UP (ref 0.2–1.2)
BUN SERPL-MCNC: 24 MG/DL — HIGH (ref 10–20)
CALCIUM SERPL-MCNC: 10.5 MG/DL — SIGNIFICANT CHANGE UP (ref 8.4–10.5)
CHLORIDE SERPL-SCNC: 105 MMOL/L — SIGNIFICANT CHANGE UP (ref 98–110)
CO2 SERPL-SCNC: 22 MMOL/L — SIGNIFICANT CHANGE UP (ref 17–32)
CREAT SERPL-MCNC: 0.9 MG/DL — SIGNIFICANT CHANGE UP (ref 0.7–1.5)
EGFR: 97 ML/MIN/1.73M2 — SIGNIFICANT CHANGE UP
EGFR: 97 ML/MIN/1.73M2 — SIGNIFICANT CHANGE UP
EOSINOPHIL # BLD AUTO: 0.02 K/UL — SIGNIFICANT CHANGE UP (ref 0–0.7)
EOSINOPHIL NFR BLD AUTO: 0.4 % — SIGNIFICANT CHANGE UP (ref 0–8)
GLUCOSE SERPL-MCNC: 173 MG/DL — HIGH (ref 70–99)
HCT VFR BLD CALC: 21.2 % — LOW (ref 42–52)
HGB BLD-MCNC: 6.4 G/DL — CRITICAL LOW (ref 14–18)
IMM GRANULOCYTES NFR BLD AUTO: 1.7 % — HIGH (ref 0.1–0.3)
LYMPHOCYTES # BLD AUTO: 0.27 K/UL — LOW (ref 1.2–3.4)
LYMPHOCYTES # BLD AUTO: 5.7 % — LOW (ref 20.5–51.1)
MCHC RBC-ENTMCNC: 28.6 PG — SIGNIFICANT CHANGE UP (ref 27–31)
MCHC RBC-ENTMCNC: 30.2 G/DL — LOW (ref 32–37)
MCV RBC AUTO: 94.6 FL — HIGH (ref 80–94)
MONOCYTES # BLD AUTO: 0.72 K/UL — HIGH (ref 0.1–0.6)
MONOCYTES NFR BLD AUTO: 15.2 % — HIGH (ref 1.7–9.3)
NEUTROPHILS # BLD AUTO: 3.62 K/UL — SIGNIFICANT CHANGE UP (ref 1.4–6.5)
NEUTROPHILS NFR BLD AUTO: 76.6 % — HIGH (ref 42.2–75.2)
NRBC BLD AUTO-RTO: 0 /100 WBCS — SIGNIFICANT CHANGE UP (ref 0–0)
PLATELET # BLD AUTO: 313 K/UL — SIGNIFICANT CHANGE UP (ref 130–400)
PMV BLD: 10.2 FL — SIGNIFICANT CHANGE UP (ref 7.4–10.4)
POTASSIUM SERPL-MCNC: 5 MMOL/L — SIGNIFICANT CHANGE UP (ref 3.5–5)
POTASSIUM SERPL-SCNC: 5 MMOL/L — SIGNIFICANT CHANGE UP (ref 3.5–5)
PROT SERPL-MCNC: 5.3 G/DL — LOW (ref 6–8)
RBC # BLD: 2.24 M/UL — LOW (ref 4.7–6.1)
RBC # FLD: 15.6 % — HIGH (ref 11.5–14.5)
SODIUM SERPL-SCNC: 137 MMOL/L — SIGNIFICANT CHANGE UP (ref 135–146)
WBC # BLD: 4.73 K/UL — LOW (ref 4.8–10.8)
WBC # FLD AUTO: 4.73 K/UL — LOW (ref 4.8–10.8)

## 2025-08-28 PROCEDURE — 86923 COMPATIBILITY TEST ELECTRIC: CPT

## 2025-08-28 PROCEDURE — 82962 GLUCOSE BLOOD TEST: CPT

## 2025-08-28 PROCEDURE — 80053 COMPREHEN METABOLIC PANEL: CPT

## 2025-08-28 PROCEDURE — 85025 COMPLETE CBC W/AUTO DIFF WBC: CPT

## 2025-08-28 PROCEDURE — 99283 EMERGENCY DEPT VISIT LOW MDM: CPT

## 2025-08-28 PROCEDURE — 86850 RBC ANTIBODY SCREEN: CPT

## 2025-08-28 PROCEDURE — 86901 BLOOD TYPING SEROLOGIC RH(D): CPT

## 2025-08-28 PROCEDURE — 36430 TRANSFUSION BLD/BLD COMPNT: CPT

## 2025-08-28 PROCEDURE — 36415 COLL VENOUS BLD VENIPUNCTURE: CPT

## 2025-08-28 PROCEDURE — P9016: CPT

## 2025-08-28 PROCEDURE — 99285 EMERGENCY DEPT VISIT HI MDM: CPT

## 2025-08-28 PROCEDURE — G0378: CPT

## 2025-08-28 PROCEDURE — 86900 BLOOD TYPING SEROLOGIC ABO: CPT

## 2025-08-29 VITALS
DIASTOLIC BLOOD PRESSURE: 72 MMHG | SYSTOLIC BLOOD PRESSURE: 154 MMHG | RESPIRATION RATE: 18 BRPM | OXYGEN SATURATION: 98 % | HEART RATE: 74 BPM

## 2025-09-17 ENCOUNTER — RESULT REVIEW (OUTPATIENT)
Age: 65
End: 2025-09-17